# Patient Record
Sex: MALE | Race: WHITE | NOT HISPANIC OR LATINO | Employment: OTHER | URBAN - METROPOLITAN AREA
[De-identification: names, ages, dates, MRNs, and addresses within clinical notes are randomized per-mention and may not be internally consistent; named-entity substitution may affect disease eponyms.]

---

## 2023-08-28 ENCOUNTER — HOSPITAL ENCOUNTER (OUTPATIENT)
Dept: DATA CONVERSION | Facility: HOSPITAL | Age: 73
Discharge: HOME | End: 2023-08-28
Payer: MEDICARE

## 2023-08-28 DIAGNOSIS — R29.898 OTHER SYMPTOMS AND SIGNS INVOLVING THE MUSCULOSKELETAL SYSTEM: ICD-10-CM

## 2023-08-28 LAB
BASOPHILS # BLD AUTO: 0.1 K/UL (ref 0–0.22)
BASOPHILS NFR BLD AUTO: 1.1 % (ref 0–1)
DEPRECATED RDW RBC AUTO: 53 FL (ref 37–54)
DIFFERENTIAL METHOD BLD: ABNORMAL
EOSINOPHIL # BLD AUTO: 0.1 K/UL (ref 0–0.45)
EOSINOPHIL NFR BLD: 1.1 % (ref 0–3)
ERYTHROCYTE [DISTWIDTH] IN BLOOD BY AUTOMATED COUNT: 15.1 % (ref 11.7–15)
ERYTHROCYTE [SEDIMENTATION RATE] IN BLOOD BY WESTERGREN METHOD: 27 MM/HR (ref 0–20)
HCT VFR BLD AUTO: 34.8 % (ref 41–50)
HGB BLD-MCNC: 10.9 GM/DL (ref 13.5–16.5)
IMM GRANULOCYTES # BLD AUTO: 0.01 K/UL (ref 0–0.1)
LYMPHOCYTES # BLD AUTO: 1.3 K/UL (ref 1.2–3.2)
LYMPHOCYTES NFR BLD MANUAL: 14.3 % (ref 20–40)
MCH RBC QN AUTO: 29.8 PG (ref 26–34)
MCHC RBC AUTO-ENTMCNC: 31.3 % (ref 31–37)
MCV RBC AUTO: 95.1 FL (ref 80–100)
MONOCYTES # BLD AUTO: 0.6 K/UL (ref 0–0.8)
MONOCYTES NFR BLD MANUAL: 6.6 % (ref 0–8)
NEUTROPHILS # BLD AUTO: 6.98 K/UL
NEUTROPHILS # BLD AUTO: 6.98 K/UL (ref 1.8–7.7)
NEUTROPHILS.IMMATURE NFR BLD: 0.1 % (ref 0–1)
NEUTS SEG NFR BLD: 76.8 % (ref 50–70)
NRBC BLD-RTO: 0 /100 WBC
PLATELET # BLD AUTO: 218 K/UL (ref 150–450)
PMV BLD AUTO: 10 CU (ref 7–12.6)
RBC # BLD AUTO: 3.66 M/UL (ref 4.5–5.5)
WBC # BLD AUTO: 9.1 K/UL (ref 4.5–11)

## 2023-09-01 PROBLEM — M15.0 PRIMARY OSTEOARTHRITIS INVOLVING MULTIPLE JOINTS: Status: ACTIVE | Noted: 2023-09-01

## 2023-09-01 PROBLEM — I10 PRIMARY HYPERTENSION: Status: ACTIVE | Noted: 2023-09-01

## 2023-09-01 PROBLEM — N18.32 STAGE 3B CHRONIC KIDNEY DISEASE (MULTI): Status: ACTIVE | Noted: 2023-09-01

## 2023-09-01 PROBLEM — K25.9: Status: ACTIVE | Noted: 2023-09-01

## 2023-09-01 PROBLEM — G47.30 SLEEP APNEA: Status: ACTIVE | Noted: 2023-09-01

## 2023-09-01 PROBLEM — M15.9 PRIMARY OSTEOARTHRITIS INVOLVING MULTIPLE JOINTS: Status: ACTIVE | Noted: 2023-09-01

## 2023-09-01 PROBLEM — R11.2 NAUSEA & VOMITING: Status: ACTIVE | Noted: 2023-09-01

## 2023-09-01 PROBLEM — I11.9 BENIGN HYPERTENSIVE HEART DISEASE: Status: ACTIVE | Noted: 2023-09-01

## 2023-09-01 PROBLEM — M81.0 AGE-RELATED OSTEOPOROSIS WITHOUT CURRENT PATHOLOGICAL FRACTURE: Status: ACTIVE | Noted: 2023-09-01

## 2023-09-01 PROBLEM — H90.6 MIXED CONDUCTIVE AND SENSORINEURAL HEARING LOSS OF BOTH EARS: Status: ACTIVE | Noted: 2023-09-01

## 2023-09-01 PROBLEM — I70.90 ARTERIOSCLEROSIS: Status: ACTIVE | Noted: 2023-09-01

## 2023-09-01 PROBLEM — K57.30 COLON, DIVERTICULOSIS: Status: ACTIVE | Noted: 2023-09-01

## 2023-09-01 PROBLEM — E11.9 TYPE 2 DIABETES MELLITUS WITHOUT COMPLICATION (MULTI): Status: ACTIVE | Noted: 2023-09-01

## 2023-09-01 PROBLEM — K59.01 SLOW TRANSIT CONSTIPATION: Status: ACTIVE | Noted: 2023-09-01

## 2023-09-01 PROBLEM — D50.0 IRON DEFICIENCY ANEMIA DUE TO CHRONIC BLOOD LOSS: Status: ACTIVE | Noted: 2023-09-01

## 2023-09-01 PROBLEM — F03.918 DEMENTIA WITH BEHAVIORAL DISTURBANCE (MULTI): Status: ACTIVE | Noted: 2023-09-01

## 2023-09-01 PROBLEM — I25.10 ATHEROSCLEROSIS OF CORONARY ARTERY WITHOUT ANGINA PECTORIS: Status: ACTIVE | Noted: 2023-09-01

## 2023-09-01 PROBLEM — I25.10 CORONARY ARTERY DISEASE INVOLVING NATIVE CORONARY ARTERY OF NATIVE HEART WITHOUT ANGINA PECTORIS: Status: ACTIVE | Noted: 2023-09-01

## 2023-09-01 RX ORDER — FISH OIL/DHA/EPA 1200-144MG
1 CAPSULE ORAL DAILY
COMMUNITY
End: 2023-11-24 | Stop reason: ALTCHOICE

## 2023-09-01 RX ORDER — DULOXETIN HYDROCHLORIDE 60 MG/1
1 CAPSULE, DELAYED RELEASE ORAL DAILY
COMMUNITY
End: 2023-12-30 | Stop reason: HOSPADM

## 2023-09-01 RX ORDER — FOLIC ACID 0.4 MG
1 TABLET ORAL DAILY
COMMUNITY

## 2023-09-01 RX ORDER — SOLIFENACIN SUCCINATE 5 MG/1
1 TABLET, FILM COATED ORAL DAILY
COMMUNITY
End: 2023-11-24 | Stop reason: ALTCHOICE

## 2023-09-01 RX ORDER — SUCRALFATE 1 G/1
1 TABLET ORAL
COMMUNITY
End: 2023-10-26 | Stop reason: HOSPADM

## 2023-09-01 RX ORDER — PANTOPRAZOLE SODIUM 40 MG/1
1 TABLET, DELAYED RELEASE ORAL DAILY
COMMUNITY
End: 2024-03-04 | Stop reason: ALTCHOICE

## 2023-09-01 RX ORDER — ASPIRIN 81 MG/1
1 TABLET ORAL DAILY
COMMUNITY
End: 2023-10-24 | Stop reason: ALTCHOICE

## 2023-09-01 RX ORDER — INSULIN HUMAN 100 [IU]/ML
INJECTION, SOLUTION PARENTERAL
COMMUNITY

## 2023-09-01 RX ORDER — DOCUSATE SODIUM 100 MG/1
100 CAPSULE, LIQUID FILLED ORAL 2 TIMES DAILY
COMMUNITY
End: 2023-10-24 | Stop reason: ALTCHOICE

## 2023-09-01 RX ORDER — ONDANSETRON 4 MG/1
1 TABLET, FILM COATED ORAL EVERY 6 HOURS PRN
COMMUNITY
End: 2023-12-30 | Stop reason: HOSPADM

## 2023-09-01 RX ORDER — LEVETIRACETAM 500 MG/1
1 TABLET ORAL EVERY 12 HOURS
COMMUNITY

## 2023-09-01 RX ORDER — LANOLIN ALCOHOL/MO/W.PET/CERES
1 CREAM (GRAM) TOPICAL DAILY
COMMUNITY

## 2023-09-01 RX ORDER — GLUCOSAM/CHONDRO/HERB 149/HYAL 750-100 MG
1 TABLET ORAL DAILY
COMMUNITY
End: 2023-11-24 | Stop reason: ALTCHOICE

## 2023-09-01 RX ORDER — FERROUS SULFATE 325(65) MG
1 TABLET, DELAYED RELEASE (ENTERIC COATED) ORAL DAILY
COMMUNITY
End: 2023-10-26 | Stop reason: HOSPADM

## 2023-09-01 RX ORDER — DONEPEZIL HYDROCHLORIDE 10 MG/1
1 TABLET, FILM COATED ORAL NIGHTLY
COMMUNITY
End: 2023-12-30 | Stop reason: HOSPADM

## 2023-09-01 RX ORDER — OXYCODONE HYDROCHLORIDE 15 MG/1
1 TABLET ORAL 4 TIMES DAILY PRN
COMMUNITY
End: 2023-12-30 | Stop reason: HOSPADM

## 2023-09-01 RX ORDER — ATORVASTATIN CALCIUM 10 MG/1
1 TABLET, FILM COATED ORAL DAILY
COMMUNITY
End: 2023-12-30 | Stop reason: HOSPADM

## 2023-09-01 RX ORDER — CLOPIDOGREL BISULFATE 75 MG/1
1 TABLET ORAL DAILY
COMMUNITY
End: 2023-11-06

## 2023-09-01 RX ORDER — SENNOSIDES 8.6 MG/1
2 TABLET ORAL NIGHTLY
COMMUNITY

## 2023-09-01 RX ORDER — TALC
3 POWDER (GRAM) TOPICAL NIGHTLY PRN
COMMUNITY
End: 2023-11-24 | Stop reason: ALTCHOICE

## 2023-09-01 RX ORDER — FENOFIBRATE 160 MG/1
TABLET ORAL
COMMUNITY
End: 2023-12-30 | Stop reason: HOSPADM

## 2023-09-01 RX ORDER — OMEGA-3 FATTY ACIDS 1000 MG
1000 CAPSULE ORAL DAILY
COMMUNITY
End: 2023-10-26 | Stop reason: HOSPADM

## 2023-09-01 RX ORDER — TRAZODONE HYDROCHLORIDE 150 MG/1
1 TABLET ORAL NIGHTLY
COMMUNITY
End: 2023-11-15 | Stop reason: SDUPTHER

## 2023-09-01 RX ORDER — GABAPENTIN 400 MG/1
400 CAPSULE ORAL DAILY
COMMUNITY
End: 2023-10-26 | Stop reason: HOSPADM

## 2023-09-01 RX ORDER — GABAPENTIN 600 MG/1
1 TABLET ORAL DAILY
COMMUNITY
End: 2023-12-30 | Stop reason: HOSPADM

## 2023-09-01 RX ORDER — GLUC/MSM/COLGN2/HYAL/ANTIARTH3 375-375-20
1 TABLET ORAL DAILY
COMMUNITY
End: 2023-10-26 | Stop reason: HOSPADM

## 2023-09-01 RX ORDER — DICYCLOMINE HYDROCHLORIDE 10 MG/1
10 CAPSULE ORAL 3 TIMES DAILY PRN
COMMUNITY
End: 2023-10-24

## 2023-09-01 RX ORDER — LOSARTAN POTASSIUM 100 MG/1
1 TABLET ORAL DAILY
COMMUNITY
End: 2023-12-30 | Stop reason: HOSPADM

## 2023-09-01 RX ORDER — AMLODIPINE BESYLATE 5 MG/1
1 TABLET ORAL DAILY
COMMUNITY
End: 2023-12-30 | Stop reason: HOSPADM

## 2023-09-01 RX ORDER — EZETIMIBE AND SIMVASTATIN 10; 80 MG/1; MG/1
1 TABLET ORAL DAILY
COMMUNITY
End: 2023-10-24 | Stop reason: ALTCHOICE

## 2023-09-01 RX ORDER — ONDANSETRON 4 MG/1
4 TABLET, FILM COATED ORAL EVERY 6 HOURS PRN
COMMUNITY
End: 2023-10-26 | Stop reason: HOSPADM

## 2023-09-01 RX ORDER — FERROUS SULFATE 325(65) MG
1 TABLET, DELAYED RELEASE (ENTERIC COATED) ORAL 2 TIMES DAILY
COMMUNITY

## 2023-09-01 RX ORDER — LACTULOSE 10 G/15ML
10 SOLUTION ORAL; RECTAL DAILY
COMMUNITY
End: 2023-11-03

## 2023-09-01 RX ORDER — INSULIN GLARGINE 100 [IU]/ML
40 INJECTION, SOLUTION SUBCUTANEOUS DAILY
COMMUNITY
End: 2023-12-30 | Stop reason: HOSPADM

## 2023-09-01 RX ORDER — TAMSULOSIN HYDROCHLORIDE 0.4 MG/1
CAPSULE ORAL
COMMUNITY

## 2023-10-04 DIAGNOSIS — I48.11 LONGSTANDING PERSISTENT ATRIAL FIBRILLATION (MULTI): Primary | ICD-10-CM

## 2023-10-23 ENCOUNTER — APPOINTMENT (OUTPATIENT)
Dept: RADIOLOGY | Facility: HOSPITAL | Age: 73
DRG: 309 | End: 2023-10-23
Payer: MEDICARE

## 2023-10-23 ENCOUNTER — HOSPITAL ENCOUNTER (INPATIENT)
Facility: HOSPITAL | Age: 73
LOS: 2 days | Discharge: HOME | DRG: 309 | End: 2023-10-26
Attending: EMERGENCY MEDICINE | Admitting: INTERNAL MEDICINE
Payer: MEDICARE

## 2023-10-23 DIAGNOSIS — R41.0 DELIRIUM: ICD-10-CM

## 2023-10-23 DIAGNOSIS — R94.31 ABNORMAL ELECTROCARDIOGRAM (ECG) (EKG): ICD-10-CM

## 2023-10-23 DIAGNOSIS — R55 SYNCOPE AND COLLAPSE: Primary | ICD-10-CM

## 2023-10-23 DIAGNOSIS — I95.89 HYPOTENSION DUE TO HYPOVOLEMIA: ICD-10-CM

## 2023-10-23 DIAGNOSIS — N18.9 ACUTE RENAL FAILURE SUPERIMPOSED ON CHRONIC KIDNEY DISEASE, UNSPECIFIED ACUTE RENAL FAILURE TYPE, UNSPECIFIED CKD STAGE: ICD-10-CM

## 2023-10-23 DIAGNOSIS — I25.10 CORONARY ARTERY DISEASE INVOLVING NATIVE CORONARY ARTERY OF NATIVE HEART WITHOUT ANGINA PECTORIS: ICD-10-CM

## 2023-10-23 DIAGNOSIS — N17.9 ACUTE RENAL FAILURE SUPERIMPOSED ON CHRONIC KIDNEY DISEASE, UNSPECIFIED ACUTE RENAL FAILURE TYPE, UNSPECIFIED CKD STAGE: ICD-10-CM

## 2023-10-23 DIAGNOSIS — N18.32 STAGE 3B CHRONIC KIDNEY DISEASE (MULTI): ICD-10-CM

## 2023-10-23 DIAGNOSIS — E86.1 HYPOTENSION DUE TO HYPOVOLEMIA: ICD-10-CM

## 2023-10-23 DIAGNOSIS — I48.91 ATRIAL FIBRILLATION, UNSPECIFIED TYPE (MULTI): ICD-10-CM

## 2023-10-23 DIAGNOSIS — S20.212A CONTUSION OF LEFT CHEST WALL, INITIAL ENCOUNTER: ICD-10-CM

## 2023-10-23 DIAGNOSIS — I31.39 PERICARDIAL EFFUSION (HHS-HCC): ICD-10-CM

## 2023-10-23 LAB
BASOPHILS # BLD AUTO: 0.04 X10*3/UL (ref 0–0.1)
BASOPHILS NFR BLD AUTO: 0.4 %
EOSINOPHIL # BLD AUTO: 0.08 X10*3/UL (ref 0–0.4)
EOSINOPHIL NFR BLD AUTO: 0.9 %
ERYTHROCYTE [DISTWIDTH] IN BLOOD BY AUTOMATED COUNT: 14.1 % (ref 11.5–14.5)
GLUCOSE BLD MANUAL STRIP-MCNC: 152 MG/DL (ref 74–99)
HCT VFR BLD AUTO: 38.2 % (ref 41–52)
HGB BLD-MCNC: 12.6 G/DL (ref 13.5–17.5)
IMM GRANULOCYTES # BLD AUTO: 0.04 X10*3/UL (ref 0–0.5)
IMM GRANULOCYTES NFR BLD AUTO: 0.4 % (ref 0–0.9)
LYMPHOCYTES # BLD AUTO: 1.23 X10*3/UL (ref 0.8–3)
LYMPHOCYTES NFR BLD AUTO: 13.5 %
MCH RBC QN AUTO: 30.6 PG (ref 26–34)
MCHC RBC AUTO-ENTMCNC: 33 G/DL (ref 32–36)
MCV RBC AUTO: 93 FL (ref 80–100)
MONOCYTES # BLD AUTO: 0.7 X10*3/UL (ref 0.05–0.8)
MONOCYTES NFR BLD AUTO: 7.7 %
NEUTROPHILS # BLD AUTO: 7.05 X10*3/UL (ref 1.6–5.5)
NEUTROPHILS NFR BLD AUTO: 77.1 %
NRBC BLD-RTO: 0 /100 WBCS (ref 0–0)
PLATELET # BLD AUTO: 239 X10*3/UL (ref 150–450)
PMV BLD AUTO: 10.2 FL (ref 7.5–11.5)
RBC # BLD AUTO: 4.12 X10*6/UL (ref 4.5–5.9)
WBC # BLD AUTO: 9.1 X10*3/UL (ref 4.4–11.3)

## 2023-10-23 PROCEDURE — 82947 ASSAY GLUCOSE BLOOD QUANT: CPT

## 2023-10-23 PROCEDURE — 85610 PROTHROMBIN TIME: CPT | Performed by: EMERGENCY MEDICINE

## 2023-10-23 PROCEDURE — 96374 THER/PROPH/DIAG INJ IV PUSH: CPT

## 2023-10-23 PROCEDURE — 85730 THROMBOPLASTIN TIME PARTIAL: CPT | Performed by: EMERGENCY MEDICINE

## 2023-10-23 PROCEDURE — 36415 COLL VENOUS BLD VENIPUNCTURE: CPT | Performed by: EMERGENCY MEDICINE

## 2023-10-23 PROCEDURE — 84075 ASSAY ALKALINE PHOSPHATASE: CPT | Performed by: EMERGENCY MEDICINE

## 2023-10-23 PROCEDURE — 96375 TX/PRO/DX INJ NEW DRUG ADDON: CPT

## 2023-10-23 PROCEDURE — 85025 COMPLETE CBC W/AUTO DIFF WBC: CPT | Performed by: EMERGENCY MEDICINE

## 2023-10-23 PROCEDURE — 2500000004 HC RX 250 GENERAL PHARMACY W/ HCPCS (ALT 636 FOR OP/ED): Performed by: EMERGENCY MEDICINE

## 2023-10-23 PROCEDURE — 70450 CT HEAD/BRAIN W/O DYE: CPT | Mod: MG

## 2023-10-23 PROCEDURE — 99285 EMERGENCY DEPT VISIT HI MDM: CPT | Mod: 25 | Performed by: EMERGENCY MEDICINE

## 2023-10-23 PROCEDURE — 86901 BLOOD TYPING SEROLOGIC RH(D): CPT | Performed by: EMERGENCY MEDICINE

## 2023-10-23 PROCEDURE — 84484 ASSAY OF TROPONIN QUANT: CPT | Performed by: EMERGENCY MEDICINE

## 2023-10-23 RX ADMIN — SODIUM CHLORIDE 1000 ML: 9 INJECTION, SOLUTION INTRAVENOUS at 00:01

## 2023-10-24 ENCOUNTER — DOCUMENTATION (OUTPATIENT)
Dept: INTERNAL MEDICINE | Facility: HOSPITAL | Age: 73
End: 2023-10-24

## 2023-10-24 ENCOUNTER — APPOINTMENT (OUTPATIENT)
Dept: RADIOLOGY | Facility: HOSPITAL | Age: 73
DRG: 309 | End: 2023-10-24
Payer: MEDICARE

## 2023-10-24 ENCOUNTER — APPOINTMENT (OUTPATIENT)
Dept: CARDIOLOGY | Facility: HOSPITAL | Age: 73
DRG: 309 | End: 2023-10-24
Payer: MEDICARE

## 2023-10-24 PROBLEM — I48.91 ATRIAL FIBRILLATION (MULTI): Status: ACTIVE | Noted: 2023-10-24

## 2023-10-24 PROBLEM — R55 SYNCOPE AND COLLAPSE: Status: ACTIVE | Noted: 2023-10-24

## 2023-10-24 PROBLEM — S20.212A CHEST WALL CONTUSION, LEFT, INITIAL ENCOUNTER: Status: ACTIVE | Noted: 2023-10-24

## 2023-10-24 PROBLEM — I31.39 PERICARDIAL EFFUSION (HHS-HCC): Status: ACTIVE | Noted: 2023-10-24

## 2023-10-24 LAB
ABO GROUP (TYPE) IN BLOOD: NORMAL
ALBUMIN SERPL-MCNC: 3.6 G/DL (ref 3.5–5)
ALBUMIN SERPL-MCNC: 3.8 G/DL (ref 3.5–5)
ALP BLD-CCNC: 31 U/L (ref 35–125)
ALT SERPL-CCNC: 14 U/L (ref 5–40)
ANION GAP SERPL CALC-SCNC: 10 MMOL/L
ANION GAP SERPL CALC-SCNC: 14 MMOL/L
ANTIBODY SCREEN: NORMAL
APTT PPP: 22.3 SECONDS (ref 22–32.5)
AST SERPL-CCNC: 16 U/L (ref 5–40)
BASOPHILS # BLD AUTO: 0.04 X10*3/UL (ref 0–0.1)
BASOPHILS NFR BLD AUTO: 0.4 %
BILIRUB SERPL-MCNC: 0.4 MG/DL (ref 0.1–1.2)
BUN SERPL-MCNC: 46 MG/DL (ref 8–25)
BUN SERPL-MCNC: 47 MG/DL (ref 8–25)
CALCIUM SERPL-MCNC: 8.9 MG/DL (ref 8.5–10.4)
CALCIUM SERPL-MCNC: 8.9 MG/DL (ref 8.5–10.4)
CHLORIDE SERPL-SCNC: 102 MMOL/L (ref 97–107)
CHLORIDE SERPL-SCNC: 103 MMOL/L (ref 97–107)
CO2 SERPL-SCNC: 18 MMOL/L (ref 24–31)
CO2 SERPL-SCNC: 19 MMOL/L (ref 24–31)
CREAT SERPL-MCNC: 2.4 MG/DL (ref 0.4–1.6)
CREAT SERPL-MCNC: 2.4 MG/DL (ref 0.4–1.6)
EJECTION FRACTION APICAL 4 CHAMBER: 56
EOSINOPHIL # BLD AUTO: 0.07 X10*3/UL (ref 0–0.4)
EOSINOPHIL NFR BLD AUTO: 0.6 %
ERYTHROCYTE [DISTWIDTH] IN BLOOD BY AUTOMATED COUNT: 14.1 % (ref 11.5–14.5)
EST. AVERAGE GLUCOSE BLD GHB EST-MCNC: 134 MG/DL
GFR SERPL CREATININE-BSD FRML MDRD: 28 ML/MIN/1.73M*2
GFR SERPL CREATININE-BSD FRML MDRD: 28 ML/MIN/1.73M*2
GLUCOSE BLD MANUAL STRIP-MCNC: 140 MG/DL (ref 74–99)
GLUCOSE BLD MANUAL STRIP-MCNC: 164 MG/DL (ref 74–99)
GLUCOSE BLD MANUAL STRIP-MCNC: 177 MG/DL (ref 74–99)
GLUCOSE BLD MANUAL STRIP-MCNC: 234 MG/DL (ref 74–99)
GLUCOSE SERPL-MCNC: 140 MG/DL (ref 65–99)
GLUCOSE SERPL-MCNC: 145 MG/DL (ref 65–99)
HBA1C MFR BLD: 6.3 %
HCT VFR BLD AUTO: 39.1 % (ref 41–52)
HGB BLD-MCNC: 13 G/DL (ref 13.5–17.5)
HOLD SPECIMEN: NORMAL
IMM GRANULOCYTES # BLD AUTO: 0.06 X10*3/UL (ref 0–0.5)
IMM GRANULOCYTES NFR BLD AUTO: 0.5 % (ref 0–0.9)
INR PPP: 1.1 (ref 0.9–1.2)
LEVETIRACETAM SERPL-MCNC: 9 UG/ML (ref 10–40)
LYMPHOCYTES # BLD AUTO: 1.36 X10*3/UL (ref 0.8–3)
LYMPHOCYTES NFR BLD AUTO: 12.4 %
MAGNESIUM SERPL-MCNC: 2.2 MG/DL (ref 1.6–3.1)
MCH RBC QN AUTO: 30 PG (ref 26–34)
MCHC RBC AUTO-ENTMCNC: 33.2 G/DL (ref 32–36)
MCV RBC AUTO: 90 FL (ref 80–100)
MONOCYTES # BLD AUTO: 0.71 X10*3/UL (ref 0.05–0.8)
MONOCYTES NFR BLD AUTO: 6.5 %
NEUTROPHILS # BLD AUTO: 8.73 X10*3/UL (ref 1.6–5.5)
NEUTROPHILS NFR BLD AUTO: 79.6 %
NRBC BLD-RTO: 0 /100 WBCS (ref 0–0)
PHOSPHATE SERPL-MCNC: 3.2 MG/DL (ref 2.5–4.5)
PLATELET # BLD AUTO: 219 X10*3/UL (ref 150–450)
PMV BLD AUTO: 10.5 FL (ref 7.5–11.5)
POTASSIUM SERPL-SCNC: 4.2 MMOL/L (ref 3.4–5.1)
POTASSIUM SERPL-SCNC: 4.5 MMOL/L (ref 3.4–5.1)
PROT SERPL-MCNC: 5.8 G/DL (ref 5.9–7.9)
PROTHROMBIN TIME: 11.9 SECONDS (ref 9.3–12.7)
RBC # BLD AUTO: 4.33 X10*6/UL (ref 4.5–5.9)
RH FACTOR (ANTIGEN D): NORMAL
SODIUM SERPL-SCNC: 132 MMOL/L (ref 133–145)
SODIUM SERPL-SCNC: 134 MMOL/L (ref 133–145)
TROPONIN T SERPL-MCNC: 77 NG/L
TSH SERPL DL<=0.05 MIU/L-ACNC: 0.63 MIU/L (ref 0.27–4.2)
WBC # BLD AUTO: 11 X10*3/UL (ref 4.4–11.3)

## 2023-10-24 PROCEDURE — 93306 TTE W/DOPPLER COMPLETE: CPT | Performed by: INTERNAL MEDICINE

## 2023-10-24 PROCEDURE — 2500000004 HC RX 250 GENERAL PHARMACY W/ HCPCS (ALT 636 FOR OP/ED): Performed by: INTERNAL MEDICINE

## 2023-10-24 PROCEDURE — 84100 ASSAY OF PHOSPHORUS: CPT | Performed by: INTERNAL MEDICINE

## 2023-10-24 PROCEDURE — 93306 TTE W/DOPPLER COMPLETE: CPT

## 2023-10-24 PROCEDURE — G0378 HOSPITAL OBSERVATION PER HR: HCPCS

## 2023-10-24 PROCEDURE — 2060000001 HC INTERMEDIATE ICU ROOM DAILY

## 2023-10-24 PROCEDURE — 96372 THER/PROPH/DIAG INJ SC/IM: CPT | Performed by: INTERNAL MEDICINE

## 2023-10-24 PROCEDURE — 80177 DRUG SCRN QUAN LEVETIRACETAM: CPT | Mod: WESLAB | Performed by: STUDENT IN AN ORGANIZED HEALTH CARE EDUCATION/TRAINING PROGRAM

## 2023-10-24 PROCEDURE — 82947 ASSAY GLUCOSE BLOOD QUANT: CPT

## 2023-10-24 PROCEDURE — 2500000005 HC RX 250 GENERAL PHARMACY W/O HCPCS: Performed by: EMERGENCY MEDICINE

## 2023-10-24 PROCEDURE — 2500000001 HC RX 250 WO HCPCS SELF ADMINISTERED DRUGS (ALT 637 FOR MEDICARE OP): Performed by: NURSE PRACTITIONER

## 2023-10-24 PROCEDURE — 2500000001 HC RX 250 WO HCPCS SELF ADMINISTERED DRUGS (ALT 637 FOR MEDICARE OP): Performed by: STUDENT IN AN ORGANIZED HEALTH CARE EDUCATION/TRAINING PROGRAM

## 2023-10-24 PROCEDURE — 85025 COMPLETE CBC W/AUTO DIFF WBC: CPT | Performed by: INTERNAL MEDICINE

## 2023-10-24 PROCEDURE — 99222 1ST HOSP IP/OBS MODERATE 55: CPT | Performed by: NURSE PRACTITIONER

## 2023-10-24 PROCEDURE — 74176 CT ABD & PELVIS W/O CONTRAST: CPT | Mod: ME

## 2023-10-24 PROCEDURE — 2500000005 HC RX 250 GENERAL PHARMACY W/O HCPCS: Performed by: INTERNAL MEDICINE

## 2023-10-24 PROCEDURE — 2500000004 HC RX 250 GENERAL PHARMACY W/ HCPCS (ALT 636 FOR OP/ED): Performed by: EMERGENCY MEDICINE

## 2023-10-24 PROCEDURE — 84443 ASSAY THYROID STIM HORMONE: CPT | Performed by: INTERNAL MEDICINE

## 2023-10-24 PROCEDURE — 36415 COLL VENOUS BLD VENIPUNCTURE: CPT | Performed by: INTERNAL MEDICINE

## 2023-10-24 PROCEDURE — 2500000001 HC RX 250 WO HCPCS SELF ADMINISTERED DRUGS (ALT 637 FOR MEDICARE OP): Performed by: INTERNAL MEDICINE

## 2023-10-24 PROCEDURE — 2500000001 HC RX 250 WO HCPCS SELF ADMINISTERED DRUGS (ALT 637 FOR MEDICARE OP): Performed by: EMERGENCY MEDICINE

## 2023-10-24 PROCEDURE — 36415 COLL VENOUS BLD VENIPUNCTURE: CPT | Performed by: STUDENT IN AN ORGANIZED HEALTH CARE EDUCATION/TRAINING PROGRAM

## 2023-10-24 PROCEDURE — 83735 ASSAY OF MAGNESIUM: CPT | Performed by: INTERNAL MEDICINE

## 2023-10-24 PROCEDURE — 2500000002 HC RX 250 W HCPCS SELF ADMINISTERED DRUGS (ALT 637 FOR MEDICARE OP, ALT 636 FOR OP/ED): Performed by: INTERNAL MEDICINE

## 2023-10-24 PROCEDURE — 83036 HEMOGLOBIN GLYCOSYLATED A1C: CPT | Performed by: INTERNAL MEDICINE

## 2023-10-24 RX ORDER — DONEPEZIL HYDROCHLORIDE 10 MG/1
10 TABLET, FILM COATED ORAL NIGHTLY
Status: DISCONTINUED | OUTPATIENT
Start: 2023-10-24 | End: 2023-10-26 | Stop reason: HOSPADM

## 2023-10-24 RX ORDER — ACETAMINOPHEN 325 MG/1
650 TABLET ORAL EVERY 4 HOURS PRN
Status: DISCONTINUED | OUTPATIENT
Start: 2023-10-24 | End: 2023-10-26 | Stop reason: HOSPADM

## 2023-10-24 RX ORDER — AMLODIPINE BESYLATE 5 MG/1
5 TABLET ORAL DAILY
Status: DISCONTINUED | OUTPATIENT
Start: 2023-10-24 | End: 2023-10-26 | Stop reason: HOSPADM

## 2023-10-24 RX ORDER — DEXTROSE MONOHYDRATE 100 MG/ML
0.3 INJECTION, SOLUTION INTRAVENOUS ONCE AS NEEDED
Status: DISCONTINUED | OUTPATIENT
Start: 2023-10-24 | End: 2023-10-26 | Stop reason: HOSPADM

## 2023-10-24 RX ORDER — ACETAMINOPHEN 325 MG/1
650 TABLET ORAL ONCE
Status: COMPLETED | OUTPATIENT
Start: 2023-10-24 | End: 2023-10-24

## 2023-10-24 RX ORDER — FOLIC ACID 0.4 MG
0.4 TABLET ORAL DAILY
Status: DISCONTINUED | OUTPATIENT
Start: 2023-10-24 | End: 2023-10-26 | Stop reason: HOSPADM

## 2023-10-24 RX ORDER — ONDANSETRON HYDROCHLORIDE 2 MG/ML
4 INJECTION, SOLUTION INTRAVENOUS EVERY 8 HOURS PRN
Status: DISCONTINUED | OUTPATIENT
Start: 2023-10-24 | End: 2023-10-26 | Stop reason: HOSPADM

## 2023-10-24 RX ORDER — SENNOSIDES 8.6 MG/1
2 TABLET ORAL NIGHTLY
Status: DISCONTINUED | OUTPATIENT
Start: 2023-10-24 | End: 2023-10-26 | Stop reason: HOSPADM

## 2023-10-24 RX ORDER — HEPARIN SODIUM 10000 [USP'U]/100ML
0-4000 INJECTION, SOLUTION INTRAVENOUS CONTINUOUS
Status: DISCONTINUED | OUTPATIENT
Start: 2023-10-24 | End: 2023-10-24 | Stop reason: ALTCHOICE

## 2023-10-24 RX ORDER — LANOLIN ALCOHOL/MO/W.PET/CERES
1000 CREAM (GRAM) TOPICAL DAILY
Status: DISCONTINUED | OUTPATIENT
Start: 2023-10-24 | End: 2023-10-26 | Stop reason: HOSPADM

## 2023-10-24 RX ORDER — LACTULOSE 10 G/15ML
20 SOLUTION ORAL AS NEEDED
Status: DISCONTINUED | OUTPATIENT
Start: 2023-10-24 | End: 2023-10-26 | Stop reason: HOSPADM

## 2023-10-24 RX ORDER — OXYCODONE HYDROCHLORIDE 5 MG/1
15 TABLET ORAL EVERY 6 HOURS PRN
Status: DISPENSED | OUTPATIENT
Start: 2023-10-24 | End: 2023-10-26

## 2023-10-24 RX ORDER — OXYCODONE HYDROCHLORIDE 5 MG/1
15 TABLET ORAL EVERY 6 HOURS PRN
Status: CANCELLED | OUTPATIENT
Start: 2023-10-24 | End: 2023-10-26

## 2023-10-24 RX ORDER — METOPROLOL TARTRATE 1 MG/ML
5 INJECTION, SOLUTION INTRAVENOUS EVERY 6 HOURS PRN
Status: DISCONTINUED | OUTPATIENT
Start: 2023-10-24 | End: 2023-10-26 | Stop reason: HOSPADM

## 2023-10-24 RX ORDER — SODIUM CHLORIDE 9 MG/ML
75 INJECTION, SOLUTION INTRAVENOUS CONTINUOUS
Status: ACTIVE | OUTPATIENT
Start: 2023-10-24 | End: 2023-10-25

## 2023-10-24 RX ORDER — GABAPENTIN 300 MG/1
300 CAPSULE ORAL ONCE
Status: COMPLETED | OUTPATIENT
Start: 2023-10-24 | End: 2023-10-24

## 2023-10-24 RX ORDER — ONDANSETRON 4 MG/1
4 TABLET, ORALLY DISINTEGRATING ORAL EVERY 8 HOURS PRN
Status: DISCONTINUED | OUTPATIENT
Start: 2023-10-24 | End: 2023-10-26 | Stop reason: HOSPADM

## 2023-10-24 RX ORDER — INSULIN LISPRO 100 [IU]/ML
0-5 INJECTION, SOLUTION INTRAVENOUS; SUBCUTANEOUS
Status: DISCONTINUED | OUTPATIENT
Start: 2023-10-24 | End: 2023-10-24

## 2023-10-24 RX ORDER — METOPROLOL SUCCINATE 25 MG/1
25 TABLET, EXTENDED RELEASE ORAL 2 TIMES DAILY
Status: DISCONTINUED | OUTPATIENT
Start: 2023-10-24 | End: 2023-10-26 | Stop reason: HOSPADM

## 2023-10-24 RX ORDER — CLOPIDOGREL BISULFATE 75 MG/1
75 TABLET ORAL DAILY
Status: DISCONTINUED | OUTPATIENT
Start: 2023-10-24 | End: 2023-10-26 | Stop reason: HOSPADM

## 2023-10-24 RX ORDER — IPRATROPIUM BROMIDE AND ALBUTEROL SULFATE 2.5; .5 MG/3ML; MG/3ML
3 SOLUTION RESPIRATORY (INHALATION) EVERY 6 HOURS PRN
Status: DISCONTINUED | OUTPATIENT
Start: 2023-10-24 | End: 2023-10-26 | Stop reason: HOSPADM

## 2023-10-24 RX ORDER — ACETAMINOPHEN 650 MG/1
650 SUPPOSITORY RECTAL EVERY 4 HOURS PRN
Status: DISCONTINUED | OUTPATIENT
Start: 2023-10-24 | End: 2023-10-26 | Stop reason: HOSPADM

## 2023-10-24 RX ORDER — DEXTROSE 50 % IN WATER (D50W) INTRAVENOUS SYRINGE
25
Status: DISCONTINUED | OUTPATIENT
Start: 2023-10-24 | End: 2023-10-26 | Stop reason: HOSPADM

## 2023-10-24 RX ORDER — GUAIFENESIN 600 MG/1
600 TABLET, EXTENDED RELEASE ORAL EVERY 12 HOURS PRN
Status: DISCONTINUED | OUTPATIENT
Start: 2023-10-24 | End: 2023-10-26 | Stop reason: HOSPADM

## 2023-10-24 RX ORDER — LEVETIRACETAM 500 MG/1
500 TABLET ORAL 2 TIMES DAILY
Status: DISCONTINUED | OUTPATIENT
Start: 2023-10-24 | End: 2023-10-26 | Stop reason: HOSPADM

## 2023-10-24 RX ORDER — HEPARIN SODIUM 5000 [USP'U]/ML
2000-4000 INJECTION, SOLUTION INTRAVENOUS; SUBCUTANEOUS AS NEEDED
Status: DISCONTINUED | OUTPATIENT
Start: 2023-10-24 | End: 2023-10-24 | Stop reason: ALTCHOICE

## 2023-10-24 RX ORDER — ATORVASTATIN CALCIUM 10 MG/1
10 TABLET, FILM COATED ORAL NIGHTLY
Status: DISCONTINUED | OUTPATIENT
Start: 2023-10-24 | End: 2023-10-26 | Stop reason: HOSPADM

## 2023-10-24 RX ORDER — INSULIN LISPRO 100 [IU]/ML
0-5 INJECTION, SOLUTION INTRAVENOUS; SUBCUTANEOUS
Status: DISCONTINUED | OUTPATIENT
Start: 2023-10-24 | End: 2023-10-26 | Stop reason: HOSPADM

## 2023-10-24 RX ORDER — ACETAMINOPHEN 160 MG/5ML
650 SOLUTION ORAL EVERY 4 HOURS PRN
Status: DISCONTINUED | OUTPATIENT
Start: 2023-10-24 | End: 2023-10-26 | Stop reason: HOSPADM

## 2023-10-24 RX ORDER — GABAPENTIN 600 MG/1
600 TABLET ORAL 2 TIMES DAILY
Status: DISCONTINUED | OUTPATIENT
Start: 2023-10-24 | End: 2023-10-26 | Stop reason: HOSPADM

## 2023-10-24 RX ORDER — HEPARIN SODIUM 5000 [USP'U]/ML
5000 INJECTION, SOLUTION INTRAVENOUS; SUBCUTANEOUS EVERY 8 HOURS
Status: DISCONTINUED | OUTPATIENT
Start: 2023-10-24 | End: 2023-10-26 | Stop reason: HOSPADM

## 2023-10-24 RX ORDER — ONDANSETRON HYDROCHLORIDE 2 MG/ML
4 INJECTION, SOLUTION INTRAVENOUS EVERY 8 HOURS PRN
Status: DISCONTINUED | OUTPATIENT
Start: 2023-10-24 | End: 2023-10-24

## 2023-10-24 RX ORDER — TAMSULOSIN HYDROCHLORIDE 0.4 MG/1
0.4 CAPSULE ORAL DAILY
Status: DISCONTINUED | OUTPATIENT
Start: 2023-10-24 | End: 2023-10-26 | Stop reason: HOSPADM

## 2023-10-24 RX ORDER — INSULIN LISPRO 100 [IU]/ML
0-5 INJECTION, SOLUTION INTRAVENOUS; SUBCUTANEOUS
Status: DISCONTINUED | OUTPATIENT
Start: 2023-10-25 | End: 2023-10-24

## 2023-10-24 RX ORDER — LIDOCAINE 560 MG/1
1 PATCH PERCUTANEOUS; TOPICAL; TRANSDERMAL NIGHTLY
Status: DISCONTINUED | OUTPATIENT
Start: 2023-10-24 | End: 2023-10-26 | Stop reason: HOSPADM

## 2023-10-24 RX ORDER — FERROUS SULFATE 325(65) MG
65 TABLET ORAL
Status: DISCONTINUED | OUTPATIENT
Start: 2023-10-24 | End: 2023-10-26 | Stop reason: HOSPADM

## 2023-10-24 RX ORDER — HEPARIN SODIUM 5000 [USP'U]/ML
5000 INJECTION, SOLUTION INTRAVENOUS; SUBCUTANEOUS EVERY 8 HOURS
Status: CANCELLED | OUTPATIENT
Start: 2023-10-24

## 2023-10-24 RX ORDER — MORPHINE SULFATE 4 MG/ML
4 INJECTION, SOLUTION INTRAMUSCULAR; INTRAVENOUS ONCE
Status: COMPLETED | OUTPATIENT
Start: 2023-10-24 | End: 2023-10-24

## 2023-10-24 RX ORDER — ONDANSETRON HYDROCHLORIDE 2 MG/ML
4 INJECTION, SOLUTION INTRAVENOUS ONCE
Status: COMPLETED | OUTPATIENT
Start: 2023-10-24 | End: 2023-10-24

## 2023-10-24 RX ORDER — ONDANSETRON 4 MG/1
4 TABLET, FILM COATED ORAL EVERY 8 HOURS PRN
Status: DISCONTINUED | OUTPATIENT
Start: 2023-10-24 | End: 2023-10-24

## 2023-10-24 RX ADMIN — METOPROLOL SUCCINATE 25 MG: 25 TABLET, EXTENDED RELEASE ORAL at 09:38

## 2023-10-24 RX ADMIN — FERROUS SULFATE TAB 325 MG (65 MG ELEMENTAL FE) 65 MG OF IRON: 325 (65 FE) TAB at 09:39

## 2023-10-24 RX ADMIN — MORPHINE SULFATE 4 MG: 4 INJECTION, SOLUTION INTRAMUSCULAR; INTRAVENOUS at 00:34

## 2023-10-24 RX ADMIN — DONEPEZIL HYDROCHLORIDE 10 MG: 10 TABLET, FILM COATED ORAL at 22:24

## 2023-10-24 RX ADMIN — ATORVASTATIN CALCIUM 10 MG: 10 TABLET, FILM COATED ORAL at 22:24

## 2023-10-24 RX ADMIN — CLOPIDOGREL BISULFATE 75 MG: 75 TABLET ORAL at 12:54

## 2023-10-24 RX ADMIN — OXYCODONE HYDROCHLORIDE 15 MG: 5 TABLET ORAL at 05:20

## 2023-10-24 RX ADMIN — LEVETIRACETAM 500 MG: 500 TABLET, FILM COATED ORAL at 12:54

## 2023-10-24 RX ADMIN — METOPROLOL SUCCINATE 25 MG: 25 TABLET, EXTENDED RELEASE ORAL at 22:24

## 2023-10-24 RX ADMIN — HEPARIN SODIUM 5000 UNITS: 5000 INJECTION, SOLUTION INTRAVENOUS; SUBCUTANEOUS at 04:17

## 2023-10-24 RX ADMIN — GABAPENTIN 300 MG: 300 CAPSULE ORAL at 03:28

## 2023-10-24 RX ADMIN — ACETAMINOPHEN 650 MG: 325 TABLET ORAL at 03:30

## 2023-10-24 RX ADMIN — OXYCODONE HYDROCHLORIDE 15 MG: 5 TABLET ORAL at 11:50

## 2023-10-24 RX ADMIN — LEVETIRACETAM 500 MG: 500 TABLET, FILM COATED ORAL at 22:24

## 2023-10-24 RX ADMIN — ONDANSETRON 4 MG: 2 INJECTION INTRAMUSCULAR; INTRAVENOUS at 00:34

## 2023-10-24 RX ADMIN — LIDOCAINE 1 PATCH: 4 PATCH TOPICAL at 01:46

## 2023-10-24 RX ADMIN — TAMSULOSIN HYDROCHLORIDE 0.4 MG: 0.4 CAPSULE ORAL at 12:54

## 2023-10-24 RX ADMIN — HEPARIN SODIUM 5000 UNITS: 5000 INJECTION, SOLUTION INTRAVENOUS; SUBCUTANEOUS at 11:50

## 2023-10-24 RX ADMIN — SENNOSIDES 17.2 MG: 8.6 TABLET, FILM COATED ORAL at 22:24

## 2023-10-24 RX ADMIN — SODIUM CHLORIDE 75 ML/HR: 900 INJECTION, SOLUTION INTRAVENOUS at 12:54

## 2023-10-24 RX ADMIN — HEPARIN SODIUM 5000 UNITS: 5000 INJECTION, SOLUTION INTRAVENOUS; SUBCUTANEOUS at 22:24

## 2023-10-24 RX ADMIN — AMLODIPINE BESYLATE 5 MG: 5 TABLET ORAL at 12:54

## 2023-10-24 RX ADMIN — OXYCODONE HYDROCHLORIDE 15 MG: 5 TABLET ORAL at 18:22

## 2023-10-24 RX ADMIN — LIDOCAINE 1 PATCH: 4 PATCH TOPICAL at 22:24

## 2023-10-24 RX ADMIN — CYANOCOBALAMIN TAB 1000 MCG 1000 MCG: 1000 TAB at 12:54

## 2023-10-24 RX ADMIN — GABAPENTIN 600 MG: 600 TABLET, FILM COATED ORAL at 12:54

## 2023-10-24 RX ADMIN — INSULIN LISPRO 1 UNITS: 100 INJECTION, SOLUTION INTRAVENOUS; SUBCUTANEOUS at 17:04

## 2023-10-24 RX ADMIN — GABAPENTIN 600 MG: 600 TABLET, FILM COATED ORAL at 22:24

## 2023-10-24 RX ADMIN — FOLIC ACID TAB 400 MCG 0.4 MG: 400 TAB at 17:04

## 2023-10-24 SDOH — SOCIAL STABILITY: SOCIAL INSECURITY: ABUSE: ADULT

## 2023-10-24 SDOH — HEALTH STABILITY: MENTAL HEALTH: EXPERIENCED ANY OF THE FOLLOWING LIFE EVENTS: DEATH OF FAMILY/FRIEND

## 2023-10-24 SDOH — SOCIAL STABILITY: SOCIAL INSECURITY: HAVE YOU HAD THOUGHTS OF HARMING ANYONE ELSE?: NO

## 2023-10-24 ASSESSMENT — COGNITIVE AND FUNCTIONAL STATUS - GENERAL
DRESSING REGULAR UPPER BODY CLOTHING: A LITTLE
DRESSING REGULAR UPPER BODY CLOTHING: A LITTLE
DRESSING REGULAR LOWER BODY CLOTHING: A LITTLE
DAILY ACTIVITIY SCORE: 19
HELP NEEDED FOR BATHING: A LITTLE
PATIENT BASELINE BEDBOUND: NO
WALKING IN HOSPITAL ROOM: A LOT
TURNING FROM BACK TO SIDE WHILE IN FLAT BAD: A LITTLE
STANDING UP FROM CHAIR USING ARMS: A LITTLE
MOBILITY SCORE: 17
MOVING TO AND FROM BED TO CHAIR: A LITTLE
TOILETING: A LITTLE
DRESSING REGULAR UPPER BODY CLOTHING: A LITTLE
CLIMB 3 TO 5 STEPS WITH RAILING: A LOT
STANDING UP FROM CHAIR USING ARMS: A LITTLE
MOBILITY SCORE: 16
WALKING IN HOSPITAL ROOM: TOTAL
MOVING FROM LYING ON BACK TO SITTING ON SIDE OF FLAT BED WITH BEDRAILS: A LITTLE
TOILETING: A LITTLE
DRESSING REGULAR LOWER BODY CLOTHING: A LITTLE
WALKING IN HOSPITAL ROOM: A LOT
STANDING UP FROM CHAIR USING ARMS: A LITTLE
DAILY ACTIVITIY SCORE: 19
PERSONAL GROOMING: A LITTLE
CLIMB 3 TO 5 STEPS WITH RAILING: A LOT
DAILY ACTIVITIY SCORE: 19
PERSONAL GROOMING: A LITTLE
TOILETING: A LITTLE
CLIMB 3 TO 5 STEPS WITH RAILING: A LOT
TURNING FROM BACK TO SIDE WHILE IN FLAT BAD: A LITTLE
MOVING TO AND FROM BED TO CHAIR: A LITTLE
HELP NEEDED FOR BATHING: A LITTLE
DRESSING REGULAR LOWER BODY CLOTHING: A LITTLE
HELP NEEDED FOR BATHING: A LITTLE
PERSONAL GROOMING: A LITTLE
MOBILITY SCORE: 18

## 2023-10-24 ASSESSMENT — LIFESTYLE VARIABLES
SKIP TO QUESTIONS 9-10: 1
HOW OFTEN DO YOU HAVE A DRINK CONTAINING ALCOHOL: NEVER
HOW MANY STANDARD DRINKS CONTAINING ALCOHOL DO YOU HAVE ON A TYPICAL DAY: PATIENT DOES NOT DRINK
SUBSTANCE_ABUSE_PAST_12_MONTHS: NO
HOW OFTEN DO YOU HAVE 6 OR MORE DRINKS ON ONE OCCASION: NEVER
PRESCIPTION_ABUSE_PAST_12_MONTHS: NO
AUDIT-C TOTAL SCORE: 0
AUDIT-C TOTAL SCORE: 0

## 2023-10-24 ASSESSMENT — ENCOUNTER SYMPTOMS
SPEECH DIFFICULTY: 0
DIAPHORESIS: 0
PHOTOPHOBIA: 0
POLYDIPSIA: 0
RECTAL PAIN: 0
WHEEZING: 0
CHILLS: 0
COUGH: 0
TREMORS: 0
DIZZINESS: 1
JOINT SWELLING: 0
FREQUENCY: 0
HEMATURIA: 0
BLOOD IN STOOL: 0
DIARRHEA: 0
PHOTOPHOBIA: 0
BACK PAIN: 1
FEVER: 0
SORE THROAT: 0
COLOR CHANGE: 0
FATIGUE: 1
DYSURIA: 0
BRUISES/BLEEDS EASILY: 0
LIGHT-HEADEDNESS: 1
VOMITING: 0
BRUISES/BLEEDS EASILY: 1
ABDOMINAL PAIN: 0
MYALGIAS: 0
CONFUSION: 0
SLEEP DISTURBANCE: 0
POLYPHAGIA: 0
WOUND: 0
CONSTIPATION: 0
NAUSEA: 0
COLOR CHANGE: 0
SHORTNESS OF BREATH: 0
ADENOPATHY: 0
SEIZURES: 0
SINUS PRESSURE: 0
HALLUCINATIONS: 0
WEAKNESS: 1
ARTHRALGIAS: 0
NUMBNESS: 0
PALPITATIONS: 1
PALPITATIONS: 0
FACIAL ASYMMETRY: 0
HEADACHES: 0
NECK PAIN: 0
FEVER: 0
APNEA: 0
DIZZINESS: 0
MYALGIAS: 1
NUMBNESS: 0
CHILLS: 0

## 2023-10-24 ASSESSMENT — PAIN SCALES - GENERAL
PAINLEVEL_OUTOF10: 8
PAINLEVEL_OUTOF10: 7
PAINLEVEL_OUTOF10: 7
PAINLEVEL_OUTOF10: 10 - WORST POSSIBLE PAIN
PAINLEVEL_OUTOF10: 0 - NO PAIN

## 2023-10-24 ASSESSMENT — PAIN - FUNCTIONAL ASSESSMENT
PAIN_FUNCTIONAL_ASSESSMENT: 0-10

## 2023-10-24 ASSESSMENT — PAIN DESCRIPTION - LOCATION: LOCATION: RIB CAGE

## 2023-10-24 ASSESSMENT — PAIN DESCRIPTION - PAIN TYPE: TYPE: ACUTE PAIN;CHRONIC PAIN

## 2023-10-24 ASSESSMENT — ACTIVITIES OF DAILY LIVING (ADL)
LACK_OF_TRANSPORTATION: NO
BATHING: INDEPENDENT
ADEQUATE_TO_COMPLETE_ADL: YES
HEARING - LEFT EAR: HEARING AID
JUDGMENT_ADEQUATE_SAFELY_COMPLETE_DAILY_ACTIVITIES: YES
ASSISTIVE_DEVICE: EYEGLASSES
PATIENT'S MEMORY ADEQUATE TO SAFELY COMPLETE DAILY ACTIVITIES?: YES
WALKS IN HOME: NEEDS ASSISTANCE
DRESSING YOURSELF: INDEPENDENT
TOILETING: INDEPENDENT
HEARING - RIGHT EAR: HEARING AID
GROOMING: INDEPENDENT
FEEDING YOURSELF: INDEPENDENT

## 2023-10-24 ASSESSMENT — COLUMBIA-SUICIDE SEVERITY RATING SCALE - C-SSRS
6. HAVE YOU EVER DONE ANYTHING, STARTED TO DO ANYTHING, OR PREPARED TO DO ANYTHING TO END YOUR LIFE?: NO
2. HAVE YOU ACTUALLY HAD ANY THOUGHTS OF KILLING YOURSELF?: NO
1. IN THE PAST MONTH, HAVE YOU WISHED YOU WERE DEAD OR WISHED YOU COULD GO TO SLEEP AND NOT WAKE UP?: NO

## 2023-10-24 ASSESSMENT — PAIN DESCRIPTION - PROGRESSION: CLINICAL_PROGRESSION: NOT CHANGED

## 2023-10-24 ASSESSMENT — PATIENT HEALTH QUESTIONNAIRE - PHQ9
1. LITTLE INTEREST OR PLEASURE IN DOING THINGS: NOT AT ALL
SUM OF ALL RESPONSES TO PHQ9 QUESTIONS 1 & 2: 0
2. FEELING DOWN, DEPRESSED OR HOPELESS: NOT AT ALL

## 2023-10-24 NOTE — CARE PLAN
Problem: Fall/Injury  Goal: Not fall by end of shift  Outcome: Progressing  Goal: Be free from injury by end of the shift  Outcome: Progressing  Goal: Verbalize understanding of personal risk factors for fall in the hospital  Outcome: Progressing  Goal: Verbalize understanding of risk factor reduction measures to prevent injury from fall in the home  Outcome: Progressing  Goal: Use assistive devices by end of the shift  Outcome: Progressing  Goal: Pace activities to prevent fatigue by end of the shift  Outcome: Progressing     Problem: Skin  Goal: Decreased wound size/increased tissue granulation at next dressing change  10/24/2023 1651 by Kandy Moeller RN  Outcome: Progressing  10/24/2023 1650 by Kandy Moeller RN  Outcome: Progressing  Goal: Participates in plan/prevention/treatment measures  10/24/2023 1651 by Kandy Moeller RN  Outcome: Progressing  10/24/2023 1650 by Kandy Moeller RN  Outcome: Progressing  Goal: Prevent/manage excess moisture  Outcome: Progressing  Goal: Prevent/minimize sheer/friction injuries  Outcome: Progressing  Goal: Promote/optimize nutrition  Outcome: Progressing  Goal: Promote skin healing  Outcome: Progressing     Problem: Pain  Goal: Takes deep breaths with improved pain control throughout the shift  Outcome: Progressing  Goal: Turns in bed with improved pain control throughout the shift  Outcome: Progressing  Goal: Walks with improved pain control throughout the shift  Outcome: Progressing  Goal: Performs ADL's with improved pain control throughout shift  Outcome: Progressing  Goal: Participates in PT with improved pain control throughout the shift  Outcome: Progressing  Goal: Free from opioid side effects throughout the shift  Outcome: Progressing  Goal: Free from acute confusion related to pain meds throughout the shift  Outcome: Progressing   The patient's goals for the shift include feel better    The clinical goals for the shift  include vitlas to remain wnl    Over the shift, the patient did not make progress toward the following goals. Barriers to progression include n/a. Recommendations to address these barriers include n/a.

## 2023-10-24 NOTE — PROGRESS NOTES
Occupational Therapy                 Therapy Communication Note    Patient Name: Rachid Yun  MRN: [id number]  Today's Date: 10/24/2023     Discipline: Occupational Therapy    Missed Visit Reason: Missed Visit Reason: Other (Comment) (Patient admitted s/p fall. Pericardial effusion noted with echo ordered for this AM to determine size and POC. Will hold eval for this AM.)    Missed Time: Cancel

## 2023-10-24 NOTE — NURSING NOTE
Call out to cardio to ask about diet order.  Patient verbalizes feeling hungry and is concerned about diabetes and low blood sugars.

## 2023-10-24 NOTE — CARE PLAN
Problem: Fall/Injury  Goal: Not fall by end of shift  10/24/2023 1629 by Cristiana Grissom RN  Outcome: Progressing  10/24/2023 1628 by Cristiana Grissom RN  Outcome: Progressing   The patient's goals for the shift include feel better    The clinical goals for the shift include rest      Problem: Skin  Goal: Participates in plan/prevention/treatment measures  10/24/2023 1629 by Cristiana Grissom RN  Outcome: Progressing  10/24/2023 1628 by Cristiana Grissom RN  Outcome: Progressing     Problem: Skin  Goal: Prevent/manage excess moisture  10/24/2023 1629 by Cristiana Grissom RN  Outcome: Progressing  10/24/2023 1628 by Cristiana Grissom RN  Outcome: Progressing     Problem: Pain  Goal: Free from acute confusion related to pain meds throughout the shift  10/24/2023 1629 by Cristiana Grissom RN  Outcome: Progressing  10/24/2023 1628 by Cristiana Grissom RN  Outcome: Progressing

## 2023-10-24 NOTE — PROGRESS NOTES
Physical Therapy                 Therapy Communication Note    Patient Name: Rachid Yun  MRN: [id number]  Today's Date: 10/24/2023     Discipline: Physical Therapy    Missed Visit Reason: Missed Visit Reason: Other (Comment) (Patient admitted s/p fall. Pericardial effusion noted with echo ordered for this AM to determine size and POC. Will hold eval for this AM.)    Missed Time: Attempt    Comment:

## 2023-10-24 NOTE — H&P
History Of Present Illness    Rachid Yun is a 73 y.o. male presenting with Syncopal Episode.      Patient is a 73-year-old male with a PMHx of previous stroke, degenerative disc disease, radiculopathies, obesity, hypertension, dyslipidemia, CAD and DVT comes to the emergency department with a chief complaint of syncope.  The patient had a procedure done at the [facility] over a week ago that was an injection in his spine.  This was performed at [facility] in [place].  At that time he showed atrial fibrillation.  Patient was seen by Dr. Ashely Cerda cardiologist at that time.  Decision was made to start Eliquis in a few days and order a Holter monitor.  Patient was scheduled to follow-up at [facility] this week.  Since then he has had a few falls.  Son Adin noted that he developed A-fib with RVR at home with heart rate in the 180s.  Specifically this morning at 430 he felt dizzy and landed on his left side and sustained a large contusion to his left chest wall.  He did not want to go to the emergency department at that time, and this evening he was with his son who witnessed a syncopal episode, but was able to catch his father before he sustained another injury.         The patient's son is his primary caregiver.  His name is Adin Yun.  Can be reached at [phone].  He lives with his father and is his primary caregiver.    Adin states that the atrial fibrillation was noted during his epidural injections for the lumbar spine at [facility] last week.  At that time he was sent home on his Plavix and was going to start Eliquis as an outpatient.  He was never able to follow-up with cardiology since then.       Today the patient's ED diagnostic work-up was noted for a normal WBC count of 9.1.  The H&H slightly low at 12.6/38.2.  The patient's platelet count was normal at 239.  Blood glucose was slightly elevated 152.  Patient's coagulation profile was within normal limits.  Blood chemistry noted for a creatinine of  2.4.  Patient's son mentioned that he normally runs at a range of 2.1.  His father does have a history of CKD.  He has not established with a renal doctor area.  First troponin level elevated 77.  CT brain without contrast was obtained.  Official results still pending.  CT chest abdomen and pelvis without contrast was obtained.  Official result is still pending.  This was noted for a very small anterior layering pericardial effusion in the preliminary report.        ECG performed on October 24, 2023 at 00 12 and interpreted at 00 13 shows atrial fibrillation without tachycardia, no axis deviation, otherwise intervals within normal limits, T wave inversions in 1 and aVL, no STEMI.  No previous for comparison.       Patient's PCP is BARTOLO Marshall.       Past Medical History    Recent diagnosis of atrial fibrillation   CAD s/p PCI w/ Stent to circumflex artery in 1996 by Dr. 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 Hare)  Epidural lumbar injections for herniated disks L1 and L2     Social History    Denies any toxic habits  Lives at home with his son who is his primary caregiver    Family History  Family History   Problem Relation Name Age of Onset    Stomach cancer Mother      Diabetes Mother      Coronary artery disease Father      Other (Pacemaker) Father      Other (S/p CABG) Sister      Other (S/p CABG) Brother      Ovarian cancer Sibling      Coronary artery disease Sibling          Allergies    Meperidine, Vancomycin, Hydromorphone, Other, and Rifampin      Review of Systems    General: No change in weight. No weakenss. No Fevers/Chills/Night Sweats   Skin: No skin/hair/nail changes. No rashes or sores.  Head:  No trauma. No  Headache/nasuea/vomitting.   Eyes: No visual changes. No tearing. No itching.   Ears: No hearing loss. No tinnitus. No vertigo. No discharge.  Nose, Sinuses: No rhinorrhea, No nasal congestion. No epistaxis.  Mouth, Throat, Neck: No bleeding gums, hoarseness, sore throat or swollen neck  Cardiac: Yes palpitations. No VERMA. No PND. No Orthopnea.   Respiratory: No Shortness of Breath. No wheezing. No cough. No hemoptysis.   GI: No nausea/vomiting. No indigestion. No diarrhea. No constipation.   Extremities: No numbness or tingling. No paresthesias.   Urinary: No change in urinary frequency. No change in hesitancy. No hematuria. No incontinence.           Physical Exam      Constitutional:  Pleasant  Eyes: PERRL, EOMI,   ENMT: mucous membranes moist  Head/Neck: Neck supple, No JVD,   Respiratory/Thorax: Patent airways, CTAB,   Cardiovascular: S1-S2 variable, irregularly irregular, no murmurs  Gastrointestinal: Soft, non-distended, +BS.  Musculoskeletal: ROM intact, no joint swelling, normal strength  Extremities: peripheral pulses intact; no edema.  Right lower extremity amputation of digits 1, 2, and 5  Neurological: Alert and Oriented x 3; no focal deficits; gross motor and sensation intact; CN II-XII intact. No asterixis.  Psychological: Appropriate mood and behavior  Skin: Left posterior chest hematoma         Last Recorded Vitals    Blood pressure 124/83, pulse 91, temperature 37.1 °C (98.7 °F), temperature source Oral, resp. rate 10, height 1.829 m (6'), weight 105 kg (231 lb 7.7 oz), SpO2 98 %.    Relevant Results               Assessment/Plan   Principal Problem:    Syncope and collapse  Active Problems:    Iron deficiency anemia due to chronic blood loss    Primary osteoarthritis involving multiple joints    Stage 3b chronic kidney disease (CMS/HCC)    Coronary artery disease involving native coronary artery of native heart without angina pectoris    Atrial fibrillation (CMS/HCC)    Pericardial effusion     Chest wall contusion, left, initial encounter        Rachid Yun is a 73 y.o. male presenting with Syncopal Episode.  Patient admitted for further evaluation and management.        1) Syncopal Episode 2/2 Suspected Atrial Fibrillation w/ RVR:      Admit patient to Telemetry service   Continuous Cardiac Monitor and BP Monitor Placement  F/U [facility] report  Patient's CHADSVASC score is elevated 3+  Lopressor as needed for rate control  Holding Full Anticoagulation in the setting of recent Left Chest Hematoma. Risks > Benefits to start now. Defer DOAC start time to Cardiology.  Maintain K+>4 and Mg++ > 2.  Will obtain TFTs (TSH + Free T4)  Obtain 2D-Echocardiography (TTE) to evaluate LV Function   Aspiration Precautions   Orthostatic vital signs  Cardiology consultation. Appreciate Recs.       2) Left Posterior Chest Contusion/Hematoma:    Algesia as needed  DuoNebs as needed  Ice pack  Lidoderm patch  Incentive spirometer  Monitor for expansion of hematoma      3) Acute on Chronic Renal Failure:    Continue IVF hydration   Hold ACEI/ARB   Continue to hold all Nephrotoxic agents  Official CT abdomen pelvis without contrast to rule out hydronephrosis  Obtain Urine electrolytes, Urinalysis , Urine Culture + Sensitivity   Continue to Monitor Renal Function (BUN/Cr) + Urine Output.   Consult nephrology specialist to establish care      4) H/o CAD s/p Stent/Small Pericardial Effusion/H/o CVA:      We will continue patient's Plavix and statin therapy  Follow-up official 2D echocardiogram to evaluate size of pericardial effusion.  Defer management to cardiology      5) H/o Seizure Disorder:     Continue patient's home Keppra dose  Seizure precautions      6) H/o Diabetes Mellitus Type 2/H/o HTN/H/o MALOU    POCT every 6 hourly  Patient states that he takes Lantus 10 to 40 units SQ nightly depending on his blood sugar result  Restart Lantus once patient's diet is advanced titrate as necessary  Continue home amlodipine  dose p.o. daily  Holding home losartan dose in the setting of acute on chronic renal insufficiency  Continue iron supplementation or therapy  Holding Jardiance for now        7) H/o Lumbar Radiculopathy w/ Chronic Opioid Dependence    Continue patient's oxycodone as needed  Medication Dispense H/o noted for prescriber  Wenceslao Kidd, APRN-CNP  Last RX written on 10/04/2023 for 25 days/100 Pills  Holding Neuromodulators for confusion and soft BP (SNRI and Gabapentinoids)  Can restart when safer        8) H/o BPH/H/o Mild Cognitive Impairment/H/o Opiod Induced Constipation:    Continue home Flomax therapy  Continue home Aricept dose  Bowel regimen with laxatives as needed      9) GI + DVT PPX:      Continue home PPI dose  Heparin subcu in the setting of chest hematoma until safe to initiate to therapeutic anticoagulation per cardiology               This Dictation was Transcribed using a Nuance Dragon Voice Recognition System Device (with Compatible Computer + Software) and as such may contain Grammatical Errors and Unintentional Typing Misprints.                      I spent 45 minutes in the professional and overall care of this patient.          Baljeet Smith MD/MPH

## 2023-10-24 NOTE — NURSING NOTE
Assumed care of patient from dayshift nurse. Patient resting comfortably in bed at this time with no complaints of pain and or discomfort. Pain medications given most recently at 1822; patient made aware he can have pain medication again ~0030; not having pain at this moment. Patient's son at bedside. IV fluids stopped; IV leaking--dayshift nurse attempted to place a new IV with no success; I will try to place a new IV after assessing the rest of my patients. Small skin tear to rt hand; non-adherent over wound. Bed in lowest position and locked, bed alarm on and functioning, and call bell and personal belongings within reach. No further interventions at this time.

## 2023-10-24 NOTE — ED PROVIDER NOTES
HPI   Chief Complaint   Patient presents with    Weakness, Gen       73-year-old male with a history of previous stroke, degenerative disc disease, radiculopathies, obesity, hypertension, dyslipidemia, CAD and DVT comes to the emergency department with a chief complaint of syncope.  The patient had a procedure done at the [facility] over a week ago that was an injection in his spine.  At that time he showed atrial fibrillation.  He did not have this diagnosis previous to that and was evaluated in the Hospital with 2 EKGs and told that he could leave as he was already taking Plavix.  Since then he has had a few falls.  Specifically this morning at 430 he felt dizzy and landed on his left side and sustained a large contusion to his left chest wall.  He did not want to go to the emergency department at that time, and this evening he was with his son who witnessed a syncopal episode, but was able to catch his father before he sustained another injury.  Currently the patient is mildly confused, pale and diaphoretic.  He states that he had a diarrheal episode today and that his major concern is the pain in the left chest wall.      History provided by:  Patient and relative   used: No                        [place] Coma Scale Score: 15         NIH Stroke Scale: 4          Patient History   No past medical history on file.  No past surgical history on file.  Family History   Problem Relation Name Age of Onset    Stomach cancer Mother      Diabetes Mother      Coronary artery disease Father      Other (Pacemaker) Father      Other (S/p CABG) Sister      Other (S/p CABG) Brother      Ovarian cancer Sibling      Coronary artery disease Sibling       Social History     Tobacco Use    Smoking status: Not on file    Smokeless tobacco: Not on file   Substance Use Topics    Alcohol use: Not on file    Drug use: Not on file       Physical Exam   ED Triage Vitals   Temp Heart Rate Resp BP   10/24/23 0115  10/23/23 2327 10/23/23 2333 10/23/23 2325   37.1 °C (98.7 °F) 79 16 93/68      SpO2 Temp Source Heart Rate Source Patient Position   10/23/23 2327 10/24/23 0115 10/24/23 0115 --   100 % Oral Monitor       BP Location FiO2 (%)     -- --             Physical Exam  Constitutional:       General: He is not in acute distress.     Appearance: He is obese. He is ill-appearing, toxic-appearing and diaphoretic.   HENT:      Head: Normocephalic and atraumatic.   Eyes:      General: No scleral icterus.     Extraocular Movements: Extraocular movements intact.      Pupils: Pupils are equal, round, and reactive to light.   Cardiovascular:      Rate and Rhythm: Normal rate. Rhythm irregular.      Pulses: Normal pulses.   Pulmonary:      Effort: Pulmonary effort is normal. No respiratory distress.      Breath sounds: No wheezing.   Abdominal:      General: There is no distension.      Palpations: Abdomen is soft.      Tenderness: There is abdominal tenderness. There is left CVA tenderness. There is no right CVA tenderness, guarding or rebound.   Musculoskeletal:         General: No swelling.      Cervical back: Normal range of motion.   Skin:     General: Skin is warm.      Capillary Refill: Capillary refill takes more than 3 seconds.      Findings: Bruising present.   Neurological:      Mental Status: He is disoriented.      Cranial Nerves: No cranial nerve deficit.      Sensory: No sensory deficit.      Motor: Weakness present.      Coordination: Coordination normal.   Psychiatric:         Mood and Affect: Mood normal.         ED Course & MDM   ED Course as of 10/24/23 0342   Tue Oct 24, 2023   0341 ECG performed on October 24, 2023 at 00 12 and interpreted at 00 13 shows atrial fibrillation without tachycardia, no axis deviation, otherwise intervals within normal limits, T wave inversions in 1 and aVL, no STEMI.  No previous for comparison. [EG]      ED Course User Index  [EG] Isa Scruggs MD         Diagnoses as of  10/24/23 0342   Syncope and collapse   Acute renal failure superimposed on chronic kidney disease, unspecified acute renal failure type, unspecified CKD stage (CMS/HCC)   Contusion of left chest wall, initial encounter   Pericardial effusion   Atrial fibrillation, unspecified type (CMS/HCC)   Delirium   Hypotension due to hypovolemia       Medical Decision Making    HPI:  As Above  PMHx/PSHx/Meds/Allergies/SH/FH as per nursing documentation and reviewed.  Review of systems: Total of 10 systems reviewed and otherwise negative except as noted elsewhere    DDX: As described in MDM    If performed, radiology listed above interpreted by me and confirmed by the Radiologist.  Medications administered during this visit (name and route): see MAR  Social determinants of health considered for this visit: Lives with son  If performed, EKG interpreted by me and detailed above    [facility] Summary/considerations:  73-year-old male presenting after frequent falls and a syncopal episode today with hypotension.  The patient is currently on Plavix, but has atrial fibrillation and likely will require a different anticoagulant.  He did not show evidence of A-fib and rapid ventricular response.  After being treated with IV fluids his hypotension improved significantly as well as his perfusion.  CT scan was performed of the chest abdomen and pelvis that shows no rib fractures or Effusions.  The patient has a new small pericardial effusion without evidence of electrical alternans or low QRS on EKG.  Patient has history also of DVT and again only on Plavix.  CT scan is also negative for pulmonary embolism.  Patient had left-sided weakness and his mental status on arrival was not at baseline with concern for possible stroke.  Head CT was performed through a brain attack protocol and is negative for intracranial bleeding or acute pathology.    The patient was seen and triaged by our nursing/medic staff, their vitals were taken and the staff notes  were reviewed.  If the patient arrived by an EMS squad or an outside agency, we discussed the case with transporting EMS medic, police, or other historians. My initial assessment was attention to their airway, breathing, and circulatory status.  We addressed any immediate or life threatening findings and completed a medical history and a physical exam if the patient or those legally responsible were in agreement with this.   Prior to the patient being discharged, I or my PA/NP or the nursing staff discussed the differential, results and discharge plan with the patient and/or family/friend/caregiver if present.  I emphasized the importance of follow-up in 2-3 days unless otherwise specified.  I explained reasons for the patient to return to the Emergency Department. Additional verbal discharge instructions were also given and discussed with the patient to supplement those generated by the EMR. We also discussed medications that were prescribed (if any) including common side effects and interactions. The patient was advised to abstain from driving, operating heavy machinery or making significant decisions while taking medications such as antihistamines, benzodiazepines, opiates and muscle relaxers. All questions were addressed.  They understand return precautions and discharge instructions. The patient and/or family/friend/caregiver expressed understanding.  **Disclaimer:  This note was dictated by speech recognition technology.  Minor errors in transcription may be present.  Please contact for clarification or corrections.    In the case the patient eloped or refused treatment/admission, we offered to the best of our ability to provide care to the patient at the time of this encounter.    35 minutes of critical care time were spent on re-examinations, close monitoring, data analysis and communication with patient/family/other providers and do not include time spent on procedures. Not concurrent with other  providers        Procedure  Procedures     Isa Scruggs MD  10/24/23 0838       Isa Scruggs MD  10/24/23 0839

## 2023-10-24 NOTE — NURSING NOTE
Dr Donovan is attending hospitalist for today.  Spoke with her student who is at bedside for assessment.  Diet order placed and non select breakfast tray ordered.  ECHO tech at bedside.

## 2023-10-24 NOTE — PROGRESS NOTES
Occupational Therapy                 Therapy Communication Note    Patient Name: Rachid Yun  MRN: [id number]  Today's Date: 10/24/2023     Discipline: Occupational Therapy    Missed Visit Reason: Missed Visit Reason: Cancel, Other (Comment) (Awaiting results of the echo test in relation to pt's pericardial effusion. Cancel in PM.)    Missed Time: Cancel

## 2023-10-24 NOTE — PROGRESS NOTES
"Subjective   Patient ID: Rachid Yun is a 73 y.o. male who presents for syncope episode.    HPI    Pt admitted to ED for episode of syncope and falling on his left side. He has a past medical history of Arthritis, Coronary artery disease, Diabetes mellitus (CMS/[facility]), Hypertension, and Stroke (CMS/[facility]). He recently had a procedure at [facility] for injections in his spine and it was discovered that he had afib. Pt mentions he has had many previous surgeries such as multiple toe amputations, b/l knee replacements, and back surgery in which his afib was never discovered. \"Pt was already taking Plavix so he was sent home.\" Monday morning he fell on his left side, resulting in a contusion to his left chest wall. He was brought into the ED with his son. Upon arrival to the ED he was diaphoretic confused and pale there was concern for chest wall integrity. His creatinine was elevated at 2.40 compared to his base of 2.0. Troponin was elevated at 77. CT w/o contrast of head, chest, and abdomen had no acute findings. ECG on 10/24 at 0012 showed atrial fibrillation w/o tachycardia, no axis deviation, no electrical alternans.     I saw the patient today he was upright in bed and well oriented. He relays the story stating he fell multiple times in the last 2 weeks which is unusual compared to his baseline ambulation. He uses a walker at home and is closely monitored by his son, he has not had such a increase frequency of falls until recently. He is a type II diabetic and states that him and his son have been working hard on his sugar and diet eating lots of vegetables and avoiding fast food. He states he had issues of low blood sugar years ago. He was recently put on Jardiance by his PCP, but had to stop taking it due to insurance issues and it costing $200/month. He states he had similar issues with Eliquis. When I was in the pts room around 10 00 he said he was feeling better but still experiencing anxiety, sweating, " and fluttering feel in his chest. He states that earlier this morning he was feeling unwell and began feeling overheated. His nurse states when she came in this morning his HR was in the 180s and he was in active afib.     Later this afternoon his son was present and able to relay that these unusual symptoms did not begin until after his steroid shot in the L1-L2 region at [facility] on the 13th.  Since this procedure he was diagnosed immediately after with A-fib and has had diaphoretic chest tightening syncope episodes most frequently when he stands up.       Medication Documentation Review Audit       Reviewed by Ирина Ahn RN (Registered Nurse) on 10/24/23 at 0649      Medication Order Taking? Sig Documenting Provider Last Dose Status   amLODIPine (Norvasc) 5 mg tablet [id number] No Take 1 tablet (5 mg) by mouth once daily. Rashawn Provider, MD 10/24/2023 Active   atorvastatin (Lipitor) 10 mg tablet [id number]  Take 1 tablet (10 mg) by mouth once daily. for 90 day(s) Historical Provider, MD  Active   clopidogrel (Plavix) 75 mg tablet [id number] No Take 1 tablet (75 mg) by mouth once daily. for 90 day(s) Rashawn Ratliff MD 10/24/2023 Active   cyanocobalamin (Vitamin B-12) 1,000 mcg tablet [id number] No Take 1 tablet (1,000 mcg) by mouth once daily. Rashawn Provider, MD 10/24/2023 Active   donepezil (Aricept) 10 mg tablet [id number] No Take 1 tablet (10 mg) by mouth once daily at bedtime. for 90 day(s) Rashawn Ratliff MD 10/24/2023 Active   DULoxetine (Cymbalta) 60 mg DR capsule [id number] No Take 1 capsule (60 mg) by mouth once daily. for 90 day(s) Historical Provider, MD Unknown Active   fenofibrate (Triglide) 160 mg tablet [id number] No  Historical Provider, MD Unknown Active   ferrous sulfate 325 (65 Fe) MG EC tablet [id number] No Take 1 tablet (325 mg) by mouth once daily. for 90 day(s) Historical Provider, MD Unknown Active   ferrous sulfate 325 (65 Fe) MG EC tablet [id number] No  Take 1 tablet (325 mg) by mouth 2 times a day. for 90 day(s) Historical Provider, MD Unknown Active   fish oil-dha-epa 1,200-144-216 mg capsule [id number] No Take 1 capsule (1,200 mg) by mouth once daily. Historical Provider, MD Unknown Active   folic acid (Folvite) 400 mcg tablet [id number] No Take 1 tablet (0.4 mg) by mouth once daily. for 90 day(s) Rashawn Ratliff MD 10/24/2023 Active   gabapentin (Neurontin) 400 mg capsule [id number] No Take 1 capsule (400 mg) by mouth once daily. for 90 day(s) Historical Provider, MD Unknown Active   gabapentin (Neurontin) 600 mg tablet [id number] No Take 1 tablet (600 mg) by mouth twice a day. Historical Provider, MD Unknown Active   insulin glargine (Lantus U-100 Insulin) 100 unit/mL injection [id number] No Inject 40 Units under the skin once daily. for 90 day(s) Rashawn Ratliff MD 10/24/2023 Active   insulin regular (HumuLIN R Regular U-100 Insuln) 100 unit/mL injection [id number] No as directed Injection sliding scale Historical Provider, MD Unknown Active   lactulose 20 gram/30 mL oral solution [id number] No Take 15 mL (10 g) by mouth once daily. for 90 day(s) Rashawn Ratliff MD Unknown Active   levETIRAcetam (Keppra) 500 mg tablet [id number] No Take 1 tablet (500 mg) by mouth every 12 hours. for 90 day(s) Rashawn Ratliff MD 10/24/2023 Active   losartan (Cozaar) 100 mg tablet [id number] No Take 1 tablet (100 mg) by mouth once daily. FOR 90 DAYS Rashawn Ratliff MD 10/24/2023 Active   melatonin 3 mg tablet [id number] No 1 tablet (3 mg) as needed at bedtime. for 30 day(s) Rashawn Ratliff MD Unknown Active   multivitamin capsule [id number] No Take 1 capsule by mouth once daily. Historical Provider, MD Unknown Active   mv-min-folic-lycop-lut-herb178 (Robbin Multivitamin For Men) 200-175-250 mcg tablet [id number] No Take 1 tablet by mouth once daily. Historical Provider, MD Unknown Active   omega 3-dha-epa-fish oil (Fish OiL) 1,000 mg (120 mg-180 mg) capsule  [id number] No Take 1 capsule (1,000 mg) by mouth once daily. 90 day(s) Rashawn Ratliff MD Unknown Active   omega-3 1,000 mg capsule capsule [id number] No Take 1 capsule (1,000 mg) by mouth once daily.  90 DAY(S) Rashawn Ratliff MD Unknown Active   ondansetron (Zofran) 4 mg tablet [id number] No Take 1 tablet (4 mg) by mouth every 6 hours if needed for nausea or vomiting. Rashawn Ratliff MD Unknown Active   ondansetron (Zofran) 4 mg tablet [id number] No Take 1 tablet (4 mg) by mouth every 6 hours if needed. Rashawn Ratliff MD Unknown Active   oxyCODONE (Roxicodone) 15 mg immediate release tablet [id number] No Take 1 tablet (15 mg) by mouth 4 times a day as needed.  FOR PAIN FOR UP TO 30 DAYS *DNF UNTIL 8/26/23* Rashawn Ratliff MD 10/24/2023 Active   pantoprazole (ProtoNix) 40 mg EC tablet [id number] No Take 1 tablet (40 mg) by mouth once daily. Rashawn Ratliff MD 10/24/2023 Active   sennosides (Senokot) 8.6 mg tablet [id number] No Take 2 tablets (17.2 mg) by mouth once daily at bedtime. Rashawn Ratliff MD Unknown Active   solifenacin (VESIcare) 5 mg tablet [id number] No Take 1 tablet (5 mg) by mouth once daily. Rashawn Ratliff MD Unknown Active   sucralfate (Carafate) 1 gram tablet [id number] No Take 1 tablet (1 g) by mouth 4 times a day before meals. Rashawn Ratliff MD Unknown Active   tamsulosin (Flomax) 0.4 mg 24 hr capsule [id number] No  Rashawn Ratliff MD 10/24/2023 Active   traZODone (Desyrel) 150 mg tablet [id number] No Take 1 tablet (150 mg) by mouth once daily at bedtime. FOR 90 DAYS Rashawn Ratliff MD Unknown Active                    Allergies   Allergen Reactions    Meperidine Other     Passes out     Vancomycin Hives     & chest pain    Hydromorphone Anxiety    Other Unknown     Ripampin    Rifampin Unknown     Dizzy/ oriented      PSHx:  bilateral total knee replacements  Left femur ORIF  Right lower extremity digits 1, 2, and 5 amputation  Bilateral rotator  cuff repair  [facility] w/ Stent Placement to CX 1996 9 (Dr. Hare)  Epidural lumbar injections for herniated disks L1 and L2    Family History   Problem Relation Name Age of Onset    Stomach cancer Mother      Diabetes Mother      Coronary artery disease Father      Other (Pacemaker) Father      Other (S/p CABG) Sister      Other (S/p CABG) Brother      Ovarian cancer Sibling      Coronary artery disease Sibling         Review of Systems   Constitutional:  Negative for chills, diaphoresis and fever.   Eyes:  Negative for photophobia and visual disturbance.   Cardiovascular:  Positive for palpitations. Negative for chest pain and leg swelling.   Musculoskeletal:  Positive for gait problem and myalgias (along left chest wall).   Skin:  Negative for color change (bruises visible along both of pts arms and legs).   Neurological:  Positive for dizziness (When changing position). Negative for facial asymmetry and numbness.   Hematological:  Bruises/bleeds easily (bruises on both arms and legs along with contusion on left side chest wall).       Objective     Vitals:  Blood pressure 119/65, pulse 81, temperature 37.1 °C (98.7 °F), temperature source Oral, resp. rate 15, height 1.829 m (6'), weight 104 kg (230 lb 6.1 oz), SpO2 98 %.     EKG     ECG performed on October 24, 2023 at 00 12 and interpreted at 00 13 shows atrial fibrillation without tachycardia, no axis deviation, otherwise intervals within normal limits, T wave inversions in 1 and aVL, no STEMI.  No previous for comparison.        CT of Head:  Old right frontal infarct   Chronic b/l white matter microvascular disease    CT Chest/Abdomen:  Severe atheroscleroritc disease of coronary vasculature   No acute fractures   Throacic wall soft tissues within normal limits   Gallstones present, no wall thickening   Atherlosclerotic disease of the aorta and b/l iliac vessels   Infrarenal IVC filter     Labs as of 10/24 05 18  CBC w/ Diff  RBC 4.33  Hemoglobin 13  Hematocrit  39.1  Neutrophils Abs 8.73    Renal Function Panel  Glucose 140   Sodium 132  Bicarbonate 19   Urea Nitrogen 47  Creatinine 2.4    Physical Exam  Constitutional:       Appearance: Normal appearance.   HENT:      Head: Normocephalic and atraumatic.   Eyes:      Extraocular Movements: Extraocular movements intact.   Cardiovascular:      Rate and Rhythm: Normal rate. Rhythm irregular.      Comments:     Pulmonary:      Effort: Pulmonary effort is normal. No respiratory distress.      Breath sounds: No wheezing.   Musculoskeletal:      Cervical back: Normal range of motion.   Neurological:      General: No focal deficit present.      Mental Status: He is alert and oriented to person, place, and time.   Psychiatric:         Mood and Affect: Mood normal.         Behavior: Behavior normal.         Assessment/Plan     1) Autonomic Instability:  Due to procedure whether it be anesthetic agent entering blood stream, or structural damage  Consult Neuro given the strange features of autonomic instability    2) Syncope:   Hypoglycemia- review pts insulin regime  Orthostatic Hypotension- orthostatic test, BP monitoring   Seizure- kepra level  3) Newly diagnosed Atrial Fibrillation   Consulted Cardio who has managed patient's rate with metoprolol in the morning and gave subcutaneous heparin to manage increased hospital coag ability.  Patient had an echo performed this afternoon that was normal with signs of underlying HFpEF.  4) Prerenal NOAM  Continue with fluids as patients NOAM seems to be dehydration driven  Repeat CMP in morning   Order urine urea, sodium, and creatinine    5) Chest Wall Contusion  Continue conservative therapy of heat, ice, and positioning changes     6) Type II Diabetes  Cut insulin doses by 50% due to hospital food, CKD, and NOAM   Diabetic Diet food order   Recheck sugar in morning      Ana Valderrama MS3    Attending A/P:    Fall  Differential including syncope 2/2 Afib with RVR vs orthostatic vs ?TIA vs  "hypoglycemia vs seizure  Per son at bedside, BG as been controlled at home, denies frequent hypoglycemic episodes  Does have hx of recurrent TIA; however patient is compliant with plavix and CT head was negative  Discussed seizure medication with patient/son - per son patient has never had a seizure, was placed empirically on keppra due being \"high risk\" for seizures after having multiple kaplan/TIA  Orthostatics ordered  Cardiology consulted for management of Afib  Concern for possible ?autonomic instability   Check keppra level  Consider neurology consult  PT/OT    Afib with RVR  Management per cardiology  Echo with normal LVEF    T2DM  SSI for now given NOAM  Hypoglycemia protocol    NOAM on CKD  Baseline Cr. 2.0 per patient's son  Suspect due to dehydration - reports decreased PO intake prior to admission  Nephrology consulted  Urine studies ordered    Hyponatremia  Asymptomatic  Likely related to dehydration  Check BMP in AM    Chest wall contusion  Pain medications PRN    Myrna Donovan MD      "

## 2023-10-24 NOTE — CARE PLAN
The patient's goals for the shift include      The clinical goals for the shift include rest    Over the shift, the patient did not make progress toward the following goals. Barriers to progression include N/A pt just arrived to the floor. Recommendations to address these barriers include rest.

## 2023-10-24 NOTE — NURSING NOTE
Assumed care of patient.  Patient up to side of bed using urinal.  HR went up to 180's.  Assisted back into bed and HR back to low 100's.  Slight SOB with speaking.  Denies chest pain/discomfort.  Awaiting cardio consult to determine plan for day.  NPO at this time.  Call light and commonly used items in reach.  Bed locked and in low position.

## 2023-10-24 NOTE — CONSULTS
"Inpatient consult to Cardiology  Consult performed by: HERBERT Yee-CNP  Consult ordered by: Baljeet Smith MD  Reason for consult: syncope        History Of Present Illness:    Rachid Yun is a 73 y.o. male presenting with syncope and weakness.  Previously followed with Dr. Hare remotely.  Recent medical history of spinal injection and subsequent atrial fibrillation post procedure with initial cardiac work-up.  Found to be in rapid atrial fibrillation.  Has a history of TIA/strokes x4 remotely and has been on clopidogrel for this.  Patient did fall in the outpatient setting with a near syncopal/syncopal episode.  Patient did fall at home , obtained a left-sided hematoma.  Was to start apixaban for stroke risk reduction, however this medication was not initiated.  Patient also describes 5 days of significant diaphoresis without chest pain, shortness of breath, nausea or vomiting.  Reports no fevers.     In the emergency department here rapid atrial fibrillation noted on EKG.  High-sensitivity troponin with a 77.  Creatinine 2.4.  Potassium 4.5.  Hemoglobin stating at 13.0.  Most recent blood pressure 140/82.  Patient received morphine and Zofran in the emergency department and was admitted on telemetry for further testing treatment            last Recorded Vitals:  Vitals:    10/24/23 0315 10/24/23 0430 10/24/23 0500 10/24/23 0605   BP:  135/78  140/82   BP Location:    Left arm   Patient Position:    Lying   Pulse: 99 90 95 93   Resp: 19 13 11 12   Temp:    36.4 °C (97.5 °F)   TempSrc:    Oral   SpO2: 98%   97%   Weight:    104 kg (230 lb 6.1 oz)   Height:    1.956 m (6' 5\")       Last Labs:  CBC - 10/24/2023:  5:18 AM  11.0 13.0 219    39.1      CMP - 10/24/2023:  5:18 AM  8.9 5.8 16 --- 0.4   3.2 3.8 14 31      PTT - 10/23/2023: 11:31 PM  1.1   11.9 22.3     Hemoglobin A1C   Date/Time Value Ref Range Status   10/24/2023 05:18 AM 6.3 (H) See below % Final   01/30/2023 10:32 AM 6.7 (H) 4.0 - 6.0 " % Final     Comment:     Hemoglobin A1C levels are related to mean blood glucose during the   preceding 2-3 months. The relationship table below may be used as a   general guide. Each 1% increase in HGB A1C is a reflection of an   increase in mean glucose of approximately 30 mg/dl.   Reference: Diabetes Care, volume 29, supplement 1 Jan. 2006                        HGB A1C ................. Approx. Mean Glucose   _______________________________________________   6%   ...............................  120 mg/dl   7%   ...............................  150 mg/dl   8%   ...............................  180 mg/dl   9%   ...............................  210 mg/dl   10%  ...............................  240 mg/dl  Performed at [address] [address]     12/01/2021 12:28 PM 5.8 4.0 - 6.0 % Final     Comment:     Hemoglobin A1C levels are related to mean blood glucose during the   preceding 2-3 months. The relationship table below may be used as a   general guide. Each 1% increase in HGB A1C is a reflection of an   increase in mean glucose of approximately 30 mg/dl.   Reference: Diabetes Care, volume 29, supplement 1 Jan. 2006                        HGB A1C ................. Approx. Mean Glucose   _______________________________________________   6%   ...............................  120 mg/dl   7%   ...............................  150 mg/dl   8%   ...............................  180 mg/dl   9%   ...............................  210 mg/dl   10%  ...............................  240 mg/dl  Performed at [address] [address]     10/17/2021 01:52 PM 7.0 (H) 4.7 - 6.4 % Final     Comment:     Interpretation:     Standardized A1c  Good control or normal:  4-6% ( mg/dL avg)  Moderate control:        6.1-8.0% (120-180 mg/dL avg)  Poor control:            >8.0% (180 mg/dL avg)  With 4% as a baseline, each 1% increase = 30 mg/dL increase in average   glucose.  [facility]  (Diabetes Control Complications Trial)      Last I/O:  No intake/output data recorded.    Past Cardiology Tests (Last 3 Years):  None available     Past Medical History:  He has a past medical history of Arthritis, Coronary artery disease, Diabetes mellitus (CMS/HCC), Hypertension, and Stroke (CMS/HCC).  Diagnosed with atrial fibrillation in October 2023    Past Surgical History:  He has a past surgical history that includes Back surgery.      Social History:  He reports that he quit smoking about 35 years ago. His smoking use included cigarettes. He has been exposed to tobacco smoke. He has never used smokeless tobacco. He reports that he does not currently use alcohol. No history on file for drug use.    Family History:  Family History   Problem Relation Name Age of Onset    Stomach cancer Mother      Diabetes Mother      Coronary artery disease Father      Other (Pacemaker) Father      Other (S/p CABG) Sister      Other (S/p CABG) Brother      Ovarian cancer Sibling      Coronary artery disease Sibling          Allergies:  Meperidine, Vancomycin, Hydromorphone, Other, and Rifampin    Inpatient Medications:  Scheduled medications   Medication Dose Route Frequency    ferrous sulfate  65 mg of iron oral Daily with breakfast    heparin  5,000 Units subcutaneous q8h    insulin lispro  0-5 Units subcutaneous TID with meals    lidocaine  1 patch transdermal Nightly     PRN medications   Medication    dextrose 10 % in water (D10W)    dextrose    glucagon    ipratropium-albuteroL    lactulose    metoprolol    oxyCODONE     Continuous Medications   Medication Dose Last Rate     Outpatient Medications:  Current Outpatient Medications   Medication Instructions    amLODIPine (Norvasc) 5 mg tablet 1 tablet, oral, Daily    atorvastatin (LIPITOR) 10 mg, oral, Daily, for 90 day(s)    clopidogrel (PLAVIX) 75 mg, oral, Daily, for 90 day(s)    cyanocobalamin (Vitamin B-12) 1,000 mcg tablet 1 tablet, oral, Daily    donepezil  (ARICEPT) 10 mg, oral, Nightly, for 90 day(s)    DULoxetine (CYMBALTA) 60 mg, oral, Daily, for 90 day(s)    fenofibrate (Triglide) 160 mg tablet     ferrous sulfate 325 (65 Fe) MG EC tablet 1 tablet, oral, Daily, for 90 day(s)    ferrous sulfate 325 (65 Fe) MG EC tablet 1 tablet, oral, 2 times daily, for 90 day(s)    fish oil-dha-epa 1,200-144-216 mg capsule 1 capsule, oral, Daily    folic acid (FOLVITE) 0.4 mg, oral, Daily, for 90 day(s)    gabapentin (NEURONTIN) 600 mg, oral, 2 times daily    gabapentin (NEURONTIN) 400 mg, oral, Daily, for 90 day(s)    insulin glargine (LANTUS U-100 INSULIN) 40 Units, subcutaneous, Daily, for 90 day(s)    insulin regular (HumuLIN R Regular U-100 Insuln) 100 unit/mL injection as directed Injection sliding scale<BR>    lactulose 10 g, oral, Daily, for 90 day(s)    levETIRAcetam (KEPPRA) 500 mg, oral, Every 12 hours, for 90 day(s)    losartan (COZAAR) 100 mg, oral, Daily, FOR 90 DAYS    melatonin 3 mg, Nightly PRN, for 30 day(s)<BR>    multivitamin capsule 1 capsule, oral, Daily    mv-min-folic-lycop-lut-herb178 (Robbin Multivitamin For Men) 200-175-250 mcg tablet 1 tablet, oral, Daily    omega 3-dha-epa-fish oil (Fish OiL) 1,000 mg (120 mg-180 mg) capsule 1 capsule, oral, Daily, 90 day(s)    omega-3 1,000 mg, oral, Daily,  90 DAY(S)<BR>    ondansetron (Zofran) 4 mg tablet 1 tablet, oral, Every 6 hours PRN    ondansetron (ZOFRAN) 4 mg, oral, Every 6 hours PRN    oxyCODONE (ROXICODONE) 15 mg, oral, 4 times daily PRN,  FOR PAIN FOR UP TO 30 DAYS *DNF UNTIL 8/26/23*<BR>    pantoprazole (PROTONIX) 40 mg, oral, Daily    sennosides (Senokot) 8.6 mg tablet 2 tablets, oral, Nightly    solifenacin (VESICARE) 5 mg, oral, Daily    sucralfate (CARAFATE) 1 g, oral, 4 times daily before meals and nightly    tamsulosin (Flomax) 0.4 mg 24 hr capsule     traZODone (DESYREL) 150 mg, oral, Nightly, FOR 90 DAYS       Physical Exam:  General: alert, oriented x3, pleasant; tremulous  HEENT: normal cephalic,  atraumatic, no scleral icterus,   Neck: No JVD, bruit or thrill, masses or tenderness   Heart: S1/S2, Rate rapid, Rhythm irregular, no s3 or s4, no murmur, thrill, or heaves at PMI.   Lungs: Clear, equal expansion and excursion, no wheezes, crackles, rhales or rhonci.  Diminished in bases.  No conversational dyspnea appreciated.  No tachypnea.  No pain with inspiration  Abdomen: bowel sounds x 4, soft, non-tender to light and deep palpation, No masses, guarding, or CVA tenderness   Genitourinary: deferred   Extremities: No significant upper or lower extremity edema appreciated.       Assessment/Plan     Rapid atrial fibrillation  Syncope/near syncope  Coronary disease  Diabetes mellitus type 2  Recent spinal injection  Hypertensive disorder  History of recurrent strokes    Overall impression    10/24: As described above, patient had an injection in his spine and subsequently went atrial fibrillation.  Had some initial work-up, however states he did not have an echocardiogram.  Was to be placed on apixaban at home in combination with his current home clopidogrel for coronary artery disease/history of 4 strokes; however he had a syncopal episode prior to this and subsequently had further syncopal episodes and falls prompting hospitalization.  Patient did not begin his apixaban.  Does have hematoma noted to his left chest wall.  No significant findings otherwise on CT brain and CT abdomen pelvis.  Blood pressure remained stable albeit mildly hypertensive.  On telemetry he remains in a atrial fibrillation, currently he is in a rapid atrial fibrillation.  To this point the patient has not been initiated on rate controlling medications per medical record.  I will initiate him on metoprolol succinate 25 mg p.o. twice daily.  Anticipate the patient has some systolic dysfunction from his previous coronary disease, will check echocardiogram this hospitalization.  He is chest pain-free.  Diabetes appears to be fairly well  "controlled.  We will continue to hold his amlodipine to monitor response to metoprolol first.  Concerning anticoagulation believe the patient would benefit from combination of both clopidogrel and apixaban for his coronary artery disease, recurrent strokes, and new atrial fibrillation.  Will wait 1 day to initiate these medications secondary to hematoma on left chest. Further recommendations post echocardiogram.  Note, given patient's description of his chronic upper extremity tremor as well as his difficulty walking and the feeling of \"rolling over his feet\" I do have some suspicion for a undiagnosed Parkinson's disease.  Defer assessment of this to admitting.     I spent 60 minutes in the professional and overall care of this patient.        Gabe Leong, APRN-CNP  "

## 2023-10-24 NOTE — CONSULTS
.Reason For Consult  Chronic kidney disease stage III/IV    History Of Present Illness  Rachid Yun is a 73 y.o. male who is known to have a history of diabetes mellitus type 2, hypertension, hyperlipidemia, coronary artery disease status post stenting many years ago recently was diagnosed with atrial fibrillation at [facility] who was placed on Eliquis to go back and see cardiology at the [facility] for further management yesterday the patient had sustained 2 falls on his left side and injured his left flank area and rib area severe bruising in that area being of pain in that area however he did pass out he does not have any chest pain shortness of breath she does not have any nausea vomiting diarrhea asked to see the patient via consultation because of his abnormal renal function with elevated serum creatinine with low GFR of 28 mL/min review his records from the past apparently last year he had a creatinine level of 2.01 with a GFR around 29-30 mils per minute the patient however is not aware of his kidney disease any dysuria hematuria or any difficulty urinating..     Review of Systems  Review of Systems   Constitutional:  Positive for fatigue. Negative for chills and fever.   HENT:  Negative for sinus pressure, sore throat and tinnitus.    Eyes:  Negative for photophobia and visual disturbance.   Respiratory:  Negative for apnea, cough, shortness of breath and wheezing.    Cardiovascular:  Negative for chest pain, palpitations and leg swelling.   Gastrointestinal:  Negative for abdominal pain, blood in stool, constipation, diarrhea, nausea, rectal pain and vomiting.   Endocrine: Negative for cold intolerance, heat intolerance, polydipsia, polyphagia and polyuria.   Genitourinary:  Negative for decreased urine volume, dysuria, frequency, hematuria and urgency.   Musculoskeletal:  Positive for back pain. Negative for arthralgias, joint swelling, myalgias and neck pain.        Patient has  sustained 2 falls yesterday   Skin:  Negative for color change, pallor, rash and wound.   Neurological:  Positive for weakness and light-headedness. Negative for dizziness, tremors, seizures, syncope, speech difficulty, numbness and headaches.   Hematological:  Negative for adenopathy. Does not bruise/bleed easily.   Psychiatric/Behavioral:  Negative for confusion, hallucinations, sleep disturbance and suicidal ideas.         Past Medical History  He has a past medical history of Arthritis, Coronary artery disease, Diabetes mellitus (CMS/HCC), Hypertension, and Stroke (CMS/HCC).    Surgical History  He has a past surgical history that includes Back surgery.     Social History  He reports that he quit smoking about 35 years ago. His smoking use included cigarettes. He has been exposed to tobacco smoke. He has never used smokeless tobacco. He reports that he does not currently use alcohol. No history on file for drug use.    Family History  Family History   Problem Relation Name Age of Onset    Stomach cancer Mother      Diabetes Mother      Coronary artery disease Father      Other (Pacemaker) Father      Other (S/p CABG) Sister      Other (S/p CABG) Brother      Ovarian cancer Sibling      Coronary artery disease Sibling          Current Facility-Administered Medications:     acetaminophen (Tylenol) tablet 650 mg, 650 mg, oral, q4h PRN **OR** acetaminophen (Tylenol) oral liquid 650 mg, 650 mg, nasogastric tube, q4h PRN **OR** acetaminophen (Tylenol) suppository 650 mg, 650 mg, rectal, q4h PRN, Baljeet Smith MD    amLODIPine (Norvasc) tablet 5 mg, 5 mg, oral, Daily, Baljeet Smith MD, 5 mg at 10/24/23 1254    atorvastatin (Lipitor) tablet 10 mg, 10 mg, oral, Nightly, Baljeet Smith MD    benzocaine-menthol (Cepastat Sore Throat) 15-3.6 mg lozenge 1 lozenge, 1 lozenge, Mouth/Throat, q2h PRN, Baljeet Smith MD    clopidogrel (Plavix) tablet 75 mg, 75 mg, oral, Daily, Baljeet Smith MD, 75 mg at 10/24/23 1254     cyanocobalamin (Vitamin B-12) tablet 1,000 mcg, 1,000 mcg, oral, Daily, Baljeet Smith MD, 1,000 mcg at 10/24/23 1254    dextrose 10 % in water (D10W) infusion, 0.3 g/kg/hr, intravenous, Once PRN, Baljeet Smith MD    dextrose 50 % injection 25 g, 25 g, intravenous, q15 min PRN, Baljeet Smith MD    donepezil (Aricept) tablet 10 mg, 10 mg, oral, Nightly, Baljeet Smith MD    ferrous sulfate 325 (65 Fe) MG tablet 65 mg of iron, 65 mg of iron, oral, Daily with breakfast, Baljeet Smith MD, 65 mg of iron at 10/24/23 0939    folic acid (Folvite) tablet 0.4 mg, 0.4 mg, oral, Daily, Baljeet Smith MD    gabapentin (Neurontin) tablet 600 mg, 600 mg, oral, BID, Myrna Donovan MD, 600 mg at 10/24/23 1254    glucagon (Glucagen) injection 1 mg, 1 mg, intramuscular, q15 min PRN, Baljeet Smith MD    guaiFENesin (Mucinex) 12 hr tablet 600 mg, 600 mg, oral, q12h PRN, Baljeet Smith MD    heparin (porcine) injection 5,000 Units, 5,000 Units, subcutaneous, q8h, Baljeet Smith MD, 5,000 Units at 10/24/23 1150    insulin lispro (HumaLOG) injection 0-5 Units, 0-5 Units, subcutaneous, TID with meals, Baljeet Smith MD    ipratropium-albuteroL (Duo-Neb) 0.5-2.5 mg/3 mL nebulizer solution 3 mL, 3 mL, nebulization, q6h PRN, Baljeet Smith MD    lactulose 20 gram/30 mL oral solution 20 g, 20 g, oral, PRN, Baljeet Smith MD    levETIRAcetam (Keppra) tablet 500 mg, 500 mg, oral, BID, Baljeet Smith MD, 500 mg at 10/24/23 1254    lidocaine 4 % patch 1 patch, 1 patch, transdermal, Nightly, Baljeet Smith MD, 1 patch at 10/24/23 0146    metoprolol succinate XL (Toprol-XL) 24 hr tablet 25 mg, 25 mg, oral, BID, Gabe Leong, HERBERT-CNP, 25 mg at 10/24/23 0938    metoprolol tartrate (Lopressor) injection 5 mg, 5 mg, intravenous, q6h PRN, Baljeet Smith MD    ondansetron ODT (Zofran-ODT) disintegrating tablet 4 mg, 4 mg, oral, q8h PRN **OR** ondansetron (Zofran) injection 4 mg, 4 mg, intravenous, q8h PRN, Baljeet Smith MD     oxyCODONE (Roxicodone) immediate release tablet 15 mg, 15 mg, oral, q6h PRN, Baljeet Smith MD, 15 mg at 10/24/23 1150    perflutren lipid microspheres (Definity) injection 0.5-10 mL of dilution, 0.5-10 mL of dilution, intravenous, Once in imaging, Baljeet Smith MD    sennosides (Senokot) tablet 17.2 mg, 2 tablet, oral, Nightly, Baljeet Smith MD    sodium chloride 0.9% infusion, 75 mL/hr, intravenous, Continuous, Baljeet Smith MD, Last Rate: 75 mL/hr at 10/24/23 1254, 75 mL/hr at 10/24/23 1254    tamsulosin (Flomax) 24 hr capsule 0.4 mg, 0.4 mg, oral, Daily, Baljeet Smith MD, 0.4 mg at 10/24/23 1254   Allergies  Meperidine, Vancomycin, Hydromorphone, Other, and Rifampin         Physical Exam  Physical Exam  Constitutional:       General: He is not in acute distress.     Appearance: He is not toxic-appearing.   HENT:      Head: Normocephalic and atraumatic.   Eyes:      Extraocular Movements: Extraocular movements intact.      Pupils: Pupils are equal, round, and reactive to light.   Neck:      Vascular: No carotid bruit.   Cardiovascular:      Rate and Rhythm: Normal rate. Rhythm irregular.   Pulmonary:      Effort: No respiratory distress.      Breath sounds: No stridor. No wheezing, rhonchi or rales.   Chest:      Chest wall: No tenderness.   Abdominal:      General: There is no distension.      Palpations: There is no mass.      Tenderness: There is no abdominal tenderness. There is left CVA tenderness (Does have severe ecchymosis and oozing all over his left flank area). There is no right CVA tenderness or guarding.      Hernia: No hernia is present.   Musculoskeletal:         General: No swelling or tenderness.      Cervical back: No rigidity.      Right lower leg: No edema.      Left lower leg: No edema.   Lymphadenopathy:      Cervical: No cervical adenopathy.   Skin:     General: Skin is warm and dry.      Coloration: Skin is not jaundiced or pale.      Findings: No bruising or erythema.  "  Neurological:      General: No focal deficit present.      Mental Status: He is alert and oriented to person, place, and time.   Psychiatric:         Mood and Affect: Mood normal.         Behavior: Behavior normal.              I&O 24HR    Intake/Output Summary (Last 24 hours) at 10/24/2023 1610  Last data filed at 10/24/2023 1135  Gross per 24 hour   Intake --   Output 250 ml   Net -250 ml       Vitals 24HR  Heart Rate:  []   Temp:  [35.9 °C (96.6 °F)-37.1 °C (98.7 °F)]   Resp:  [10-24]   BP: ()/(47-88)   Height:  [182.9 cm (6')-195.6 cm (6' 5\")]   Weight:  [104 kg (230 lb 6.1 oz)-105 kg (231 lb 7.7 oz)]   SpO2:  [91 %-100 %]         Relevant Results        Results for orders placed or performed during the hospital encounter of 10/23/23 (from the past 96 hour(s))   POCT GLUCOSE   Result Value Ref Range    POCT Glucose 152 (H) 74 - 99 mg/dL   CBC and Auto Differential   Result Value Ref Range    WBC 9.1 4.4 - 11.3 x10*3/uL    nRBC 0.0 0.0 - 0.0 /100 WBCs    RBC 4.12 (L) 4.50 - 5.90 x10*6/uL    Hemoglobin 12.6 (L) 13.5 - 17.5 g/dL    Hematocrit 38.2 (L) 41.0 - 52.0 %    MCV 93 80 - 100 fL    MCH 30.6 26.0 - 34.0 pg    MCHC 33.0 32.0 - 36.0 g/dL    RDW 14.1 11.5 - 14.5 %    Platelets 239 150 - 450 x10*3/uL    MPV 10.2 7.5 - 11.5 fL    Neutrophils % 77.1 40.0 - 80.0 %    Immature Granulocytes %, Automated 0.4 0.0 - 0.9 %    Lymphocytes % 13.5 13.0 - 44.0 %    Monocytes % 7.7 2.0 - 10.0 %    Eosinophils % 0.9 0.0 - 6.0 %    Basophils % 0.4 0.0 - 2.0 %    Neutrophils Absolute 7.05 (H) 1.60 - 5.50 x10*3/uL    Immature Granulocytes Absolute, Automated 0.04 0.00 - 0.50 x10*3/uL    Lymphocytes Absolute 1.23 0.80 - 3.00 x10*3/uL    Monocytes Absolute 0.70 0.05 - 0.80 x10*3/uL    Eosinophils Absolute 0.08 0.00 - 0.40 x10*3/uL    Basophils Absolute 0.04 0.00 - 0.10 x10*3/uL   Comprehensive metabolic panel   Result Value Ref Range    Glucose 145 (H) 65 - 99 mg/dL    Sodium 134 133 - 145 mmol/L    Potassium 4.2 3.4 " - 5.1 mmol/L    Chloride 102 97 - 107 mmol/L    Bicarbonate 18 (L) 24 - 31 mmol/L    Urea Nitrogen 46 (H) 8 - 25 mg/dL    Creatinine 2.40 (H) 0.40 - 1.60 mg/dL    eGFR 28 (L) >60 mL/min/1.73m*2    Calcium 8.9 8.5 - 10.4 mg/dL    Albumin 3.6 3.5 - 5.0 g/dL    Alkaline Phosphatase 31 (L) 35 - 125 U/L    Total Protein 5.8 (L) 5.9 - 7.9 g/dL    AST 16 5 - 40 U/L    Bilirubin, Total 0.4 0.1 - 1.2 mg/dL    ALT 14 5 - 40 U/L    Anion Gap 14 <=19 mmol/L   Protime-INR   Result Value Ref Range    Protime 11.9 9.3 - 12.7 seconds    INR 1.1 0.9 - 1.2   APTT   Result Value Ref Range    aPTT 22.3 22.0 - 32.5 seconds   Troponin T, High Sensitivity   Result Value Ref Range    Troponin T, High Sensitivity 77 (H) <=15 ng/L   Type And Screen   Result Value Ref Range    ABO TYPE A     Rh TYPE POS     ANTIBODY SCREEN NEG    Light Blue Top   Result Value Ref Range    Extra Tube Hold for add-ons.    Renal function panel   Result Value Ref Range    Glucose 140 (H) 65 - 99 mg/dL    Sodium 132 (L) 133 - 145 mmol/L    Potassium 4.5 3.4 - 5.1 mmol/L    Chloride 103 97 - 107 mmol/L    Bicarbonate 19 (L) 24 - 31 mmol/L    Urea Nitrogen 47 (H) 8 - 25 mg/dL    Creatinine 2.40 (H) 0.40 - 1.60 mg/dL    eGFR 28 (L) >60 mL/min/1.73m*2    Calcium 8.9 8.5 - 10.4 mg/dL    Phosphorus 3.2 2.5 - 4.5 mg/dL    Albumin 3.8 3.5 - 5.0 g/dL    Anion Gap 10 <=19 mmol/L   CBC and Auto Differential   Result Value Ref Range    WBC 11.0 4.4 - 11.3 x10*3/uL    nRBC 0.0 0.0 - 0.0 /100 WBCs    RBC 4.33 (L) 4.50 - 5.90 x10*6/uL    Hemoglobin 13.0 (L) 13.5 - 17.5 g/dL    Hematocrit 39.1 (L) 41.0 - 52.0 %    MCV 90 80 - 100 fL    MCH 30.0 26.0 - 34.0 pg    MCHC 33.2 32.0 - 36.0 g/dL    RDW 14.1 11.5 - 14.5 %    Platelets 219 150 - 450 x10*3/uL    MPV 10.5 7.5 - 11.5 fL    Neutrophils % 79.6 40.0 - 80.0 %    Immature Granulocytes %, Automated 0.5 0.0 - 0.9 %    Lymphocytes % 12.4 13.0 - 44.0 %    Monocytes % 6.5 2.0 - 10.0 %    Eosinophils % 0.6 0.0 - 6.0 %    Basophils %  0.4 0.0 - 2.0 %    Neutrophils Absolute 8.73 (H) 1.60 - 5.50 x10*3/uL    Immature Granulocytes Absolute, Automated 0.06 0.00 - 0.50 x10*3/uL    Lymphocytes Absolute 1.36 0.80 - 3.00 x10*3/uL    Monocytes Absolute 0.71 0.05 - 0.80 x10*3/uL    Eosinophils Absolute 0.07 0.00 - 0.40 x10*3/uL    Basophils Absolute 0.04 0.00 - 0.10 x10*3/uL   Magnesium   Result Value Ref Range    Magnesium 2.20 1.60 - 3.10 mg/dL   TSH   Result Value Ref Range    Thyroid Stimulating Hormone 0.63 0.27 - 4.20 mIU/L   Hemoglobin A1C   Result Value Ref Range    Hemoglobin A1C 6.3 (H) See below %    Estimated Average Glucose 134 Not Established mg/dL   POCT GLUCOSE   Result Value Ref Range    POCT Glucose 140 (H) 74 - 99 mg/dL   Transthoracic Echo (TTE) Complete   Result Value Ref Range    LV A4C EF 56.0    POCT GLUCOSE   Result Value Ref Range    POCT Glucose 177 (H) 74 - 99 mg/dL          Assessment/Plan     Transthoracic Echo (TTE) Complete    Result Date: 10/24/2023           [place] [address]            Phone [phone] TRANSTHORACIC ECHOCARDIOGRAM REPORT  Patient Name:      LORI Valdez Physician:   [id number] Adi Payan DO Study Date:        10/24/2023          Ordering Provider:   [id number] MILE CONRAD MRN/PID:           [id number]            Fellow: Accession#:        [id number]        Nurse: Date of Birth/Age: 1950 / 73      Sonographer:         Blu Morales RDCS                    years Gender:            M                   Additional Staff: Height:            190.00 cm           Admit Date: Weight:            104.00 kg           Admission Status:    Inpatient - Routine BSA:               2.32 m2             Department Location: [facility] ([place]) Blood Pressure: 119 /65 mmHg Study Type:    TRANSTHORACIC ECHO (TTE) COMPLETE Diagnosis/ICD: Abnormal electrocardiogram [ECG] [EKG]-R94.31 Indication:    Abn Ekg CPT  Codes:     Echo Complete w Full Doppler-[id number] Patient History: Smoker:            Current. Diabetes:          Yes Pertinent History: CAD, HTN, Hyperlipidemia and Chest Pain. PCI, Abn Ekg, Renal                    dx III. Study Detail: The following Echo studies were performed: 2D, M-Mode, Doppler and               color flow.  PHYSICIAN INTERPRETATION: Left Ventricle: Left ventricular systolic function is normal. There are no regional wall motion abnormalities. The left ventricular cavity size is normal. Spectral Doppler shows an impaired relaxation pattern of left ventricular diastolic filling. Left Atrium: The left atrium is mildly dilated. Right Ventricle: The right ventricle is normal in size. There is normal right ventricular global systolic function. Right Atrium: The right atrium is normal in size. Aortic Valve: The aortic valve is trileaflet. There is trivial aortic valve regurgitation. The peak instantaneous gradient of the aortic valve is 6.1 mmHg. The mean gradient of the aortic valve is 3.0 mmHg. Mitral Valve: The mitral valve is normal in structure. There is mild mitral valve regurgitation. Tricuspid Valve: The tricuspid valve is structurally normal. There is mild tricuspid regurgitation. Pulmonic Valve: The pulmonic valve is not well visualized. The pulmonic valve regurgitation was not well visualized. Pericardium: There is no pericardial effusion noted. Aorta: The aortic root is normal.  CONCLUSIONS:  1. Left ventricular systolic function is normal.  2. Spectral Doppler shows an impaired relaxation pattern of left ventricular diastolic filling. QUANTITATIVE DATA SUMMARY: 2D MEASUREMENTS:                           Normal Ranges: LAs:           4.70 cm    (2.7-4.0cm) IVSd:          1.29 cm    (0.6-1.1cm) LVPWd:         1.41 cm    (0.6-1.1cm) LVIDd:         6.29 cm    (3.9-5.9cm) LVIDs:         4.20 cm LV Mass Index: 171.5 g/m2 LV % FS        33.2 % LA VOLUME:                               Normal Ranges:  LA Vol A4C:        121.4 ml   (22+/-6mL/m2) LA Vol A2C:        140.2 ml LA Vol BP:         133.6 ml LA Vol Index A4C:  52.3ml/m2 LA Vol Index A2C:  60.4 ml/m2 LA Vol Index BP:   57.5 ml/m2 LA Area A4C:       30.0 cm2 LA Area A2C:       33.0 cm2 LA Major Axis A4C: 6.3 cm LA Major Axis A2C: 6.6 cm LA Volume Index:   52.2 ml/m2 LA Vol A4C:        111.0 ml LA Vol A2C:        127.0 ml RA VOLUME BY A/L METHOD:                       Normal Ranges: RA Area A4C: 25.2 cm2 M-MODE MEASUREMENTS:                  Normal Ranges: Ao Root: 3.60 cm (2.0-3.7cm) LAs:     5.40 cm (2.7-4.0cm) AORTA MEASUREMENTS:                    Normal Ranges: Asc Ao, d: 3.90 cm (2.1-3.4cm) LV SYSTOLIC FUNCTION BY 2D PLANIMETRY (MOD):                     Normal Ranges: EF-A4C View: 56.0 % (>=55%) EF-A2C View: 51.1 % EF-Biplane:  52.4 % LV DIASTOLIC FUNCTION:                     Normal Ranges: MV Peak E: 0.74 m/s (0.7-1.2 m/s) MITRAL VALVE:                 Normal Ranges: MV DT: 165 msec (150-240msec) AORTIC VALVE:                                   Normal Ranges: AoV Vmax:                1.23 m/s (<=1.7m/s) AoV Peak P.1 mmHg (<20mmHg) AoV Mean PG:             3.0 mmHg (1.7-11.5mmHg) LVOT Max Josr:            0.91 m/s (<=1.1m/s) AoV VTI:                 19.10 cm (18-25cm) LVOT VTI:                16.60 cm LVOT Diameter:           2.00 cm  (1.8-2.4cm) AoV Area, VTI:           2.73 cm2 (2.5-5.5cm2) AoV Area,Vmax:           2.33 cm2 (2.5-4.5cm2) AoV Dimensionless Index: 0.87  RIGHT VENTRICLE: RV Basal 4.46 cm RV Mid   3.50 cm RV Major 5.6 cm TAPSE:   19.3 mm RV s'    0.13 m/s TRICUSPID VALVE/RVSP:                             Normal Ranges: Peak TR Velocity: 2.11 m/s RV Syst Pressure: 20.8 mmHg (< 30mmHg) IVC Diam:         1.61 cm PULMONIC VALVE:                      Normal Ranges: PV Max Josr: 0.7 m/s  (0.6-0.9m/s) PV Max P.0 mmHg  [id number] Adi Payan DO Electronically signed on 10/24/2023 at 1:18:50 PM  ** Final **     CT brain attack head  wo IV contrast    Result Date: 10/24/2023  Interpreted By:  Luciana Mobley, STUDY: CT BRAIN ATTACK HEAD WO IV CONTRAST;  10/23/2023 11:40 pm   INDICATION: Signs/Symptoms:Stroke Evaluation.   COMPARISON: 08/31/2010.   ACCESSION NUMBER(S): [id number]   ORDERING CLINICIAN: MASTER CHAVEZ   TECHNIQUE: Noncontrast axial CT scan of head was performed. Angled reformats in brain and bone windows were generated. The images were reviewed in bone, brain, blood and soft tissue windows.   FINDINGS: CSF Spaces: Ex vacuo dilation of the ventricles. The sulci and basal cisterns are within normal limits. There is no extraaxial fluid collection.   Parenchyma: Chronic bilateral white matter microvascular disease. Old right frontal infarct. There is no mass effect or midline shift. There is no intracranial hemorrhage.   Calvarium: The calvarium is unremarkable.   Paranasal sinuses and mastoids: Visualized paranasal sinuses and mastoids are clear.       No acute intracranial findings.   MACRO: None   Signed by: Luciana Mobley 10/24/2023 8:48 AM Dictation workstation:   [id number]    CT chest abdomen pelvis wo IV contrast    Result Date: 10/24/2023  Interpreted By:  Luciana Mobley, STUDY: CT CHEST ABDOMEN PELVIS WO CONTRAST;  10/24/2023 12:51 am   INDICATION: Signs/Symptoms:syncope, hypotension, hx diarrhea on blood thinner   COMPARISON: CT abdomen and pelvis dated 07/21/2023.   ACCESSION NUMBER(S): [id number]   ORDERING CLINICIAN: MASTER CHAVEZ   TECHNIQUE: CT of the chest, abdomen, and pelvis was performed. Contiguous axial images were obtained at  5 mm slice thickness through the chest, and at  3 mm through the abdomen and pelvis. Coronal and sagittal reconstructions at  3 mm slice thickness were performed.   FINDINGS: CHEST:   LUNG/PLEURA/LARGE AIRWAYS: No air space opacity, focal consolidation, pleural effusion/hemothorax, or pneumothorax are appreciated.  There are no discrete pulmonary nodules.   VESSELS: Atherosclerotic  disease of the aorta.   HEART: The heart is normal in size.  Severe atherosclerotic disease of the coronary vasculature.   MEDIASTINUM AND ATIF: No pneumomediastinum, abnormal mediastinal fluid collection or mediastinal hematoma are appreciated.  No mediastinal, hilar or biaxillary adenopathy is present.  The esophagus is normal in course and caliber.   CHEST WALL AND LOWER NECK: No acute fracture or dislocation of the included osseous structures are appreciated.  No suspicious osseous lesions are identified.  The thoracic wall soft tissues are within normal limits.   ABDOMEN:   LIVER: No focal perfusion abnormality of the liver is appreciated to suggest contusion or laceration. There is no subcapsular hematoma, no perihepatic fluid collection.   GALLBLADDER: Gallstones present. No wall thickening.   BILE DUCTS: The intahepatic and extrahepatic bile ducts are not dilated.   PANCREAS: The pancreas appears unremarkable.   SPLEEN: Normal size. No focal lesions.   ADRENAL GLANDS: The bilateral adrenal glands are unremarkable in appearance.   KIDNEYS AND URETERS: Kidneys appear normal. No stones. No hydronephrosis.   PELVIS:   BLADDER: The urinary bladder appears within normal limits.   REPRODUCTIVE ORGANS: No suspicious findings.   BOWEL: The stomach is unremarkable.  The small bowel is normal in caliber without evidence of focal wall thickening or obstruction.  There is no evidence of focal wall thickening or dilatation of the large bowel.   VESSELS: Atherosclerotic disease of the aorta and bilateral iliac vessels. Infrarenal IVC filter present.   PERITONEUM/RETROPERITONEUM/LYMPH NODES: There is no evidence of intra- or retroperitoneal hematoma.  There is no free or loculated fluid collection, no free intraperitoneal air. No abdominopelvic lymphadenopathy is present.   BONES AND ABDOMINAL WALL: No evidence of acute fracture or dislocation of the included osseous structures.  No suspicious osseous lesions are  identified.  The abdominal wall soft tissues appear normal.       CHEST No acute findings.   ABDOMEN - PELVIS 1. No acute findings. 2. Cholelithiasis.   MACRO: None   Signed by: Luciana Mobley 10/24/2023 8:45 AM Dictation workstation:   [id number]    Impression:  Chronic kidney disease stage IV etiology most likely nephrosclerosis from combination of diabetes mellitus and hypertension  Syncope  Multiple falls  Atrial fibrillation  Diabetes mellitus  Hypertension  Coronary artery disease    Recommendations:    obtain urine protein /creatinine ratio  He will benefit from SGLT2 inhibitor farxiga 10 mg daily  Continue to monitor renal function very closely  No indication for dialysis therapy  Renal diet    Thank you for your consultation          Nicole Ordazults

## 2023-10-25 LAB
ALBUMIN SERPL-MCNC: 3.8 G/DL (ref 3.5–5)
ALP BLD-CCNC: 30 U/L (ref 35–125)
ALT SERPL-CCNC: 16 U/L (ref 5–40)
ANION GAP SERPL CALC-SCNC: 11 MMOL/L
AST SERPL-CCNC: 17 U/L (ref 5–40)
BILIRUB SERPL-MCNC: 0.5 MG/DL (ref 0.1–1.2)
BUN SERPL-MCNC: 51 MG/DL (ref 8–25)
CALCIUM SERPL-MCNC: 8.7 MG/DL (ref 8.5–10.4)
CHLORIDE SERPL-SCNC: 105 MMOL/L (ref 97–107)
CO2 SERPL-SCNC: 21 MMOL/L (ref 24–31)
CREAT SERPL-MCNC: 2.1 MG/DL (ref 0.4–1.6)
ERYTHROCYTE [DISTWIDTH] IN BLOOD BY AUTOMATED COUNT: 14.1 % (ref 11.5–14.5)
GFR SERPL CREATININE-BSD FRML MDRD: 33 ML/MIN/1.73M*2
GLUCOSE BLD MANUAL STRIP-MCNC: 113 MG/DL (ref 74–99)
GLUCOSE BLD MANUAL STRIP-MCNC: 113 MG/DL (ref 74–99)
GLUCOSE BLD MANUAL STRIP-MCNC: 158 MG/DL (ref 74–99)
GLUCOSE BLD MANUAL STRIP-MCNC: 172 MG/DL (ref 74–99)
GLUCOSE BLD MANUAL STRIP-MCNC: 230 MG/DL (ref 74–99)
GLUCOSE SERPL-MCNC: 116 MG/DL (ref 65–99)
HCT VFR BLD AUTO: 38.7 % (ref 41–52)
HGB BLD-MCNC: 12.7 G/DL (ref 13.5–17.5)
MCH RBC QN AUTO: 30.1 PG (ref 26–34)
MCHC RBC AUTO-ENTMCNC: 32.8 G/DL (ref 32–36)
MCV RBC AUTO: 92 FL (ref 80–100)
NRBC BLD-RTO: 0 /100 WBCS (ref 0–0)
PLATELET # BLD AUTO: 202 X10*3/UL (ref 150–450)
PMV BLD AUTO: 10.6 FL (ref 7.5–11.5)
POTASSIUM SERPL-SCNC: 4.5 MMOL/L (ref 3.4–5.1)
PROT SERPL-MCNC: 6.1 G/DL (ref 5.9–7.9)
RBC # BLD AUTO: 4.22 X10*6/UL (ref 4.5–5.9)
SODIUM SERPL-SCNC: 137 MMOL/L (ref 133–145)
WBC # BLD AUTO: 10.7 X10*3/UL (ref 4.4–11.3)

## 2023-10-25 PROCEDURE — 2500000004 HC RX 250 GENERAL PHARMACY W/ HCPCS (ALT 636 FOR OP/ED): Performed by: INTERNAL MEDICINE

## 2023-10-25 PROCEDURE — 99232 SBSQ HOSP IP/OBS MODERATE 35: CPT | Performed by: NURSE PRACTITIONER

## 2023-10-25 PROCEDURE — 36415 COLL VENOUS BLD VENIPUNCTURE: CPT | Performed by: INTERNAL MEDICINE

## 2023-10-25 PROCEDURE — 97166 OT EVAL MOD COMPLEX 45 MIN: CPT | Mod: GO

## 2023-10-25 PROCEDURE — 96372 THER/PROPH/DIAG INJ SC/IM: CPT | Performed by: NURSE PRACTITIONER

## 2023-10-25 PROCEDURE — 2500000004 HC RX 250 GENERAL PHARMACY W/ HCPCS (ALT 636 FOR OP/ED): Performed by: NURSE PRACTITIONER

## 2023-10-25 PROCEDURE — 97530 THERAPEUTIC ACTIVITIES: CPT | Mod: GO

## 2023-10-25 PROCEDURE — 80053 COMPREHEN METABOLIC PANEL: CPT | Performed by: INTERNAL MEDICINE

## 2023-10-25 PROCEDURE — 2500000001 HC RX 250 WO HCPCS SELF ADMINISTERED DRUGS (ALT 637 FOR MEDICARE OP): Performed by: STUDENT IN AN ORGANIZED HEALTH CARE EDUCATION/TRAINING PROGRAM

## 2023-10-25 PROCEDURE — 97162 PT EVAL MOD COMPLEX 30 MIN: CPT | Mod: GP

## 2023-10-25 PROCEDURE — 2500000002 HC RX 250 W HCPCS SELF ADMINISTERED DRUGS (ALT 637 FOR MEDICARE OP, ALT 636 FOR OP/ED): Performed by: INTERNAL MEDICINE

## 2023-10-25 PROCEDURE — 97110 THERAPEUTIC EXERCISES: CPT | Mod: GP

## 2023-10-25 PROCEDURE — 2500000001 HC RX 250 WO HCPCS SELF ADMINISTERED DRUGS (ALT 637 FOR MEDICARE OP): Performed by: INTERNAL MEDICINE

## 2023-10-25 PROCEDURE — 2500000005 HC RX 250 GENERAL PHARMACY W/O HCPCS: Performed by: INTERNAL MEDICINE

## 2023-10-25 PROCEDURE — 2500000001 HC RX 250 WO HCPCS SELF ADMINISTERED DRUGS (ALT 637 FOR MEDICARE OP): Performed by: NURSE PRACTITIONER

## 2023-10-25 PROCEDURE — 96372 THER/PROPH/DIAG INJ SC/IM: CPT | Performed by: INTERNAL MEDICINE

## 2023-10-25 PROCEDURE — 2060000001 HC INTERMEDIATE ICU ROOM DAILY

## 2023-10-25 PROCEDURE — G0378 HOSPITAL OBSERVATION PER HR: HCPCS

## 2023-10-25 PROCEDURE — 85027 COMPLETE CBC AUTOMATED: CPT | Performed by: INTERNAL MEDICINE

## 2023-10-25 PROCEDURE — 82947 ASSAY GLUCOSE BLOOD QUANT: CPT

## 2023-10-25 RX ADMIN — GABAPENTIN 600 MG: 600 TABLET, FILM COATED ORAL at 08:20

## 2023-10-25 RX ADMIN — OXYCODONE HYDROCHLORIDE 15 MG: 5 TABLET ORAL at 00:39

## 2023-10-25 RX ADMIN — FOLIC ACID TAB 400 MCG 0.4 MG: 400 TAB at 08:21

## 2023-10-25 RX ADMIN — METOPROLOL SUCCINATE 25 MG: 25 TABLET, EXTENDED RELEASE ORAL at 20:34

## 2023-10-25 RX ADMIN — LIDOCAINE 1 PATCH: 4 PATCH TOPICAL at 20:35

## 2023-10-25 RX ADMIN — ATORVASTATIN CALCIUM 10 MG: 10 TABLET, FILM COATED ORAL at 20:34

## 2023-10-25 RX ADMIN — OXYCODONE HYDROCHLORIDE 15 MG: 5 TABLET ORAL at 08:01

## 2023-10-25 RX ADMIN — CLOPIDOGREL BISULFATE 75 MG: 75 TABLET ORAL at 08:20

## 2023-10-25 RX ADMIN — OXYCODONE HYDROCHLORIDE 15 MG: 5 TABLET ORAL at 14:05

## 2023-10-25 RX ADMIN — HEPARIN SODIUM 5000 UNITS: 5000 INJECTION, SOLUTION INTRAVENOUS; SUBCUTANEOUS at 20:34

## 2023-10-25 RX ADMIN — LEVETIRACETAM 500 MG: 500 TABLET, FILM COATED ORAL at 08:21

## 2023-10-25 RX ADMIN — OXYCODONE HYDROCHLORIDE 15 MG: 5 TABLET ORAL at 20:35

## 2023-10-25 RX ADMIN — INSULIN LISPRO 1 UNITS: 100 INJECTION, SOLUTION INTRAVENOUS; SUBCUTANEOUS at 20:39

## 2023-10-25 RX ADMIN — HEPARIN SODIUM 5000 UNITS: 5000 INJECTION, SOLUTION INTRAVENOUS; SUBCUTANEOUS at 04:48

## 2023-10-25 RX ADMIN — CYANOCOBALAMIN TAB 1000 MCG 1000 MCG: 1000 TAB at 08:21

## 2023-10-25 RX ADMIN — AMLODIPINE BESYLATE 5 MG: 5 TABLET ORAL at 08:21

## 2023-10-25 RX ADMIN — INSULIN LISPRO 2 UNITS: 100 INJECTION, SOLUTION INTRAVENOUS; SUBCUTANEOUS at 00:02

## 2023-10-25 RX ADMIN — GABAPENTIN 600 MG: 600 TABLET, FILM COATED ORAL at 20:33

## 2023-10-25 RX ADMIN — SENNOSIDES 17.2 MG: 8.6 TABLET, FILM COATED ORAL at 20:34

## 2023-10-25 RX ADMIN — INSULIN LISPRO 2 UNITS: 100 INJECTION, SOLUTION INTRAVENOUS; SUBCUTANEOUS at 17:45

## 2023-10-25 RX ADMIN — LEVETIRACETAM 500 MG: 500 TABLET, FILM COATED ORAL at 20:33

## 2023-10-25 RX ADMIN — LACTULOSE 20 G: 10 SOLUTION ORAL at 15:14

## 2023-10-25 RX ADMIN — HEPARIN SODIUM 5000 UNITS: 5000 INJECTION, SOLUTION INTRAVENOUS; SUBCUTANEOUS at 12:05

## 2023-10-25 RX ADMIN — DONEPEZIL HYDROCHLORIDE 10 MG: 10 TABLET, FILM COATED ORAL at 20:33

## 2023-10-25 RX ADMIN — FERROUS SULFATE TAB 325 MG (65 MG ELEMENTAL FE) 65 MG OF IRON: 325 (65 FE) TAB at 08:21

## 2023-10-25 RX ADMIN — TAMSULOSIN HYDROCHLORIDE 0.4 MG: 0.4 CAPSULE ORAL at 08:21

## 2023-10-25 RX ADMIN — METOPROLOL SUCCINATE 25 MG: 25 TABLET, EXTENDED RELEASE ORAL at 08:21

## 2023-10-25 ASSESSMENT — COGNITIVE AND FUNCTIONAL STATUS - GENERAL
MOVING TO AND FROM BED TO CHAIR: A LOT
TOILETING: A LITTLE
DAILY ACTIVITIY SCORE: 14
HELP NEEDED FOR BATHING: A LOT
WALKING IN HOSPITAL ROOM: A LOT
HELP NEEDED FOR BATHING: A LOT
MOBILITY SCORE: 12
DRESSING REGULAR UPPER BODY CLOTHING: A LITTLE
PERSONAL GROOMING: A LITTLE
DAILY ACTIVITIY SCORE: 17
TURNING FROM BACK TO SIDE WHILE IN FLAT BAD: A LOT
CLIMB 3 TO 5 STEPS WITH RAILING: TOTAL
MOVING FROM LYING ON BACK TO SITTING ON SIDE OF FLAT BED WITH BEDRAILS: A LITTLE
STANDING UP FROM CHAIR USING ARMS: A LOT
DRESSING REGULAR LOWER BODY CLOTHING: A LOT
PERSONAL GROOMING: A LOT
TOILETING: A LOT
DRESSING REGULAR UPPER BODY CLOTHING: A LITTLE
DRESSING REGULAR LOWER BODY CLOTHING: TOTAL

## 2023-10-25 ASSESSMENT — PAIN SCALES - GENERAL
PAINLEVEL_OUTOF10: 4
PAINLEVEL_OUTOF10: 5 - MODERATE PAIN
PAINLEVEL_OUTOF10: 5 - MODERATE PAIN
PAINLEVEL_OUTOF10: 0 - NO PAIN
PAINLEVEL_OUTOF10: 7
PAINLEVEL_OUTOF10: 6
PAINLEVEL_OUTOF10: 5 - MODERATE PAIN
PAINLEVEL_OUTOF10: 0 - NO PAIN

## 2023-10-25 ASSESSMENT — PAIN - FUNCTIONAL ASSESSMENT
PAIN_FUNCTIONAL_ASSESSMENT: 0-10

## 2023-10-25 ASSESSMENT — ACTIVITIES OF DAILY LIVING (ADL)
ADLS_ADDRESSED: NO
ADL_ASSISTANCE: INDEPENDENT
BATHING_ASSISTANCE: MODERATE
ADL_ASSISTANCE: INDEPENDENT

## 2023-10-25 NOTE — PROGRESS NOTES
Medication Education     Medication education for Rachid Yun was provided to the patient  for the following medication(s):  Metoprolol succinate      Medication education provided by a Pharmacist:  ADR Counseling Dose, frequency, storage How the medication works and benefits of taking it Benefits of taking the medication     Identified potential barriers to education:  None    Method(s) of Education:  Verbal, Handout    An opportunity to ask questions and receive answers was provided.     Assessment of understanding the patient :  2= meets goals/outcomes    Additional Notes (if applicable): provided pill box    Samantha Dela Cruz, PharmD

## 2023-10-25 NOTE — CARE PLAN
Problem: Fall/Injury  Goal: Not fall by end of shift  Outcome: Progressing  Goal: Be free from injury by end of the shift  Outcome: Progressing  Goal: Verbalize understanding of personal risk factors for fall in the hospital  Outcome: Progressing  Goal: Verbalize understanding of risk factor reduction measures to prevent injury from fall in the home  Outcome: Progressing  Goal: Use assistive devices by end of the shift  Outcome: Progressing  Goal: Pace activities to prevent fatigue by end of the shift  Outcome: Progressing     Problem: Skin  Goal: Decreased wound size/increased tissue granulation at next dressing change  Outcome: Progressing  Goal: Participates in plan/prevention/treatment measures  Outcome: Progressing  Goal: Prevent/manage excess moisture  Outcome: Progressing  Goal: Prevent/minimize sheer/friction injuries  Outcome: Progressing  Goal: Promote/optimize nutrition  Outcome: Progressing  Goal: Promote skin healing  Outcome: Progressing     Problem: Pain  Goal: Takes deep breaths with improved pain control throughout the shift  Outcome: Progressing  Goal: Turns in bed with improved pain control throughout the shift  Outcome: Progressing  Goal: Walks with improved pain control throughout the shift  Outcome: Progressing  Goal: Performs ADL's with improved pain control throughout shift  Outcome: Progressing  Goal: Participates in PT with improved pain control throughout the shift  Outcome: Progressing  Goal: Free from opioid side effects throughout the shift  Outcome: Progressing  Goal: Free from acute confusion related to pain meds throughout the shift  Outcome: Progressing   The patient's goals for the shift include feel better    The clinical goals for the shift include vitlas to remain wnl    Over the shift, the patient did not make progress toward the following goals. Barriers to progression include pain and room temperature. Recommendations to address these barriers include giving pain  medications as ordered and turning the temperature down in room for comfort.

## 2023-10-25 NOTE — PROGRESS NOTES
Occupational Therapy    Evaluation/Treatment    Patient Name: Rachid Yun  MRN: [id number]  : 1950  Today's Date: 10/25/23  Time Calculation  Start Time: 816  Stop Time: 840  Time Calculation (min): 24 min       Assessment:  OT Assessment: Pt presents with increased overall weakness, tremulous movements, and requires 2 person assist upon eval. Pt will benefit from continued skilled OT services to address these functional deficits.  Prognosis: Good  Evaluation/Treatment Tolerance: Patient limited by fatigue, Patient limited by pain  End of Session Communication: Bedside nurse  End of Session Patient Position: Up in chair  OT Assessment Results: Decreased ADL status, Decreased upper extremity range of motion, Decreased upper extremity strength, Decreased endurance, Decreased fine motor control, Decreased functional mobility, Decreased gross motor control  Prognosis: Good  Evaluation/Treatment Tolerance: Patient limited by fatigue, Patient limited by pain    Plan:  Treatment Interventions: ADL retraining, Functional transfer training, UE strengthening/ROM, Endurance training, Patient/family training, Equipment evaluation/education, Neuromuscular reeducation, Continued evaluation  OT Frequency: 3 times per week  OT Discharge Recommendations: Moderate intensity level of continued care  OT - OK to Discharge: Yes  Treatment Interventions: ADL retraining, Functional transfer training, UE strengthening/ROM, Endurance training, Patient/family training, Equipment evaluation/education, Neuromuscular reeducation, Continued evaluation    Subjective   Current Problem:  1. Syncope and collapse        2. Acute renal failure superimposed on chronic kidney disease, unspecified acute renal failure type, unspecified CKD stage (CMS/HCC)        3. Contusion of left chest wall, initial encounter        4. Pericardial effusion        5. Atrial fibrillation, unspecified type (CMS/HCC)        6. Delirium        7. Hypotension due  to hypovolemia        8. Stage 3b chronic kidney disease (CMS/HCC)  Transthoracic Echo (TTE) Complete    Transthoracic Echo (TTE) Complete      9. Coronary artery disease involving native coronary artery of native heart without angina pectoris  Transthoracic Echo (TTE) Complete    Transthoracic Echo (TTE) Complete      10. Abnormal electrocardiogram (ECG) (EKG)  Transthoracic Echo (TTE) Complete        General:   OT Received On: 10/25/23  General  Reason for Referral: Syncope and collapse, weakness, impaired ADL's  Referred By: Dr. Smith  Past Medical History Relevant to Rehab: OP, DM2, arthritis, CAD, CVA, spine surgery, a-fib  pt's pericardial effusion. Cancel in PM.)  Family/Caregiver Present: Yes (son present at the beginning)  Co-Treatment: PT  Co-Treatment Reason: Pt required 2 skilled Therapists on initial eval due to medical and physical complexity.  Prior to Session Communication: Bedside nurse  Patient Position Received: Bed, 3 rail up  General Comment: Cleared by nurse prior to session and pt agreeable to OT eval.    Precautions:  Hearing/Visual Limitations: glasses all the time  Medical Precautions: Fall precautions    Vital Signs:  Heart Rate: 92    Pain:  Pain Assessment  Pain Assessment: 0-10  Pain Score: 5 - Moderate pain  Pain Type: Acute pain  Pain Location: Abdomen  Pain Orientation: Left  Patient's Stated Pain Goal: No pain    Objective     Cognition:  Overall Cognitive Status: Within Functional Limits  Orientation Level: Oriented X4    Home Living:  Type of Home: Condo  Lives With: Adult children, Other (Comment) (Son)  Home Adaptive Equipment: Wheelchair-power, Other (Comment) (rollator)  Home Layout: Two level  Home Access: Stairs to enter without rails  Entrance Stairs-Rails: None  Entrance Stairs-Number of Steps: 1  Bathroom Shower/Tub: Tub/shower unit  Bathroom Toilet: Handicapped height  Bathroom Equipment: Grab bars in shower, Shower chair with back    Prior Function:  Level of  [place]: Independent with ADLs and functional transfers, Needs assistance with homemaking  Receives Help From: Family (Son)  ADL Assistance: Independent  Homemaking Assistance: Needs assistance  Ambulatory Assistance: Independent (mod indep with rollator vs motorized W/C)  Vocational: Retired  Hand Dominance: Ambidextrous       ADL:  Eating Assistance: Independent  Grooming Assistance: Moderate  Grooming Deficit: Standing with assistive device  Bathing Assistance: Moderate  UE Dressing Assistance: Minimal  LE Dressing Assistance: Total  Toileting Assistance with Device: Unable to assess      Activity Tolerance:  Activity Tolerance Comments: impaired due to increased overall weakness and pain reported       Bed Mobility/Transfers: Transfers  Transfer: Yes  Transfer 1  Transfer From 1: Bed to  Transfer to 1: Stand  Technique 1: Sit to stand  Transfer Device 1: Walker  Transfer Level of Assistance 1: Moderate assistance, +2, Minimal verbal cues  Trials/Comments 1: Increased tactile assist for balance and due to BLE weakness.  Transfers 2  Transfer From 2: Stand to  Transfer to 2: Sit  Technique 2: Stand to sit  Transfer Device 2: Walker  Transfer Level of Assistance 2: Moderate assistance, +2, Minimal verbal cues  Trials/Comments 2: same as above         Therapy/Activity: Therapeutic Activity  Therapeutic Activity Performed: Yes  Therapeutic Activity 1: Pt completed short functional mobility from the bed to the bedside chair and required mod A x2 for balance mostly and due to BLE weakness.     Vision:Vision - Basic Assessment  Current Vision: Wears glasses all the time    Sensation:  Sensation Comment: BUE's WFL    Strength:  Strength Comments: RUE AROM impaired at shoulder and WFL distally, MMT 2/5 shoulder and 3+/5 distally (LUE AROM impaired at shoulder and WFL distally, MMT 2-/5 shoulder and 3+/5 distally)       Coordination:  Movements are Fluid and Coordinated: No  Upper Body Coordination: tremors noted  intermittently     Hand Function:  Hand Function  Gross Grasp: Functional  Coordination: Impaired      Outcome Measures: [facility] Daily Activity  Putting on and taking off regular lower body clothing: Total  Bathing (including washing, rinsing, drying): A lot  Putting on and taking off regular upper body clothing: A little  Toileting, which includes using toilet, bedpan or urinal: A lot  Taking care of personal grooming such as brushing teeth: A lot  Eating Meals: None  Daily Activity - Total Score: 14      Education Documentation  Home Exercise Program, taught by Ash Cruz Jr., OT at 10/25/2023  9:42 AM.  Learner: Patient  Readiness: Acceptance  Method: Explanation, Demonstration  Response: Needs Reinforcement    ADL Training, taught by Ash Cruz Jr., OT at 10/25/2023  9:42 AM.  Learner: Patient  Readiness: Acceptance  Method: Explanation, Demonstration  Response: Needs Reinforcement    Education Comments  No comments found.         Goals:  Encounter Problems       Encounter Problems (Active)       OT Goals       Pt will complete all functional transfers and mobility with close S using a RW. (Progressing)       Start:  10/25/23    Expected End:  11/08/23            Pt will complete all grooming tasks with close S/setup in standing. (Progressing)       Start:  10/25/23    Expected End:  11/08/23            Pt will complete UB dressing/bathing with setup/indep and LB dressing/bathing with close S using AE as needed. (Progressing)       Start:  10/25/23    Expected End:  11/08/23            Pt will complete all toileting tasks with close S. (Progressing)       Start:  10/25/23    Expected End:  11/08/23            Pt will participate in BUE exercises in order to enhance functional performance. (Progressing)       Start:  10/25/23    Expected End:  11/08/23

## 2023-10-25 NOTE — PROGRESS NOTES
[facility] met with the pt and his son. Discussion around dc planning needs. [facility] made pt and son aware that based on pts therapy evals it appears as though he would benefit from stay at SNF as he needed 2 assist for transfers. Pt and son stating they would like to see how the pt does taking more than a few steps from bed to chair, requesting therapy work with him one more time. [facility] state they will see him again but not until 10/26 in the morning. In the meantime dc planning completed with the hopes that the pt will return home with [facility], with son and pts approval referral being sent to  Homecare. Pts PCP is Dr MADAY Marshall. Attending CNP made aware. Anticipate dc on 10/26, son requesting dc after 2:30 as she states he will transport pt home.

## 2023-10-25 NOTE — NURSING NOTE
Patient asleep at this time, no signs of pain or discomfort observed. No need for interventions at this time.

## 2023-10-25 NOTE — NURSING NOTE
Assumed care of this pt. Pt sitting in bed, NAD. Pt denies needs at this time. BSSR. Bed low, call light and belongings in reach. Continuing to monitor

## 2023-10-25 NOTE — PROGRESS NOTES
Physical Therapy    Physical Therapy Evaluation & Treatment    Patient Name: Rachid Yun  MRN: [id number]  Today's Date: 10/25/2023   Time Calculation  Start Time: 0817  Stop Time: 0841  Time Calculation (min): 24 min    Assessment/Plan   PT Assessment  PT Assessment Results: Decreased strength, Decreased range of motion, Decreased endurance, Impaired balance, Decreased mobility, Decreased coordination, Decreased safety awareness, Impaired sensation, Impaired tone  Rehab Prognosis: Good  Evaluation/Treatment Tolerance: Patient tolerated treatment well  Medical Staff Made Aware: Yes  Strengths: Ability to acquire knowledge  Barriers to Participation: Comorbidities  End of Session Communication: Bedside nurse  Assessment Comment: pt with a decline in function from his baseline. pt with B LE weakness, L>R, unsteady gait, impaired balance, and fair safety awareness. pt is a high risk for falls and will benefit from continued skilled therapy services to improve functional performance and maximize safety.  End of Session Patient Position: Up in chair, Alarm off, not on at start of session  IP OR SWING BED PT PLAN  Inpatient or Swing Bed: Inpatient  PT Plan  Treatment/Interventions: Bed mobility, Transfer training, Gait training, Balance training, Strengthening, Endurance training, Range of motion, Therapeutic exercise, Therapeutic activity  PT Plan: Skilled PT  PT Frequency: 4 times per week  PT Discharge Recommendations: Moderate intensity level of continued care  Equipment Recommended upon Discharge: Wheeled walker  PT Recommended Transfer Status: Assist x2, Assistive device (rolling walker)  PT - OK to Discharge: Yes      Subjective     General Visit Information:  General  Reason for Referral: Impaired Mobility following syncope/collapse at home  Referred By: Baljeet Smith MD  Past Medical History Relevant to Rehab: OP, DM2, arthritis, CAD, CVA, spine surgery, a-fib  Family/Caregiver Present: Yes  Caregiver  Feedback: son corroborating information pt was providing  Co-Treatment: OT  Co-Treatment Reason: initial evaluation on medically complex patient with history of multiple falls  Prior to Session Communication: Bedside nurse  Patient Position Received: Bed, 3 rail up, Alarm off, not on at start of session  Preferred Learning Style: verbal  General Comment: pt sitting on EOB upon arrival, son present initially but had to leave for work.  Home Living:  Home Living  Type of Home: Condo  Lives With: Adult children, Other (Comment)  Home Adaptive Equipment: Wheelchair-power, Other (Comment)  Home Layout: Two level  Home Access: Stairs to enter without rails  Entrance Stairs-Rails: None  Entrance Stairs-Number of Steps: 1  Bathroom Shower/Tub: Tub/shower unit  Bathroom Toilet: Handicapped height  Bathroom Equipment: Grab bars in shower, Shower chair with back  Home Living Comments: pt has multiple accomodations in his home  Prior Level of Function:  Prior Function Per Pt/Caregiver Report  Level of [place]: Independent with ADLs and functional transfers, Needs assistance with homemaking  Receives Help From: Family  ADL Assistance: Independent  Homemaking Assistance: Needs assistance  Ambulatory Assistance: Independent  Vocational: Retired  Hand Dominance: Ambidextrous  Prior Function Comments: pt with several falls recently, son works from home usually and can assist pt as needed. son reports pt is getting weaker.  Precautions:  Precautions  Hearing/Visual Limitations: wears glasses, no hearing aides  Medical Precautions: Fall precautions  Precautions Comment: pt verbalizes understanding of uding the call button and waiting for assistance for all mobility  Vital Signs:  Vital Signs  Heart Rate: 87  Heart Rate Source: Monitor  Patient Position: Sitting    Objective   Pain:  Pain Assessment  Pain Assessment: 0-10  Pain Score: 5 - Moderate pain  Pain Type: Acute pain  Pain Location: Rib cage  Pain Orientation: Left  Pain  Interventions:  (pt is wearing a pain patch over hematoma on left flank)  Cognition:  Cognition  Overall Cognitive Status: Within Functional Limits  Orientation Level: Oriented X4    General Assessments:  General Observation  General Observation: hematoma left flank from previous fall, pt very pleasant, cooperative and talkative.     Activity Tolerance  Endurance: Tolerates 10 - 20 min exercise with multiple rests  Activity Tolerance Comments: decreased [place] to activity, fatigues relatively quickly    Sensation  Sensation Comment: numbness B feet    Coordination  Movements are Fluid and Coordinated: No  Upper Body Coordination: resting tremors in B UEs, R>L  Lower Body Coordination: steppage gait with L LE  Coordination Comment: poor accuracy of movement, increased time to complete    Postural Control  Posture Comment: forward flexed at hips, trunk, and head, unable to stand fully erect.    Static Sitting Balance  Static Sitting-Balance Support: Bilateral upper extremity supported  Static Sitting-Level of Assistance: Close supervision  Static Sitting-Comment/Number of Minutes: sat EOB x 10 minutes, denies dizziness    Static Standing Balance  Static Standing-Balance Support: Bilateral upper extremity supported  Static Standing-Level of Assistance: Moderate assistance (x2)  Static Standing-Comment/Number of Minutes: stood in place x 1 minute before ambulating few steps to chair.  Functional Assessments:  ADL  ADL's Addressed: No    Bed Mobility  Bed Mobility: No (pt sitting EOB upon arrival.)    Transfers  Transfer: Yes  Transfer 1  Transfer From 1: Sit to  Transfer to 1: Stand  Technique 1: Sit to stand  Transfer Device 1: Walker  Transfer Level of Assistance 1: Moderate assistance, Minimal verbal cues, Moderate tactile cues, +2  Trials/Comments 1: verbal cues for hand placement, mod A x 2 to guide trunk up, shift weight anteriorly, and establish COG over HARI. verbal cues for more erect posture.  Transfers  2  Transfer From 2: Stand to  Transfer to 2: Sit  Technique 2: Stand to sit  Transfer Device 2: Walker  Transfer Level of Assistance 2: Moderate assistance, Minimal verbal cues, Minimal tactile cues, +2  Trials/Comments 2: verbal cues for safety, sequencing, and centering self at the chair, pt reaching hands back for the chair before cues provided, mod A for eccentric control in sitting.    Ambulation/Gait Training  Ambulation/Gait Training Performed: Yes (Amb 5 steps to the chair with the rolling walker and mod A x 2 for balance and safety. steppage gait left LE, tremulous and unsteady.)    Stairs  Stairs: No  Extremity/Trunk Assessments:  RLE   RLE : Exceptions to WFL (tremulous with movement, unable to achieve full knee and hip extension, ankle to neutral, strength 3+ to 4-/5)  LLE   LLE : Exceptions to WFL (poor available ROM ankle, strnegth 2-/5, increase tremors during ther ex, very weak and atrophied musculature. hip/knee strength 3/5)  Treatments:  Therapeutic Exercise  Therapeutic Exercise Performed: Yes (seated in chair x 12 reps each)  Therapeutic Exercise Activity 1: heel toe raises (very limited right ankle ROM, unable to perform with left ankle)  Therapeutic Exercise Activity 2: LAQ (increased time and effort to complete L LE through available ROM)  Therapeutic Exercise Activity 3: alternating hip flexion  Outcome Measures:  [facility] Basic Mobility  Turning from your back to your side while in a flat bed without using bedrails: A little  Moving from lying on your back to sitting on the side of a flat bed without using bedrails: A lot  Moving to and from bed to chair (including a wheelchair): A lot  Standing up from a chair using your arms (e.g. wheelchair or bedside chair): A lot  To walk in hospital room: A lot  Climbing 3-5 steps with railing: Total  Basic Mobility - Total Score: 12    Encounter Problems       Encounter Problems (Active)       Balance       STG - Maintains dynamic standing balance with  upper extremity support on rolling walker and close supervision (Progressing)       Start:  10/25/23    Expected End:  11/30/23       INTERVENTIONS:  1. Practice standing with minimal support.  2. Educate patient about standing tolerance.  3. Educate patient about independence with gait, transfers, and ADL's.  4. Educate patient about use of assistive device.  5. Educate patient about self-directed care.            Mobility       STG - Patient will ambulate 60' with min A and rolling walker (Progressing)       Start:  10/25/23    Expected End:  11/30/23               Transfers       STG - Patient to transfer to and from sit to supine with close supervision (Progressing)       Start:  10/25/23    Expected End:  11/30/23            STG - Patient will transfer sit to and from stand with close supervision (Progressing)       Start:  10/25/23    Expected End:  11/30/23                   Education Documentation  Mobility Training, taught by Ayse Arvizu PT at 10/25/2023 10:39 AM.  Learner: Patient  Readiness: Acceptance  Method: Explanation  Response: Verbalizes Understanding    Education Comments  No comments found.

## 2023-10-25 NOTE — PROGRESS NOTES
"Rachid Yun is a 73 y.o. male on day 1 of admission presenting with Syncope and collapse.    Subjective      Patient was seen yesterday for chronic kidney disease 3 admitted after he passed out and had a fall feels a whole lot better no complaints today    Review of Systems    Objective     Physical Exam  Neck:      Vascular: No carotid bruit.   Cardiovascular:      Rate and Rhythm: Normal rate and regular rhythm.      Heart sounds: No murmur heard.     No friction rub. No gallop.   Pulmonary:      Breath sounds: No wheezing, rhonchi or rales.   Chest:      Chest wall: No tenderness.   Abdominal:      General: There is no distension.      Tenderness: There is no abdominal tenderness. There is no guarding or rebound.      Comments: Severe bruises along his left flank area   Musculoskeletal:         General: No swelling or tenderness.      Cervical back: Neck supple.      Right lower leg: No edema.      Left lower leg: No edema.   Lymphadenopathy:      Cervical: No cervical adenopathy.         Last Recorded Vitals  Blood pressure 158/88, pulse 87, temperature 36.8 °C (98.2 °F), temperature source Oral, resp. rate 18, height 1.958 m (6' 5.09\"), weight 104 kg (229 lb 4.5 oz), SpO2 97 %.    Intake/Output last 3 Shifts:  I/O last 3 completed shifts:  In: 686.3 (6.6 mL/kg) [P.O.:240; I.V.:446.3 (4.3 mL/kg)]  Out: 430 (4.1 mL/kg) [Urine:430 (0.1 mL/kg/hr)]  Weight: 104 kg     Current Facility-Administered Medications:     acetaminophen (Tylenol) tablet 650 mg, 650 mg, oral, q4h PRN **OR** acetaminophen (Tylenol) oral liquid 650 mg, 650 mg, nasogastric tube, q4h PRN **OR** acetaminophen (Tylenol) suppository 650 mg, 650 mg, rectal, q4h PRN, Baljeet Smith MD    amLODIPine (Norvasc) tablet 5 mg, 5 mg, oral, Daily, Baljeet Smith MD, 5 mg at 10/25/23 0821    [START ON 10/27/2023] apixaban (Eliquis) tablet 5 mg, 5 mg, oral, BID, Gabe Leong, APRN-CNP    atorvastatin (Lipitor) tablet 10 mg, 10 mg, oral, Nightly, " Baljeet Smith MD, 10 mg at 10/24/23 2224    benzocaine-menthol (Cepastat Sore Throat) 15-3.6 mg lozenge 1 lozenge, 1 lozenge, Mouth/Throat, q2h PRN, Baljeet Smith MD    clopidogrel (Plavix) tablet 75 mg, 75 mg, oral, Daily, Baljeet Smith MD, 75 mg at 10/25/23 0820    cyanocobalamin (Vitamin B-12) tablet 1,000 mcg, 1,000 mcg, oral, Daily, Baljeet Smith MD, 1,000 mcg at 10/25/23 0821    dextrose 10 % in water (D10W) infusion, 0.3 g/kg/hr, intravenous, Once PRN, Baljeet mSith MD    dextrose 50 % injection 25 g, 25 g, intravenous, q15 min PRN, Baljeet Smith MD    donepezil (Aricept) tablet 10 mg, 10 mg, oral, Nightly, Baljeet Smith MD, 10 mg at 10/24/23 2224    ferrous sulfate 325 (65 Fe) MG tablet 65 mg of iron, 65 mg of iron, oral, Daily with breakfast, Baljeet Smith MD, 65 mg of iron at 10/25/23 0821    folic acid (Folvite) tablet 0.4 mg, 0.4 mg, oral, Daily, Baljeet Smith MD, 0.4 mg at 10/25/23 0821    gabapentin (Neurontin) tablet 600 mg, 600 mg, oral, BID, Myrna Donovan MD, 600 mg at 10/25/23 0820    glucagon (Glucagen) injection 1 mg, 1 mg, intramuscular, q15 min PRN, Baljeet Smith MD    guaiFENesin (Mucinex) 12 hr tablet 600 mg, 600 mg, oral, q12h PRN, Baljeet Smith MD    heparin (porcine) injection 5,000 Units, 5,000 Units, subcutaneous, q8h, HERBERT Yee-CNP, 5,000 Units at 10/25/23 1205    insulin lispro (HumaLOG) injection 0-5 Units, 0-5 Units, subcutaneous, Before meals & nightly, Baljeet Smith MD, 2 Units at 10/25/23 0002    ipratropium-albuteroL (Duo-Neb) 0.5-2.5 mg/3 mL nebulizer solution 3 mL, 3 mL, nebulization, q6h PRN, Baljeet Smith MD    lactulose 20 gram/30 mL oral solution 20 g, 20 g, oral, PRN, Baljeet Smith MD    levETIRAcetam (Keppra) tablet 500 mg, 500 mg, oral, BID, Baljeet Smith MD, 500 mg at 10/25/23 0821    lidocaine 4 % patch 1 patch, 1 patch, transdermal, Nightly, Baljeet Smith MD, 1 patch at 10/24/23 2224    metoprolol succinate XL  (Toprol-XL) 24 hr tablet 25 mg, 25 mg, oral, BID, Gabe Leong, APRN-CNP, 25 mg at 10/25/23 0821    metoprolol tartrate (Lopressor) injection 5 mg, 5 mg, intravenous, q6h PRN, Baljeet Smith MD    ondansetron ODT (Zofran-ODT) disintegrating tablet 4 mg, 4 mg, oral, q8h PRN **OR** ondansetron (Zofran) injection 4 mg, 4 mg, intravenous, q8h PRN, Baljeet Smith MD    oxyCODONE (Roxicodone) immediate release tablet 15 mg, 15 mg, oral, q6h PRN, Baljeet Smith MD, 15 mg at 10/25/23 1405    perflutren lipid microspheres (Definity) injection 0.5-10 mL of dilution, 0.5-10 mL of dilution, intravenous, Once in imaging, Baljeet Smith MD    sennosides (Senokot) tablet 17.2 mg, 2 tablet, oral, Nightly, Baljeet Smith MD, 17.2 mg at 10/24/23 2224    tamsulosin (Flomax) 24 hr capsule 0.4 mg, 0.4 mg, oral, Daily, Baljeet Smith MD, 0.4 mg at 10/25/23 0821   Relevant Results    Results for orders placed or performed during the hospital encounter of 10/23/23 (from the past 96 hour(s))   POCT GLUCOSE   Result Value Ref Range    POCT Glucose 152 (H) 74 - 99 mg/dL   CBC and Auto Differential   Result Value Ref Range    WBC 9.1 4.4 - 11.3 x10*3/uL    nRBC 0.0 0.0 - 0.0 /100 WBCs    RBC 4.12 (L) 4.50 - 5.90 x10*6/uL    Hemoglobin 12.6 (L) 13.5 - 17.5 g/dL    Hematocrit 38.2 (L) 41.0 - 52.0 %    MCV 93 80 - 100 fL    MCH 30.6 26.0 - 34.0 pg    MCHC 33.0 32.0 - 36.0 g/dL    RDW 14.1 11.5 - 14.5 %    Platelets 239 150 - 450 x10*3/uL    MPV 10.2 7.5 - 11.5 fL    Neutrophils % 77.1 40.0 - 80.0 %    Immature Granulocytes %, Automated 0.4 0.0 - 0.9 %    Lymphocytes % 13.5 13.0 - 44.0 %    Monocytes % 7.7 2.0 - 10.0 %    Eosinophils % 0.9 0.0 - 6.0 %    Basophils % 0.4 0.0 - 2.0 %    Neutrophils Absolute 7.05 (H) 1.60 - 5.50 x10*3/uL    Immature Granulocytes Absolute, Automated 0.04 0.00 - 0.50 x10*3/uL    Lymphocytes Absolute 1.23 0.80 - 3.00 x10*3/uL    Monocytes Absolute 0.70 0.05 - 0.80 x10*3/uL    Eosinophils Absolute 0.08  0.00 - 0.40 x10*3/uL    Basophils Absolute 0.04 0.00 - 0.10 x10*3/uL   Comprehensive metabolic panel   Result Value Ref Range    Glucose 145 (H) 65 - 99 mg/dL    Sodium 134 133 - 145 mmol/L    Potassium 4.2 3.4 - 5.1 mmol/L    Chloride 102 97 - 107 mmol/L    Bicarbonate 18 (L) 24 - 31 mmol/L    Urea Nitrogen 46 (H) 8 - 25 mg/dL    Creatinine 2.40 (H) 0.40 - 1.60 mg/dL    eGFR 28 (L) >60 mL/min/1.73m*2    Calcium 8.9 8.5 - 10.4 mg/dL    Albumin 3.6 3.5 - 5.0 g/dL    Alkaline Phosphatase 31 (L) 35 - 125 U/L    Total Protein 5.8 (L) 5.9 - 7.9 g/dL    AST 16 5 - 40 U/L    Bilirubin, Total 0.4 0.1 - 1.2 mg/dL    ALT 14 5 - 40 U/L    Anion Gap 14 <=19 mmol/L   Protime-INR   Result Value Ref Range    Protime 11.9 9.3 - 12.7 seconds    INR 1.1 0.9 - 1.2   APTT   Result Value Ref Range    aPTT 22.3 22.0 - 32.5 seconds   Troponin T, High Sensitivity   Result Value Ref Range    Troponin T, High Sensitivity 77 (H) <=15 ng/L   Type And Screen   Result Value Ref Range    ABO TYPE A     Rh TYPE POS     ANTIBODY SCREEN NEG    Light Blue Top   Result Value Ref Range    Extra Tube Hold for add-ons.    Renal function panel   Result Value Ref Range    Glucose 140 (H) 65 - 99 mg/dL    Sodium 132 (L) 133 - 145 mmol/L    Potassium 4.5 3.4 - 5.1 mmol/L    Chloride 103 97 - 107 mmol/L    Bicarbonate 19 (L) 24 - 31 mmol/L    Urea Nitrogen 47 (H) 8 - 25 mg/dL    Creatinine 2.40 (H) 0.40 - 1.60 mg/dL    eGFR 28 (L) >60 mL/min/1.73m*2    Calcium 8.9 8.5 - 10.4 mg/dL    Phosphorus 3.2 2.5 - 4.5 mg/dL    Albumin 3.8 3.5 - 5.0 g/dL    Anion Gap 10 <=19 mmol/L   CBC and Auto Differential   Result Value Ref Range    WBC 11.0 4.4 - 11.3 x10*3/uL    nRBC 0.0 0.0 - 0.0 /100 WBCs    RBC 4.33 (L) 4.50 - 5.90 x10*6/uL    Hemoglobin 13.0 (L) 13.5 - 17.5 g/dL    Hematocrit 39.1 (L) 41.0 - 52.0 %    MCV 90 80 - 100 fL    MCH 30.0 26.0 - 34.0 pg    MCHC 33.2 32.0 - 36.0 g/dL    RDW 14.1 11.5 - 14.5 %    Platelets 219 150 - 450 x10*3/uL    MPV 10.5 7.5 - 11.5  fL    Neutrophils % 79.6 40.0 - 80.0 %    Immature Granulocytes %, Automated 0.5 0.0 - 0.9 %    Lymphocytes % 12.4 13.0 - 44.0 %    Monocytes % 6.5 2.0 - 10.0 %    Eosinophils % 0.6 0.0 - 6.0 %    Basophils % 0.4 0.0 - 2.0 %    Neutrophils Absolute 8.73 (H) 1.60 - 5.50 x10*3/uL    Immature Granulocytes Absolute, Automated 0.06 0.00 - 0.50 x10*3/uL    Lymphocytes Absolute 1.36 0.80 - 3.00 x10*3/uL    Monocytes Absolute 0.71 0.05 - 0.80 x10*3/uL    Eosinophils Absolute 0.07 0.00 - 0.40 x10*3/uL    Basophils Absolute 0.04 0.00 - 0.10 x10*3/uL   Magnesium   Result Value Ref Range    Magnesium 2.20 1.60 - 3.10 mg/dL   TSH   Result Value Ref Range    Thyroid Stimulating Hormone 0.63 0.27 - 4.20 mIU/L   Hemoglobin A1C   Result Value Ref Range    Hemoglobin A1C 6.3 (H) See below %    Estimated Average Glucose 134 Not Established mg/dL   POCT GLUCOSE   Result Value Ref Range    POCT Glucose 140 (H) 74 - 99 mg/dL   Transthoracic Echo (TTE) Complete   Result Value Ref Range    LV A4C EF 56.0    POCT GLUCOSE   Result Value Ref Range    POCT Glucose 177 (H) 74 - 99 mg/dL   Levetiracetam   Result Value Ref Range    Keppra 9 (L) 10 - 40 ug/mL   POCT GLUCOSE   Result Value Ref Range    POCT Glucose 164 (H) 74 - 99 mg/dL   POCT GLUCOSE   Result Value Ref Range    POCT Glucose 234 (H) 74 - 99 mg/dL   Comprehensive metabolic panel   Result Value Ref Range    Glucose 116 (H) 65 - 99 mg/dL    Sodium 137 133 - 145 mmol/L    Potassium 4.5 3.4 - 5.1 mmol/L    Chloride 105 97 - 107 mmol/L    Bicarbonate 21 (L) 24 - 31 mmol/L    Urea Nitrogen 51 (H) 8 - 25 mg/dL    Creatinine 2.10 (H) 0.40 - 1.60 mg/dL    eGFR 33 (L) >60 mL/min/1.73m*2    Calcium 8.7 8.5 - 10.4 mg/dL    Albumin 3.8 3.5 - 5.0 g/dL    Alkaline Phosphatase 30 (L) 35 - 125 U/L    Total Protein 6.1 5.9 - 7.9 g/dL    AST 17 5 - 40 U/L    Bilirubin, Total 0.5 0.1 - 1.2 mg/dL    ALT 16 5 - 40 U/L    Anion Gap 11 <=19 mmol/L   CBC   Result Value Ref Range    WBC 10.7 4.4 - 11.3  x10*3/uL    nRBC 0.0 0.0 - 0.0 /100 WBCs    RBC 4.22 (L) 4.50 - 5.90 x10*6/uL    Hemoglobin 12.7 (L) 13.5 - 17.5 g/dL    Hematocrit 38.7 (L) 41.0 - 52.0 %    MCV 92 80 - 100 fL    MCH 30.1 26.0 - 34.0 pg    MCHC 32.8 32.0 - 36.0 g/dL    RDW 14.1 11.5 - 14.5 %    Platelets 202 150 - 450 x10*3/uL    MPV 10.6 7.5 - 11.5 fL   POCT GLUCOSE   Result Value Ref Range    POCT Glucose 113 (H) 74 - 99 mg/dL   POCT GLUCOSE   Result Value Ref Range    POCT Glucose 113 (H) 74 - 99 mg/dL       Assessment/Plan    Chronic kidney disease stage IV etiology most likely nephrosclerosis from combination of diabetes mellitus and hypertension creatinine is a little bit better today discharge from renal point review with me in the office in 2 weeks  Syncope  Multiple falls  Atrial fibrillation  Diabetes mellitus  Hypertension  Coronary artery disease        Ariel Costa MD

## 2023-10-25 NOTE — NURSING NOTE
"Patient participated with therapy.  Sat up in chair for about 30 minutes.  HR 85 on tele, afib rhythm.  States he feels like the \"Sweats\" are starting to decrease.  Son at bedside.    "

## 2023-10-25 NOTE — PROGRESS NOTES
Rachid Yun is a 73 y.o. male on day 1 of admission presenting with Syncope and collapse.      Subjective   Patient seen and examined this AM. States he feels much better today compared to yesterday. Denies palpitations, SOB, chest pain this AM. Was able to work with therapy today but still feels like his strength is not back to baseline.        Objective     Last Recorded Vitals  /88 (BP Location: Left arm, Patient Position: Lying)   Pulse 87   Temp 36.8 °C (98.2 °F) (Oral)   Resp 18   Wt 104 kg (229 lb 4.5 oz)   SpO2 97%   Intake/Output last 3 Shifts:    Intake/Output Summary (Last 24 hours) at 10/25/2023 1354  Last data filed at 10/25/2023 1300  Gross per 24 hour   Intake 1168.25 ml   Output 1655 ml   Net -486.75 ml       Admission Weight  Weight: 105 kg (231 lb 7.7 oz) (10/24/23 0000)    Daily Weight  10/24/23 : 104 kg (229 lb 4.5 oz)    Image Results  Transthoracic Echo (TTE) [place] [address]             Phone [phone]    TRANSTHORACIC ECHOCARDIOGRAM REPORT       Patient Name:      RACHID YUN      Reading Physician:   [id number] Payan DO  Study Date:        10/24/2023          Ordering Provider:   [id number] CONRAD  MRN/PID:           [id number]            Fellow:  Accession#:        [id number]        Nurse:  Date of Birth/Age: 1950 / 73      Sonographer:         Blu Morales RDCS                     years  Gender:            M                   Additional Staff:  Height:            190.00 cm           Admit Date:  Weight:            104.00 kg           Admission Status:    Inpatient - Routine  BSA:               2.32 m2             Department Location: [facility]  Blood Pressure: 119 /65 mmHg    Study Type:    TRANSTHORACIC ECHO (TTE) COMPLETE  Diagnosis/ICD: Abnormal electrocardiogram [ECG] [EKG]-R94.31  Indication:    Abn Ekg  CPT Codes:      Echo Complete w Full Doppler-[id number]    Patient History:  Smoker:            Current.  Diabetes:          Yes  Pertinent History: CAD, HTN, Hyperlipidemia and Chest Pain. PCI, Abn Ekg, Renal                     dx III.    Study Detail: The following Echo studies were performed: 2D, M-Mode, Doppler and                color flow.       PHYSICIAN INTERPRETATION:  Left Ventricle: Left ventricular systolic function is normal. There are no regional wall motion abnormalities. The left ventricular cavity size is normal. Spectral Doppler shows an impaired relaxation pattern of left ventricular diastolic filling.  Left Atrium: The left atrium is mildly dilated.  Right Ventricle: The right ventricle is normal in size. There is normal right ventricular global systolic function.  Right Atrium: The right atrium is normal in size.  Aortic Valve: The aortic valve is trileaflet. There is trivial aortic valve regurgitation. The peak instantaneous gradient of the aortic valve is 6.1 mmHg. The mean gradient of the aortic valve is 3.0 mmHg.  Mitral Valve: The mitral valve is normal in structure. There is mild mitral valve regurgitation.  Tricuspid Valve: The tricuspid valve is structurally normal. There is mild tricuspid regurgitation.  Pulmonic Valve: The pulmonic valve is not well visualized. The pulmonic valve regurgitation was not well visualized.  Pericardium: There is no pericardial effusion noted.  Aorta: The aortic root is normal.       CONCLUSIONS:   1. Left ventricular systolic function is normal.   2. Spectral Doppler shows an impaired relaxation pattern of left ventricular diastolic filling.    QUANTITATIVE DATA SUMMARY:  2D MEASUREMENTS:                            Normal Ranges:  LAs:           4.70 cm    (2.7-4.0cm)  IVSd:          1.29 cm    (0.6-1.1cm)  LVPWd:         1.41 cm    (0.6-1.1cm)  LVIDd:         6.29 cm    (3.9-5.9cm)  LVIDs:         4.20 cm  LV Mass Index: 171.5 g/m2  LV % FS        33.2 %    LA VOLUME:                                 Normal Ranges:  LA Vol A4C:        121.4 ml   (22+/-6mL/m2)  LA Vol A2C:        140.2 ml  LA Vol BP:         133.6 ml  LA Vol Index A4C:  52.3ml/m2  LA Vol Index A2C:  60.4 ml/m2  LA Vol Index BP:   57.5 ml/m2  LA Area A4C:       30.0 cm2  LA Area A2C:       33.0 cm2  LA Major Axis A4C: 6.3 cm  LA Major Axis A2C: 6.6 cm  LA Volume Index:   52.2 ml/m2  LA Vol A4C:        111.0 ml  LA Vol A2C:        127.0 ml    RA VOLUME BY A/L METHOD:                        Normal Ranges:  RA Area A4C: 25.2 cm2    M-MODE MEASUREMENTS:                   Normal Ranges:  Ao Root: 3.60 cm (2.0-3.7cm)  LAs:     5.40 cm (2.7-4.0cm)    AORTA MEASUREMENTS:                     Normal Ranges:  Asc Ao, d: 3.90 cm (2.1-3.4cm)    LV SYSTOLIC FUNCTION BY 2D PLANIMETRY (MOD):                      Normal Ranges:  EF-A4C View: 56.0 % (>=55%)  EF-A2C View: 51.1 %  EF-Biplane:  52.4 %    LV DIASTOLIC FUNCTION:                      Normal Ranges:  MV Peak E: 0.74 m/s (0.7-1.2 m/s)    MITRAL VALVE:                  Normal Ranges:  MV DT: 165 msec (150-240msec)    AORTIC VALVE:                                    Normal Ranges:  AoV Vmax:                1.23 m/s (<=1.7m/s)  AoV Peak P.1 mmHg (<20mmHg)  AoV Mean PG:             3.0 mmHg (1.7-11.5mmHg)  LVOT Max Josr:            0.91 m/s (<=1.1m/s)  AoV VTI:                 19.10 cm (18-25cm)  LVOT VTI:                16.60 cm  LVOT Diameter:           2.00 cm  (1.8-2.4cm)  AoV Area, VTI:           2.73 cm2 (2.5-5.5cm2)  AoV Area,Vmax:           2.33 cm2 (2.5-4.5cm2)  AoV Dimensionless Index: 0.87       RIGHT VENTRICLE:  RV Basal 4.46 cm  RV Mid   3.50 cm  RV Major 5.6 cm  TAPSE:   19.3 mm  RV s'    0.13 m/s    TRICUSPID VALVE/RVSP:                              Normal Ranges:  Peak TR Velocity: 2.11 m/s  RV Syst Pressure: 20.8 mmHg (< 30mmHg)  IVC Diam:         1.61 cm    PULMONIC VALVE:                       Normal Ranges:  PV Max Josr: 0.7 m/s  (0.6-0.9m/s)  PV Max PG:   2.0 mmHg       [id number] Adi Payan DO  Electronically signed on 10/24/2023 at 1:18:50 PM       ** Final **  CT brain attack head wo IV contrast  Narrative: Interpreted By:  Luciana Mobley,   STUDY:  CT BRAIN ATTACK HEAD WO IV CONTRAST;  10/23/2023 11:40 pm      INDICATION:  Signs/Symptoms:Stroke Evaluation.      COMPARISON:  08/31/2010.      ACCESSION NUMBER(S):  [id number]      ORDERING CLINICIAN:  MASTER CHAVEZ      TECHNIQUE:  Noncontrast axial CT scan of head was performed. Angled reformats in  brain and bone windows were generated. The images were reviewed in  bone, brain, blood and soft tissue windows.      FINDINGS:  CSF Spaces: Ex vacuo dilation of the ventricles. The sulci and basal  cisterns are within normal limits. There is no extraaxial fluid  collection.      Parenchyma: Chronic bilateral white matter microvascular disease. 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 There is no mass effect or midline shift.  There is no intracranial hemorrhage.      Calvarium: The calvarium is unremarkable.      Paranasal sinuses and mastoids: Visualized paranasal sinuses and  mastoids are clear.      Impression: No acute intracranial findings.      MACRO:  None      Signed by: Luciana Mobley 10/24/2023 8:48 AM  Dictation workstation:   [id number]  CT chest abdomen pelvis wo IV contrast  Narrative: Interpreted By:  Luciana Mobley,   STUDY:  CT CHEST ABDOMEN PELVIS WO CONTRAST;  10/24/2023 12:51 am      INDICATION:  Signs/Symptoms:syncope, hypotension, hx diarrhea on blood thinner      COMPARISON:  CT abdomen and pelvis dated 07/21/2023.      ACCESSION NUMBER(S):  [id number]      ORDERING CLINICIAN:  MASTER CHAVEZ      TECHNIQUE:  CT of the chest, abdomen, and pelvis was performed.  Contiguous axial images were obtained at  5 mm slice thickness  through the chest, and at  3 mm through the abdomen and pelvis.  Coronal and sagittal reconstructions at  3 mm slice thickness were  performed.      FINDINGS:  CHEST:       LUNG/PLEURA/LARGE AIRWAYS:  No air space opacity, focal consolidation, pleural  effusion/hemothorax, or pneumothorax are appreciated.  There are no  discrete pulmonary nodules.      VESSELS:  Atherosclerotic disease of the aorta.      HEART:  The heart is normal in size.  Severe atherosclerotic disease of the  coronary vasculature.      MEDIASTINUM AND ATIF:  No pneumomediastinum, abnormal mediastinal fluid collection or  mediastinal hematoma are appreciated.  No mediastinal, hilar or  biaxillary adenopathy is present.  The esophagus is normal in course  and caliber.      CHEST WALL AND LOWER NECK:  No acute fracture or dislocation of the included osseous structures  are appreciated.  No suspicious osseous lesions are identified.  The  thoracic wall soft tissues are within normal limits.      ABDOMEN:      LIVER:  No focal perfusion abnormality of the liver is appreciated to suggest  contusion or laceration. There is no subcapsular hematoma, no  perihepatic fluid collection.      GALLBLADDER:  Gallstones present. No wall thickening.      BILE DUCTS:  The intahepatic and extrahepatic bile ducts are not dilated.      PANCREAS:  The pancreas appears unremarkable.      SPLEEN:  Normal size. No focal lesions.      ADRENAL GLANDS:  The bilateral adrenal glands are unremarkable in appearance.      KIDNEYS AND URETERS:  Kidneys appear normal. No stones. No hydronephrosis.      PELVIS:      BLADDER:  The urinary bladder appears within normal limits.      REPRODUCTIVE ORGANS:  No suspicious findings.      BOWEL:  The stomach is unremarkable.  The small bowel is normal in caliber  without evidence of focal wall thickening or obstruction.  There is  no evidence of focal wall thickening or dilatation of the large bowel.      VESSELS:  Atherosclerotic disease of the aorta and bilateral iliac vessels.  Infrarenal IVC filter present.      PERITONEUM/RETROPERITONEUM/LYMPH NODES:  There is no evidence of intra- or retroperitoneal  hematoma.  There is  no free or loculated fluid collection, no free intraperitoneal air.  No abdominopelvic lymphadenopathy is present.      BONES AND ABDOMINAL WALL:  No evidence of acute fracture or dislocation of the included osseous  structures.  No suspicious osseous lesions are identified.  The  abdominal wall soft tissues appear normal.      Impression: CHEST  No acute findings.      ABDOMEN - PELVIS  1. No acute findings.  2. Cholelithiasis.      MACRO:  None      Signed by: Luciana Mobley 10/24/2023 8:45 AM  Dictation workstation:   [id number]      Physical Exam  Constitutional:       General: He is not in acute distress.     Appearance: He is not toxic-appearing.   HENT:      Head: Normocephalic and atraumatic.      Mouth/Throat:      Mouth: Mucous membranes are moist.      Pharynx: Oropharynx is clear.   Eyes:      General: No scleral icterus.  Cardiovascular:      Rate and Rhythm: Normal rate. Rhythm irregular.   Pulmonary:      Effort: No respiratory distress.      Breath sounds: No wheezing.   Abdominal:      General: There is no distension.      Tenderness: There is no abdominal tenderness.   Musculoskeletal:      Right lower leg: No edema.      Left lower leg: No edema.   Neurological:      Mental Status: He is alert and oriented to person, place, and time.         Relevant Results               Assessment/Plan   This patient currently has cardiac telemetry ordered; if you would like to modify or discontinue the telemetry order, click here to go to the orders activity to modify/discontinue the order.    Syncope  Differential including syncope 2/2 Afib with RVR vs orthostatic vs ?TIA vs hypoglycemia vs seizure  Per son, BG as been controlled at home, denies frequent hypoglycemic episodes  Does have hx of recurrent TIA; however patient is compliant with plavix and CT head was negative  Discussed seizure medication with patient/son - per son patient has never had a seizure, was placed empirically on keppra  "due being \"high risk\" for seizures after having multiple kaplan/TIA  Seeing as symptoms started following spinal epidural procedure, concern for possible ??local anesthetic systemic toxicity  Orthostatics ordered  Cardiology consulted for management of Afib  Will defer neurology eval for now seeing as patient's symptoms have improved  PT/OT     Afib with RVR  Management per cardiology  Echo with normal LVEF  Start eliquis on 10/27 per cardiology given chest wall contusion     T2DM  SSI for now given NOAM  Hypoglycemia protocol     NOAM on CKD  Baseline Cr. 2.0 per patient's son  Cr 2.1 this AM  Suspect due to dehydration - reports decreased PO intake prior to admission  Nephrology consulted  Resolved      Hyponatremia  Likely related to dehydration  resolved     Chest wall contusion  Pain medications PRN    HTN  Home meds resumed    Debility  PT/OT recommending SNF placement    Dispo: cleared for discharge by cardiology. Possible discharge today vs tomorrow      Principal Problem:    Syncope and collapse  Active Problems:    Iron deficiency anemia due to chronic blood loss    Primary osteoarthritis involving multiple joints    Stage 3b chronic kidney disease (CMS/HCC)    Coronary artery disease involving native coronary artery of native heart without angina pectoris    Atrial fibrillation (CMS/HCC)    Pericardial effusion    Chest wall contusion, left, initial encounter                Myrna Donovan MD      "

## 2023-10-25 NOTE — PROGRESS NOTES
Spiritual Care Visit    Clinical Encounter Type  Visited With: Patient  Routine Visit: Introduction  Continue Visiting: No         Values/Beliefs  Spiritual Requests During Hospitalization: [place] only    Sacramental Encounters  Communion: Does not want communion  Communion Given Indicator: No  Sacrament of Sick-Anointing: Patient declined anointing  Ethan Mcduffie

## 2023-10-25 NOTE — PROGRESS NOTES
"Rachid Yun is a 73 y.o. male on day 2 of admission presenting with Syncope and collapse.    Subjective   Alert and oriented x3.  Denies complaints chest pain or pressure, palpitations or feeling rapid heart rate.  States he slept very well       Objective     Physical Exam  Constitutional:       General: He is not in acute distress.     Appearance: Normal appearance.   HENT:      Head: Normocephalic and atraumatic.      Nose: Nose normal.      Mouth/Throat:      Mouth: Mucous membranes are dry.      Pharynx: Oropharynx is clear.   Eyes:      Extraocular Movements: Extraocular movements intact.      Pupils: Pupils are equal, round, and reactive to light.   Cardiovascular:      Rate and Rhythm: Normal rate. Rhythm irregular.      Pulses: Normal pulses.      Heart sounds: No murmur heard.     No friction rub. No gallop.      Comments: Bruising noted to left flank  Pulmonary:      Effort: Pulmonary effort is normal.      Breath sounds: No wheezing, rhonchi or rales.   Abdominal:      Palpations: Abdomen is soft.   Musculoskeletal:         General: No deformity. Normal range of motion.      Cervical back: Normal range of motion.      Right lower leg: No edema.      Left lower leg: No edema.   Skin:     General: Skin is warm and dry.      Capillary Refill: Capillary refill takes less than 2 seconds.   Neurological:      Mental Status: He is alert and oriented to person, place, and time.   Psychiatric:         Mood and Affect: Mood normal.         Judgment: Judgment normal.         Last Recorded Vitals  Blood pressure 158/88, pulse 92, temperature 36.8 °C (98.2 °F), temperature source Oral, resp. rate 18, height 1.958 m (6' 5.09\"), weight 104 kg (229 lb 4.5 oz), SpO2 97 %.  Intake/Output last 3 Shifts:  I/O last 3 completed shifts:  In: 686.3 (6.6 mL/kg) [P.O.:240; I.V.:446.3 (4.3 mL/kg)]  Out: 430 (4.1 mL/kg) [Urine:430 (0.1 mL/kg/hr)]  Weight: 104 kg     Relevant Results  Results for orders placed or performed during " the hospital encounter of 10/23/23 (from the past 24 hour(s))   POCT GLUCOSE   Result Value Ref Range    POCT Glucose 140 (H) 74 - 99 mg/dL   Transthoracic Echo (TTE) Complete   Result Value Ref Range    LV A4C EF 56.0    POCT GLUCOSE   Result Value Ref Range    POCT Glucose 177 (H) 74 - 99 mg/dL   Levetiracetam   Result Value Ref Range    Keppra 9 (L) 10 - 40 ug/mL   POCT GLUCOSE   Result Value Ref Range    POCT Glucose 164 (H) 74 - 99 mg/dL   POCT GLUCOSE   Result Value Ref Range    POCT Glucose 234 (H) 74 - 99 mg/dL   Comprehensive metabolic panel   Result Value Ref Range    Glucose 116 (H) 65 - 99 mg/dL    Sodium 137 133 - 145 mmol/L    Potassium 4.5 3.4 - 5.1 mmol/L    Chloride 105 97 - 107 mmol/L    Bicarbonate 21 (L) 24 - 31 mmol/L    Urea Nitrogen 51 (H) 8 - 25 mg/dL    Creatinine 2.10 (H) 0.40 - 1.60 mg/dL    eGFR 33 (L) >60 mL/min/1.73m*2    Calcium 8.7 8.5 - 10.4 mg/dL    Albumin 3.8 3.5 - 5.0 g/dL    Alkaline Phosphatase 30 (L) 35 - 125 U/L    Total Protein 6.1 5.9 - 7.9 g/dL    AST 17 5 - 40 U/L    Bilirubin, Total 0.5 0.1 - 1.2 mg/dL    ALT 16 5 - 40 U/L    Anion Gap 11 <=19 mmol/L   CBC   Result Value Ref Range    WBC 10.7 4.4 - 11.3 x10*3/uL    nRBC 0.0 0.0 - 0.0 /100 WBCs    RBC 4.22 (L) 4.50 - 5.90 x10*6/uL    Hemoglobin 12.7 (L) 13.5 - 17.5 g/dL    Hematocrit 38.7 (L) 41.0 - 52.0 %    MCV 92 80 - 100 fL    MCH 30.1 26.0 - 34.0 pg    MCHC 32.8 32.0 - 36.0 g/dL    RDW 14.1 11.5 - 14.5 %    Platelets 202 150 - 450 x10*3/uL    MPV 10.6 7.5 - 11.5 fL   POCT GLUCOSE   Result Value Ref Range    POCT Glucose 113 (H) 74 - 99 mg/dL     Transthoracic Echo (TTE) Complete    Result Date: 10/24/2023           [place] [id number]            Phone [phone] TRANSTHORACIC ECHOCARDIOGRAM REPORT  Patient Name:      LORI Valdez Physician:   [id number] Adi Payan DO Study Date:        10/24/2023           Ordering Provider:   [id number] MILE CONRAD MRN/PID:           [id number]            Fellow: Accession#:        [id number]        Nurse: Date of Birth/Age: 1950 / 73      Sonographer:         Blu Morales RDCS                    years Gender:            M                   Additional Staff: Height:            190.00 cm           Admit Date: Weight:            104.00 kg           Admission Status:    Inpatient - Routine BSA:               2.32 m2             Department Location: [facility] Blood Pressure: 119 /65 mmHg Study Type:    TRANSTHORACIC ECHO (TTE) COMPLETE Diagnosis/ICD: Abnormal electrocardiogram [ECG] [EKG]-R94.31 Indication:    Abn Ekg CPT Codes:     Echo Complete w Full [id number] Patient History: Smoker:            Current. Diabetes:          Yes Pertinent History: CAD, HTN, Hyperlipidemia and Chest Pain. PCI, Abn Ekg, Renal                    dx III. Study Detail: The following Echo studies were performed: 2D, M-Mode, Doppler and               color flow.  PHYSICIAN INTERPRETATION: Left Ventricle: Left ventricular systolic function is normal. There are no regional wall motion abnormalities. The left ventricular cavity size is normal. Spectral Doppler shows an impaired relaxation pattern of left ventricular diastolic filling. Left Atrium: The left atrium is mildly dilated. Right Ventricle: The right ventricle is normal in size. There is normal right ventricular global systolic function. Right Atrium: The right atrium is normal in size. Aortic Valve: The aortic valve is trileaflet. There is trivial aortic valve regurgitation. The peak instantaneous gradient of the aortic valve is 6.1 mmHg. The mean gradient of the aortic valve is 3.0 mmHg. Mitral Valve: The mitral valve is normal in structure. There is mild mitral valve regurgitation. Tricuspid Valve: The tricuspid valve is structurally normal. There is mild tricuspid regurgitation. Pulmonic Valve: The pulmonic valve is not well  visualized. The pulmonic valve regurgitation was not well visualized. Pericardium: There is no pericardial effusion noted. Aorta: The aortic root is normal.  CONCLUSIONS:  1. Left ventricular systolic function is normal.  2. Spectral Doppler shows an impaired relaxation pattern of left ventricular diastolic filling. QUANTITATIVE DATA SUMMARY: 2D MEASUREMENTS:                           Normal Ranges: LAs:           4.70 cm    (2.7-4.0cm) IVSd:          1.29 cm    (0.6-1.1cm) LVPWd:         1.41 cm    (0.6-1.1cm) LVIDd:         6.29 cm    (3.9-5.9cm) LVIDs:         4.20 cm LV Mass Index: 171.5 g/m2 LV % FS        33.2 % LA VOLUME:                               Normal Ranges: LA Vol A4C:        121.4 ml   (22+/-6mL/m2) LA Vol A2C:        140.2 ml LA Vol BP:         133.6 ml LA Vol Index A4C:  52.3ml/m2 LA Vol Index A2C:  60.4 ml/m2 LA Vol Index BP:   57.5 ml/m2 LA Area A4C:       30.0 cm2 LA Area A2C:       33.0 cm2 LA Major Axis A4C: 6.3 cm LA Major Axis A2C: 6.6 cm LA Volume Index:   52.2 ml/m2 LA Vol A4C:        111.0 ml LA Vol A2C:        127.0 ml RA VOLUME BY A/L METHOD:                       Normal Ranges: RA Area A4C: 25.2 cm2 M-MODE MEASUREMENTS:                  Normal Ranges: Ao Root: 3.60 cm (2.0-3.7cm) LAs:     5.40 cm (2.7-4.0cm) AORTA MEASUREMENTS:                    Normal Ranges: Asc Ao, d: 3.90 cm (2.1-3.4cm) LV SYSTOLIC FUNCTION BY 2D PLANIMETRY (MOD):                     Normal Ranges: EF-A4C View: 56.0 % (>=55%) EF-A2C View: 51.1 % EF-Biplane:  52.4 % LV DIASTOLIC FUNCTION:                     Normal Ranges: MV Peak E: 0.74 m/s (0.7-1.2 m/s) MITRAL VALVE:                 Normal Ranges: MV DT: 165 msec (150-240msec) AORTIC VALVE:                                   Normal Ranges: AoV Vmax:                1.23 m/s (<=1.7m/s) AoV Peak P.1 mmHg (<20mmHg) AoV Mean PG:             3.0 mmHg (1.7-11.5mmHg) LVOT Max Josr:            0.91 m/s (<=1.1m/s) AoV VTI:                 19.10 cm (18-25cm)  LVOT VTI:                16.60 cm LVOT Diameter:           2.00 cm  (1.8-2.4cm) AoV Area, VTI:           2.73 cm2 (2.5-5.5cm2) AoV Area,Vmax:           2.33 cm2 (2.5-4.5cm2) AoV Dimensionless Index: 0.87  RIGHT VENTRICLE: RV Basal 4.46 cm RV Mid   3.50 cm RV Major 5.6 cm TAPSE:   19.3 mm RV s'    0.13 m/s TRICUSPID VALVE/RVSP:                             Normal Ranges: Peak TR Velocity: 2.11 m/s RV Syst Pressure: 20.8 mmHg (< 30mmHg) IVC Diam:         1.61 cm PULMONIC VALVE:                      Normal Ranges: PV Max Josr: 0.7 m/s  (0.6-0.9m/s) PV Max P.0 mmHg  [id number] Adi Payan DO Electronically signed on 10/24/2023 at 1:18:50 PM  ** Final **          Assessment/Plan   Principal Problem:    Syncope and collapse  Active Problems:    Iron deficiency anemia due to chronic blood loss    Primary osteoarthritis involving multiple joints    Stage 3b chronic kidney disease (CMS/HCC)    Coronary artery disease involving native coronary artery of native heart without angina pectoris    Atrial fibrillation (CMS/HCC)    Pericardial effusion    Chest wall contusion, left, initial encounter       Rapid atrial fibrillation  Syncope/near syncope  Coronary disease  Diabetes mellitus type 2  Recent spinal injection  Hypertensive disorder  History of recurrent strokes     Overall impression     10/24: As described above, patient had an injection in his spine and subsequently went atrial fibrillation.  Had some initial work-up, however states he did not have an echocardiogram.  Was to be placed on apixaban at home in combination with his current home clopidogrel for coronary artery disease/history of 4 strokes; however he had a syncopal episode prior to this and subsequently had further syncopal episodes and falls prompting hospitalization.  Patient did not begin his apixaban.  Does have hematoma noted to his left chest wall.  No significant findings otherwise on CT brain and CT abdomen pelvis.  Blood pressure remained stable  "albeit mildly hypertensive.  On telemetry he remains in a atrial fibrillation, currently he is in a rapid atrial fibrillation.  To this point the patient has not been initiated on rate controlling medications per medical record.  I will initiate him on metoprolol succinate 25 mg p.o. twice daily.  Anticipate the patient has some systolic dysfunction from his previous coronary disease, will check echocardiogram this hospitalization.  He is chest pain-free.  Diabetes appears to be fairly well controlled.  We will continue to hold his amlodipine to monitor response to metoprolol first.  Concerning anticoagulation believe the patient would benefit from combination of both clopidogrel and apixaban for his coronary artery disease, recurrent strokes, and new atrial fibrillation.  Will wait 1 day to initiate these medications secondary to hematoma on left chest. Further recommendations post echocardiogram.  Note, given patient's description of his chronic upper extremity tremor as well as his difficulty walking and the feeling of \"rolling over his feet\" I do have some suspicion for a undiagnosed Parkinson's disease.  Defer assessment of this to admitting.     10/25: Stable overnight.  Denies complaints chest pain or pressure, palpitations or feeling of rapid heart rate.  States he feels very well.  On telemetry monitor has remained in a rate controlled atrial fibrillation.  We will continue with metoprolol therapy.  He is complaining was initially placed on hold and resumed today by admitting.  Agree with this.  Starting anticoagulation patient will start on apixaban.  We will delay this until 10/27 to allow for some healing and prevent further bruising to his left flank.  We will discontinue heparin subcutaneously prior to this. His blood pressure has remained stable and trended with systolic most predominant between 120 and 130, this morning systolic is noted to be 158.  We will give a.m. medications and reassess.  His " amlodipine was initially placed on hold, however since the patient has remained consistently mildly hypertensive will reinitiate today.  Patient is working with therapy this morning with no complaints.  Overall patient appears stable from a cardiac perspective.  We will sign off.  Patient to follow-up with Dr. Hare in the outpatient setting in the next 2 to 3 weeks.    HERBERT Yee-CNP

## 2023-10-26 ENCOUNTER — PHARMACY VISIT (OUTPATIENT)
Dept: PHARMACY | Facility: CLINIC | Age: 73
End: 2023-10-26
Payer: COMMERCIAL

## 2023-10-26 ENCOUNTER — HOME HEALTH ADMISSION (OUTPATIENT)
Dept: HOME HEALTH SERVICES | Facility: HOME HEALTH | Age: 73
End: 2023-10-26
Payer: MEDICARE

## 2023-10-26 VITALS
DIASTOLIC BLOOD PRESSURE: 82 MMHG | OXYGEN SATURATION: 100 % | RESPIRATION RATE: 18 BRPM | SYSTOLIC BLOOD PRESSURE: 146 MMHG | TEMPERATURE: 98.1 F | WEIGHT: 229.28 LBS | HEART RATE: 78 BPM | HEIGHT: 77 IN | BODY MASS INDEX: 27.07 KG/M2

## 2023-10-26 LAB
GLUCOSE BLD MANUAL STRIP-MCNC: 112 MG/DL (ref 74–99)
GLUCOSE BLD MANUAL STRIP-MCNC: 132 MG/DL (ref 74–99)
GLUCOSE BLD MANUAL STRIP-MCNC: 153 MG/DL (ref 74–99)

## 2023-10-26 PROCEDURE — G0378 HOSPITAL OBSERVATION PER HR: HCPCS

## 2023-10-26 PROCEDURE — RXMED WILLOW AMBULATORY MEDICATION CHARGE

## 2023-10-26 PROCEDURE — 82947 ASSAY GLUCOSE BLOOD QUANT: CPT

## 2023-10-26 PROCEDURE — 2500000001 HC RX 250 WO HCPCS SELF ADMINISTERED DRUGS (ALT 637 FOR MEDICARE OP): Performed by: INTERNAL MEDICINE

## 2023-10-26 PROCEDURE — 97116 GAIT TRAINING THERAPY: CPT | Mod: GP,CQ

## 2023-10-26 PROCEDURE — 97530 THERAPEUTIC ACTIVITIES: CPT | Mod: GP,CQ

## 2023-10-26 PROCEDURE — 2500000001 HC RX 250 WO HCPCS SELF ADMINISTERED DRUGS (ALT 637 FOR MEDICARE OP): Performed by: STUDENT IN AN ORGANIZED HEALTH CARE EDUCATION/TRAINING PROGRAM

## 2023-10-26 PROCEDURE — 2500000004 HC RX 250 GENERAL PHARMACY W/ HCPCS (ALT 636 FOR OP/ED): Performed by: INTERNAL MEDICINE

## 2023-10-26 PROCEDURE — 2500000001 HC RX 250 WO HCPCS SELF ADMINISTERED DRUGS (ALT 637 FOR MEDICARE OP): Performed by: NURSE PRACTITIONER

## 2023-10-26 PROCEDURE — 96372 THER/PROPH/DIAG INJ SC/IM: CPT | Performed by: NURSE PRACTITIONER

## 2023-10-26 PROCEDURE — 97110 THERAPEUTIC EXERCISES: CPT | Mod: GP,CQ

## 2023-10-26 PROCEDURE — 2500000004 HC RX 250 GENERAL PHARMACY W/ HCPCS (ALT 636 FOR OP/ED): Performed by: NURSE PRACTITIONER

## 2023-10-26 RX ORDER — OXYCODONE HYDROCHLORIDE 5 MG/1
15 TABLET ORAL EVERY 6 HOURS PRN
Status: DISCONTINUED | OUTPATIENT
Start: 2023-10-26 | End: 2023-10-26 | Stop reason: HOSPADM

## 2023-10-26 RX ORDER — METOPROLOL SUCCINATE 25 MG/1
25 TABLET, EXTENDED RELEASE ORAL 2 TIMES DAILY
Qty: 60 TABLET | Refills: 0 | Status: SHIPPED | OUTPATIENT
Start: 2023-10-26 | End: 2023-11-24 | Stop reason: DRUGHIGH

## 2023-10-26 RX ADMIN — METOPROLOL SUCCINATE 25 MG: 25 TABLET, EXTENDED RELEASE ORAL at 08:36

## 2023-10-26 RX ADMIN — GABAPENTIN 600 MG: 600 TABLET, FILM COATED ORAL at 08:36

## 2023-10-26 RX ADMIN — FERROUS SULFATE TAB 325 MG (65 MG ELEMENTAL FE) 65 MG OF IRON: 325 (65 FE) TAB at 08:35

## 2023-10-26 RX ADMIN — OXYCODONE HYDROCHLORIDE 15 MG: 5 TABLET ORAL at 10:12

## 2023-10-26 RX ADMIN — CYANOCOBALAMIN TAB 1000 MCG 1000 MCG: 1000 TAB at 08:36

## 2023-10-26 RX ADMIN — OXYCODONE HYDROCHLORIDE 15 MG: 5 TABLET ORAL at 16:21

## 2023-10-26 RX ADMIN — LACTULOSE 20 G: 10 SOLUTION ORAL at 10:23

## 2023-10-26 RX ADMIN — LEVETIRACETAM 500 MG: 500 TABLET, FILM COATED ORAL at 08:36

## 2023-10-26 RX ADMIN — CLOPIDOGREL BISULFATE 75 MG: 75 TABLET ORAL at 08:36

## 2023-10-26 RX ADMIN — TAMSULOSIN HYDROCHLORIDE 0.4 MG: 0.4 CAPSULE ORAL at 08:36

## 2023-10-26 RX ADMIN — HEPARIN SODIUM 5000 UNITS: 5000 INJECTION, SOLUTION INTRAVENOUS; SUBCUTANEOUS at 03:00

## 2023-10-26 RX ADMIN — OXYCODONE HYDROCHLORIDE 15 MG: 5 TABLET ORAL at 02:53

## 2023-10-26 RX ADMIN — AMLODIPINE BESYLATE 5 MG: 5 TABLET ORAL at 08:36

## 2023-10-26 RX ADMIN — FOLIC ACID TAB 400 MCG 0.4 MG: 400 TAB at 08:36

## 2023-10-26 RX ADMIN — HEPARIN SODIUM 5000 UNITS: 5000 INJECTION, SOLUTION INTRAVENOUS; SUBCUTANEOUS at 12:00

## 2023-10-26 ASSESSMENT — COGNITIVE AND FUNCTIONAL STATUS - GENERAL
DRESSING REGULAR LOWER BODY CLOTHING: A LOT
MOBILITY SCORE: 14
MOVING TO AND FROM BED TO CHAIR: A LOT
STANDING UP FROM CHAIR USING ARMS: A LOT
TURNING FROM BACK TO SIDE WHILE IN FLAT BAD: A LITTLE
STANDING UP FROM CHAIR USING ARMS: A LOT
HELP NEEDED FOR BATHING: A LOT
TURNING FROM BACK TO SIDE WHILE IN FLAT BAD: A LITTLE
HELP NEEDED FOR BATHING: A LOT
CLIMB 3 TO 5 STEPS WITH RAILING: TOTAL
TOILETING: A LITTLE
DAILY ACTIVITIY SCORE: 19
STANDING UP FROM CHAIR USING ARMS: A LOT
WALKING IN HOSPITAL ROOM: A LOT
CLIMB 3 TO 5 STEPS WITH RAILING: TOTAL
MOVING TO AND FROM BED TO CHAIR: A LOT
WALKING IN HOSPITAL ROOM: A LOT
TOILETING: A LITTLE
WALKING IN HOSPITAL ROOM: A LOT
MOBILITY SCORE: 14
MOVING TO AND FROM BED TO CHAIR: A LOT
MOBILITY SCORE: 14
DRESSING REGULAR LOWER BODY CLOTHING: A LOT
DAILY ACTIVITIY SCORE: 19
CLIMB 3 TO 5 STEPS WITH RAILING: TOTAL
TURNING FROM BACK TO SIDE WHILE IN FLAT BAD: A LITTLE

## 2023-10-26 ASSESSMENT — PAIN SCALES - GENERAL
PAINLEVEL_OUTOF10: 9
PAINLEVEL_OUTOF10: 0 - NO PAIN
PAINLEVEL_OUTOF10: 0 - NO PAIN
PAINLEVEL_OUTOF10: 7
PAINLEVEL_OUTOF10: 0 - NO PAIN
PAINLEVEL_OUTOF10: 7

## 2023-10-26 ASSESSMENT — PAIN - FUNCTIONAL ASSESSMENT
PAIN_FUNCTIONAL_ASSESSMENT: 0-10
PAIN_FUNCTIONAL_ASSESSMENT: 0-10

## 2023-10-26 NOTE — PROGRESS NOTES
"Rachid Yun is a 73 y.o. male on day 2 of admission presenting with Syncope and collapse.    Subjective      Patient is seen for chronic kidney disease 3 admitted after he passed out and had a fall feels a whole lot better no complaints today    Review of Systems    Objective     Physical Exam  Neck:      Vascular: No carotid bruit.   Cardiovascular:      Rate and Rhythm: Normal rate and regular rhythm.      Heart sounds: No murmur heard.     No friction rub. No gallop.   Pulmonary:      Breath sounds: No wheezing, rhonchi or rales.   Chest:      Chest wall: No tenderness.   Abdominal:      General: There is no distension.      Tenderness: There is no abdominal tenderness. There is no guarding or rebound.      Comments: Severe bruises along his left flank area   Musculoskeletal:         General: No swelling or tenderness.      Cervical back: Neck supple.      Right lower leg: No edema.      Left lower leg: No edema.   Lymphadenopathy:      Cervical: No cervical adenopathy.         Last Recorded Vitals  Blood pressure 146/82, pulse 78, temperature 36.7 °C (98.1 °F), temperature source Oral, resp. rate 18, height 1.958 m (6' 5.09\"), weight 104 kg (229 lb 4.5 oz), SpO2 100 %.    Intake/Output last 3 Shifts:  I/O last 3 completed shifts:  In: 1392 (13.4 mL/kg) [P.O.:1392]  Out: 3425 (32.9 mL/kg) [Urine:3425 (0.9 mL/kg/hr)]  Weight: 104 kg     Current Facility-Administered Medications:     acetaminophen (Tylenol) tablet 650 mg, 650 mg, oral, q4h PRN **OR** acetaminophen (Tylenol) oral liquid 650 mg, 650 mg, nasogastric tube, q4h PRN **OR** acetaminophen (Tylenol) suppository 650 mg, 650 mg, rectal, q4h PRN, Baljeet Smith MD    amLODIPine (Norvasc) tablet 5 mg, 5 mg, oral, Daily, Baljeet Smith MD, 5 mg at 10/26/23 0836    [START ON 10/27/2023] apixaban (Eliquis) tablet 5 mg, 5 mg, oral, BID, Gabe Leong APRN-CNP    atorvastatin (Lipitor) tablet 10 mg, 10 mg, oral, Nightly, Baljeet Smith MD, 10 mg at " 10/25/23 2034    benzocaine-menthol (Cepastat Sore Throat) 15-3.6 mg lozenge 1 lozenge, 1 lozenge, Mouth/Throat, q2h PRN, Baljeet Smith MD    clopidogrel (Plavix) tablet 75 mg, 75 mg, oral, Daily, Baljeet Smith MD, 75 mg at 10/26/23 0836    cyanocobalamin (Vitamin B-12) tablet 1,000 mcg, 1,000 mcg, oral, Daily, Baljeet Smith MD, 1,000 mcg at 10/26/23 0836    dextrose 10 % in water (D10W) infusion, 0.3 g/kg/hr, intravenous, Once PRN, Baljeet Smith MD    dextrose 50 % injection 25 g, 25 g, intravenous, q15 min PRN, Baljeet Smith MD    donepezil (Aricept) tablet 10 mg, 10 mg, oral, Nightly, Baljeet Smith MD, 10 mg at 10/25/23 2033    ferrous sulfate 325 (65 Fe) MG tablet 65 mg of iron, 65 mg of iron, oral, Daily with breakfast, Baljeet Smith MD, 65 mg of iron at 10/26/23 0835    folic acid (Folvite) tablet 0.4 mg, 0.4 mg, oral, Daily, Baljeet Smith MD, 0.4 mg at 10/26/23 0836    gabapentin (Neurontin) tablet 600 mg, 600 mg, oral, BID, Myrna Donovan MD, 600 mg at 10/26/23 0836    glucagon (Glucagen) injection 1 mg, 1 mg, intramuscular, q15 min PRN, Baljeet Smith MD    guaiFENesin (Mucinex) 12 hr tablet 600 mg, 600 mg, oral, q12h PRN, Baljeet Smith MD    heparin (porcine) injection 5,000 Units, 5,000 Units, subcutaneous, q8h, Gabe Leong, APRN-CNP, 5,000 Units at 10/26/23 1200    insulin lispro (HumaLOG) injection 0-5 Units, 0-5 Units, subcutaneous, Before meals & nightly, Baljeet Smith MD, 1 Units at 10/25/23 2039    ipratropium-albuteroL (Duo-Neb) 0.5-2.5 mg/3 mL nebulizer solution 3 mL, 3 mL, nebulization, q6h PRN, Baljeet Smith MD    lactulose 20 gram/30 mL oral solution 20 g, 20 g, oral, PRN, Baljeet Smith MD, 20 g at 10/26/23 1023    levETIRAcetam (Keppra) tablet 500 mg, 500 mg, oral, BID, Baljeet Smith MD, 500 mg at 10/26/23 0836    lidocaine 4 % patch 1 patch, 1 patch, transdermal, Nightly, Baljeet Smith MD, 1 patch at 10/25/23 2035    metoprolol succinate XL (Toprol-XL)  24 hr tablet 25 mg, 25 mg, oral, BID, Gabe Leong, APRN-CNP, 25 mg at 10/26/23 0836    metoprolol tartrate (Lopressor) injection 5 mg, 5 mg, intravenous, q6h PRN, Baljeet Smith MD    ondansetron ODT (Zofran-ODT) disintegrating tablet 4 mg, 4 mg, oral, q8h PRN **OR** ondansetron (Zofran) injection 4 mg, 4 mg, intravenous, q8h PRN, Baljeet Smith MD    oxyCODONE (Roxicodone) immediate release tablet 15 mg, 15 mg, oral, q6h PRN, Myrna Donovan MD, 15 mg at 10/26/23 1012    perflutren lipid microspheres (Definity) injection 0.5-10 mL of dilution, 0.5-10 mL of dilution, intravenous, Once in imaging, Baljeet Smith MD    sennosides (Senokot) tablet 17.2 mg, 2 tablet, oral, Nightly, Baljeet Smith MD, 17.2 mg at 10/25/23 2034    tamsulosin (Flomax) 24 hr capsule 0.4 mg, 0.4 mg, oral, Daily, Baljeet Smith MD, 0.4 mg at 10/26/23 0836   Relevant Results    Results for orders placed or performed during the hospital encounter of 10/23/23 (from the past 96 hour(s))   POCT GLUCOSE   Result Value Ref Range    POCT Glucose 152 (H) 74 - 99 mg/dL   CBC and Auto Differential   Result Value Ref Range    WBC 9.1 4.4 - 11.3 x10*3/uL    nRBC 0.0 0.0 - 0.0 /100 WBCs    RBC 4.12 (L) 4.50 - 5.90 x10*6/uL    Hemoglobin 12.6 (L) 13.5 - 17.5 g/dL    Hematocrit 38.2 (L) 41.0 - 52.0 %    MCV 93 80 - 100 fL    MCH 30.6 26.0 - 34.0 pg    MCHC 33.0 32.0 - 36.0 g/dL    RDW 14.1 11.5 - 14.5 %    Platelets 239 150 - 450 x10*3/uL    MPV 10.2 7.5 - 11.5 fL    Neutrophils % 77.1 40.0 - 80.0 %    Immature Granulocytes %, Automated 0.4 0.0 - 0.9 %    Lymphocytes % 13.5 13.0 - 44.0 %    Monocytes % 7.7 2.0 - 10.0 %    Eosinophils % 0.9 0.0 - 6.0 %    Basophils % 0.4 0.0 - 2.0 %    Neutrophils Absolute 7.05 (H) 1.60 - 5.50 x10*3/uL    Immature Granulocytes Absolute, Automated 0.04 0.00 - 0.50 x10*3/uL    Lymphocytes Absolute 1.23 0.80 - 3.00 x10*3/uL    Monocytes Absolute 0.70 0.05 - 0.80 x10*3/uL    Eosinophils Absolute 0.08 0.00 - 0.40  x10*3/uL    Basophils Absolute 0.04 0.00 - 0.10 x10*3/uL   Comprehensive metabolic panel   Result Value Ref Range    Glucose 145 (H) 65 - 99 mg/dL    Sodium 134 133 - 145 mmol/L    Potassium 4.2 3.4 - 5.1 mmol/L    Chloride 102 97 - 107 mmol/L    Bicarbonate 18 (L) 24 - 31 mmol/L    Urea Nitrogen 46 (H) 8 - 25 mg/dL    Creatinine 2.40 (H) 0.40 - 1.60 mg/dL    eGFR 28 (L) >60 mL/min/1.73m*2    Calcium 8.9 8.5 - 10.4 mg/dL    Albumin 3.6 3.5 - 5.0 g/dL    Alkaline Phosphatase 31 (L) 35 - 125 U/L    Total Protein 5.8 (L) 5.9 - 7.9 g/dL    AST 16 5 - 40 U/L    Bilirubin, Total 0.4 0.1 - 1.2 mg/dL    ALT 14 5 - 40 U/L    Anion Gap 14 <=19 mmol/L   Protime-INR   Result Value Ref Range    Protime 11.9 9.3 - 12.7 seconds    INR 1.1 0.9 - 1.2   APTT   Result Value Ref Range    aPTT 22.3 22.0 - 32.5 seconds   Troponin T, High Sensitivity   Result Value Ref Range    Troponin T, High Sensitivity 77 (H) <=15 ng/L   Type And Screen   Result Value Ref Range    ABO TYPE A     Rh TYPE POS     ANTIBODY SCREEN NEG    Light Blue Top   Result Value Ref Range    Extra Tube Hold for add-ons.    Renal function panel   Result Value Ref Range    Glucose 140 (H) 65 - 99 mg/dL    Sodium 132 (L) 133 - 145 mmol/L    Potassium 4.5 3.4 - 5.1 mmol/L    Chloride 103 97 - 107 mmol/L    Bicarbonate 19 (L) 24 - 31 mmol/L    Urea Nitrogen 47 (H) 8 - 25 mg/dL    Creatinine 2.40 (H) 0.40 - 1.60 mg/dL    eGFR 28 (L) >60 mL/min/1.73m*2    Calcium 8.9 8.5 - 10.4 mg/dL    Phosphorus 3.2 2.5 - 4.5 mg/dL    Albumin 3.8 3.5 - 5.0 g/dL    Anion Gap 10 <=19 mmol/L   CBC and Auto Differential   Result Value Ref Range    WBC 11.0 4.4 - 11.3 x10*3/uL    nRBC 0.0 0.0 - 0.0 /100 WBCs    RBC 4.33 (L) 4.50 - 5.90 x10*6/uL    Hemoglobin 13.0 (L) 13.5 - 17.5 g/dL    Hematocrit 39.1 (L) 41.0 - 52.0 %    MCV 90 80 - 100 fL    MCH 30.0 26.0 - 34.0 pg    MCHC 33.2 32.0 - 36.0 g/dL    RDW 14.1 11.5 - 14.5 %    Platelets 219 150 - 450 x10*3/uL    MPV 10.5 7.5 - 11.5 fL     Neutrophils % 79.6 40.0 - 80.0 %    Immature Granulocytes %, Automated 0.5 0.0 - 0.9 %    Lymphocytes % 12.4 13.0 - 44.0 %    Monocytes % 6.5 2.0 - 10.0 %    Eosinophils % 0.6 0.0 - 6.0 %    Basophils % 0.4 0.0 - 2.0 %    Neutrophils Absolute 8.73 (H) 1.60 - 5.50 x10*3/uL    Immature Granulocytes Absolute, Automated 0.06 0.00 - 0.50 x10*3/uL    Lymphocytes Absolute 1.36 0.80 - 3.00 x10*3/uL    Monocytes Absolute 0.71 0.05 - 0.80 x10*3/uL    Eosinophils Absolute 0.07 0.00 - 0.40 x10*3/uL    Basophils Absolute 0.04 0.00 - 0.10 x10*3/uL   Magnesium   Result Value Ref Range    Magnesium 2.20 1.60 - 3.10 mg/dL   TSH   Result Value Ref Range    Thyroid Stimulating Hormone 0.63 0.27 - 4.20 mIU/L   Hemoglobin A1C   Result Value Ref Range    Hemoglobin A1C 6.3 (H) See below %    Estimated Average Glucose 134 Not Established mg/dL   POCT GLUCOSE   Result Value Ref Range    POCT Glucose 140 (H) 74 - 99 mg/dL   Transthoracic Echo (TTE) Complete   Result Value Ref Range    LV A4C EF 56.0    POCT GLUCOSE   Result Value Ref Range    POCT Glucose 177 (H) 74 - 99 mg/dL   Levetiracetam   Result Value Ref Range    Keppra 9 (L) 10 - 40 ug/mL   POCT GLUCOSE   Result Value Ref Range    POCT Glucose 164 (H) 74 - 99 mg/dL   POCT GLUCOSE   Result Value Ref Range    POCT Glucose 234 (H) 74 - 99 mg/dL   Comprehensive metabolic panel   Result Value Ref Range    Glucose 116 (H) 65 - 99 mg/dL    Sodium 137 133 - 145 mmol/L    Potassium 4.5 3.4 - 5.1 mmol/L    Chloride 105 97 - 107 mmol/L    Bicarbonate 21 (L) 24 - 31 mmol/L    Urea Nitrogen 51 (H) 8 - 25 mg/dL    Creatinine 2.10 (H) 0.40 - 1.60 mg/dL    eGFR 33 (L) >60 mL/min/1.73m*2    Calcium 8.7 8.5 - 10.4 mg/dL    Albumin 3.8 3.5 - 5.0 g/dL    Alkaline Phosphatase 30 (L) 35 - 125 U/L    Total Protein 6.1 5.9 - 7.9 g/dL    AST 17 5 - 40 U/L    Bilirubin, Total 0.5 0.1 - 1.2 mg/dL    ALT 16 5 - 40 U/L    Anion Gap 11 <=19 mmol/L   CBC   Result Value Ref Range    WBC 10.7 4.4 - 11.3 x10*3/uL     nRBC 0.0 0.0 - 0.0 /100 WBCs    RBC 4.22 (L) 4.50 - 5.90 x10*6/uL    Hemoglobin 12.7 (L) 13.5 - 17.5 g/dL    Hematocrit 38.7 (L) 41.0 - 52.0 %    MCV 92 80 - 100 fL    MCH 30.1 26.0 - 34.0 pg    MCHC 32.8 32.0 - 36.0 g/dL    RDW 14.1 11.5 - 14.5 %    Platelets 202 150 - 450 x10*3/uL    MPV 10.6 7.5 - 11.5 fL   POCT GLUCOSE   Result Value Ref Range    POCT Glucose 113 (H) 74 - 99 mg/dL   POCT GLUCOSE   Result Value Ref Range    POCT Glucose 113 (H) 74 - 99 mg/dL   POCT GLUCOSE   Result Value Ref Range    POCT Glucose 172 (H) 74 - 99 mg/dL   POCT GLUCOSE   Result Value Ref Range    POCT Glucose 158 (H) 74 - 99 mg/dL   POCT GLUCOSE   Result Value Ref Range    POCT Glucose 230 (H) 74 - 99 mg/dL   POCT GLUCOSE   Result Value Ref Range    POCT Glucose 132 (H) 74 - 99 mg/dL   POCT GLUCOSE   Result Value Ref Range    POCT Glucose 112 (H) 74 - 99 mg/dL       Assessment/Plan    Chronic kidney disease stage IV etiology most likely nephrosclerosis from combination of diabetes mellitus and hypertension creatinine is a little bit better today discharge from renal point review with me in the office in 2 weeks  Syncope  Multiple falls  Atrial fibrillation  Diabetes mellitus  Hypertension  Coronary artery disease        Ariel Costa MD

## 2023-10-26 NOTE — CARE PLAN
Problem: Fall/Injury  Goal: Not fall by end of shift  Outcome: Progressing  Goal: Be free from injury by end of the shift  Outcome: Progressing  Goal: Verbalize understanding of personal risk factors for fall in the hospital  Outcome: Progressing  Goal: Verbalize understanding of risk factor reduction measures to prevent injury from fall in the home  Outcome: Progressing  Goal: Use assistive devices by end of the shift  Outcome: Progressing  Goal: Pace activities to prevent fatigue by end of the shift  Outcome: Progressing     Problem: Skin  Goal: Decreased wound size/increased tissue granulation at next dressing change  Outcome: Progressing  Goal: Participates in plan/prevention/treatment measures  Outcome: Progressing  Goal: Prevent/manage excess moisture  Outcome: Progressing  Goal: Prevent/minimize sheer/friction injuries  Outcome: Progressing  Goal: Promote/optimize nutrition  Outcome: Progressing  Goal: Promote skin healing  Outcome: Progressing     Problem: Pain  Goal: Takes deep breaths with improved pain control throughout the shift  Outcome: Progressing  Goal: Turns in bed with improved pain control throughout the shift  Outcome: Progressing  Goal: Walks with improved pain control throughout the shift  Outcome: Progressing  Goal: Performs ADL's with improved pain control throughout shift  Outcome: Progressing  Goal: Participates in PT with improved pain control throughout the shift  Outcome: Progressing  Goal: Free from opioid side effects throughout the shift  Outcome: Progressing  Goal: Free from acute confusion related to pain meds throughout the shift  Outcome: Progressing   The patient's goals for the shift include pain control    The clinical goals for the shift include pt safety, no falls    Over the shift, the patient did not make progress toward the following goals. Barriers to progression include pt has chronic pain. Recommendations to address these barriers include continue with home pain  medication regimen.

## 2023-10-26 NOTE — DISCHARGE INSTRUCTIONS
Okay to resume home medications at discharge as prescribed by your healthcare team.     New medications: Eliquis 5mg twice daily (start date 10/27), metoprolol succinate 25mg twice daily.     Please keep an eye on the bruises on your sides as they might increase in size with the use of eliquis. If the bruises significantly worsen, please go to your PCP office or emergency department for further evaluation.     I recommend asking your cardiologist about any coupons/samples/prescription programs for eliquis at your follow up appointment    You can also try searching the pharmaceutical company or medication online to see if they have any coupons/prescription programs available (for any medication)    An order was went for a wheelchair but it might take some time before you actually receive it (assuming it is covered by your insurance)

## 2023-10-26 NOTE — CARE PLAN
Problem: Fall/Injury  Goal: Not fall by end of shift  Outcome: Adequate for Discharge  Goal: Be free from injury by end of the shift  Outcome: Adequate for Discharge  Goal: Verbalize understanding of personal risk factors for fall in the hospital  Outcome: Adequate for Discharge  Goal: Verbalize understanding of risk factor reduction measures to prevent injury from fall in the home  Outcome: Adequate for Discharge  Goal: Use assistive devices by end of the shift  Outcome: Adequate for Discharge  Goal: Pace activities to prevent fatigue by end of the shift  Outcome: Adequate for Discharge     Problem: Skin  Goal: Decreased wound size/increased tissue granulation at next dressing change  Outcome: Adequate for Discharge  Goal: Participates in plan/prevention/treatment measures  Outcome: Adequate for Discharge  Goal: Prevent/manage excess moisture  Outcome: Adequate for Discharge  Goal: Prevent/minimize sheer/friction injuries  Outcome: Adequate for Discharge  Goal: Promote/optimize nutrition  Outcome: Adequate for Discharge  Goal: Promote skin healing  Outcome: Adequate for Discharge     Problem: Pain  Goal: Takes deep breaths with improved pain control throughout the shift  Outcome: Adequate for Discharge  Goal: Turns in bed with improved pain control throughout the shift  Outcome: Adequate for Discharge  Goal: Walks with improved pain control throughout the shift  Outcome: Adequate for Discharge  Goal: Performs ADL's with improved pain control throughout shift  Outcome: Adequate for Discharge  Goal: Participates in PT with improved pain control throughout the shift  Outcome: Adequate for Discharge  Goal: Free from opioid side effects throughout the shift  Outcome: Adequate for Discharge  Goal: Free from acute confusion related to pain meds throughout the shift  Outcome: Adequate for Discharge     Problem: OT Goals  Goal: Pt will complete all functional transfers and mobility with close S using a RW.  Outcome:  Adequate for Discharge  Goal: Pt will complete all grooming tasks with close S/setup in standing.  Outcome: Adequate for Discharge  Goal: Pt will complete UB dressing/bathing with setup/indep and LB dressing/bathing with close S using AE as needed.  Outcome: Adequate for Discharge  Goal: Pt will complete all toileting tasks with close S.  Outcome: Adequate for Discharge  Goal: Pt will participate in BUE exercises in order to enhance functional performance.  Outcome: Adequate for Discharge     Problem: Balance  Goal: STG - Maintains dynamic standing balance with upper extremity support on rolling walker and close supervision  Outcome: Adequate for Discharge     Problem: Mobility  Goal: STG - Patient will ambulate 60' with min A and rolling walker  Outcome: Adequate for Discharge     Problem: Transfers  Goal: STG - Patient to transfer to and from sit to supine with close supervision  Outcome: Adequate for Discharge  Goal: STG - Patient will transfer sit to and from stand with close supervision  Outcome: Adequate for Discharge   The patient's goals for the shift include pain control    The clinical goals for the shift include no falls

## 2023-10-26 NOTE — PROGRESS NOTES
Rachid Yun is a 73 y.o. male on day 2 of admission presenting with Syncope and collapse.      Subjective   Patient seen and examined with son present at bedside. Patient denies additional episodes of palpitations/clamminess. States that he feels significantly improved from admission. Working with therapy this AM, still feels mildly weak compared to baseline but this is improving as well. Tolerating PO. Patient and son requesting order for wheelchair if possible - will discuss with care coordinator.        Objective     Last Recorded Vitals  /79 (BP Location: Left arm, Patient Position: Lying)   Pulse 78   Temp 36.5 °C (97.7 °F) (Oral)   Resp 18   Wt 104 kg (229 lb 4.5 oz)   SpO2 98%   Intake/Output last 3 Shifts:    Intake/Output Summary (Last 24 hours) at 10/26/2023 1043  Last data filed at 10/26/2023 0900  Gross per 24 hour   Intake 1392 ml   Output 3900 ml   Net -2508 ml       Admission Weight  Weight: 105 kg (231 lb 7.7 oz) (10/24/23 0000)    Daily Weight  10/24/23 : 104 kg (229 lb 4.5 oz)    Image Results  Transthoracic Echo (TTE) [place] [address]             Phone [phone]    TRANSTHORACIC ECHOCARDIOGRAM REPORT       Patient Name:      RACHID YUN      Reading Physician:   [id number] Payan DO  Study Date:        10/24/2023          Ordering Provider:   [id number] MILE CONRAD  MRN/PID:           [id number]            Fellow:  Accession#:        [id number]        Nurse:  Date of Birth/Age: 1950 / 73      Sonographer:         Blu Morales RDCS                     years  Gender:            M                   Additional Staff:  Height:            190.00 cm           Admit Date:  Weight:            104.00 kg           Admission Status:    Inpatient - Routine  BSA:               2.32 m2             Department Location: [facility]  Blood Pressure: 119 /65  mmHg    Study Type:    TRANSTHORACIC ECHO (TTE) COMPLETE  Diagnosis/ICD: Abnormal electrocardiogram [ECG] [EKG]-R94.31  Indication:    Abn Ekg  CPT Codes:     Echo Complete w Full Doppler-[id number]    Patient History:  Smoker:            Current.  Diabetes:          Yes  Pertinent History: CAD, HTN, Hyperlipidemia and Chest Pain. PCI, Abn Ekg, Renal                     dx III.    Study Detail: The following Echo studies were performed: 2D, M-Mode, Doppler and                color flow.       PHYSICIAN INTERPRETATION:  Left Ventricle: Left ventricular systolic function is normal. There are no regional wall motion abnormalities. The left ventricular cavity size is normal. Spectral Doppler shows an impaired relaxation pattern of left ventricular diastolic filling.  Left Atrium: The left atrium is mildly dilated.  Right Ventricle: The right ventricle is normal in size. There is normal right ventricular global systolic function.  Right Atrium: The right atrium is normal in size.  Aortic Valve: The aortic valve is trileaflet. There is trivial aortic valve regurgitation. The peak instantaneous gradient of the aortic valve is 6.1 mmHg. The mean gradient of the aortic valve is 3.0 mmHg.  Mitral Valve: The mitral valve is normal in structure. There is mild mitral valve regurgitation.  Tricuspid Valve: The tricuspid valve is structurally normal. There is mild tricuspid regurgitation.  Pulmonic Valve: The pulmonic valve is not well visualized. The pulmonic valve regurgitation was not well visualized.  Pericardium: There is no pericardial effusion noted.  Aorta: The aortic root is normal.       CONCLUSIONS:   1. Left ventricular systolic function is normal.   2. Spectral Doppler shows an impaired relaxation pattern of left ventricular diastolic filling.    QUANTITATIVE DATA SUMMARY:  2D MEASUREMENTS:                            Normal Ranges:  LAs:           4.70 cm    (2.7-4.0cm)  IVSd:          1.29 cm    (0.6-1.1cm)  LVPWd:          1.41 cm    (0.6-1.1cm)  LVIDd:         6.29 cm    (3.9-5.9cm)  LVIDs:         4.20 cm  LV Mass Index: 171.5 g/m2  LV % FS        33.2 %    LA VOLUME:                                Normal Ranges:  LA Vol A4C:        121.4 ml   (22+/-6mL/m2)  LA Vol A2C:        140.2 ml  LA Vol BP:         133.6 ml  LA Vol Index A4C:  52.3ml/m2  LA Vol Index A2C:  60.4 ml/m2  LA Vol Index BP:   57.5 ml/m2  LA Area A4C:       30.0 cm2  LA Area A2C:       33.0 cm2  LA Major Axis A4C: 6.3 cm  LA Major Axis A2C: 6.6 cm  LA Volume Index:   52.2 ml/m2  LA Vol A4C:        111.0 ml  LA Vol A2C:        127.0 ml    RA VOLUME BY A/L METHOD:                        Normal Ranges:  RA Area A4C: 25.2 cm2    M-MODE MEASUREMENTS:                   Normal Ranges:  Ao Root: 3.60 cm (2.0-3.7cm)  LAs:     5.40 cm (2.7-4.0cm)    AORTA MEASUREMENTS:                     Normal Ranges:  Asc Ao, d: 3.90 cm (2.1-3.4cm)    LV SYSTOLIC FUNCTION BY 2D PLANIMETRY (MOD):                      Normal Ranges:  EF-A4C View: 56.0 % (>=55%)  EF-A2C View: 51.1 %  EF-Biplane:  52.4 %    LV DIASTOLIC FUNCTION:                      Normal Ranges:  MV Peak E: 0.74 m/s (0.7-1.2 m/s)    MITRAL VALVE:                  Normal Ranges:  MV DT: 165 msec (150-240msec)    AORTIC VALVE:                                    Normal Ranges:  AoV Vmax:                1.23 m/s (<=1.7m/s)  AoV Peak P.1 mmHg (<20mmHg)  AoV Mean PG:             3.0 mmHg (1.7-11.5mmHg)  LVOT Max Josr:            0.91 m/s (<=1.1m/s)  AoV VTI:                 19.10 cm (18-25cm)  LVOT VTI:                16.60 cm  LVOT Diameter:           2.00 cm  (1.8-2.4cm)  AoV Area, VTI:           2.73 cm2 (2.5-5.5cm2)  AoV Area,Vmax:           2.33 cm2 (2.5-4.5cm2)  AoV Dimensionless Index: 0.87       RIGHT VENTRICLE:  RV Basal 4.46 cm  RV Mid   3.50 cm  RV Major 5.6 cm  TAPSE:   19.3 mm  RV s'    0.13 m/s    TRICUSPID VALVE/RVSP:                              Normal Ranges:  Peak TR Velocity: 2.11 m/s  RV Syst  Pressure: 20.8 mmHg (< 30mmHg)  IVC Diam:         1.61 cm    PULMONIC VALVE:                       Normal Ranges:  PV Max Josr: 0.7 m/s  (0.6-0.9m/s)  PV Max P.0 mmHg       [id number] Adi Payan DO  Electronically signed on 10/24/2023 at 1:18:50 PM       ** Final **  CT brain attack head wo IV contrast  Narrative: Interpreted By:  Luciana Mobley,   STUDY:  CT BRAIN ATTACK HEAD WO IV CONTRAST;  10/23/2023 11:40 pm      INDICATION:  Signs/Symptoms:Stroke Evaluation.      COMPARISON:  2010.      ACCESSION NUMBER(S):  [id number]      ORDERING CLINICIAN:  MASTER CHAVEZ      TECHNIQUE:  Noncontrast axial CT scan of head was performed. Angled reformats in  brain and bone windows were generated. The images were reviewed in  bone, brain, blood and soft tissue windows.      FINDINGS:  CSF Spaces: Ex vacuo dilation of the ventricles. The sulci and basal  cisterns are within normal limits. There is no extraaxial fluid  collection.      Parenchyma: Chronic bilateral white matter microvascular disease. 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 There is no mass effect or midline shift.  There is no intracranial hemorrhage.      Calvarium: The calvarium is unremarkable.      Paranasal sinuses and mastoids: Visualized paranasal sinuses and  mastoids are clear.      Impression: No acute intracranial findings.      MACRO:  None      Signed by: Luciana Mobley 10/24/2023 8:48 AM  Dictation workstation:   [id number]  CT chest abdomen pelvis wo IV contrast  Narrative: Interpreted By:  Luciana Mobley,   STUDY:  CT CHEST ABDOMEN PELVIS WO CONTRAST;  10/24/2023 12:51 am      INDICATION:  Signs/Symptoms:syncope, hypotension, hx diarrhea on blood thinner      COMPARISON:  CT abdomen and pelvis dated 2023.      ACCESSION NUMBER(S):  [id number]      ORDERING CLINICIAN:  MASTER CHAVEZ      TECHNIQUE:  CT of the chest, abdomen, and pelvis was performed.  Contiguous axial images were obtained at  5 mm slice thickness  through the chest, and at   3 mm through the abdomen and pelvis.  Coronal and sagittal reconstructions at  3 mm slice thickness were  performed.      FINDINGS:  CHEST:      LUNG/PLEURA/LARGE AIRWAYS:  No air space opacity, focal consolidation, pleural  effusion/hemothorax, or pneumothorax are appreciated.  There are no  discrete pulmonary nodules.      VESSELS:  Atherosclerotic disease of the aorta.      HEART:  The heart is normal in size.  Severe atherosclerotic disease of the  coronary vasculature.      MEDIASTINUM AND ATIF:  No pneumomediastinum, abnormal mediastinal fluid collection or  mediastinal hematoma are appreciated.  No mediastinal, hilar or  biaxillary adenopathy is present.  The esophagus is normal in course  and caliber.      CHEST WALL AND LOWER NECK:  No acute fracture or dislocation of the included osseous structures  are appreciated.  No suspicious osseous lesions are identified.  The  thoracic wall soft tissues are within normal limits.      ABDOMEN:      LIVER:  No focal perfusion abnormality of the liver is appreciated to suggest  contusion or laceration. There is no subcapsular hematoma, no  perihepatic fluid collection.      GALLBLADDER:  Gallstones present. No wall thickening.      BILE DUCTS:  The intahepatic and extrahepatic bile ducts are not dilated.      PANCREAS:  The pancreas appears unremarkable.      SPLEEN:  Normal size. No focal lesions.      ADRENAL GLANDS:  The bilateral adrenal glands are unremarkable in appearance.      KIDNEYS AND URETERS:  Kidneys appear normal. No stones. No hydronephrosis.      PELVIS:      BLADDER:  The urinary bladder appears within normal limits.      REPRODUCTIVE ORGANS:  No suspicious findings.      BOWEL:  The stomach is unremarkable.  The small bowel is normal in caliber  without evidence of focal wall thickening or obstruction.  There is  no evidence of focal wall thickening or dilatation of the large bowel.      VESSELS:  Atherosclerotic disease of the aorta and bilateral  iliac vessels.  Infrarenal IVC filter present.      PERITONEUM/RETROPERITONEUM/LYMPH NODES:  There is no evidence of intra- or retroperitoneal hematoma.  There is  no free or loculated fluid collection, no free intraperitoneal air.  No abdominopelvic lymphadenopathy is present.      BONES AND ABDOMINAL WALL:  No evidence of acute fracture or dislocation of the included osseous  structures.  No suspicious osseous lesions are identified.  The  abdominal wall soft tissues appear normal.      Impression: CHEST  No acute findings.      ABDOMEN - PELVIS  1. No acute findings.  2. Cholelithiasis.      MACRO:  None      Signed by: Luciana Mobley 10/24/2023 8:45 AM  Dictation workstation:   [id number]      Physical Exam  Constitutional:       General: He is not in acute distress.     Appearance: He is not toxic-appearing.   HENT:      Head: Normocephalic and atraumatic.      Mouth/Throat:      Mouth: Mucous membranes are moist.      Pharynx: Oropharynx is clear.   Eyes:      General: No scleral icterus.  Cardiovascular:      Rate and Rhythm: Normal rate. Rhythm irregular.   Pulmonary:      Effort: No respiratory distress.      Breath sounds: Normal breath sounds. No wheezing.   Abdominal:      General: There is no distension.      Tenderness: There is no abdominal tenderness.   Musculoskeletal:      Right lower leg: No edema.      Left lower leg: No edema.   Skin:     Findings: Bruising (large area of ecchymosis of the right and left flank; chronic ecchymosis of the upper extremities) present.   Neurological:      Mental Status: He is alert and oriented to person, place, and time.   Psychiatric:         Mood and Affect: Mood normal.         Behavior: Behavior normal.         Relevant Results               Assessment/Plan   This patient currently has cardiac telemetry ordered; if you would like to modify or discontinue the telemetry order, click here to go to the orders activity to modify/discontinue the  "order.    Syncope  Differential including syncope 2/2 Afib with RVR vs orthostatic vs ?TIA vs hypoglycemia vs seizure  Per son, BG as been controlled at home, denies frequent hypoglycemic episodes  Does have hx of recurrent TIA; however patient is compliant with plavix and CT head was negative  Discussed seizure medication with patient/son - per son patient has never had a seizure, was placed empirically on keppra due being \"high risk\" for seizures after having multiple kaplan/TIA  Seeing as symptoms started following spinal epidural procedure, concern for possible ??local anesthetic systemic toxicity  Orthostatics ordered  Cardiology consulted for management of Afib  Will defer neurology eval for now seeing as patient's symptoms have improved  PT/OT     Afib with RVR  Management per cardiology  Echo with normal LVEF  Start eliquis on 10/27 per cardiology given chest wall contusion     T2DM  SSI for now given NOAM  Hypoglycemia protocol     NOAM on CKD  Baseline Cr. 2.0 per patient's son  Cr 2.1 this AM  Suspect due to dehydration - reports decreased PO intake prior to admission  Nephrology consulted  Resolved      Hyponatremia  Likely related to dehydration  resolved     Chest wall contusion  Pain medications PRN     HTN  Home meds resumed     Debility  PT/OT recommending SNF placement  Patient is currently ambulating with walker at home but is having frequent falls.     Patient has medical history of multi-joint osteoarthritis, herniated lumbar disc, and chronic pain which has caused difficulty with mobility and frequent falls at home despite use of a walker. This is now exacerbated by new onset atrial fibrillation which has caused multiple syncopal episodes. Because of this, it is in my medical opinion that patient would benefit from a wheelchair.        Dispo: plan for discharge home with [facility] this afternoon.           Principal Problem:    Syncope and collapse  Active Problems:    Iron deficiency anemia due to " chronic blood loss    Primary osteoarthritis involving multiple joints    Stage 3b chronic kidney disease (CMS/HCC)    Coronary artery disease involving native coronary artery of native heart without angina pectoris    Atrial fibrillation (CMS/HCC)    Pericardial effusion    Chest wall contusion, left, initial encounter                  Myrna Donovan MD

## 2023-10-26 NOTE — PROGRESS NOTES
Physical Therapy    Physical Therapy Treatment    Patient Name: Rachid Yun  MRN: [id number]  Today's Date: 10/26/2023  Time Calculation  Start Time: 1030  Stop Time: 1100  Time Calculation (min): 30 min       Assessment/Plan   PT Assessment  PT Assessment Results: Decreased strength, Decreased range of motion, Decreased endurance, Impaired balance, Decreased mobility, Decreased coordination, Decreased safety awareness, Impaired sensation, Impaired tone  Rehab Prognosis: Good  Evaluation/Treatment Tolerance: Patient tolerated treatment well  Medical Staff Made Aware: Yes  Strengths: Ability to acquire knowledge  Barriers to Participation: Comorbidities  End of Session Communication: Bedside nurse  Assessment Comment: pt with a decline in function from his baseline. pt with B LE weakness, L>R, unsteady gait, impaired balance, and fair safety awareness. pt is a high risk for falls and will benefit from continued skilled therapy services to improve functional performance and maximize safety.  End of Session Patient Position: Bed, 2 rail up  PT Plan  Inpatient/Swing Bed or Outpatient: Inpatient  PT Plan  Treatment/Interventions: Bed mobility, Transfer training, Gait training, Balance training, Strengthening, Endurance training, Range of motion, Therapeutic exercise, Therapeutic activity  PT Plan: Skilled PT  PT Frequency: 4 times per week  PT Discharge Recommendations: Moderate intensity level of continued care  Equipment Recommended upon Discharge: Wheeled walker  PT Recommended Transfer Status: Assist x2, Assistive device (rolling walker)  PT - OK to Discharge: Yes      General Visit Information:   PT  Visit  PT Received On: 10/26/23  General  Family/Caregiver Present: Yes  Caregiver Feedback: Son present.  Prior to Session Communication: Bedside nurse  Patient Position Received: Bed, 2 rail up  General Comment: Cleared by nursing to be seen for therapy. pt agreeable with tx, seated EOB upon arrival.    Subjective    Precautions:  Precautions  Hearing/Visual Limitations: wears glasses, no hearing aides  Medical Precautions: Fall precautions    Vital Signs:       Objective   Pain:  Pain Assessment  Pain Assessment: 0-10  Pain Score: 0 - No pain  Cognition:     Postural Control:     Activity Tolerance:     Treatments:  Therapeutic Exercise  Therapeutic Exercise Performed: Yes  Therapeutic Exercise Activity 1: Bilateral ankle pumps x15  Therapeutic Exercise Activity 2: Bilateral hip flexion x15  Therapeutic Exercise Activity 3: Bilateral knee extension x15  Therapeutic Exercise Activity 4: Resisted hip abd/add 15    Therapeutic Activity  Therapeutic Activity Performed: Yes  Therapeutic Activity 1: Assisted to bathroom.    Balance/Neuromuscular Re-Education  Balance/Neuromuscular Re-Education Activity Performed: Yes  Balance/Neuromuscular Re-Education Activity 1: Fair seated static balance, poor static standing balance with bilateral UE support on walker.    Bed Mobility  Bed Mobility: No    Ambulation/Gait Training  Ambulation/Gait Training Performed: Yes  Ambulation/Gait Training 1  Surface 1: Level tile  Device 1: Rolling walker  Assistance 1: Moderate assistance  Comments/Distance (ft) 1: 15' with wheeled walker, presents with slow fawad, decreased bilateral step length, foot drop noted on left, kyphotic posture, bilateral flexed knees, cues to remain in HARI of walker.  Transfers  Transfer: Yes  Transfer 1  Transfer From 1: Sit to  Transfer to 1: Stand  Transfer Level of Assistance 1: Moderate assistance  Trials/Comments 1: Mod assist for trunk up during sit to stand, cues for proper hand placement, decreased eccentric control in sitting.    Stairs  Stairs: No    Outcome Measures:  [facility] Basic Mobility  Turning from your back to your side while in a flat bed without using bedrails: None  Moving from lying on your back to sitting on the side of a flat bed without using bedrails: A little  Moving to and from bed to chair  (including a wheelchair): A lot  Standing up from a chair using your arms (e.g. wheelchair or bedside chair): A lot  To walk in hospital room: A lot  Climbing 3-5 steps with railing: Total  Basic Mobility - Total Score: 14    Education Documentation  No documentation found.  Education Comments  No comments found.        OP EDUCATION:       Encounter Problems       Encounter Problems (Active)       Balance       STG - Maintains dynamic standing balance with upper extremity support on rolling walker and close supervision (Progressing)             Mobility       STG - Patient will ambulate 60' with min A and rolling walker (Progressing)       Start:  10/25/23    Expected End:  11/30/23               Transfers       STG - Patient to transfer to and from sit to supine with close supervision (Progressing)       Start:  10/25/23    Expected End:  11/30/23            STG - Patient will transfer sit to and from stand with close supervision (Progressing)       Start:  10/25/23    Expected End:  11/30/23

## 2023-10-26 NOTE — PROGRESS NOTES
Pt with possible dc home today with  Homecare.    [facility] well as [facility] note indicating need for wheelchair which will be sent to DME provider who will then need to run things through insurance which will take some time. Hospital pharmacy to fill free one month prescription for eliquis. [facility] met with pt and explained all of this to him, he is agreeable for dc home.

## 2023-10-27 NOTE — DISCHARGE SUMMARY
Discharge Diagnosis  Syncope and collapse    Issues Requiring Follow-Up  Afib  Cost of eliquis  Resolution of chest wall contusions    Discharge Meds     Your medication list        START taking these medications        Instructions Last Dose Given Next Dose Due   Eliquis 5 mg tablet  Generic drug: apixaban  Start taking on: October 27, 2023      Take 1 tablet (5 mg) by mouth 2 times a day. Do not start before October 27, 2023.       metoprolol succinate XL 25 mg 24 hr tablet  Commonly known as: Toprol-XL      Take 1 tablet (25 mg) by mouth 2 times a day. Do not crush or chew.              CHANGE how you take these medications        Instructions Last Dose Given Next Dose Due   ferrous sulfate 325 (65 Fe) MG EC tablet  What changed: Another medication with the same name was removed. Continue taking this medication, and follow the directions you see here.           gabapentin 600 mg tablet  Commonly known as: Neurontin  What changed: Another medication with the same name was removed. Continue taking this medication, and follow the directions you see here.           ondansetron 4 mg tablet  Commonly known as: Zofran  What changed: Another medication with the same name was removed. Continue taking this medication, and follow the directions you see here.                  CONTINUE taking these medications        Instructions Last Dose Given Next Dose Due   amLODIPine 5 mg tablet  Commonly known as: Norvasc           Aricept 10 mg tablet  Generic drug: donepezil           atorvastatin 10 mg tablet  Commonly known as: Lipitor           cyanocobalamin 1,000 mcg tablet  Commonly known as: Vitamin B-12           Cymbalta 60 mg DR capsule  Generic drug: DULoxetine           fenofibrate 160 mg tablet  Commonly known as: Triglide           Fish OiL 1,000 mg (120 mg-180 mg) capsule  Generic drug: omega 3-dha-epa-fish oil           fish oil-dha-epa 1,200-144-216 mg capsule           folic acid 400 mcg tablet  Commonly known as:  Folvite           HumuLIN R Regular U-100 Insuln 100 unit/mL injection  Generic drug: insulin regular           lactulose 20 gram/30 mL oral solution           Lantus U-100 Insulin 100 unit/mL injection  Generic drug: insulin glargine           levETIRAcetam 500 mg tablet  Commonly known as: Keppra           losartan 100 mg tablet  Commonly known as: Cozaar           melatonin 3 mg tablet           multivitamin capsule           oxyCODONE 15 mg immediate release tablet  Commonly known as: Roxicodone           pantoprazole 40 mg EC tablet  Commonly known as: ProtoNix           Plavix 75 mg tablet  Generic drug: clopidogrel           sennosides 8.6 mg tablet  Commonly known as: Senokot           solifenacin 5 mg tablet  Commonly known as: VESIcare           tamsulosin 0.4 mg 24 hr capsule  Commonly known as: Flomax           traZODone 150 mg tablet  Commonly known as: Desyrel                  STOP taking these medications      Robbin Multivitamin For Men 200-175-250 mcg tablet  Generic drug: [id number]        omega-3 1,000 mg capsule capsule        sucralfate 1 gram tablet  Commonly known as: Carafate                  Where to Get Your Medications        These medications were sent to [facility]  [id number] [address] [address]      Hours: 9 AM to 6 PM Mon-Fri, 9 AM to 1 PM Sat Phone: [phone]   Eliquis 5 mg tablet  metoprolol succinate XL 25 mg 24 hr tablet         Test Results Pending At Discharge  Pending Labs       Order Current Status    Extra Urine Gray Tube Collected (10/24/23 0338)    Sodium, Urine Random Collected (10/24/23 7748)    Urinalysis with Reflex Microscopic and Culture Collected (10/24/23 0338)    Urinalysis with Reflex Microscopic and Culture Collected (10/24/23 0338)    Urea Nitrogen, Urine Random In process            Hospital Course   73yoM with PMHx multi-joint OA with chronic pain, T2DM, HTN, hx of TIA/stroke, HLD, CAD, CKD who presented after a  syncopal fall at home. Recently diagnosed with afib after having a spinal injection at [facility]. Found to be in Afib with RVR at home with HR 180s. Admitted for further workup of syncope/afib. Cardiology was consulted. Patient was started on metoprolol for rate control with plans to start eliquis on discharge. Patient's symptoms improved with beta blocker. PT/OT recommending mod intensity rehab at discharge but patient and family elected for discharge home with [facility] instead.     Pertinent Physical Exam At Time of Discharge  Physical Exam    Outpatient Follow-Up  Future Appointments   Date Time Provider Department Center   12/4/2023  1:30 PM Izabel Marshall MD [id number] [facility]         Myrna Donovan MD

## 2023-10-31 ENCOUNTER — HOME CARE VISIT (OUTPATIENT)
Dept: HOME HEALTH SERVICES | Facility: HOME HEALTH | Age: 73
End: 2023-10-31
Payer: MEDICARE

## 2023-10-31 VITALS
SYSTOLIC BLOOD PRESSURE: 122 MMHG | HEART RATE: 72 BPM | HEIGHT: 77 IN | WEIGHT: 230 LBS | BODY MASS INDEX: 27.16 KG/M2 | TEMPERATURE: 97.3 F | DIASTOLIC BLOOD PRESSURE: 84 MMHG | RESPIRATION RATE: 18 BRPM

## 2023-10-31 PROCEDURE — G0152 HHCP-SERV OF OT,EA 15 MIN: HCPCS

## 2023-10-31 PROCEDURE — 0023 HH SOC

## 2023-10-31 PROCEDURE — G0299 HHS/HOSPICE OF RN EA 15 MIN: HCPCS

## 2023-10-31 ASSESSMENT — PAIN SCALES - PAIN ASSESSMENT IN ADVANCED DEMENTIA (PAINAD)
NEGVOCALIZATION: 0
FACIALEXPRESSION: 0
FACIALEXPRESSION: 0 - SMILING OR INEXPRESSIVE.
NEGVOCALIZATION: 0 - NONE.
CONSOLABILITY: 0 - NO NEED TO CONSOLE.
TOTALSCORE: 0
CONSOLABILITY: 0
BODYLANGUAGE: 0 - RELAXED.
BODYLANGUAGE: 0
BREATHING: 0

## 2023-10-31 ASSESSMENT — ENCOUNTER SYMPTOMS
STOOL FREQUENCY: LESS THAN DAILY
CONTUSION: 1
PAIN SEVERITY GOAL: 0/10
SUBJECTIVE PAIN PROGRESSION: GRADUALLY IMPROVING
TINGLING: 1
HIGHEST PAIN SEVERITY IN PAST 24 HOURS: 6/10
BOWEL PATTERN NORMAL: 1
PAIN: 1
PAIN LOCATION - PAIN SEVERITY: 6/10
LAST BOWEL MOVEMENT: 66777
LOWEST PAIN SEVERITY IN PAST 24 HOURS: 5/10
APPETITE LEVEL: GOOD
DRY SKIN: 1
DESCRIPTION OF MEMORY LOSS: LONG TERM
PAIN LOCATION: BACK
DEPRESSION: 0
LOSS OF SENSATION IN FEET: 1
TREMORS: 1
PERSON REPORTING PAIN: PATIENT
PERSON REPORTING PAIN: PATIENT
PAIN: 1
DYSPNEA ACTIVITY LEVEL: AFTER AMBULATING LESS THAN 10 FT
SHORTNESS OF BREATH: 1
DESCRIPTION OF MEMORY LOSS: SHORT TERM
CHANGE IN APPETITE: UNCHANGED
OCCASIONAL FEELINGS OF UNSTEADINESS: 1

## 2023-10-31 ASSESSMENT — ACTIVITIES OF DAILY LIVING (ADL)
ENTERING_EXITING_HOME: MINIMUM ASSIST
AMBULATION ASSISTANCE: STAND BY ASSIST
OASIS_M1830: 03
AMBULATION ASSISTANCE: 1

## 2023-11-01 ENCOUNTER — HOME CARE VISIT (OUTPATIENT)
Dept: HOME HEALTH SERVICES | Facility: HOME HEALTH | Age: 73
End: 2023-11-01
Payer: MEDICARE

## 2023-11-01 VITALS
TEMPERATURE: 98.6 F | DIASTOLIC BLOOD PRESSURE: 84 MMHG | SYSTOLIC BLOOD PRESSURE: 133 MMHG | OXYGEN SATURATION: 97 % | RESPIRATION RATE: 18 BRPM | HEART RATE: 88 BPM

## 2023-11-01 PROCEDURE — G0151 HHCP-SERV OF PT,EA 15 MIN: HCPCS

## 2023-11-01 SDOH — HEALTH STABILITY: PHYSICAL HEALTH: EXERCISE TYPE: WRITTEN

## 2023-11-01 ASSESSMENT — PAIN SCALES - PAIN ASSESSMENT IN ADVANCED DEMENTIA (PAINAD)
BODYLANGUAGE: 1 - TENSE. DISTRESSED PACING. FIDGETING.
BREATHING: 1
CONSOLABILITY: 0 - NO NEED TO CONSOLE.
TOTALSCORE: 4
FACIALEXPRESSION: 1
CONSOLABILITY: 0
FACIALEXPRESSION: 1 - SAD. FRIGHTENED. FROWN.
NEGVOCALIZATION: 1
BODYLANGUAGE: 1
NEGVOCALIZATION: 1 - OCCASIONAL MOAN OR GROAN. LOW-LEVEL SPEECH WITH A NEGATIVE OR DISAPPROVING QUALITY.

## 2023-11-01 ASSESSMENT — ENCOUNTER SYMPTOMS
PAIN LOCATION: CHEST
PAIN LOCATION - PAIN SEVERITY: 7/10
PAIN LOCATION - PAIN QUALITY: ACHING
LOWEST PAIN SEVERITY IN PAST 24 HOURS: 3/10
PAIN LOCATION - PAIN DURATION: DAILY
PAIN SEVERITY GOAL: 5/10
PERSON REPORTING PAIN: PATIENT
PAIN: 1
SUBJECTIVE PAIN PROGRESSION: GRADUALLY IMPROVING
PAIN LOCATION - RELIEVING FACTORS: REST
SUBJECTIVE PAIN PROGRESSION: WAXING AND WANING
PAIN LOCATION - PAIN SEVERITY: 8/10
HIGHEST PAIN SEVERITY IN PAST 24 HOURS: 8/10
PAIN LOCATION - PAIN QUALITY: SHARP
PAIN LOCATION - PAIN DURATION: VARIES
PAIN LOCATION - EXACERBATING FACTORS: MVMT
HIGHEST PAIN SEVERITY IN PAST 24 HOURS: 10/10
PAIN LOCATION - PAIN FREQUENCY: INTERMITTENT
ARTHRALGIAS: 1
LOWEST PAIN SEVERITY IN PAST 24 HOURS: 8/10
PAIN SEVERITY GOAL: 0/10
AGITATION: 1
MUSCLE WEAKNESS: 1
PAIN LOCATION - EXACERBATING FACTORS: MOVEMENT
PAIN LOCATION: BACK
PAIN LOCATION - RELIEVING FACTORS: NOTHING
PAIN LOCATION - PAIN FREQUENCY: CONSTANT

## 2023-11-01 ASSESSMENT — ACTIVITIES OF DAILY LIVING (ADL)
PHYSICAL TRANSFERS ASSESSED: 1
PHYSICAL TRANSFERS ASSESSED: 1
AMBULATION ASSISTANCE: 1
AMBULATION ASSISTANCE: STAND BY ASSIST
AMBULATION ASSISTANCE: CONTACT GUARD ASSIST
CURRENT_FUNCTION: STAND BY ASSIST
TOILETING: 1
AMBULATION_DISTANCE/DURATION_TOLERATED: 50'
AMBULATION ASSISTANCE ON FLAT SURFACES: 1
AMBULATION ASSISTANCE: 1
TOILETING: MINIMUM ASSIST
DRESSING_LB_CURRENT_FUNCTION: MINIMUM ASSIST
CURRENT_FUNCTION: MODERATE ASSIST

## 2023-11-01 NOTE — HOME HEALTH
"S: \"I am so afraid to fall\"  O: Pt went sit to stand from very low couch to RW with CGA(Placed second couch cushion on top of first to aid in sit to stand). Pt ambulated 50' with RW and SBA, pt has left foot drop and has AFO but does not wear it in the house. Pt went sit to and from supine with SBA. Pt did supine(heel slides, hip abduct, bridges), seated(LAQ, abdom, UEs), standing(squats, toe raises) x 5-10 reps.  A: Pt had 2 falls at home with bruising to left and right ribs. Has been recently dx'd with a-fib, is diabetic. Pt has chronic pain and has been declining in mobility for some time. Pt has weakness, imbalance, decreased mobility and pain. Pt has good potential to increase strength, balance, endurance, mobility and decrease pain.  P: PT 1wk1 and 2wk4 to address ther ex, gait, balance and stairs"

## 2023-11-02 ENCOUNTER — HOME CARE VISIT (OUTPATIENT)
Dept: HOME HEALTH SERVICES | Facility: HOME HEALTH | Age: 73
End: 2023-11-02
Payer: MEDICARE

## 2023-11-02 PROCEDURE — G0156 HHCP-SVS OF AIDE,EA 15 MIN: HCPCS

## 2023-11-06 ENCOUNTER — HOME CARE VISIT (OUTPATIENT)
Dept: HOME HEALTH SERVICES | Facility: HOME HEALTH | Age: 73
End: 2023-11-06
Payer: MEDICARE

## 2023-11-06 PROCEDURE — G0156 HHCP-SVS OF AIDE,EA 15 MIN: HCPCS

## 2023-11-06 RX ORDER — CLOPIDOGREL BISULFATE 75 MG/1
75 TABLET ORAL DAILY
Qty: 100 TABLET | Refills: 2 | Status: SHIPPED | OUTPATIENT
Start: 2023-11-06

## 2023-11-07 ENCOUNTER — HOME CARE VISIT (OUTPATIENT)
Dept: HOME HEALTH SERVICES | Facility: HOME HEALTH | Age: 73
End: 2023-11-07
Payer: MEDICARE

## 2023-11-07 VITALS — DIASTOLIC BLOOD PRESSURE: 78 MMHG | HEART RATE: 76 BPM | SYSTOLIC BLOOD PRESSURE: 128 MMHG | TEMPERATURE: 98.2 F

## 2023-11-07 VITALS
RESPIRATION RATE: 18 BRPM | HEART RATE: 76 BPM | DIASTOLIC BLOOD PRESSURE: 78 MMHG | TEMPERATURE: 98.2 F | SYSTOLIC BLOOD PRESSURE: 128 MMHG

## 2023-11-07 PROCEDURE — G0157 HHC PT ASSISTANT EA 15: HCPCS

## 2023-11-07 PROCEDURE — G0158 HHC OT ASSISTANT EA 15: HCPCS

## 2023-11-07 RX ORDER — TRAZODONE HYDROCHLORIDE 150 MG/1
TABLET ORAL
Qty: 90 TABLET | Refills: 3 | OUTPATIENT
Start: 2023-11-07

## 2023-11-07 ASSESSMENT — ENCOUNTER SYMPTOMS
LOWEST PAIN SEVERITY IN PAST 24 HOURS: 4/10
HIGHEST PAIN SEVERITY IN PAST 24 HOURS: 7/10

## 2023-11-09 ENCOUNTER — HOME CARE VISIT (OUTPATIENT)
Dept: HOME HEALTH SERVICES | Facility: HOME HEALTH | Age: 73
End: 2023-11-09
Payer: MEDICARE

## 2023-11-09 VITALS
HEART RATE: 82 BPM | SYSTOLIC BLOOD PRESSURE: 122 MMHG | DIASTOLIC BLOOD PRESSURE: 82 MMHG | RESPIRATION RATE: 18 BRPM | TEMPERATURE: 97.7 F

## 2023-11-09 VITALS
OXYGEN SATURATION: 98 % | DIASTOLIC BLOOD PRESSURE: 70 MMHG | HEART RATE: 80 BPM | SYSTOLIC BLOOD PRESSURE: 114 MMHG | TEMPERATURE: 97.9 F | RESPIRATION RATE: 20 BRPM

## 2023-11-09 PROCEDURE — G0299 HHS/HOSPICE OF RN EA 15 MIN: HCPCS

## 2023-11-09 PROCEDURE — G0157 HHC PT ASSISTANT EA 15: HCPCS

## 2023-11-09 PROCEDURE — G0156 HHCP-SVS OF AIDE,EA 15 MIN: HCPCS

## 2023-11-09 SDOH — HEALTH STABILITY: PHYSICAL HEALTH: EXERCISE COMMENTS: THER EX IN SUPINE B LE MODIFIED TO PAIN TOLERABLE ROM ON R LE FOR ABD AND HEEL SLIDES

## 2023-11-09 ASSESSMENT — ENCOUNTER SYMPTOMS
PAIN LOCATION: RIGHT HIP
DYSPNEA ACTIVITY LEVEL: AFTER AMBULATING LESS THAN 10 FT
PAIN: 1
TINGLING: 1
PAIN LOCATION - PAIN SEVERITY: 7/10
PAIN: 1
DESCRIPTION OF MEMORY LOSS: SHORT TERM
STOOL FREQUENCY: LESS THAN DAILY
SHORTNESS OF BREATH: 1
MUSCLE WEAKNESS: 1
PERSON REPORTING PAIN: PATIENT
SUBJECTIVE PAIN PROGRESSION: GRADUALLY IMPROVING
HIGHEST PAIN SEVERITY IN PAST 24 HOURS: 8/10
PAIN SEVERITY GOAL: 0/10
HIGHEST PAIN SEVERITY IN PAST 24 HOURS: 9/10
DEPRESSION: 0
BOWEL PATTERN NORMAL: 1
APPETITE LEVEL: GOOD
OCCASIONAL FEELINGS OF UNSTEADINESS: 1
LOWEST PAIN SEVERITY IN PAST 24 HOURS: 4/10
CHANGE IN APPETITE: UNCHANGED
PAIN LOCATION: RIGHT HIP
LOWEST PAIN SEVERITY IN PAST 24 HOURS: 7/10
LAST BOWEL MOVEMENT: 66786
LOSS OF SENSATION IN FEET: 1

## 2023-11-09 ASSESSMENT — PAIN SCALES - PAIN ASSESSMENT IN ADVANCED DEMENTIA (PAINAD)
CONSOLABILITY: 0 - NO NEED TO CONSOLE.
NEGVOCALIZATION: 0 - NONE.
BODYLANGUAGE: 0
BREATHING: 0
FACIALEXPRESSION: 0 - SMILING OR INEXPRESSIVE.
NEGVOCALIZATION: 0
CONSOLABILITY: 0
TOTALSCORE: 0
FACIALEXPRESSION: 0
BODYLANGUAGE: 0 - RELAXED.

## 2023-11-09 ASSESSMENT — ACTIVITIES OF DAILY LIVING (ADL)
MONEY MANAGEMENT (EXPENSES/BILLS): NEEDS ASSISTANCE
AMBULATION ASSISTANCE ON FLAT SURFACES: 1

## 2023-11-10 ENCOUNTER — HOME CARE VISIT (OUTPATIENT)
Dept: HOME HEALTH SERVICES | Facility: HOME HEALTH | Age: 73
End: 2023-11-10
Payer: MEDICARE

## 2023-11-10 PROCEDURE — G0158 HHC OT ASSISTANT EA 15: HCPCS

## 2023-11-10 ASSESSMENT — PAIN DESCRIPTION - PAIN TYPE: TYPE: ACUTE PAIN

## 2023-11-13 ENCOUNTER — APPOINTMENT (OUTPATIENT)
Dept: CARDIOLOGY | Facility: CLINIC | Age: 73
End: 2023-11-13
Payer: MEDICARE

## 2023-11-13 ENCOUNTER — HOME CARE VISIT (OUTPATIENT)
Dept: HOME HEALTH SERVICES | Facility: HOME HEALTH | Age: 73
End: 2023-11-13
Payer: MEDICARE

## 2023-11-13 PROBLEM — E78.2 MIXED HYPERLIPIDEMIA: Status: ACTIVE | Noted: 2023-11-13

## 2023-11-13 PROCEDURE — G0156 HHCP-SVS OF AIDE,EA 15 MIN: HCPCS

## 2023-11-14 ENCOUNTER — HOME CARE VISIT (OUTPATIENT)
Dept: HOME HEALTH SERVICES | Facility: HOME HEALTH | Age: 73
End: 2023-11-14

## 2023-11-14 ENCOUNTER — HOME CARE VISIT (OUTPATIENT)
Dept: HOME HEALTH SERVICES | Facility: HOME HEALTH | Age: 73
End: 2023-11-14
Payer: MEDICARE

## 2023-11-14 VITALS — TEMPERATURE: 97.6 F | HEART RATE: 78 BPM | RESPIRATION RATE: 18 BRPM

## 2023-11-14 PROCEDURE — G0157 HHC PT ASSISTANT EA 15: HCPCS

## 2023-11-14 SDOH — HEALTH STABILITY: PHYSICAL HEALTH: EXERCISE TYPE: STRENGTHENING

## 2023-11-14 SDOH — HEALTH STABILITY: PHYSICAL HEALTH: EXERCISE COMMENTS: SECONDS

## 2023-11-14 SDOH — HEALTH STABILITY: PHYSICAL HEALTH
EXERCISE COMMENTS: SUPINE STRENGTHENING EXERCISES X 10 BRIDGING IR TO NEUTRAL ABD HEEL SLIDES , ADDED IN STANDING X 10 HAMSTRING CURLS MARCHING SHALLOW KNEE BENDS STRETCHING L HAMSTRINGS ABDUCTORS AND LOWBACK X 30 SECONDS PATIENT INSTRUCTED TO PERFORM X 3 DAILY FOR 30

## 2023-11-14 ASSESSMENT — ENCOUNTER SYMPTOMS
MUSCLE WEAKNESS: 1
LOWEST PAIN SEVERITY IN PAST 24 HOURS: 4/10
HIGHEST PAIN SEVERITY IN PAST 24 HOURS: 7/10

## 2023-11-15 DIAGNOSIS — I48.91 ATRIAL FIBRILLATION, UNSPECIFIED TYPE (MULTI): ICD-10-CM

## 2023-11-15 DIAGNOSIS — G47.30 SLEEP APNEA, UNSPECIFIED TYPE: ICD-10-CM

## 2023-11-16 ENCOUNTER — HOME CARE VISIT (OUTPATIENT)
Dept: HOME HEALTH SERVICES | Facility: HOME HEALTH | Age: 73
End: 2023-11-16
Payer: MEDICARE

## 2023-11-16 VITALS
TEMPERATURE: 98.3 F | RESPIRATION RATE: 18 BRPM | HEART RATE: 76 BPM | OXYGEN SATURATION: 98 % | DIASTOLIC BLOOD PRESSURE: 78 MMHG | SYSTOLIC BLOOD PRESSURE: 110 MMHG

## 2023-11-16 VITALS
DIASTOLIC BLOOD PRESSURE: 76 MMHG | HEART RATE: 86 BPM | SYSTOLIC BLOOD PRESSURE: 104 MMHG | TEMPERATURE: 97.3 F | RESPIRATION RATE: 18 BRPM

## 2023-11-16 PROCEDURE — G0157 HHC PT ASSISTANT EA 15: HCPCS

## 2023-11-16 PROCEDURE — G0299 HHS/HOSPICE OF RN EA 15 MIN: HCPCS

## 2023-11-16 PROCEDURE — G0158 HHC OT ASSISTANT EA 15: HCPCS

## 2023-11-16 PROCEDURE — G0156 HHCP-SVS OF AIDE,EA 15 MIN: HCPCS

## 2023-11-16 ASSESSMENT — ENCOUNTER SYMPTOMS
BOWEL PATTERN NORMAL: 1
HIGHEST PAIN SEVERITY IN PAST 24 HOURS: 8/10
PAIN LOCATION: RIGHT HIP
DESCRIPTION OF MEMORY LOSS: LONG TERM
HIGHEST PAIN SEVERITY IN PAST 24 HOURS: 8/10
SUBJECTIVE PAIN PROGRESSION: GRADUALLY WORSENING
TINGLING: 1
PAIN: 1
LOWEST PAIN SEVERITY IN PAST 24 HOURS: 4/10
FORGETFULNESS: 1
DEPRESSION: 0
FATIGUE: 1
PAIN LOCATION - PAIN DURATION: VARIES
APPETITE LEVEL: GOOD
MUSCLE WEAKNESS: 1
PAIN LOCATION - PAIN FREQUENCY: CONSTANT
LAST BOWEL MOVEMENT: 66794
DESCRIPTION OF MEMORY LOSS: SHORT TERM
DIZZINESS: 1
PAIN SEVERITY GOAL: 0/10
LOSS OF SENSATION IN FEET: 1
STOOL FREQUENCY: LESS THAN DAILY
TREMORS: 1
DRY SKIN: 1
OCCASIONAL FEELINGS OF UNSTEADINESS: 1
LOWEST PAIN SEVERITY IN PAST 24 HOURS: 5/10
PAIN: 1
PAIN LOCATION - PAIN QUALITY: ACHE
PAIN LOCATION - PAIN SEVERITY: 5/10
PERSON REPORTING PAIN: PATIENT

## 2023-11-16 ASSESSMENT — PAIN SCALES - PAIN ASSESSMENT IN ADVANCED DEMENTIA (PAINAD)
BODYLANGUAGE: 0
FACIALEXPRESSION: 0
NEGVOCALIZATION: 0 - NONE.
TOTALSCORE: 0
BODYLANGUAGE: 0 - RELAXED.
CONSOLABILITY: 0 - NO NEED TO CONSOLE.
FACIALEXPRESSION: 0 - SMILING OR INEXPRESSIVE.
CONSOLABILITY: 0
BREATHING: 0
NEGVOCALIZATION: 0

## 2023-11-16 ASSESSMENT — ACTIVITIES OF DAILY LIVING (ADL)
AMBULATION ASSISTANCE: 1
AMBULATION ASSISTANCE: STAND BY ASSIST
MONEY MANAGEMENT (EXPENSES/BILLS): NEEDS ASSISTANCE

## 2023-11-20 ENCOUNTER — HOME CARE VISIT (OUTPATIENT)
Dept: HOME HEALTH SERVICES | Facility: HOME HEALTH | Age: 73
End: 2023-11-20
Payer: MEDICARE

## 2023-11-20 PROCEDURE — G0156 HHCP-SVS OF AIDE,EA 15 MIN: HCPCS

## 2023-11-21 ENCOUNTER — HOME CARE VISIT (OUTPATIENT)
Dept: HOME HEALTH SERVICES | Facility: HOME HEALTH | Age: 73
End: 2023-11-21
Payer: MEDICARE

## 2023-11-21 PROCEDURE — G0152 HHCP-SERV OF OT,EA 15 MIN: HCPCS

## 2023-11-21 PROCEDURE — G0157 HHC PT ASSISTANT EA 15: HCPCS

## 2023-11-21 ASSESSMENT — PAIN SCALES - PAIN ASSESSMENT IN ADVANCED DEMENTIA (PAINAD)
CONSOLABILITY: 0 - NO NEED TO CONSOLE.
NEGVOCALIZATION: 0
BODYLANGUAGE: 0 - RELAXED.
BREATHING: 0
FACIALEXPRESSION: 0 - SMILING OR INEXPRESSIVE.
NEGVOCALIZATION: 0 - NONE.
FACIALEXPRESSION: 0
TOTALSCORE: 0
BODYLANGUAGE: 0
CONSOLABILITY: 0

## 2023-11-21 ASSESSMENT — ACTIVITIES OF DAILY LIVING (ADL)
BATHING ASSESSED: 1
AMBULATION ASSISTANCE: STAND BY ASSIST
DRESSING_UB_CURRENT_FUNCTION: MINIMUM ASSIST
AMBULATION ASSISTANCE: 1
BATHING_CURRENT_FUNCTION: MINIMUM ASSIST
DRESSING_LB_CURRENT_FUNCTION: CONTACT GUARD ASSIST

## 2023-11-21 ASSESSMENT — ENCOUNTER SYMPTOMS
HIGHEST PAIN SEVERITY IN PAST 24 HOURS: 5/10
PAIN: 1
HIGHEST PAIN SEVERITY IN PAST 24 HOURS: 5/10
LOWEST PAIN SEVERITY IN PAST 24 HOURS: 5/10
SUBJECTIVE PAIN PROGRESSION: UNCHANGED
PAIN LOCATION - PAIN SEVERITY: 5/10
LOWEST PAIN SEVERITY IN PAST 24 HOURS: 4/10
PAIN LOCATION: RIGHT HIP
PAIN: 1
PERSON REPORTING PAIN: PATIENT

## 2023-11-22 ENCOUNTER — HOME CARE VISIT (OUTPATIENT)
Dept: HOME HEALTH SERVICES | Facility: HOME HEALTH | Age: 73
End: 2023-11-22
Payer: MEDICARE

## 2023-11-22 VITALS
HEART RATE: 88 BPM | DIASTOLIC BLOOD PRESSURE: 78 MMHG | SYSTOLIC BLOOD PRESSURE: 114 MMHG | TEMPERATURE: 98.5 F | RESPIRATION RATE: 18 BRPM | OXYGEN SATURATION: 98 %

## 2023-11-22 PROCEDURE — G0156 HHCP-SVS OF AIDE,EA 15 MIN: HCPCS

## 2023-11-22 PROCEDURE — G0299 HHS/HOSPICE OF RN EA 15 MIN: HCPCS

## 2023-11-22 PROCEDURE — G0158 HHC OT ASSISTANT EA 15: HCPCS

## 2023-11-22 ASSESSMENT — ENCOUNTER SYMPTOMS
SUBJECTIVE PAIN PROGRESSION: UNCHANGED
DEPRESSION: 0
LOSS OF SENSATION IN FEET: 1
LOWEST PAIN SEVERITY IN PAST 24 HOURS: 4/10
HIGHEST PAIN SEVERITY IN PAST 24 HOURS: 8/10
DYSPNEA ACTIVITY LEVEL: AFTER AMBULATING LESS THAN 10 FT
PAIN: 1
LAST BOWEL MOVEMENT: 66800
BOWEL PATTERN NORMAL: 1
PAIN SEVERITY GOAL: 0/10
CHANGE IN APPETITE: UNCHANGED
APPETITE LEVEL: GOOD
SHORTNESS OF BREATH: 1
PAIN LOCATION: RIGHT HIP
OCCASIONAL FEELINGS OF UNSTEADINESS: 1
RHINORRHEA: 1
STOOL FREQUENCY: LESS THAN DAILY
PAIN LOCATION - PAIN SEVERITY: 4/10

## 2023-11-22 ASSESSMENT — PAIN SCALES - PAIN ASSESSMENT IN ADVANCED DEMENTIA (PAINAD)
CONSOLABILITY: 0 - NO NEED TO CONSOLE.
BODYLANGUAGE: 0 - RELAXED.
NEGVOCALIZATION: 0 - NONE.
CONSOLABILITY: 0
NEGVOCALIZATION: 0
FACIALEXPRESSION: 0
BODYLANGUAGE: 0
BREATHING: 0
FACIALEXPRESSION: 0 - SMILING OR INEXPRESSIVE.
TOTALSCORE: 0

## 2023-11-22 ASSESSMENT — ACTIVITIES OF DAILY LIVING (ADL): AMBULATION ASSISTANCE: 1

## 2023-11-24 ENCOUNTER — OFFICE VISIT (OUTPATIENT)
Dept: CARDIOLOGY | Facility: CLINIC | Age: 73
End: 2023-11-24
Payer: MEDICARE

## 2023-11-24 VITALS
SYSTOLIC BLOOD PRESSURE: 124 MMHG | HEART RATE: 61 BPM | DIASTOLIC BLOOD PRESSURE: 70 MMHG | OXYGEN SATURATION: 99 % | WEIGHT: 230 LBS | BODY MASS INDEX: 27.27 KG/M2

## 2023-11-24 DIAGNOSIS — E78.2 MIXED HYPERLIPIDEMIA: ICD-10-CM

## 2023-11-24 DIAGNOSIS — F03.918 DEMENTIA WITH BEHAVIORAL DISTURBANCE (MULTI): ICD-10-CM

## 2023-11-24 DIAGNOSIS — I10 PRIMARY HYPERTENSION: ICD-10-CM

## 2023-11-24 DIAGNOSIS — I50.9 CHRONIC HEART FAILURE, UNSPECIFIED HEART FAILURE TYPE (MULTI): ICD-10-CM

## 2023-11-24 DIAGNOSIS — I48.91 ATRIAL FIBRILLATION, UNSPECIFIED TYPE (MULTI): ICD-10-CM

## 2023-11-24 DIAGNOSIS — I25.10 ATHEROSCLEROSIS OF CORONARY ARTERY OF NATIVE HEART WITHOUT ANGINA PECTORIS, UNSPECIFIED VESSEL OR LESION TYPE: Primary | ICD-10-CM

## 2023-11-24 DIAGNOSIS — G40.909 NONINTRACTABLE EPILEPSY WITHOUT STATUS EPILEPTICUS, UNSPECIFIED EPILEPSY TYPE (MULTI): ICD-10-CM

## 2023-11-24 PROCEDURE — 1125F AMNT PAIN NOTED PAIN PRSNT: CPT | Performed by: INTERNAL MEDICINE

## 2023-11-24 PROCEDURE — 1159F MED LIST DOCD IN RCRD: CPT | Performed by: INTERNAL MEDICINE

## 2023-11-24 PROCEDURE — 99214 OFFICE O/P EST MOD 30 MIN: CPT | Performed by: INTERNAL MEDICINE

## 2023-11-24 PROCEDURE — 3044F HG A1C LEVEL LT 7.0%: CPT | Performed by: INTERNAL MEDICINE

## 2023-11-24 PROCEDURE — 3074F SYST BP LT 130 MM HG: CPT | Performed by: INTERNAL MEDICINE

## 2023-11-24 PROCEDURE — 4010F ACE/ARB THERAPY RXD/TAKEN: CPT | Performed by: INTERNAL MEDICINE

## 2023-11-24 PROCEDURE — 1036F TOBACCO NON-USER: CPT | Performed by: INTERNAL MEDICINE

## 2023-11-24 PROCEDURE — 1111F DSCHRG MED/CURRENT MED MERGE: CPT | Performed by: INTERNAL MEDICINE

## 2023-11-24 PROCEDURE — 3078F DIAST BP <80 MM HG: CPT | Performed by: INTERNAL MEDICINE

## 2023-11-24 RX ORDER — SODIUM CHLORIDE 9 MG/ML
50 INJECTION, SOLUTION INTRAVENOUS CONTINUOUS
Status: CANCELLED | OUTPATIENT
Start: 2023-11-24

## 2023-11-24 RX ORDER — TRAZODONE HYDROCHLORIDE 150 MG/1
150 TABLET ORAL NIGHTLY
Qty: 90 TABLET | Refills: 3 | Status: SHIPPED | OUTPATIENT
Start: 2023-11-24 | End: 2024-11-23

## 2023-11-24 RX ORDER — METOPROLOL SUCCINATE 50 MG/1
50 TABLET, EXTENDED RELEASE ORAL 2 TIMES DAILY
Qty: 60 TABLET | Refills: 11 | Status: SHIPPED | OUTPATIENT
Start: 2023-11-24 | End: 2023-11-29 | Stop reason: HOSPADM

## 2023-11-24 RX ORDER — METOPROLOL SUCCINATE 25 MG/1
25 TABLET, EXTENDED RELEASE ORAL 2 TIMES DAILY
Qty: 180 TABLET | Refills: 3 | Status: SHIPPED | OUTPATIENT
Start: 2023-11-24 | End: 2024-03-04 | Stop reason: SDUPTHER

## 2023-11-24 ASSESSMENT — ENCOUNTER SYMPTOMS
DYSPNEA ON EXERTION: 1
NEUROLOGICAL NEGATIVE: 1
PALPITATIONS: 1
GASTROINTESTINAL NEGATIVE: 1

## 2023-11-24 ASSESSMENT — PAIN SCALES - GENERAL: PAINLEVEL: 6

## 2023-11-24 NOTE — PROGRESS NOTES
Subjective   Rachid Yun is a 73 y.o. male.    Chief Complaint:  Hospital Follow-up    HPI  Patient has had SOB with exertion with the afib, and remains so.  He has 2 rotator cuff tears and wants to have cortisone injection on Monday.  Review of Systems   Constitutional: Positive for malaise/fatigue.   HENT: Negative.     Cardiovascular:  Positive for dyspnea on exertion and palpitations.   Musculoskeletal:  Positive for arthritis.   Gastrointestinal: Negative.    Genitourinary: Negative.    Neurological: Negative.        Objective   Constitutional:       Appearance: Not in distress.   Eyes:      Conjunctiva/sclera: Conjunctivae normal.   Neck:      Vascular: JVD normal.   Pulmonary:      Breath sounds: Normal breath sounds. No wheezing. No rhonchi. No rales.   Cardiovascular:      Normal rate. Irregularly irregular rhythm.      Murmurs: There is no murmur.      No gallop.  No click. No rub.   Abdominal:      Palpations: Abdomen is soft.   Neurological:      General: No focal deficit present.      Mental Status: Alert.         Lab Review:   Admission on 10/23/2023, Discharged on 10/26/2023   Component Date Value   • WBC 10/23/2023 9.1    • nRBC 10/23/2023 0.0    • RBC 10/23/2023 4.12 (L)    • Hemoglobin 10/23/2023 12.6 (L)    • Hematocrit 10/23/2023 38.2 (L)    • MCV 10/23/2023 93    • MCH 10/23/2023 30.6    • MCHC 10/23/2023 33.0    • RDW 10/23/2023 14.1    • Platelets 10/23/2023 239    • MPV 10/23/2023 10.2    • Neutrophils % 10/23/2023 77.1    • Immature Granulocytes %,* 10/23/2023 0.4    • Lymphocytes % 10/23/2023 13.5    • Monocytes % 10/23/2023 7.7    • Eosinophils % 10/23/2023 0.9    • Basophils % 10/23/2023 0.4    • Neutrophils Absolute 10/23/2023 7.05 (H)    • Immature Granulocytes Ab* 10/23/2023 0.04    • Lymphocytes Absolute 10/23/2023 1.23    • Monocytes Absolute 10/23/2023 0.70    • Eosinophils Absolute 10/23/2023 0.08    • Basophils Absolute 10/23/2023 0.04    • Glucose 10/23/2023 145 (H)    •  Sodium 10/23/2023 134    • Potassium 10/23/2023 4.2    • Chloride 10/23/2023 102    • Bicarbonate 10/23/2023 18 (L)    • Urea Nitrogen 10/23/2023 46 (H)    • Creatinine 10/23/2023 2.40 (H)    • eGFR 10/23/2023 28 (L)    • Calcium 10/23/2023 8.9    • Albumin 10/23/2023 3.6    • Alkaline Phosphatase 10/23/2023 31 (L)    • Total Protein 10/23/2023 5.8 (L)    • AST 10/23/2023 16    • Bilirubin, Total 10/23/2023 0.4    • ALT 10/23/2023 14    • Anion Gap 10/23/2023 14    • Protime 10/23/2023 11.9    • INR 10/23/2023 1.1    • aPTT 10/23/2023 22.3    • ABO TYPE 10/23/2023 A    • Rh TYPE 10/23/2023 POS    • ANTIBODY SCREEN 10/23/2023 NEG    • Troponin T, High Sensiti* 10/23/2023 77 (H)    • POCT Glucose 10/23/2023 152 (H)    • Glucose 10/24/2023 140 (H)    • Sodium 10/24/2023 132 (L)    • Potassium 10/24/2023 4.5    • Chloride 10/24/2023 103    • Bicarbonate 10/24/2023 19 (L)    • Urea Nitrogen 10/24/2023 47 (H)    • Creatinine 10/24/2023 2.40 (H)    • eGFR 10/24/2023 28 (L)    • Calcium 10/24/2023 8.9    • Phosphorus 10/24/2023 3.2    • Albumin 10/24/2023 3.8    • Anion Gap 10/24/2023 10    • WBC 10/24/2023 11.0    • nRBC 10/24/2023 0.0    • RBC 10/24/2023 4.33 (L)    • Hemoglobin 10/24/2023 13.0 (L)    • Hematocrit 10/24/2023 39.1 (L)    • MCV 10/24/2023 90    • MCH 10/24/2023 30.0    • MCHC 10/24/2023 33.2    • RDW 10/24/2023 14.1    • Platelets 10/24/2023 219    • MPV 10/24/2023 10.5    • Neutrophils % 10/24/2023 79.6    • Immature Granulocytes %,* 10/24/2023 0.5    • Lymphocytes % 10/24/2023 12.4    • Monocytes % 10/24/2023 6.5    • Eosinophils % 10/24/2023 0.6    • Basophils % 10/24/2023 0.4    • Neutrophils Absolute 10/24/2023 8.73 (H)    • Immature Granulocytes Ab* 10/24/2023 0.06    • Lymphocytes Absolute 10/24/2023 1.36    • Monocytes Absolute 10/24/2023 0.71    • Eosinophils Absolute 10/24/2023 0.07    • Basophils Absolute 10/24/2023 0.04    • Magnesium 10/24/2023 2.20    • Thyroid Stimulating Horm* 10/24/2023  0.63    • LV A4C EF 10/24/2023 56.0    • Hemoglobin A1C 10/24/2023 6.3 (H)    • Estimated Average Glucose 10/24/2023 134    • Extra Tube 10/24/2023 Hold for add-ons.    • POCT Glucose 10/24/2023 140 (H)    • Keppra 10/24/2023 9 (L)    • POCT Glucose 10/24/2023 177 (H)    • POCT Glucose 10/24/2023 164 (H)    • Glucose 10/25/2023 116 (H)    • Sodium 10/25/2023 137    • Potassium 10/25/2023 4.5    • Chloride 10/25/2023 105    • Bicarbonate 10/25/2023 21 (L)    • Urea Nitrogen 10/25/2023 51 (H)    • Creatinine 10/25/2023 2.10 (H)    • eGFR 10/25/2023 33 (L)    • Calcium 10/25/2023 8.7    • Albumin 10/25/2023 3.8    • Alkaline Phosphatase 10/25/2023 30 (L)    • Total Protein 10/25/2023 6.1    • AST 10/25/2023 17    • Bilirubin, Total 10/25/2023 0.5    • ALT 10/25/2023 16    • Anion Gap 10/25/2023 11    • WBC 10/25/2023 10.7    • nRBC 10/25/2023 0.0    • RBC 10/25/2023 4.22 (L)    • Hemoglobin 10/25/2023 12.7 (L)    • Hematocrit 10/25/2023 38.7 (L)    • MCV 10/25/2023 92    • MCH 10/25/2023 30.1    • MCHC 10/25/2023 32.8    • RDW 10/25/2023 14.1    • Platelets 10/25/2023 202    • MPV 10/25/2023 10.6    • POCT Glucose 10/24/2023 234 (H)    • POCT Glucose 10/25/2023 113 (H)    • POCT Glucose 10/25/2023 113 (H)    • POCT Glucose 10/25/2023 172 (H)    • POCT Glucose 10/25/2023 158 (H)    • POCT Glucose 10/25/2023 230 (H)    • POCT Glucose 10/26/2023 132 (H)    • POCT Glucose 10/26/2023 112 (H)    • POCT Glucose 10/26/2023 153 (H)        Assessment/Plan   The primary encounter diagnosis was Atherosclerosis of coronary artery of native heart without angina pectoris, unspecified vessel or lesion type. Diagnoses of Primary hypertension, Mixed hyperlipidemia, Atrial fibrillation, unspecified type (CMS/HCC), Chronic heart failure, unspecified heart failure type (CMS/HCC), Nonintractable epilepsy without status epilepticus, unspecified epilepsy type (CMS/HCC), and Dementia with behavioral disturbance (CMS/[facility]) were also pertinent to  this visit.      Atherosclerosis of coronary artery without angina pectoris  Stable with no anginal type symptoms.  But does have shortness of breath when he exerts himself.  Will need to try to control atrial fibrillation better to see if symptoms will improve.    Atrial fibrillation (CMS/HCC)  Patient definitely with symptomatic atrial fibrillation.  I think a rhythm control strategy would be appropriate at this point in time.  I thus recommended a DC cardioversion to reestablish sinus rhythm.  The patient has been on Eliquis for approximately 4 weeks now so a cardioversion procedure can be done.   unfortunately will need to limit the injections at this point in time, but hopefully the cardioversion will be successful and will be able to pause anticoagulation for injections in the future.    Primary hypertension  BP adequate will follow    Mixed hyperlipidemia  Dr. Marshall has followed lipids, review in future.           Patient was identified as a fall risk. Risk prevention instructions provided.

## 2023-11-24 NOTE — ASSESSMENT & PLAN NOTE
Patient definitely with symptomatic atrial fibrillation.  I think a rhythm control strategy would be appropriate at this point in time.  I thus recommended a DC cardioversion to reestablish sinus rhythm.  The patient has been on Eliquis for approximately 4 weeks now so a cardioversion procedure can be done.   unfortunately will need to limit the injections at this point in time, but hopefully the cardioversion will be successful and will be able to pause anticoagulation for injections in the future.

## 2023-11-24 NOTE — PATIENT INSTRUCTIONS

## 2023-11-24 NOTE — ASSESSMENT & PLAN NOTE
Stable with no anginal type symptoms.  But does have shortness of breath when he exerts himself.  Will need to try to control atrial fibrillation better to see if symptoms will improve.

## 2023-11-27 ENCOUNTER — HOME CARE VISIT (OUTPATIENT)
Dept: HOME HEALTH SERVICES | Facility: HOME HEALTH | Age: 73
End: 2023-11-27
Payer: MEDICARE

## 2023-11-27 ENCOUNTER — LAB (OUTPATIENT)
Dept: LAB | Facility: LAB | Age: 73
End: 2023-11-27
Payer: MEDICARE

## 2023-11-27 VITALS — OXYGEN SATURATION: 98 % | DIASTOLIC BLOOD PRESSURE: 62 MMHG | SYSTOLIC BLOOD PRESSURE: 101 MMHG | HEART RATE: 86 BPM

## 2023-11-27 DIAGNOSIS — I48.91 ATRIAL FIBRILLATION, UNSPECIFIED TYPE (MULTI): ICD-10-CM

## 2023-11-27 LAB
ANION GAP SERPL CALC-SCNC: 15 MMOL/L
BUN SERPL-MCNC: 30 MG/DL (ref 8–25)
CALCIUM SERPL-MCNC: 9.4 MG/DL (ref 8.5–10.4)
CHLORIDE SERPL-SCNC: 106 MMOL/L (ref 97–107)
CO2 SERPL-SCNC: 23 MMOL/L (ref 24–31)
CREAT SERPL-MCNC: 2.3 MG/DL (ref 0.4–1.6)
ERYTHROCYTE [DISTWIDTH] IN BLOOD BY AUTOMATED COUNT: 13.7 % (ref 11.5–14.5)
GFR SERPL CREATININE-BSD FRML MDRD: 29 ML/MIN/1.73M*2
GLUCOSE SERPL-MCNC: 154 MG/DL (ref 65–99)
HCT VFR BLD AUTO: 36.6 % (ref 41–52)
HGB BLD-MCNC: 11.6 G/DL (ref 13.5–17.5)
MCH RBC QN AUTO: 29.8 PG (ref 26–34)
MCHC RBC AUTO-ENTMCNC: 31.7 G/DL (ref 32–36)
MCV RBC AUTO: 94 FL (ref 80–100)
NRBC BLD-RTO: 0 /100 WBCS (ref 0–0)
PLATELET # BLD AUTO: 261 X10*3/UL (ref 150–450)
POTASSIUM SERPL-SCNC: 5.2 MMOL/L (ref 3.4–5.1)
RBC # BLD AUTO: 3.89 X10*6/UL (ref 4.5–5.9)
SODIUM SERPL-SCNC: 144 MMOL/L (ref 133–145)
WBC # BLD AUTO: 7.3 X10*3/UL (ref 4.4–11.3)

## 2023-11-27 PROCEDURE — 36415 COLL VENOUS BLD VENIPUNCTURE: CPT

## 2023-11-27 PROCEDURE — G0156 HHCP-SVS OF AIDE,EA 15 MIN: HCPCS

## 2023-11-27 PROCEDURE — 80048 BASIC METABOLIC PNL TOTAL CA: CPT

## 2023-11-27 PROCEDURE — 85027 COMPLETE CBC AUTOMATED: CPT

## 2023-11-27 PROCEDURE — G0158 HHC OT ASSISTANT EA 15: HCPCS

## 2023-11-27 ASSESSMENT — PAIN DESCRIPTION - PAIN TYPE: TYPE: CHRONIC PAIN

## 2023-11-28 PROCEDURE — G0180 MD CERTIFICATION HHA PATIENT: HCPCS | Performed by: INTERNAL MEDICINE

## 2023-11-29 ENCOUNTER — HOSPITAL ENCOUNTER (OUTPATIENT)
Facility: HOSPITAL | Age: 73
Setting detail: OBSERVATION
Discharge: HOME | End: 2023-11-29
Attending: INTERNAL MEDICINE | Admitting: INTERNAL MEDICINE
Payer: MEDICARE

## 2023-11-29 ENCOUNTER — ANESTHESIA EVENT (OUTPATIENT)
Dept: CARDIOLOGY | Facility: HOSPITAL | Age: 73
End: 2023-11-29
Payer: MEDICARE

## 2023-11-29 ENCOUNTER — ANESTHESIA (OUTPATIENT)
Dept: CARDIOLOGY | Facility: HOSPITAL | Age: 73
End: 2023-11-29
Payer: MEDICARE

## 2023-11-29 VITALS
SYSTOLIC BLOOD PRESSURE: 84 MMHG | OXYGEN SATURATION: 94 % | RESPIRATION RATE: 16 BRPM | TEMPERATURE: 96.8 F | DIASTOLIC BLOOD PRESSURE: 53 MMHG | HEART RATE: 48 BPM

## 2023-11-29 DIAGNOSIS — I48.91 ATRIAL FIBRILLATION, UNSPECIFIED TYPE (MULTI): ICD-10-CM

## 2023-11-29 PROCEDURE — 3700000002 HC GENERAL ANESTHESIA TIME - EACH INCREMENTAL 1 MINUTE: Performed by: INTERNAL MEDICINE

## 2023-11-29 PROCEDURE — 7100000009 HC PHASE TWO TIME - INITIAL BASE CHARGE: Performed by: INTERNAL MEDICINE

## 2023-11-29 PROCEDURE — A92960 PR CARDIOVERSION, ELECTIVE;EXTERN: Performed by: NURSE ANESTHETIST, CERTIFIED REGISTERED

## 2023-11-29 PROCEDURE — 3700000001 HC GENERAL ANESTHESIA TIME - INITIAL BASE CHARGE: Performed by: INTERNAL MEDICINE

## 2023-11-29 PROCEDURE — A92960 PR CARDIOVERSION, ELECTIVE;EXTERN: Performed by: STUDENT IN AN ORGANIZED HEALTH CARE EDUCATION/TRAINING PROGRAM

## 2023-11-29 PROCEDURE — 92960 CARDIOVERSION ELECTRIC EXT: CPT | Mod: 59 | Performed by: INTERNAL MEDICINE

## 2023-11-29 PROCEDURE — 92960 CARDIOVERSION ELECTRIC EXT: CPT | Performed by: INTERNAL MEDICINE

## 2023-11-29 PROCEDURE — 99100 ANES PT EXTEME AGE<1 YR&>70: CPT | Performed by: STUDENT IN AN ORGANIZED HEALTH CARE EDUCATION/TRAINING PROGRAM

## 2023-11-29 PROCEDURE — 2500000004 HC RX 250 GENERAL PHARMACY W/ HCPCS (ALT 636 FOR OP/ED): Performed by: NURSE ANESTHETIST, CERTIFIED REGISTERED

## 2023-11-29 PROCEDURE — 7100000010 HC PHASE TWO TIME - EACH INCREMENTAL 1 MINUTE: Performed by: INTERNAL MEDICINE

## 2023-11-29 RX ORDER — SODIUM CHLORIDE 9 MG/ML
50 INJECTION, SOLUTION INTRAVENOUS CONTINUOUS
Status: DISCONTINUED | OUTPATIENT
Start: 2023-11-29 | End: 2023-11-29

## 2023-11-29 RX ORDER — PROPOFOL 10 MG/ML
INJECTION, EMULSION INTRAVENOUS AS NEEDED
Status: DISCONTINUED | OUTPATIENT
Start: 2023-11-29 | End: 2023-11-29

## 2023-11-29 RX ADMIN — PROPOFOL 50 MG: 10 INJECTION, EMULSION INTRAVENOUS at 11:11

## 2023-11-29 ASSESSMENT — COLUMBIA-SUICIDE SEVERITY RATING SCALE - C-SSRS
6. HAVE YOU EVER DONE ANYTHING, STARTED TO DO ANYTHING, OR PREPARED TO DO ANYTHING TO END YOUR LIFE?: NO
1. IN THE PAST MONTH, HAVE YOU WISHED YOU WERE DEAD OR WISHED YOU COULD GO TO SLEEP AND NOT WAKE UP?: NO
2. HAVE YOU ACTUALLY HAD ANY THOUGHTS OF KILLING YOURSELF?: NO

## 2023-11-29 ASSESSMENT — PAIN SCALES - GENERAL
PAINLEVEL_OUTOF10: 0 - NO PAIN
PAIN_LEVEL: 0

## 2023-11-29 ASSESSMENT — ENCOUNTER SYMPTOMS: EXERCISE TOLERANCE: POOR

## 2023-11-29 ASSESSMENT — PAIN - FUNCTIONAL ASSESSMENT: PAIN_FUNCTIONAL_ASSESSMENT: 0-10

## 2023-11-29 NOTE — DISCHARGE SUMMARY
Discharge Diagnosis  Atrial fibrillation (CMS/[facility])    Issues Requiring Follow-Up  Paroxysmal atrial fibrillation    Test Results Pending At Discharge  Pending Labs       No current pending labs.            Hospital Course   Patient was brought to the heart and vascular center at [facility].  Sedation was provided by the anesthesia department and the patient underwent a successful DC cardioversion from the atrial fibrillation to a sinus bradycardia.  He was to be discharged home on the same day.    Pertinent Physical Exam At Time of Discharge  Physical Exam  Eyes:      Conjunctiva/sclera: Conjunctivae normal.   Cardiovascular:      Rate and Rhythm: Normal rate and regular rhythm.      Heart sounds:      No gallop.   Pulmonary:      Breath sounds: Normal breath sounds. No wheezing, rhonchi or rales.   Abdominal:      Palpations: Abdomen is soft.   Neurological:      General: No focal deficit present.      Mental Status: He is alert.       Constitutional:       Appearance: Not in distress.   Eyes:      Conjunctiva/sclera: Conjunctivae normal.   Neck:      Vascular: JVD normal.   Pulmonary:      Breath sounds: Normal breath sounds. No wheezing. No rhonchi. No rales.   Cardiovascular:      Normal rate. Regular rhythm.      Murmurs: There is no murmur.      No gallop.  No click. No rub.   Abdominal:      Palpations: Abdomen is soft.   Neurological:      General: No focal deficit present.      Mental Status: Alert.        Home Medications     Medication List      CHANGE how you take these medications     metoprolol succinate XL 25 mg 24 hr tablet; Commonly known as:   Toprol-XL; Take 1 tablet (25 mg) by mouth 2 times a day. Do not crush or   chew.; What changed: Another medication with the same name was removed.   Continue taking this medication, and follow the directions you see here.     CONTINUE taking these medications     amLODIPine 5 mg tablet; Commonly known as: Norvasc   Aricept 10 mg tablet; Generic drug:  donepezil   atorvastatin 10 mg tablet; Commonly known as: Lipitor   clopidogrel 75 mg tablet; Commonly known as: Plavix; TAKE 1 TABLET BY   MOUTH ONCE  DAILY   cyanocobalamin 1,000 mcg tablet; Commonly known as: Vitamin B-12   Cymbalta 60 mg DR capsule; Generic drug: DULoxetine   Eliquis 5 mg tablet; Generic drug: apixaban   fenofibrate 160 mg tablet; Commonly known as: Triglide   ferrous sulfate 325 (65 Fe) MG EC tablet   folic acid 400 mcg tablet; Commonly known as: Folvite   gabapentin 600 mg tablet; Commonly known as: Neurontin   HumuLIN R Regular U-100 Insuln 100 unit/mL injection; Generic drug:   insulin regular   lactulose 20 gram/30 mL oral solution; TAKE 15 ML BY MOUTH ONCE DAILY   Lantus U-100 Insulin 100 unit/mL injection; Generic drug: insulin   glargine   levETIRAcetam 500 mg tablet; Commonly known as: Keppra   losartan 100 mg tablet; Commonly known as: Cozaar   ondansetron 4 mg tablet; Commonly known as: Zofran   oxyCODONE 15 mg immediate release tablet; Commonly known as: Roxicodone   pantoprazole 40 mg EC tablet; Commonly known as: ProtoNix   sennosides 8.6 mg tablet; Commonly known as: Senokot   tamsulosin 0.4 mg 24 hr capsule; Commonly known as: Flomax   traZODone 150 mg tablet; Commonly known as: Desyrel; Take 1 tablet (150   mg) by mouth once daily at bedtime. FOR 90 DAYS       Outpatient Follow-Up  Future Appointments   Date Time Provider Department Center   11/30/2023 To Be Determined Talia More [facility]   11/30/2023 To Be Determined Jaclyn Trevizo CNA [facility]   12/2/2023 11:00 AM Lesley Grimes PT [facility]   12/4/2023  1:30 PM Izabel Marshall MD [id number] [facility]   12/5/2023 To Be Determined Talia More [facility]   12/6/2023 To Be Determined Jaclyn Trevizo CNA [facility]   12/7/2023 To Be Determined Talia More [facility]   12/8/2023 To Be Determined Jaclyn Trevizo CNA [facility]   12/12/2023 To Be Determined GREG Cardenas [facility]   12/14/2023 To Be  Determined GREG Cardenas [facility]   12/19/2023 To Be Determined GREG Cardenas [facility]   12/21/2023 To Be Determined Marisa Mccrary OT [facility]   1/2/2024  9:30 AM Phill Hare DO [address]       Phill Hare DO

## 2023-11-29 NOTE — PERIOPERATIVE NURSING NOTE
Discharge instructions reviewed with patient and family members. Patient and family verbalized understanding. Patient discharged in stable condition with family.

## 2023-11-29 NOTE — H&P
History Of Present Illness  Rachid Yun is a 73 y.o. male presenting with .Persistent atrial fibrillation shortness of breath.  Patient with significant shortness of breath with exertion felt could be related to persistent atrial fibrillation despite rate control measures.  Patient now presents for elective DC cardioversion.     Past Medical History  Past Medical History:   Diagnosis Date    Arthritis     Coronary artery disease     Diabetes mellitus (CMS/HCC)     Hypertension     Stroke (CMS/HCC)        Surgical History  Past Surgical History:   Procedure Laterality Date    BACK SURGERY          Social History  He reports that he quit smoking about 35 years ago. His smoking use included cigarettes. He has been exposed to tobacco smoke. He has never used smokeless tobacco. He reports that he does not currently use alcohol. No history on file for drug use.    Family History  Family History   Problem Relation Name Age of Onset    Stomach cancer Mother      Diabetes Mother      Coronary artery disease Father      Other (Pacemaker) Father      Other (S/p CABG) Sister      Other (S/p CABG) Brother      Ovarian cancer Sibling      Coronary artery disease Sibling          Allergies  Meperidine, Vancomycin, Hydromorphone, Other, and Rifampin    Review of Systems     Physical Exam  Eyes:      Conjunctiva/sclera: Conjunctivae normal.   Cardiovascular:      Rate and Rhythm: Normal rate. Rhythm irregularly irregular.      Heart sounds:      No gallop.   Pulmonary:      Breath sounds: Normal breath sounds. No wheezing, rhonchi or rales.   Abdominal:      Palpations: Abdomen is soft.   Neurological:      General: No focal deficit present.      Mental Status: He is alert.        Constitutional:       Appearance: Not in distress.   Eyes:      Conjunctiva/sclera: Conjunctivae normal.   Neck:      Vascular: JVD normal.   Pulmonary:      Breath sounds: Normal breath sounds. No wheezing. No rhonchi. No rales.   Cardiovascular:       Normal rate. Irregularly irregular rhythm.      Murmurs: There is no murmur.      No gallop.  No click. No rub.   Abdominal:      Palpations: Abdomen is soft.   Neurological:      General: No focal deficit present.      Mental Status: Alert.        Last Recorded Vitals  Blood pressure 84/53, pulse (!) 48, temperature 36 °C (96.8 °F), temperature source Temporal, resp. rate 16, SpO2 94 %.    Relevant Results             Assessment/Plan   Active Problems:    Atrial fibrillation (CMS/HCC)      Patient presents for elective DC cardioversion.  Risks and benefits reviewed with patient and family.       I spent 20 minutes in the professional and overall care of this patient.      Phill Hare, DO

## 2023-11-29 NOTE — ANESTHESIA PREPROCEDURE EVALUATION
Patient: Rachid Yun    Procedure Information       Date/Time: 11/29/23 1100    Procedure: Cardioversion    Location: [facility] CV ROOM / Virtual [facility] Cardiac Cath Lab    Providers: Phill Hare, DO            Relevant Problems   Anesthesia (within normal limits)      Cardiovascular   (+) Atherosclerosis of coronary artery without angina pectoris   (+) Atrial fibrillation (CMS/HCC) (Resolved)   (+) Chronic heart failure, unspecified heart failure type (CMS/HCC)   (+) Coronary artery disease involving native coronary artery of native heart without angina pectoris   (+) Mixed hyperlipidemia   (+) Primary hypertension      Endocrine   (+) Type 2 diabetes mellitus without complication (CMS/HCC)      GI   (+) Gastric stress ulcer      /Renal   (+) Stage 3b chronic kidney disease (CMS/HCC)      Neuro/Psych   (+) Dementia with behavioral disturbance (CMS/HCC)   (+) Nonintractable epilepsy without status epilepticus, unspecified epilepsy type (CMS/HCC)      Hematology   (+) Iron deficiency anemia due to chronic blood loss      Musculoskeletal   (+) Primary osteoarthritis involving multiple joints      Eyes, Ears, Nose, and Throat   (+) Mixed conductive and sensorineural hearing loss of both ears       Clinical information reviewed:     Meds                 Anesthesia Evaluation     Patient summary reviewed and Nursing notes reviewed    No history of anesthetic complications   Airway   Mallampati: II  TM distance: >3 FB  Neck ROM: full  Dental     Comment: carious    Pulmonary   (+) sleep apnea  Cardiovascular   Exercise tolerance: poor  (+) hypertension, CAD    Neuro/Psych   (+) CVA  GI/Hepatic/Renal   (+) PUD, chronic renal disease    Endo/Other   (+) diabetes mellitus type 2      Anesthesia type: general  Risks, benefits, and alternatives discussed with patient.    Plan discussed with CRNA and attending.  ASA 3

## 2023-11-29 NOTE — ANESTHESIA POSTPROCEDURE EVALUATION
Patient: Rachid Yun    Procedure Summary       Date: 11/29/23 Room / Location: [facility] CV ROOM / Virtual [facility] Cardiac Cath Lab    Anesthesia Start: 1107 Anesthesia Stop: 1124    Procedure: Cardioversion Diagnosis:       Atrial fibrillation, unspecified type (CMS/HCC)      (Atrial fibrillation, unspecified type (CMS/HCC) [I48.91])    Providers: Phill Hare DO Responsible Provider: Lakhwinder Henry DO    Anesthesia Type: MAC ASA Status: 3            Anesthesia Type: MAC    Vitals Value Taken Time   BP 86/47 11/29/23 1123   Temp 36 °C (96.8 °F) 11/29/23 1121   Pulse 50 11/29/23 1121   Resp 16 11/29/23 1121   SpO2 99 % 11/29/23 1121       Anesthesia Post Evaluation    Patient location during evaluation: PACU  Patient participation: complete - patient participated  Level of consciousness: awake  Pain score: 0  Pain management: adequate  Multimodal analgesia pain management approach  Airway patency: patent  Two or more strategies used to mitigate risk of obstructive sleep apnea  Cardiovascular status: acceptable  Respiratory status: acceptable  Hydration status: acceptable  Postoperative Nausea and Vomiting: none  Comments: No Nausea    There were no known notable events for this encounter.

## 2023-11-29 NOTE — POST-PROCEDURE NOTE
Physician Transition of Care Summary  Invasive Cardiovascular Lab    Procedure Date: 11/29/2023  Attending:    * Phill Hare - Primary  Resident/Fellow/Other Assistant: Surgeon(s) and Role:    Indications:   Pre-op Diagnosis     * Atrial fibrillation, unspecified type (CMS/HCC) [I48.91]    Post-procedure diagnosis:   Successful cardioversion to sinus rhythm    Procedure(s):   Cardioversion  57596 - [place] CARDIOVERSION ELECTIVE ARRHYTHMIA EXTERNAL        Procedure Findings:   Successful electrocardioversion    Description of the Procedure:   Patient was brought to the heart and vascular center at [facility].  Sedation was provided by the anesthesia department.  Once adequate sedation was required patient was given a single 200 J cardioversion shock successfully converting the patient from atrial fibrillation to a sinus bradycardia.  The patient was to be recovered in the heart vascular center and discharged home.    Complications:   None    Stents/Implants:       Anticoagulation/Antiplatelet Plan:   Eliquis    Estimated Blood Loss:   * No values recorded between 11/29/2023 11:11 AM and 11/29/2023 11:18 AM *    Anesthesia: Monitor Anesthesia Care Anesthesia Staff: Anesthesiologist: Lakhwinder Henry DO  CRNA: HERBERT Camacho-CRNA    Any Specimen(s) Removed:   No specimens collected during this procedure.    Disposition:   Discharged to home      Electronically signed by: Phill Hare DO, 11/29/2023 11:18 AM

## 2023-11-30 ENCOUNTER — HOME CARE VISIT (OUTPATIENT)
Dept: HOME HEALTH SERVICES | Facility: HOME HEALTH | Age: 73
End: 2023-11-30
Payer: MEDICARE

## 2023-11-30 VITALS
OXYGEN SATURATION: 95 % | DIASTOLIC BLOOD PRESSURE: 56 MMHG | HEART RATE: 62 BPM | SYSTOLIC BLOOD PRESSURE: 101 MMHG | TEMPERATURE: 98.4 F | RESPIRATION RATE: 16 BRPM

## 2023-11-30 PROCEDURE — G0156 HHCP-SVS OF AIDE,EA 15 MIN: HCPCS

## 2023-11-30 PROCEDURE — 0023 HH SOC

## 2023-11-30 PROCEDURE — G0158 HHC OT ASSISTANT EA 15: HCPCS

## 2023-11-30 ASSESSMENT — PAIN DESCRIPTION - PAIN TYPE: TYPE: CHRONIC PAIN

## 2023-12-02 ENCOUNTER — HOME CARE VISIT (OUTPATIENT)
Dept: HOME HEALTH SERVICES | Facility: HOME HEALTH | Age: 73
End: 2023-12-02
Payer: MEDICARE

## 2023-12-02 ENCOUNTER — APPOINTMENT (OUTPATIENT)
Dept: HOME HEALTH SERVICES | Facility: HOME HEALTH | Age: 73
End: 2023-12-02
Payer: MEDICARE

## 2023-12-04 ENCOUNTER — HOME CARE VISIT (OUTPATIENT)
Dept: HOME HEALTH SERVICES | Facility: HOME HEALTH | Age: 73
End: 2023-12-04
Payer: MEDICARE

## 2023-12-04 ENCOUNTER — OFFICE VISIT (OUTPATIENT)
Dept: PRIMARY CARE | Facility: CLINIC | Age: 73
End: 2023-12-04
Payer: MEDICARE

## 2023-12-04 VITALS
DIASTOLIC BLOOD PRESSURE: 70 MMHG | RESPIRATION RATE: 16 BRPM | SYSTOLIC BLOOD PRESSURE: 146 MMHG | OXYGEN SATURATION: 95 % | WEIGHT: 239 LBS | HEIGHT: 77 IN | BODY MASS INDEX: 28.22 KG/M2 | HEART RATE: 69 BPM

## 2023-12-04 DIAGNOSIS — M81.0 AGE-RELATED OSTEOPOROSIS WITHOUT CURRENT PATHOLOGICAL FRACTURE: Primary | ICD-10-CM

## 2023-12-04 DIAGNOSIS — Z23 IMMUNIZATION DUE: ICD-10-CM

## 2023-12-04 PROCEDURE — 1036F TOBACCO NON-USER: CPT | Performed by: INTERNAL MEDICINE

## 2023-12-04 PROCEDURE — 3077F SYST BP >= 140 MM HG: CPT | Performed by: INTERNAL MEDICINE

## 2023-12-04 PROCEDURE — 1159F MED LIST DOCD IN RCRD: CPT | Performed by: INTERNAL MEDICINE

## 2023-12-04 PROCEDURE — 4010F ACE/ARB THERAPY RXD/TAKEN: CPT | Performed by: INTERNAL MEDICINE

## 2023-12-04 PROCEDURE — 3078F DIAST BP <80 MM HG: CPT | Performed by: INTERNAL MEDICINE

## 2023-12-04 PROCEDURE — G0156 HHCP-SVS OF AIDE,EA 15 MIN: HCPCS

## 2023-12-04 PROCEDURE — 99211 OFF/OP EST MAY X REQ PHY/QHP: CPT | Performed by: INTERNAL MEDICINE

## 2023-12-04 PROCEDURE — 1125F AMNT PAIN NOTED PAIN PRSNT: CPT | Performed by: INTERNAL MEDICINE

## 2023-12-04 PROCEDURE — 3044F HG A1C LEVEL LT 7.0%: CPT | Performed by: INTERNAL MEDICINE

## 2023-12-04 PROCEDURE — G0008 ADMIN INFLUENZA VIRUS VAC: HCPCS | Performed by: INTERNAL MEDICINE

## 2023-12-04 ASSESSMENT — ENCOUNTER SYMPTOMS
LOSS OF SENSATION IN FEET: 0
OCCASIONAL FEELINGS OF UNSTEADINESS: 1
DEPRESSION: 0

## 2023-12-04 ASSESSMENT — PATIENT HEALTH QUESTIONNAIRE - PHQ9
1. LITTLE INTEREST OR PLEASURE IN DOING THINGS: NOT AT ALL
2. FEELING DOWN, DEPRESSED OR HOPELESS: NOT AT ALL
SUM OF ALL RESPONSES TO PHQ9 QUESTIONS 1 AND 2: 0

## 2023-12-04 ASSESSMENT — PAIN SCALES - GENERAL: PAINLEVEL: 7

## 2023-12-05 ENCOUNTER — HOME CARE VISIT (OUTPATIENT)
Dept: HOME HEALTH SERVICES | Facility: HOME HEALTH | Age: 73
End: 2023-12-05
Payer: MEDICARE

## 2023-12-05 PROCEDURE — G0158 HHC OT ASSISTANT EA 15: HCPCS

## 2023-12-05 ASSESSMENT — PAIN DESCRIPTION - PAIN TYPE: TYPE: CHRONIC PAIN

## 2023-12-07 ENCOUNTER — HOME CARE VISIT (OUTPATIENT)
Dept: HOME HEALTH SERVICES | Facility: HOME HEALTH | Age: 73
End: 2023-12-07
Payer: MEDICARE

## 2023-12-07 VITALS
DIASTOLIC BLOOD PRESSURE: 76 MMHG | OXYGEN SATURATION: 97 % | SYSTOLIC BLOOD PRESSURE: 138 MMHG | HEART RATE: 81 BPM | TEMPERATURE: 97.7 F

## 2023-12-07 PROCEDURE — G0158 HHC OT ASSISTANT EA 15: HCPCS

## 2023-12-07 ASSESSMENT — PAIN DESCRIPTION - PAIN TYPE: TYPE: CHRONIC PAIN

## 2023-12-08 ENCOUNTER — HOME CARE VISIT (OUTPATIENT)
Dept: HOME HEALTH SERVICES | Facility: HOME HEALTH | Age: 73
End: 2023-12-08
Payer: MEDICARE

## 2023-12-11 ENCOUNTER — OFFICE VISIT (OUTPATIENT)
Dept: PRIMARY CARE | Facility: CLINIC | Age: 73
End: 2023-12-11
Payer: MEDICARE

## 2023-12-11 ENCOUNTER — HOSPITAL ENCOUNTER (OUTPATIENT)
Dept: RADIOLOGY | Facility: HOSPITAL | Age: 73
Discharge: HOME | End: 2023-12-11
Payer: MEDICARE

## 2023-12-11 VITALS
WEIGHT: 239 LBS | RESPIRATION RATE: 16 BRPM | HEIGHT: 77 IN | BODY MASS INDEX: 28.22 KG/M2 | SYSTOLIC BLOOD PRESSURE: 116 MMHG | OXYGEN SATURATION: 97 % | HEART RATE: 105 BPM | DIASTOLIC BLOOD PRESSURE: 68 MMHG

## 2023-12-11 DIAGNOSIS — L21.9 SEBORRHEIC DERMATITIS OF SCALP: ICD-10-CM

## 2023-12-11 DIAGNOSIS — R10.31 RLQ ABDOMINAL PAIN: ICD-10-CM

## 2023-12-11 DIAGNOSIS — G47.33 OBSTRUCTIVE SLEEP APNEA SYNDROME: ICD-10-CM

## 2023-12-11 DIAGNOSIS — I48.11 LONGSTANDING PERSISTENT ATRIAL FIBRILLATION (MULTI): Primary | ICD-10-CM

## 2023-12-11 DIAGNOSIS — E11.42 DIABETIC POLYNEUROPATHY ASSOCIATED WITH TYPE 2 DIABETES MELLITUS (MULTI): ICD-10-CM

## 2023-12-11 DIAGNOSIS — K57.32 DIVERTICULITIS OF LARGE INTESTINE WITHOUT PERFORATION OR ABSCESS WITHOUT BLEEDING: ICD-10-CM

## 2023-12-11 DIAGNOSIS — I10 PRIMARY HYPERTENSION: ICD-10-CM

## 2023-12-11 DIAGNOSIS — N18.32 STAGE 3B CHRONIC KIDNEY DISEASE (MULTI): ICD-10-CM

## 2023-12-11 DIAGNOSIS — E78.2 MIXED HYPERLIPIDEMIA: ICD-10-CM

## 2023-12-11 DIAGNOSIS — I25.10 ATHEROSCLEROSIS OF NATIVE CORONARY ARTERY OF NATIVE HEART WITHOUT ANGINA PECTORIS: ICD-10-CM

## 2023-12-11 PROCEDURE — 4010F ACE/ARB THERAPY RXD/TAKEN: CPT | Performed by: INTERNAL MEDICINE

## 2023-12-11 PROCEDURE — A9698 NON-RAD CONTRAST MATERIALNOC: HCPCS | Performed by: INTERNAL MEDICINE

## 2023-12-11 PROCEDURE — 3074F SYST BP LT 130 MM HG: CPT | Performed by: INTERNAL MEDICINE

## 2023-12-11 PROCEDURE — 2550000001 HC RX 255 CONTRASTS: Performed by: INTERNAL MEDICINE

## 2023-12-11 PROCEDURE — 1036F TOBACCO NON-USER: CPT | Performed by: INTERNAL MEDICINE

## 2023-12-11 PROCEDURE — 74176 CT ABD & PELVIS W/O CONTRAST: CPT

## 2023-12-11 PROCEDURE — 3078F DIAST BP <80 MM HG: CPT | Performed by: INTERNAL MEDICINE

## 2023-12-11 PROCEDURE — 1125F AMNT PAIN NOTED PAIN PRSNT: CPT | Performed by: INTERNAL MEDICINE

## 2023-12-11 PROCEDURE — 99215 OFFICE O/P EST HI 40 MIN: CPT | Performed by: INTERNAL MEDICINE

## 2023-12-11 PROCEDURE — 1159F MED LIST DOCD IN RCRD: CPT | Performed by: INTERNAL MEDICINE

## 2023-12-11 PROCEDURE — 3044F HG A1C LEVEL LT 7.0%: CPT | Performed by: INTERNAL MEDICINE

## 2023-12-11 PROCEDURE — 99215 OFFICE O/P EST HI 40 MIN: CPT | Mod: 25 | Performed by: INTERNAL MEDICINE

## 2023-12-11 RX ORDER — KETOCONAZOLE 20 MG/ML
SHAMPOO, SUSPENSION TOPICAL 2 TIMES WEEKLY
Qty: 120 ML | Refills: 11 | Status: SHIPPED | OUTPATIENT
Start: 2023-12-11 | End: 2023-12-30 | Stop reason: HOSPADM

## 2023-12-11 RX ADMIN — IOHEXOL 500 ML: 12 SOLUTION ORAL at 15:27

## 2023-12-11 ASSESSMENT — ENCOUNTER SYMPTOMS
SHORTNESS OF BREATH: 0
CONSTIPATION: 0
FEVER: 0
WEAKNESS: 0
HEADACHES: 0
ABDOMINAL PAIN: 0
NAUSEA: 0
PALPITATIONS: 0
CHILLS: 0
FREQUENCY: 0
DIZZINESS: 0
SLEEP DISTURBANCE: 0
RHINORRHEA: 0
NERVOUS/ANXIOUS: 0
VOMITING: 0
DIFFICULTY URINATING: 0
SINUS PAIN: 0
ARTHRALGIAS: 0
DIARRHEA: 0
OCCASIONAL FEELINGS OF UNSTEADINESS: 1
JOINT SWELLING: 0
APPETITE CHANGE: 0
COUGH: 0
SORE THROAT: 0
HEMATURIA: 0
DEPRESSION: 0
FATIGUE: 0
BACK PAIN: 1
LOSS OF SENSATION IN FEET: 0

## 2023-12-11 ASSESSMENT — PAIN SCALES - GENERAL: PAINLEVEL: 7

## 2023-12-11 NOTE — PROGRESS NOTES
"Subjective   Patient ID: Rachid Yun is a 73 y.o. male who presents for follow up after hospital visit on 11/29. Patient had 3 falls in November. After one of the falls, his son reports the patient was unresponsive and they presented to the hospital at this time. In the hospital, patient received cardioversion and started metoprolol. Plans to see Dr. Hare next month. He received steroid injection for back pain prior to his hospital visit. Has not been using CPAP and is waiting for parts. Monitors his blood glucose every morning and reports readings in 110's. His readings fluctuate at night. He c/o R sided groin pain that began 4 days ago. It feels like he pulled a muscle. He also reports an itchy facial rash that causes discomfort. The patient attends physical therapy 3x a week but his neuropathy has worsened.    Diagnostics Reviewed: 2010 sleep study showed severe sleep apnea.  Labs Reviewed: 11/2023 creatinine 2.3. 10/2023 A1c 6.3, TSH nml.         Review of Systems   Constitutional:  Negative for appetite change, chills, fatigue and fever.   HENT:  Negative for ear pain, rhinorrhea, sinus pain and sore throat.    Eyes:  Negative for visual disturbance.   Respiratory:  Negative for cough and shortness of breath.    Cardiovascular:  Negative for chest pain and palpitations.   Gastrointestinal:  Negative for abdominal pain, constipation, diarrhea, nausea and vomiting.   Genitourinary:  Negative for difficulty urinating, frequency and hematuria.   Musculoskeletal:  Positive for back pain. Negative for arthralgias and joint swelling.        Groin pain   Skin:  Positive for rash.   Neurological:  Negative for dizziness, weakness and headaches.   Psychiatric/Behavioral:  Negative for sleep disturbance. The patient is not nervous/anxious.        Objective   /68   Pulse 105   Resp 16   Ht 1.956 m (6' 5\")   Wt 108 kg (239 lb)   SpO2 97%   BMI 28.34 kg/m²     Physical Exam  Eyes:      Conjunctiva/sclera: " Conjunctivae normal.      Pupils: Pupils are equal, round, and reactive to light.   Cardiovascular:      Rate and Rhythm: Regular rhythm.      Heart sounds: Normal heart sounds.   Pulmonary:      Breath sounds: Normal breath sounds.   Abdominal:      Palpations: Abdomen is soft. There is no hepatomegaly.      Tenderness: There is abdominal tenderness (RLQ).   Skin:     General: Skin is warm.      Comments: Seborrheic dermatitis on face   Neurological:      Mental Status: He is alert and oriented to person, place, and time.   Psychiatric:         Mood and Affect: Mood and affect normal.         Behavior: Behavior normal. Behavior is cooperative.         Cognition and Memory: Cognition normal.         Assessment/Plan   Diagnoses and all orders for this visit:  Longstanding persistent atrial fibrillation (CMS/HCC)  Primary hypertension  Obstructive sleep apnea syndrome  -     In-Center Sleep Study (Non-Sleep Provider Only); Future  Stage 3b chronic kidney disease (CMS/HCC)  Atherosclerosis of native coronary artery of native heart without angina pectoris  Mixed hyperlipidemia  RLQ abdominal pain  -     CT abdomen pelvis wo IV contrast; Future  Diabetic polyneuropathy associated with type 2 diabetes mellitus (CMS/HCC)  Seborrheic dermatitis of scalp  -     ketoconazole (NIZOral) 2 % shampoo; Apply topically 2 times a week. Shampoo daily, leave on for 5-10 minutes, then rinse.      Scribe Attestation  By signing my name below, I, Carin Moore, Scribe   attest that this documentation has been prepared under the direction and in the presence of Izabel Marshall MD.

## 2023-12-12 ENCOUNTER — HOME CARE VISIT (OUTPATIENT)
Dept: HOME HEALTH SERVICES | Facility: HOME HEALTH | Age: 73
End: 2023-12-12
Payer: MEDICARE

## 2023-12-12 PROCEDURE — G0156 HHCP-SVS OF AIDE,EA 15 MIN: HCPCS

## 2023-12-12 PROCEDURE — G0158 HHC OT ASSISTANT EA 15: HCPCS

## 2023-12-12 RX ORDER — AMOXICILLIN AND CLAVULANATE POTASSIUM 500; 125 MG/1; MG/1
500 TABLET, FILM COATED ORAL 2 TIMES DAILY
Qty: 14 TABLET | Refills: 0 | Status: SHIPPED | OUTPATIENT
Start: 2023-12-12 | End: 2023-12-30 | Stop reason: HOSPADM

## 2023-12-12 ASSESSMENT — PAIN DESCRIPTION - PAIN TYPE: TYPE: CHRONIC PAIN

## 2023-12-12 NOTE — RESULT ENCOUNTER NOTE
CT abdomen shows mild sigmoid diverticulitis, and constipation, will start antibiotic and he also needs to take a stool softener.  He also has small bilateral inguinal hernias containing fat which can also cause pain in the groin, and has gallbladder stones, which I believe do not cause any symptoms at this point

## 2023-12-14 ENCOUNTER — HOME CARE VISIT (OUTPATIENT)
Dept: HOME HEALTH SERVICES | Facility: HOME HEALTH | Age: 73
End: 2023-12-14
Payer: MEDICARE

## 2023-12-14 VITALS
OXYGEN SATURATION: 98 % | DIASTOLIC BLOOD PRESSURE: 77 MMHG | HEART RATE: 66 BPM | SYSTOLIC BLOOD PRESSURE: 127 MMHG | RESPIRATION RATE: 16 BRPM

## 2023-12-14 PROCEDURE — G0158 HHC OT ASSISTANT EA 15: HCPCS

## 2023-12-14 ASSESSMENT — PAIN DESCRIPTION - PAIN TYPE: TYPE: CHRONIC PAIN

## 2023-12-15 ENCOUNTER — HOME CARE VISIT (OUTPATIENT)
Dept: HOME HEALTH SERVICES | Facility: HOME HEALTH | Age: 73
End: 2023-12-15
Payer: MEDICARE

## 2023-12-15 PROCEDURE — G0156 HHCP-SVS OF AIDE,EA 15 MIN: HCPCS

## 2023-12-18 ENCOUNTER — HOME CARE VISIT (OUTPATIENT)
Dept: HOME HEALTH SERVICES | Facility: HOME HEALTH | Age: 73
End: 2023-12-18
Payer: MEDICARE

## 2023-12-18 PROCEDURE — G0156 HHCP-SVS OF AIDE,EA 15 MIN: HCPCS

## 2023-12-20 ENCOUNTER — HOME CARE VISIT (OUTPATIENT)
Dept: HOME HEALTH SERVICES | Facility: HOME HEALTH | Age: 73
End: 2023-12-20
Payer: MEDICARE

## 2023-12-20 PROCEDURE — G0156 HHCP-SVS OF AIDE,EA 15 MIN: HCPCS

## 2023-12-21 ENCOUNTER — HOME CARE VISIT (OUTPATIENT)
Dept: HOME HEALTH SERVICES | Facility: HOME HEALTH | Age: 73
End: 2023-12-21
Payer: MEDICARE

## 2023-12-21 PROCEDURE — G0152 HHCP-SERV OF OT,EA 15 MIN: HCPCS

## 2023-12-21 ASSESSMENT — ENCOUNTER SYMPTOMS
PERSON REPORTING PAIN: PATIENT
LOWEST PAIN SEVERITY IN PAST 24 HOURS: 7/10
PAIN: 1
PAIN SEVERITY GOAL: 5/10
SUBJECTIVE PAIN PROGRESSION: UNCHANGED
HIGHEST PAIN SEVERITY IN PAST 24 HOURS: 7/10
PAIN LOCATION: LEFT SHOULDER

## 2023-12-21 ASSESSMENT — PAIN SCALES - PAIN ASSESSMENT IN ADVANCED DEMENTIA (PAINAD)
NEGVOCALIZATION: 0 - NONE.
BODYLANGUAGE: 0
BODYLANGUAGE: 0 - RELAXED.
BREATHING: 0
CONSOLABILITY: 0 - NO NEED TO CONSOLE.
FACIALEXPRESSION: 0
NEGVOCALIZATION: 0
FACIALEXPRESSION: 0 - SMILING OR INEXPRESSIVE.
TOTALSCORE: 0
CONSOLABILITY: 0

## 2023-12-21 ASSESSMENT — ACTIVITIES OF DAILY LIVING (ADL)
OASIS_M1830: 01
HOME_HEALTH_OASIS: 01

## 2023-12-24 ENCOUNTER — APPOINTMENT (OUTPATIENT)
Dept: RADIOLOGY | Facility: HOSPITAL | Age: 73
DRG: 321 | End: 2023-12-24
Payer: MEDICARE

## 2023-12-24 ENCOUNTER — APPOINTMENT (OUTPATIENT)
Dept: CARDIOLOGY | Facility: HOSPITAL | Age: 73
DRG: 321 | End: 2023-12-24
Payer: MEDICARE

## 2023-12-24 ENCOUNTER — HOSPITAL ENCOUNTER (INPATIENT)
Facility: HOSPITAL | Age: 73
LOS: 6 days | Discharge: NURSING, REHAB OR LONG-TERM CARE | DRG: 321 | End: 2023-12-30
Attending: STUDENT IN AN ORGANIZED HEALTH CARE EDUCATION/TRAINING PROGRAM | Admitting: INTERNAL MEDICINE
Payer: MEDICARE

## 2023-12-24 DIAGNOSIS — M25.562 PAIN IN BOTH KNEES, UNSPECIFIED CHRONICITY: ICD-10-CM

## 2023-12-24 DIAGNOSIS — G40.909 NONINTRACTABLE EPILEPSY WITHOUT STATUS EPILEPTICUS, UNSPECIFIED EPILEPSY TYPE (MULTI): ICD-10-CM

## 2023-12-24 DIAGNOSIS — G89.29 CHRONIC LOW BACK PAIN WITHOUT SCIATICA, UNSPECIFIED BACK PAIN LATERALITY: ICD-10-CM

## 2023-12-24 DIAGNOSIS — M25.561 PAIN IN BOTH KNEES, UNSPECIFIED CHRONICITY: ICD-10-CM

## 2023-12-24 DIAGNOSIS — N28.9 KIDNEY DISEASE: ICD-10-CM

## 2023-12-24 DIAGNOSIS — E11.9 TYPE 2 DIABETES MELLITUS WITHOUT COMPLICATION, WITH LONG-TERM CURRENT USE OF INSULIN (MULTI): ICD-10-CM

## 2023-12-24 DIAGNOSIS — G62.9 NEUROPATHY: ICD-10-CM

## 2023-12-24 DIAGNOSIS — I48.0 PAROXYSMAL ATRIAL FIBRILLATION (MULTI): ICD-10-CM

## 2023-12-24 DIAGNOSIS — R94.31 ABNORMAL ELECTROCARDIOGRAM (ECG) (EKG): ICD-10-CM

## 2023-12-24 DIAGNOSIS — N18.32 STAGE 3B CHRONIC KIDNEY DISEASE (MULTI): ICD-10-CM

## 2023-12-24 DIAGNOSIS — I21.4 NON-ST ELEVATION (NSTEMI) MYOCARDIAL INFARCTION (MULTI): ICD-10-CM

## 2023-12-24 DIAGNOSIS — F03.918 DEMENTIA WITH BEHAVIORAL DISTURBANCE (MULTI): ICD-10-CM

## 2023-12-24 DIAGNOSIS — I25.10 CORONARY ARTERY DISEASE INVOLVING NATIVE CORONARY ARTERY OF NATIVE HEART WITHOUT ANGINA PECTORIS: ICD-10-CM

## 2023-12-24 DIAGNOSIS — K25.9 GASTRIC STRESS ULCER: ICD-10-CM

## 2023-12-24 DIAGNOSIS — M15.9 PRIMARY OSTEOARTHRITIS INVOLVING MULTIPLE JOINTS: ICD-10-CM

## 2023-12-24 DIAGNOSIS — R55 SYNCOPE AND COLLAPSE: Primary | ICD-10-CM

## 2023-12-24 DIAGNOSIS — M54.50 CHRONIC LOW BACK PAIN WITHOUT SCIATICA, UNSPECIFIED BACK PAIN LATERALITY: ICD-10-CM

## 2023-12-24 DIAGNOSIS — Z79.4 TYPE 2 DIABETES MELLITUS WITHOUT COMPLICATION, WITH LONG-TERM CURRENT USE OF INSULIN (MULTI): ICD-10-CM

## 2023-12-24 DIAGNOSIS — K59.01 SLOW TRANSIT CONSTIPATION: ICD-10-CM

## 2023-12-24 DIAGNOSIS — E78.2 MIXED HYPERLIPIDEMIA: ICD-10-CM

## 2023-12-24 LAB
ALBUMIN SERPL-MCNC: 3.9 G/DL (ref 3.5–5)
ALP BLD-CCNC: 44 U/L (ref 35–125)
ALT SERPL-CCNC: 10 U/L (ref 5–40)
ANION GAP SERPL CALC-SCNC: 14 MMOL/L
AST SERPL-CCNC: 18 U/L (ref 5–40)
BASOPHILS # BLD AUTO: 0.08 X10*3/UL (ref 0–0.1)
BASOPHILS NFR BLD AUTO: 1 %
BILIRUB SERPL-MCNC: 0.5 MG/DL (ref 0.1–1.2)
BUN SERPL-MCNC: 54 MG/DL (ref 8–25)
CALCIUM SERPL-MCNC: 9.2 MG/DL (ref 8.5–10.4)
CHLORIDE SERPL-SCNC: 102 MMOL/L (ref 97–107)
CO2 SERPL-SCNC: 19 MMOL/L (ref 24–31)
CREAT SERPL-MCNC: 3.2 MG/DL (ref 0.4–1.6)
EOSINOPHIL # BLD AUTO: 0.15 X10*3/UL (ref 0–0.4)
EOSINOPHIL NFR BLD AUTO: 2 %
ERYTHROCYTE [DISTWIDTH] IN BLOOD BY AUTOMATED COUNT: 13.7 % (ref 11.5–14.5)
GFR SERPL CREATININE-BSD FRML MDRD: 20 ML/MIN/1.73M*2
GLUCOSE BLD MANUAL STRIP-MCNC: 138 MG/DL (ref 74–99)
GLUCOSE SERPL-MCNC: 160 MG/DL (ref 65–99)
HCT VFR BLD AUTO: 37.4 % (ref 41–52)
HGB BLD-MCNC: 11.9 G/DL (ref 13.5–17.5)
IMM GRANULOCYTES # BLD AUTO: 0.03 X10*3/UL (ref 0–0.5)
IMM GRANULOCYTES NFR BLD AUTO: 0.4 % (ref 0–0.9)
LYMPHOCYTES # BLD AUTO: 0.96 X10*3/UL (ref 0.8–3)
LYMPHOCYTES NFR BLD AUTO: 12.5 %
MCH RBC QN AUTO: 28.8 PG (ref 26–34)
MCHC RBC AUTO-ENTMCNC: 31.8 G/DL (ref 32–36)
MCV RBC AUTO: 91 FL (ref 80–100)
MONOCYTES # BLD AUTO: 0.49 X10*3/UL (ref 0.05–0.8)
MONOCYTES NFR BLD AUTO: 6.4 %
NEUTROPHILS # BLD AUTO: 5.95 X10*3/UL (ref 1.6–5.5)
NEUTROPHILS NFR BLD AUTO: 77.7 %
NRBC BLD-RTO: 0 /100 WBCS (ref 0–0)
PLATELET # BLD AUTO: 239 X10*3/UL (ref 150–450)
POTASSIUM SERPL-SCNC: 4.3 MMOL/L (ref 3.4–5.1)
PROT SERPL-MCNC: 6.4 G/DL (ref 5.9–7.9)
RBC # BLD AUTO: 4.13 X10*6/UL (ref 4.5–5.9)
SODIUM SERPL-SCNC: 135 MMOL/L (ref 133–145)
TROPONIN T SERPL-MCNC: 129 NG/L
TROPONIN T SERPL-MCNC: 130 NG/L
WBC # BLD AUTO: 7.7 X10*3/UL (ref 4.4–11.3)

## 2023-12-24 PROCEDURE — 82947 ASSAY GLUCOSE BLOOD QUANT: CPT

## 2023-12-24 PROCEDURE — 36415 COLL VENOUS BLD VENIPUNCTURE: CPT | Performed by: STUDENT IN AN ORGANIZED HEALTH CARE EDUCATION/TRAINING PROGRAM

## 2023-12-24 PROCEDURE — G0390 TRAUMA RESPONS W/HOSP CRITI: HCPCS

## 2023-12-24 PROCEDURE — 70450 CT HEAD/BRAIN W/O DYE: CPT

## 2023-12-24 PROCEDURE — 73030 X-RAY EXAM OF SHOULDER: CPT | Mod: LT

## 2023-12-24 PROCEDURE — 99285 EMERGENCY DEPT VISIT HI MDM: CPT | Performed by: STUDENT IN AN ORGANIZED HEALTH CARE EDUCATION/TRAINING PROGRAM

## 2023-12-24 PROCEDURE — 2060000001 HC INTERMEDIATE ICU ROOM DAILY

## 2023-12-24 PROCEDURE — 2500000004 HC RX 250 GENERAL PHARMACY W/ HCPCS (ALT 636 FOR OP/ED): Performed by: INTERNAL MEDICINE

## 2023-12-24 PROCEDURE — 93005 ELECTROCARDIOGRAM TRACING: CPT

## 2023-12-24 PROCEDURE — 85025 COMPLETE CBC W/AUTO DIFF WBC: CPT | Performed by: STUDENT IN AN ORGANIZED HEALTH CARE EDUCATION/TRAINING PROGRAM

## 2023-12-24 PROCEDURE — 2500000001 HC RX 250 WO HCPCS SELF ADMINISTERED DRUGS (ALT 637 FOR MEDICARE OP): Performed by: INTERNAL MEDICINE

## 2023-12-24 PROCEDURE — 71045 X-RAY EXAM CHEST 1 VIEW: CPT

## 2023-12-24 PROCEDURE — 80053 COMPREHEN METABOLIC PANEL: CPT | Performed by: STUDENT IN AN ORGANIZED HEALTH CARE EDUCATION/TRAINING PROGRAM

## 2023-12-24 PROCEDURE — 93010 ELECTROCARDIOGRAM REPORT: CPT | Performed by: INTERNAL MEDICINE

## 2023-12-24 PROCEDURE — 2500000004 HC RX 250 GENERAL PHARMACY W/ HCPCS (ALT 636 FOR OP/ED): Performed by: STUDENT IN AN ORGANIZED HEALTH CARE EDUCATION/TRAINING PROGRAM

## 2023-12-24 PROCEDURE — 99223 1ST HOSP IP/OBS HIGH 75: CPT | Performed by: SURGERY

## 2023-12-24 PROCEDURE — 72125 CT NECK SPINE W/O DYE: CPT

## 2023-12-24 PROCEDURE — 73564 X-RAY EXAM KNEE 4 OR MORE: CPT | Mod: 50

## 2023-12-24 PROCEDURE — 84484 ASSAY OF TROPONIN QUANT: CPT | Performed by: STUDENT IN AN ORGANIZED HEALTH CARE EDUCATION/TRAINING PROGRAM

## 2023-12-24 RX ORDER — ATORVASTATIN CALCIUM 10 MG/1
10 TABLET, FILM COATED ORAL DAILY
Status: DISCONTINUED | OUTPATIENT
Start: 2023-12-25 | End: 2023-12-30 | Stop reason: HOSPADM

## 2023-12-24 RX ORDER — FENOFIBRATE 160 MG/1
160 TABLET ORAL DAILY
Status: DISCONTINUED | OUTPATIENT
Start: 2023-12-25 | End: 2023-12-25

## 2023-12-24 RX ORDER — POLYETHYLENE GLYCOL 3350 17 G/17G
17 POWDER, FOR SOLUTION ORAL DAILY
Status: DISCONTINUED | OUTPATIENT
Start: 2023-12-25 | End: 2023-12-30 | Stop reason: HOSPADM

## 2023-12-24 RX ORDER — SENNOSIDES 8.6 MG/1
2 TABLET ORAL NIGHTLY
Status: DISCONTINUED | OUTPATIENT
Start: 2023-12-24 | End: 2023-12-30 | Stop reason: HOSPADM

## 2023-12-24 RX ORDER — ONDANSETRON 4 MG/1
4 TABLET, FILM COATED ORAL 4 TIMES DAILY PRN
Status: DISCONTINUED | OUTPATIENT
Start: 2023-12-24 | End: 2023-12-30 | Stop reason: HOSPADM

## 2023-12-24 RX ORDER — DONEPEZIL HYDROCHLORIDE 10 MG/1
10 TABLET, FILM COATED ORAL NIGHTLY
Status: DISCONTINUED | OUTPATIENT
Start: 2023-12-24 | End: 2023-12-30 | Stop reason: HOSPADM

## 2023-12-24 RX ORDER — TAMSULOSIN HYDROCHLORIDE 0.4 MG/1
0.4 CAPSULE ORAL DAILY
Status: DISCONTINUED | OUTPATIENT
Start: 2023-12-25 | End: 2023-12-30 | Stop reason: HOSPADM

## 2023-12-24 RX ORDER — PANTOPRAZOLE SODIUM 40 MG/1
40 TABLET, DELAYED RELEASE ORAL DAILY
Status: DISCONTINUED | OUTPATIENT
Start: 2023-12-25 | End: 2023-12-30 | Stop reason: HOSPADM

## 2023-12-24 RX ORDER — GABAPENTIN 600 MG/1
600 TABLET ORAL DAILY
Status: DISCONTINUED | OUTPATIENT
Start: 2023-12-25 | End: 2023-12-30 | Stop reason: HOSPADM

## 2023-12-24 RX ORDER — FERROUS SULFATE 325(65) MG
65 TABLET ORAL
Status: DISCONTINUED | OUTPATIENT
Start: 2023-12-25 | End: 2023-12-30 | Stop reason: HOSPADM

## 2023-12-24 RX ORDER — METOPROLOL SUCCINATE 25 MG/1
25 TABLET, EXTENDED RELEASE ORAL 2 TIMES DAILY
Status: DISCONTINUED | OUTPATIENT
Start: 2023-12-24 | End: 2023-12-30 | Stop reason: HOSPADM

## 2023-12-24 RX ORDER — FOLIC ACID 0.4 MG
0.4 TABLET ORAL DAILY
Status: DISCONTINUED | OUTPATIENT
Start: 2023-12-25 | End: 2023-12-30 | Stop reason: HOSPADM

## 2023-12-24 RX ORDER — CLOPIDOGREL BISULFATE 75 MG/1
75 TABLET ORAL DAILY
Status: DISCONTINUED | OUTPATIENT
Start: 2023-12-25 | End: 2023-12-30 | Stop reason: HOSPADM

## 2023-12-24 RX ORDER — ACETAMINOPHEN 500 MG
5 TABLET ORAL NIGHTLY PRN
Status: DISCONTINUED | OUTPATIENT
Start: 2023-12-24 | End: 2023-12-30 | Stop reason: HOSPADM

## 2023-12-24 RX ORDER — LOSARTAN POTASSIUM 100 MG/1
100 TABLET ORAL DAILY
Status: DISCONTINUED | OUTPATIENT
Start: 2023-12-24 | End: 2023-12-24

## 2023-12-24 RX ORDER — LEVETIRACETAM 500 MG/1
500 TABLET ORAL 2 TIMES DAILY
Status: DISCONTINUED | OUTPATIENT
Start: 2023-12-24 | End: 2023-12-30 | Stop reason: HOSPADM

## 2023-12-24 RX ORDER — DULOXETIN HYDROCHLORIDE 60 MG/1
60 CAPSULE, DELAYED RELEASE ORAL DAILY
Status: DISCONTINUED | OUTPATIENT
Start: 2023-12-25 | End: 2023-12-30 | Stop reason: HOSPADM

## 2023-12-24 RX ORDER — GUAIFENESIN 100 MG/5ML
200 SOLUTION ORAL EVERY 4 HOURS PRN
Status: DISCONTINUED | OUTPATIENT
Start: 2023-12-24 | End: 2023-12-30 | Stop reason: HOSPADM

## 2023-12-24 RX ORDER — ONDANSETRON HYDROCHLORIDE 2 MG/ML
4 INJECTION, SOLUTION INTRAVENOUS EVERY 6 HOURS PRN
Status: DISCONTINUED | OUTPATIENT
Start: 2023-12-24 | End: 2023-12-30 | Stop reason: HOSPADM

## 2023-12-24 RX ORDER — ACETAMINOPHEN 325 MG/1
650 TABLET ORAL EVERY 6 HOURS PRN
Status: DISCONTINUED | OUTPATIENT
Start: 2023-12-24 | End: 2023-12-30 | Stop reason: HOSPADM

## 2023-12-24 RX ORDER — SODIUM CHLORIDE 9 MG/ML
100 INJECTION, SOLUTION INTRAVENOUS CONTINUOUS
Status: DISCONTINUED | OUTPATIENT
Start: 2023-12-24 | End: 2023-12-29

## 2023-12-24 RX ORDER — AMLODIPINE BESYLATE 5 MG/1
5 TABLET ORAL DAILY
Status: DISCONTINUED | OUTPATIENT
Start: 2023-12-25 | End: 2023-12-30 | Stop reason: HOSPADM

## 2023-12-24 RX ORDER — ONDANSETRON 4 MG/1
4 TABLET, FILM COATED ORAL EVERY 8 HOURS PRN
Status: DISCONTINUED | OUTPATIENT
Start: 2023-12-24 | End: 2023-12-24

## 2023-12-24 RX ADMIN — SODIUM CHLORIDE 100 ML/HR: 900 INJECTION, SOLUTION INTRAVENOUS at 20:43

## 2023-12-24 RX ADMIN — TRAZODONE HYDROCHLORIDE 150 MG: 100 TABLET ORAL at 20:42

## 2023-12-24 RX ADMIN — SODIUM CHLORIDE 1000 ML: 9 INJECTION, SOLUTION INTRAVENOUS at 16:17

## 2023-12-24 RX ADMIN — LEVETIRACETAM 500 MG: 500 TABLET, FILM COATED ORAL at 20:42

## 2023-12-24 RX ADMIN — DONEPEZIL HYDROCHLORIDE 10 MG: 10 TABLET, FILM COATED ORAL at 20:44

## 2023-12-24 RX ADMIN — APIXABAN 2.5 MG: 2.5 TABLET, FILM COATED ORAL at 20:41

## 2023-12-24 RX ADMIN — METOPROLOL SUCCINATE 25 MG: 25 TABLET, FILM COATED, EXTENDED RELEASE ORAL at 20:42

## 2023-12-24 ASSESSMENT — ENCOUNTER SYMPTOMS
PALPITATIONS: 0
DIARRHEA: 0
LIGHT-HEADEDNESS: 1
APNEA: 0
FEVER: 0
ACTIVITY CHANGE: 0
BLOOD IN STOOL: 0
HEMATURIA: 0
CONSTIPATION: 0
SEIZURES: 0
NAUSEA: 0
APPETITE CHANGE: 0
DIAPHORESIS: 0
CHILLS: 0
ARTHRALGIAS: 1
ABDOMINAL PAIN: 0
WHEEZING: 0
SHORTNESS OF BREATH: 0
COUGH: 0

## 2023-12-24 ASSESSMENT — COGNITIVE AND FUNCTIONAL STATUS - GENERAL
DRESSING REGULAR UPPER BODY CLOTHING: A LITTLE
TURNING FROM BACK TO SIDE WHILE IN FLAT BAD: A LITTLE
MOBILITY SCORE: 14
TOILETING: A LITTLE
MOVING TO AND FROM BED TO CHAIR: A LOT
MOVING FROM LYING ON BACK TO SITTING ON SIDE OF FLAT BED WITH BEDRAILS: A LITTLE
DAILY ACTIVITIY SCORE: 20
DRESSING REGULAR LOWER BODY CLOTHING: A LITTLE
HELP NEEDED FOR BATHING: A LITTLE
STANDING UP FROM CHAIR USING ARMS: A LOT
CLIMB 3 TO 5 STEPS WITH RAILING: A LOT
WALKING IN HOSPITAL ROOM: A LOT

## 2023-12-24 ASSESSMENT — PAIN DESCRIPTION - PROGRESSION: CLINICAL_PROGRESSION: NOT CHANGED

## 2023-12-24 ASSESSMENT — LIFESTYLE VARIABLES
REASON UNABLE TO ASSESS: NO
HAVE YOU EVER FELT YOU SHOULD CUT DOWN ON YOUR DRINKING: NO
EVER FELT BAD OR GUILTY ABOUT YOUR DRINKING: NO
HAVE PEOPLE ANNOYED YOU BY CRITICIZING YOUR DRINKING: NO
EVER HAD A DRINK FIRST THING IN THE MORNING TO STEADY YOUR NERVES TO GET RID OF A HANGOVER: NO

## 2023-12-24 ASSESSMENT — PAIN DESCRIPTION - FREQUENCY: FREQUENCY: CONSTANT/CONTINUOUS

## 2023-12-24 ASSESSMENT — PAIN SCALES - GENERAL
PAINLEVEL_OUTOF10: 8
PAINLEVEL_OUTOF10: 5 - MODERATE PAIN

## 2023-12-24 ASSESSMENT — PAIN DESCRIPTION - ORIENTATION: ORIENTATION: LEFT;RIGHT

## 2023-12-24 ASSESSMENT — COLUMBIA-SUICIDE SEVERITY RATING SCALE - C-SSRS
1. IN THE PAST MONTH, HAVE YOU WISHED YOU WERE DEAD OR WISHED YOU COULD GO TO SLEEP AND NOT WAKE UP?: NO
6. HAVE YOU EVER DONE ANYTHING, STARTED TO DO ANYTHING, OR PREPARED TO DO ANYTHING TO END YOUR LIFE?: NO
2. HAVE YOU ACTUALLY HAD ANY THOUGHTS OF KILLING YOURSELF?: NO

## 2023-12-24 ASSESSMENT — PAIN DESCRIPTION - PAIN TYPE: TYPE: ACUTE PAIN

## 2023-12-24 ASSESSMENT — PAIN DESCRIPTION - ONSET: ONSET: SUDDEN

## 2023-12-24 ASSESSMENT — PAIN - FUNCTIONAL ASSESSMENT
PAIN_FUNCTIONAL_ASSESSMENT: 0-10
PAIN_FUNCTIONAL_ASSESSMENT: 0-10

## 2023-12-24 ASSESSMENT — PAIN DESCRIPTION - LOCATION: LOCATION: KNEE

## 2023-12-24 ASSESSMENT — PAIN DESCRIPTION - DESCRIPTORS: DESCRIPTORS: ACHING;SHOOTING;SHARP

## 2023-12-24 NOTE — Clinical Note
Guidewire exchanged. The guidewire was removed due to: wire reshaping. Bend in wire, replacing for new wire

## 2023-12-24 NOTE — Clinical Note
Diagnostic wire removed. The ASCVD Risk score (Jesúsjose e DONALD Jr., et al., 2013) failed to calculate for the following reasons:    The 2013 ASCVD risk score is only valid for ages 40 to 79    Preventive Health Recommendations  Male Ages 26 - 39    Yearly exam:             See your health care provider every year in order to  o   Review health changes.   o   Discuss preventive care.    o   Review your medicines if your doctor has prescribed any.    You should be tested each year for STDs (sexually transmitted diseases), if you re at risk.     After age 35, talk to your provider about cholesterol testing. If you are at risk for heart disease, have your cholesterol tested at least every 5 years.     If you are at risk for diabetes, you should have a diabetes test (fasting glucose).  Shots: Get a flu shot each year. Get a tetanus shot every 10 years.     Nutrition:    Eat at least 5 servings of fruits and vegetables daily.     Eat whole-grain bread, whole-wheat pasta and brown rice instead of white grains and rice.     Get adequate Calcium and Vitamin D.     Lifestyle    Exercise for at least 150 minutes a week (30 minutes a day, 5 days a week). This will help you control your weight and prevent disease.     Limit alcohol to one drink per day.     No smoking.     Wear sunscreen to prevent skin cancer.     See your dentist every six months for an exam and cleaning.

## 2023-12-24 NOTE — Clinical Note
Vessel: circumflex (mid). Inflation 1: Pressure = 13 aria; Duration = 40 sec. Inflation 2: Pressure = 14 aria; Duration = 30 sec.

## 2023-12-24 NOTE — Clinical Note
Diagnostic wire inserted. Detail Level: Detailed Quality 226: Preventive Care And Screening: Tobacco Use: Screening And Cessation Intervention: Patient screened for tobacco use and is an ex/non-smoker Quality 130: Documentation Of Current Medications In The Medical Record: Current Medications Documented Quality 431: Preventive Care And Screening: Unhealthy Alcohol Use - Screening: Patient not identified as an unhealthy alcohol user when screened for unhealthy alcohol use using a systematic screening method Quality 110: Preventive Care And Screening: Influenza Immunization: Influenza Immunization previously received during influenza season

## 2023-12-24 NOTE — Clinical Note
Vessel: circumflex (distal). Guidewire advanced and used as the primary guidewire crossing the lesion.

## 2023-12-24 NOTE — ED PROVIDER NOTES
HPI   Chief Complaint   Patient presents with    Fall     Lightheadedness prior to fall       Patient is a 73-year-old male presenting to the emergency department for evaluation after a fall from a syncopal episode on the toilet.  Patient states he sitting on the toilet when he thinks he passed out and woke up on the floor.  He states he is on Eliquis and on the blood thinner that he is unsure which 1.  He does believe he hit his head and is complaining of head and neck pain as well as pain in his left knee, right knee and left shoulder.  Patient denies chest pain, shortness of breath, fever, chills, nausea, vomiting abdominal pain, recent, headaches, numbness, tingling.                          Enrique Coma Scale Score: 15                  Patient History   Past Medical History:   Diagnosis Date    Arthritis     Coronary artery disease     Diabetes mellitus (CMS/HCC)     Hypertension     Stroke (CMS/HCC)      Past Surgical History:   Procedure Laterality Date    BACK SURGERY      CARDIAC ELECTROPHYSIOLOGY PROCEDURE N/A 2023    Procedure: Cardioversion;  Surgeon: Phill Hare DO;  Location: [facility] Cardiac Cath Lab;  Service: Cardiovascular;  Laterality: N/A;     Family History   Problem Relation Name Age of Onset    Stomach cancer Mother      Diabetes Mother      Coronary artery disease Father      Other (Pacemaker) Father      Other (S/p CABG) Sister      Other (S/p CABG) Brother      Ovarian cancer Sibling      Coronary artery disease Sibling       Social History     Tobacco Use    Smoking status: Former     Types: Cigarettes     Quit date:      Years since quittin.0     Passive exposure: Past    Smokeless tobacco: Never   Vaping Use    Vaping Use: Never used   Substance Use Topics    Alcohol use: Not Currently    Drug use: Never       Physical Exam   ED Triage Vitals [23 1538]   Temp Heart Rate Resp BP   36.7 °C (98 °F) 79 18 119/70      SpO2 Temp src Heart Rate Source Patient Position   100 %  -- -- --      BP Location FiO2 (%)     -- --       Physical Exam  Vitals and nursing note reviewed.   Constitutional:       General: He is not in acute distress.     Appearance: Normal appearance. He is normal weight. He is not ill-appearing or toxic-appearing.   HENT:      Head: Normocephalic and atraumatic.      Ears:      Comments: Skin tear to the tip of the left ear with hemostasis achieved.     Nose: Nose normal.      Mouth/Throat:      Mouth: Mucous membranes are moist.   Eyes:      Extraocular Movements: Extraocular movements intact.      Conjunctiva/sclera: Conjunctivae normal.      Pupils: Pupils are equal, round, and reactive to light.   Cardiovascular:      Rate and Rhythm: Normal rate. Rhythm irregular.      Pulses: Normal pulses.      Heart sounds: Normal heart sounds.      Comments: History of A-fib  Pulmonary:      Effort: Pulmonary effort is normal.      Breath sounds: Normal breath sounds. No wheezing, rhonchi or rales.   Abdominal:      General: Abdomen is flat.      Palpations: Abdomen is soft.   Musculoskeletal:         General: Normal range of motion.      Cervical back: Normal range of motion and neck supple.      Comments: Moving all extremities   Skin:     Findings: Abrasion (Skin tear to the left shoulder, left knee and right elbow) present.   Neurological:      General: No focal deficit present.      Mental Status: He is alert and oriented to person, place, and time. Mental status is at baseline.   Psychiatric:         Mood and Affect: Mood normal.         Behavior: Behavior normal.         ED Course & MDM   ED Course as of 12/24/23 1859   Sun Dec 24, 2023   1755 EKG Time: 1559  EKG Interpretation time: 1605  EKG Interpretation: EKG shows atrial fibrillation with rate controlled at 73, normal axis, QTc 429, no evidence of STEMI    EKG was interpreted by myself independently [JL]      ED Course User Index  [JL] Igor Glez DO         Diagnoses as of 12/24/23 1859   Syncope and collapse    Pain in both knees, unspecified chronicity   Kidney disease       Medical Decision Making  Parts of this chart have been completed using voice recognition software. Please excuse any errors of transcription. Despite the medical decision making time stamp above-my medical decision making has taken place during the patient's entire visit. My thought process and reason for plan has been formulated from the time that I saw the patient until the time of disposition and is not specific to one specific moment during their visit and furthermore my MDM encompasses this entire chart and not only this text box.    Patient seen in conjunction with attending physician Dr. Glez.    HPI: Detailed above.    Exam: A medically appropriate exam performed, outlined above, given the known history and presentation.    History obtained from: Patient    EKG: Reviewed and interpreted by my attending physician    Social Determinants of Health considered during this visit: Lives at home    Labs/Diagnostics:  XR chest 1 view   Final Result   No acute cardiopulmonary process.        MACRO:   None        Signed by: Torres Vallejo 12/24/2023 4:48 PM   Dictation workstation:   [id number]      XR shoulder left 2+ views   Final Result   1. No acute fracture or dislocation.   2. Severe glenohumeral and moderate AC joint osteoarthrosis.        MACRO:   None.        Signed by: Torres Vallejo 12/24/2023 4:49 PM   Dictation workstation:   [id number]      XR knee 4+ views bilateral   Final Result   No acute fracture or dislocation.        MACRO:   None        Signed by: Torres Vallejo 12/24/2023 4:52 PM   Dictation workstation:   [id number]      CT head wo IV contrast   Final Result   Stable age related changes and remote right frontal/periventricular   white matter infarct without acute intracranial process.        Signed by: Moody Aranda 12/24/2023 3:51 PM   Dictation workstation:   [id number]      CT cervical spine wo IV contrast    Final Result   Marked cervical spondylosis without acute fracture or facet   subluxation.        Signed by: Moody Aranda 12/24/2023 3:53 PM   Dictation workstation:   [id number]        Labs Reviewed   CBC WITH AUTO DIFFERENTIAL - Abnormal       Result Value    WBC 7.7      nRBC 0.0      RBC 4.13 (*)     Hemoglobin 11.9 (*)     Hematocrit 37.4 (*)     MCV 91      MCH 28.8      MCHC 31.8 (*)     RDW 13.7      Platelets 239      Neutrophils % 77.7      Immature Granulocytes %, Automated 0.4      Lymphocytes % 12.5      Monocytes % 6.4      Eosinophils % 2.0      Basophils % 1.0      Neutrophils Absolute 5.95 (*)     Immature Granulocytes Absolute, Automated 0.03      Lymphocytes Absolute 0.96      Monocytes Absolute 0.49      Eosinophils Absolute 0.15      Basophils Absolute 0.08     COMPREHENSIVE METABOLIC PANEL - Abnormal    Glucose 160 (*)     Sodium 135      Potassium 4.3      Chloride 102      Bicarbonate 19 (*)     Urea Nitrogen 54 (*)     Creatinine 3.20 (*)     eGFR 20 (*)     Calcium 9.2      Albumin 3.9      Alkaline Phosphatase 44      Total Protein 6.4      AST 18      Bilirubin, Total 0.5      ALT 10      Anion Gap 14     SERIAL TROPONIN, INITIAL ([facility]) - Abnormal    Troponin T, High Sensitivity 130 (*)    SERIAL TROPONIN,  2 HOUR ([facility]) - Abnormal    Troponin T, High Sensitivity 129 (*)    TROPONIN T SERIES, HIGH SENSITIVITY (0, 2 HR, 6 HR)    Narrative:     The following orders were created for panel order Troponin T Series, High Sensitivity (0, 2HR, 6HR).  Procedure                               Abnormality         Status                     ---------                               -----------         ------                     Serial Troponin, Initial...[[id number]]  Abnormal            Final result               Serial Troponin, 2 Hour ...[[id number]]  Abnormal            Final result                 Please view results for these tests on the individual orders.     Medications given during visit:  Normal saline    Considerations/further MDM:  Patient is a 73-year-old male presenting to the emergency department for evaluation after a fall from a syncopal episode on the toilet.  Limited trauma was called due to patient having a head injury on Eliquis and complaining of multiple injuries.  Patient now complaining of bilateral knee pain, pain in the left shoulder and pain in the neck.  Imaging ordered as well as diagnostic labs.  Skin tear as mentioned above were cleaned and dressed appropriately.  CMP showed evidence of acute kidney injury.  Initial troponin was 130 and therefore there is concern for cardiogenic syncope.  No evidence of leukocytosis on CBC however hemoglobin is 11.9 and hematocrit 37.4.  Chest x-ray showed no acute cardiopulmonary process.  X-ray of the left shoulder showed no acute fracture or dislocation with severe glenohumeral and moderate AC joint osteoarthritis.  X-ray of the bilateral knee shows no acute fracture dislocations.  CT of the head showed age-related changes but no acute intracranial process.  CT of the cervical spine showed marked cervical spondylosis without acute fracture or facet subluxation.  Due to the patient's acute kidney injury and concern for cardiogenic syncope he will be admitted for further evaluation and management.  Spoke with hospitalist  who agreed to admission.     Procedure  Procedures     Alina Arceo PA-C  12/24/23 1900

## 2023-12-25 LAB
ALBUMIN SERPL-MCNC: 3.6 G/DL (ref 3.5–5)
ANION GAP SERPL CALC-SCNC: 12 MMOL/L
BUN SERPL-MCNC: 51 MG/DL (ref 8–25)
CALCIUM SERPL-MCNC: 8.8 MG/DL (ref 8.5–10.4)
CHLORIDE SERPL-SCNC: 106 MMOL/L (ref 97–107)
CO2 SERPL-SCNC: 20 MMOL/L (ref 24–31)
CREAT SERPL-MCNC: 2.6 MG/DL (ref 0.4–1.6)
GFR SERPL CREATININE-BSD FRML MDRD: 25 ML/MIN/1.73M*2
GLUCOSE BLD MANUAL STRIP-MCNC: 113 MG/DL (ref 74–99)
GLUCOSE BLD MANUAL STRIP-MCNC: 162 MG/DL (ref 74–99)
GLUCOSE SERPL-MCNC: 122 MG/DL (ref 65–99)
HOLD SPECIMEN: NORMAL
PHOSPHATE SERPL-MCNC: 3 MG/DL (ref 2.5–4.5)
POTASSIUM SERPL-SCNC: 3.9 MMOL/L (ref 3.4–5.1)
SODIUM SERPL-SCNC: 138 MMOL/L (ref 133–145)
TROPONIN T SERPL-MCNC: 90 NG/L
TSH SERPL DL<=0.05 MIU/L-ACNC: 0.95 MIU/L (ref 0.27–4.2)

## 2023-12-25 PROCEDURE — 2500000001 HC RX 250 WO HCPCS SELF ADMINISTERED DRUGS (ALT 637 FOR MEDICARE OP): Performed by: INTERNAL MEDICINE

## 2023-12-25 PROCEDURE — 82947 ASSAY GLUCOSE BLOOD QUANT: CPT

## 2023-12-25 PROCEDURE — 2500000002 HC RX 250 W HCPCS SELF ADMINISTERED DRUGS (ALT 637 FOR MEDICARE OP, ALT 636 FOR OP/ED): Performed by: INTERNAL MEDICINE

## 2023-12-25 PROCEDURE — 2500000004 HC RX 250 GENERAL PHARMACY W/ HCPCS (ALT 636 FOR OP/ED): Performed by: INTERNAL MEDICINE

## 2023-12-25 PROCEDURE — 36415 COLL VENOUS BLD VENIPUNCTURE: CPT | Performed by: INTERNAL MEDICINE

## 2023-12-25 PROCEDURE — 99222 1ST HOSP IP/OBS MODERATE 55: CPT

## 2023-12-25 PROCEDURE — 80069 RENAL FUNCTION PANEL: CPT | Performed by: INTERNAL MEDICINE

## 2023-12-25 PROCEDURE — 84443 ASSAY THYROID STIM HORMONE: CPT | Performed by: INTERNAL MEDICINE

## 2023-12-25 PROCEDURE — 84484 ASSAY OF TROPONIN QUANT: CPT | Performed by: STUDENT IN AN ORGANIZED HEALTH CARE EDUCATION/TRAINING PROGRAM

## 2023-12-25 PROCEDURE — 97162 PT EVAL MOD COMPLEX 30 MIN: CPT | Mod: GP | Performed by: PHYSICAL THERAPIST

## 2023-12-25 PROCEDURE — 97530 THERAPEUTIC ACTIVITIES: CPT | Mod: GP | Performed by: PHYSICAL THERAPIST

## 2023-12-25 PROCEDURE — 2060000001 HC INTERMEDIATE ICU ROOM DAILY

## 2023-12-25 RX ORDER — AMINOPHYLLINE 25 MG/ML
125 INJECTION, SOLUTION INTRAVENOUS AS NEEDED
Status: CANCELLED | OUTPATIENT
Start: 2023-12-25

## 2023-12-25 RX ADMIN — Medication 0.4 MG: at 09:08

## 2023-12-25 RX ADMIN — LEVETIRACETAM 500 MG: 500 TABLET, FILM COATED ORAL at 20:15

## 2023-12-25 RX ADMIN — FERROUS SULFATE TAB 325 MG (65 MG ELEMENTAL FE) 1 TABLET: 325 (65 FE) TAB at 20:15

## 2023-12-25 RX ADMIN — STANDARDIZED SENNA CONCENTRATE 17.2 MG: 8.6 TABLET ORAL at 20:15

## 2023-12-25 RX ADMIN — DONEPEZIL HYDROCHLORIDE 10 MG: 10 TABLET, FILM COATED ORAL at 20:15

## 2023-12-25 RX ADMIN — SODIUM CHLORIDE 100 ML/HR: 900 INJECTION, SOLUTION INTRAVENOUS at 20:22

## 2023-12-25 RX ADMIN — ACETAMINOPHEN 650 MG: 325 TABLET ORAL at 14:12

## 2023-12-25 RX ADMIN — APIXABAN 2.5 MG: 2.5 TABLET, FILM COATED ORAL at 09:08

## 2023-12-25 RX ADMIN — ATORVASTATIN CALCIUM 10 MG: 10 TABLET, FILM COATED ORAL at 14:11

## 2023-12-25 RX ADMIN — AMLODIPINE BESYLATE 5 MG: 5 TABLET ORAL at 09:08

## 2023-12-25 RX ADMIN — TRAZODONE HYDROCHLORIDE 150 MG: 100 TABLET ORAL at 20:15

## 2023-12-25 RX ADMIN — PANTOPRAZOLE SODIUM 40 MG: 40 TABLET, DELAYED RELEASE ORAL at 09:08

## 2023-12-25 RX ADMIN — POLYETHYLENE GLYCOL 3350 17 G: 17 POWDER, FOR SOLUTION ORAL at 09:00

## 2023-12-25 RX ADMIN — CLOPIDOGREL BISULFATE 75 MG: 75 TABLET ORAL at 09:08

## 2023-12-25 RX ADMIN — FERROUS SULFATE TAB 325 MG (65 MG ELEMENTAL FE) 1 TABLET: 325 (65 FE) TAB at 09:08

## 2023-12-25 RX ADMIN — FENOFIBRATE 160 MG: 160 TABLET ORAL at 09:08

## 2023-12-25 RX ADMIN — LEVETIRACETAM 500 MG: 500 TABLET, FILM COATED ORAL at 09:08

## 2023-12-25 RX ADMIN — METOPROLOL SUCCINATE 25 MG: 25 TABLET, FILM COATED, EXTENDED RELEASE ORAL at 09:08

## 2023-12-25 RX ADMIN — TAMSULOSIN HYDROCHLORIDE 0.4 MG: 0.4 CAPSULE ORAL at 09:08

## 2023-12-25 RX ADMIN — APIXABAN 2.5 MG: 2.5 TABLET, FILM COATED ORAL at 20:16

## 2023-12-25 RX ADMIN — ACETAMINOPHEN 650 MG: 325 TABLET ORAL at 04:08

## 2023-12-25 RX ADMIN — GABAPENTIN 600 MG: 600 TABLET, FILM COATED ORAL at 09:08

## 2023-12-25 RX ADMIN — ACETAMINOPHEN 650 MG: 325 TABLET ORAL at 20:16

## 2023-12-25 RX ADMIN — DULOXETINE HYDROCHLORIDE 60 MG: 60 CAPSULE, DELAYED RELEASE ORAL at 09:08

## 2023-12-25 SDOH — SOCIAL STABILITY: SOCIAL INSECURITY: HAVE YOU HAD THOUGHTS OF HARMING ANYONE ELSE?: NO

## 2023-12-25 SDOH — SOCIAL STABILITY: SOCIAL INSECURITY: DO YOU FEEL ANYONE HAS EXPLOITED OR TAKEN ADVANTAGE OF YOU FINANCIALLY OR OF YOUR PERSONAL PROPERTY?: NO

## 2023-12-25 SDOH — SOCIAL STABILITY: SOCIAL INSECURITY: DOES ANYONE TRY TO KEEP YOU FROM HAVING/CONTACTING OTHER FRIENDS OR DOING THINGS OUTSIDE YOUR HOME?: NO

## 2023-12-25 SDOH — SOCIAL STABILITY: SOCIAL INSECURITY: ARE THERE ANY APPARENT SIGNS OF INJURIES/BEHAVIORS THAT COULD BE RELATED TO ABUSE/NEGLECT?: NO

## 2023-12-25 SDOH — SOCIAL STABILITY: SOCIAL INSECURITY: ARE YOU OR HAVE YOU BEEN THREATENED OR ABUSED PHYSICALLY, EMOTIONALLY, OR SEXUALLY BY ANYONE?: NO

## 2023-12-25 SDOH — SOCIAL STABILITY: SOCIAL INSECURITY: WERE YOU ABLE TO COMPLETE ALL THE BEHAVIORAL HEALTH SCREENINGS?: YES

## 2023-12-25 SDOH — SOCIAL STABILITY: SOCIAL INSECURITY: DO YOU FEEL UNSAFE GOING BACK TO THE PLACE WHERE YOU ARE LIVING?: NO

## 2023-12-25 SDOH — SOCIAL STABILITY: SOCIAL INSECURITY: HAS ANYONE EVER THREATENED TO HURT YOUR FAMILY OR YOUR PETS?: NO

## 2023-12-25 SDOH — SOCIAL STABILITY: SOCIAL INSECURITY: ABUSE: ADULT

## 2023-12-25 ASSESSMENT — COGNITIVE AND FUNCTIONAL STATUS - GENERAL
CLIMB 3 TO 5 STEPS WITH RAILING: A LOT
WALKING IN HOSPITAL ROOM: A LOT
MOBILITY SCORE: 14
MOVING FROM LYING ON BACK TO SITTING ON SIDE OF FLAT BED WITH BEDRAILS: A LITTLE
TURNING FROM BACK TO SIDE WHILE IN FLAT BAD: A LITTLE
DRESSING REGULAR LOWER BODY CLOTHING: A LITTLE
CLIMB 3 TO 5 STEPS WITH RAILING: A LOT
MOVING TO AND FROM BED TO CHAIR: A LOT
HELP NEEDED FOR BATHING: A LITTLE
DRESSING REGULAR UPPER BODY CLOTHING: A LITTLE
TOILETING: A LITTLE
PATIENT BASELINE BEDBOUND: NO
TOILETING: A LITTLE
WALKING IN HOSPITAL ROOM: A LOT
DRESSING REGULAR UPPER BODY CLOTHING: A LITTLE
CLIMB 3 TO 5 STEPS WITH RAILING: A LOT
DRESSING REGULAR UPPER BODY CLOTHING: A LITTLE
MOVING FROM LYING ON BACK TO SITTING ON SIDE OF FLAT BED WITH BEDRAILS: A LITTLE
DRESSING REGULAR LOWER BODY CLOTHING: A LITTLE
STANDING UP FROM CHAIR USING ARMS: A LOT
WALKING IN HOSPITAL ROOM: A LOT
TURNING FROM BACK TO SIDE WHILE IN FLAT BAD: A LITTLE
DAILY ACTIVITIY SCORE: 20
MOVING TO AND FROM BED TO CHAIR: A LOT
MOVING TO AND FROM BED TO CHAIR: A LOT
MOBILITY SCORE: 13
STANDING UP FROM CHAIR USING ARMS: A LOT
TURNING FROM BACK TO SIDE WHILE IN FLAT BAD: A LOT
HELP NEEDED FOR BATHING: A LITTLE
TOILETING: A LITTLE
DAILY ACTIVITIY SCORE: 20
MOVING FROM LYING ON BACK TO SITTING ON SIDE OF FLAT BED WITH BEDRAILS: A LITTLE
DAILY ACTIVITIY SCORE: 20
STANDING UP FROM CHAIR USING ARMS: A LOT
HELP NEEDED FOR BATHING: A LITTLE
DRESSING REGULAR LOWER BODY CLOTHING: A LITTLE
MOBILITY SCORE: 14

## 2023-12-25 ASSESSMENT — ACTIVITIES OF DAILY LIVING (ADL)
BATHING: NEEDS ASSISTANCE
GROOMING: NEEDS ASSISTANCE
HEARING - LEFT EAR: FUNCTIONAL
GROOMING: NEEDS ASSISTANCE
HEARING - RIGHT EAR: FUNCTIONAL
JUDGMENT_ADEQUATE_SAFELY_COMPLETE_DAILY_ACTIVITIES: YES
DRESSING YOURSELF: NEEDS ASSISTANCE
FEEDING YOURSELF: INDEPENDENT
ADL_ASSISTANCE: INDEPENDENT
ASSISTIVE_DEVICE: WALKER
WALKS IN HOME: NEEDS ASSISTANCE
ADLS_ADDRESSED: NO
TOILETING: NEEDS ASSISTANCE
HEARING - LEFT EAR: FUNCTIONAL
ADEQUATE_TO_COMPLETE_ADL: YES
JUDGMENT_ADEQUATE_SAFELY_COMPLETE_DAILY_ACTIVITIES: YES
FEEDING YOURSELF: INDEPENDENT
TOILETING: NEEDS ASSISTANCE
DRESSING YOURSELF: NEEDS ASSISTANCE
PATIENT'S MEMORY ADEQUATE TO SAFELY COMPLETE DAILY ACTIVITIES?: YES
ADEQUATE_TO_COMPLETE_ADL: YES
ASSISTIVE_DEVICE: WALKER
HEARING - RIGHT EAR: FUNCTIONAL
PATIENT'S MEMORY ADEQUATE TO SAFELY COMPLETE DAILY ACTIVITIES?: YES
BATHING: NEEDS ASSISTANCE

## 2023-12-25 ASSESSMENT — LIFESTYLE VARIABLES
AUDIT-C TOTAL SCORE: 0
AUDIT-C TOTAL SCORE: 0
HOW MANY STANDARD DRINKS CONTAINING ALCOHOL DO YOU HAVE ON A TYPICAL DAY: PATIENT DOES NOT DRINK
HOW OFTEN DO YOU HAVE A DRINK CONTAINING ALCOHOL: NEVER
SKIP TO QUESTIONS 9-10: 1
HOW OFTEN DO YOU HAVE 6 OR MORE DRINKS ON ONE OCCASION: NEVER

## 2023-12-25 ASSESSMENT — PATIENT HEALTH QUESTIONNAIRE - PHQ9
1. LITTLE INTEREST OR PLEASURE IN DOING THINGS: NOT AT ALL
2. FEELING DOWN, DEPRESSED OR HOPELESS: NOT AT ALL
SUM OF ALL RESPONSES TO PHQ9 QUESTIONS 1 & 2: 0

## 2023-12-25 ASSESSMENT — PAIN - FUNCTIONAL ASSESSMENT
PAIN_FUNCTIONAL_ASSESSMENT: 0-10
PAIN_FUNCTIONAL_ASSESSMENT: FLACC (FACE, LEGS, ACTIVITY, CRY, CONSOLABILITY)
PAIN_FUNCTIONAL_ASSESSMENT: 0-10
PAIN_FUNCTIONAL_ASSESSMENT: WONG-BAKER FACES
PAIN_FUNCTIONAL_ASSESSMENT: FLACC (FACE, LEGS, ACTIVITY, CRY, CONSOLABILITY)
PAIN_FUNCTIONAL_ASSESSMENT: FLACC (FACE, LEGS, ACTIVITY, CRY, CONSOLABILITY)

## 2023-12-25 ASSESSMENT — PAIN SCALES - GENERAL
PAINLEVEL_OUTOF10: 7
PAINLEVEL_OUTOF10: 8
PAINLEVEL_OUTOF10: 8
PAINLEVEL_OUTOF10: 0 - NO PAIN
PAINLEVEL_OUTOF10: 8
PAINLEVEL_OUTOF10: 0 - NO PAIN

## 2023-12-25 ASSESSMENT — PAIN DESCRIPTION - LOCATION: LOCATION: GENERALIZED

## 2023-12-25 NOTE — CARE PLAN
The patient's goals for the shift include      The clinical goals for the shift include no falls, manage pain    Problem: Diabetes  Goal: Achieve decreasing blood glucose levels by end of shift  Outcome: Progressing  Goal: Increase stability of blood glucose readings by end of shift  Outcome: Progressing  Goal: Decrease in ketones present in urine by end of shift  Outcome: Progressing  Goal: Maintain electrolyte levels within acceptable range throughout shift  Outcome: Progressing  Goal: Maintain glucose levels >70mg/dl to <250mg/dl throughout shift  Outcome: Progressing  Goal: No changes in neurological exam by end of shift  Outcome: Progressing  Goal: Learn about and adhere to nutrition recommendations by end of shift  Outcome: Progressing  Goal: Vital signs within normal range for age by end of shift  Outcome: Progressing  Goal: Increase self care and/or family involovement by end of shift  Outcome: Progressing  Goal: Receive DSME education by end of shift  Outcome: Progressing     Problem: Fall/Injury  Goal: Not fall by end of shift  Outcome: Progressing  Goal: Be free from injury by end of the shift  Outcome: Progressing  Goal: Verbalize understanding of personal risk factors for fall in the hospital  Outcome: Progressing  Goal: Verbalize understanding of risk factor reduction measures to prevent injury from fall in the home  Outcome: Progressing  Goal: Use assistive devices by end of the shift  Outcome: Progressing  Goal: Pace activities to prevent fatigue by end of the shift  Outcome: Progressing     Problem: Pain  Goal: My pain/discomfort is manageable  Outcome: Progressing     Problem: Safety  Goal: Patient will be injury free during hospitalization  Outcome: Progressing  Goal: I will remain free of falls  Outcome: Progressing     Problem: Daily Care  Goal: Daily care needs are met  Outcome: Progressing     Problem: Psychosocial Needs  Goal: Demonstrates ability to cope with  hospitalization/illness  Outcome: Progressing  Goal: Collaborate with me, my family, and caregiver to identify my specific goals  Outcome: Progressing     Problem: Discharge Barriers  Goal: My discharge needs are met  Outcome: Progressing     Problem: Pain  Goal: STG - Patient verbalizes a reduction in pain level to <4/10 to improve I/ADLs  Outcome: Progressing

## 2023-12-25 NOTE — CONSULTS
.Reason For Consult  Chronic kidney disease stage IV  History Of Present Illness  Rachid Yun is a 73 y.o. male who is very well-known to my practice is [id number] he has underlying CKD stage IV he also had a history of congestive heart failure possible seizure activity who apparently was admitted to the hospital after he had a syncopal episode and a fall he hurt his left knee right elbow and shoulder he was found to have A-fib with RVR the patient is known to have a longstanding history of atrial fibrillation in the past I was asked to see the patient on renal consultation because of advanced kidney disease he is awake and responsive he denies any uremic symptoms no GI symptoms no shortness breath no swelling lower extremities no dysuria hematuria     Review of Systems  10 points review of system was done all negative except response for the history of present illness    Past Medical History  He has a past medical history of Arthritis, Coronary artery disease, Diabetes mellitus (CMS/HCC), Hypertension, and Stroke (CMS/HCC).    Surgical History  He has a past surgical history that includes Back surgery and Cardiac electrophysiology procedure (N/A, 11/29/2023).     Social History  He reports that he quit smoking about 36 years ago. His smoking use included cigarettes. He has been exposed to tobacco smoke. He has never used smokeless tobacco. He reports that he does not currently use alcohol. He reports that he does not use drugs.    Family History  Family History   Problem Relation Name Age of Onset    Stomach cancer Mother      Diabetes Mother      Coronary artery disease Father      Other (Pacemaker) Father      Other (S/p CABG) Sister      Other (S/p CABG) Brother      Ovarian cancer Sibling      Coronary artery disease Sibling          Current Facility-Administered Medications:     acetaminophen (Tylenol) tablet 650 mg, 650 mg, oral, q6h PRN, Monica Orozco MD, 650 mg at 12/25/23 0409    amLODIPine  (Norvasc) tablet 5 mg, 5 mg, oral, Daily, Monica Orozco MD, 5 mg at 12/25/23 0908    apixaban (Eliquis) tablet 2.5 mg, 2.5 mg, oral, BID, Monica Orozco MD, 2.5 mg at 12/25/23 0908    atorvastatin (Lipitor) tablet 10 mg, 10 mg, oral, Daily, Monica Orozco MD    clopidogrel (Plavix) tablet 75 mg, 75 mg, oral, Daily, Monica Orozco MD, 75 mg at 12/25/23 0908    donepezil (Aricept) tablet 10 mg, 10 mg, oral, Nightly, Monica Orozco MD, 10 mg at 12/24/23 2044    DULoxetine (Cymbalta) DR capsule 60 mg, 60 mg, oral, Daily, Monica Orozco MD, 60 mg at 12/25/23 0908    fenofibrate (Triglide) tablet 160 mg, 160 mg, oral, Daily, Monica Orozco MD, 160 mg at 12/25/23 0908    ferrous sulfate (325 mg ferrous sulfate) tablet 1 tablet, 65 mg of iron, oral, BID with meals, Monica Orozco MD, 1 tablet at 12/25/23 0908    folic acid (Folvite) tablet 0.4 mg, 0.4 mg, oral, Daily, Monica Orozco MD, 0.4 mg at 12/25/23 0908    gabapentin (Neurontin) tablet 600 mg, 600 mg, oral, Daily, Monica Orozco MD, 600 mg at 12/25/23 0908    guaiFENesin (Robitussin) 100 mg/5 mL syrup 200 mg, 200 mg, oral, q4h PRN, Monica Orozco MD    levETIRAcetam (Keppra) tablet 500 mg, 500 mg, oral, BID, Monica Orozco MD, 500 mg at 12/25/23 0908    melatonin tablet 5 mg, 5 mg, oral, Nightly PRN, Monica Orozco MD    metoprolol succinate XL (Toprol-XL) 24 hr tablet 25 mg, 25 mg, oral, BID, Monica Orozco MD, 25 mg at 12/25/23 0908    ondansetron (Zofran) injection 4 mg, 4 mg, intravenous, q6h PRN, Monica Orozco MD    ondansetron (Zofran) tablet 4 mg, 4 mg, oral, 4x daily PRN, Monica Orozco MD    pantoprazole (ProtoNix) EC tablet 40 mg, 40 mg, oral, Daily, Monica Orozco MD, 40 mg at 12/25/23 0908    polyethylene glycol (Glycolax, Miralax) packet 17 g, 17 g, oral, Daily, Monica Orozco MD, 17 g at 12/25/23 0900    sennosides (Senokot) tablet 17.2 mg, 2 tablet, oral,  Nightly, Monica Orozco MD    sodium chloride 0.9% infusion, 100 mL/hr, intravenous, Continuous, Monica Orozco MD, Last Rate: 100 mL/hr at 12/25/23 1143, 100 mL/hr at 12/25/23 1143    tamsulosin (Flomax) 24 hr capsule 0.4 mg, 0.4 mg, oral, Daily, Monica Orozco MD, 0.4 mg at 12/25/23 0908    traZODone (Desyrel) tablet 150 mg, 150 mg, oral, Nightly, Monica Orozco MD, 150 mg at 12/24/23 2042   Allergies  Meperidine, Vancomycin, Hydromorphone, Other, and Rifampin         Physical Exam  Physical Exam  Constitutional:       General: He is not in acute distress.     Appearance: He is not toxic-appearing.   HENT:      Head: Normocephalic and atraumatic.   Eyes:      Extraocular Movements: Extraocular movements intact.      Pupils: Pupils are equal, round, and reactive to light.   Neck:      Vascular: No carotid bruit.   Cardiovascular:      Rate and Rhythm: Normal rate. Rhythm irregular.   Pulmonary:      Effort: No respiratory distress.      Breath sounds: No stridor. No wheezing, rhonchi or rales.   Chest:      Chest wall: No tenderness.   Abdominal:      General: There is no distension.      Palpations: There is no mass.      Tenderness: There is no abdominal tenderness. There is no right CVA tenderness, left CVA tenderness or guarding.      Hernia: No hernia is present.   Musculoskeletal:         General: No swelling or tenderness.      Cervical back: No rigidity.      Right lower leg: No edema.      Left lower leg: No edema.   Lymphadenopathy:      Cervical: No cervical adenopathy.   Skin:     General: Skin is warm and dry.      Coloration: Skin is not jaundiced or pale.      Findings: Bruising present. No erythema.      Comments: Patient has bruises and abrasions on his left knee right elbow   Neurological:      General: No focal deficit present.      Mental Status: He is alert and oriented to person, place, and time.              I&O 24HR    Intake/Output Summary (Last 24 hours) at 12/25/2023  "1309  Last data filed at 12/25/2023 0413  Gross per 24 hour   Intake 1000 ml   Output 350 ml   Net 650 ml       Vitals 24HR  Heart Rate:  []   Temp:  [36 °C (96.8 °F)-36.7 °C (98 °F)]   Resp:  [6-26]   BP: (119-164)/()   Height:  [182.9 cm (6')-193 cm (6' 4\")]   Weight:  [99.9 kg (220 lb 3.8 oz)-108 kg (239 lb)]   SpO2:  [95 %-100 %]         Relevant Results        Results for orders placed or performed during the hospital encounter of 12/24/23 (from the past 96 hour(s))   CBC and Auto Differential   Result Value Ref Range    WBC 7.7 4.4 - 11.3 x10*3/uL    nRBC 0.0 0.0 - 0.0 /100 WBCs    RBC 4.13 (L) 4.50 - 5.90 x10*6/uL    Hemoglobin 11.9 (L) 13.5 - 17.5 g/dL    Hematocrit 37.4 (L) 41.0 - 52.0 %    MCV 91 80 - 100 fL    MCH 28.8 26.0 - 34.0 pg    MCHC 31.8 (L) 32.0 - 36.0 g/dL    RDW 13.7 11.5 - 14.5 %    Platelets 239 150 - 450 x10*3/uL    Neutrophils % 77.7 40.0 - 80.0 %    Immature Granulocytes %, Automated 0.4 0.0 - 0.9 %    Lymphocytes % 12.5 13.0 - 44.0 %    Monocytes % 6.4 2.0 - 10.0 %    Eosinophils % 2.0 0.0 - 6.0 %    Basophils % 1.0 0.0 - 2.0 %    Neutrophils Absolute 5.95 (H) 1.60 - 5.50 x10*3/uL    Immature Granulocytes Absolute, Automated 0.03 0.00 - 0.50 x10*3/uL    Lymphocytes Absolute 0.96 0.80 - 3.00 x10*3/uL    Monocytes Absolute 0.49 0.05 - 0.80 x10*3/uL    Eosinophils Absolute 0.15 0.00 - 0.40 x10*3/uL    Basophils Absolute 0.08 0.00 - 0.10 x10*3/uL   Comprehensive metabolic panel   Result Value Ref Range    Glucose 160 (H) 65 - 99 mg/dL    Sodium 135 133 - 145 mmol/L    Potassium 4.3 3.4 - 5.1 mmol/L    Chloride 102 97 - 107 mmol/L    Bicarbonate 19 (L) 24 - 31 mmol/L    Urea Nitrogen 54 (H) 8 - 25 mg/dL    Creatinine 3.20 (H) 0.40 - 1.60 mg/dL    eGFR 20 (L) >60 mL/min/1.73m*2    Calcium 9.2 8.5 - 10.4 mg/dL    Albumin 3.9 3.5 - 5.0 g/dL    Alkaline Phosphatase 44 35 - 125 U/L    Total Protein 6.4 5.9 - 7.9 g/dL    AST 18 5 - 40 U/L    Bilirubin, Total 0.5 0.1 - 1.2 mg/dL    ALT " 10 5 - 40 U/L    Anion Gap 14 <=19 mmol/L   Serial Troponin, Initial ([facility])   Result Value Ref Range    Troponin T, High Sensitivity 130 (H) <=15 ng/L   Serial Troponin, 2 Hour ([facility])   Result Value Ref Range    Troponin T, High Sensitivity 129 (H) <=15 ng/L   POCT GLUCOSE   Result Value Ref Range    POCT Glucose 138 (H) 74 - 99 mg/dL   Thyroid Stimulating Hormone   Result Value Ref Range    Thyroid Stimulating Hormone 0.95 0.27 - 4.20 mIU/L   Renal Function Panel   Result Value Ref Range    Glucose 122 (H) 65 - 99 mg/dL    Sodium 138 133 - 145 mmol/L    Potassium 3.9 3.4 - 5.1 mmol/L    Chloride 106 97 - 107 mmol/L    Bicarbonate 20 (L) 24 - 31 mmol/L    Urea Nitrogen 51 (H) 8 - 25 mg/dL    Creatinine 2.60 (H) 0.40 - 1.60 mg/dL    eGFR 25 (L) >60 mL/min/1.73m*2    Calcium 8.8 8.5 - 10.4 mg/dL    Phosphorus 3.0 2.5 - 4.5 mg/dL    Albumin 3.6 3.5 - 5.0 g/dL    Anion Gap 12 <=19 mmol/L   Serial Troponin, 6 Hour ([facility])   Result Value Ref Range    Troponin T, High Sensitivity 90 (H) <=15 ng/L   Lavender Top   Result Value Ref Range    Extra Tube Hold for add-ons.    POCT GLUCOSE   Result Value Ref Range    POCT Glucose 113 (H) 74 - 99 mg/dL          Assessment/Plan     XR knee 4+ views bilateral    Result Date: 12/24/2023  Interpreted By:  Torres Vallejo, STUDY: XR KNEE 4+ VIEWS BILATERAL; 12/24/2023 4:43 pm   INDICATION: Signs/Symptoms:fall, pain;   COMPARISON: None   ACCESSION NUMBER(S): [id number]   ORDERING CLINICIAN: AMAURY IBANEZ   TECHNIQUE: AP, lateral, internal oblique, external oblique projections bilaterally.   FINDINGS: LEFT KNEE:   Total knee arthroplasty changes are identified. Relative chronic loss of left femoral cortical bone is seen.   No fracture or subluxation is identified.   No suprapatellar joint effusion is identified.   RIGHT KNEE:   Total knee arthroplasty changes are identified.   No fracture or subluxation is identified.   No suprapatellar joint effusion is identified.       No acute  fracture or dislocation.   MACRO: None   Signed by: Torres Vallejo 12/24/2023 4:52 PM Dictation workstation:   [id number]    XR shoulder left 2+ views    Result Date: 12/24/2023  Interpreted By:  Torres Vallejo, STUDY: XR SHOULDER LEFT 2+ VIEWS; 12/24/2023 4:43 pm   INDICATION: Signs/Symptoms:fall, pain;   COMPARISON: None   ACCESSION NUMBER(S): [id number]   ORDERING CLINICIAN: AMAURY IBANEZ   TECHNIQUE: Neutral and scapular projections   FINDINGS: No fracture or dislocation is seen.   Severe glenohumeral osteoarthrosis noted.   Moderate AC joint osteoarthrosis is seen.   The included intrathoracic structures are grossly unremarkable.       1. No acute fracture or dislocation. 2. Severe glenohumeral and moderate AC joint osteoarthrosis.   MACRO: None.   Signed by: Torres Vallejo 12/24/2023 4:49 PM Dictation workstation:   [id number]    XR chest 1 view    Result Date: 12/24/2023  Interpreted By:  Torres Vallejo, STUDY: XR CHEST 1 VIEW; 12/24/2023 4:43 pm   INDICATION: Signs/Symptoms:trauma   COMPARISON: No recent comparison   ACCESSION NUMBER(S): [id number]   ORDERING CLINICIAN: AMAURY IBANEZ   FINDINGS: The cardiomediastinal silhouette is unremarkable. The lungs are clear. No pleural effusion is identified.   The osseous structures are intact.       No acute cardiopulmonary process.   MACRO: None   Signed by: Torres Vallejo 12/24/2023 4:48 PM Dictation workstation:   [id number]    CT cervical spine wo IV contrast    Result Date: 12/24/2023  Interpreted By:  Moody Aranda, STUDY: CT CERVICAL SPINE WO IV CONTRAST; 12/24/2023 3:39 pm   INDICATION: Signs/Symptoms:trauma, neck pain   COMPARISON: None.   ACCESSION NUMBER(S): [id number]   ORDERING CLINICIAN: AMAURY IBANEZ   TECHNIQUE: Axial unenhanced images were obtained through the cervical spine. Sagittal and coronal post processing reconstruction images were obtained.   All CT examinations are performed with one or more of the following dose  reduction techniques: Automated Exposure Control, adjustment of mA and/or kV according to patient size, or use of iterative reconstruction techniques.   FINDINGS: No fracture is seen. The vertebral body heights are maintained. The vertebral alignment is anatomic; this is confirmed on the sagittal reconstruction images. Degenerative changes involve the articulation between the odontoid process and anterior arch of the C1 vertebra. There is narrowing of the C3-C4, C4-C5, C5-C6 and mostly the C6-C7, C7-T1 and T1-T2 discs with marginal osteophytes. There are also hypertrophic changes of facet joints bilaterally with resultant bilateral foraminal stenosis. Images from the thoracic inlet are unremarkable.       Marked cervical spondylosis without acute fracture or facet subluxation.   Signed by: Moody Aranda 12/24/2023 3:53 PM Dictation workstation:   [id number]    CT head wo IV contrast    Result Date: 12/24/2023  Interpreted By:  Moody Aranda, STUDY: CT HEAD WO IV CONTRAST; 12/24/2023 3:39 pm   INDICATION: Signs/Symptoms:trauma, head injury on blood thinners   COMPARISON: October 2023   ACCESSION NUMBER(S): [id number]   ORDERING CLINICIAN: AMAURY IBANEZ   TECHNIQUE: Axial images were obtained through the brain. No IV contrast was administered.   All CT examinations are performed with one or more of the following dose reduction techniques: Automated Exposure Control, adjustment of mA and/or kV according to patient size, or use of iterative reconstruction techniques.   FINDINGS: The ventricles are enlarged but midline in position, with proportional prominence of the sulci, due to atrophy. Patchy periventricular hypodensities are present suggestive of small vessel ischemic white matter disease. Remote infarct is again seen in the right frontal periventricular white matter. There is no intracranial hemorrhage. No mass or mass effect is seen. The osseus structures are unremarkable.       Stable age related changes  and remote right frontal/periventricular white matter infarct without acute intracranial process.   Signed by: Moody Aranda 12/24/2023 3:51 PM Dictation workstation:   [id number]    Impression:  Chronic kidney disease stage IV  Syncope rule out cardiac causes  Falls  Chronic atrial fibrillation  History of congestive heart failure  Mild anemia of chronic kidney disease    Recommendations:  Discontinue fenofibrate  Cardiology evaluation  Avoid nephrotoxic medications  Indication for dialysis therapy      Thank  you for your consult    Ariel Costa MDInpatient consult to Nephrology  Consult performed by: Ariel Costa MD  Consult ordered by: Monica Orozco MD

## 2023-12-25 NOTE — NURSING NOTE
Pt arrived to U room 445 about 2145 via cart unaccompanied except by 2 ED staff members. Pt acknowledges generalized pain. I inform him I will review his MAR for current pain intervention orders. Pt is pulled from cart to bed. Pt has at least 5 wounds visible, though ED reported 6 which they stated was charted. Will review chart. Pt and I are concerned if dressings are removed at this time for further documentation, they may start to bleed again. The ED informed pt that one was still oozing, but it would be safe to remove dressings come morning. Will not remove ED dressings at this time. A small bruise is noted on pt's left sacrum/buttocks. Pt alert, aox4. Pt has no needs or complaints at this time. Skin assessment complete. Pt oriented to call bell use, bed functions, room layout, and telephone use. Call bell and belongings are placed in reach. Telemetry leads in process of being connected. Bed alarm set. Continuing to monitor.

## 2023-12-25 NOTE — CARE PLAN
The patient's goals for the shift include      The clinical goals for the shift include Patient to have vitals wnl      Problem: Diabetes  Goal: Achieve decreasing blood glucose levels by end of shift  12/25/2023 1141 by Cristiana Grissom RN  Outcome: Progressing  12/25/2023 1113 by Cristiana Grissom RN  Outcome: Progressing  Goal: Increase stability of blood glucose readings by end of shift  12/25/2023 1141 by Cristiana Grissom RN  Outcome: Progressing  12/25/2023 1113 by Cristiana Grissom RN  Outcome: Progressing  Goal: Decrease in ketones present in urine by end of shift  12/25/2023 1141 by Cristiana Grissom RN  Outcome: Progressing  12/25/2023 1113 by Cristiana Grissom RN  Outcome: Progressing  Goal: Maintain electrolyte levels within acceptable range throughout shift  12/25/2023 1141 by Cristiana Grissom RN  Outcome: Progressing  12/25/2023 1113 by Cristiana Grissom RN  Outcome: Progressing  Goal: Maintain glucose levels >70mg/dl to <250mg/dl throughout shift  12/25/2023 1141 by Cristiana Grissom RN  Outcome: Progressing  12/25/2023 1113 by Cristiana Grissom RN  Outcome: Progressing  Goal: No changes in neurological exam by end of shift  12/25/2023 1141 by Cristiana Grissom RN  Outcome: Progressing  12/25/2023 1113 by Cristiana Grissom RN  Outcome: Progressing  Goal: Learn about and adhere to nutrition recommendations by end of shift  12/25/2023 1141 by Cristiana Grissom RN  Outcome: Progressing  12/25/2023 1113 by Cristiana Grissom RN  Outcome: Progressing  Goal: Vital signs within normal range for age by end of shift  12/25/2023 1141 by Cristiana Grissom RN  Outcome: Progressing  12/25/2023 1113 by Cristiana Grissom RN  Outcome: Progressing  Goal: Increase self care and/or family involovement by end of shift  12/25/2023 1141 by Cristiana Grissom RN  Outcome: Progressing  12/25/2023 1113 by Cristiana Grissom RN  Outcome: Progressing  Goal: Receive DSME education by end of shift  12/25/2023 1141 by Cristiana Grissom RN  Outcome:  Progressing  12/25/2023 1113 by Cristiana Grissom RN  Outcome: Progressing     Problem: Fall/Injury  Goal: Not fall by end of shift  12/25/2023 1141 by Cristiana Grissom RN  Outcome: Progressing  12/25/2023 1113 by Cristiana Grissom RN  Outcome: Progressing  Goal: Be free from injury by end of the shift  12/25/2023 1141 by Cristiana Grissom RN  Outcome: Progressing  12/25/2023 1113 by Cristiana Grissom RN  Outcome: Progressing  Goal: Verbalize understanding of personal risk factors for fall in the hospital  12/25/2023 1141 by Cristiana Grissom RN  Outcome: Progressing  12/25/2023 1113 by Cristiana Grissom RN  Outcome: Progressing  Goal: Verbalize understanding of risk factor reduction measures to prevent injury from fall in the home  12/25/2023 1141 by Cristiana Grissom RN  Outcome: Progressing  12/25/2023 1113 by Cristiana Grissom RN  Outcome: Progressing  Goal: Use assistive devices by end of the shift  12/25/2023 1141 by Cristiana Grissom RN  Outcome: Progressing  12/25/2023 1113 by Cristiana Grissom RN  Outcome: Progressing  Goal: Pace activities to prevent fatigue by end of the shift  12/25/2023 1141 by Cristiana Grissom RN  Outcome: Progressing  12/25/2023 1113 by Cristiana Grissom RN  Outcome: Progressing     Problem: Pain  Goal: My pain/discomfort is manageable  12/25/2023 1141 by Cristiana Grissom RN  Outcome: Progressing  12/25/2023 1113 by Cristiana Grissom RN  Outcome: Progressing     Problem: Safety  Goal: Patient will be injury free during hospitalization  12/25/2023 1141 by Cristiana Grissom RN  Outcome: Progressing  12/25/2023 1113 by Cristiana Grissom RN  Outcome: Progressing  Goal: I will remain free of falls  12/25/2023 1141 by Cristiana Grissom RN  Outcome: Progressing  12/25/2023 1113 by Cristiana Grissom RN  Outcome: Progressing     Problem: Daily Care  Goal: Daily care needs are met  12/25/2023 1141 by Cristiana Grissom RN  Outcome: Progressing  12/25/2023 1113 by Cristiana Grissom, RN  Outcome: Progressing     Problem: Psychosocial  Needs  Goal: Demonstrates ability to cope with hospitalization/illness  12/25/2023 1141 by Cristiana Grissom RN  Outcome: Progressing  12/25/2023 1113 by Cristiana Grissom RN  Outcome: Progressing  Goal: Collaborate with me, my family, and caregiver to identify my specific goals  12/25/2023 1141 by Cristiana Grissom RN  Outcome: Progressing  12/25/2023 1113 by Cristiana Grissom RN  Outcome: Progressing     Problem: Discharge Barriers  Goal: My discharge needs are met  12/25/2023 1141 by Cristiana Grissom RN  Outcome: Progressing  12/25/2023 1113 by Cristiana Grissom RN  Outcome: Progressing     Problem: Pain  Goal: STG - Patient verbalizes a reduction in pain level to <4/10 to improve I/ADLs  12/25/2023 1141 by Cristiana Grissom RN  Outcome: Progressing  12/25/2023 1113 by Cristiana Grissom RN  Outcome: Progressing

## 2023-12-25 NOTE — PROGRESS NOTES
Physical Therapy    Physical Therapy Evaluation    Patient Name: Rachid Yun  MRN: [id number]  Today's Date: 12/25/2023   Time Calculation  Start Time: 0807  Stop Time: 0832  Time Calculation (min): 25 min    Assessment/Plan   PT Assessment  PT Assessment Results: Decreased strength, Decreased range of motion, Decreased endurance, Impaired balance, Decreased mobility, Decreased safety awareness, Impaired sensation, Pain, Decreased skin integrity  Rehab Prognosis: Good  Barriers to Discharge: Pain levels  Evaluation/Treatment Tolerance: Patient limited by pain, Patient limited by fatigue  Medical Staff Made Aware: Yes  Strengths: Support of Caregivers  Barriers to Participation: Comorbidities  End of Session Communication: Bedside nurse  Assessment Comment: Pt is a 73 y.o. male with decreased strength, reduced ROM, impaired functional mobility, high pain levels, gait deficits, and impaired balance at risk for falls. Pt would benefit from moderate intensity needs at discharge.  End of Session Patient Position: Bed, 3 rail up  IP OR SWING BED PT PLAN  Inpatient or Swing Bed: Inpatient  PT Plan  Treatment/Interventions: Bed mobility, Transfer training, Gait training, Balance training, Neuromuscular re-education, Strengthening, Endurance training, Range of motion, Therapeutic exercise, Therapeutic activity, Positioning, Postural re-education  PT Plan: Skilled PT  PT Frequency: 4 times per week  PT Discharge Recommendations: Moderate intensity level of continued care  Equipment Recommended upon Discharge: Wheeled walker  PT Recommended Transfer Status: Assist x1      Subjective   General Visit Information:  General  Reason for Referral: Syncope and collapse  Referred By: Dr. Orozco  Past Medical History Relevant to Rehab: Several falls  Missed Visit: No  Family/Caregiver Present: No  Prior to Session Communication: Bedside nurse  Patient Position Received: Bed, 3 rail up  Preferred Learning Style: verbal  General  "Comment: Pt supine in bed upon arrival. Cleared for therapy by nursing. Agreeable to PT session  Home Living:  Home Living  Type of Home: Condo  Lives With: Adult children  Home Adaptive Equipment: Walker rolling or standard, Cane  Home Layout: One level, Able to live on main level with bedroom/bathroom  Home Access: Stairs to enter with rails  Entrance Stairs-Rails: Right  Entrance Stairs-Number of Steps: 1  Bathroom Shower/Tub: Tub/shower unit  Bathroom Toilet: Standard  Bathroom Equipment: Grab bars in shower, Shower chair with back  Home Living Comments: Son able to assist  Prior Level of Function:  Prior Function Per Pt/Caregiver Report  Level of [place]: Independent with ADLs and functional transfers, Needs assistance with homemaking  Receives Help From: Family  ADL Assistance: Independent  Homemaking Assistance: Needs assistance  Ambulatory Assistance: Independent  Vocational: Retired  Precautions:  Precautions  Medical Precautions: Fall precautions  Vital Signs:  See flowsheet - orthostatics obtained during session with RN     Objective   Pain:  Pain Assessment  Pain Assessment: 0-10  Pain Score: 7  Pain Type: Chronic pain, Acute pain  Pain Location:  (\"All over\")  Pain Frequency: Constant/continuous  Pain Onset: Ongoing  Clinical Progression: Gradually worsening  Patient's Stated Pain Goal: No pain  Pain Interventions: Repositioned  Cognition:  Cognition  Overall Cognitive Status: Within Functional Limits  Insight: Within function limits    General Assessments:  General Observation  General Observation: Tele and IV     Activity Tolerance  Endurance: Tolerates less than 10 min exercise, no significant change in vital signs, Decreased tolerance for upright activites  Early Mobility/Exercise Safety Screen: Proceed with mobilization - No exclusion criteria met    Postural Control  Postural Control: Within Functional Limits  Posture Comment:  (Forward flexed posture, moderate protracted scapulae)    Static " Sitting Balance  Static Sitting-Balance Support: Bilateral upper extremity supported, Feet supported  Static Sitting-Level of Assistance: Close supervision  Static Sitting-Comment/Number of Minutes: 3 minutes    Static Standing Balance  Static Standing-Balance Support: Bilateral upper extremity supported  Static Standing-Level of Assistance: Moderate assistance  Static Standing-Comment/Number of Minutes: 2 minutes  Functional Assessments:  ADL  ADL's Addressed: No    Bed Mobility  Bed Mobility: Yes  Bed Mobility 1  Bed Mobility 1: Supine to sitting  Level of Assistance 1: Moderate assistance, Moderate verbal cues  Bed Mobility Comments 1:  (Mod A for supine to sit with cues to lower bilat LEs over EOB and then use bedrail and push through trunk for upright positioning)  Bed Mobility 2  Bed Mobility  2: Sitting to supine  Level of Assistance 2: Moderate verbal cues, Moderate assistance  Bed Mobility Comments 2: Assist for bilat LE positioning and positioning trunk in correct position after transfer    Transfers  Transfer: Yes  Transfer 1  Transfer From 1: Sit to  Transfer to 1: Stand  Technique 1: Sit to stand  Transfer Device 1: Walker  Transfer Level of Assistance 1: Moderate assistance  Trials/Comments 1:  (Decreased weight through left LE/UE. Cues to perform forward trunk shift and push from bed and not 2WW)  Transfers 2  Transfer From 2: Stand to  Transfer to 2: Sit  Technique 2: Stand to sit  Transfer Device 2: Walker  Transfer Level of Assistance 2: Moderate assistance, Moderate verbal cues  Trials/Comments 2:  (Cues for controlled eccentric lowering and to reach for bed before sitting)    Ambulation/Gait Training  Ambulation/Gait Training Performed: Yes  Ambulation/Gait Training 1  Surface 1: Level tile  Device 1: Rolling walker  Assistance 1: Moderate assistance  Quality of Gait 1: Narrow base of support, Shuffling gait  Comments/Distance (ft) 1:  (4 lateral steps to left)    Stairs  Stairs:  No  Extremity/Trunk Assessments:  RLE   RLE : Within Functional Limits  LLE   LLE : Within Functional Limits  Outcome Measures:  [facility] Basic Mobility  Turning from your back to your side while in a flat bed without using bedrails: A little  Moving from lying on your back to sitting on the side of a flat bed without using bedrails: A lot  Moving to and from bed to chair (including a wheelchair): A lot  Standing up from a chair using your arms (e.g. wheelchair or bedside chair): A lot  To walk in hospital room: A lot  Climbing 3-5 steps with railing: A lot  Basic Mobility - Total Score: 13    Encounter Problems       Encounter Problems (Active)       Mobility       STG - Patient will ambulate 40 feet with 2WW at modified independent       Start:  12/25/23    Expected End:  01/08/24               Pain          Transfers       STG - Patient will perform bed mobility from supine to sit at modified independent       Start:  12/25/23    Expected End:  01/08/24            STG - Patient will transfer sit to and from stand from normal surface height at modified independent        Start:  12/25/23    Expected End:  01/08/24

## 2023-12-25 NOTE — PROGRESS NOTES
Rachid Yun is a 73 y.o. male on day 1 of admission presenting with Syncope and collapse.      Subjective   Patient feels better.  He denies shortness of breath, dizziness.       Objective     Last Recorded Vitals  /56 (BP Location: Right arm, Patient Position: Lying)   Pulse 64   Temp 36.1 °C (97 °F) (Temporal)   Resp 18   Wt 99.9 kg (220 lb 3.8 oz)   SpO2 97%   Intake/Output last 3 Shifts:    Intake/Output Summary (Last 24 hours) at 12/25/2023 1605  Last data filed at 12/25/2023 0413  Gross per 24 hour   Intake 1000 ml   Output 350 ml   Net 650 ml       Admission Weight  Weight: 108 kg (239 lb) (12/24/23 2021)    Daily Weight  12/24/23 : 99.9 kg (220 lb 3.8 oz)    Image Results  XR knee 4+ views bilateral  Narrative: Interpreted By:  Torres Vallejo,   STUDY:  XR KNEE 4+ VIEWS BILATERAL; 12/24/2023 4:43 pm      INDICATION:  Signs/Symptoms:fall, pain;      COMPARISON:  None      ACCESSION NUMBER(S):  [id number]      ORDERING CLINICIAN:  AMAURY IBANEZ      TECHNIQUE:  AP, lateral, internal oblique, external oblique projections  bilaterally.      FINDINGS:  LEFT KNEE:      Total knee arthroplasty changes are identified. Relative chronic loss  of left femoral cortical bone is seen.      No fracture or subluxation is identified.      No suprapatellar joint effusion is identified.      RIGHT KNEE:      Total knee arthroplasty changes are identified.      No fracture or subluxation is identified.      No suprapatellar joint effusion is identified.      Impression: No acute fracture or dislocation.      MACRO:  None      Signed by: Torres Vallejo 12/24/2023 4:52 PM  Dictation workstation:   [id number]  XR shoulder left 2+ views  Narrative: Interpreted By:  Torres Vallejo,   STUDY:  XR SHOULDER LEFT 2+ VIEWS; 12/24/2023 4:43 pm      INDICATION:  Signs/Symptoms:fall, pain;      COMPARISON:  None      ACCESSION NUMBER(S):  [id number]      ORDERING CLINICIAN:  AMAURY IBANEZ      TECHNIQUE:  Neutral and  scapular projections      FINDINGS:  No fracture or dislocation is seen.      Severe glenohumeral osteoarthrosis noted.      Moderate AC joint osteoarthrosis is seen.      The included intrathoracic structures are grossly unremarkable.      Impression: 1. No acute fracture or dislocation.  2. Severe glenohumeral and moderate AC joint osteoarthrosis.      MACRO:  None.      Signed by: Torres Vallejo 12/24/2023 4:49 PM  Dictation workstation:   [id number]  XR chest 1 view  Narrative: Interpreted By:  Torres Vallejo,   STUDY:  XR CHEST 1 VIEW; 12/24/2023 4:43 pm      INDICATION:  Signs/Symptoms:trauma      COMPARISON:  No recent comparison      ACCESSION NUMBER(S):  [id number]      ORDERING CLINICIAN:  AMAURY IBANEZ      FINDINGS:  The cardiomediastinal silhouette is unremarkable. The lungs are  clear. No pleural effusion is identified.      The osseous structures are intact.      Impression: No acute cardiopulmonary process.      MACRO:  None      Signed by: Torres Vallejo 12/24/2023 4:48 PM  Dictation workstation:   [id number]  CT cervical spine wo IV contrast  Narrative: Interpreted By:  Moody Aranda,   STUDY:  CT CERVICAL SPINE WO IV CONTRAST; 12/24/2023 3:39 pm      INDICATION:  Signs/Symptoms:trauma, neck pain      COMPARISON:  None.      ACCESSION NUMBER(S):  [id number]      ORDERING CLINICIAN:  AMAURY IBANEZ      TECHNIQUE:  Axial unenhanced images were obtained through the cervical spine.  Sagittal and coronal post processing reconstruction images were  obtained.      All CT examinations are performed with one or more of the following  dose reduction techniques: Automated Exposure Control, adjustment of  mA and/or kV according to patient size, or use of iterative  reconstruction techniques.      FINDINGS:  No fracture is seen.  The vertebral body heights are maintained.  The vertebral alignment is anatomic; this is confirmed on the  sagittal reconstruction images. Degenerative changes involve  the  articulation between the odontoid process and anterior arch of the C1  vertebra. There is narrowing of the C3-C4, C4-C5, C5-C6 and mostly  the C6-C7, C7-T1 and T1-T2 discs with marginal osteophytes. There are  also hypertrophic changes of facet joints bilaterally with resultant  bilateral foraminal stenosis. Images from the thoracic inlet are  unremarkable.      Impression: Marked cervical spondylosis without acute fracture or facet  subluxation.      Signed by: Moody Aranda 12/24/2023 3:53 PM  Dictation workstation:   [id number]  CT head wo IV contrast  Narrative: Interpreted By:  Moody Aranda,   STUDY:  CT HEAD WO IV CONTRAST; 12/24/2023 3:39 pm      INDICATION:  Signs/Symptoms:trauma, head injury on blood thinners      COMPARISON:  October 2023      ACCESSION NUMBER(S):  [id number]      ORDERING CLINICIAN:  AMAURY IBANEZ      TECHNIQUE:  Axial images were obtained through the brain. No IV contrast was  administered.      All CT examinations are performed with one or more of the following  dose reduction techniques: Automated Exposure Control, adjustment of  mA and/or kV according to patient size, or use of iterative  reconstruction techniques.      FINDINGS:  The ventricles are enlarged but midline in position, with  proportional prominence of the sulci, due to atrophy. Patchy  periventricular hypodensities are present suggestive of small vessel  ischemic white matter disease. Remote infarct is again seen in the  right frontal periventricular white matter. There is no intracranial  hemorrhage. No mass or mass effect is seen.  The osseus structures are unremarkable.      Impression: Stable age related changes and remote right frontal/periventricular  white matter infarct without acute intracranial process.      Signed by: Moody Aranda 12/24/2023 3:51 PM  Dictation workstation:   [id number]      Physical Exam  Pt is NAD.  Cooperative with exam.  In no distress at rest.  A, Ox3.  Face is  symmetrical.  Skin - no lesions.  Lungs: clear to auscultations B/L.  Diminished bilaterally no wheezes, rales, rhonchi.  Heart: irregular S1S2.  Abdomen: soft, NT, ND. BS positive.  Extr.: no edema, cords, cyanosis.  Moves all extr.   Relevant Results               Assessment/Plan                  Principal Problem:    Syncope and collapse            Syncope and collapse.  Likely, cardiac.  Possible orthostatic.  Will provide IV hydration, check orthostatic vital signs.  Consulted cardiology -plan for stress test in the morning  Paroxysmal atrial fibrillation.  Patient is back in atrial fibrillation but his rate is controlled.  Continue Eliquis   Acute on chronic renal failure.  Hold Cozaar, monitor renal function.  IV hydration, consult nephrology.  Improved today.  Seen by nephrology  Hypertension.  Continue medications except Cozaar  CODE STATUS, discussed with patient: Full code  Repeated falls.  Consult physical Occupational Therapy              Monica Orozco MD

## 2023-12-25 NOTE — NURSING NOTE
Called ED nurse back for report at this time having reviewed the chart and finding the targeted room is not ideal in its proximity from desk r/t pts admission with syncope and the fact there is no bed in the room. Will target room 445. On hold currently for report.

## 2023-12-25 NOTE — H&P
History Of Present Illness  Rachid Yun is a 73 y.o. male presenting with syncope.  Patient has a history of atrial fibrillation he was diagnosed just wants to go.  Patient underwent cardioversion by Dr. Hare.  Patient was on Eliquis and then dose was decreased to 2.5 mg twice a day.  Patient had a similar episode about 2 months ago when he had syncope.  Patient is on Keppra for possibility of having seizure though he was not diagnosed with seizure disorder.  Today, patient was at his baseline he denies any chest pain, shortness of breath, palpitations.  He ambulates with a cane.  He remembers walking to the bathroom and sitting on the toilet to urinate.  Next thing he remembers he is on the floor when his son is trying to wake him up.  According to the son, he heard patient falling so he ran to the bathroom to find patient on the floor unresponsive.  Patient he the floor with his knees and then he hit his face and elbow against a door jam.  Patient had significant bleeding from his left knee and left side of the neck.  Patient did not have any seizure-like activity according to his son according to nursing signout.  During the exam, patient denies any acute problems.  Past Medical History  Paroxysmal atrial fibrillation  Coronary artery disease  Hypertension  Type 2 diabetes on insulin  Chronic renal failure stage III-IV  Osteoarthritis  Dyslipidemia  Possible seizure  Syncope in the past  CVA/TIA      Surgical History  He has a past surgical history that includes Back surgery and Cardiac electrophysiology procedure (N/A, 11/29/2023).  Bilateral knee replacement  Social History  He reports that he quit smoking about 36 years ago. His smoking use included cigarettes. He has been exposed to tobacco smoke. He has never used smokeless tobacco. He reports that he does not currently use alcohol. He reports that he does not use drugs.  Lives at home with his son  Family History  Family History   Problem Relation Name  Age of Onset    Stomach cancer Mother      Diabetes Mother      Coronary artery disease Father      Other (Pacemaker) Father      Other (S/p CABG) Sister      Other (S/p CABG) Brother      Ovarian cancer Sibling      Coronary artery disease Sibling          Allergies  Meperidine, Vancomycin, Hydromorphone, Other, and Rifampin    Review of Systems   Constitutional:  Negative for activity change, appetite change, chills, diaphoresis and fever.   Respiratory:  Negative for apnea, cough, shortness of breath and wheezing.    Cardiovascular:  Negative for chest pain, palpitations and leg swelling.   Gastrointestinal:  Negative for abdominal pain, blood in stool, constipation, diarrhea and nausea.   Genitourinary:  Negative for hematuria.   Musculoskeletal:  Positive for arthralgias.   Neurological:  Positive for syncope and light-headedness. Negative for seizures.   All other systems reviewed and are negative.  10 systems were reviewed     Physical Exam   Location: ED, room 10  Pi is in no apparent distress.   Cooperative with exam.  Nontoxic-appearing.  Comfortable at rest on room air.  Skin is clean, dry.  Ecchymosis and skin abrasions on the left knee, left side of the neck, right eyebrow, left earlobe, palpable  Head is atraumatic, normocephalic.  Mouth mucosa is pink and moist.  No mucosal lesions.  No nasal discharge.  Musculoskeletal: No deformities, no muscle swelling.  No point tenderness to palpation.  Neck is supple, no JVD, no carotid bruits.  No lymphadenopathy.  Chest is atraumatic.  No tenderness to palpation.  Lungs are clear to auscultation bilaterally.  Diminished at bases no wheezes, no rales, no rhonchi.  Heart: irregular rate and rhythm , S1-S2.  No murmurs, rubs, gallops.  Peripheral pulses are palpable in all extremities, equal.  Monitor demonstrates atrial fibrillation, rate controlled  Abdomen is soft, not tender, not distended.  Bowel sounds positive in all quadrants.  No rebound, no guarding.   Full exam deferred.  Extremities: No peripheral edema, cords, cyanosis, varices.  Neuro: Patient is alert, oriented x3.  Moves all extremities.  No gross focal neurological deficits.  Face is symmetrical.  Tongue is midline.  No visual abnormalities.  No dysarthria or aphasia.  Psychiatric: Patient is cooperative with exam, maintains good eye contact.  No evidence of psychosis, suicidal ideation or depression.   Last Recorded Vitals  /70   Pulse 75   Temp 36.7 °C (98 °F)   Resp 10   SpO2 100%     Relevant Results        Results for orders placed or performed during the hospital encounter of 12/24/23 (from the past 24 hour(s))   CBC and Auto Differential   Result Value Ref Range    WBC 7.7 4.4 - 11.3 x10*3/uL    nRBC 0.0 0.0 - 0.0 /100 WBCs    RBC 4.13 (L) 4.50 - 5.90 x10*6/uL    Hemoglobin 11.9 (L) 13.5 - 17.5 g/dL    Hematocrit 37.4 (L) 41.0 - 52.0 %    MCV 91 80 - 100 fL    MCH 28.8 26.0 - 34.0 pg    MCHC 31.8 (L) 32.0 - 36.0 g/dL    RDW 13.7 11.5 - 14.5 %    Platelets 239 150 - 450 x10*3/uL    Neutrophils % 77.7 40.0 - 80.0 %    Immature Granulocytes %, Automated 0.4 0.0 - 0.9 %    Lymphocytes % 12.5 13.0 - 44.0 %    Monocytes % 6.4 2.0 - 10.0 %    Eosinophils % 2.0 0.0 - 6.0 %    Basophils % 1.0 0.0 - 2.0 %    Neutrophils Absolute 5.95 (H) 1.60 - 5.50 x10*3/uL    Immature Granulocytes Absolute, Automated 0.03 0.00 - 0.50 x10*3/uL    Lymphocytes Absolute 0.96 0.80 - 3.00 x10*3/uL    Monocytes Absolute 0.49 0.05 - 0.80 x10*3/uL    Eosinophils Absolute 0.15 0.00 - 0.40 x10*3/uL    Basophils Absolute 0.08 0.00 - 0.10 x10*3/uL   Comprehensive metabolic panel   Result Value Ref Range    Glucose 160 (H) 65 - 99 mg/dL    Sodium 135 133 - 145 mmol/L    Potassium 4.3 3.4 - 5.1 mmol/L    Chloride 102 97 - 107 mmol/L    Bicarbonate 19 (L) 24 - 31 mmol/L    Urea Nitrogen 54 (H) 8 - 25 mg/dL    Creatinine 3.20 (H) 0.40 - 1.60 mg/dL    eGFR 20 (L) >60 mL/min/1.73m*2    Calcium 9.2 8.5 - 10.4 mg/dL    Albumin 3.9 3.5  - 5.0 g/dL    Alkaline Phosphatase 44 35 - 125 U/L    Total Protein 6.4 5.9 - 7.9 g/dL    AST 18 5 - 40 U/L    Bilirubin, Total 0.5 0.1 - 1.2 mg/dL    ALT 10 5 - 40 U/L    Anion Gap 14 <=19 mmol/L   Serial Troponin, Initial ([facility])   Result Value Ref Range    Troponin T, High Sensitivity 130 (H) <=15 ng/L   Serial Troponin, 2 Hour ([facility])   Result Value Ref Range    Troponin T, High Sensitivity 129 (H) <=15 ng/L       XR knee 4+ views bilateral    Result Date: 12/24/2023  Interpreted By:  Torres Vallejo, STUDY: XR KNEE 4+ VIEWS BILATERAL; 12/24/2023 4:43 pm   INDICATION: Signs/Symptoms:fall, pain;   COMPARISON: None   ACCESSION NUMBER(S): [id number]   ORDERING CLINICIAN: AMAURY IBANEZ   TECHNIQUE: AP, lateral, internal oblique, external oblique projections bilaterally.   FINDINGS: LEFT KNEE:   Total knee arthroplasty changes are identified. Relative chronic loss of left femoral cortical bone is seen.   No fracture or subluxation is identified.   No suprapatellar joint effusion is identified.   RIGHT KNEE:   Total knee arthroplasty changes are identified.   No fracture or subluxation is identified.   No suprapatellar joint effusion is identified.       No acute fracture or dislocation.   MACRO: None   Signed by: Torres Vallejo 12/24/2023 4:52 PM Dictation workstation:   [id number]    XR shoulder left 2+ views    Result Date: 12/24/2023  Interpreted By:  Torres Vallejo, STUDY: XR SHOULDER LEFT 2+ VIEWS; 12/24/2023 4:43 pm   INDICATION: Signs/Symptoms:fall, pain;   COMPARISON: None   ACCESSION NUMBER(S): [id number]   ORDERING CLINICIAN: AMAURY IBANEZ   TECHNIQUE: Neutral and scapular projections   FINDINGS: No fracture or dislocation is seen.   Severe glenohumeral osteoarthrosis noted.   Moderate AC joint osteoarthrosis is seen.   The included intrathoracic structures are grossly unremarkable.       1. No acute fracture or dislocation. 2. Severe glenohumeral and moderate AC joint osteoarthrosis.   MACRO: None.    Signed by: Torres Vallejo 12/24/2023 4:49 PM Dictation workstation:   [id number]    XR chest 1 view    Result Date: 12/24/2023  Interpreted By:  Torres Vallejo, STUDY: XR CHEST 1 VIEW; 12/24/2023 4:43 pm   INDICATION: Signs/Symptoms:trauma   COMPARISON: No recent comparison   ACCESSION NUMBER(S): [id number]   ORDERING CLINICIAN: AMAURY IBANEZ   FINDINGS: The cardiomediastinal silhouette is unremarkable. The lungs are clear. No pleural effusion is identified.   The osseous structures are intact.       No acute cardiopulmonary process.   MACRO: None   Signed by: Torres Vallejo 12/24/2023 4:48 PM Dictation workstation:   [id number]    CT cervical spine wo IV contrast    Result Date: 12/24/2023  Interpreted By:  Moody Aranda, STUDY: CT CERVICAL SPINE WO IV CONTRAST; 12/24/2023 3:39 pm   INDICATION: Signs/Symptoms:trauma, neck pain   COMPARISON: None.   ACCESSION NUMBER(S): [id number]   ORDERING CLINICIAN: AMAURY IBANEZ   TECHNIQUE: Axial unenhanced images were obtained through the cervical spine. Sagittal and coronal post processing reconstruction images were obtained.   All CT examinations are performed with one or more of the following dose reduction techniques: Automated Exposure Control, adjustment of mA and/or kV according to patient size, or use of iterative reconstruction techniques.   FINDINGS: No fracture is seen. The vertebral body heights are maintained. The vertebral alignment is anatomic; this is confirmed on the sagittal reconstruction images. Degenerative changes involve the articulation between the odontoid process and anterior arch of the C1 vertebra. There is narrowing of the C3-C4, C4-C5, C5-C6 and mostly the C6-C7, C7-T1 and T1-T2 discs with marginal osteophytes. There are also hypertrophic changes of facet joints bilaterally with resultant bilateral foraminal stenosis. Images from the thoracic inlet are unremarkable.       Marked cervical spondylosis without acute fracture or facet  subluxation.   Signed by: Moody Aranda 12/24/2023 3:53 PM Dictation workstation:   [id number]    CT head wo IV contrast    Result Date: 12/24/2023  Interpreted By:  Moody Aranda, STUDY: CT HEAD WO IV CONTRAST; 12/24/2023 3:39 pm   INDICATION: Signs/Symptoms:trauma, head injury on blood thinners   COMPARISON: October 2023   ACCESSION NUMBER(S): [id number]   ORDERING CLINICIAN: AMAURY IBANEZ   TECHNIQUE: Axial images were obtained through the brain. No IV contrast was administered.   All CT examinations are performed with one or more of the following dose reduction techniques: Automated Exposure Control, adjustment of mA and/or kV according to patient size, or use of iterative reconstruction techniques.   FINDINGS: The ventricles are enlarged but midline in position, with proportional prominence of the sulci, due to atrophy. Patchy periventricular hypodensities are present suggestive of small vessel ischemic white matter disease. Remote infarct is again seen in the right frontal periventricular white matter. There is no intracranial hemorrhage. No mass or mass effect is seen. The osseus structures are unremarkable.       Stable age related changes and remote right frontal/periventricular white matter infarct without acute intracranial process.   Signed by: Moody Aranda 12/24/2023 3:51 PM Dictation workstation:   [id number]    Electrophysiology procedure    Result Date: 12/19/2023  Recommendations: 1.  2.      CT abdomen pelvis wo IV contrast    Result Date: 12/11/2023  Interpreted By:  Ashu Palmer, STUDY: CT ABDOMEN PELVIS WO IV CONTRAST; 12/11/2023 4:28 pm   INDICATION: Signs/Symptoms:  RLQ pain, possible appendicitis.   COMPARISON: 24 October 2023   ACCESSION NUMBER(S): [id number]   ORDERING CLINICIAN: BUD PEREZ   TECHNIQUE: CT of the Abdomen and Pelvis was performed. Axial, sagittal and coronal reformatted images were reviewed.   PROCEDURE: Helical CT images were obtained through the abdomen and  pelvis at 3 mm collimation.   CT was performed with one or more of the following dose reduction techniques: automated exposure control, adjustment of the mA and/or kV according to patient size, or use of iterative reconstruction technique.   FINDINGS: Chest: The lower thorax demonstrates dependent atelectasis without infiltrate or effusion. Moderate to advanced coronary artery calcifications are seen without cardiomegaly with calcifications incompletely seen in the mitral in atrial valves. Mild atherosclerotic disease distal thoracic aorta demonstrated.   Esophagus, stomach, duodenum and small bowel: Esophagus is slightly patulous without hiatal hernia. Stomach, duodenum, and small bowel with residual oral contrast within it are otherwise unremarkable. No dilated loops of small bowel. No suggestion of asymmetric small bowel wall thickening. Pancreas:  There is atrophy of the pancreas with fatty infiltration without focal lesion and no duct visible.   Liver: Streak artifact due to contact of the patient with the gantry through the abdomen is seen. The liver parenchyma is unremarkable. Gallbladder and bile ducts: Elongated with layering sludge and tiny stones in the gallbladder without pericholecystic fluid or gallbladder wall thickening and no choledocholithiasis or intra or extrahepatic ductal dilatation.   Adrenal Glands: In adrenals are unremarkable.   Spleen: No abnormality of the spleen   Kidneys and ureters: Nonspecific stranding surrounds the left greater than right ureter without nephrolithiasis or ureterolithiasis.   Urinary Bladder: Incompletely urine distended with thickened walls.   Appendix: Normal appendix visualized posterior colonic gutter.   Large Bowel: Mild ascending, transverse and descending colonic stool burden with diverticulosis scattered in the descending colon without diverticulitis. Diverticulosis is greater in the sigmoid colon with slight wall thickening of the sigmoid colon measuring as  much as 9.4 mm thickness in the redundant sigmoid which continues to the rectum. This may be 2 2 rectal colitis without surrounding inflammation.   Intraperitoneal space: No free fluid, free air or fluid collection.   Vasculature: Moderate atherosclerotic disease of the nondilated abdominal aorta and iliac vessels seen. Infrarenal IVC filter in place.   Lymph Nodes: No inguinal, pelvic sidewall, mesenteric or retroperitoneal adenopathy.   Reproductive Organs: Coarse calcifications in a mildly enlarged prostate seen.   Bones/Joints: Multilevel discogenic degenerative disease and vacuum phenomenon in the lower thoracic and lumbar spine seen with grade 1 anterolisthesis L4 on L5. No acute wedge compression or compression injury. Sclerosis proximal left femur seen that may be due to in ostosis or AVN. Sclerosis is seen in the right greater than left femoral head suggestive of AVN with preserved femoral head contours. Moderate bilateral SI joint degenerative change in facet arthropathy lower lumbar spine seen. Levoscoliosis upper lumbar spine demonstrated. No fracture or dislocation of the bilateral hips or femurs.   Soft Tissues: Nonspecific induration right lower quadrant with associated calcifications is seen without fluid collection to suggest abscess. Trace anasarca is seen given streak artifact. Small right greater than left inguinal hernias only involving fat without bowel involvement demonstrated. Diastasis recti is seen without focal abdominal wall defect. The remainder of the soft tissues are unremarkable.       1. Normal appendix. 2. Diffuse nodular wall thickening of the rectosigmoid may be indicative of colitis without dilatation and no surrounding induration and no focal diverticulitis. This is new compared to the prior examination. 3. Mild scattered descending and slightly more extensive sigmoid diverticulosis without focal diverticulitis given the nodular wall thickening of the rectosigmoid. 4.  Nonspecific stranding surrounds the left greater than right kidney without obstructive uropathy and no cyst or mass seen. Recommend correlation with urinalysis for UTI. The bladder is incompletely urine distended given the appearance of a thickened wall. 5. No ileus or obstruction. No findings to suggest enteritis. 6. Elongated gallbladder with layering sludge and tiny stones without findings of acute cholecystitis and no choledocholithiasis or intra or extrahepatic ductal dilatation. 7. Nonspecific area of induration in the subcutaneous tissues right lower quadrant with associated calcifications, unchanged compared to previous. 8. Chronic sclerosis in the proximal left femur is well as each femoral head that may be due to chronic AVN. This is seen on the prior examination.         This report has been produced using speech recognition. This exam is available in DICOM format to non-affiliated healthcare facilities on a secure media free searchable basis with prior patient authorization. The patient exposure is reported to a radiation dose index registry. All CT examinations are performed with one or more of the following dose reduction techniques: Automated Exposure Control, Adjustment of mA and/or KV according to patient size, or use of iterative reconstruction techniques.   Signed by: Ashu Palmer 12/11/2023 6:15 PM Dictation workstation:   [id number]    Assessment/Plan   Principal Problem:    Syncope and collapse      Syncope and collapse.  Likely, cardiac.  Possible orthostatic.  Will provide IV hydration, check orthostatic vital signs.  Consult cardiology  Paroxysmal atrial fibrillation.  Patient is back in atrial fibrillation but his rate is controlled.  Continue Eliquis and consult cardiology  Acute on chronic renal failure.  Hold Cozaar, monitor renal function.  IV hydration, consult nephrology  Hypertension.  Continue medications except Cozaar  CODE STATUS, discussed with patient: Full code  Repeated  falls.  Consult physical Occupational Therapy  Time spent with patient 42 minutes.       Monica Orozco MD

## 2023-12-25 NOTE — CARE PLAN
Problem: Skin  Goal: Decreased wound size/increased tissue granulation at next dressing change  Outcome: Progressing   The patient's goals for the shift include      The clinical goals for the shift include Patient to have vitals wnl

## 2023-12-25 NOTE — NURSING NOTE
Rounded on pt. Pt laying in bed, eyes closed, respirations even and unlabored. Pt appears to be sleeping and in no apparent pain or distress. Call bell and belongings are placed in reach. Telemetry leads connected and reading on monitor. I asked pt earlier since he was awake near midnight, if he would like us to not do the midnight vitals if he was asleep by then. Pt agreeable. Vital signs not done at this time. Bed alarm set. Continuing to monitor.

## 2023-12-25 NOTE — CONSULTS
Inpatient consult to Cardiology  Consult performed by: HERBERT Almendarez-CNP  Consult ordered by: Monica Orozco MD  Reason for consult: Atrial fibrillation, syncope        History Of Present Illness:    Rachid Yun is a 73 y.o. male presenting with syncope.  He follows with Dr. Hare for cardiology. Patient has been admitted to the hospital recently with similar symptoms.  Past medical history of atrial fibrillation with cardioversion in November 2023, hypertension, stroke, coronary artery disease and diabetes mellitus.  Patient states he walked to the bathroom sat on the toilet to urinate and became lightheaded, the next thing he remembers being on the floor and his son tried to wake him up.  Patient denies any recent chest pain, pressure or shortness of breath.  Patient is on Keppra for suspected seizure activity over two years ago, though there was no seizure-like activity during this fall.  CT of the cervical spine shows no acute fracture or facet subluxation.  CT of the head negative for any acute intracranial process.  X-ray of the chest negative for any acute cardiopulmonary process.  EKG in the emergency department shows atrial fibrillation which is rate controlled with a ventricular rate of 73 bpm.  Lab work completed in the emergency department shows a sodium of 135, potassium 4.3, elevated creatinine at 3.20 and hemoglobin of 11.9.  Troponin trends show 130, 129 and 90.  He was given a bolus of normal saline in the emergency department and was admitted for further assessment and management.    Review of Systems   All other systems reviewed and are negative.        Last Recorded Vitals:  Vitals:    12/24/23 2210 12/24/23 2215 12/25/23 0413 12/25/23 0700   BP: 147/69 135/65 125/67 148/78   BP Location: Right leg Right arm Right arm Right arm   Patient Position: Lying Sitting Lying Lying   Pulse:  97 97 105   Resp:  13 18 18   Temp:  36.3 °C (97.3 °F) 36 °C (96.8 °F) 36 °C (96.8 °F)   TempSrc:   "Temporal Temporal Temporal   SpO2:  100% 98% 98%   Weight:  99.9 kg (220 lb 3.8 oz)     Height:  1.93 m (6' 4\")         Last Labs:  CBC - 12/24/2023:  3:55 PM  7.7 11.9 239    37.4      CMP - 12/25/2023:  4:31 AM  8.8 6.4 18 --- 0.5   3.0 3.6 10 44      PTT - 10/23/2023: 11:31 PM  1.1   11.9 22.3     Hemoglobin A1C   Date/Time Value Ref Range Status   10/24/2023 05:18 AM 6.3 (H) See below % Final   01/30/2023 10:32 AM 6.7 (H) 4.0 - 6.0 % Final     Comment:     Hemoglobin A1C levels are related to mean blood glucose during the   preceding 2-3 months. The relationship table below may be used as a   general guide. Each 1% increase in HGB A1C is a reflection of an   increase in mean glucose of approximately 30 mg/dl.   Reference: Diabetes Care, volume 29, supplement 1 Jan. 2006                        HGB A1C ................. Approx. Mean Glucose   _______________________________________________   6%   ...............................  120 mg/dl   7%   ...............................  150 mg/dl   8%   ...............................  180 mg/dl   9%   ...............................  210 mg/dl   10%  ...............................  240 mg/dl  Performed at [address] [place] [address]     12/01/2021 12:28 PM 5.8 4.0 - 6.0 % Final     Comment:     Hemoglobin A1C levels are related to mean blood glucose during the   preceding 2-3 months. The relationship table below may be used as a   general guide. Each 1% increase in HGB A1C is a reflection of an   increase in mean glucose of approximately 30 mg/dl.   Reference: Diabetes Care, volume 29, supplement 1 Jan. 2006                        HGB A1C ................. Approx. Mean Glucose   _______________________________________________   6%   ...............................  120 mg/dl   7%   ...............................  150 mg/dl   8%   ...............................  180 mg/dl   9%   ...............................  210 mg/dl   10%  " "...............................  240 mg/dl  Performed at [address] [address]     10/17/2021 01:52 PM 7.0 (H) 4.7 - 6.4 % Final     Comment:     Interpretation:     Standardized A1c  Good control or normal:  4-6% ( mg/dL avg)  Moderate control:        6.1-8.0% (120-180 mg/dL avg)  Poor control:            >8.0% (180 mg/dL avg)  With 4% as a baseline, each 1% increase = 30 mg/dL increase in average   glucose.  Taken from DCCT (Diabetes Control Complications Trial)      Last I/O:  I/O last 3 completed shifts:  In: 1000 (10 mL/kg) [IV Piggyback:1000]  Out: 350 (3.5 mL/kg) [Urine:350 (0.1 mL/kg/hr)]  Weight: 99.9 kg     Past Cardiology Tests (Last 3 Years):  EKG:  No results found for this or any previous visit from the past 1095 days.    Echo:  Transthoracic Echo (TTE) Complete 10/24/2023    Ejection Fractions:  No results found for: \"EF\"  Cath:  No results found for this or any previous visit from the past 1095 days.    Stress Test:  No results found for this or any previous visit from the past 1095 days.    Cardiac Imaging:  No results found for this or any previous visit from the past 1095 days.      Past Medical History:  He has a past medical history of Arthritis, Coronary artery disease, Diabetes mellitus (CMS/HCC), Hypertension, and Stroke (CMS/HCC).    Past Surgical History:  He has a past surgical history that includes Back surgery and Cardiac electrophysiology procedure (N/A, 11/29/2023).      Social History:  He reports that he quit smoking about 36 years ago. His smoking use included cigarettes. He has been exposed to tobacco smoke. He has never used smokeless tobacco. He reports that he does not currently use alcohol. He reports that he does not use drugs.    Family History:  Family History   Problem Relation Name Age of Onset    Stomach cancer Mother      Diabetes Mother      Coronary artery disease Father      Other (Pacemaker) Father      Other (S/p CABG) Sister      " Other (S/p CABG) Brother      Ovarian cancer Sibling      Coronary artery disease Sibling          Allergies:  Meperidine, Vancomycin, Hydromorphone, Other, and Rifampin    Inpatient Medications:  Scheduled medications   Medication Dose Route Frequency    amLODIPine  5 mg oral Daily    apixaban  2.5 mg oral BID    atorvastatin  10 mg oral Daily    clopidogrel  75 mg oral Daily    donepezil  10 mg oral Nightly    DULoxetine  60 mg oral Daily    fenofibrate  160 mg oral Daily    ferrous sulfate (325 mg ferrous sulfate)  65 mg of iron oral BID with meals    folic acid  0.4 mg oral Daily    gabapentin  600 mg oral Daily    levETIRAcetam  500 mg oral BID    metoprolol succinate XL  25 mg oral BID    pantoprazole  40 mg oral Daily    polyethylene glycol  17 g oral Daily    sennosides  2 tablet oral Nightly    tamsulosin  0.4 mg oral Daily    traZODone  150 mg oral Nightly     PRN medications   Medication    acetaminophen    guaiFENesin    melatonin    ondansetron    ondansetron     Continuous Medications   Medication Dose Last Rate    sodium chloride 0.9%  100 mL/hr 100 mL/hr (12/24/23 2043)     Outpatient Medications:  Current Outpatient Medications   Medication Instructions    amLODIPine (Norvasc) 5 mg tablet 1 tablet, oral, Daily    apixaban (ELIQUIS) 2.5 mg, oral, 2 times daily    atorvastatin (Lipitor) 10 mg tablet 1 tablet, oral, Daily, for 90 day(s)    clopidogrel (PLAVIX) 75 mg, oral, Daily    cyanocobalamin (Vitamin B-12) 1,000 mcg tablet 1 tablet, oral, Daily    donepezil (Aricept) 10 mg tablet 1 tablet, oral, Nightly, for 90 day(s)    DULoxetine (Cymbalta) 60 mg DR capsule 1 capsule, oral, Daily, for 90 day(s)    fenofibrate (Triglide) 160 mg tablet     ferrous sulfate 325 (65 Fe) MG EC tablet 1 tablet, oral, 2 times daily, for 90 day(s)    folic acid (Folvite) 400 mcg tablet 1 tablet, oral, Daily, for 90 day(s)    gabapentin (Neurontin) 600 mg tablet 1 tablet, oral, Daily    insulin glargine (LANTUS U-100  INSULIN) 40 Units, subcutaneous, Daily, for 90 day(s)    insulin regular (HumuLIN R Regular U-100 Insuln) 100 unit/mL injection as directed Injection sliding scale<BR>    ketoconazole (NIZOral) 2 % shampoo Topical, 2 times weekly, Shampoo daily, leave on for 5-10 minutes, then rinse.    lactulose 20 gram/30 mL oral solution TAKE 15 ML BY MOUTH ONCE DAILY    levETIRAcetam (Keppra) 500 mg tablet 1 tablet, oral, Every 12 hours, for 90 day(s)    losartan (Cozaar) 100 mg tablet 1 tablet, oral, Daily, FOR 90 DAYS    metoprolol succinate XL (TOPROL-XL) 25 mg, oral, 2 times daily, Do not crush or chew.    ondansetron (Zofran) 4 mg tablet 1 tablet, oral, Every 6 hours PRN    oxyCODONE (Roxicodone) 15 mg immediate release tablet 1 tablet, oral, 4 times daily PRN,  FOR PAIN FOR UP TO 30 DAYS *DNF UNTIL 8/26/23*<BR>    pantoprazole (ProtoNix) 40 mg EC tablet 1 tablet, oral, Daily    sennosides (Senokot) 8.6 mg tablet 2 tablets, oral, Nightly    tamsulosin (Flomax) 0.4 mg 24 hr capsule     traZODone (DESYREL) 150 mg, oral, Nightly, FOR 90 DAYS       Physical Exam  Neurological:      Mental Status: He is alert and oriented to person, place, and time.          Assessment/Plan   Syncope  Atrial Fibrillation  Coronary Artery Disease  Diabetes Mellitus type 2  Cerebrovascular Accident    Impression and Plan: 12/25 Patient presenting with syncope and collapse at home.  Patient has been admitted to the hospital recently with similar symptoms.  Patient states he walked to the bathroom sat on the toilet to urinate, became lightheaded and the next thing he remembers being on the floor and his son tried to wake him up.  Patient denies any recent chest pain, pressure or shortness of breath.  Patient is on Keppra for suspected seizure activity, though there was no seizure-like activity during this fall.  CT of the cervical spine shows no acute fracture or facet subluxation.  CT of the head negative for any acute intracranial process.  X-ray  of the chest negative for any acute cardiopulmonary process.  EKG in the emergency department shows atrial fibrillation which is rate controlled with a ventricular rate of 73 bpm.  Lab work completed in the emergency department shows a sodium of 135, potassium 4.3, elevated creatinine at 3.20 and hemoglobin of 11.9.  Troponin trend show 130, 129 and 90.  Patient remains in a rate controlled atrial fibrillation on telemetry.  He is on Eliquis for stroke risk reduction at a reduced dose of 2.5 mg.  Orthostatic vitals were negative.  Echocardiogram completed in October of this year showed a normal left ventricular systolic function and an impaired relaxation pattern of left ventricular diastolic filling. Given this patient's syncope as well as his elevated troponin's, I have ordered a Lexiscan stress test to be completed tomorrow morning. Patient will be NPO at midnight for this.     Peripheral IV 12/24/23 20 G Left Antecubital (Active)   Site Assessment Clean;Dry;Intact 12/25/23 0800   Dressing Status Clean;Dry;Occlusive 12/25/23 0800   Number of days: 1       Code Status:  Full Code    I spent 60 minutes in the professional and overall care of this patient.        Ana Olivo, APRN-CNP

## 2023-12-25 NOTE — NURSING NOTE
Rounded on pt. Pt laying in bed, alert, aox4. Pt requesting pain medication for generalized pain. Will obtain only available intervention which is tylenol. Pt has no other needs or complaints at this time. Pt oriented to call bell and it and belongings are placed in reach. Telemetry leads connected and reading on monitor. Bed alarm set. Continuing to monitor.

## 2023-12-25 NOTE — ED PROVIDER NOTES
Patient was seen by both myself and advanced practitioner.  I performed substantive portion of the visit including all aspects of the medical decision making.  Please refer to advanced practitioner's note further workup, evaluation.    Patient is a 73-year-old male that presents emergency room for evaluation after a fall, head injury.  Initially patient was made a code HIA as he is on Eliquis due to history of atrial fibrillation.  However it was found that patient did lose consciousness during the fall.  Is unclear whether patient lost consciousness before or after the fall.  Patient does admit to a headache in the back of the head as well as pain in his left shoulder and his knees bilaterally.  He denies change in vision, shortness of breath, chest pain, abdominal pain, nausea or vomiting.  Patient denies any missed doses of his Eliquis.    On exam patient uncomfortable appearing but in no obvious distress.  He is awake, alert and oriented.  He is moving all extremities equally with generalized weakness but no focal deficit.  Pupils are equal, round, reactive to light.  He does have a small abrasion to the left ear however no obvious head laceration.  There is a skin tear to the left shoulder as well as the knees bilaterally however no deep laceration in these skin tears do not require repair.  Patient has GCS of 15 on initial presentation.  Given patient's loss of consciousness with head injury and multiple body areas involved patient was made a limited trauma.  CT scan of the head and cervical spine was ordered along with x-ray of the left shoulder, chest, knees bilaterally.  X-ray showed degenerative changes consistent with patient's known history of arthritis.  There is no evidence of pneumothorax, pneumomediastinum or pulmonary contusion on chest x-ray.  CT scan of the brain shows again chronic age-related changes but no evidence of acute intracranial process.  CT scan cervical spine showed no evidence of  fracture.  Blood work is ordered given concern for possible syncope given patient significant cardiac history.  EKG shows atrial fibrillation which is rate controlled at this time and however no evidence of STEMI.  Blood work is remarkable for significantly elevated troponin of 130, acute on chronic kidney injury with a creatinine of 3.2 which is significantly up from his baseline of 2.  He does have normal electrolytes at this time and normal hemoglobin level.  Given his significant elevated troponin patient will be admitted for possible cardiogenic syncope.  I did discuss case again with the trauma surgeon who agrees with plan for admission.  Case was discussed with hospitalist who accepted patient for admission.     Igor Glez, DO  12/24/23 1909

## 2023-12-25 NOTE — CONSULTS
Reason For Consult  Trauma    History Of Present Illness  Rachid Yun is a 73 y.o. male presenting with multiple superficial injuries following a fall from the toilet.  The patient states he went to get up from the toilet and became lightheaded and fell to his knees.  He did lose consciousness.  He was brought to the emergency room.  He was initially classified as HIA because he is on Eliquis and Plavix.  He had multiple superficial abrasions that were bleeding when he came in.  These were all bandaged by the emergency department.  His trauma workup has been otherwise negative.  He will be admitted to the medical service for workup of his syncope.     Past Medical History  He has a past medical history of Arthritis, Coronary artery disease, Diabetes mellitus (CMS/HCC), Hypertension, and Stroke (CMS/HCC).  Atrial fibrillation.    Surgical History  He has a past surgical history that includes Back surgery and Cardiac electrophysiology procedure (N/A, 11/29/2023).  He has had a left knee replacement x 3.  He has also had cardioversion for atrial fibrillation.     Social History  He reports that he quit smoking about 36 years ago. His smoking use included cigarettes. He has been exposed to tobacco smoke. He has never used smokeless tobacco. He reports that he does not currently use alcohol. He reports that he does not use drugs.    Family History  Family History   Problem Relation Name Age of Onset    Stomach cancer Mother      Diabetes Mother      Coronary artery disease Father      Other (Pacemaker) Father      Other (S/p CABG) Sister      Other (S/p CABG) Brother      Ovarian cancer Sibling      Coronary artery disease Sibling          Allergies  Meperidine, Vancomycin, Hydromorphone, Other, and Rifampin    Review of Systems  Complains of pain in both legs.  Otherwise negative except as described in the history of present illness.     Physical Exam  Patient is awake, alert, cooperative.  No acute distress.  GCS  15.  HEENT: Notable for abrasion of the left ear, not actively bleeding  Neck: Supple, no lymphadenopathy.  Clinically clear.  Lungs: Clear to auscultation bilaterally.  Heart: Normal S1, S2, regular.  Abdomen: Soft, nontender, nondistended, positive bowel sounds.  Extremities: Bandage over the left knee with scar consistent with previous surgery.  Additional bandages over the left arm, left supraclavicular area.  Skin: Multiple abrasions and ecchymosis, otherwise warm and dry.  Neuro: Cranial nerves II through XII grossly intact.  No focal deficits.         I&O 24HR    Intake/Output Summary (Last 24 hours) at 12/24/2023 1911  Last data filed at 12/24/2023 1647  Gross per 24 hour   Intake 1000 ml   Output --   Net 1000 ml       Vitals 24HR  Heart Rate:  []   Temp:  [36.7 °C (98 °F)]   Resp:  [6-26]   BP: (119-164)/()   SpO2:  [95 %-100 %]       Relevant Results  XR knee 4+ views bilateral    Result Date: 12/24/2023  Interpreted By:  Torres Vallejo, STUDY: XR KNEE 4+ VIEWS BILATERAL; 12/24/2023 4:43 pm   INDICATION: Signs/Symptoms:fall, pain;   COMPARISON: None   ACCESSION NUMBER(S): [id number]   ORDERING CLINICIAN: AMAURY IBANEZ   TECHNIQUE: AP, lateral, internal oblique, external oblique projections bilaterally.   FINDINGS: LEFT KNEE:   Total knee arthroplasty changes are identified. Relative chronic loss of left femoral cortical bone is seen.   No fracture or subluxation is identified.   No suprapatellar joint effusion is identified.   RIGHT KNEE:   Total knee arthroplasty changes are identified.   No fracture or subluxation is identified.   No suprapatellar joint effusion is identified.       No acute fracture or dislocation.   MACRO: None   Signed by: Torres Vallejo 12/24/2023 4:52 PM Dictation workstation:   [id number]    XR shoulder left 2+ views    Result Date: 12/24/2023  Interpreted By:  Torres Vallejo, STUDY: XR SHOULDER LEFT 2+ VIEWS; 12/24/2023 4:43 pm   INDICATION:  Signs/Symptoms:fall, pain;   COMPARISON: None   ACCESSION NUMBER(S): [id number]   ORDERING CLINICIAN: AMAURY IBANEZ   TECHNIQUE: Neutral and scapular projections   FINDINGS: No fracture or dislocation is seen.   Severe glenohumeral osteoarthrosis noted.   Moderate AC joint osteoarthrosis is seen.   The included intrathoracic structures are grossly unremarkable.       1. No acute fracture or dislocation. 2. Severe glenohumeral and moderate AC joint osteoarthrosis.   MACRO: None.   Signed by: Torres Vallejo 12/24/2023 4:49 PM Dictation workstation:   [id number]    XR chest 1 view    Result Date: 12/24/2023  Interpreted By:  Torres Vallejo, STUDY: XR CHEST 1 VIEW; 12/24/2023 4:43 pm   INDICATION: Signs/Symptoms:trauma   COMPARISON: No recent comparison   ACCESSION NUMBER(S): [id number]   ORDERING CLINICIAN: AMAURY IBANEZ   FINDINGS: The cardiomediastinal silhouette is unremarkable. The lungs are clear. No pleural effusion is identified.   The osseous structures are intact.       No acute cardiopulmonary process.   MACRO: None   Signed by: Torres Vallejo 12/24/2023 4:48 PM Dictation workstation:   [id number]    CT cervical spine wo IV contrast    Result Date: 12/24/2023  Interpreted By:  Moody Aranda, STUDY: CT CERVICAL SPINE WO IV CONTRAST; 12/24/2023 3:39 pm   INDICATION: Signs/Symptoms:trauma, neck pain   COMPARISON: None.   ACCESSION NUMBER(S): [id number]   ORDERING CLINICIAN: AMAURY IBANEZ   TECHNIQUE: Axial unenhanced images were obtained through the cervical spine. Sagittal and coronal post processing reconstruction images were obtained.   All CT examinations are performed with one or more of the following dose reduction techniques: Automated Exposure Control, adjustment of mA and/or kV according to patient size, or use of iterative reconstruction techniques.   FINDINGS: No fracture is seen. The vertebral body heights are maintained. The vertebral alignment is anatomic; this is confirmed on the  sagittal reconstruction images. Degenerative changes involve the articulation between the odontoid process and anterior arch of the C1 vertebra. There is narrowing of the C3-C4, C4-C5, C5-C6 and mostly the C6-C7, C7-T1 and T1-T2 discs with marginal osteophytes. There are also hypertrophic changes of facet joints bilaterally with resultant bilateral foraminal stenosis. Images from the thoracic inlet are unremarkable.       Marked cervical spondylosis without acute fracture or facet subluxation.   Signed by: Moody Aranda 12/24/2023 3:53 PM Dictation workstation:   [id number]    CT head wo IV contrast    Result Date: 12/24/2023  Interpreted By:  Moody Aranda, STUDY: CT HEAD WO IV CONTRAST; 12/24/2023 3:39 pm   INDICATION: Signs/Symptoms:trauma, head injury on blood thinners   COMPARISON: October 2023   ACCESSION NUMBER(S): [id number]   ORDERING CLINICIAN: AMAURY IBANEZ   TECHNIQUE: Axial images were obtained through the brain. No IV contrast was administered.   All CT examinations are performed with one or more of the following dose reduction techniques: Automated Exposure Control, adjustment of mA and/or kV according to patient size, or use of iterative reconstruction techniques.   FINDINGS: The ventricles are enlarged but midline in position, with proportional prominence of the sulci, due to atrophy. Patchy periventricular hypodensities are present suggestive of small vessel ischemic white matter disease. Remote infarct is again seen in the right frontal periventricular white matter. There is no intracranial hemorrhage. No mass or mass effect is seen. The osseus structures are unremarkable.       Stable age related changes and remote right frontal/periventricular white matter infarct without acute intracranial process.   Signed by: Moody Aranda 12/24/2023 3:51 PM Dictation workstation:   [id number]      Results for orders placed or performed during the hospital encounter of 12/24/23 (from the past 24  hour(s))   CBC and Auto Differential   Result Value Ref Range    WBC 7.7 4.4 - 11.3 x10*3/uL    nRBC 0.0 0.0 - 0.0 /100 WBCs    RBC 4.13 (L) 4.50 - 5.90 x10*6/uL    Hemoglobin 11.9 (L) 13.5 - 17.5 g/dL    Hematocrit 37.4 (L) 41.0 - 52.0 %    MCV 91 80 - 100 fL    MCH 28.8 26.0 - 34.0 pg    MCHC 31.8 (L) 32.0 - 36.0 g/dL    RDW 13.7 11.5 - 14.5 %    Platelets 239 150 - 450 x10*3/uL    Neutrophils % 77.7 40.0 - 80.0 %    Immature Granulocytes %, Automated 0.4 0.0 - 0.9 %    Lymphocytes % 12.5 13.0 - 44.0 %    Monocytes % 6.4 2.0 - 10.0 %    Eosinophils % 2.0 0.0 - 6.0 %    Basophils % 1.0 0.0 - 2.0 %    Neutrophils Absolute 5.95 (H) 1.60 - 5.50 x10*3/uL    Immature Granulocytes Absolute, Automated 0.03 0.00 - 0.50 x10*3/uL    Lymphocytes Absolute 0.96 0.80 - 3.00 x10*3/uL    Monocytes Absolute 0.49 0.05 - 0.80 x10*3/uL    Eosinophils Absolute 0.15 0.00 - 0.40 x10*3/uL    Basophils Absolute 0.08 0.00 - 0.10 x10*3/uL   Comprehensive metabolic panel   Result Value Ref Range    Glucose 160 (H) 65 - 99 mg/dL    Sodium 135 133 - 145 mmol/L    Potassium 4.3 3.4 - 5.1 mmol/L    Chloride 102 97 - 107 mmol/L    Bicarbonate 19 (L) 24 - 31 mmol/L    Urea Nitrogen 54 (H) 8 - 25 mg/dL    Creatinine 3.20 (H) 0.40 - 1.60 mg/dL    eGFR 20 (L) >60 mL/min/1.73m*2    Calcium 9.2 8.5 - 10.4 mg/dL    Albumin 3.9 3.5 - 5.0 g/dL    Alkaline Phosphatase 44 35 - 125 U/L    Total Protein 6.4 5.9 - 7.9 g/dL    AST 18 5 - 40 U/L    Bilirubin, Total 0.5 0.1 - 1.2 mg/dL    ALT 10 5 - 40 U/L    Anion Gap 14 <=19 mmol/L   Serial Troponin, Initial ([facility])   Result Value Ref Range    Troponin T, High Sensitivity 130 (H) <=15 ng/L   Serial Troponin, 2 Hour ([facility])   Result Value Ref Range    Troponin T, High Sensitivity 129 (H) <=15 ng/L         Assessment/Plan       No acute trauma intervention planned or indicated.  Workup of syncope per internal medicine.  Patient is cleared from the trauma service.  Will remain available as needed.    Principal  Problem:    Syncope and collapse            Gregory Cruz MD

## 2023-12-25 NOTE — CARE PLAN
Problem: Diabetes  Goal: Achieve decreasing blood glucose levels by end of shift  12/25/2023 0526 by Carolina Morales RN  Outcome: Progressing  12/25/2023 0525 by Carolina Morales RN  Outcome: Progressing  Goal: Increase stability of blood glucose readings by end of shift  12/25/2023 0526 by Carolina Morales RN  Outcome: Progressing  12/25/2023 0525 by Carolina Morales RN  Outcome: Progressing  Goal: Decrease in ketones present in urine by end of shift  12/25/2023 0526 by Carolina Morales RN  Outcome: Progressing  12/25/2023 0525 by Carolina Morales RN  Outcome: Progressing  Goal: Maintain electrolyte levels within acceptable range throughout shift  12/25/2023 0526 by Carolina Morales RN  Outcome: Progressing  12/25/2023 0525 by Carolina Morales RN  Outcome: Progressing  Goal: Maintain glucose levels >70mg/dl to <250mg/dl throughout shift  12/25/2023 0526 by Carolina Morales RN  Outcome: Progressing  12/25/2023 0525 by Carolina Morales RN  Outcome: Progressing  Goal: No changes in neurological exam by end of shift  12/25/2023 0526 by Carolina Morales RN  Outcome: Progressing  12/25/2023 0525 by Carolina Morales RN  Outcome: Progressing  Goal: Learn about and adhere to nutrition recommendations by end of shift  12/25/2023 0526 by Carolina Morales RN  Outcome: Progressing  12/25/2023 0525 by Carolina Morales RN  Outcome: Progressing  Goal: Vital signs within normal range for age by end of shift  12/25/2023 0526 by Carolina Morales RN  Outcome: Progressing  12/25/2023 0525 by Carolina Morales RN  Outcome: Progressing  Goal: Increase self care and/or family involovement by end of shift  12/25/2023 0526 by Carolina Morales RN  Outcome: Progressing  12/25/2023 0525 by Carolina Morales RN  Outcome: Progressing  Goal: Receive DSME education by end of shift  12/25/2023 0526 by Carolina Morales RN  Outcome: Progressing  12/25/2023 0525 by Carolina Morales  RN  Outcome: Progressing     Problem: Fall/Injury  Goal: Not fall by end of shift  Outcome: Progressing  Goal: Be free from injury by end of the shift  Outcome: Progressing  Goal: Verbalize understanding of personal risk factors for fall in the hospital  Outcome: Progressing  Goal: Verbalize understanding of risk factor reduction measures to prevent injury from fall in the home  Outcome: Progressing  Goal: Use assistive devices by end of the shift  Outcome: Progressing  Goal: Pace activities to prevent fatigue by end of the shift  Outcome: Progressing     Problem: Pain  Goal: My pain/discomfort is manageable  Outcome: Progressing     Problem: Safety  Goal: Patient will be injury free during hospitalization  Outcome: Progressing  Goal: I will remain free of falls  Outcome: Progressing     Problem: Daily Care  Goal: Daily care needs are met  Outcome: Progressing     Problem: Psychosocial Needs  Goal: Demonstrates ability to cope with hospitalization/illness  Outcome: Progressing  Goal: Collaborate with me, my family, and caregiver to identify my specific goals  Outcome: Progressing  Flowsheets  Taken 12/25/2023 0017  Cultural Requests During Hospitalization: denies  Spiritual Requests During Hospitalization: denies  Taken 12/24/2023 2200  Cultural Requests During Hospitalization: denies  Spiritual Requests During Hospitalization: denies     Problem: Discharge Barriers  Goal: My discharge needs are met  Outcome: Progressing   The patient's goals for the shift include      The clinical goals for the shift include no falls, manage pain    Over the shift, the patient did make progress toward the goals.

## 2023-12-26 ENCOUNTER — APPOINTMENT (OUTPATIENT)
Dept: RADIOLOGY | Facility: HOSPITAL | Age: 73
DRG: 321 | End: 2023-12-26
Payer: MEDICARE

## 2023-12-26 ENCOUNTER — APPOINTMENT (OUTPATIENT)
Dept: CARDIOLOGY | Facility: HOSPITAL | Age: 73
DRG: 321 | End: 2023-12-26
Payer: MEDICARE

## 2023-12-26 LAB
GLUCOSE BLD MANUAL STRIP-MCNC: 121 MG/DL (ref 74–99)
GLUCOSE BLD MANUAL STRIP-MCNC: 146 MG/DL (ref 74–99)
GLUCOSE BLD MANUAL STRIP-MCNC: 209 MG/DL (ref 74–99)
GLUCOSE BLD MANUAL STRIP-MCNC: 212 MG/DL (ref 74–99)

## 2023-12-26 PROCEDURE — 2500000004 HC RX 250 GENERAL PHARMACY W/ HCPCS (ALT 636 FOR OP/ED)

## 2023-12-26 PROCEDURE — 2500000002 HC RX 250 W HCPCS SELF ADMINISTERED DRUGS (ALT 637 FOR MEDICARE OP, ALT 636 FOR OP/ED): Performed by: INTERNAL MEDICINE

## 2023-12-26 PROCEDURE — 2500000001 HC RX 250 WO HCPCS SELF ADMINISTERED DRUGS (ALT 637 FOR MEDICARE OP): Performed by: INTERNAL MEDICINE

## 2023-12-26 PROCEDURE — 93017 CV STRESS TEST TRACING ONLY: CPT

## 2023-12-26 PROCEDURE — 97166 OT EVAL MOD COMPLEX 45 MIN: CPT | Mod: GO

## 2023-12-26 PROCEDURE — 97530 THERAPEUTIC ACTIVITIES: CPT | Mod: GP

## 2023-12-26 PROCEDURE — 93016 CV STRESS TEST SUPVJ ONLY: CPT | Performed by: INTERNAL MEDICINE

## 2023-12-26 PROCEDURE — A9502 TC99M TETROFOSMIN: HCPCS

## 2023-12-26 PROCEDURE — 3430000001 HC RX 343 DIAGNOSTIC RADIOPHARMACEUTICALS

## 2023-12-26 PROCEDURE — 2060000001 HC INTERMEDIATE ICU ROOM DAILY

## 2023-12-26 PROCEDURE — 82947 ASSAY GLUCOSE BLOOD QUANT: CPT

## 2023-12-26 PROCEDURE — 78452 HT MUSCLE IMAGE SPECT MULT: CPT

## 2023-12-26 PROCEDURE — 94760 N-INVAS EAR/PLS OXIMETRY 1: CPT

## 2023-12-26 PROCEDURE — 2500000004 HC RX 250 GENERAL PHARMACY W/ HCPCS (ALT 636 FOR OP/ED): Performed by: INTERNAL MEDICINE

## 2023-12-26 PROCEDURE — 99232 SBSQ HOSP IP/OBS MODERATE 35: CPT | Performed by: INTERNAL MEDICINE

## 2023-12-26 RX ORDER — OXYCODONE HYDROCHLORIDE 5 MG/1
15 TABLET ORAL EVERY 6 HOURS PRN
Status: DISCONTINUED | OUTPATIENT
Start: 2023-12-26 | End: 2023-12-30 | Stop reason: HOSPADM

## 2023-12-26 RX ORDER — SODIUM CHLORIDE 9 MG/ML
75 INJECTION, SOLUTION INTRAVENOUS CONTINUOUS
Status: CANCELLED | OUTPATIENT
Start: 2023-12-27 | End: 2023-12-28

## 2023-12-26 RX ORDER — REGADENOSON 0.08 MG/ML
0.4 INJECTION, SOLUTION INTRAVENOUS
Status: COMPLETED | OUTPATIENT
Start: 2023-12-26 | End: 2023-12-26

## 2023-12-26 RX ADMIN — APIXABAN 2.5 MG: 2.5 TABLET, FILM COATED ORAL at 10:44

## 2023-12-26 RX ADMIN — AMLODIPINE BESYLATE 5 MG: 5 TABLET ORAL at 10:54

## 2023-12-26 RX ADMIN — TETROFOSMIN 10.4 MILLICURIE: 0.23 INJECTION, POWDER, LYOPHILIZED, FOR SOLUTION INTRAVENOUS at 07:15

## 2023-12-26 RX ADMIN — METOPROLOL SUCCINATE 25 MG: 25 TABLET, FILM COATED, EXTENDED RELEASE ORAL at 23:53

## 2023-12-26 RX ADMIN — PANTOPRAZOLE SODIUM 40 MG: 40 TABLET, DELAYED RELEASE ORAL at 10:44

## 2023-12-26 RX ADMIN — SODIUM CHLORIDE 100 ML/HR: 900 INJECTION, SOLUTION INTRAVENOUS at 06:33

## 2023-12-26 RX ADMIN — POLYETHYLENE GLYCOL 3350 17 G: 17 POWDER, FOR SOLUTION ORAL at 10:44

## 2023-12-26 RX ADMIN — TAMSULOSIN HYDROCHLORIDE 0.4 MG: 0.4 CAPSULE ORAL at 10:53

## 2023-12-26 RX ADMIN — METOPROLOL SUCCINATE 25 MG: 25 TABLET, FILM COATED, EXTENDED RELEASE ORAL at 10:44

## 2023-12-26 RX ADMIN — GABAPENTIN 600 MG: 600 TABLET, FILM COATED ORAL at 10:44

## 2023-12-26 RX ADMIN — OXYCODONE 15 MG: 5 TABLET ORAL at 22:30

## 2023-12-26 RX ADMIN — FERROUS SULFATE TAB 325 MG (65 MG ELEMENTAL FE) 1 TABLET: 325 (65 FE) TAB at 10:53

## 2023-12-26 RX ADMIN — OXYCODONE 15 MG: 5 TABLET ORAL at 16:25

## 2023-12-26 RX ADMIN — LEVETIRACETAM 500 MG: 500 TABLET, FILM COATED ORAL at 20:16

## 2023-12-26 RX ADMIN — OXYCODONE 15 MG: 5 TABLET ORAL at 10:43

## 2023-12-26 RX ADMIN — ATORVASTATIN CALCIUM 10 MG: 10 TABLET, FILM COATED ORAL at 10:44

## 2023-12-26 RX ADMIN — STANDARDIZED SENNA CONCENTRATE 17.2 MG: 8.6 TABLET ORAL at 20:16

## 2023-12-26 RX ADMIN — REGADENOSON 0.4 MG: 0.08 INJECTION, SOLUTION INTRAVENOUS at 10:08

## 2023-12-26 RX ADMIN — LEVETIRACETAM 500 MG: 500 TABLET, FILM COATED ORAL at 10:44

## 2023-12-26 RX ADMIN — Medication 0.4 MG: at 10:44

## 2023-12-26 RX ADMIN — DONEPEZIL HYDROCHLORIDE 10 MG: 10 TABLET, FILM COATED ORAL at 20:16

## 2023-12-26 RX ADMIN — FERROUS SULFATE TAB 325 MG (65 MG ELEMENTAL FE) 1 TABLET: 325 (65 FE) TAB at 16:25

## 2023-12-26 RX ADMIN — TRAZODONE HYDROCHLORIDE 150 MG: 100 TABLET ORAL at 20:16

## 2023-12-26 RX ADMIN — TETROFOSMIN 30 MILLICURIE: 0.23 INJECTION, POWDER, LYOPHILIZED, FOR SOLUTION INTRAVENOUS at 10:08

## 2023-12-26 RX ADMIN — CLOPIDOGREL BISULFATE 75 MG: 75 TABLET ORAL at 10:44

## 2023-12-26 RX ADMIN — DULOXETINE HYDROCHLORIDE 60 MG: 60 CAPSULE, DELAYED RELEASE ORAL at 10:44

## 2023-12-26 ASSESSMENT — PAIN - FUNCTIONAL ASSESSMENT
PAIN_FUNCTIONAL_ASSESSMENT: 0-10
PAIN_FUNCTIONAL_ASSESSMENT: WONG-BAKER FACES
PAIN_FUNCTIONAL_ASSESSMENT: 0-10
PAIN_FUNCTIONAL_ASSESSMENT: FLACC (FACE, LEGS, ACTIVITY, CRY, CONSOLABILITY)
PAIN_FUNCTIONAL_ASSESSMENT: 0-10

## 2023-12-26 ASSESSMENT — ACTIVITIES OF DAILY LIVING (ADL)
ADL_ASSISTANCE: INDEPENDENT
BATHING_ASSISTANCE: MODERATE
LACK_OF_TRANSPORTATION: NO

## 2023-12-26 ASSESSMENT — COGNITIVE AND FUNCTIONAL STATUS - GENERAL
CLIMB 3 TO 5 STEPS WITH RAILING: A LOT
MOVING TO AND FROM BED TO CHAIR: A LOT
WALKING IN HOSPITAL ROOM: TOTAL
TURNING FROM BACK TO SIDE WHILE IN FLAT BAD: A LOT
EATING MEALS: A LITTLE
DRESSING REGULAR LOWER BODY CLOTHING: TOTAL
MOBILITY SCORE: 13
MOVING FROM LYING ON BACK TO SITTING ON SIDE OF FLAT BED WITH BEDRAILS: A LITTLE
HELP NEEDED FOR BATHING: A LOT
HELP NEEDED FOR BATHING: A LITTLE
STANDING UP FROM CHAIR USING ARMS: A LITTLE
MOVING TO AND FROM BED TO CHAIR: A LOT
HELP NEEDED FOR BATHING: A LOT
MOBILITY SCORE: 13
DRESSING REGULAR LOWER BODY CLOTHING: A LOT
TOILETING: TOTAL
DRESSING REGULAR UPPER BODY CLOTHING: A LOT
DRESSING REGULAR UPPER BODY CLOTHING: A LITTLE
TOILETING: A LITTLE
CLIMB 3 TO 5 STEPS WITH RAILING: TOTAL
MOVING TO AND FROM BED TO CHAIR: A LITTLE
DAILY ACTIVITIY SCORE: 19
CLIMB 3 TO 5 STEPS WITH RAILING: A LOT
DAILY ACTIVITIY SCORE: 12
WALKING IN HOSPITAL ROOM: A LOT
EATING MEALS: A LITTLE
PERSONAL GROOMING: A LITTLE
TOILETING: TOTAL
MOVING FROM LYING ON BACK TO SITTING ON SIDE OF FLAT BED WITH BEDRAILS: A LOT
STANDING UP FROM CHAIR USING ARMS: A LITTLE
WALKING IN HOSPITAL ROOM: A LOT
DAILY ACTIVITIY SCORE: 12
TURNING FROM BACK TO SIDE WHILE IN FLAT BAD: A LOT
TURNING FROM BACK TO SIDE WHILE IN FLAT BAD: A LITTLE
DRESSING REGULAR UPPER BODY CLOTHING: A LOT
STANDING UP FROM CHAIR USING ARMS: A LITTLE
PERSONAL GROOMING: A LITTLE
MOVING FROM LYING ON BACK TO SITTING ON SIDE OF FLAT BED WITH BEDRAILS: A LOT
MOBILITY SCORE: 14
DRESSING REGULAR LOWER BODY CLOTHING: TOTAL

## 2023-12-26 ASSESSMENT — PAIN SCALES - GENERAL
PAINLEVEL_OUTOF10: 5 - MODERATE PAIN
PAINLEVEL_OUTOF10: 5 - MODERATE PAIN
PAINLEVEL_OUTOF10: 2
PAINLEVEL_OUTOF10: 10 - WORST POSSIBLE PAIN
PAINLEVEL_OUTOF10: 0 - NO PAIN
PAINLEVEL_OUTOF10: 7
PAINLEVEL_OUTOF10: 5 - MODERATE PAIN

## 2023-12-26 ASSESSMENT — PAIN DESCRIPTION - LOCATION: LOCATION: GENERALIZED

## 2023-12-26 NOTE — PROGRESS NOTES
[facility] met with the pt at bedside to discuss dc planning. Pt lives with his son in multistory condo however with first floor setup. Pt has walker and cane. Family assists with transportation as needed.Pts PCP is Dr Izabel Marshall.Pt is agreeable to go to SNF, SNF choices provided of 1.[facility] - no bed availability [facility] - reviewing 3.[facility] - can accept. Quick precert is needed.      12/26/23 1320   Discharge Planning   Patient expects to be discharged to: SNF   Does the patient need discharge transport arranged? Yes   RoundTrip coordination needed? Yes

## 2023-12-26 NOTE — NURSING NOTE
Patient back to room and Ok to eat prior to 2nd part of stress test.  Therapy worked with patient.  Medicated for pain.  Resting quietly in bed at this time.  Call light in reach.  Denies SOB/chest pain.

## 2023-12-26 NOTE — PROGRESS NOTES
Physical Therapy    Physical Therapy Treatment    Patient Name: Rachid Yun  MRN: [id number]  Today's Date: 12/26/2023  Time Calculation  Start Time: 1101  Stop Time: 1130  Time Calculation (min): 29 min       Assessment/Plan   PT Assessment  Rehab Prognosis: Good  Evaluation/Treatment Tolerance: Patient tolerated treatment well  End of Session Communication: Bedside nurse  Assessment Comment: Functional mobility is progressing slowly;  tolerance to activity improving slowly;  fall risk.  End of Session Patient Position: Bed, 2 rail up  PT Plan  Inpatient/Swing Bed or Outpatient: Inpatient  PT Plan  Treatment/Interventions: Bed mobility, Transfer training, Gait training, Balance training, Neuromuscular re-education, Strengthening, Endurance training, Range of motion, Therapeutic exercise, Therapeutic activity, Positioning, Postural re-education  PT Plan: Skilled PT  PT Frequency: 4 times per week  PT Discharge Recommendations: Moderate intensity level of continued care  Equipment Recommended upon Discharge: Wheeled walker  PT Recommended Transfer Status: Assist x2  PT - OK to Discharge: Yes (with skilled physical therapy services at next level of care.)      General Visit Information:   PT  Visit  PT Received On: 12/26/23  General  Missed Visit: Yes  Missed Visit Reason: Patient in a medical procedure (Patient off nursing abarca at testing (stress test).)  Prior to Session Communication: Bedside nurse  Patient Position Received: Bed, 2 rail up (Sitting on side of bed.)  General Comment: Patient reports agreeable to treatment.    Subjective   Precautions:  Precautions  Medical Precautions: Fall precautions         Objective   Pain:  Pain Assessment  Pain Assessment: FLACC (Face, Legs, Activity, Cry, Consolability)  Pain Score: 5 - Moderate pain  Pain Type: Acute pain  Pain Location: Knee  Pain Orientation: Left  Cognition:  Cognition  Overall Cognitive Status: Within Functional Limits          Activity  Tolerance:  Activity Tolerance  Endurance: Decreased tolerance for upright activites  Treatments:                 Bed Mobility 1  Bed Mobility 1: Sitting to supine, Supine to sitting  Level of Assistance 1: Moderate assistance  Bed Mobility Comments 1: Assist with left LE during supine to sit;  increased time and effort required to achieve supine to sit.  Bed Mobility 2  Bed Mobility  2: Sitting to supine  Level of Assistance 2: Moderate assistance  Bed Mobility Comments 2: Assist with olivia LE during sit to supine;  increased time and effort required to achieve sit to supine.    Ambulation/Gait Training  Ambulation/Gait Training Performed: No  Transfer 1  Transfer From 1: Sit to  Transfer to 1: Stand  Technique 1: Sit to stand  Transfer Device 1: Walker  Transfer Level of Assistance 1: Moderate assistance  Trials/Comments 1: x 2 reps;  assist with trunk support during sit to stand;  verbal cues for hand placement.  Transfers 2  Transfer From 2: Stand to  Transfer to 2: Sit  Technique 2: Stand to sit  Transfer Device 2: Walker  Transfer Level of Assistance 2: Minimum assistance  Trials/Comments 2: 2 x reps;  assist with trunk support during stand to sit.  Transfers 3  Transfer From 3: Bed to  Transfer to 3: Commode-standard  Technique 3: Stand pivot  Transfer Device 3: Walker  Transfer Level of Assistance 3: Minimum assistance  Trials/Comments 3: Assist with trunk support and balance during stand pivot transfer with rolling walker;  patient was able to support body weight with olivia LE;  patient required increased time and effort to weightshift and advance olivia LE during pivot portion of transfer.  Transfers 4  Transfer From 4: Commode-standard to  Transfer to 4: Bed  Technique 4: Stand pivot  Transfer Device 4: Walker  Transfer Level of Assistance 4: Minimum assistance  Trials/Comments 4: Assist with trunk support and balance during stand pivot transfer with walker.    Stairs  Stairs: No         Outcome Measures:  [facility]  Basic Mobility  Turning from your back to your side while in a flat bed without using bedrails: A lot  Moving from lying on your back to sitting on the side of a flat bed without using bedrails: A lot  Moving to and from bed to chair (including a wheelchair): A lot  Standing up from a chair using your arms (e.g. wheelchair or bedside chair): A little  To walk in hospital room: A lot  Climbing 3-5 steps with railing: A lot  Basic Mobility - Total Score: 13    Education Documentation  No documentation found.  Education Comments  No comments found.        OP EDUCATION:       Encounter Problems       Encounter Problems (Active)       Mobility       STG - Patient will ambulate 40 feet with 2WW at modified independent       Start:  12/25/23    Expected End:  01/08/24               Pain          Transfers       STG - Patient will perform bed mobility from supine to sit at modified independent (Progressing)       Start:  12/25/23    Expected End:  01/08/24            STG - Patient will transfer sit to and from stand from normal surface height at modified independent  (Progressing)       Start:  12/25/23    Expected End:  01/08/24

## 2023-12-26 NOTE — PROGRESS NOTES
Occupational Therapy    Evaluation    Patient Name: Rachid Yun  MRN: [id number]  Today's Date: 12/26/2023  Time Calculation  Start Time: 1248  Stop Time: 1302  Time Calculation (min): 14 min    Assessment  IP OT Assessment  OT Assessment: Patient is a 73 year old male admitted with syncope and collapse. Patient is presenting with a decline in strength, balance, activity tolerance, and function, resulting in an increased need for assistance with ADL/IADL tasks. Skilled OT to address the above deficits and increase patient's safety and independence with daily tasks.  Prognosis: Good  Evaluation/Treatment Tolerance: Patient limited by pain  End of Session Communication: Bedside nurse  End of Session Patient Position: Bed, 2 rail up  Plan:  Treatment Interventions: ADL retraining, Functional transfer training, UE strengthening/ROM, Endurance training, Patient/family training, Neuromuscular reeducation, Compensatory technique education  OT Frequency: 3 times per week  OT Discharge Recommendations: Moderate intensity level of continued care  OT Recommended Transfer Status: Moderate assist, Assist of 1  OT - OK to Discharge: Yes    Subjective   Current Problem:  1. Syncope and collapse  Nuclear Stress Test    Nuclear Stress Test    Cardiology Interpretation Of Nuclear Stress - See Other Report For Nuclear Portion    Cardiology Interpretation Of Nuclear Stress - See Other Report For Nuclear Portion      2. Pain in both knees, unspecified chronicity        3. Kidney disease        4. Abnormal electrocardiogram (ECG) (EKG)  Nuclear Stress Test    Nuclear Stress Test    Cardiology Interpretation Of Nuclear Stress - See Other Report For Nuclear Portion    Cardiology Interpretation Of Nuclear Stress - See Other Report For Nuclear Portion        General:  General  Reason for Referral: decline in ADLs  Referred By: Dr. Orozco  Past Medical History Relevant to Rehab: Patient is a 73 year old male admitted with syncope and  collapse. PMH: Several falls, a-fib, DM type 2, primary HTN, CAD, pericardial effusion, mixed HLD  Prior to Session Communication: Bedside nurse  Patient Position Received: Bed, 2 rail up  Preferred Learning Style: verbal  General Comment: Patient cleared by nursing for therapy. Patient in bed upon arrival and agreeable to participate  Precautions:  Medical Precautions: Fall precautions  Pain:  Pain Assessment  Pain Assessment: 0-10  Pain Score: 5 - Moderate pain  Pain Type: Chronic pain, Acute pain  Pain Location: Back (neck, B LE)    Objective   Cognition:  Overall Cognitive Status: Within Functional Limits  Insight: Mild     Home Living:  Type of Home: Condo  Lives With: Adult children (son)  Home Adaptive Equipment: Walker rolling or standard, Cane (rollator)  Home Layout: Two level, Able to live on main level with bedroom/bathroom  Home Access: Stairs to enter with rails  Entrance Stairs-Rails: Right  Entrance Stairs-Number of Steps: 1  Bathroom Shower/Tub: Tub/shower unit  Bathroom Toilet: Standard  Bathroom Equipment: Grab bars in shower, Shower chair with back   Prior Function:  Level of [place]: Independent with ADLs and functional transfers, Needs assistance with homemaking  Receives Help From: Family  ADL Assistance: Independent  Homemaking Assistance: Needs assistance  Ambulatory Assistance: Independent  Vocational: Retired  IADL History:  Homemaking Responsibilities: No  IADL Comments: patient's son completes IADLs  ADL:  Eating Assistance: Stand by  Eating Deficit: Setup (seated with items within reach)  Grooming Assistance: Minimal  Grooming Deficit: Standing with assistive device, Setup, Supervision/safety, Increased time to complete (per clinical judgement)  Bathing Assistance: Moderate  Bathing Deficit: Setup, Supervision/safety, Increased time to complete , Left lower leg including foot, Right lower leg including foot (per clinical judgement)  UE Dressing Assistance: Moderate  UE Dressing  Deficit: Thread LUE, Thread RUE, Pull over head, Setup (per clinical judgement)  LE Dressing Assistance: Total  LE Dressing Deficit: Don/doff R sock, Don/doff L sock (per clinical judgement)  Toileting Assistance with Device: Maximal  Toileting Deficit: Perineal hygiene (per clinical judgement)  Activity Tolerance:  Endurance: Decreased tolerance for upright activites  Bed Mobility/Transfers: Bed Mobility  Bed Mobility: Yes  Bed Mobility 1  Bed Mobility 1: Supine to sitting  Level of Assistance 1: Moderate assistance  Bed Mobility Comments 1: via log roll for pain control. assist to move B LE and raise trunk  Bed Mobility 2  Bed Mobility  2: Sitting to supine  Level of Assistance 2: Moderate assistance  Bed Mobility Comments 2: via log roll for pain control. assist to raise B LE    Transfers  Transfer: Yes  Transfer 1  Transfer From 1: Bed to  Transfer to 1: Stand  Technique 1: Sit to stand  Transfer Device 1: Walker  Transfer Level of Assistance 1: Moderate assistance  Trials/Comments 1: once in standing, patient requires Min A to take ~2 lateral side steps towards the head of the bed. patient c/o of dizziness    IADL's:   Homemaking Responsibilities: No  IADL Comments: patient's son completes IADLs  Vision: Vision - Basic Assessment  Current Vision: Wears glasses all the time  Sensation:  Sensation Comment: patient reports numbness/tingling in B LE  Strength:  Strength Comments: B UE impaired at shoulders, chronic rotrator cuff injuries. at least >/= 3/5 distally. observed functionally  Hand Function:  Hand Function  Gross Grasp: Functional  Coordination: Functional  Extremities: RUE   RUE : Exceptions to WFL (decreased shoulder ROM. chronic rotator cuff injury) and LUE   LUE: Exceptions to WFL (decreased shoulder ROM. chronic rotator cuff injury)    Outcome Measures: [facility] Daily Activity  Putting on and taking off regular lower body clothing: Total  Bathing (including washing, rinsing, drying): A lot  Putting on  and taking off regular upper body clothing: A lot  Toileting, which includes using toilet, bedpan or urinal: Total  Taking care of personal grooming such as brushing teeth: A little  Eating Meals: A little  Daily Activity - Total Score: 12    Education Documentation  Body Mechanics, taught by Junie Urbano OT at 12/26/2023  1:36 PM.  Learner: Patient  Readiness: Acceptance  Method: Explanation, Demonstration  Response: Needs Reinforcement    Precautions, taught by Junie Urbano OT at 12/26/2023  1:36 PM.  Learner: Patient  Readiness: Acceptance  Method: Explanation, Demonstration  Response: Needs Reinforcement    Education Comments  No comments found.      Goals:   Encounter Problems       Encounter Problems (Active)       OT Goals       B UE Strengthening (Progressing)       Start:  12/26/23    Expected End:  01/25/24       Patient will increase B UE strength to 4+/5 for functional transfers.         ADLs (Progressing)       Start:  12/26/23    Expected End:  01/25/24       Patient will complete ADL tasks with Mod I, using AE as needed, in order to increase patient's safety and independence with self-care tasks.         Functional Transfers (Progressing)       Start:  12/26/23    Expected End:  01/25/24       Patient will complete functional transfers with Mod I in order to increase patient's safety and independence with daily tasks.         Functional Mobility (Progressing)       Start:  12/26/23    Expected End:  01/25/24       Patient will demonstrate the ability to complete item retrieval and functional mobility with Mod I in order to increase patient's safety and independence with daily tasks.

## 2023-12-26 NOTE — PROGRESS NOTES
Physical Therapy                 Therapy Communication Note    Patient Name: Rachid Yun  MRN: [id number]  Today's Date: 12/26/2023     Discipline: Physical Therapy    Missed Visit Reason: Missed Visit Reason: Patient in a medical procedure (Patient off nursing abarca at testing (stress test).)    Missed Time: Attempt

## 2023-12-26 NOTE — NURSING NOTE
Assessment completed.    Irregular HR 80 bpm.  Afib on tele.    Skin tears observed to body from fall at home.  Discussed plan stress test today.    Patient asking about pain medications - states he takes 15mg oxycodone every 6 hours at home for chronic pain, patient stating has not taken this medication while here at hospital.    Pain is making it difficult for him to sleep on night.  Will reach out to hospitalist regarding patient's concerns.

## 2023-12-26 NOTE — CARE PLAN
Problem: Transfers  Goal: STG - Patient will perform bed mobility from supine to sit at modified independent  Outcome: Progressing  Goal: STG - Patient will transfer sit to and from stand from normal surface height at modified independent   Outcome: Progressing

## 2023-12-26 NOTE — PROGRESS NOTES
"Rachid Yun is a 73 y.o. male on day 2 of admission presenting with Syncope and collapse.    Subjective   Patient still with lots of aches and pain from his fall.  Difficulty lying flat secondary to back and knee pain.  No chest pain or anginal type symptoms.       Objective     Physical Exam  Eyes:      Conjunctiva/sclera: Conjunctivae normal.   Cardiovascular:      Rate and Rhythm: Normal rate and regular rhythm.      Heart sounds:      No gallop.   Pulmonary:      Breath sounds: Normal breath sounds. No wheezing, rhonchi or rales.   Abdominal:      Palpations: Abdomen is soft.   Neurological:      General: No focal deficit present.      Mental Status: He is alert.       Constitutional:       Appearance: Not in distress.   Eyes:      Conjunctiva/sclera: Conjunctivae normal.   Neck:      Vascular: JVD normal.   Pulmonary:      Breath sounds: Normal breath sounds. No wheezing. No rhonchi. No rales.   Cardiovascular:      Normal rate. Regular rhythm.      Murmurs: There is no murmur.      No gallop.  No click. No rub.   Abdominal:      Palpations: Abdomen is soft.   Neurological:      General: No focal deficit present.      Mental Status: Alert.        Last Recorded Vitals  Blood pressure (!) 124/94, pulse 80, temperature 36.4 °C (97.5 °F), temperature source Temporal, resp. rate 20, height 1.93 m (6' 4\"), weight 102 kg (225 lb 12 oz), SpO2 95 %.  Intake/Output last 3 Shifts:  I/O last 3 completed shifts:  In: 2825 (27.6 mL/kg) [I.V.:2825 (27.6 mL/kg)]  Out: 1540 (15 mL/kg) [Urine:1540 (0.4 mL/kg/hr)]  Weight: 102.4 kg     Relevant Results                             Assessment/Plan   Principal Problem:    Syncope and collapse    Syncope  Atrial Fibrillation  Coronary Artery Disease  Diabetes Mellitus type 2  Cerebrovascular Accident     Impression and Plan: 12/25 Patient presenting with syncope and collapse at home.  Patient has been admitted to the hospital recently with similar symptoms.  Patient states he " walked to the bathroom sat on the toilet to urinate, became lightheaded and the next thing he remembers being on the floor and his son tried to wake him up.  Patient denies any recent chest pain, pressure or shortness of breath.  Patient is on Keppra for suspected seizure activity, though there was no seizure-like activity during this fall.  CT of the cervical spine shows no acute fracture or facet subluxation.  CT of the head negative for any acute intracranial process.  X-ray of the chest negative for any acute cardiopulmonary process.  EKG in the emergency department shows atrial fibrillation which is rate controlled with a ventricular rate of 73 bpm.  Lab work completed in the emergency department shows a sodium of 135, potassium 4.3, elevated creatinine at 3.20 and hemoglobin of 11.9.  Troponin trend show 130, 129 and 90.  Patient remains in a rate controlled atrial fibrillation on telemetry.  He is on Eliquis for stroke risk reduction at a reduced dose of 2.5 mg.  Orthostatic vitals were negative.  Echocardiogram completed in October of this year showed a normal left ventricular systolic function and an impaired relaxation pattern of left ventricular diastolic filling. Given this patient's syncope as well as his elevated troponin's, I have ordered a Lexiscan stress test to be completed tomorrow morning. Patient will be NPO at midnight for this.     12/26: Patient feels relatively well except for pain from traumatic fall with injuries to the knee back and arm.  Rest pain or anginal symptoms.  With elevated high-sensitivity troponins myocardial perfusion stress test has been ordered to look to see if there is any evidence of ischemia.  Thus far no significant cardiac arrhythmias noted.  If etiology of the syncope remains unclear possibly discharge home with patch monitor.  Also restart the patient's oral oxycodone that he takes at home for his chronic pain syndrome.       I spent 20 minutes in the professional  and overall care of this patient.      Phill Hare, DO

## 2023-12-26 NOTE — PROGRESS NOTES
Rachid Yun is a 73 y.o. male on day 2 of admission presenting with Syncope and collapse.      Subjective   Patient feels better.  He denies shortness of breath, dizziness.   More active.    Objective     Last Recorded Vitals  BP (!) 124/40 (BP Location: Right arm, Patient Position: Sitting)   Pulse 78   Temp 36.4 °C (97.5 °F) (Temporal)   Resp 16   Wt 102 kg (225 lb 12 oz)   SpO2 99%   Intake/Output last 3 Shifts:    Intake/Output Summary (Last 24 hours) at 12/26/2023 1236  Last data filed at 12/26/2023 0732  Gross per 24 hour   Intake 2825 ml   Output 1810 ml   Net 1015 ml         Admission Weight  Weight: 108 kg (239 lb) (12/24/23 2021)    Daily Weight  12/26/23 : 102 kg (225 lb 12 oz)    Image Results  Cardiology Interpretation Of Nuclear Stress - See Other Report For Nuclear Portion             [place] [address]             Phone [phone]    Nuclear Pharmacologic Stress Test    Patient Name:     RACHID YUN      Ordering Provider:    [id number] SMOOTH CABRERA  Study Date:       12/26/2023          Reading Physician:    Namita Hare DO  MRN/PID:          [id number]            Supervising           Namita Hare DO                                        Physician:  Accession#:       [id number]        Fellow:  Date of           1950 / 73      Fellow:  Birth/Age:        years  Gender:           M                   Nurse:                Zulma Diaz RN  Admit Date:                           Technician:           Roseann Tillman                                                              CVTI  Admission Status:                     Sonographer:          KEIKO  Height:           193.04 cm           Technologist:  Weight:           102.06 kg           Additional Staff:  BSA:              2.33 m2             Encounter#:           [id number]  BMI:              27.39 kg/m2         Patient Location:    Study Type:    CARDIOLOGY INTERPRETATION OF  NUCLEAR STRESS  Diagnosis/ICD: Syncope and collapse-R55  Indication:    Syncope  CPT Codes:     Stress Test Interpretation-51255; Stress Test Supervision-[id number]    Falls Risk:     Study Details: Correct procedure and correct patient verified verbally and with                 ID Band checked.       Patient History: Hypertension, coronary artery disease, hyperlipidemia, congestive heart failure, atrial fibrillation and syncope. ARTERIOSCLEROSIS,PERICARDIAL EFFUSION, DM, CARDIOVERSION NOVEMBER 2023.     Medications: AMLODIPINE, ELIQUIS, ATORVASTATIN, PLAVIX, METOPROLOL. The patient did not take medications as prescribed.     Patient Performance: Patient received a total of 0.4 mg of Regadenoson at 10:07:53 AM. The resting blood pressure was 140/75 mmHg with a heart rate of 93 bpm. The patient developed no symptoms during the stress exam. The blood pressure response was normal. The test was terminated due to: completed lab protocol. A standard dose of Lexiscan was infused over 10 to 15 seconds and flushed with saline. The patient then received a standard IV dose of Myoview. The patient did not experience chest pain with infusion of Lexiscan. The resting twelve-lead EKG revealed atrial fibrillation with occasional PVCs. With infusion of the Lexiscan there were no significant changes in the ST segments. No dysrhythmias were induced. Resting blood pressure was 140/75 and did not change significantly. Myoview imaging was to be reported separately. Patient has met the discharge criteria and is discharged to their floor.     Baseline ECG: Resting ECG showed AFIB with occasional premature ventricular contractions and AFIB.     Stress Stage Data:  +----------------+--+------+-------+                  HRSys BPDias BP  +----------------+--+------+-------+  Baseline [id number]   75       +----------------+--+------+-------+       Recovery ECG: Recovery ECG showed atrial fibrillation, with frequent premature ventricular  contractions.     +------------+---+------+-------+              HR Sys BPDias BP  +------------+---+------+-------+  Recovery I  [id number]   74       +------------+---+------+-------+  Recovery II [id number]   72       +------------+---+------+-------+  Recovery [id number]   65       +------------+---+------+-------+       Summary:   1. No clinical or ECG evidence of ischemia.   2. Myoview imaging to be reported separately.   3. Adequate level of stress achieved.   4. Nuclear image results are reported separately.    [id number] Phill Hare   Electronically signed on 12/26/2023 at 10:28:51 AM            ** Final **      Physical Exam  Pt is NAD.  Cooperative with exam.  In no distress at rest.  A, Ox3.  Face is symmetrical.  Skin - no lesions.  Lungs: clear to auscultations B/L.  Diminished bilaterally no wheezes, rales, rhonchi.  Heart: irregular S1S2.  Abdomen: soft, NT, ND. BS positive.  Extr.: no edema, cords, cyanosis.  Moves all extr.   Relevant Results               Assessment/Plan                  Principal Problem:    Syncope and collapse            Syncope and collapse.  Likely, cardiac.  Possible orthostatic.  Will provide IV hydration, check orthostatic vital signs.  Consulted cardiology -plan for stress test in the morning  Paroxysmal atrial fibrillation.  Patient is back in atrial fibrillation but his rate is controlled.  Continue Eliquis   Acute on chronic renal failure.  Hold Cozaar, monitor renal function.  IV hydration, consult nephrology.  Improved today.  Seen by nephrology  Hypertension.  Continue medications except Cozaar  CODE STATUS, discussed with patient: Full code  Repeated falls.  Consult physical Occupational Therapy     12/26: Stress test today.  Patient is doing overall better.  He is agreeable for acute rehab.  Possible discharge later today or tomorrow.  Radiology may adjust patient's medications based on stress test results.         Monica Orozco MD

## 2023-12-26 NOTE — PROGRESS NOTES
"Rachid Yun is a 73 y.o. male on day 2 of admission presenting with Syncope and collapse.    Subjective   Patient seen for chronic disease stage IV admitted with a syncopal episode he is undergoing cardiology evaluation as an etiology for his syncope he just had a first part of his stress test no further syncopal episode or falls here in the hospital       Objective     Physical Exam  Neck:      Vascular: No carotid bruit.   Cardiovascular:      Rate and Rhythm: Normal rate and regular rhythm.      Heart sounds: No murmur heard.     No friction rub. No gallop.   Pulmonary:      Breath sounds: No wheezing, rhonchi or rales.   Chest:      Chest wall: No tenderness.   Abdominal:      General: There is no distension.      Tenderness: There is no abdominal tenderness. There is no guarding or rebound.   Musculoskeletal:         General: No swelling or tenderness.      Cervical back: Neck supple.      Right lower leg: No edema.      Left lower leg: No edema.   Lymphadenopathy:      Cervical: No cervical adenopathy.         Last Recorded Vitals  Blood pressure (!) 124/94, pulse 80, temperature 36.4 °C (97.5 °F), temperature source Temporal, resp. rate 20, height 1.93 m (6' 4\"), weight 102 kg (225 lb 12 oz), SpO2 95 %.    Intake/Output last 3 Shifts:  I/O last 3 completed shifts:  In: 2825 (27.6 mL/kg) [I.V.:2825 (27.6 mL/kg)]  Out: 1540 (15 mL/kg) [Urine:1540 (0.4 mL/kg/hr)]  Weight: 102.4 kg     Current Facility-Administered Medications:     acetaminophen (Tylenol) tablet 650 mg, 650 mg, oral, q6h PRN, Monica Orozco MD, 650 mg at 12/25/23 2016    amLODIPine (Norvasc) tablet 5 mg, 5 mg, oral, Daily, Monica Orozco MD, 5 mg at 12/26/23 1054    apixaban (Eliquis) tablet 2.5 mg, 2.5 mg, oral, BID, Monica Orozco MD, 2.5 mg at 12/26/23 1044    atorvastatin (Lipitor) tablet 10 mg, 10 mg, oral, Daily, Monica Orozco MD, 10 mg at 12/26/23 1044    clopidogrel (Plavix) tablet 75 mg, 75 mg, oral, Daily, Monica" MD Pam, 75 mg at 12/26/23 1044    donepezil (Aricept) tablet 10 mg, 10 mg, oral, Nightly, Monica Orozco MD, 10 mg at 12/25/23 2015    DULoxetine (Cymbalta) DR capsule 60 mg, 60 mg, oral, Daily, Monica Orozco MD, 60 mg at 12/26/23 1044    ferrous sulfate (325 mg ferrous sulfate) tablet 1 tablet, 65 mg of iron, oral, BID with meals, Monica Orozco MD, 1 tablet at 12/26/23 1053    folic acid (Folvite) tablet 0.4 mg, 0.4 mg, oral, Daily, Monica Orozco MD, 0.4 mg at 12/26/23 1044    gabapentin (Neurontin) tablet 600 mg, 600 mg, oral, Daily, Monica Orozco MD, 600 mg at 12/26/23 1044    guaiFENesin (Robitussin) 100 mg/5 mL syrup 200 mg, 200 mg, oral, q4h PRN, Monica Orozco MD    levETIRAcetam (Keppra) tablet 500 mg, 500 mg, oral, BID, Monica Orozco MD, 500 mg at 12/26/23 1044    melatonin tablet 5 mg, 5 mg, oral, Nightly PRN, Monica Orozco MD    metoprolol succinate XL (Toprol-XL) 24 hr tablet 25 mg, 25 mg, oral, BID, Monica Orozco MD, 25 mg at 12/26/23 1044    ondansetron (Zofran) injection 4 mg, 4 mg, intravenous, q6h PRN, Monica Orozco MD    ondansetron (Zofran) tablet 4 mg, 4 mg, oral, 4x daily PRN, Monica Orozco MD    oxyCODONE (Roxicodone) immediate release tablet 15 mg, 15 mg, oral, q6h PRN, Phill Hare DO, 15 mg at 12/26/23 1043    pantoprazole (ProtoNix) EC tablet 40 mg, 40 mg, oral, Daily, Monica Orozco MD, 40 mg at 12/26/23 1044    polyethylene glycol (Glycolax, Miralax) packet 17 g, 17 g, oral, Daily, Monica Orozco MD, 17 g at 12/26/23 1044    sennosides (Senokot) tablet 17.2 mg, 2 tablet, oral, Nightly, Monica Orozco MD, 17.2 mg at 12/25/23 2015    sodium chloride 0.9% infusion, 100 mL/hr, intravenous, Continuous, Monica Orozco MD, Last Rate: 100 mL/hr at 12/26/23 0633, 100 mL/hr at 12/26/23 0633    tamsulosin (Flomax) 24 hr capsule 0.4 mg, 0.4 mg, oral, Daily, Monica Orozco MD, 0.4 mg at 12/26/23 1058     traZODone (Desyrel) tablet 150 mg, 150 mg, oral, Nightly, Monica Orozco MD, 150 mg at 12/25/23 2015   Relevant Results    Results for orders placed or performed during the hospital encounter of 12/24/23 (from the past 96 hour(s))   CBC and Auto Differential   Result Value Ref Range    WBC 7.7 4.4 - 11.3 x10*3/uL    nRBC 0.0 0.0 - 0.0 /100 WBCs    RBC 4.13 (L) 4.50 - 5.90 x10*6/uL    Hemoglobin 11.9 (L) 13.5 - 17.5 g/dL    Hematocrit 37.4 (L) 41.0 - 52.0 %    MCV 91 80 - 100 fL    MCH 28.8 26.0 - 34.0 pg    MCHC 31.8 (L) 32.0 - 36.0 g/dL    RDW 13.7 11.5 - 14.5 %    Platelets 239 150 - 450 x10*3/uL    Neutrophils % 77.7 40.0 - 80.0 %    Immature Granulocytes %, Automated 0.4 0.0 - 0.9 %    Lymphocytes % 12.5 13.0 - 44.0 %    Monocytes % 6.4 2.0 - 10.0 %    Eosinophils % 2.0 0.0 - 6.0 %    Basophils % 1.0 0.0 - 2.0 %    Neutrophils Absolute 5.95 (H) 1.60 - 5.50 x10*3/uL    Immature Granulocytes Absolute, Automated 0.03 0.00 - 0.50 x10*3/uL    Lymphocytes Absolute 0.96 0.80 - 3.00 x10*3/uL    Monocytes Absolute 0.49 0.05 - 0.80 x10*3/uL    Eosinophils Absolute 0.15 0.00 - 0.40 x10*3/uL    Basophils Absolute 0.08 0.00 - 0.10 x10*3/uL   Comprehensive metabolic panel   Result Value Ref Range    Glucose 160 (H) 65 - 99 mg/dL    Sodium 135 133 - 145 mmol/L    Potassium 4.3 3.4 - 5.1 mmol/L    Chloride 102 97 - 107 mmol/L    Bicarbonate 19 (L) 24 - 31 mmol/L    Urea Nitrogen 54 (H) 8 - 25 mg/dL    Creatinine 3.20 (H) 0.40 - 1.60 mg/dL    eGFR 20 (L) >60 mL/min/1.73m*2    Calcium 9.2 8.5 - 10.4 mg/dL    Albumin 3.9 3.5 - 5.0 g/dL    Alkaline Phosphatase 44 35 - 125 U/L    Total Protein 6.4 5.9 - 7.9 g/dL    AST 18 5 - 40 U/L    Bilirubin, Total 0.5 0.1 - 1.2 mg/dL    ALT 10 5 - 40 U/L    Anion Gap 14 <=19 mmol/L   Serial Troponin, Initial ([facility])   Result Value Ref Range    Troponin T, High Sensitivity 130 (H) <=15 ng/L   Serial Troponin, 2 Hour ([facility])   Result Value Ref Range    Troponin T, High Sensitivity 129 (H) <=15  ng/L   POCT GLUCOSE   Result Value Ref Range    POCT Glucose 138 (H) 74 - 99 mg/dL   Thyroid Stimulating Hormone   Result Value Ref Range    Thyroid Stimulating Hormone 0.95 0.27 - 4.20 mIU/L   Renal Function Panel   Result Value Ref Range    Glucose 122 (H) 65 - 99 mg/dL    Sodium 138 133 - 145 mmol/L    Potassium 3.9 3.4 - 5.1 mmol/L    Chloride 106 97 - 107 mmol/L    Bicarbonate 20 (L) 24 - 31 mmol/L    Urea Nitrogen 51 (H) 8 - 25 mg/dL    Creatinine 2.60 (H) 0.40 - 1.60 mg/dL    eGFR 25 (L) >60 mL/min/1.73m*2    Calcium 8.8 8.5 - 10.4 mg/dL    Phosphorus 3.0 2.5 - 4.5 mg/dL    Albumin 3.6 3.5 - 5.0 g/dL    Anion Gap 12 <=19 mmol/L   Serial Troponin, 6 Hour ([facility])   Result Value Ref Range    Troponin T, High Sensitivity 90 (H) <=15 ng/L   Lavender Top   Result Value Ref Range    Extra Tube Hold for add-ons.    POCT GLUCOSE   Result Value Ref Range    POCT Glucose 113 (H) 74 - 99 mg/dL   POCT GLUCOSE   Result Value Ref Range    POCT Glucose 162 (H) 74 - 99 mg/dL   POCT GLUCOSE   Result Value Ref Range    POCT Glucose 121 (H) 74 - 99 mg/dL       Assessment/Plan   Chronic kidney disease stage IV renal function is stable no indication for dialysis continue to monitor very closely  Syncope rule out cardiac causes he is undergoing evaluation by cardiology  Falls  Chronic atrial fibrillation  History of congestive heart failure  Mild anemia of chronic kidney disease                Ariel Costa MD

## 2023-12-27 LAB
ANION GAP SERPL CALC-SCNC: 10 MMOL/L
BUN SERPL-MCNC: 36 MG/DL (ref 8–25)
CALCIUM SERPL-MCNC: 8.8 MG/DL (ref 8.5–10.4)
CHLORIDE SERPL-SCNC: 105 MMOL/L (ref 97–107)
CO2 SERPL-SCNC: 22 MMOL/L (ref 24–31)
CREAT SERPL-MCNC: 1.7 MG/DL (ref 0.4–1.6)
GFR SERPL CREATININE-BSD FRML MDRD: 42 ML/MIN/1.73M*2
GLUCOSE BLD MANUAL STRIP-MCNC: 121 MG/DL (ref 74–99)
GLUCOSE BLD MANUAL STRIP-MCNC: 130 MG/DL (ref 74–99)
GLUCOSE BLD MANUAL STRIP-MCNC: 157 MG/DL (ref 74–99)
GLUCOSE BLD MANUAL STRIP-MCNC: 179 MG/DL (ref 74–99)
GLUCOSE SERPL-MCNC: 125 MG/DL (ref 65–99)
POTASSIUM SERPL-SCNC: 4.9 MMOL/L (ref 3.4–5.1)
SODIUM SERPL-SCNC: 137 MMOL/L (ref 133–145)

## 2023-12-27 PROCEDURE — 2500000001 HC RX 250 WO HCPCS SELF ADMINISTERED DRUGS (ALT 637 FOR MEDICARE OP): Performed by: INTERNAL MEDICINE

## 2023-12-27 PROCEDURE — 2500000002 HC RX 250 W HCPCS SELF ADMINISTERED DRUGS (ALT 637 FOR MEDICARE OP, ALT 636 FOR OP/ED): Performed by: INTERNAL MEDICINE

## 2023-12-27 PROCEDURE — 82947 ASSAY GLUCOSE BLOOD QUANT: CPT

## 2023-12-27 PROCEDURE — 97535 SELF CARE MNGMENT TRAINING: CPT | Mod: GO

## 2023-12-27 PROCEDURE — 80048 BASIC METABOLIC PNL TOTAL CA: CPT | Performed by: INTERNAL MEDICINE

## 2023-12-27 PROCEDURE — 97116 GAIT TRAINING THERAPY: CPT | Mod: GP

## 2023-12-27 PROCEDURE — 2060000001 HC INTERMEDIATE ICU ROOM DAILY

## 2023-12-27 PROCEDURE — 99232 SBSQ HOSP IP/OBS MODERATE 35: CPT | Performed by: INTERNAL MEDICINE

## 2023-12-27 PROCEDURE — 2500000004 HC RX 250 GENERAL PHARMACY W/ HCPCS (ALT 636 FOR OP/ED): Performed by: INTERNAL MEDICINE

## 2023-12-27 PROCEDURE — 97530 THERAPEUTIC ACTIVITIES: CPT | Mod: GP

## 2023-12-27 PROCEDURE — 94760 N-INVAS EAR/PLS OXIMETRY 1: CPT

## 2023-12-27 PROCEDURE — 36415 COLL VENOUS BLD VENIPUNCTURE: CPT | Performed by: INTERNAL MEDICINE

## 2023-12-27 RX ORDER — ALUMINUM HYDROXIDE, MAGNESIUM HYDROXIDE, AND SIMETHICONE 1200; 120; 1200 MG/30ML; MG/30ML; MG/30ML
30 SUSPENSION ORAL 4 TIMES DAILY PRN
Status: DISCONTINUED | OUTPATIENT
Start: 2023-12-27 | End: 2023-12-30 | Stop reason: HOSPADM

## 2023-12-27 RX ORDER — ADHESIVE BANDAGE
30 BANDAGE TOPICAL DAILY PRN
Status: DISCONTINUED | OUTPATIENT
Start: 2023-12-27 | End: 2023-12-30 | Stop reason: HOSPADM

## 2023-12-27 RX ORDER — BISACODYL 10 MG/1
10 SUPPOSITORY RECTAL DAILY PRN
Status: DISCONTINUED | OUTPATIENT
Start: 2023-12-27 | End: 2023-12-30 | Stop reason: HOSPADM

## 2023-12-27 RX ORDER — DULOXETIN HYDROCHLORIDE 60 MG/1
60 CAPSULE, DELAYED RELEASE ORAL DAILY
Qty: 3 CAPSULE | Refills: 0 | Status: SHIPPED | OUTPATIENT
Start: 2023-12-28 | End: 2024-04-16

## 2023-12-27 RX ORDER — OXYCODONE HYDROCHLORIDE 15 MG/1
15 TABLET ORAL EVERY 6 HOURS PRN
Qty: 15 TABLET | Refills: 0 | Status: SHIPPED | OUTPATIENT
Start: 2023-12-27 | End: 2023-12-30

## 2023-12-27 RX ADMIN — LEVETIRACETAM 500 MG: 500 TABLET, FILM COATED ORAL at 20:27

## 2023-12-27 RX ADMIN — STANDARDIZED SENNA CONCENTRATE 17.2 MG: 8.6 TABLET ORAL at 20:27

## 2023-12-27 RX ADMIN — AMLODIPINE BESYLATE 5 MG: 5 TABLET ORAL at 08:35

## 2023-12-27 RX ADMIN — DONEPEZIL HYDROCHLORIDE 10 MG: 10 TABLET, FILM COATED ORAL at 20:27

## 2023-12-27 RX ADMIN — CLOPIDOGREL BISULFATE 75 MG: 75 TABLET ORAL at 08:35

## 2023-12-27 RX ADMIN — METOPROLOL SUCCINATE 25 MG: 25 TABLET, FILM COATED, EXTENDED RELEASE ORAL at 20:27

## 2023-12-27 RX ADMIN — FERROUS SULFATE TAB 325 MG (65 MG ELEMENTAL FE) 1 TABLET: 325 (65 FE) TAB at 16:32

## 2023-12-27 RX ADMIN — MAGNESIUM HYDROXIDE 30 ML: 1200 LIQUID ORAL at 20:27

## 2023-12-27 RX ADMIN — TAMSULOSIN HYDROCHLORIDE 0.4 MG: 0.4 CAPSULE ORAL at 08:35

## 2023-12-27 RX ADMIN — OXYCODONE 15 MG: 5 TABLET ORAL at 04:52

## 2023-12-27 RX ADMIN — FERROUS SULFATE TAB 325 MG (65 MG ELEMENTAL FE) 1 TABLET: 325 (65 FE) TAB at 08:34

## 2023-12-27 RX ADMIN — OXYCODONE 15 MG: 5 TABLET ORAL at 10:44

## 2023-12-27 RX ADMIN — PANTOPRAZOLE SODIUM 40 MG: 40 TABLET, DELAYED RELEASE ORAL at 08:35

## 2023-12-27 RX ADMIN — OXYCODONE 15 MG: 5 TABLET ORAL at 20:39

## 2023-12-27 RX ADMIN — ATORVASTATIN CALCIUM 10 MG: 10 TABLET, FILM COATED ORAL at 08:35

## 2023-12-27 RX ADMIN — GABAPENTIN 600 MG: 600 TABLET, FILM COATED ORAL at 08:35

## 2023-12-27 RX ADMIN — Medication 0.4 MG: at 08:35

## 2023-12-27 RX ADMIN — METOPROLOL SUCCINATE 25 MG: 25 TABLET, FILM COATED, EXTENDED RELEASE ORAL at 08:35

## 2023-12-27 RX ADMIN — ALUMINUM HYDROXIDE, MAGNESIUM HYDROXIDE, AND SIMETHICONE 30 ML: 200; 200; 20 SUSPENSION ORAL at 16:32

## 2023-12-27 RX ADMIN — TRAZODONE HYDROCHLORIDE 150 MG: 100 TABLET ORAL at 20:26

## 2023-12-27 RX ADMIN — LEVETIRACETAM 500 MG: 500 TABLET, FILM COATED ORAL at 08:35

## 2023-12-27 RX ADMIN — POLYETHYLENE GLYCOL 3350 17 G: 17 POWDER, FOR SOLUTION ORAL at 08:35

## 2023-12-27 RX ADMIN — DULOXETINE HYDROCHLORIDE 60 MG: 60 CAPSULE, DELAYED RELEASE ORAL at 08:35

## 2023-12-27 ASSESSMENT — COGNITIVE AND FUNCTIONAL STATUS - GENERAL
CLIMB 3 TO 5 STEPS WITH RAILING: A LOT
CLIMB 3 TO 5 STEPS WITH RAILING: A LOT
TURNING FROM BACK TO SIDE WHILE IN FLAT BAD: A LITTLE
HELP NEEDED FOR BATHING: A LITTLE
CLIMB 3 TO 5 STEPS WITH RAILING: A LOT
STANDING UP FROM CHAIR USING ARMS: A LITTLE
HELP NEEDED FOR BATHING: A LOT
MOBILITY SCORE: 17
DRESSING REGULAR LOWER BODY CLOTHING: A LOT
STANDING UP FROM CHAIR USING ARMS: A LITTLE
TOILETING: A LITTLE
DRESSING REGULAR LOWER BODY CLOTHING: A LITTLE
WALKING IN HOSPITAL ROOM: A LOT
MOBILITY SCORE: 14
TURNING FROM BACK TO SIDE WHILE IN FLAT BAD: A LOT
TOILETING: A LITTLE
DAILY ACTIVITIY SCORE: 17
DRESSING REGULAR UPPER BODY CLOTHING: A LITTLE
MOBILITY SCORE: 14
DRESSING REGULAR LOWER BODY CLOTHING: A LOT
WALKING IN HOSPITAL ROOM: A LITTLE
MOVING TO AND FROM BED TO CHAIR: A LITTLE
TURNING FROM BACK TO SIDE WHILE IN FLAT BAD: A LOT
MOVING FROM LYING ON BACK TO SITTING ON SIDE OF FLAT BED WITH BEDRAILS: A LITTLE
MOVING FROM LYING ON BACK TO SITTING ON SIDE OF FLAT BED WITH BEDRAILS: A LITTLE
PERSONAL GROOMING: A LITTLE
DAILY ACTIVITIY SCORE: 19
STANDING UP FROM CHAIR USING ARMS: A LITTLE
HELP NEEDED FOR BATHING: A LOT
MOVING FROM LYING ON BACK TO SITTING ON SIDE OF FLAT BED WITH BEDRAILS: A LITTLE
DAILY ACTIVITIY SCORE: 17
WALKING IN HOSPITAL ROOM: A LOT
MOVING TO AND FROM BED TO CHAIR: A LOT
PERSONAL GROOMING: A LITTLE
MOVING TO AND FROM BED TO CHAIR: A LOT
PERSONAL GROOMING: A LITTLE
DRESSING REGULAR UPPER BODY CLOTHING: A LITTLE
TOILETING: A LITTLE
DRESSING REGULAR UPPER BODY CLOTHING: A LITTLE

## 2023-12-27 ASSESSMENT — PAIN SCALES - GENERAL
PAINLEVEL_OUTOF10: 5 - MODERATE PAIN
PAINLEVEL_OUTOF10: 0 - NO PAIN
PAINLEVEL_OUTOF10: 2
PAINLEVEL_OUTOF10: 7
PAINLEVEL_OUTOF10: 0 - NO PAIN
PAINLEVEL_OUTOF10: 7

## 2023-12-27 ASSESSMENT — PAIN - FUNCTIONAL ASSESSMENT
PAIN_FUNCTIONAL_ASSESSMENT: 0-10

## 2023-12-27 ASSESSMENT — PAIN DESCRIPTION - LOCATION
LOCATION: OTHER (COMMENT)
LOCATION: GENERALIZED
LOCATION: GENERALIZED

## 2023-12-27 ASSESSMENT — ACTIVITIES OF DAILY LIVING (ADL): HOME_MANAGEMENT_TIME_ENTRY: 25

## 2023-12-27 ASSESSMENT — PAIN DESCRIPTION - DESCRIPTORS: DESCRIPTORS: ACHING;SORE

## 2023-12-27 NOTE — PROGRESS NOTES
"Occupational Therapy    Occupational Therapy Treatment    Name: Rachid Yun  MRN: [id number]  : 1950  Date: 23  Time Calculation  Start Time: 1026  Stop Time: 1051  Time Calculation (min): 25 min    Assessment:     Plan:  Treatment Interventions: ADL retraining, Functional transfer training, UE strengthening/ROM, Endurance training, Patient/family training, Neuromuscular reeducation, Compensatory technique education  OT Frequency: 3 times per week  OT Discharge Recommendations: Moderate intensity level of continued care  OT Recommended Transfer Status: Moderate assist, Assist of 1  OT - OK to Discharge: Yes    Subjective   Previous Visit Info:  OT Last Visit  OT Received On: 23  General:  General  Prior to Session Communication: Bedside nurse  Patient Position Received: Bed, 2 rail up  Preferred Learning Style: verbal  General Comment: RN cleared for OT treatment, pt agreeable  Precautions:  Medical Precautions: Fall precautions  Vitals:  Vital Signs  Heart Rate: 82  Heart Rate Source: Monitor  Pain Assessment:  Pain Assessment  Pain Assessment: 0-10  Pain Score: 5 - Moderate pain  Pain Type: Acute pain  Pain Location:  (\"all over\")  Pain Interventions:  (RN medicated at end of session)     Objective   Activities of Daily Living: Grooming  Grooming Level of Assistance: Setup  Grooming Where Assessed: Sitting sinkside  Grooming Comments: Pt seated in wheelchair at sinkside able to wash face/hands and brush teeth. Combed hair prior    UE Dressing  UE Dressing Level of Assistance: Moderate assistance  UE Dressing Where Assessed: Edge of bed  UE Dressing Comments: assist to thread Ue into gown due to L shoulder issues    LE Dressing  LE Dressing: Yes  Adult Briefs Level of Assistance: Maximum assistance  LE Dressing Where Assessed: Edge of bed  LE Dressing Comments: assist to to thread B LEs into brief, assist to pull up in standing.    Bed Mobility/Transfers: Bed Mobility  Bed Mobility: Yes  Bed " Mobility 1  Bed Mobility 1: Supine to sitting  Level of Assistance 1: Moderate assistance  Bed Mobility Comments 1: assist for trunk up and scoot to EOB. pt able to initiate B LEs to EOB    Transfers  Transfer: Yes  Transfer 1  Transfer From 1: Bed to  Transfer to 1: Stand  Technique 1: Sit to stand  Transfer Device 1: Walker  Transfer Level of Assistance 1: Moderate assistance  Trials/Comments 1: Pt required increased time to complete with extra effort and mod A. Pt able to take several steps to wheelchair.  Transfers 2  Transfer From 2: Stand to  Transfer to 2: Wheelchair  Technique 2: Stand to sit  Transfer Device 2: Walker  Transfer Level of Assistance 2: Minimum assistance  Trials/Comments 2: transferred to wheelchair for remainder of treatment and handed off to PT at end of session    Strength:  Strength  Strength Comments: B UE impaired at shoulders, chronic rotrator cuff injuries. at least >/= 3/5 distally. observed functionally    Outcome Measures:  [facility] Daily Activity  Putting on and taking off regular lower body clothing: A lot  Bathing (including washing, rinsing, drying): A lot  Putting on and taking off regular upper body clothing: A little  Toileting, which includes using toilet, bedpan or urinal: A little  Taking care of personal grooming such as brushing teeth: A little  Eating Meals: None  Daily Activity - Total Score: 17        Education Documentation  No documentation found.  Education Comments  No comments found.      Goals:  Encounter Problems          OT Goals       B UE Strengthening (Progressing)       Start:  12/26/23    Expected End:  01/25/24       Patient will increase B UE strength to 4+/5 for functional transfers.         ADLs (Progressing)       Start:  12/26/23    Expected End:  01/25/24       Patient will complete ADL tasks with Mod I, using AE as needed, in order to increase patient's safety and independence with self-care tasks.         Functional Transfers (Progressing)        Start:  12/26/23    Expected End:  01/25/24       Patient will complete functional transfers with Mod I in order to increase patient's safety and independence with daily tasks.         Functional Mobility (Progressing)       Start:  12/26/23    Expected End:  01/25/24       Patient will demonstrate the ability to complete item retrieval and functional mobility with Mod I in order to increase patient's safety and independence with daily tasks.

## 2023-12-27 NOTE — PROGRESS NOTES
Rachid Yun is a 73 y.o. male on day 3 of admission presenting with Syncope and collapse.      Subjective   Patient feels better.  He denies shortness of breath, dizziness.   More active.    Objective     Last Recorded Vitals  /75 (BP Location: Right arm, Patient Position: Lying)   Pulse 76   Temp 36 °C (96.8 °F) (Temporal)   Resp 20   Wt 102 kg (225 lb 15.5 oz)   SpO2 98%   Intake/Output last 3 Shifts:    Intake/Output Summary (Last 24 hours) at 12/27/2023 1450  Last data filed at 12/27/2023 1100  Gross per 24 hour   Intake 440 ml   Output 1915 ml   Net -1475 ml         Admission Weight  Weight: 108 kg (239 lb) (12/24/23 2021)    Daily Weight  12/27/23 : 102 kg (225 lb 15.5 oz)    Image Results  Nuclear Stress Test  Narrative: Interpreted By:  Raúl Infante,   STUDY:  NUCLEAR STRESS TEST;  12/26/2023 11:09 am      INDICATION:  Signs/Symptoms:Syncope.  Collapse      COMPARISON:  None.      ACCESSION NUMBER(S):  [id number]      ORDERING CLINICIAN:  SMOOTH CABRERA      TECHNIQUE:  DIVISION OF NUCLEAR MEDICINE  PHARMACOLOGIC STRESS MYOCARDIAL PERFUSION SCAN, ONE DAY PROTOCOL      The patient received an intravenous injection of 10.4 mCi of Tc-99m  Myoview and resting emission tomographic (SPECT) images of the  myocardium were acquired. The patient then received an intravenous  infusion of 0.4 mg regadenoson (Lexiscan) followed by an additional  injection of 30.0 mCi of Tc-99m Myoview. Stress phase SPECT images of  the myocardium were then acquired. These included ECG-gated  post-stress and rest images to assess and quantify ventricular  function.  Low dose CT was acquired for attenuation correction.      FINDINGS:  Stress and rest phase imaging demonstrate a small area of ischemia  within the lateral wall of the left ventricle at the apex. Also,  there is a small area of ischemia within the anteroseptal left  ventricle at the apex. Finally, there is a small area of ischemia  within the mid inferior  wall of the left ventricle (vertical long  axis image [id number]).      EKG gated imaging demonstrate dyskinetic motion within portions of  the septum of the left ventricle with corresponding decreased wall  thickening. There is increased end-diastolic volume 126 mL. Decreased  ejection fraction 33%              Attenuation correction CT images demonstrate no gross anatomic  abnormalities.      Impression: 1. Small area of ischemia about the apex of left ventricle involving  the lateral wall and anteroseptal left ventricle at the apex.      2. Small area of ischemia within the mid inferior wall left ventricle.      3. Decreased ejection fraction 33%          MACRO:  None      Signed by: Raúl Infante 12/26/2023 2:49 PM  Dictation workstation:   [id number]  Cardiology Interpretation Of Nuclear Stress - See Other Report For Nuclear Portion             [place] [address]             Phone [phone]    Nuclear Pharmacologic Stress Test    Patient Name:     LORI KEN      Ordering Provider:    [id number] SMOOTH CABRERA  Study Date:       12/26/2023          Reading Physician:    [id number] Hare DO  MRN/PID:          [id number]            Supervising           [id number] Hare DO                                        Physician:  Accession#:       [id number]        Fellow:  Date of           1950 / 73      Fellow:  Birth/Age:        years  Gender:           M                   Nurse:                Zulma Diaz RN  Admit Date:                           Technician:           Roseann Tillman                                                              CVTI  Admission Status:                     Sonographer:          KEIKO  Height:           193.04 cm           Technologist:  Weight:           102.06 kg           Additional Staff:  BSA:              2.33 m2             Encounter#:           [id number]  BMI:              27.39 kg/m2         Patient Location:    Study  Type:    CARDIOLOGY INTERPRETATION OF NUCLEAR STRESS  Diagnosis/ICD: Syncope and collapse-R55  Indication:    Syncope  CPT Codes:     Stress Test Interpretation-49542; Stress Test Supervision-[id number]    Falls Risk:     Study Details: Correct procedure and correct patient verified verbally and with                 ID Band checked.       Patient History: Hypertension, coronary artery disease, hyperlipidemia, congestive heart failure, atrial fibrillation and syncope. ARTERIOSCLEROSIS,PERICARDIAL EFFUSION, DM, CARDIOVERSION NOVEMBER 2023.     Medications: AMLODIPINE, ELIQUIS, ATORVASTATIN, PLAVIX, METOPROLOL. The patient did not take medications as prescribed.     Patient Performance: Patient received a total of 0.4 mg of Regadenoson at 10:07:53 AM. The resting blood pressure was 140/75 mmHg with a heart rate of 93 bpm. The patient developed no symptoms during the stress exam. The blood pressure response was normal. The test was terminated due to: completed lab protocol. A standard dose of Lexiscan was infused over 10 to 15 seconds and flushed with saline. The patient then received a standard IV dose of Myoview. The patient did not experience chest pain with infusion of Lexiscan. The resting twelve-lead EKG revealed atrial fibrillation with occasional PVCs. With infusion of the Lexiscan there were no significant changes in the ST segments. No dysrhythmias were induced. Resting blood pressure was 140/75 and did not change significantly. Myoview imaging was to be reported separately. Patient has met the discharge criteria and is discharged to their floor.     Baseline ECG: Resting ECG showed AFIB with occasional premature ventricular contractions and AFIB.     Stress Stage Data:  +----------------+--+------+-------+                  HRSys BPDias BP  +----------------+--+------+-------+  Baseline [id number]   75       +----------------+--+------+-------+       Recovery ECG: Recovery ECG showed atrial  fibrillation, with frequent premature ventricular contractions.     +------------+---+------+-------+              HR Sys BPDias BP  +------------+---+------+-------+  Recovery I  [id number]   74       +------------+---+------+-------+  Recovery II [id number]   72       +------------+---+------+-------+  Recovery [id number] 136   65       +------------+---+------+-------+       Summary:   1. No clinical or ECG evidence of ischemia.   2. Myoview imaging to be reported separately.   3. Adequate level of stress achieved.   4. Nuclear image results are reported separately.    [id number] Phill Hare   Electronically signed on 12/26/2023 at 10:28:51 AM            ** Final **      Physical Exam  Pt is NAD.  Cooperative with exam.  In no distress at rest.  A, Ox3.  Face is symmetrical.  Skin - no lesions.  Lungs: clear to auscultations B/L.  Diminished bilaterally no wheezes, rales, rhonchi.  Heart: irregular S1S2.  Abdomen: soft, NT, ND. BS positive.  Extr.: no edema, cords, cyanosis.  Moves all extr.   Relevant Results               Assessment/Plan                  Principal Problem:    Syncope and collapse  Active Problems:    Atrial fibrillation (CMS/HCC)    Stage 3b chronic kidney disease (CMS/HCC)    Coronary artery disease involving native coronary artery of native heart without angina pectoris            Syncope and collapse.  Likely, cardiac.  Possible orthostatic.  Will provide IV hydration, check orthostatic vital signs.  Consulted cardiology -plan for stress test in the morning  Paroxysmal atrial fibrillation.  Patient is back in atrial fibrillation but his rate is controlled.  Continue Eliquis   Acute on chronic renal failure.  Hold Cozaar, monitor renal function.  IV hydration, consult nephrology.  Improved today.  Seen by nephrology  Hypertension.  Continue medications except Cozaar  CODE STATUS, discussed with patient: Full code  Repeated falls.  Consult physical Occupational Therapy     12/26: Stress  test today.  Patient is doing overall better.  He is agreeable for acute rehab.  Possible discharge later today or tomorrow.  Radiology may adjust patient's medications based on stress test results.  12/27: Stress test yesterday demonstrated some ischemia.  Plan is for cardiac catheterization in the morning.  Renal function improved.  Patient complains of heartburn.  Will use Mylanta as needed.  Time spent with patient today 22 minutes       Monica Orozco MD

## 2023-12-27 NOTE — NURSING NOTE
Patients Hr went up to 140s and while going in to check on him I noticed the patient adding water to his urinal. I asked him why he did and he said because it smelled. I asked him not to do that because we measure the amount.

## 2023-12-27 NOTE — PROGRESS NOTES
Pt to have heart cath on 12/28. [facility] stating no bed availability, this worker made [facility] aware they are facility of choice and that  will initiate precert.      12/27/23 1442   Discharge Planning   Patient expects to be discharged to: [facility] - precert needed

## 2023-12-27 NOTE — PROGRESS NOTES
"Rachid Yun is a 73 y.o. male on day 3 of admission presenting with Syncope and collapse.    Subjective   Patient resting comfortably in bed eating breakfast. Denies chest pain, pressure or palpitations.        Objective     Physical Exam  Cardiovascular:      Rate and Rhythm: Normal rate. Rhythm irregular.      Heart sounds: No murmur heard.     No friction rub. No gallop.   Pulmonary:      Effort: Pulmonary effort is normal. No respiratory distress.      Breath sounds: No wheezing, rhonchi or rales.   Abdominal:      General: Bowel sounds are normal.   Musculoskeletal:      Right lower leg: No edema.   Skin:     General: Skin is warm and dry.      Findings: Bruising present.   Neurological:      Mental Status: He is alert and oriented to person, place, and time.         Last Recorded Vitals  Blood pressure 103/61, pulse 70, temperature 36.3 °C (97.3 °F), temperature source Temporal, resp. rate 18, height 1.93 m (6' 4\"), weight 102 kg (225 lb 15.5 oz), SpO2 98 %.  Intake/Output last 3 Shifts:  I/O last 3 completed shifts:  In: 3265 (31.9 mL/kg) [P.O.:440; I.V.:2825 (27.6 mL/kg)]  Out: 2975 (29 mL/kg) [Urine:2975 (0.8 mL/kg/hr)]  Weight: 102.5 kg     Relevant Results  Results for orders placed or performed during the hospital encounter of 12/24/23 (from the past 24 hour(s))   POCT GLUCOSE   Result Value Ref Range    POCT Glucose 121 (H) 74 - 99 mg/dL   POCT GLUCOSE   Result Value Ref Range    POCT Glucose 146 (H) 74 - 99 mg/dL   POCT GLUCOSE   Result Value Ref Range    POCT Glucose 209 (H) 74 - 99 mg/dL   POCT GLUCOSE   Result Value Ref Range    POCT Glucose 212 (H) 74 - 99 mg/dL   Basic Metabolic Panel   Result Value Ref Range    Glucose 125 (H) 65 - 99 mg/dL    Sodium 137 133 - 145 mmol/L    Potassium 4.9 3.4 - 5.1 mmol/L    Chloride 105 97 - 107 mmol/L    Bicarbonate 22 (L) 24 - 31 mmol/L    Urea Nitrogen 36 (H) 8 - 25 mg/dL    Creatinine 1.70 (H) 0.40 - 1.60 mg/dL    eGFR 42 (L) >60 mL/min/1.73m*2    Calcium " 8.8 8.5 - 10.4 mg/dL    Anion Gap 10 <=19 mmol/L          Assessment/Plan   Principal Problem:    Syncope and collapse  Active Problems:    Atrial fibrillation (CMS/HCC)    Stage 3b chronic kidney disease (CMS/HCC)    Coronary artery disease involving native coronary artery of native heart without angina pectoris    Syncope  Atrial Fibrillation  Coronary Artery Disease  Diabetes Mellitus type 2  Cerebrovascular Accident     Impression and Plan: 12/25 Patient presenting with syncope and collapse at home.  Patient has been admitted to the hospital recently with similar symptoms.  Patient states he walked to the bathroom sat on the toilet to urinate, became lightheaded and the next thing he remembers being on the floor and his son tried to wake him up.  Patient denies any recent chest pain, pressure or shortness of breath.  Patient is on Keppra for suspected seizure activity, though there was no seizure-like activity during this fall.  CT of the cervical spine shows no acute fracture or facet subluxation.  CT of the head negative for any acute intracranial process.  X-ray of the chest negative for any acute cardiopulmonary process.  EKG in the emergency department shows atrial fibrillation which is rate controlled with a ventricular rate of 73 bpm.  Lab work completed in the emergency department shows a sodium of 135, potassium 4.3, elevated creatinine at 3.20 and hemoglobin of 11.9.  Troponin trend show 130, 129 and 90.  Patient remains in a rate controlled atrial fibrillation on telemetry.  He is on Eliquis for stroke risk reduction at a reduced dose of 2.5 mg.  Orthostatic vitals were negative.  Echocardiogram completed in October of this year showed a normal left ventricular systolic function and an impaired relaxation pattern of left ventricular diastolic filling. Given this patient's syncope as well as his elevated troponin's, I have ordered a Lexiscan stress test to be completed tomorrow morning. Patient will be  NPO at midnight for this.      12/26: Patient feels relatively well except for pain from traumatic fall with injuries to the knee back and arm.  Rest pain or anginal symptoms.  With elevated high-sensitivity troponins myocardial perfusion stress test has been ordered to look to see if there is any evidence of ischemia.  Thus far no significant cardiac arrhythmias noted.  If etiology of the syncope remains unclear possibly discharge home with patch monitor.  Also restart the patient's oral oxycodone that he takes at home for his chronic pain syndrome.    12/27: Patient resting comfortably in bed eating breakfast at the time of my exam.  He denies chest pain, pressure or palpitations.  Stress test yesterday showing small area of ischemia at the apex of left ventricle involving the lateral wall and anterior septal left ventricle at the apex, small area of ischemia within the mid inferior wall left ventricle.  He has remained in a rate controlled atrial fibrillation on telemetry with occasional PVCs.  Blood pressures overall normal with his last recorded at 103/61.  Patient breathing comfortably on room air with pulse oximetry of 98%.  He appears overall euvolemic on examination.  Lab work this morning shows a sodium of 137, potassium 4.9 his creatinine is slightly improved at 1.70.  Results from his Lexiscan stress test were discussed with the patient and after further discussion we have decided to proceed with a left heart catheterization tomorrow.  The risks and benefits of this procedure have been discussed at length with the patient by myself and Dr. Hare and he is agreeable to proceeding.  Patient to be n.p.o. at midnight.  Eliquis is on hold at this time.          Ana Olivo, HERBERT-CNP

## 2023-12-27 NOTE — CARE PLAN
Problem: Mobility  Goal: STG - Patient will ambulate 40 feet with 2WW at modified independent  Outcome: Progressing     Problem: Transfers  Goal: STG - Patient will perform bed mobility from supine to sit at modified independent  Outcome: Progressing  Goal: STG - Patient will transfer sit to and from stand from normal surface height at modified independent   Outcome: Progressing

## 2023-12-27 NOTE — PROGRESS NOTES
"Rachid Yun is a 73 y.o. male on day 3 of admission presenting with Syncope and collapse.    Subjective   Patient seen for chronic disease stage IV admitted with a syncopal episode his stress test was abnormal he scheduled for cardiac catheterization tomorrow he is pain-free at this time       Objective     Physical Exam  Neck:      Vascular: No carotid bruit.   Cardiovascular:      Rate and Rhythm: Normal rate and regular rhythm.      Heart sounds: No murmur heard.     No friction rub. No gallop.   Pulmonary:      Breath sounds: No wheezing, rhonchi or rales.   Chest:      Chest wall: No tenderness.   Abdominal:      General: There is no distension.      Tenderness: There is no abdominal tenderness. There is no guarding or rebound.   Musculoskeletal:         General: No swelling or tenderness.      Cervical back: Neck supple.      Right lower leg: No edema.      Left lower leg: No edema.   Lymphadenopathy:      Cervical: No cervical adenopathy.         Last Recorded Vitals  Blood pressure 103/61, pulse 70, temperature 36.3 °C (97.3 °F), temperature source Temporal, resp. rate 18, height 1.93 m (6' 4\"), weight 102 kg (225 lb 15.5 oz), SpO2 98 %.    Intake/Output last 3 Shifts:  I/O last 3 completed shifts:  In: 3265 (31.9 mL/kg) [P.O.:440; I.V.:2825 (27.6 mL/kg)]  Out: 2975 (29 mL/kg) [Urine:2975 (0.8 mL/kg/hr)]  Weight: 102.5 kg     Current Facility-Administered Medications:     acetaminophen (Tylenol) tablet 650 mg, 650 mg, oral, q6h PRN, Monica Orozco MD, 650 mg at 12/25/23 2016    amLODIPine (Norvasc) tablet 5 mg, 5 mg, oral, Daily, Monica Orozco MD, 5 mg at 12/27/23 0835    atorvastatin (Lipitor) tablet 10 mg, 10 mg, oral, Daily, Monica Orozco MD, 10 mg at 12/27/23 0835    clopidogrel (Plavix) tablet 75 mg, 75 mg, oral, Daily, Monica Orozco MD, 75 mg at 12/27/23 0835    donepezil (Aricept) tablet 10 mg, 10 mg, oral, Nightly, Monica Orozco MD, 10 mg at 12/26/23 2016    DULoxetine " (Cymbalta) DR capsule 60 mg, 60 mg, oral, Daily, Monica Orozco MD, 60 mg at 12/27/23 0835    ferrous sulfate (325 mg ferrous sulfate) tablet 1 tablet, 65 mg of iron, oral, BID with meals, Monica Orozco MD, 1 tablet at 12/27/23 0834    folic acid (Folvite) tablet 0.4 mg, 0.4 mg, oral, Daily, Monica Orozco MD, 0.4 mg at 12/27/23 0835    gabapentin (Neurontin) tablet 600 mg, 600 mg, oral, Daily, Monica Orozco MD, 600 mg at 12/27/23 0835    guaiFENesin (Robitussin) 100 mg/5 mL syrup 200 mg, 200 mg, oral, q4h PRN, Monica Orozco MD    levETIRAcetam (Keppra) tablet 500 mg, 500 mg, oral, BID, Monica Orozco MD, 500 mg at 12/27/23 0835    melatonin tablet 5 mg, 5 mg, oral, Nightly PRN, Monica Orozco MD    metoprolol succinate XL (Toprol-XL) 24 hr tablet 25 mg, 25 mg, oral, BID, Monica Orozco MD, 25 mg at 12/27/23 0835    ondansetron (Zofran) injection 4 mg, 4 mg, intravenous, q6h PRN, Monica Orozco MD    ondansetron (Zofran) tablet 4 mg, 4 mg, oral, 4x daily PRN, Monica Orozco MD    oxyCODONE (Roxicodone) immediate release tablet 15 mg, 15 mg, oral, q6h PRN, Phill Hare DO, 15 mg at 12/27/23 1044    pantoprazole (ProtoNix) EC tablet 40 mg, 40 mg, oral, Daily, Monica Orozco MD, 40 mg at 12/27/23 0835    polyethylene glycol (Glycolax, Miralax) packet 17 g, 17 g, oral, Daily, Monica Orozco MD, 17 g at 12/27/23 0835    sennosides (Senokot) tablet 17.2 mg, 2 tablet, oral, Nightly, Monica Orozco MD, 17.2 mg at 12/26/23 2016    sodium chloride 0.9% infusion, 100 mL/hr, intravenous, Continuous, Monica Orozco MD, Last Rate: 100 mL/hr at 12/26/23 0633, 100 mL/hr at 12/26/23 0633    tamsulosin (Flomax) 24 hr capsule 0.4 mg, 0.4 mg, oral, Daily, Monica Orozco MD, 0.4 mg at 12/27/23 0835    traZODone (Desyrel) tablet 150 mg, 150 mg, oral, Nightly, Monica Orozco MD, 150 mg at 12/26/23 2016   Relevant Results    Results for orders placed or  performed during the hospital encounter of 12/24/23 (from the past 96 hour(s))   CBC and Auto Differential   Result Value Ref Range    WBC 7.7 4.4 - 11.3 x10*3/uL    nRBC 0.0 0.0 - 0.0 /100 WBCs    RBC 4.13 (L) 4.50 - 5.90 x10*6/uL    Hemoglobin 11.9 (L) 13.5 - 17.5 g/dL    Hematocrit 37.4 (L) 41.0 - 52.0 %    MCV 91 80 - 100 fL    MCH 28.8 26.0 - 34.0 pg    MCHC 31.8 (L) 32.0 - 36.0 g/dL    RDW 13.7 11.5 - 14.5 %    Platelets 239 150 - 450 x10*3/uL    Neutrophils % 77.7 40.0 - 80.0 %    Immature Granulocytes %, Automated 0.4 0.0 - 0.9 %    Lymphocytes % 12.5 13.0 - 44.0 %    Monocytes % 6.4 2.0 - 10.0 %    Eosinophils % 2.0 0.0 - 6.0 %    Basophils % 1.0 0.0 - 2.0 %    Neutrophils Absolute 5.95 (H) 1.60 - 5.50 x10*3/uL    Immature Granulocytes Absolute, Automated 0.03 0.00 - 0.50 x10*3/uL    Lymphocytes Absolute 0.96 0.80 - 3.00 x10*3/uL    Monocytes Absolute 0.49 0.05 - 0.80 x10*3/uL    Eosinophils Absolute 0.15 0.00 - 0.40 x10*3/uL    Basophils Absolute 0.08 0.00 - 0.10 x10*3/uL   Comprehensive metabolic panel   Result Value Ref Range    Glucose 160 (H) 65 - 99 mg/dL    Sodium 135 133 - 145 mmol/L    Potassium 4.3 3.4 - 5.1 mmol/L    Chloride 102 97 - 107 mmol/L    Bicarbonate 19 (L) 24 - 31 mmol/L    Urea Nitrogen 54 (H) 8 - 25 mg/dL    Creatinine 3.20 (H) 0.40 - 1.60 mg/dL    eGFR 20 (L) >60 mL/min/1.73m*2    Calcium 9.2 8.5 - 10.4 mg/dL    Albumin 3.9 3.5 - 5.0 g/dL    Alkaline Phosphatase 44 35 - 125 U/L    Total Protein 6.4 5.9 - 7.9 g/dL    AST 18 5 - 40 U/L    Bilirubin, Total 0.5 0.1 - 1.2 mg/dL    ALT 10 5 - 40 U/L    Anion Gap 14 <=19 mmol/L   Serial Troponin, Initial ([facility])   Result Value Ref Range    Troponin T, High Sensitivity 130 (H) <=15 ng/L   Serial Troponin, 2 Hour ([facility])   Result Value Ref Range    Troponin T, High Sensitivity 129 (H) <=15 ng/L   POCT GLUCOSE   Result Value Ref Range    POCT Glucose 138 (H) 74 - 99 mg/dL   Thyroid Stimulating Hormone   Result Value Ref Range    Thyroid  Stimulating Hormone 0.95 0.27 - 4.20 mIU/L   Renal Function Panel   Result Value Ref Range    Glucose 122 (H) 65 - 99 mg/dL    Sodium 138 133 - 145 mmol/L    Potassium 3.9 3.4 - 5.1 mmol/L    Chloride 106 97 - 107 mmol/L    Bicarbonate 20 (L) 24 - 31 mmol/L    Urea Nitrogen 51 (H) 8 - 25 mg/dL    Creatinine 2.60 (H) 0.40 - 1.60 mg/dL    eGFR 25 (L) >60 mL/min/1.73m*2    Calcium 8.8 8.5 - 10.4 mg/dL    Phosphorus 3.0 2.5 - 4.5 mg/dL    Albumin 3.6 3.5 - 5.0 g/dL    Anion Gap 12 <=19 mmol/L   Serial Troponin, 6 Hour ([facility])   Result Value Ref Range    Troponin T, High Sensitivity 90 (H) <=15 ng/L   Lavender Top   Result Value Ref Range    Extra Tube Hold for add-ons.    POCT GLUCOSE   Result Value Ref Range    POCT Glucose 113 (H) 74 - 99 mg/dL   POCT GLUCOSE   Result Value Ref Range    POCT Glucose 162 (H) 74 - 99 mg/dL   POCT GLUCOSE   Result Value Ref Range    POCT Glucose 121 (H) 74 - 99 mg/dL   POCT GLUCOSE   Result Value Ref Range    POCT Glucose 146 (H) 74 - 99 mg/dL   POCT GLUCOSE   Result Value Ref Range    POCT Glucose 209 (H) 74 - 99 mg/dL   POCT GLUCOSE   Result Value Ref Range    POCT Glucose 212 (H) 74 - 99 mg/dL   Basic Metabolic Panel   Result Value Ref Range    Glucose 125 (H) 65 - 99 mg/dL    Sodium 137 133 - 145 mmol/L    Potassium 4.9 3.4 - 5.1 mmol/L    Chloride 105 97 - 107 mmol/L    Bicarbonate 22 (L) 24 - 31 mmol/L    Urea Nitrogen 36 (H) 8 - 25 mg/dL    Creatinine 1.70 (H) 0.40 - 1.60 mg/dL    eGFR 42 (L) >60 mL/min/1.73m*2    Calcium 8.8 8.5 - 10.4 mg/dL    Anion Gap 10 <=19 mmol/L   POCT GLUCOSE   Result Value Ref Range    POCT Glucose 121 (H) 74 - 99 mg/dL       Assessment/Plan   Chronic kidney disease stage IV creatinine level is better today patient is at high risk for IV contrast induced acute kidney injury because of the heart catheterization tomorrow he understand the risk very well including the possibility of dialysis therapy and he is agreeable to proceed  Syncope rule out cardiac  causes he is undergoing evaluation by cardiology  Falls  Chronic atrial fibrillation  History of congestive heart failure  Mild anemia of chronic kidney disease                Ariel Costa MD

## 2023-12-28 LAB
ACT BLD: 302 SEC (ref 89–169)
ANION GAP SERPL CALC-SCNC: 9 MMOL/L
BUN SERPL-MCNC: 40 MG/DL (ref 8–25)
CALCIUM SERPL-MCNC: 9.1 MG/DL (ref 8.5–10.4)
CHLORIDE SERPL-SCNC: 103 MMOL/L (ref 97–107)
CO2 SERPL-SCNC: 23 MMOL/L (ref 24–31)
CREAT SERPL-MCNC: 1.9 MG/DL (ref 0.4–1.6)
GFR SERPL CREATININE-BSD FRML MDRD: 37 ML/MIN/1.73M*2
GLUCOSE BLD MANUAL STRIP-MCNC: 137 MG/DL (ref 74–99)
GLUCOSE BLD MANUAL STRIP-MCNC: 164 MG/DL (ref 74–99)
GLUCOSE SERPL-MCNC: 139 MG/DL (ref 65–99)
POTASSIUM SERPL-SCNC: 5 MMOL/L (ref 3.4–5.1)
POTASSIUM SERPL-SCNC: 5.3 MMOL/L (ref 3.4–5.1)
SODIUM SERPL-SCNC: 135 MMOL/L (ref 133–145)

## 2023-12-28 PROCEDURE — 2500000005 HC RX 250 GENERAL PHARMACY W/O HCPCS: Performed by: INTERNAL MEDICINE

## 2023-12-28 PROCEDURE — C9600 PERC DRUG-EL COR STENT SING: HCPCS | Performed by: INTERNAL MEDICINE

## 2023-12-28 PROCEDURE — 2550000001 HC RX 255 CONTRASTS: Performed by: INTERNAL MEDICINE

## 2023-12-28 PROCEDURE — C1760 CLOSURE DEV, VASC: HCPCS | Performed by: INTERNAL MEDICINE

## 2023-12-28 PROCEDURE — 027034Z DILATION OF CORONARY ARTERY, ONE ARTERY WITH DRUG-ELUTING INTRALUMINAL DEVICE, PERCUTANEOUS APPROACH: ICD-10-PCS | Performed by: INTERNAL MEDICINE

## 2023-12-28 PROCEDURE — C1887 CATHETER, GUIDING: HCPCS | Performed by: INTERNAL MEDICINE

## 2023-12-28 PROCEDURE — B215YZZ FLUOROSCOPY OF LEFT HEART USING OTHER CONTRAST: ICD-10-PCS | Performed by: INTERNAL MEDICINE

## 2023-12-28 PROCEDURE — 2500000001 HC RX 250 WO HCPCS SELF ADMINISTERED DRUGS (ALT 637 FOR MEDICARE OP): Performed by: INTERNAL MEDICINE

## 2023-12-28 PROCEDURE — 80048 BASIC METABOLIC PNL TOTAL CA: CPT | Performed by: INTERNAL MEDICINE

## 2023-12-28 PROCEDURE — 2720000007 HC OR 272 NO HCPCS: Performed by: INTERNAL MEDICINE

## 2023-12-28 PROCEDURE — 92928 PRQ TCAT PLMT NTRAC ST 1 LES: CPT | Performed by: INTERNAL MEDICINE

## 2023-12-28 PROCEDURE — 4A023N7 MEASUREMENT OF CARDIAC SAMPLING AND PRESSURE, LEFT HEART, PERCUTANEOUS APPROACH: ICD-10-PCS | Performed by: INTERNAL MEDICINE

## 2023-12-28 PROCEDURE — 36415 COLL VENOUS BLD VENIPUNCTURE: CPT | Performed by: INTERNAL MEDICINE

## 2023-12-28 PROCEDURE — 2500000004 HC RX 250 GENERAL PHARMACY W/ HCPCS (ALT 636 FOR OP/ED): Performed by: INTERNAL MEDICINE

## 2023-12-28 PROCEDURE — 99152 MOD SED SAME PHYS/QHP 5/>YRS: CPT | Performed by: INTERNAL MEDICINE

## 2023-12-28 PROCEDURE — 2780000003 HC OR 278 NO HCPCS: Performed by: INTERNAL MEDICINE

## 2023-12-28 PROCEDURE — 85347 COAGULATION TIME ACTIVATED: CPT

## 2023-12-28 PROCEDURE — C1874 STENT, COATED/COV W/DEL SYS: HCPCS | Performed by: INTERNAL MEDICINE

## 2023-12-28 PROCEDURE — 99153 MOD SED SAME PHYS/QHP EA: CPT | Performed by: INTERNAL MEDICINE

## 2023-12-28 PROCEDURE — B211YZZ FLUOROSCOPY OF MULTIPLE CORONARY ARTERIES USING OTHER CONTRAST: ICD-10-PCS | Performed by: INTERNAL MEDICINE

## 2023-12-28 PROCEDURE — 82947 ASSAY GLUCOSE BLOOD QUANT: CPT

## 2023-12-28 PROCEDURE — 93458 L HRT ARTERY/VENTRICLE ANGIO: CPT | Performed by: INTERNAL MEDICINE

## 2023-12-28 PROCEDURE — 2060000001 HC INTERMEDIATE ICU ROOM DAILY

## 2023-12-28 PROCEDURE — 2500000002 HC RX 250 W HCPCS SELF ADMINISTERED DRUGS (ALT 637 FOR MEDICARE OP, ALT 636 FOR OP/ED): Performed by: INTERNAL MEDICINE

## 2023-12-28 PROCEDURE — C1725 CATH, TRANSLUMIN NON-LASER: HCPCS | Performed by: INTERNAL MEDICINE

## 2023-12-28 PROCEDURE — 84132 ASSAY OF SERUM POTASSIUM: CPT | Performed by: INTERNAL MEDICINE

## 2023-12-28 DEVICE — STENT ONYXNG25012UX ONYX 2.50X12RX
Type: IMPLANTABLE DEVICE | Site: HEART | Status: FUNCTIONAL
Brand: ONYX FRONTIER™

## 2023-12-28 RX ORDER — FENTANYL CITRATE 50 UG/ML
INJECTION, SOLUTION INTRAMUSCULAR; INTRAVENOUS AS NEEDED
Status: DISCONTINUED | OUTPATIENT
Start: 2023-12-28 | End: 2023-12-28 | Stop reason: HOSPADM

## 2023-12-28 RX ORDER — CLOPIDOGREL BISULFATE 75 MG/1
75 TABLET ORAL DAILY
Status: CANCELLED | OUTPATIENT
Start: 2023-12-28 | End: 2024-12-27

## 2023-12-28 RX ORDER — SODIUM CHLORIDE 9 MG/ML
5 INJECTION, SOLUTION INTRAVENOUS CONTINUOUS
Status: CANCELLED | OUTPATIENT
Start: 2023-12-28 | End: 2023-12-28

## 2023-12-28 RX ORDER — NAPROXEN SODIUM 220 MG/1
81 TABLET, FILM COATED ORAL DAILY
Status: CANCELLED | OUTPATIENT
Start: 2023-12-28

## 2023-12-28 RX ORDER — IODIXANOL 270 MG/ML
INJECTION, SOLUTION INTRAVASCULAR AS NEEDED
Status: DISCONTINUED | OUTPATIENT
Start: 2023-12-28 | End: 2023-12-28 | Stop reason: HOSPADM

## 2023-12-28 RX ORDER — LIDOCAINE HYDROCHLORIDE 10 MG/ML
INJECTION, SOLUTION EPIDURAL; INFILTRATION; INTRACAUDAL; PERINEURAL AS NEEDED
Status: DISCONTINUED | OUTPATIENT
Start: 2023-12-28 | End: 2023-12-28 | Stop reason: HOSPADM

## 2023-12-28 RX ORDER — HEPARIN SODIUM 1000 [USP'U]/ML
INJECTION, SOLUTION INTRAVENOUS; SUBCUTANEOUS AS NEEDED
Status: DISCONTINUED | OUTPATIENT
Start: 2023-12-28 | End: 2023-12-28 | Stop reason: HOSPADM

## 2023-12-28 RX ORDER — MIDAZOLAM HYDROCHLORIDE 1 MG/ML
INJECTION INTRAMUSCULAR; INTRAVENOUS AS NEEDED
Status: DISCONTINUED | OUTPATIENT
Start: 2023-12-28 | End: 2023-12-28 | Stop reason: HOSPADM

## 2023-12-28 RX ORDER — CLOPIDOGREL BISULFATE 300 MG/1
TABLET, FILM COATED ORAL AS NEEDED
Status: DISCONTINUED | OUTPATIENT
Start: 2023-12-28 | End: 2023-12-28 | Stop reason: HOSPADM

## 2023-12-28 RX ADMIN — GABAPENTIN 600 MG: 600 TABLET, FILM COATED ORAL at 13:18

## 2023-12-28 RX ADMIN — PANTOPRAZOLE SODIUM 40 MG: 40 TABLET, DELAYED RELEASE ORAL at 13:18

## 2023-12-28 RX ADMIN — Medication 0.4 MG: at 13:19

## 2023-12-28 RX ADMIN — AMLODIPINE BESYLATE 5 MG: 5 TABLET ORAL at 13:18

## 2023-12-28 RX ADMIN — DONEPEZIL HYDROCHLORIDE 10 MG: 10 TABLET, FILM COATED ORAL at 21:56

## 2023-12-28 RX ADMIN — METOPROLOL SUCCINATE 25 MG: 25 TABLET, FILM COATED, EXTENDED RELEASE ORAL at 13:18

## 2023-12-28 RX ADMIN — METOPROLOL SUCCINATE 25 MG: 25 TABLET, FILM COATED, EXTENDED RELEASE ORAL at 21:56

## 2023-12-28 RX ADMIN — FERROUS SULFATE TAB 325 MG (65 MG ELEMENTAL FE) 1 TABLET: 325 (65 FE) TAB at 18:04

## 2023-12-28 RX ADMIN — DULOXETINE HYDROCHLORIDE 60 MG: 60 CAPSULE, DELAYED RELEASE ORAL at 13:18

## 2023-12-28 RX ADMIN — POLYETHYLENE GLYCOL 3350 17 G: 17 POWDER, FOR SOLUTION ORAL at 13:18

## 2023-12-28 RX ADMIN — TRAZODONE HYDROCHLORIDE 150 MG: 100 TABLET ORAL at 21:56

## 2023-12-28 RX ADMIN — STANDARDIZED SENNA CONCENTRATE 17.2 MG: 8.6 TABLET ORAL at 21:56

## 2023-12-28 RX ADMIN — OXYCODONE 15 MG: 5 TABLET ORAL at 03:49

## 2023-12-28 RX ADMIN — FERROUS SULFATE TAB 325 MG (65 MG ELEMENTAL FE) 1 TABLET: 325 (65 FE) TAB at 13:18

## 2023-12-28 RX ADMIN — LEVETIRACETAM 500 MG: 500 TABLET, FILM COATED ORAL at 21:56

## 2023-12-28 RX ADMIN — TAMSULOSIN HYDROCHLORIDE 0.4 MG: 0.4 CAPSULE ORAL at 13:18

## 2023-12-28 RX ADMIN — OXYCODONE 15 MG: 5 TABLET ORAL at 10:19

## 2023-12-28 RX ADMIN — OXYCODONE 15 MG: 5 TABLET ORAL at 18:04

## 2023-12-28 RX ADMIN — LEVETIRACETAM 500 MG: 500 TABLET, FILM COATED ORAL at 13:18

## 2023-12-28 ASSESSMENT — COGNITIVE AND FUNCTIONAL STATUS - GENERAL
DRESSING REGULAR UPPER BODY CLOTHING: A LITTLE
WALKING IN HOSPITAL ROOM: A LOT
TOILETING: A LITTLE
MOVING TO AND FROM BED TO CHAIR: A LITTLE
DRESSING REGULAR LOWER BODY CLOTHING: A LOT
MOBILITY SCORE: 15
DAILY ACTIVITIY SCORE: 17
MOVING TO AND FROM BED TO CHAIR: A LITTLE
MOVING FROM LYING ON BACK TO SITTING ON SIDE OF FLAT BED WITH BEDRAILS: A LITTLE
CLIMB 3 TO 5 STEPS WITH RAILING: A LOT
PERSONAL GROOMING: A LITTLE
STANDING UP FROM CHAIR USING ARMS: A LITTLE
TURNING FROM BACK TO SIDE WHILE IN FLAT BAD: A LITTLE
WALKING IN HOSPITAL ROOM: A LOT
CLIMB 3 TO 5 STEPS WITH RAILING: TOTAL
MOVING FROM LYING ON BACK TO SITTING ON SIDE OF FLAT BED WITH BEDRAILS: A LITTLE
DRESSING REGULAR UPPER BODY CLOTHING: A LITTLE
STANDING UP FROM CHAIR USING ARMS: A LITTLE
DAILY ACTIVITIY SCORE: 18
DRESSING REGULAR LOWER BODY CLOTHING: A LOT
MOBILITY SCORE: 16
HELP NEEDED FOR BATHING: A LITTLE
HELP NEEDED FOR BATHING: A LOT
TURNING FROM BACK TO SIDE WHILE IN FLAT BAD: A LITTLE
TOILETING: A LOT

## 2023-12-28 ASSESSMENT — PAIN SCALES - GENERAL
PAINLEVEL_OUTOF10: 9
PAINLEVEL_OUTOF10: 7
PAINLEVEL_OUTOF10: 0 - NO PAIN
PAINLEVEL_OUTOF10: 7
PAINLEVEL_OUTOF10: 0 - NO PAIN

## 2023-12-28 ASSESSMENT — PAIN DESCRIPTION - DESCRIPTORS: DESCRIPTORS: ACHING;SORE

## 2023-12-28 ASSESSMENT — COLUMBIA-SUICIDE SEVERITY RATING SCALE - C-SSRS
1. IN THE PAST MONTH, HAVE YOU WISHED YOU WERE DEAD OR WISHED YOU COULD GO TO SLEEP AND NOT WAKE UP?: NO
2. HAVE YOU ACTUALLY HAD ANY THOUGHTS OF KILLING YOURSELF?: NO
6. HAVE YOU EVER DONE ANYTHING, STARTED TO DO ANYTHING, OR PREPARED TO DO ANYTHING TO END YOUR LIFE?: NO

## 2023-12-28 ASSESSMENT — PAIN - FUNCTIONAL ASSESSMENT
PAIN_FUNCTIONAL_ASSESSMENT: 0-10

## 2023-12-28 ASSESSMENT — PAIN DESCRIPTION - LOCATION
LOCATION: GENERALIZED
LOCATION: SHOULDER

## 2023-12-28 NOTE — CARE PLAN
Problem: Diabetes  Goal: Achieve decreasing blood glucose levels by end of shift  Outcome: Progressing  Goal: Increase stability of blood glucose readings by end of shift  Outcome: Progressing  Goal: Decrease in ketones present in urine by end of shift  Outcome: Progressing  Goal: Maintain electrolyte levels within acceptable range throughout shift  Outcome: Progressing  Goal: Maintain glucose levels >70mg/dl to <250mg/dl throughout shift  Outcome: Progressing  Goal: No changes in neurological exam by end of shift  Outcome: Progressing  Goal: Learn about and adhere to nutrition recommendations by end of shift  Outcome: Progressing  Goal: Vital signs within normal range for age by end of shift  Outcome: Progressing  Goal: Increase self care and/or family involovement by end of shift  Outcome: Progressing  Goal: Receive DSME education by end of shift  Outcome: Progressing     Problem: Fall/Injury  Goal: Verbalize understanding of personal risk factors for fall in the hospital  Outcome: Progressing  Goal: Verbalize understanding of risk factor reduction measures to prevent injury from fall in the home  Outcome: Progressing  Goal: Use assistive devices by end of the shift  Outcome: Progressing     Problem: Pain  Goal: My pain/discomfort is manageable  Outcome: Progressing     Problem: Safety  Goal: Patient will be injury free during hospitalization  Outcome: Progressing  Goal: I will remain free of falls  Outcome: Progressing     Problem: Daily Care  Goal: Daily care needs are met  Outcome: Progressing     Problem: Psychosocial Needs  Goal: Demonstrates ability to cope with hospitalization/illness  Outcome: Progressing  Goal: Collaborate with me, my family, and caregiver to identify my specific goals  Outcome: Progressing     Problem: Discharge Barriers  Goal: My discharge needs are met  Outcome: Progressing     Problem: Pain  Goal: STG - Patient verbalizes a reduction in pain level to <4/10 to improve  I/ADLs  Outcome: Progressing     Problem: Skin  Goal: Decreased wound size/increased tissue granulation at next dressing change  Outcome: Progressing  Goal: Participates in plan/prevention/treatment measures  Outcome: Progressing  Goal: Prevent/manage excess moisture  Outcome: Progressing  Goal: Prevent/minimize sheer/friction injuries  Outcome: Progressing  Goal: Promote/optimize nutrition  Outcome: Progressing  Goal: Promote skin healing  Outcome: Progressing   The patient's goals for the shift include      The clinical goals for the shift include free from falls    Over the shift, the patient did not make progress toward the following goals. Barriers to progression include n/a. Recommendations to address these barriers include n/a.

## 2023-12-28 NOTE — NURSING NOTE
Spoke with dr. Link regarding pt ordered plavix (covering for dr. Hare) aware pt was given 300mg of plavix in cath lab, requested daily plavix 75mg start tomorrow

## 2023-12-28 NOTE — PROGRESS NOTES
Physical Therapy                 Therapy Communication Note    Patient Name: Rachid Yun  MRN: [id number]  Today's Date: 12/28/2023     Discipline: Physical Therapy    Missed Visit Reason: Missed Visit Reason: Patient in a medical procedure (Patient off nursing abarca at procedure (cardiac cath).)    Missed Time: Attempt

## 2023-12-28 NOTE — PROGRESS NOTES
[facility] met with the pt at bedside, he confirmed plan is still to go to SNF at [place]. This worker made him aware [place] has no beds available however [facility] does, he is agreeable. [facility] has been approved.      12/28/23 1250   Discharge Planning   Patient expects to be discharged to: [facility] - [facility] approval received

## 2023-12-28 NOTE — PROGRESS NOTES
Occupational Therapy                 Therapy Communication Note    Patient Name: Rachid Yun  MRN: [id number]  Today's Date: 12/28/2023     Discipline: Occupational Therapy    Missed Visit Reason: Missed Visit Reason: Patient in a medical procedure (Pt at heart cath this date)    Missed Time: Attempt    Comment:

## 2023-12-28 NOTE — PROGRESS NOTES
Lori Yun is a 73 y.o. male on day 4 of admission presenting with Syncope and collapse.      Subjective   Patient feels better.  He denies shortness of breath, dizziness.  Denies chest pain, denies shortness of breath.    Objective     Last Recorded Vitals  /88 (BP Location: Left arm, Patient Position: Lying)   Pulse 83   Temp 36.2 °C (97.2 °F) (Temporal)   Resp 17   Wt 104 kg (229 lb 0.9 oz)   SpO2 95%   Intake/Output last 3 Shifts:    Intake/Output Summary (Last 24 hours) at 12/28/2023 1205  Last data filed at 12/28/2023 0920  Gross per 24 hour   Intake --   Output 1145 ml   Net -1145 ml         Admission Weight  Weight: 108 kg (239 lb) (12/24/23 2021)    Daily Weight  12/28/23 : 104 kg (229 lb 0.9 oz)    Image Results  [place] [address]             Phone [phone]    Cardiovascular Catheterization Report    Patient Name:      LORI YUN      Performing Physician: Namita Hancock DO  Study Date:        12/28/2023          Verifying Physician:  Namita Hancock DO  MRN/PID:           [id number]            Cardiologist:  Accession#:        [id number]        Ordering Provider:    Namita HANCOCK  Date of Birth/Age: 1950 / 73      Fellow:                     years  Gender:            M                   Fellow:  Encounter#:        [id number]       Study:            Coronary Arteriogram  Additional Study: Left Heart Cath no LV  Additional Study: PCI - Percutaneous Coronary Intervention       Indications:  LORI YUN is a 73 year old male who presents with coronary artery disease, prior percutaneous coronary intervention, prior myocardial infarction and syncope. NSTE - ACS.  Stress test performed: No. CTA performed: NoDave Diaz accessed: No. LVEF  Assessed:  No.  Cardiac arrest: No.  Cardiac surgical consult: No.  Cardiovascular Instability: No  Frailty status of patient entering lab: 6 = Moderately frail.       Procedure Description:  After infiltration with 1% Lidocaine, the femoral artery was cannulated with a modified Seldinger technique. The arterial sheath was sized up to 6 [place]bourgish. After completion of the procedure, femoral artery angiography was performed. This demonstrated a common femoral artery puncture appropriate for closure. An Angio-Seal Evolution 6F ([employer]) vascular closure device was placed per protocol.     Procedure Description Comments:  The patient was brought to the cardiac catheterization laboratory and placed on the catheterization table. The right groin was prepped and draped in usual fashion local anesthesia obtained with 1% Xylocaine. The right femoral artery was punctured with a needle and a guidewire inserted. A 6 [place]bourgish arterial sheath was placed into the right femoral artery over the guidewire. A JL 4 catheter was [place] in the left coronary system cannulated and viewed angiographically multiple projections. The JL 4 cath was removed and exchanged for a Hemanth catheter. The Hemanth catheter was advanced in the right coronary artery cannulated and viewed angiographically in 2 projections. The Hemanth catheter was removed changed for a pigtail catheter. The pigtail catheter was advanced across aortic valve in the left ventricle where pressures were taken. Left-ventricular angiography was not performed to limit contrast exposure. The pigtail catheter was withdrawn and removed with pullback pressures   revealing no significant gradient. The patient then proceeded onto a percutaneous coronary intervention procedure. A CLS 3.5 guiding catheter was advanced in the left coronary system cannulated. A PT 2 guidewire was then advanced into the distal circumflex artery. Next a 2.5 x 10 mm compliant balloon was opened and prepped. His  tract over the guidewire position to the area of stenosis and expanded for 30 inflation followed by a second 30nd inflation taking the balloon diameter to 2.6 mm. The balloon was deflated withdrawn and exchanged for a 2.5 x 12 mm drug-eluting stent. The drug-eluting stent was passed into the area of stenosis and deployed with 2 separate inflations taking stent diameter to 2.65 mm. The balloon was deflated withdrawn and coronary angiography performed with and without the guidewire in place revealing an angiographic adequate result. There is SULEIMAN grade III flow in the system. The balloon guiding catheter and guidewire removed completely and an angiographic view of   the right femoral artery obtained. An Angio-Seal closure device was then deployed. Patient was then transferred to the heart and vascular center in stable condition.     Coronary Angiography:  The coronary circulation is right dominant.     Coronary Angiography Comments:  Left main trunk: Left main coronary artery was without significant disease dividing into left anterior descending and circumflex artery    Left anterior sending artery: Left anterior sending artery was a large vessel extending to the apex of the heart. There was mild nonobstructive disease in the system    Circumflex artery: The circumflex artery was a large nondominant vessel. There was a widely patent stent in its midportion and then just distal to the stent there was a 90% stenosis.    Right coronary artery: Right coronary artery is a large dominant vessel. There is a 50% proximal stenosis but no obstructive lesions noted.    Left ventricular angiography: Left ventricular angiography was not performed to limit contrast exposure.       Coronary Interventions:  Angiography reveals a 90% stenosis of the mid circumflex coronary artery. Pre-intervention SULEIMAN flow was 3. Percutaneous coronary intervention was performed within the mid circumflex. The vessel was pre-dilated using a compliant  balloon 2.5 mm x 10 mm at 8 OLY. [employer] drug-eluting stent 2.5 mm x 12 mm was advanced to the lesion and implanted at 10 OLY. The stenosis was successfully reduced from 90% to 0%. Post-intervention SULEIMAN flow was 3.     Hemo Personnel:  +----------------+---------+  Name            Duty       +----------------+---------+  Phill Hare MD 1  +----------------+---------+       Hemodynamic Pressures:     +----+---------------+----------+-------------+--------------+-------+---------+  Site   Date Time   Phase NameSystolic mmHgDiastolic mmHgED mmHgMean mmHg  +----+---------------+----------+-------------+--------------+-------+---------+    AO     12/28/2023  [facility]          122            53              78           8:27:34 AM                                                       +----+---------------+----------+-------------+--------------+-------+---------+    AO     12/28/2023  [facility]          121            51              76           8:27:48 AM                                                       +----+---------------+----------+-------------+--------------+-------+---------+    AO     12/28/2023  [facility]          121            52              75           8:27:53 AM                                                       +----+---------------+----------+-------------+--------------+-------+---------+    LV     12/28/2023  [facility]          127            -1      8                    8:36:02 AM                                                       +----+---------------+----------+-------------+--------------+-------+---------+    LV     12/28/2023  [facility]          126            -1      5                    8:36:13 AM                                                       +----+---------------+----------+-------------+--------------+-------+---------+   LVp     12/28/2023  [facility]          124             -3      5                    8:36:23 AM                                                       +----+---------------+----------+-------------+--------------+-------+---------+   AOp     12/28/2023  [facility]          132            51              86           8:36:33 AM                                                       +----+---------------+----------+-------------+--------------+-------+---------+    AO     12/28/2023  [facility]          123            57              84           8:44:18 AM                                                       +----+---------------+----------+-------------+--------------+-------+---------+    AO     12/28/2023  [facility]          137            39              91           8:48:02 AM                                                       +----+---------------+----------+-------------+--------------+-------+---------+    AO     12/28/2023  [facility]          128            57              86           8:52:26 AM                                                       +----+---------------+----------+-------------+--------------+-------+---------+    AO     12/28/2023  [facility]          135            64              94           8:58:20 AM                                                       +----+---------------+----------+-------------+--------------+-------+---------+    AO     12/28/2023  [facility]          138            63              93           9:09:36 AM                                                       +----+---------------+----------+-------------+--------------+-------+---------+    AO     12/28/2023  [facility]          134            64              92           9:13:28 AM                                                       +----+---------------+----------+-------------+--------------+-------+---------+       Cardiac Cath Post  Procedure Notes:  Post Procedure Diagnosis: Single vessel disease.  Blood Loss:               Estimated blood loss during the procedure was 20 ml                            mls.  Specimens Removed:        Number of specimen(s) removed: none.    ____________________________________________________________________________________  CONCLUSIONS:   1. Successful PCI and deployment of drug-eluting stent into the mid circumflex artery with preoperative stenosis of 90% and SULEIMAN grade III flow and postoperative residual of 0% and SULEIMAN grade III flow.   2. Mild nonobstructive plaquing of left anterior sending artery.   3. 50% stenosis of proximal right coronary artery.   4. Clinical correlation: This patient presented with a syncopal episode and had elevation in high-sensitivity troponin suggestive of a non-ST elevation myocardial infarction. His anticoagulation therapy was held and he underwent diagnostic cardiac catheterization today revealing a severe lesion in his mid circumflex artery. This is successfully treated with a PTCA and deployment of a drug-eluting stent with good procedural outcome. No left ventricular angiography was performed in attempt to limit contrast exposure in this patient with chronic kidney disease. The patient has been on anticoagulation chronically and will likely be placed on the combination of clopidogrel with his apixaban in the future.    ICD 10 Codes:  Non ST elevation (NSTEMI) myocardial infarction-I21.4     CPT Codes:  Coronary Angiography S&I only ([facility])([facility])-27453; Left Heart Cath (visualization of coronaries) and LV-93564; Stent w angioplasty Left Circumflex single major Artery branch (PCI)-16973.     [id number] Phill Hare DO  Performing Physician  Electronically signed by [id number] Phill Hare DO on 12/28/2023 at 10:03:13 AM         ** Final **      Physical Exam  Pt is NAD.  Cooperative with exam.  In no distress at rest.  A, Ox3.  Face is symmetrical.  Skin - no lesions.  Lungs: clear to  auscultations B/L.  Diminished bilaterally no wheezes, rales, rhonchi.  Heart: irregular S1S2.  Abdomen: soft, NT, ND. BS positive.  Extr.: no edema, cords, cyanosis.  Moves all extr.   Relevant Results               Assessment/Plan                  Principal Problem:    Syncope and collapse  Active Problems:    Atrial fibrillation (CMS/HCC)    Stage 3b chronic kidney disease (CMS/HCC)    Coronary artery disease involving native coronary artery of native heart without angina pectoris            Syncope and collapse.  Likely, cardiac.  Possible orthostatic.  Will provide IV hydration, check orthostatic vital signs.  Consulted cardiology -plan for stress test in the morning  Paroxysmal atrial fibrillation.  Patient is back in atrial fibrillation but his rate is controlled.  Continue Eliquis   Acute on chronic renal failure.  Hold Cozaar, monitor renal function.  IV hydration, consult nephrology.  Improved today.  Seen by nephrology  Hypertension.  Continue medications except Cozaar  CODE STATUS, discussed with patient: Full code  Repeated falls.  Consult physical Occupational Therapy     12/26: Stress test today.  Patient is doing overall better.  He is agreeable for acute rehab.  Possible discharge later today or tomorrow.  Radiology may adjust patient's medications based on stress test results.  12/27: Stress test yesterday demonstrated some ischemia.  Plan is for cardiac catheterization in the morning.  Renal function improved.  Patient complains of heartburn.  Will use Mylanta as needed.  Time spent with patient today 22 minutes     12/28: Status post heart cath with stent to mid circumflex artery.  Continue IV hydration.  Monitor renal function.  Possible discharge to rehab tomorrow if patient is stable and his renal function is stable.  Monica Orozco MD

## 2023-12-28 NOTE — CARE PLAN
The patient's goals for the shift include      The clinical goals for the shift include free from falls    Problem: Diabetes  Goal: Achieve decreasing blood glucose levels by end of shift  Outcome: Progressing  Goal: Increase stability of blood glucose readings by end of shift  Outcome: Progressing  Goal: Decrease in ketones present in urine by end of shift  Outcome: Progressing  Goal: Maintain electrolyte levels within acceptable range throughout shift  Outcome: Progressing  Goal: Maintain glucose levels >70mg/dl to <250mg/dl throughout shift  Outcome: Progressing  Goal: No changes in neurological exam by end of shift  Outcome: Progressing  Goal: Learn about and adhere to nutrition recommendations by end of shift  Outcome: Progressing  Goal: Vital signs within normal range for age by end of shift  Outcome: Progressing  Goal: Increase self care and/or family involovement by end of shift  Outcome: Progressing  Goal: Receive DSME education by end of shift  Outcome: Progressing     Problem: Fall/Injury  Goal: Verbalize understanding of personal risk factors for fall in the hospital  Outcome: Progressing  Goal: Verbalize understanding of risk factor reduction measures to prevent injury from fall in the home  Outcome: Progressing  Goal: Use assistive devices by end of the shift  Outcome: Progressing     Problem: Pain  Goal: My pain/discomfort is manageable  Outcome: Progressing     Problem: Safety  Goal: Patient will be injury free during hospitalization  Outcome: Progressing  Goal: I will remain free of falls  Outcome: Progressing     Problem: Daily Care  Goal: Daily care needs are met  Outcome: Progressing     Problem: Psychosocial Needs  Goal: Demonstrates ability to cope with hospitalization/illness  Outcome: Progressing  Goal: Collaborate with me, my family, and caregiver to identify my specific goals  Outcome: Progressing     Problem: Discharge Barriers  Goal: My discharge needs are met  Outcome: Progressing      Problem: Pain  Goal: STG - Patient verbalizes a reduction in pain level to <4/10 to improve I/ADLs  Outcome: Progressing     Problem: Skin  Goal: Decreased wound size/increased tissue granulation at next dressing change  Outcome: Progressing  Goal: Participates in plan/prevention/treatment measures  Outcome: Progressing  Goal: Prevent/manage excess moisture  Outcome: Progressing  Goal: Prevent/minimize sheer/friction injuries  Outcome: Progressing  Goal: Promote/optimize nutrition  Outcome: Progressing  Goal: Promote skin healing  Outcome: Progressing

## 2023-12-28 NOTE — POST-PROCEDURE NOTE
Physician Transition of Care Summary  Invasive Cardiovascular Lab    Procedure Date: 12/28/2023  Attending: Panel 1:     * Phill Hare - Primary  Panel 2:     * Phill Hare - Primary  Resident/Fellow/Other Assistant: Surgeon(s) and Role:    Indications:   Pre-op Diagnosis     * Syncope and collapse [R55]     * Stage 3b chronic kidney disease (CMS/HCC) [N18.32]     * Paroxysmal atrial fibrillation (CMS/HCC) [I48.0]     * Coronary artery disease involving native coronary artery of native heart without angina pectoris [I25.10]    Post-procedure diagnosis:   Post-op Diagnosis     * Syncope and collapse [R55]     * Stage 3b chronic kidney disease (CMS/HCC) [N18.32]     * Paroxysmal atrial fibrillation (CMS/HCC) [I48.0]     * Coronary artery disease involving native coronary artery of native heart without angina pectoris [I25.10]    Procedure(s):     * Left Heart Cath, With LV, Angriography And Grafts    * PCI      Procedure Findings:   Patient underwent diagnostic cardiac catheterization revealing 90% stenosis of mid circumflex artery.  Left anterior descending and right coronary arteries displayed nonobstructive disease.    Description of the Procedure:   Patient underwent coronary angiography revealing significant lesion involving his mid circumflex artery.  He then underwent successful percutaneous coronary intervention with drug-eluting stent placed into circumflex artery reducing stenosis to 0% with SULEIMAN grade III flow    Complications:   None    Stents/Implants:   Cardiovascular Implants       Stent    Stent, Ramses [place], 2.50 X 12rx - [id number] - Implanted        Inventory item: STENT, RAMSES FRONTIER CLAY, 2.50 X 12RX Model/Cat number: [id number]    : [employer] Lot number: [id number]    Device identifier: [id number]        As of 12/28/2023       Status: Implanted                              Anticoagulation/Antiplatelet Plan:   Aspirin, clopidogrel and Eliquis in the  future    Estimated Blood Loss:   20 mL    Anesthesia: Moderate Sedation Anesthesia Staff: No anesthesia staff entered.    Any Specimen(s) Removed:   No specimens collected during this procedure.    Disposition:   Tansferred to the [facility]      Electronically signed by: Phill Hare DO, 12/28/2023 9:39 AM

## 2023-12-28 NOTE — PROGRESS NOTES
Occupational Therapy                 Therapy Communication Note    Patient Name: Rachid Yun  MRN: [id number]  Today's Date: 12/28/2023     Discipline: Occupational Therapy    Missed Visit Reason: Missed Visit Reason: Other (Comment) (Pt sleeping soundly after heart cath this date. Verbal cues provided however pt does not arouse. No OT tx completed this date.)    Missed Time: Attempt    Comment: Attempt at 8178

## 2023-12-28 NOTE — PROGRESS NOTES
Patient is known to my practice with underlying CKD stage IV he underwent a cardiac catheterization this morning we will be monitoring his renal function very closely because of the IV contrast his potassium was a little bit high 5.3 this morning I will repeat another 1 this afternoon and treat accordingly

## 2023-12-28 NOTE — CARE PLAN
Problem: Skin  Goal: Prevent/minimize sheer/friction injuries  12/28/2023 1535 by Cristiana Grissom RN  Outcome: Progressing  12/28/2023 1535 by Cristiana Grissom, RN  Outcome: Progressing   The patient's goals for the shift include      The clinical goals for the shift include Patient to have no complications with procedure today

## 2023-12-28 NOTE — NURSING NOTE
[facility] Patient resting comfortably in bed at this time, complaints of generalized pain following fall at home. I told the patient I would give him his PRN oxycodone when ready per order. Dressings to multiple skin tears clean and intact; [facility] except [facility] to left [facility]. Will change dressing to left knee when I give the patient his HS medications. Patient aware of heart cath in morning; unsure of exact time of procedure. I told him I would try to find out the time of the heart cath for tomorrow in his chart. Bed in lowest position and locked, bed alarm on and functioning, and call bell and personal belongings within reach. No further interventions at this time.

## 2023-12-29 LAB
ANION GAP SERPL CALC-SCNC: 9 MMOL/L
BUN SERPL-MCNC: 37 MG/DL (ref 8–25)
CALCIUM SERPL-MCNC: 8.4 MG/DL (ref 8.5–10.4)
CHLORIDE SERPL-SCNC: 104 MMOL/L (ref 97–107)
CO2 SERPL-SCNC: 23 MMOL/L (ref 24–31)
CREAT SERPL-MCNC: 2 MG/DL (ref 0.4–1.6)
GFR SERPL CREATININE-BSD FRML MDRD: 35 ML/MIN/1.73M*2
GLUCOSE BLD MANUAL STRIP-MCNC: 129 MG/DL (ref 74–99)
GLUCOSE BLD MANUAL STRIP-MCNC: 143 MG/DL (ref 74–99)
GLUCOSE BLD MANUAL STRIP-MCNC: 151 MG/DL (ref 74–99)
GLUCOSE BLD MANUAL STRIP-MCNC: 169 MG/DL (ref 74–99)
GLUCOSE SERPL-MCNC: 169 MG/DL (ref 65–99)
POTASSIUM SERPL-SCNC: 4.8 MMOL/L (ref 3.4–5.1)
SODIUM SERPL-SCNC: 136 MMOL/L (ref 133–145)

## 2023-12-29 PROCEDURE — 2500000001 HC RX 250 WO HCPCS SELF ADMINISTERED DRUGS (ALT 637 FOR MEDICARE OP): Performed by: INTERNAL MEDICINE

## 2023-12-29 PROCEDURE — 36415 COLL VENOUS BLD VENIPUNCTURE: CPT | Performed by: INTERNAL MEDICINE

## 2023-12-29 PROCEDURE — 82947 ASSAY GLUCOSE BLOOD QUANT: CPT

## 2023-12-29 PROCEDURE — 97530 THERAPEUTIC ACTIVITIES: CPT | Mod: GO,CO

## 2023-12-29 PROCEDURE — 97110 THERAPEUTIC EXERCISES: CPT | Mod: GP,CQ

## 2023-12-29 PROCEDURE — 2060000001 HC INTERMEDIATE ICU ROOM DAILY

## 2023-12-29 PROCEDURE — 97116 GAIT TRAINING THERAPY: CPT | Mod: GP,CQ

## 2023-12-29 PROCEDURE — 2500000002 HC RX 250 W HCPCS SELF ADMINISTERED DRUGS (ALT 637 FOR MEDICARE OP, ALT 636 FOR OP/ED): Performed by: INTERNAL MEDICINE

## 2023-12-29 PROCEDURE — 99232 SBSQ HOSP IP/OBS MODERATE 35: CPT | Performed by: INTERNAL MEDICINE

## 2023-12-29 PROCEDURE — 80048 BASIC METABOLIC PNL TOTAL CA: CPT | Performed by: INTERNAL MEDICINE

## 2023-12-29 PROCEDURE — 97535 SELF CARE MNGMENT TRAINING: CPT | Mod: GO,CO

## 2023-12-29 PROCEDURE — 2500000004 HC RX 250 GENERAL PHARMACY W/ HCPCS (ALT 636 FOR OP/ED): Performed by: INTERNAL MEDICINE

## 2023-12-29 RX ORDER — LACTULOSE 10 G/15ML
10 SOLUTION ORAL DAILY
Status: DISCONTINUED | OUTPATIENT
Start: 2023-12-29 | End: 2023-12-30 | Stop reason: HOSPADM

## 2023-12-29 RX ADMIN — TRAZODONE HYDROCHLORIDE 150 MG: 100 TABLET ORAL at 20:33

## 2023-12-29 RX ADMIN — LACTULOSE 10 G: 20 SOLUTION ORAL at 09:10

## 2023-12-29 RX ADMIN — FERROUS SULFATE TAB 325 MG (65 MG ELEMENTAL FE) 1 TABLET: 325 (65 FE) TAB at 17:44

## 2023-12-29 RX ADMIN — OXYCODONE 15 MG: 5 TABLET ORAL at 06:49

## 2023-12-29 RX ADMIN — TAMSULOSIN HYDROCHLORIDE 0.4 MG: 0.4 CAPSULE ORAL at 09:06

## 2023-12-29 RX ADMIN — DULOXETINE HYDROCHLORIDE 60 MG: 60 CAPSULE, DELAYED RELEASE ORAL at 09:06

## 2023-12-29 RX ADMIN — METOPROLOL SUCCINATE 25 MG: 25 TABLET, FILM COATED, EXTENDED RELEASE ORAL at 09:06

## 2023-12-29 RX ADMIN — APIXABAN 2.5 MG: 2.5 TABLET, FILM COATED ORAL at 20:33

## 2023-12-29 RX ADMIN — AMLODIPINE BESYLATE 5 MG: 5 TABLET ORAL at 09:06

## 2023-12-29 RX ADMIN — SODIUM CHLORIDE 100 ML/HR: 900 INJECTION, SOLUTION INTRAVENOUS at 09:06

## 2023-12-29 RX ADMIN — METOPROLOL SUCCINATE 25 MG: 25 TABLET, FILM COATED, EXTENDED RELEASE ORAL at 20:33

## 2023-12-29 RX ADMIN — DONEPEZIL HYDROCHLORIDE 10 MG: 10 TABLET, FILM COATED ORAL at 20:33

## 2023-12-29 RX ADMIN — POLYETHYLENE GLYCOL 3350 17 G: 17 POWDER, FOR SOLUTION ORAL at 09:06

## 2023-12-29 RX ADMIN — OXYCODONE 15 MG: 5 TABLET ORAL at 13:23

## 2023-12-29 RX ADMIN — LEVETIRACETAM 500 MG: 500 TABLET, FILM COATED ORAL at 09:05

## 2023-12-29 RX ADMIN — ATORVASTATIN CALCIUM 10 MG: 10 TABLET, FILM COATED ORAL at 09:05

## 2023-12-29 RX ADMIN — OXYCODONE 15 MG: 5 TABLET ORAL at 00:05

## 2023-12-29 RX ADMIN — CLOPIDOGREL BISULFATE 75 MG: 75 TABLET ORAL at 09:05

## 2023-12-29 RX ADMIN — Medication 0.4 MG: at 09:06

## 2023-12-29 RX ADMIN — OXYCODONE 15 MG: 5 TABLET ORAL at 19:46

## 2023-12-29 RX ADMIN — APIXABAN 2.5 MG: 2.5 TABLET, FILM COATED ORAL at 09:00

## 2023-12-29 RX ADMIN — GABAPENTIN 600 MG: 600 TABLET, FILM COATED ORAL at 09:05

## 2023-12-29 RX ADMIN — LEVETIRACETAM 500 MG: 500 TABLET, FILM COATED ORAL at 20:33

## 2023-12-29 RX ADMIN — FERROUS SULFATE TAB 325 MG (65 MG ELEMENTAL FE) 1 TABLET: 325 (65 FE) TAB at 09:06

## 2023-12-29 RX ADMIN — PANTOPRAZOLE SODIUM 40 MG: 40 TABLET, DELAYED RELEASE ORAL at 09:05

## 2023-12-29 ASSESSMENT — COGNITIVE AND FUNCTIONAL STATUS - GENERAL
PERSONAL GROOMING: A LITTLE
EATING MEALS: A LITTLE
PERSONAL GROOMING: A LITTLE
MOVING TO AND FROM BED TO CHAIR: A LOT
PERSONAL GROOMING: A LITTLE
DAILY ACTIVITIY SCORE: 14
DRESSING REGULAR LOWER BODY CLOTHING: A LOT
MOVING FROM LYING ON BACK TO SITTING ON SIDE OF FLAT BED WITH BEDRAILS: A LITTLE
MOVING FROM LYING ON BACK TO SITTING ON SIDE OF FLAT BED WITH BEDRAILS: A LITTLE
TURNING FROM BACK TO SIDE WHILE IN FLAT BAD: A LITTLE
MOVING TO AND FROM BED TO CHAIR: A LOT
WALKING IN HOSPITAL ROOM: A LOT
STANDING UP FROM CHAIR USING ARMS: A LOT
MOVING TO AND FROM BED TO CHAIR: A LOT
DAILY ACTIVITIY SCORE: 17
STANDING UP FROM CHAIR USING ARMS: A LOT
DRESSING REGULAR UPPER BODY CLOTHING: A LOT
TURNING FROM BACK TO SIDE WHILE IN FLAT BAD: A LITTLE
HELP NEEDED FOR BATHING: A LOT
HELP NEEDED FOR BATHING: A LITTLE
MOBILITY SCORE: 14
EATING MEALS: A LITTLE
STANDING UP FROM CHAIR USING ARMS: A LOT
TURNING FROM BACK TO SIDE WHILE IN FLAT BAD: A LITTLE
DRESSING REGULAR LOWER BODY CLOTHING: A LOT
DAILY ACTIVITIY SCORE: 14
WALKING IN HOSPITAL ROOM: A LOT
MOBILITY SCORE: 14
TOILETING: A LOT
DRESSING REGULAR UPPER BODY CLOTHING: A LOT
DRESSING REGULAR LOWER BODY CLOTHING: A LOT
HELP NEEDED FOR BATHING: A LOT
TOILETING: A LOT
WALKING IN HOSPITAL ROOM: A LOT
MOBILITY SCORE: 13
CLIMB 3 TO 5 STEPS WITH RAILING: TOTAL
DRESSING REGULAR UPPER BODY CLOTHING: A LITTLE
MOVING FROM LYING ON BACK TO SITTING ON SIDE OF FLAT BED WITH BEDRAILS: A LITTLE
CLIMB 3 TO 5 STEPS WITH RAILING: A LOT
CLIMB 3 TO 5 STEPS WITH RAILING: A LOT
TOILETING: A LOT

## 2023-12-29 ASSESSMENT — PAIN - FUNCTIONAL ASSESSMENT
PAIN_FUNCTIONAL_ASSESSMENT: 0-10

## 2023-12-29 ASSESSMENT — PAIN SCALES - GENERAL
PAINLEVEL_OUTOF10: 7
PAINLEVEL_OUTOF10: 7
PAINLEVEL_OUTOF10: 0 - NO PAIN
PAINLEVEL_OUTOF10: 0 - NO PAIN
PAINLEVEL_OUTOF10: 3
PAINLEVEL_OUTOF10: 7
PAINLEVEL_OUTOF10: 0 - NO PAIN

## 2023-12-29 ASSESSMENT — ACTIVITIES OF DAILY LIVING (ADL): HOME_MANAGEMENT_TIME_ENTRY: 14

## 2023-12-29 ASSESSMENT — PAIN DESCRIPTION - LOCATION
LOCATION: BACK

## 2023-12-29 ASSESSMENT — PAIN DESCRIPTION - DESCRIPTORS
DESCRIPTORS: ACHING;SORE
DESCRIPTORS: ACHING;SORE

## 2023-12-29 NOTE — PROGRESS NOTES
"Rachid Yun is a 73 y.o. male on day 5 of admission presenting with Syncope and collapse.    Subjective   Patient feels very good this morning.  No chest pain or shortness of breath.  He states he feels very good ever since the percutaneous intervention was performed yesterday.       Objective     Physical Exam  Eyes:      Conjunctiva/sclera: Conjunctivae normal.   Cardiovascular:      Rate and Rhythm: Normal rate and regular rhythm.      Heart sounds:      No gallop.   Pulmonary:      Breath sounds: Normal breath sounds. No wheezing, rhonchi or rales.   Abdominal:      Palpations: Abdomen is soft.   Neurological:      General: No focal deficit present.      Mental Status: He is alert.       Constitutional:       Appearance: Not in distress.   Eyes:      Conjunctiva/sclera: Conjunctivae normal.   Neck:      Vascular: JVD normal.   Pulmonary:      Breath sounds: Normal breath sounds. No wheezing. No rhonchi. No rales.   Cardiovascular:      Normal rate. Regular rhythm.      Murmurs: There is no murmur.      No gallop.  No click. No rub.   Abdominal:      Palpations: Abdomen is soft.   Neurological:      General: No focal deficit present.      Mental Status: Alert.        Last Recorded Vitals  Blood pressure 126/74, pulse 78, temperature 36.3 °C (97.3 °F), temperature source Temporal, resp. rate 16, height 1.93 m (6' 4\"), weight 105 kg (232 lb 5.8 oz), SpO2 99 %.  Intake/Output last 3 Shifts:  I/O last 3 completed shifts:  In: - (0 mL/kg)   Out: 1770 (16.8 mL/kg) [Urine:1750 (0.5 mL/kg/hr); Blood:20]  Weight: 105.4 kg     Relevant Results                             Assessment/Plan   Principal Problem:    Syncope and collapse  Active Problems:    Atrial fibrillation (CMS/HCC)    Stage 3b chronic kidney disease (CMS/HCC)    Coronary artery disease involving native coronary artery of native heart without angina pectoris      Syncope  Atrial Fibrillation  Coronary Artery Disease  Diabetes Mellitus type " 2  Cerebrovascular Accident     Impression and Plan: 12/25 Patient presenting with syncope and collapse at home.  Patient has been admitted to the hospital recently with similar symptoms.  Patient states he walked to the bathroom sat on the toilet to urinate, became lightheaded and the next thing he remembers being on the floor and his son tried to wake him up.  Patient denies any recent chest pain, pressure or shortness of breath.  Patient is on Keppra for suspected seizure activity, though there was no seizure-like activity during this fall.  CT of the cervical spine shows no acute fracture or facet subluxation.  CT of the head negative for any acute intracranial process.  X-ray of the chest negative for any acute cardiopulmonary process.  EKG in the emergency department shows atrial fibrillation which is rate controlled with a ventricular rate of 73 bpm.  Lab work completed in the emergency department shows a sodium of 135, potassium 4.3, elevated creatinine at 3.20 and hemoglobin of 11.9.  Troponin trend show 130, 129 and 90.  Patient remains in a rate controlled atrial fibrillation on telemetry.  He is on Eliquis for stroke risk reduction at a reduced dose of 2.5 mg.  Orthostatic vitals were negative.  Echocardiogram completed in October of this year showed a normal left ventricular systolic function and an impaired relaxation pattern of left ventricular diastolic filling. Given this patient's syncope as well as his elevated troponin's, I have ordered a Lexiscan stress test to be completed tomorrow morning. Patient will be NPO at midnight for this.      12/26: Patient feels relatively well except for pain from traumatic fall with injuries to the knee back and arm.  Rest pain or anginal symptoms.  With elevated high-sensitivity troponins myocardial perfusion stress test has been ordered to look to see if there is any evidence of ischemia.  Thus far no significant cardiac arrhythmias noted.  If etiology of the  syncope remains unclear possibly discharge home with patch monitor.  Also restart the patient's oral oxycodone that he takes at home for his chronic pain syndrome.     12/27: Patient resting comfortably in bed eating breakfast at the time of my exam.  He denies chest pain, pressure or palpitations.  Stress test yesterday showing small area of ischemia at the apex of left ventricle involving the lateral wall and anterior septal left ventricle at the apex, small area of ischemia within the mid inferior wall left ventricle.  He has remained in a rate controlled atrial fibrillation on telemetry with occasional PVCs.  Blood pressures overall normal with his last recorded at 103/61.  Patient breathing comfortably on room air with pulse oximetry of 98%.  He appears overall euvolemic on examination.  Lab work this morning shows a sodium of 137, potassium 4.9 his creatinine is slightly improved at 1.70.  Results from his Lexiscan stress test were discussed with the patient and after further discussion we have decided to proceed with a left heart catheterization tomorrow.  The risks and benefits of this procedure have been discussed at length with the patient by myself and Dr. Hare and he is agreeable to proceeding.  Patient to be n.p.o. at midnight.  Eliquis is on hold at this time.    12/29: Patient underwent successful percutaneous coronary intervention yesterday with drug-eluting stent placed into circumflex artery.  Attempted to utilize the least amount of contrast as possible.  Serum creatinine at 2.0 this morning.  Will treat the patient with clopidogrel and apixaban as her anticoagulant and antiplatelet regimen.  Would now begin to increase activity level and begin discharge planning.             I spent 20 minutes in the professional and overall care of this patient.      Phill Hare, DO

## 2023-12-29 NOTE — CARE PLAN
The patient's goals for the shift include      The clinical goals for the shift include Patient to have no complications with procedure today      Problem: Diabetes  Goal: Achieve decreasing blood glucose levels by end of shift  Outcome: Progressing  Goal: Increase stability of blood glucose readings by end of shift  Outcome: Progressing  Goal: Decrease in ketones present in urine by end of shift  Outcome: Progressing  Goal: Maintain electrolyte levels within acceptable range throughout shift  Outcome: Progressing  Goal: Maintain glucose levels >70mg/dl to <250mg/dl throughout shift  Outcome: Progressing  Goal: No changes in neurological exam by end of shift  Outcome: Progressing  Goal: Learn about and adhere to nutrition recommendations by end of shift  Outcome: Progressing  Goal: Vital signs within normal range for age by end of shift  Outcome: Progressing  Goal: Increase self care and/or family involovement by end of shift  Outcome: Progressing  Goal: Receive DSME education by end of shift  Outcome: Progressing     Problem: Fall/Injury  Goal: Verbalize understanding of personal risk factors for fall in the hospital  Outcome: Progressing  Goal: Verbalize understanding of risk factor reduction measures to prevent injury from fall in the home  Outcome: Progressing  Goal: Use assistive devices by end of the shift  Outcome: Progressing     Problem: Pain  Goal: My pain/discomfort is manageable  Outcome: Progressing     Problem: Safety  Goal: Patient will be injury free during hospitalization  Outcome: Progressing  Goal: I will remain free of falls  Outcome: Progressing     Problem: Daily Care  Goal: Daily care needs are met  Outcome: Progressing     Problem: Psychosocial Needs  Goal: Demonstrates ability to cope with hospitalization/illness  Outcome: Progressing  Goal: Collaborate with me, my family, and caregiver to identify my specific goals  Outcome: Progressing     Problem: Discharge Barriers  Goal: My discharge  needs are met  Outcome: Progressing     Problem: Pain  Goal: STG - Patient verbalizes a reduction in pain level to <4/10 to improve I/ADLs  Outcome: Progressing     Problem: Skin  Goal: Decreased wound size/increased tissue granulation at next dressing change  Outcome: Progressing  Goal: Participates in plan/prevention/treatment measures  Outcome: Progressing  Goal: Prevent/manage excess moisture  Outcome: Progressing  Goal: Prevent/minimize sheer/friction injuries  Outcome: Progressing  Goal: Promote/optimize nutrition  Outcome: Progressing  Goal: Promote skin healing  Outcome: Progressing

## 2023-12-29 NOTE — CARE PLAN
Problem: Diabetes  Goal: Achieve decreasing blood glucose levels by end of shift  Outcome: Progressing  Goal: Increase stability of blood glucose readings by end of shift  Outcome: Progressing  Goal: Decrease in ketones present in urine by end of shift  Outcome: Progressing  Goal: Maintain electrolyte levels within acceptable range throughout shift  Outcome: Progressing  Goal: Maintain glucose levels >70mg/dl to <250mg/dl throughout shift  Outcome: Progressing  Goal: No changes in neurological exam by end of shift  Outcome: Progressing  Goal: Learn about and adhere to nutrition recommendations by end of shift  Outcome: Progressing  Goal: Vital signs within normal range for age by end of shift  Outcome: Progressing  Goal: Increase self care and/or family involovement by end of shift  Outcome: Progressing  Goal: Receive DSME education by end of shift  Outcome: Progressing     Problem: Fall/Injury  Goal: Verbalize understanding of personal risk factors for fall in the hospital  Outcome: Progressing  Goal: Verbalize understanding of risk factor reduction measures to prevent injury from fall in the home  Outcome: Progressing  Goal: Use assistive devices by end of the shift  Outcome: Progressing     Problem: Pain  Goal: My pain/discomfort is manageable  Outcome: Progressing     Problem: Safety  Goal: Patient will be injury free during hospitalization  Outcome: Progressing  Goal: I will remain free of falls  Outcome: Progressing     Problem: Daily Care  Goal: Daily care needs are met  Outcome: Progressing     Problem: Psychosocial Needs  Goal: Demonstrates ability to cope with hospitalization/illness  Outcome: Progressing  Goal: Collaborate with me, my family, and caregiver to identify my specific goals  Outcome: Progressing     Problem: Discharge Barriers  Goal: My discharge needs are met  Outcome: Progressing     Problem: Pain  Goal: STG - Patient verbalizes a reduction in pain level to <4/10 to improve  I/ADLs  Outcome: Progressing     Problem: Skin  Goal: Decreased wound size/increased tissue granulation at next dressing change  Outcome: Progressing  Goal: Participates in plan/prevention/treatment measures  Outcome: Progressing  Goal: Prevent/manage excess moisture  Outcome: Progressing  Goal: Prevent/minimize sheer/friction injuries  Outcome: Progressing  Goal: Promote/optimize nutrition  Outcome: Progressing  Goal: Promote skin healing  Outcome: Progressing   The patient's goals for the shift include      The clinical goals for the shift include Patient to have no complications with procedure today    Over the shift, the patient did not make progress toward the following goals. Barriers to progression include n/a. Recommendations to address these barriers include n/a.

## 2023-12-29 NOTE — PROGRESS NOTES
Pts creatinine elevated. Await nephrology follow up for clearance for dc. Precert has been approved to [facility].     IF pt does not dc on 12/29, precert to [facility] is good through 1/2.     12/29/23 1038   Discharge Planning   Patient expects to be discharged to: [facility]

## 2023-12-29 NOTE — NURSING NOTE
Assumed care of patient from dayshift nurse. Patient resting comfortably in bed at this time; complaints of generalized pain, told patient I would give him PRN medications when available per order. Right groin heart catheter insertion site clean and intact, covered with tegaderm. All skin tear dressings intact and dry. Soft to palpation, no signs of bleeding or hematoma observed. Bed in lowest position and locked, bed alarm on and functioning, and call bell and personal belongings within reach. No further interventions at this time.

## 2023-12-29 NOTE — PROGRESS NOTES
Lori Yun is a 73 y.o. male on day 5 of admission presenting with Syncope and collapse.      Subjective   Patient feels better.  He denies shortness of breath, dizziness.  Denies chest pain, denies shortness of breath.    Objective     Last Recorded Vitals  /74 (BP Location: Right arm, Patient Position: Lying)   Pulse 78   Temp 36.3 °C (97.3 °F) (Temporal)   Resp 16   Wt 105 kg (232 lb 5.8 oz)   SpO2 99%   Intake/Output last 3 Shifts:    Intake/Output Summary (Last 24 hours) at 12/29/2023 1002  Last data filed at 12/29/2023 0851  Gross per 24 hour   Intake 200 ml   Output 1370 ml   Net -1170 ml         Admission Weight  Weight: 108 kg (239 lb) (12/24/23 2021)    Daily Weight  12/29/23 : 105 kg (232 lb 5.8 oz)    Image Results  [place] [address]             Phone [phone]    Cardiovascular Catheterization Report    Patient Name:      LORI YUN      Performing Physician: Namita Hancock DO  Study Date:        12/28/2023          Verifying Physician:  Namita Hancock DO  MRN/PID:           [id number]            Cardiologist:  Accession#:        [id number]        Ordering Provider:    Namita HANCOCK  Date of Birth/Age: 1950 / 73      Fellow:                     years  Gender:            M                   Fellow:  Encounter#:        [id number]       Study:            Coronary Arteriogram  Additional Study: Left Heart Cath no LV  Additional Study: PCI - Percutaneous Coronary Intervention       Indications:  LORI YUN is a 73 year old male who presents with coronary artery disease, prior percutaneous coronary intervention, prior myocardial infarction and syncope. NSTE - ACS.  Stress test performed: No. CTA performed: NoDave Diaz accessed: No. LVEF  Assessed:  No.  Cardiac arrest: No.  Cardiac surgical consult: No.  Cardiovascular Instability: No  Frailty status of patient entering lab: 6 = Moderately frail.       Procedure Description:  After infiltration with 1% Lidocaine, the femoral artery was cannulated with a modified Seldinger technique. The arterial sheath was sized up to 6 Spanish. After completion of the procedure, femoral artery angiography was performed. This demonstrated a common femoral artery puncture appropriate for closure. An Angio-Seal Evolution 6F ([employer] [employer]) vascular closure device was placed per protocol.     Procedure Description Comments:  The patient was brought to the cardiac catheterization laboratory and placed on the catheterization table. The right groin was prepped and draped in usual fashion local anesthesia obtained with 1% Xylocaine. The right femoral artery was punctured with a needle and a guidewire inserted. A 6 Spanish arterial sheath was placed into the right femoral artery over the guidewire. A JL 4 catheter was [place] in the left coronary system cannulated and viewed angiographically multiple projections. The JL 4 cath was removed and exchanged for a Hemanth catheter. The Hemanth catheter was advanced in the right coronary artery cannulated and viewed angiographically in 2 projections. The Hemanth catheter was removed changed for a pigtail catheter. The pigtail catheter was advanced across aortic valve in the left ventricle where pressures were taken. Left-ventricular angiography was not performed to limit contrast exposure. The pigtail catheter was withdrawn and removed with pullback pressures   revealing no significant gradient. The patient then proceeded onto a percutaneous coronary intervention procedure. A CLS 3.5 guiding catheter was advanced in the left coronary system cannulated. A PT 2 guidewire was then advanced into the distal circumflex artery. Next a 2.5 x 10 mm compliant balloon was opened and prepped. His  tract over the guidewire position to the area of stenosis and expanded for 30 inflation followed by a second 30nd inflation taking the balloon diameter to 2.6 mm. The balloon was deflated withdrawn and exchanged for a 2.5 x 12 mm drug-eluting stent. The drug-eluting stent was passed into the area of stenosis and deployed with 2 separate inflations taking stent diameter to 2.65 mm. The balloon was deflated withdrawn and coronary angiography performed with and without the guidewire in place revealing an angiographic adequate result. There is SULEIMAN grade III flow in the system. The balloon guiding catheter and guidewire removed completely and an angiographic view of   the right femoral artery obtained. An Angio-Seal closure device was then deployed. Patient was then transferred to the heart and vascular center in stable condition.     Coronary Angiography:  The coronary circulation is right dominant.     Coronary Angiography Comments:  Left main trunk: Left main coronary artery was without significant disease dividing into left anterior descending and circumflex artery    Left anterior sending artery: Left anterior sending artery was a large vessel extending to the apex of the heart. There was mild nonobstructive disease in the system    Circumflex artery: The circumflex artery was a large nondominant vessel. There was a widely patent stent in its midportion and then just distal to the stent there was a 90% stenosis.    Right coronary artery: Right coronary artery is a large dominant vessel. There is a 50% proximal stenosis but no obstructive lesions noted.    Left ventricular angiography: Left ventricular angiography was not performed to limit contrast exposure.       Coronary Interventions:  Angiography reveals a 90% stenosis of the mid circumflex coronary artery. Pre-intervention SULEIMAN flow was 3. Percutaneous coronary intervention was performed within the mid circumflex. The vessel was pre-dilated using a compliant  balloon 2.5 mm x 10 mm at 8 OLY. [place] Sancho drug-eluting stent 2.5 mm x 12 mm was advanced to the lesion and implanted at 10 OLY. The stenosis was successfully reduced from 90% to 0%. Post-intervention SULEIMAN flow was 3.     Hemo Personnel:  +----------------+---------+  Name            Duty       +----------------+---------+  Phill Hare MD 1  +----------------+---------+       Hemodynamic Pressures:     +----+---------------+----------+-------------+--------------+-------+---------+  Site   Date Time   Phase NameSystolic mmHgDiastolic mmHgED mmHgMean mmHg  +----+---------------+----------+-------------+--------------+-------+---------+    AO     12/28/2023  AIR REST          122            53              78           8:27:34 AM                                                       +----+---------------+----------+-------------+--------------+-------+---------+    AO     12/28/2023  [facility]          121            51              76           8:27:48 AM                                                       +----+---------------+----------+-------------+--------------+-------+---------+    AO     12/28/2023  [facility]          121            52              75           8:27:53 AM                                                       +----+---------------+----------+-------------+--------------+-------+---------+    LV     12/28/2023  [facility]          127            -1      8                    8:36:02 AM                                                       +----+---------------+----------+-------------+--------------+-------+---------+    LV     12/28/2023  [facility]          126            -1      5                    8:36:13 AM                                                       +----+---------------+----------+-------------+--------------+-------+---------+   LVp     12/28/2023  [facility]          124             -3      5                    8:36:23 AM                                                       +----+---------------+----------+-------------+--------------+-------+---------+   AOp     12/28/2023  [facility]          132            51              86           8:36:33 AM                                                       +----+---------------+----------+-------------+--------------+-------+---------+    AO     12/28/2023  [facility]          123            57              84           8:44:18 AM                                                       +----+---------------+----------+-------------+--------------+-------+---------+    AO     12/28/2023  [facility]          137            39              91           8:48:02 AM                                                       +----+---------------+----------+-------------+--------------+-------+---------+    AO     12/28/2023  [facility]          128            57              86           8:52:26 AM                                                       +----+---------------+----------+-------------+--------------+-------+---------+    AO     12/28/2023  [facility]          135            64              94           8:58:20 AM                                                       +----+---------------+----------+-------------+--------------+-------+---------+    AO     12/28/2023  [facility]          138            63              93           9:09:36 AM                                                       +----+---------------+----------+-------------+--------------+-------+---------+    AO     12/28/2023  [facility]          134            64              92           9:13:28 AM                                                       +----+---------------+----------+-------------+--------------+-------+---------+       Cardiac Cath Post  Procedure Notes:  Post Procedure Diagnosis: Single vessel disease.  Blood Loss:               Estimated blood loss during the procedure was 20 ml                            mls.  Specimens Removed:        Number of specimen(s) removed: none.    ____________________________________________________________________________________  CONCLUSIONS:   1. Successful PCI and deployment of drug-eluting stent into the mid circumflex artery with preoperative stenosis of 90% and SULEIMAN grade III flow and postoperative residual of 0% and SULEIMAN grade III flow.   2. Mild nonobstructive plaquing of left anterior sending artery.   3. 50% stenosis of proximal right coronary artery.   4. Clinical correlation: This patient presented with a syncopal episode and had elevation in high-sensitivity troponin suggestive of a non-ST elevation myocardial infarction. His anticoagulation therapy was held and he underwent diagnostic cardiac catheterization today revealing a severe lesion in his mid circumflex artery. This is successfully treated with a PTCA and deployment of a drug-eluting stent with good procedural outcome. No left ventricular angiography was performed in attempt to limit contrast exposure in this patient with chronic kidney disease. The patient has been on anticoagulation chronically and will likely be placed on the combination of clopidogrel with his apixaban in the future.    ICD 10 Codes:  Non ST elevation (NSTEMI) myocardial infarction-I21.4     CPT Codes:  Coronary Angiography S&I only ([facility])([facility])-99909; Left Heart Cath (visualization of coronaries) and LV-44224; Stent w angioplasty Left Circumflex single major Artery branch (PCI)-91722.     [id number] Phill Hare DO  Performing Physician  Electronically signed by [id number] Phill Hare DO on 12/28/2023 at 10:03:13 AM         ** Final **      Physical Exam  Pt is NAD.  Cooperative with exam.  In no distress at rest.  A, Ox3.  Face is symmetrical.  Skin - no lesions.  Lungs: clear to  auscultations B/L.  Diminished bilaterally no wheezes, rales, rhonchi.  Heart: irregular S1S2.  Abdomen: soft, NT, ND. BS positive.  Extr.: no edema, cords, cyanosis.  Moves all extr.   Relevant Results               Assessment/Plan                  Principal Problem:    Syncope and collapse  Active Problems:    Atrial fibrillation (CMS/HCC)    Stage 3b chronic kidney disease (CMS/HCC)    Coronary artery disease involving native coronary artery of native heart without angina pectoris            Syncope and collapse.  Likely, cardiac.  Possible orthostatic.  Will provide IV hydration, check orthostatic vital signs.  Consulted cardiology -plan for stress test in the morning  Paroxysmal atrial fibrillation.  Patient is back in atrial fibrillation but his rate is controlled.  Continue Eliquis   Acute on chronic renal failure.  Hold Cozaar, monitor renal function.  IV hydration, consult nephrology.  Improved today.  Seen by nephrology  Hypertension.  Continue medications except Cozaar  CODE STATUS, discussed with patient: Full code  Repeated falls.  Consult physical Occupational Therapy     12/26: Stress test today.  Patient is doing overall better.  He is agreeable for acute rehab.  Possible discharge later today or tomorrow.  Radiology may adjust patient's medications based on stress test results.  12/27: Stress test yesterday demonstrated some ischemia.  Plan is for cardiac catheterization in the morning.  Renal function improved.  Patient complains of heartburn.  Will use Mylanta as needed.  Time spent with patient today 22 minutes     12/28: Status post heart cath with stent to mid circumflex artery.  Continue IV hydration.  Monitor renal function.  Possible discharge to rehab tomorrow if patient is stable and his renal function is stable.  12/29: Overall, patient is stable, ready for discharge.  His creatinine is a little bit worse today.  Will discharge if okay with nephrology.  Monica Orozco MD

## 2023-12-29 NOTE — NURSING NOTE
Stent education/card/handout given to patient. Discussed importance of taking medication as prescribed/following up with physician appointments. Discussed benefits of heart healthy diet. Discussed importance of maintaining a healthy weight. Cardiac rehab education given to patient.

## 2023-12-29 NOTE — PROGRESS NOTES
Physical Therapy    Physical Therapy Treatment    Patient Name: Rachid Yun  MRN: [id number]  Today's Date: 12/29/2023  Time Calculation  Start Time: 1035  Stop Time: 1100  Time Calculation (min): 25 min       Assessment/Plan   PT Assessment  End of Session Communication: Bedside nurse  End of Session Patient Position: Up in chair  PT Plan  Inpatient/Swing Bed or Outpatient: Inpatient  PT Plan  Treatment/Interventions: Bed mobility, Transfer training, Gait training, Balance training, Neuromuscular re-education, Strengthening, Endurance training, Range of motion, Therapeutic exercise, Therapeutic activity, Positioning, Postural re-education  PT Plan: Skilled PT  PT Frequency: 4 times per week  PT Discharge Recommendations: Moderate intensity level of continued care  Equipment Recommended upon Discharge: Wheeled walker  PT Recommended Transfer Status: Assist x1  PT - OK to Discharge: Yes (with skilled physical therapy services at next level of care.)      General Visit Information:   PT  Visit  PT Received On: 12/29/23  General  Prior to Session Communication: Bedside nurse  Patient Position Received: Bed, 2 rail up  General Comment: Cleared by nursing to be seen for therapy, pt agreeable with tx, seated EOB upon arrival.    Subjective        Objective   Pain:  Pain Assessment  Pain Assessment: 0-10  Pain Score: 0 - No pain    Treatments:  Therapeutic Exercise  Therapeutic Exercise Performed: Yes  Therapeutic Exercise Activity 1: Bilateral ankle pumps x15  Therapeutic Exercise Activity 2: Bilateral hip flexion x15  Therapeutic Exercise Activity 3: Bilateral knee extension x15  Therapeutic Exercise Activity 4: Resisted hip abd/add 15    Therapeutic Activity  Therapeutic Activity Performed: No    Balance/Neuromuscular Re-Education  Balance/Neuromuscular Re-Education Activity Performed: No    Bed Mobility  Bed Mobility: No    Ambulation/Gait Training  Ambulation/Gait Training Performed: Yes  Ambulation/Gait Training  1  Surface 1: Level tile  Device 1: Rolling walker  Assistance 1: Moderate assistance  Comments/Distance (ft) 1: 15' with wheeled walker, presents with slow fawad, decreased bilateral step length, bilateral flexed knees, multiple standing rest breaks due to fatigue. Mod assist x2 for balance.  Transfers  Transfer: Yes  Transfer 1  Transfer From 1: Sit to  Transfer to 1: Stand  Transfer Level of Assistance 1: Moderate assistance  Trials/Comments 1: Mod assist for trunk up during sit to stand, poor static standing balance, decreased eccentric control in sitting.    Stairs  Stairs: No    Outcome Measures:  [facility] Basic Mobility  Turning from your back to your side while in a flat bed without using bedrails: A little  Moving from lying on your back to sitting on the side of a flat bed without using bedrails: A little  Moving to and from bed to chair (including a wheelchair): A lot  Standing up from a chair using your arms (e.g. wheelchair or bedside chair): A lot  To walk in hospital room: A lot  Climbing 3-5 steps with railing: A lot  Basic Mobility - Total Score: 14         Encounter Problems       Encounter Problems (Active)       Mobility       STG - Patient will ambulate 40 feet with 2WW at modified independent (Progressing)       Start:  12/25/23    Expected End:  01/08/24               Pain          Transfers       STG - Patient will perform bed mobility from supine to sit at modified independent (Progressing)       Start:  12/25/23    Expected End:  01/08/24            STG - Patient will transfer sit to and from stand from normal surface height at modified independent  (Progressing)       Start:  12/25/23    Expected End:  01/08/24

## 2023-12-29 NOTE — PROGRESS NOTES
"Rachid Yun is a 73 y.o. male on day 5 of admission presenting with Syncope and collapse.    Subjective   Patient seen for chronic disease stage IV he underwent cardiac catheterization yesterday had a stent placement today is day 1 postprocedure he feels fine he denies any chest pain shortness of breath no GI symptoms       Objective     Physical Exam  Neck:      Vascular: No carotid bruit.   Cardiovascular:      Rate and Rhythm: Normal rate and regular rhythm.      Heart sounds: No murmur heard.     No friction rub. No gallop.   Pulmonary:      Breath sounds: No wheezing, rhonchi or rales.   Chest:      Chest wall: No tenderness.   Abdominal:      General: There is no distension.      Tenderness: There is no abdominal tenderness. There is no guarding or rebound.   Musculoskeletal:         General: No swelling or tenderness.      Cervical back: Neck supple.      Right lower leg: No edema.      Left lower leg: No edema.   Lymphadenopathy:      Cervical: No cervical adenopathy.         Last Recorded Vitals  Blood pressure 126/74, pulse 78, temperature 36.3 °C (97.3 °F), temperature source Temporal, resp. rate 16, height 1.93 m (6' 4\"), weight 105 kg (232 lb 5.8 oz), SpO2 99 %.    Intake/Output last 3 Shifts:  I/O last 3 completed shifts:  In: - (0 mL/kg)   Out: 1770 (16.8 mL/kg) [Urine:1750 (0.5 mL/kg/hr); Blood:20]  Weight: 105.4 kg     Current Facility-Administered Medications:     acetaminophen (Tylenol) tablet 650 mg, 650 mg, oral, q6h PRN, Monica Orozco MD, 650 mg at 12/25/23 2016    alum-mag hydroxide-simeth (Mylanta) 200-200-20 mg/5 mL oral suspension 30 mL, 30 mL, oral, 4x daily PRN, Monica Orozco MD, 30 mL at 12/27/23 1632    amLODIPine (Norvasc) tablet 5 mg, 5 mg, oral, Daily, Monica Orozco MD, 5 mg at 12/29/23 0906    apixaban (Eliquis) tablet 2.5 mg, 2.5 mg, oral, BID, Phill Hare DO, 2.5 mg at 12/29/23 0900    atorvastatin (Lipitor) tablet 10 mg, 10 mg, oral, Daily, Monica " MD Pam, 10 mg at 12/29/23 0905    bisacodyl (Dulcolax) suppository 10 mg, 10 mg, rectal, Daily PRN, Monica Orozco MD    clopidogrel (Plavix) tablet 75 mg, 75 mg, oral, Daily, Monica Orozco MD, 75 mg at 12/29/23 0905    donepezil (Aricept) tablet 10 mg, 10 mg, oral, Nightly, Monica Orozco MD, 10 mg at 12/28/23 2156    DULoxetine (Cymbalta) DR capsule 60 mg, 60 mg, oral, Daily, Monica Orozco MD, 60 mg at 12/29/23 0906    ferrous sulfate (325 mg ferrous sulfate) tablet 1 tablet, 65 mg of iron, oral, BID with meals, Monica Orozco MD, 1 tablet at 12/29/23 0906    folic acid (Folvite) tablet 0.4 mg, 0.4 mg, oral, Daily, Monica Orozco MD, 0.4 mg at 12/29/23 0906    gabapentin (Neurontin) tablet 600 mg, 600 mg, oral, Daily, Monica Orozco MD, 600 mg at 12/29/23 0905    guaiFENesin (Robitussin) 100 mg/5 mL syrup 200 mg, 200 mg, oral, q4h PRN, Monica Orozco MD    lactulose 20 gram/30 mL oral solution 10 g, 10 g, oral, Daily, Phill Hare DO, 10 g at 12/29/23 0910    levETIRAcetam (Keppra) tablet 500 mg, 500 mg, oral, BID, Monica Orozco MD, 500 mg at 12/29/23 0905    magnesium hydroxide (Milk of Magnesia) 400 mg/5 mL suspension 30 mL, 30 mL, oral, Daily PRN, Monica Orozco MD, 30 mL at 12/27/23 2027    melatonin tablet 5 mg, 5 mg, oral, Nightly PRN, Monica Orozco MD    metoprolol succinate XL (Toprol-XL) 24 hr tablet 25 mg, 25 mg, oral, BID, Monica Orozco MD, 25 mg at 12/29/23 0906    ondansetron (Zofran) injection 4 mg, 4 mg, intravenous, q6h PRN, Monica Orozco MD    ondansetron (Zofran) tablet 4 mg, 4 mg, oral, 4x daily PRN, Monica Orozco MD    oxyCODONE (Roxicodone) immediate release tablet 15 mg, 15 mg, oral, q6h PRN, Phill Hare DO, 15 mg at 12/29/23 0649    pantoprazole (ProtoNix) EC tablet 40 mg, 40 mg, oral, Daily, Monica Orozco MD, 40 mg at 12/29/23 0905    polyethylene glycol (Glycolax, Miralax) packet 17 g, 17 g,  oral, Daily, Monica Orozco MD, 17 g at 12/29/23 0906    sennosides (Senokot) tablet 17.2 mg, 2 tablet, oral, Nightly, Monica Orozco MD, 17.2 mg at 12/28/23 2156    sodium chloride 0.9% infusion, 100 mL/hr, intravenous, Continuous, Monica Orozco MD, Last Rate: 100 mL/hr at 12/29/23 0906, 100 mL/hr at 12/29/23 0906    tamsulosin (Flomax) 24 hr capsule 0.4 mg, 0.4 mg, oral, Daily, Monica Orozco MD, 0.4 mg at 12/29/23 0906    traZODone (Desyrel) tablet 150 mg, 150 mg, oral, Nightly, Monica Orozco MD, 150 mg at 12/28/23 2156   Relevant Results    Results for orders placed or performed during the hospital encounter of 12/24/23 (from the past 96 hour(s))   POCT GLUCOSE   Result Value Ref Range    POCT Glucose 162 (H) 74 - 99 mg/dL   POCT GLUCOSE   Result Value Ref Range    POCT Glucose 121 (H) 74 - 99 mg/dL   POCT GLUCOSE   Result Value Ref Range    POCT Glucose 146 (H) 74 - 99 mg/dL   POCT GLUCOSE   Result Value Ref Range    POCT Glucose 209 (H) 74 - 99 mg/dL   POCT GLUCOSE   Result Value Ref Range    POCT Glucose 212 (H) 74 - 99 mg/dL   Basic Metabolic Panel   Result Value Ref Range    Glucose 125 (H) 65 - 99 mg/dL    Sodium 137 133 - 145 mmol/L    Potassium 4.9 3.4 - 5.1 mmol/L    Chloride 105 97 - 107 mmol/L    Bicarbonate 22 (L) 24 - 31 mmol/L    Urea Nitrogen 36 (H) 8 - 25 mg/dL    Creatinine 1.70 (H) 0.40 - 1.60 mg/dL    eGFR 42 (L) >60 mL/min/1.73m*2    Calcium 8.8 8.5 - 10.4 mg/dL    Anion Gap 10 <=19 mmol/L   POCT GLUCOSE   Result Value Ref Range    POCT Glucose 121 (H) 74 - 99 mg/dL   POCT GLUCOSE   Result Value Ref Range    POCT Glucose 130 (H) 74 - 99 mg/dL   POCT GLUCOSE   Result Value Ref Range    POCT Glucose 179 (H) 74 - 99 mg/dL   POCT GLUCOSE   Result Value Ref Range    POCT Glucose 157 (H) 74 - 99 mg/dL   Basic Metabolic Panel   Result Value Ref Range    Glucose 139 (H) 65 - 99 mg/dL    Sodium 135 133 - 145 mmol/L    Potassium 5.3 (H) 3.4 - 5.1 mmol/L    Chloride 103 97 -  107 mmol/L    Bicarbonate 23 (L) 24 - 31 mmol/L    Urea Nitrogen 40 (H) 8 - 25 mg/dL    Creatinine 1.90 (H) 0.40 - 1.60 mg/dL    eGFR 37 (L) >60 mL/min/1.73m*2    Calcium 9.1 8.5 - 10.4 mg/dL    Anion Gap 9 <=19 mmol/L   ACTIVATED CLOTTING TIME LOW   Result Value Ref Range    POCT Activated Clotting Time Low Range 302 (H) 89 - 169 sec   POCT GLUCOSE   Result Value Ref Range    POCT Glucose 137 (H) 74 - 99 mg/dL   Potassium   Result Value Ref Range    Potassium 5.0 3.4 - 5.1 mmol/L   POCT GLUCOSE   Result Value Ref Range    POCT Glucose 164 (H) 74 - 99 mg/dL   Basic Metabolic Panel   Result Value Ref Range    Glucose 169 (H) 65 - 99 mg/dL    Sodium 136 133 - 145 mmol/L    Potassium 4.8 3.4 - 5.1 mmol/L    Chloride 104 97 - 107 mmol/L    Bicarbonate 23 (L) 24 - 31 mmol/L    Urea Nitrogen 37 (H) 8 - 25 mg/dL    Creatinine 2.00 (H) 0.40 - 1.60 mg/dL    eGFR 35 (L) >60 mL/min/1.73m*2    Calcium 8.4 (L) 8.5 - 10.4 mg/dL    Anion Gap 9 <=19 mmol/L   POCT GLUCOSE   Result Value Ref Range    POCT Glucose 129 (H) 74 - 99 mg/dL       Assessment/Plan   Chronic kidney disease stage IV it seems that his creatinine level is stable after the IV contrast discontinue IV fluids I would like to keep him overnight to check his renal function in the morning if stable he can be discharged to rehab  Syncope rule out cardiac causes he is undergoing evaluation by cardiology  Falls  Chronic atrial fibrillation  History of congestive heart failure  Mild anemia of chronic kidney disease  Artery disease status post stenting by cardiology              Ariel Costa MD

## 2023-12-29 NOTE — PROGRESS NOTES
Occupational Therapy    OT Treatment    Patient Name: Rachid Yun  MRN: [id number]  Today's Date: 12/29/2023  Time Calculation  Start Time: 1017  Stop Time: 1041  Time Calculation (min): 24 min         Assessment:  OT Assessment: Gradual progress made towards OT goals. Continue with current OT POC to increase strength, balance and functional tolerance for safety and independence during ADLs.  Evaluation/Treatment Tolerance: Patient tolerated treatment well  End of Session Communication: Bedside nurse (PTA present in room)  End of Session Patient Position: Up in chair, Alarm off, not on at start of session (all needs in reach)  OT Assessment Results: Decreased ADL status, Decreased upper extremity range of motion, Decreased upper extremity strength, Decreased safe judgment during ADL, Decreased endurance, Decreased sensation, Decreased functional mobility, Decreased gross motor control  Evaluation/Treatment Tolerance: Patient tolerated treatment well  Plan:  Treatment Interventions: ADL retraining, Functional transfer training, UE strengthening/ROM, Endurance training, Patient/family training, Neuromuscular reeducation, Compensatory technique education  OT Frequency: 3 times per week  OT Discharge Recommendations: Moderate intensity level of continued care  OT Recommended Transfer Status: Moderate assist, Assist of 1  OT - OK to Discharge: Yes  Treatment Interventions: ADL retraining, Functional transfer training, UE strengthening/ROM, Endurance training, Patient/family training, Neuromuscular reeducation, Compensatory technique education    Subjective   Previous Visit Info:  OT Last Visit  OT Received On: 12/29/23  General:  General  Reason for Referral: decline in ADLs s/p syncope and collapse, L heart cath  Past Medical History Relevant to Rehab: Patient is a 73 year old male admitted with syncope and collapse. PMH: Several falls, a-fib, DM type 2, primary HTN, CAD, pericardial effusion, mixed HLD  Prior to  Session Communication: Bedside nurse  Patient Position Received: Bed, 2 rail up, Alarm off, not on at start of session  General Comment: Pt cleared for OT session per nursing, cooperative throughout session.  Precautions:  Medical Precautions: Fall precautions  Vital Signs:     Pain:  Pain Assessment  Pain Assessment: 0-10  Pain Score: 3  Pain Type: Chronic pain  Pain Location: Generalized  Clinical Progression: Gradually worsening (with weight bearing)    Objective    Cognition:  Cognition  Overall Cognitive Status: Within Functional Limits  Safety/Judgement: Within Functional Limits  Impulsive: Mildly  Coordination:  Movements are Fluid and Coordinated: No (slower rate of movement)  Activities of Daily Living: Grooming  Grooming Comments: oral hygiene tasks completed seated EOB with set-up and close supervision. Pt declined participation in bathing tasks d/t completing with nursing staff prior to tx session    LE Dressing  LE Dressing: Yes  Pants Level of Assistance: Moderate assistance  Adult Briefs Level of Assistance: Moderate assistance  LE Dressing Where Assessed: Edge of bed  LE Dressing Comments: increased time required for task completion. Pt able to thread LLE through brief and pants however required modA to thread RLE. Once in supported standing with FWW pt required modA to don LB clothing over hips  Functional Standing Tolerance:  Time: x2.5min  Activity: static standing with FWW and Anne  Functional Standing Tolerance Comments: Static standing trials completed to increase functional tolerance for safety and independence during ADLs.  Bed Mobility/Transfers: Transfers  Transfer: Yes  Transfer 1  Trials/Comments 1: sit<>stands completed x3 from standard EOB height with FWW and modA-verbal/tactile cues required for proper hand placement to increase safety and decrease risk of falls.    Therapy/Activity: Therapeutic Activity  Therapeutic Activity Performed: Yes  Therapeutic Activity 1: Functional mobility  completed with FWW and Anne in room at minimal household distances to increase functional tolerance and strength to maximize safety and independence during ADL/ ADL transfers      Outcome Measures:[facility] Daily Activity  Putting on and taking off regular lower body clothing: A lot  Bathing (including washing, rinsing, drying): A lot  Putting on and taking off regular upper body clothing: A lot  Toileting, which includes using toilet, bedpan or urinal: A lot  Taking care of personal grooming such as brushing teeth: A little  Eating Meals: A little  Daily Activity - Total Score: 14        Education Documentation  Body Mechanics, taught by GREG Landers at 12/29/2023  2:12 PM.  Learner: Patient  Readiness: Acceptance  Method: Explanation, Demonstration  Response: Needs Reinforcement    ADL Training, taught by GREG Landers at 12/29/2023  2:12 PM.  Learner: Patient  Readiness: Acceptance  Method: Explanation, Demonstration  Response: Needs Reinforcement    Education Comments  Education reviewed on role of OT/ POC, importance of OT to maximize safety and independence during functional tasks and safety awareness throughout functional tasks/ transfers. Questions, comments and concerns addressed regarding OT.      Goals:  Encounter Problems       Encounter Problems (Active)       Mobility       STG - Patient will ambulate 40 feet with 2WW at modified independent (Progressing)       Start:  12/25/23    Expected End:  01/08/24               OT Goals       B UE Strengthening (Progressing)       Start:  12/26/23    Expected End:  01/25/24       Patient will increase B UE strength to 4+/5 for functional transfers.         ADLs (Progressing)       Start:  12/26/23    Expected End:  01/25/24       Patient will complete ADL tasks with Mod I, using AE as needed, in order to increase patient's safety and independence with self-care tasks.         Functional Transfers (Progressing)       Start:  12/26/23    Expected  End:  01/25/24       Patient will complete functional transfers with Mod I in order to increase patient's safety and independence with daily tasks.         Functional Mobility (Progressing)       Start:  12/26/23    Expected End:  01/25/24       Patient will demonstrate the ability to complete item retrieval and functional mobility with Mod I in order to increase patient's safety and independence with daily tasks.              Transfers       STG - Patient will perform bed mobility from supine to sit at modified independent (Progressing)       Start:  12/25/23    Expected End:  01/08/24            STG - Patient will transfer sit to and from stand from normal surface height at modified independent  (Progressing)       Start:  12/25/23    Expected End:  01/08/24

## 2023-12-30 VITALS
SYSTOLIC BLOOD PRESSURE: 123 MMHG | TEMPERATURE: 97 F | HEART RATE: 86 BPM | WEIGHT: 232.37 LBS | HEIGHT: 76 IN | OXYGEN SATURATION: 97 % | DIASTOLIC BLOOD PRESSURE: 95 MMHG | BODY MASS INDEX: 28.3 KG/M2 | RESPIRATION RATE: 16 BRPM

## 2023-12-30 PROBLEM — R55 SYNCOPE AND COLLAPSE: Status: RESOLVED | Noted: 2023-10-24 | Resolved: 2023-12-30

## 2023-12-30 LAB
ANION GAP SERPL CALC-SCNC: 10 MMOL/L
BUN SERPL-MCNC: 39 MG/DL (ref 8–25)
CALCIUM SERPL-MCNC: 8.7 MG/DL (ref 8.5–10.4)
CHLORIDE SERPL-SCNC: 104 MMOL/L (ref 97–107)
CO2 SERPL-SCNC: 23 MMOL/L (ref 24–31)
CREAT SERPL-MCNC: 1.9 MG/DL (ref 0.4–1.6)
GFR SERPL CREATININE-BSD FRML MDRD: 37 ML/MIN/1.73M*2
GLUCOSE BLD MANUAL STRIP-MCNC: 153 MG/DL (ref 74–99)
GLUCOSE BLD MANUAL STRIP-MCNC: 171 MG/DL (ref 74–99)
GLUCOSE SERPL-MCNC: 158 MG/DL (ref 65–99)
HOLD SPECIMEN: NORMAL
POTASSIUM SERPL-SCNC: 4.4 MMOL/L (ref 3.4–5.1)
SODIUM SERPL-SCNC: 137 MMOL/L (ref 133–145)

## 2023-12-30 PROCEDURE — 2500000001 HC RX 250 WO HCPCS SELF ADMINISTERED DRUGS (ALT 637 FOR MEDICARE OP): Performed by: INTERNAL MEDICINE

## 2023-12-30 PROCEDURE — 36415 COLL VENOUS BLD VENIPUNCTURE: CPT | Performed by: INTERNAL MEDICINE

## 2023-12-30 PROCEDURE — 82947 ASSAY GLUCOSE BLOOD QUANT: CPT

## 2023-12-30 PROCEDURE — 82565 ASSAY OF CREATININE: CPT | Performed by: INTERNAL MEDICINE

## 2023-12-30 PROCEDURE — 2500000002 HC RX 250 W HCPCS SELF ADMINISTERED DRUGS (ALT 637 FOR MEDICARE OP, ALT 636 FOR OP/ED): Performed by: INTERNAL MEDICINE

## 2023-12-30 PROCEDURE — 97530 THERAPEUTIC ACTIVITIES: CPT | Mod: GO,CO

## 2023-12-30 PROCEDURE — 2500000004 HC RX 250 GENERAL PHARMACY W/ HCPCS (ALT 636 FOR OP/ED): Performed by: INTERNAL MEDICINE

## 2023-12-30 PROCEDURE — 97535 SELF CARE MNGMENT TRAINING: CPT | Mod: GO,CO

## 2023-12-30 PROCEDURE — 99232 SBSQ HOSP IP/OBS MODERATE 35: CPT | Performed by: INTERNAL MEDICINE

## 2023-12-30 RX ORDER — ADHESIVE BANDAGE
30 BANDAGE TOPICAL DAILY PRN
Qty: 360 ML | Refills: 0
Start: 2023-12-30

## 2023-12-30 RX ORDER — DONEPEZIL HYDROCHLORIDE 10 MG/1
10 TABLET, FILM COATED ORAL NIGHTLY
Start: 2023-12-30

## 2023-12-30 RX ORDER — DEXTROSE MONOHYDRATE 100 MG/ML
0.3 INJECTION, SOLUTION INTRAVENOUS ONCE AS NEEDED
Status: DISCONTINUED | OUTPATIENT
Start: 2023-12-30 | End: 2023-12-30 | Stop reason: HOSPADM

## 2023-12-30 RX ORDER — ONDANSETRON 4 MG/1
4 TABLET, FILM COATED ORAL 4 TIMES DAILY PRN
Qty: 20 TABLET | Refills: 0 | Status: SHIPPED
Start: 2023-12-30

## 2023-12-30 RX ORDER — INSULIN GLARGINE 100 [IU]/ML
15 INJECTION, SOLUTION SUBCUTANEOUS NIGHTLY
Start: 2023-12-30 | End: 2024-05-01

## 2023-12-30 RX ORDER — ACETAMINOPHEN 500 MG
5 TABLET ORAL NIGHTLY PRN
Refills: 0
Start: 2023-12-30

## 2023-12-30 RX ORDER — INSULIN GLARGINE 100 [IU]/ML
15 INJECTION, SOLUTION SUBCUTANEOUS NIGHTLY
Status: DISCONTINUED | OUTPATIENT
Start: 2023-12-30 | End: 2023-12-30 | Stop reason: HOSPADM

## 2023-12-30 RX ORDER — BISACODYL 10 MG/1
10 SUPPOSITORY RECTAL DAILY PRN
Qty: 12 SUPPOSITORY | Refills: 0 | Status: SHIPPED | OUTPATIENT
Start: 2023-12-30

## 2023-12-30 RX ORDER — ATORVASTATIN CALCIUM 10 MG/1
10 TABLET, FILM COATED ORAL DAILY
Start: 2023-12-31

## 2023-12-30 RX ORDER — ACETAMINOPHEN 325 MG/1
650 TABLET ORAL EVERY 6 HOURS PRN
Qty: 30 TABLET | Refills: 0
Start: 2023-12-30

## 2023-12-30 RX ORDER — ALUMINUM HYDROXIDE, MAGNESIUM HYDROXIDE, AND SIMETHICONE 1200; 120; 1200 MG/30ML; MG/30ML; MG/30ML
30 SUSPENSION ORAL 4 TIMES DAILY PRN
Qty: 355 ML | Refills: 0 | Status: SHIPPED | OUTPATIENT
Start: 2023-12-30

## 2023-12-30 RX ORDER — DEXTROSE 50 % IN WATER (D50W) INTRAVENOUS SYRINGE
25
Status: DISCONTINUED | OUTPATIENT
Start: 2023-12-30 | End: 2023-12-30 | Stop reason: HOSPADM

## 2023-12-30 RX ORDER — GABAPENTIN 600 MG/1
600 TABLET ORAL DAILY
Qty: 3 TABLET | Refills: 0 | Status: SHIPPED | OUTPATIENT
Start: 2023-12-31 | End: 2024-04-22

## 2023-12-30 RX ORDER — POLYETHYLENE GLYCOL 3350 17 G/17G
17 POWDER, FOR SOLUTION ORAL DAILY
Start: 2023-12-31

## 2023-12-30 RX ADMIN — POLYETHYLENE GLYCOL 3350 17 G: 17 POWDER, FOR SOLUTION ORAL at 08:22

## 2023-12-30 RX ADMIN — FERROUS SULFATE TAB 325 MG (65 MG ELEMENTAL FE) 1 TABLET: 325 (65 FE) TAB at 08:21

## 2023-12-30 RX ADMIN — ATORVASTATIN CALCIUM 10 MG: 10 TABLET, FILM COATED ORAL at 08:21

## 2023-12-30 RX ADMIN — CLOPIDOGREL BISULFATE 75 MG: 75 TABLET ORAL at 08:21

## 2023-12-30 RX ADMIN — TAMSULOSIN HYDROCHLORIDE 0.4 MG: 0.4 CAPSULE ORAL at 08:21

## 2023-12-30 RX ADMIN — LEVETIRACETAM 500 MG: 500 TABLET, FILM COATED ORAL at 08:21

## 2023-12-30 RX ADMIN — METOPROLOL SUCCINATE 25 MG: 25 TABLET, FILM COATED, EXTENDED RELEASE ORAL at 08:22

## 2023-12-30 RX ADMIN — OXYCODONE 15 MG: 5 TABLET ORAL at 08:21

## 2023-12-30 RX ADMIN — GABAPENTIN 600 MG: 600 TABLET, FILM COATED ORAL at 08:21

## 2023-12-30 RX ADMIN — LACTULOSE 10 G: 20 SOLUTION ORAL at 08:22

## 2023-12-30 RX ADMIN — DULOXETINE HYDROCHLORIDE 60 MG: 60 CAPSULE, DELAYED RELEASE ORAL at 08:21

## 2023-12-30 RX ADMIN — OXYCODONE 15 MG: 5 TABLET ORAL at 01:31

## 2023-12-30 RX ADMIN — APIXABAN 2.5 MG: 2.5 TABLET, FILM COATED ORAL at 08:21

## 2023-12-30 RX ADMIN — Medication 0.4 MG: at 08:21

## 2023-12-30 RX ADMIN — AMLODIPINE BESYLATE 5 MG: 5 TABLET ORAL at 08:22

## 2023-12-30 RX ADMIN — PANTOPRAZOLE SODIUM 40 MG: 40 TABLET, DELAYED RELEASE ORAL at 08:21

## 2023-12-30 ASSESSMENT — ACTIVITIES OF DAILY LIVING (ADL)
HOME_MANAGEMENT_TIME_ENTRY: 24
BATHING_LEVEL_OF_ASSISTANCE: MAXIMUM ASSISTANCE
BATHING_WHERE_ASSESSED: SITTING SINKSIDE

## 2023-12-30 ASSESSMENT — PAIN SCALES - GENERAL
PAINLEVEL_OUTOF10: 0 - NO PAIN
PAINLEVEL_OUTOF10: 7
PAINLEVEL_OUTOF10: 7
PAINLEVEL_OUTOF10: 2
PAINLEVEL_OUTOF10: 7

## 2023-12-30 ASSESSMENT — PAIN - FUNCTIONAL ASSESSMENT
PAIN_FUNCTIONAL_ASSESSMENT: 0-10

## 2023-12-30 ASSESSMENT — COGNITIVE AND FUNCTIONAL STATUS - GENERAL
DRESSING REGULAR LOWER BODY CLOTHING: A LITTLE
PERSONAL GROOMING: A LITTLE
DAILY ACTIVITIY SCORE: 19
DRESSING REGULAR UPPER BODY CLOTHING: A LITTLE
TOILETING: A LITTLE
HELP NEEDED FOR BATHING: A LITTLE

## 2023-12-30 ASSESSMENT — PAIN DESCRIPTION - DESCRIPTORS: DESCRIPTORS: ACHING;SORE

## 2023-12-30 ASSESSMENT — PAIN DESCRIPTION - LOCATION: LOCATION: BACK

## 2023-12-30 NOTE — NURSING NOTE
Assumed care of patient from dayshift nurse. Patient resting comfortably in bed at this time with no complaints of pain and or discomfort. Dressing to left knee skin tear changed, area still bleeding. All other dressings over skin tears clean and intact. Bed in lowest position and locked, bed alarm on and functioning, and call bell and personal belongings within reach. No further interventions at this time.

## 2023-12-30 NOTE — PROGRESS NOTES
"Rachid Yun is a 73 y.o. male on day 6 of admission presenting with Syncope and collapse.    Subjective   Patient resting comfortably in bed.  Wondering if he can be discharged home today.       Objective     Physical Exam  Cardiovascular:      Rate and Rhythm: Normal rate. Rhythm irregular.      Heart sounds: No murmur heard.     No friction rub. No gallop.   Pulmonary:      Effort: Pulmonary effort is normal.      Breath sounds: Normal breath sounds. No wheezing, rhonchi or rales.   Musculoskeletal:      Right lower leg: No edema.      Left lower leg: No edema.   Neurological:      Mental Status: He is alert and oriented to person, place, and time.         Last Recorded Vitals  Blood pressure 104/77, pulse 80, temperature 36.2 °C (97.2 °F), temperature source Temporal, resp. rate 16, height 1.93 m (6' 4\"), weight 105 kg (232 lb 5.8 oz), SpO2 96 %.  Intake/Output last 3 Shifts:  I/O last 3 completed shifts:  In: 1512 (14.3 mL/kg) [P.O.:1512]  Out: 1370 (13 mL/kg) [Urine:1370 (0.4 mL/kg/hr)]  Weight: 105.4 kg     Relevant Results  Results for orders placed or performed during the hospital encounter of 12/24/23 (from the past 24 hour(s))   POCT GLUCOSE   Result Value Ref Range    POCT Glucose 143 (H) 74 - 99 mg/dL   POCT GLUCOSE   Result Value Ref Range    POCT Glucose 169 (H) 74 - 99 mg/dL   POCT GLUCOSE   Result Value Ref Range    POCT Glucose 151 (H) 74 - 99 mg/dL   Lavender Top   Result Value Ref Range    Extra Tube Hold for add-ons.    Basic Metabolic Panel   Result Value Ref Range    Glucose 158 (H) 65 - 99 mg/dL    Sodium 137 133 - 145 mmol/L    Potassium 4.4 3.4 - 5.1 mmol/L    Chloride 104 97 - 107 mmol/L    Bicarbonate 23 (L) 24 - 31 mmol/L    Urea Nitrogen 39 (H) 8 - 25 mg/dL    Creatinine 1.90 (H) 0.40 - 1.60 mg/dL    eGFR 37 (L) >60 mL/min/1.73m*2    Calcium 8.7 8.5 - 10.4 mg/dL    Anion Gap 10 <=19 mmol/L   POCT GLUCOSE   Result Value Ref Range    POCT Glucose 153 (H) 74 - 99 mg/dL   POCT GLUCOSE "   Result Value Ref Range    POCT Glucose 171 (H) 74 - 99 mg/dL          Assessment/Plan   Principal Problem:    Syncope and collapse  Active Problems:    Atrial fibrillation (CMS/HCC)    Stage 3b chronic kidney disease (CMS/HCC)    Coronary artery disease involving native coronary artery of native heart without angina pectoris    Syncope  Atrial Fibrillation  Coronary Artery Disease  Diabetes Mellitus type 2  Cerebrovascular Accident     Impression and Plan: 12/25 Patient presenting with syncope and collapse at home.  Patient has been admitted to the hospital recently with similar symptoms.  Patient states he walked to the bathroom sat on the toilet to urinate, became lightheaded and the next thing he remembers being on the floor and his son tried to wake him up.  Patient denies any recent chest pain, pressure or shortness of breath.  Patient is on Keppra for suspected seizure activity, though there was no seizure-like activity during this fall.  CT of the cervical spine shows no acute fracture or facet subluxation.  CT of the head negative for any acute intracranial process.  X-ray of the chest negative for any acute cardiopulmonary process.  EKG in the emergency department shows atrial fibrillation which is rate controlled with a ventricular rate of 73 bpm.  Lab work completed in the emergency department shows a sodium of 135, potassium 4.3, elevated creatinine at 3.20 and hemoglobin of 11.9.  Troponin trend show 130, 129 and 90.  Patient remains in a rate controlled atrial fibrillation on telemetry.  He is on Eliquis for stroke risk reduction at a reduced dose of 2.5 mg.  Orthostatic vitals were negative.  Echocardiogram completed in October of this year showed a normal left ventricular systolic function and an impaired relaxation pattern of left ventricular diastolic filling. Given this patient's syncope as well as his elevated troponin's, I have ordered a Lexiscan stress test to be completed tomorrow morning.  Patient will be NPO at midnight for this.      12/26: Patient feels relatively well except for pain from traumatic fall with injuries to the knee back and arm.  Rest pain or anginal symptoms.  With elevated high-sensitivity troponins myocardial perfusion stress test has been ordered to look to see if there is any evidence of ischemia.  Thus far no significant cardiac arrhythmias noted.  If etiology of the syncope remains unclear possibly discharge home with patch monitor.  Also restart the patient's oral oxycodone that he takes at home for his chronic pain syndrome.     12/27: Patient resting comfortably in bed eating breakfast at the time of my exam.  He denies chest pain, pressure or palpitations.  Stress test yesterday showing small area of ischemia at the apex of left ventricle involving the lateral wall and anterior septal left ventricle at the apex, small area of ischemia within the mid inferior wall left ventricle.  He has remained in a rate controlled atrial fibrillation on telemetry with occasional PVCs.  Blood pressures overall normal with his last recorded at 103/61.  Patient breathing comfortably on room air with pulse oximetry of 98%.  He appears overall euvolemic on examination.  Lab work this morning shows a sodium of 137, potassium 4.9 his creatinine is slightly improved at 1.70.  Results from his Lexiscan stress test were discussed with the patient and after further discussion we have decided to proceed with a left heart catheterization tomorrow.  The risks and benefits of this procedure have been discussed at length with the patient by myself and Dr. Hare and he is agreeable to proceeding.  Patient to be n.p.o. at midnight.  Eliquis is on hold at this time.     12/29: Patient underwent successful percutaneous coronary intervention yesterday with drug-eluting stent placed into circumflex artery.  Attempted to utilize the least amount of contrast as possible.  Serum creatinine at 2.0 this morning.   Will treat the patient with clopidogrel and apixaban as her anticoagulant and antiplatelet regimen.  Would now begin to increase activity level and begin discharge planning.    12/30 Patient doing well, inquiring about discharge.  Denies chest pain pressure or palpitations.  Denies any shortness of breath or lightheadedness.  Remains in rate controlled atrial fibrillation on telemetry with occasional PVCs.  Breathing comfortably on room air with pulse oximetry of 96%.  Appears euvolemic on examination.  Blood pressures have remained normotensive with his last recorded at 104/77.  Creatinine was actually slightly improved this morning at 1.90.  Sodium 137, potassium 4.4.  Continue patient on clopidogrel and apixaban for anticoagulant and antiplatelet regimen.  Patient would be appropriate for discharge to rehab facility today.  Follow-up with Dr. Hare in 2 to 3 weeks in the outpatient setting.        HERBERT Almendarez-CNP

## 2023-12-30 NOTE — PROGRESS NOTES
"Rachid Yun is a 73 y.o. male on day 6 of admission presenting with Syncope and collapse.    Subjective   Patient seen for chronic disease stage IV he underwent cardiac catheterization and had a stent placement patient feels well he denies any further chest pain or shortness of breath no GI symptoms.       Objective     Physical Exam  Neck:      Vascular: No carotid bruit.   Cardiovascular:      Rate and Rhythm: Normal rate and regular rhythm.      Heart sounds: No murmur heard.     No friction rub. No gallop.   Pulmonary:      Breath sounds: No wheezing, rhonchi or rales.   Chest:      Chest wall: No tenderness.   Abdominal:      General: There is no distension.      Tenderness: There is no abdominal tenderness. There is no guarding or rebound.   Musculoskeletal:         General: No swelling or tenderness.      Cervical back: Neck supple.      Right lower leg: No edema.      Left lower leg: No edema.   Lymphadenopathy:      Cervical: No cervical adenopathy.         Last Recorded Vitals  Blood pressure (!) 123/95, pulse 86, temperature 36.1 °C (97 °F), temperature source Temporal, resp. rate 16, height 1.93 m (6' 4\"), weight 105 kg (232 lb 5.8 oz), SpO2 97 %.    Intake/Output last 3 Shifts:  I/O last 3 completed shifts:  In: 1512 (14.3 mL/kg) [P.O.:1512]  Out: 1370 (13 mL/kg) [Urine:1370 (0.4 mL/kg/hr)]  Weight: 105.4 kg     Current Facility-Administered Medications:     acetaminophen (Tylenol) tablet 650 mg, 650 mg, oral, q6h PRN, Monica Orozco MD, 650 mg at 12/25/23 2016    alum-mag hydroxide-simeth (Mylanta) 200-200-20 mg/5 mL oral suspension 30 mL, 30 mL, oral, 4x daily PRN, Monica Orozco MD, 30 mL at 12/27/23 1632    amLODIPine (Norvasc) tablet 5 mg, 5 mg, oral, Daily, Monica Orozco MD, 5 mg at 12/30/23 0822    apixaban (Eliquis) tablet 2.5 mg, 2.5 mg, oral, BID, Phill Hare DO, 2.5 mg at 12/30/23 0821    atorvastatin (Lipitor) tablet 10 mg, 10 mg, oral, Daily, Monica Orozco MD, 10 " mg at 12/30/23 0821    bisacodyl (Dulcolax) suppository 10 mg, 10 mg, rectal, Daily PRN, Monica Orozco MD    clopidogrel (Plavix) tablet 75 mg, 75 mg, oral, Daily, Monica Orozco MD, 75 mg at 12/30/23 0821    dextrose 10 % in water (D10W) infusion, 0.3 g/kg/hr, intravenous, Once PRN, Monica Orozco MD    dextrose 50 % injection 25 g, 25 g, intravenous, q15 min PRN, Moinca Orozco MD    donepezil (Aricept) tablet 10 mg, 10 mg, oral, Nightly, Monica Orozco MD, 10 mg at 12/29/23 2033    DULoxetine (Cymbalta) DR capsule 60 mg, 60 mg, oral, Daily, Monica Orozco MD, 60 mg at 12/30/23 0821    ferrous sulfate (325 mg ferrous sulfate) tablet 1 tablet, 65 mg of iron, oral, BID with meals, Monica Orozco MD, 1 tablet at 12/30/23 0821    folic acid (Folvite) tablet 0.4 mg, 0.4 mg, oral, Daily, Monica Orozco MD, 0.4 mg at 12/30/23 0821    gabapentin (Neurontin) tablet 600 mg, 600 mg, oral, Daily, Monica Orozco MD, 600 mg at 12/30/23 0821    glucagon (Glucagen) injection 1 mg, 1 mg, intramuscular, q15 min PRN, Monica Orozco MD    guaiFENesin (Robitussin) 100 mg/5 mL syrup 200 mg, 200 mg, oral, q4h PRN, Monica Orozco MD    insulin glargine (Lantus) injection 15 Units, 15 Units, subcutaneous, Nightly, Monica Orozco MD    lactulose 20 gram/30 mL oral solution 10 g, 10 g, oral, Daily, Phill Hare DO, 10 g at 12/30/23 0822    levETIRAcetam (Keppra) tablet 500 mg, 500 mg, oral, BID, Monica Orozco MD, 500 mg at 12/30/23 0821    magnesium hydroxide (Milk of Magnesia) 400 mg/5 mL suspension 30 mL, 30 mL, oral, Daily PRN, Monica Orozco MD, 30 mL at 12/27/23 2027    melatonin tablet 5 mg, 5 mg, oral, Nightly PRN, Monica Orozco MD    metoprolol succinate XL (Toprol-XL) 24 hr tablet 25 mg, 25 mg, oral, BID, Monica Orozco MD, 25 mg at 12/30/23 0822    ondansetron (Zofran) injection 4 mg, 4 mg, intravenous, q6h PRN, Monica Orozco MD     ondansetron (Zofran) tablet 4 mg, 4 mg, oral, 4x daily PRN, Monica Orozco MD    oxyCODONE (Roxicodone) immediate release tablet 15 mg, 15 mg, oral, q6h PRN, Phill Hare DO, 15 mg at 12/30/23 0821    pantoprazole (ProtoNix) EC tablet 40 mg, 40 mg, oral, Daily, Monica Orozco MD, 40 mg at 12/30/23 0821    polyethylene glycol (Glycolax, Miralax) packet 17 g, 17 g, oral, Daily, Monica Orozco MD, 17 g at 12/30/23 0822    sennosides (Senokot) tablet 17.2 mg, 2 tablet, oral, Nightly, Monica Orozco MD, 17.2 mg at 12/28/23 2156    tamsulosin (Flomax) 24 hr capsule 0.4 mg, 0.4 mg, oral, Daily, Monica Orozco MD, 0.4 mg at 12/30/23 0821    traZODone (Desyrel) tablet 150 mg, 150 mg, oral, Nightly, Monica Orozco MD, 150 mg at 12/29/23 2033   Relevant Results    Results for orders placed or performed during the hospital encounter of 12/24/23 (from the past 96 hour(s))   POCT GLUCOSE   Result Value Ref Range    POCT Glucose 209 (H) 74 - 99 mg/dL   POCT GLUCOSE   Result Value Ref Range    POCT Glucose 212 (H) 74 - 99 mg/dL   Basic Metabolic Panel   Result Value Ref Range    Glucose 125 (H) 65 - 99 mg/dL    Sodium 137 133 - 145 mmol/L    Potassium 4.9 3.4 - 5.1 mmol/L    Chloride 105 97 - 107 mmol/L    Bicarbonate 22 (L) 24 - 31 mmol/L    Urea Nitrogen 36 (H) 8 - 25 mg/dL    Creatinine 1.70 (H) 0.40 - 1.60 mg/dL    eGFR 42 (L) >60 mL/min/1.73m*2    Calcium 8.8 8.5 - 10.4 mg/dL    Anion Gap 10 <=19 mmol/L   POCT GLUCOSE   Result Value Ref Range    POCT Glucose 121 (H) 74 - 99 mg/dL   POCT GLUCOSE   Result Value Ref Range    POCT Glucose 130 (H) 74 - 99 mg/dL   POCT GLUCOSE   Result Value Ref Range    POCT Glucose 179 (H) 74 - 99 mg/dL   POCT GLUCOSE   Result Value Ref Range    POCT Glucose 157 (H) 74 - 99 mg/dL   Basic Metabolic Panel   Result Value Ref Range    Glucose 139 (H) 65 - 99 mg/dL    Sodium 135 133 - 145 mmol/L    Potassium 5.3 (H) 3.4 - 5.1 mmol/L    Chloride 103 97 - 107 mmol/L     Bicarbonate 23 (L) 24 - 31 mmol/L    Urea Nitrogen 40 (H) 8 - 25 mg/dL    Creatinine 1.90 (H) 0.40 - 1.60 mg/dL    eGFR 37 (L) >60 mL/min/1.73m*2    Calcium 9.1 8.5 - 10.4 mg/dL    Anion Gap 9 <=19 mmol/L   ACTIVATED CLOTTING TIME LOW   Result Value Ref Range    POCT Activated Clotting Time Low Range 302 (H) 89 - 169 sec   POCT GLUCOSE   Result Value Ref Range    POCT Glucose 137 (H) 74 - 99 mg/dL   Potassium   Result Value Ref Range    Potassium 5.0 3.4 - 5.1 mmol/L   POCT GLUCOSE   Result Value Ref Range    POCT Glucose 164 (H) 74 - 99 mg/dL   Basic Metabolic Panel   Result Value Ref Range    Glucose 169 (H) 65 - 99 mg/dL    Sodium 136 133 - 145 mmol/L    Potassium 4.8 3.4 - 5.1 mmol/L    Chloride 104 97 - 107 mmol/L    Bicarbonate 23 (L) 24 - 31 mmol/L    Urea Nitrogen 37 (H) 8 - 25 mg/dL    Creatinine 2.00 (H) 0.40 - 1.60 mg/dL    eGFR 35 (L) >60 mL/min/1.73m*2    Calcium 8.4 (L) 8.5 - 10.4 mg/dL    Anion Gap 9 <=19 mmol/L   POCT GLUCOSE   Result Value Ref Range    POCT Glucose 129 (H) 74 - 99 mg/dL   POCT GLUCOSE   Result Value Ref Range    POCT Glucose 143 (H) 74 - 99 mg/dL   POCT GLUCOSE   Result Value Ref Range    POCT Glucose 169 (H) 74 - 99 mg/dL   POCT GLUCOSE   Result Value Ref Range    POCT Glucose 151 (H) 74 - 99 mg/dL   Lavender Top   Result Value Ref Range    Extra Tube Hold for add-ons.    Basic Metabolic Panel   Result Value Ref Range    Glucose 158 (H) 65 - 99 mg/dL    Sodium 137 133 - 145 mmol/L    Potassium 4.4 3.4 - 5.1 mmol/L    Chloride 104 97 - 107 mmol/L    Bicarbonate 23 (L) 24 - 31 mmol/L    Urea Nitrogen 39 (H) 8 - 25 mg/dL    Creatinine 1.90 (H) 0.40 - 1.60 mg/dL    eGFR 37 (L) >60 mL/min/1.73m*2    Calcium 8.7 8.5 - 10.4 mg/dL    Anion Gap 10 <=19 mmol/L   POCT GLUCOSE   Result Value Ref Range    POCT Glucose 153 (H) 74 - 99 mg/dL   POCT GLUCOSE   Result Value Ref Range    POCT Glucose 171 (H) 74 - 99 mg/dL       Assessment/Plan   Chronic kidney disease stage IV his renal function is  fairly stable postcardiac cath and IV contrast okay to discharge to rehab today follow-up with me in 2 weeks  Syncope rule out cardiac causes he is undergoing evaluation by cardiology  Falls  Chronic atrial fibrillation  History of congestive heart failure  Mild anemia of chronic kidney disease  Artery disease status post stenting by cardiology              Ariel Costa MD

## 2023-12-30 NOTE — CARE PLAN
Problem: Diabetes  Goal: Achieve decreasing blood glucose levels by end of shift  Outcome: Progressing  Goal: Increase stability of blood glucose readings by end of shift  Outcome: Progressing  Goal: Decrease in ketones present in urine by end of shift  Outcome: Progressing  Goal: Maintain electrolyte levels within acceptable range throughout shift  Outcome: Progressing  Goal: Maintain glucose levels >70mg/dl to <250mg/dl throughout shift  Outcome: Progressing  Goal: No changes in neurological exam by end of shift  Outcome: Progressing  Goal: Learn about and adhere to nutrition recommendations by end of shift  Outcome: Progressing  Goal: Vital signs within normal range for age by end of shift  Outcome: Progressing  Goal: Increase self care and/or family involovement by end of shift  Outcome: Progressing  Goal: Receive DSME education by end of shift  Outcome: Progressing     Problem: Fall/Injury  Goal: Verbalize understanding of personal risk factors for fall in the hospital  Outcome: Progressing  Goal: Verbalize understanding of risk factor reduction measures to prevent injury from fall in the home  Outcome: Progressing  Goal: Use assistive devices by end of the shift  Outcome: Progressing     Problem: Pain  Goal: My pain/discomfort is manageable  Outcome: Progressing     Problem: Safety  Goal: Patient will be injury free during hospitalization  Outcome: Progressing  Goal: I will remain free of falls  Outcome: Progressing     Problem: Daily Care  Goal: Daily care needs are met  Outcome: Progressing     Problem: Psychosocial Needs  Goal: Demonstrates ability to cope with hospitalization/illness  Outcome: Progressing  Goal: Collaborate with me, my family, and caregiver to identify my specific goals  Outcome: Progressing     Problem: Discharge Barriers  Goal: My discharge needs are met  Outcome: Progressing     Problem: Pain  Goal: STG - Patient verbalizes a reduction in pain level to <4/10 to improve  I/ADLs  Outcome: Progressing     Problem: Skin  Goal: Decreased wound size/increased tissue granulation at next dressing change  Outcome: Progressing  Goal: Participates in plan/prevention/treatment measures  Outcome: Progressing  Goal: Prevent/manage excess moisture  Outcome: Progressing  Goal: Prevent/minimize sheer/friction injuries  Outcome: Progressing  Goal: Promote/optimize nutrition  Outcome: Progressing  Goal: Promote skin healing  Outcome: Progressing   The patient's goals for the shift include      The clinical goals for the shift include Paqtient to be discharged    Over the shift, the patient did not make progress toward the following goals. Barriers to progression include n/a. Recommendations to address these barriers include n/a.

## 2023-12-30 NOTE — PROGRESS NOTES
Occupational Therapy    OT Treatment    Patient Name: Rachid Yun  MRN: [id number]  Today's Date: 12/30/2023  Time Calculation  Start Time: 0812  Stop Time: 0854  Time Calculation (min): 42 min         Assessment:  OT Assessment: Pt pleasant highly motivated with all requested tasks  Prognosis: Good  Evaluation/Treatment Tolerance: Patient tolerated treatment well  End of Session Communication: Bedside nurse  End of Session Patient Position: Up in chair (call light in reach all needs met)  OT Assessment Results: Decreased ADL status, Decreased upper extremity range of motion, Decreased upper extremity strength, Decreased safe judgment during ADL, Decreased endurance, Decreased sensation, Decreased functional mobility, Decreased gross motor control  Prognosis: Good  Evaluation/Treatment Tolerance: Patient tolerated treatment well  Strengths: Ability to acquire knowledge, Attitude of self, Coping skills, Rehab experience, Support of Caregivers, Support of extended family/friends  Barriers to Participation: Comorbidities  Plan:  Treatment Interventions: ADL retraining, Functional transfer training, Endurance training, Patient/family training, Equipment evaluation/education, Compensatory technique education  OT Frequency: 3 times per week  OT Discharge Recommendations: Moderate intensity level of continued care  Equipment Recommended upon Discharge: Wheeled walker  OT Recommended Transfer Status: Moderate assist  OT - OK to Discharge: Yes  Treatment Interventions: ADL retraining, Functional transfer training, Endurance training, Patient/family training, Equipment evaluation/education, Compensatory technique education    Subjective   Previous Visit Info:  OT Last Visit  OT Received On: 12/30/23  General:  General  Reason for Referral: decline in ADLs s/p syncope and collapse, L heart cath  Referred By: Dr. Orozco  Past Medical History Relevant to Rehab: Patient is a 73 year old male admitted with syncope and  collapse. PMH: Several falls, a-fib, DM type 2, primary HTN, CAD, pericardial effusion, mixed HLD  Prior to Session Communication: Bedside nurse  Patient Position Received: Bed, 2 rail up  Preferred Learning Style: verbal  General Comment: Confered with NSG.  Pt agreeable toskilled therapeutic intervention  Precautions:  Medical Precautions: Fall precautions  Vital Signs:     Pain:  Pain Assessment  Pain Assessment: 0-10  Pain Score: 7  Pain Type: Chronic pain  Pain Location: Shoulder  Pain Orientation: Left  Pain Interventions: Repositioned, Therapeutic presence, Environmental changes    Objective    Cognition:  Cognition  Overall Cognitive Status: Within Functional Limits  Orientation Level: Oriented X4  Safety/Judgement: Within Functional Limits  Coordination:     Activities of Daily Living: Feeding  Feeding Level of Assistance: Independent  Feeding Where Assessed: Chair  Feeding Comments: Pt self feed breakfast    Grooming  Grooming Level of Assistance: Modified independent  Grooming Where Assessed: Chair  Grooming Comments: Pt brushing teeth, comb hair wash face    UE Bathing  UE Bathing Level of Assistance: Minimum assistance  UE Bathing Where Assessed: Sitting sinkside  UE Bathing Comments: sponge bathing assist L axilla d/t limited shoulder AROM    LE Bathing  LE Bathing Level of Assistance: Maximum assistance  LE Bathing Where Assessed: Sitting sinkside  LE Bathing Comments: sponge bathing assist B feet/gbuttocks in stance    UE Dressing  UE Dressing Level of Assistance: Contact guard  UE Dressing Where Assessed: Chair  UE Dressing Comments: instructeed Pt with compensatory techniques doff/don hospital gown    LE Dressing  LE Dressing: Yes  LE Dressing Adaptive Equipment: Reacher, Sock aide  Pants Level of Assistance: Minimum assistance  Sock Level of Assistance: Setup  Adult Briefs Level of Assistance: Minimum assistance  LE Dressing Where Assessed: Toilet  LE Dressing Comments: instructed with AE LB care  assist  waist management L side unilateral hand support    Toileting  Toileting Level of Assistance: Minimum assistance  Where Assessed: Toilet  Toileting Comments: Pt adjust clothing prioor/hygiene assist L waist in stance  Functional Standing Tolerance:  Time: 2 minutes  Activity: stance phase ADL skills  Functional Standing Tolerance Comments: unilateral hand support  Bed Mobility/Transfers: Bed Mobility  Bed Mobility: Yes  Bed Mobility 1  Bed Mobility 1: Supine to sitting  Level of Assistance 1: Minimum assistance  Bed Mobility Comments 1: vc body mechanics use side transfer bar    Transfers  Transfer: Yes  Transfer 1  Transfer From 1: Sit to  Transfer to 1: Stand  Technique 1: Sit to stand  Transfer Device 1: Walker (rolling)  Transfer Level of Assistance 1: Minimum assistance  Trials/Comments 1: increased time vc hand placement audible shoulder crepitice  Transfers 2  Transfer From 2: Stand to  Transfer to 2: Sit  Technique 2: Stand to sit  Transfer Device 2: Walker (rolling)  Transfer Level of Assistance 2: Minimum assistance  Trials/Comments 2: vc controlled descent and hand placement    Toilet Transfers  Toilet Transfer From: Rolling walker  Toilet Transfer Type: To and from  Toilet Transfer to: Standard toilet  Toilet Transfer Technique: Ambulating  Toilet Transfers: Minimal assistance  Toilet Transfers Comments: vc controlled descent  Ambulation/Gait Training:  Ambulation/Gait Training  Ambulation/Gait Training Performed: Yes  Ambulation/Gait Training 1  Surface 1: Level tile  Device 1: Rolling walker  Gait Support Devices: Gait belt  Assistance 1: Minimum assistance  Quality of Gait 1: Foot slap (L)  Comments/Distance (ft) 1: 15 ' x 2  Strength:  Strength Comments: B UE impaired at shoulders, chronic rotrator cuff injuries. at least >/= 3/5 distally. observed functionally    Outcome Measures:[facility] Daily Activity  Putting on and taking off regular lower body clothing: A little  Bathing (including washing,  rinsing, drying): A little  Putting on and taking off regular upper body clothing: A little  Toileting, which includes using toilet, bedpan or urinal: A little  Taking care of personal grooming such as brushing teeth: A little  Eating Meals: None  Daily Activity - Total Score: 19        Education Documentation  Body Mechanics, taught by GREG Encarnacion at 12/30/2023  9:09 AM.  Learner: Patient  Readiness: Acceptance  Method: Explanation, Demonstration, Teach-back  Response: Needs Reinforcement, Demonstrated Understanding, Verbalizes Understanding  Comment: Instructing with body mechanics, AE LB, safety woth all transfers    Precautions, taught by GREG Encarnacion at 12/30/2023  9:09 AM.  Learner: Patient  Readiness: Acceptance  Method: Explanation, Demonstration, Teach-back  Response: Needs Reinforcement, Demonstrated Understanding, Verbalizes Understanding  Comment: Instructing with body mechanics, AE LB, safety woth all transfers    ADL Training, taught by GREG Encarnacion at 12/30/2023  9:09 AM.  Learner: Patient  Readiness: Acceptance  Method: Explanation, Demonstration, Teach-back  Response: Needs Reinforcement, Demonstrated Understanding, Verbalizes Understanding  Comment: Instructing with body mechanics, AE LB, safety woth all transfers    Education Comments  No comments found.        OP EDUCATION:       Goals:  Encounter Problems       Encounter Problems (Active)       Mobility       STG - Patient will ambulate 40 feet with 2WW at modified independent (Progressing)       Start:  12/25/23    Expected End:  01/08/24               OT Goals       B UE Strengthening (Progressing)       Start:  12/26/23    Expected End:  01/25/24       Patient will increase B UE strength to 4+/5 for functional transfers.         ADLs (Progressing)       Start:  12/26/23    Expected End:  01/25/24       Patient will complete ADL tasks with Mod I, using AE as needed, in order to increase patient's safety and independence  with self-care tasks.         Functional Transfers (Progressing)       Start:  12/26/23    Expected End:  01/25/24       Patient will complete functional transfers with Mod I in order to increase patient's safety and independence with daily tasks.         Functional Mobility (Progressing)       Start:  12/26/23    Expected End:  01/25/24       Patient will demonstrate the ability to complete item retrieval and functional mobility with Mod I in order to increase patient's safety and independence with daily tasks.              Transfers       STG - Patient will perform bed mobility from supine to sit at modified independent (Progressing)       Start:  12/25/23    Expected End:  01/08/24            STG - Patient will transfer sit to and from stand from normal surface height at modified independent  (Progressing)       Start:  12/25/23    Expected End:  01/08/24

## 2023-12-30 NOTE — DISCHARGE SUMMARY
Discharge Diagnosis  Syncope and collapse  Coronary artery disease  Acute on chronic renal failure  Chronic atrial fibrillation  Type 2 diabetes  Hypertension  Dyslipidemia  Ischemic cardiomyopathy  Issues Requiring Follow-Up      Discharge Meds     Your medication list        START taking these medications        Instructions Last Dose Given Next Dose Due   acetaminophen 325 mg tablet  Commonly known as: Tylenol      Take 2 tablets (650 mg) by mouth every 6 hours if needed for mild pain (1 - 3).       alum-mag hydroxide-simeth 200-200-20 mg/5 mL oral suspension  Commonly known as: Mylanta      Take 30 mL by mouth 4 times a day as needed for indigestion or heartburn.       bisacodyl 10 mg suppository  Commonly known as: Dulcolax      Insert 1 suppository (10 mg) into the rectum once daily as needed for constipation.       magnesium hydroxide 400 mg/5 mL suspension  Commonly known as: Milk of Magnesia      Take 30 mL by mouth once daily as needed for constipation.       melatonin 5 mg tablet      Take 1 tablet (5 mg) by mouth as needed at bedtime for sleep.       polyethylene glycol 17 gram packet  Commonly known as: Glycolax, Miralax  Start taking on: December 31, 2023      Take 17 g by mouth once daily. Do not start before December 31, 2023.              CHANGE how you take these medications        Instructions Last Dose Given Next Dose Due   atorvastatin 10 mg tablet  Commonly known as: Lipitor  Start taking on: December 31, 2023  What changed: additional instructions      Take 1 tablet (10 mg) by mouth once daily. Do not start before December 31, 2023.       donepezil 10 mg tablet  Commonly known as: Aricept  What changed: additional instructions      Take 1 tablet (10 mg) by mouth once daily at bedtime.       DULoxetine 60 mg DR capsule  Commonly known as: Cymbalta  What changed: additional instructions      Take 1 capsule (60 mg) by mouth once daily. Do not crush or chew. Do not start before December 28,  2023.       insulin glargine 100 unit/mL injection  Commonly known as: Lantus  What changed:   how much to take  when to take this  additional instructions      Inject 15 Units under the skin once daily at bedtime. Take as directed per insulin instructions.       ondansetron 4 mg tablet  Commonly known as: Zofran  What changed:   when to take this  reasons to take this      Take 1 tablet (4 mg) by mouth 4 times a day as needed for nausea.       oxyCODONE 15 mg immediate release tablet  Commonly known as: Roxicodone  What changed:   when to take this  reasons to take this  additional instructions      Take 1 tablet (15 mg) by mouth every 6 hours if needed for moderate pain (4 - 6) or severe pain (7 - 10) for up to 3 days.              CONTINUE taking these medications        Instructions Last Dose Given Next Dose Due   clopidogrel 75 mg tablet  Commonly known as: Plavix      TAKE 1 TABLET BY MOUTH ONCE  DAILY       cyanocobalamin 1,000 mcg tablet  Commonly known as: Vitamin B-12           ferrous sulfate 325 (65 Fe) MG EC tablet           folic acid 400 mcg tablet  Commonly known as: Folvite           gabapentin 600 mg tablet  Commonly known as: Neurontin  Start taking on: December 31, 2023      Take 1 tablet (600 mg) by mouth once daily for 3 days. Do not start before December 31, 2023.       HumuLIN R Regular U-100 Insuln 100 unit/mL injection  Generic drug: insulin regular           lactulose 20 gram/30 mL oral solution      TAKE 15 ML BY MOUTH ONCE DAILY       levETIRAcetam 500 mg tablet  Commonly known as: Keppra           metoprolol succinate XL 25 mg 24 hr tablet  Commonly known as: Toprol-XL      Take 1 tablet (25 mg) by mouth 2 times a day. Do not crush or chew.       pantoprazole 40 mg EC tablet  Commonly known as: ProtoNix           sennosides 8.6 mg tablet  Commonly known as: Senokot           tamsulosin 0.4 mg 24 hr capsule  Commonly known as: Flomax           traZODone 150 mg tablet  Commonly known as:  Desyrel      Take 1 tablet (150 mg) by mouth once daily at bedtime. FOR 90 DAYS              STOP taking these medications      amLODIPine 5 mg tablet  Commonly known as: Norvasc        amoxicillin-pot clavulanate 500-125 mg tablet  Commonly known as: Augmentin        Eliquis 5 mg tablet  Generic drug: apixaban        fenofibrate 160 mg tablet  Commonly known as: Triglide        ketoconazole 2 % shampoo  Commonly known as: NIZOral        losartan 100 mg tablet  Commonly known as: Cozaar                  Where to Get Your Medications        These medications were sent to [facility]/pharmacy #[id number] - [place], [place] - [address]  [address], [facility] [address]      Hours: 24-hours Phone: [phone]   alum-mag hydroxide-simeth 200-200-20 mg/5 mL oral suspension  bisacodyl 10 mg suppository       You can get these medications from any pharmacy    Bring a paper prescription for each of these medications  DULoxetine 60 mg DR capsule  gabapentin 600 mg tablet  oxyCODONE 15 mg immediate release tablet       Information about where to get these medications is not yet available    Ask your nurse or doctor about these medications  acetaminophen 325 mg tablet  atorvastatin 10 mg tablet  donepezil 10 mg tablet  insulin glargine 100 unit/mL injection  magnesium hydroxide 400 mg/5 mL suspension  melatonin 5 mg tablet  ondansetron 4 mg tablet  polyethylene glycol 17 gram packet         Test Results Pending At Discharge  Pending Labs       No current pending labs.            Hospital Course   History:Rachid Yun is a 73 y.o. male presenting with syncope.  Patient has a history of atrial fibrillation he was diagnosed just wants to go.  Patient underwent cardioversion by Dr. Hare.  Patient was on Eliquis and then dose was decreased to 2.5 mg twice a day.  Patient had a similar episode about 2 months ago when he had syncope.  Patient is on Keppra for possibility of having seizure though he was not  diagnosed with seizure disorder.  Today, patient was at his baseline he denies any chest pain, shortness of breath, palpitations.  He ambulates with a cane.  He remembers walking to the bathroom and sitting on the toilet to urinate.  Next thing he remembers he is on the floor when his son is trying to wake him up.  According to the son, he heard patient falling so he ran to the bathroom to find patient on the floor unresponsive.  Patient he the floor with his knees and then he hit his face and elbow against a door jam.  Patient had significant bleeding from his left knee and left side of the neck.  Patient did not have any seizure-like activity according to his son according to nursing signout.  During the exam, patient denies any acute problems.  Diagnostic workup: CT of the head and cervical spine did not demonstrate any acute findings.  X-ray of the bilateral knees did not demonstrate any acute traumatic abnormalities.  Cardiac stress test:1. Small area of ischemia about the apex of left ventricle involving  the lateral wall and anteroseptal left ventricle at the apex.      2. Small area of ischemia within the mid inferior wall left ventricle.      3. Decreased ejection fraction 33%  Patient had cardiac catheterization on December 27:Angiography reveals a 90% stenosis of the mid circumflex coronary artery. Pre-intervention SULEIMAN flow was 3. Percutaneous coronary intervention was performed within the mid circumflex. The vessel was pre-dilated using a compliant balloon 2.5 mm x 10 mm at 8 OLY. [place] drug-eluting stent 2.5 mm x 12 mm was advanced to the lesion and implanted at 10 OLY. The stenosis was successfully reduced from 90% to 0%. Post-intervention SULEIMAN flow was 3.     Patient was evaluated by nephrologist, Dr. Costa.  Initial level of creatinine 3.2.  We stopped Cozaar.  Patient received gentle hydration.  Creatinine improved to 1.7.  It worsened after cardiac catheter 2.0-1.9 today.    Patient will be  discharged to an acute rehab.    Pertinent Physical Exam 1At Time of Discharge  Physical Exam  Pt is NAD.  Cooperative with exam.  In no distress at rest.  A, Ox3.  Face is symmetrical.  Skin - no lesions.  Lungs: clear to auscultations B/L. No wheezes, rales, rhonchi.  Heart: irregular S1S2.  Abdomen: soft, NT, ND. BS positive.  Extr.: no edema, cords, cyanosis.  Moves all extr.   Outpatient Follow-Up  Future Appointments   Date Time Provider Department Center   1/2/2024  9:30 AM Phill Hare DO [address]     Time spent with patient today 36 minutes.    Monica Orozco MD

## 2024-01-02 PROBLEM — I25.10 ASHD (ARTERIOSCLEROTIC HEART DISEASE): Status: ACTIVE | Noted: 2024-01-02

## 2024-01-02 PROBLEM — R55 VASOVAGAL SYNCOPE: Status: ACTIVE | Noted: 2024-01-02

## 2024-01-07 LAB
Q ONSET: 226 MS
QRS COUNT: 12 BEATS
QRS DURATION: 90 MS
QT INTERVAL: 390 MS
QTC CALCULATION(BAZETT): 429 MS
QTC FREDERICIA: 416 MS
R AXIS: 36 DEGREES
T AXIS: 54 DEGREES
T OFFSET: 421 MS
VENTRICULAR RATE: 73 BPM

## 2024-02-01 ENCOUNTER — TELEPHONE (OUTPATIENT)
Dept: CARDIOLOGY | Facility: CLINIC | Age: 74
End: 2024-02-01
Payer: MEDICARE

## 2024-02-01 NOTE — TELEPHONE ENCOUNTER
Patient  calling to see if it was ok to get his cortisone injections in his shoulder again and if so dr carrera office needs an ok to do so

## 2024-02-01 NOTE — TELEPHONE ENCOUNTER
Called patient and told him he would have to stay on clopidogrel and eliquis without interruption for 3-6 months minimum.  Sent letter to a Presley Kidd CNP than would be ok to proceed with injection of shoulder if can be done on the clopidogrel and apixaban combination.

## 2024-03-04 ENCOUNTER — OFFICE VISIT (OUTPATIENT)
Dept: PRIMARY CARE | Facility: CLINIC | Age: 74
End: 2024-03-04
Payer: MEDICARE

## 2024-03-04 VITALS
BODY MASS INDEX: 27.4 KG/M2 | OXYGEN SATURATION: 95 % | DIASTOLIC BLOOD PRESSURE: 80 MMHG | HEART RATE: 67 BPM | RESPIRATION RATE: 16 BRPM | SYSTOLIC BLOOD PRESSURE: 134 MMHG | WEIGHT: 225 LBS | HEIGHT: 76 IN

## 2024-03-04 DIAGNOSIS — R26.2 DIFFICULTY WALKING: ICD-10-CM

## 2024-03-04 DIAGNOSIS — G47.33 OBSTRUCTIVE SLEEP APNEA SYNDROME: ICD-10-CM

## 2024-03-04 DIAGNOSIS — I48.91 ATRIAL FIBRILLATION, UNSPECIFIED TYPE (MULTI): ICD-10-CM

## 2024-03-04 DIAGNOSIS — L21.9 SEBORRHEIC DERMATITIS OF SCALP: ICD-10-CM

## 2024-03-04 DIAGNOSIS — N18.32 STAGE 3B CHRONIC KIDNEY DISEASE (MULTI): ICD-10-CM

## 2024-03-04 DIAGNOSIS — M17.12 ARTHRITIS OF LEFT KNEE: Primary | ICD-10-CM

## 2024-03-04 DIAGNOSIS — E11.42 DIABETIC POLYNEUROPATHY ASSOCIATED WITH TYPE 2 DIABETES MELLITUS (MULTI): ICD-10-CM

## 2024-03-04 DIAGNOSIS — M19.012 ARTHRITIS OF LEFT SHOULDER REGION: ICD-10-CM

## 2024-03-04 PROCEDURE — 1159F MED LIST DOCD IN RCRD: CPT | Performed by: INTERNAL MEDICINE

## 2024-03-04 PROCEDURE — 99214 OFFICE O/P EST MOD 30 MIN: CPT | Performed by: INTERNAL MEDICINE

## 2024-03-04 PROCEDURE — 1036F TOBACCO NON-USER: CPT | Performed by: INTERNAL MEDICINE

## 2024-03-04 PROCEDURE — 4010F ACE/ARB THERAPY RXD/TAKEN: CPT | Performed by: INTERNAL MEDICINE

## 2024-03-04 PROCEDURE — 1125F AMNT PAIN NOTED PAIN PRSNT: CPT | Performed by: INTERNAL MEDICINE

## 2024-03-04 PROCEDURE — 3079F DIAST BP 80-89 MM HG: CPT | Performed by: INTERNAL MEDICINE

## 2024-03-04 PROCEDURE — 1157F ADVNC CARE PLAN IN RCRD: CPT | Performed by: INTERNAL MEDICINE

## 2024-03-04 PROCEDURE — 3075F SYST BP GE 130 - 139MM HG: CPT | Performed by: INTERNAL MEDICINE

## 2024-03-04 RX ORDER — METOPROLOL SUCCINATE 25 MG/1
25 TABLET, EXTENDED RELEASE ORAL 2 TIMES DAILY
Qty: 180 TABLET | Refills: 3 | Status: SHIPPED | OUTPATIENT
Start: 2024-03-04 | End: 2025-03-04

## 2024-03-04 RX ORDER — OMEPRAZOLE 20 MG/1
20 CAPSULE, DELAYED RELEASE ORAL
COMMUNITY
Start: 2024-02-14

## 2024-03-04 RX ORDER — OXYCODONE HYDROCHLORIDE 15 MG/1
15 TABLET ORAL EVERY 4 HOURS PRN
COMMUNITY
Start: 2024-02-23

## 2024-03-04 RX ORDER — AMLODIPINE BESYLATE 5 MG/1
5 TABLET ORAL DAILY
COMMUNITY
Start: 2024-02-19

## 2024-03-04 RX ORDER — LOSARTAN POTASSIUM 100 MG/1
100 TABLET ORAL DAILY
COMMUNITY
Start: 2024-02-18 | End: 2024-05-20

## 2024-03-04 RX ORDER — KETOCONAZOLE 20 MG/G
CREAM TOPICAL DAILY
Qty: 30 G | Refills: 11 | Status: SHIPPED | OUTPATIENT
Start: 2024-03-04 | End: 2025-03-04

## 2024-03-04 RX ORDER — INSULIN ASPART 100 [IU]/ML
INJECTION, SOLUTION INTRAVENOUS; SUBCUTANEOUS
COMMUNITY

## 2024-03-04 RX ORDER — APIXABAN 2.5 MG/1
2.5 TABLET, FILM COATED ORAL 2 TIMES DAILY
COMMUNITY
Start: 2024-02-14 | End: 2024-03-04 | Stop reason: SDUPTHER

## 2024-03-04 RX ORDER — OXYBUTYNIN CHLORIDE 5 MG/1
5 TABLET ORAL 2 TIMES DAILY
COMMUNITY

## 2024-03-04 RX ORDER — FENOFIBRATE 134 MG/1
134 CAPSULE ORAL
COMMUNITY

## 2024-03-04 RX ORDER — APIXABAN 2.5 MG/1
2.5 TABLET, FILM COATED ORAL 2 TIMES DAILY
Qty: 180 TABLET | Refills: 3 | Status: SHIPPED | OUTPATIENT
Start: 2024-03-04 | End: 2025-03-04

## 2024-03-04 ASSESSMENT — PAIN SCALES - GENERAL: PAINLEVEL: 7

## 2024-03-04 ASSESSMENT — ENCOUNTER SYMPTOMS
DIARRHEA: 0
CONSTIPATION: 0
NAUSEA: 0
DIZZINESS: 0
OCCASIONAL FEELINGS OF UNSTEADINESS: 0
DEPRESSION: 0
ABDOMINAL PAIN: 0
PALPITATIONS: 0
LOSS OF SENSATION IN FEET: 0
ARTHRALGIAS: 1
SHORTNESS OF BREATH: 0
COUGH: 0

## 2024-03-04 NOTE — PROGRESS NOTES
"Subjective   Patient ID: Rachid Yun is a 73 y.o. male who presents for hospital follow up. He presented to ED on 12/24 after an episode of syncope. He previously had 2-3 prior episodes of syncope. Had stent placed and denies episodes of syncope since. The shampoo he was prescribed did not improve his dry scalp and he would like to try something else. In the morning, his blood glucose runs between . Patient has been doing physical therapy exercises at home. He has difficulty ambulating due to arthritis in shoulders and knees. He is unable to hold onto walker because of L shoulder pain. He cannot stand on his own and needs assistance with transfers. Needs to use wheelchair to navigate his home and he is able to self-propel a manual wheelchair.    Diagnostics Reviewed:  Labs Reviewed:         Review of Systems   HENT:          Dry scalp   Respiratory:  Negative for cough and shortness of breath.    Cardiovascular:  Negative for chest pain and palpitations.   Gastrointestinal:  Negative for abdominal pain, constipation, diarrhea and nausea.   Musculoskeletal:  Positive for arthralgias (L shoulder, knees).   Neurological:  Negative for dizziness.       Objective   /80   Pulse 67   Resp 16   Ht 1.93 m (6' 4\")   Wt 102 kg (225 lb)   SpO2 95%   BMI 27.39 kg/m²     Physical Exam  Cardiovascular:      Rate and Rhythm: Normal rate and regular rhythm.      Heart sounds: Normal heart sounds.   Pulmonary:      Breath sounds: Normal breath sounds.   Abdominal:      Palpations: Abdomen is soft.      Tenderness: There is no abdominal tenderness.   Musculoskeletal:      Right shoulder: Decreased range of motion.      Left shoulder: Decreased range of motion.      Right lower leg: No edema.      Left lower leg: No edema.   Neurological:      Mental Status: He is alert and oriented to person, place, and time.   Psychiatric:         Mood and Affect: Mood normal.         Behavior: Behavior normal. "         Assessment/Plan   Diagnoses and all orders for this visit:  Seborrheic dermatitis of scalp  -     Referral to Dermatology  Atrial fibrillation, unspecified type (CMS/HCC)  -     Eliquis 2.5 mg tablet; Take 1 tablet (2.5 mg) by mouth 2 times a day.  -     metoprolol succinate XL (Toprol-XL) 25 mg 24 hr tablet; Take 1 tablet (25 mg) by mouth 2 times a day. Do not crush or chew.  Arthritis of left knee  -     Wheelchair  Arthritis of left shoulder region  -     Wheelchair  Difficulty walking  Diabetic polyneuropathy associated with type 2 diabetes mellitus (CMS/[facility])  -     CBC and Auto Differential; Future  -     Comprehensive Metabolic Panel; Future  -     Wheelchair  -     Hemoglobin A1C; Future  -     Lipid Panel; Future  Obstructive sleep apnea syndrome  -     In-Center Sleep Study (Non-Sleep Provider Only); Future  Stage 3b chronic kidney disease (CMS/[facility])      Scribe Attestation  By signing my name below, ICarin, Scribe   attest that this documentation has been prepared under the direction and in the presence of Izabel Marshall MD.

## 2024-03-25 DIAGNOSIS — Z79.4 TYPE 2 DIABETES MELLITUS WITHOUT COMPLICATION, WITH LONG-TERM CURRENT USE OF INSULIN (MULTI): ICD-10-CM

## 2024-03-25 DIAGNOSIS — E11.9 TYPE 2 DIABETES MELLITUS WITHOUT COMPLICATION, WITH LONG-TERM CURRENT USE OF INSULIN (MULTI): ICD-10-CM

## 2024-03-28 DIAGNOSIS — K59.01 SLOW TRANSIT CONSTIPATION: ICD-10-CM

## 2024-03-28 RX ORDER — LACTULOSE 10 G/15ML
SOLUTION ORAL; RECTAL
Qty: 450 ML | Refills: 0 | Status: SHIPPED | OUTPATIENT
Start: 2024-03-28 | End: 2024-05-10 | Stop reason: SDUPTHER

## 2024-04-03 DIAGNOSIS — Z95.5 STENTED CORONARY ARTERY: Primary | ICD-10-CM

## 2024-04-10 ENCOUNTER — APPOINTMENT (OUTPATIENT)
Dept: CARDIAC REHAB | Facility: CLINIC | Age: 74
End: 2024-04-10
Payer: MEDICARE

## 2024-04-15 ENCOUNTER — APPOINTMENT (OUTPATIENT)
Dept: CARDIAC REHAB | Facility: CLINIC | Age: 74
End: 2024-04-15
Payer: MEDICARE

## 2024-04-15 DIAGNOSIS — N18.4 CHRONIC KIDNEY DISEASE, STAGE 4 (SEVERE) (MULTI): Primary | ICD-10-CM

## 2024-04-17 ENCOUNTER — APPOINTMENT (OUTPATIENT)
Dept: CARDIAC REHAB | Facility: CLINIC | Age: 74
End: 2024-04-17
Payer: MEDICARE

## 2024-04-22 ENCOUNTER — OFFICE VISIT (OUTPATIENT)
Dept: CARDIOLOGY | Facility: CLINIC | Age: 74
End: 2024-04-22
Payer: MEDICARE

## 2024-04-22 ENCOUNTER — APPOINTMENT (OUTPATIENT)
Dept: CARDIAC REHAB | Facility: CLINIC | Age: 74
End: 2024-04-22
Payer: MEDICARE

## 2024-04-22 VITALS
DIASTOLIC BLOOD PRESSURE: 64 MMHG | BODY MASS INDEX: 26.66 KG/M2 | HEART RATE: 68 BPM | WEIGHT: 219 LBS | SYSTOLIC BLOOD PRESSURE: 100 MMHG

## 2024-04-22 DIAGNOSIS — I10 PRIMARY HYPERTENSION: ICD-10-CM

## 2024-04-22 DIAGNOSIS — I25.10 ASHD (ARTERIOSCLEROTIC HEART DISEASE): Primary | ICD-10-CM

## 2024-04-22 DIAGNOSIS — I48.91 ATRIAL FIBRILLATION, UNSPECIFIED TYPE (MULTI): ICD-10-CM

## 2024-04-22 DIAGNOSIS — E78.2 MIXED HYPERLIPIDEMIA: ICD-10-CM

## 2024-04-22 PROCEDURE — 1125F AMNT PAIN NOTED PAIN PRSNT: CPT | Performed by: INTERNAL MEDICINE

## 2024-04-22 PROCEDURE — 99214 OFFICE O/P EST MOD 30 MIN: CPT | Performed by: INTERNAL MEDICINE

## 2024-04-22 PROCEDURE — 1159F MED LIST DOCD IN RCRD: CPT | Performed by: INTERNAL MEDICINE

## 2024-04-22 PROCEDURE — 1036F TOBACCO NON-USER: CPT | Performed by: INTERNAL MEDICINE

## 2024-04-22 PROCEDURE — 1157F ADVNC CARE PLAN IN RCRD: CPT | Performed by: INTERNAL MEDICINE

## 2024-04-22 PROCEDURE — 4010F ACE/ARB THERAPY RXD/TAKEN: CPT | Performed by: INTERNAL MEDICINE

## 2024-04-22 PROCEDURE — 3074F SYST BP LT 130 MM HG: CPT | Performed by: INTERNAL MEDICINE

## 2024-04-22 PROCEDURE — 3078F DIAST BP <80 MM HG: CPT | Performed by: INTERNAL MEDICINE

## 2024-04-22 ASSESSMENT — ENCOUNTER SYMPTOMS
ABDOMINAL PAIN: 0
OCCASIONAL FEELINGS OF UNSTEADINESS: 1
LOSS OF SENSATION IN FEET: 1
PALPITATIONS: 0
BACK PAIN: 1
DEPRESSION: 0
DYSPNEA ON EXERTION: 0
BLURRED VISION: 0
PARESTHESIAS: 0
SHORTNESS OF BREATH: 0
DYSURIA: 0
NUMBNESS: 0
HEMATURIA: 0
COUGH: 0

## 2024-04-22 ASSESSMENT — PAIN SCALES - GENERAL: PAINLEVEL: 6

## 2024-04-22 ASSESSMENT — PATIENT HEALTH QUESTIONNAIRE - PHQ9
1. LITTLE INTEREST OR PLEASURE IN DOING THINGS: NOT AT ALL
SUM OF ALL RESPONSES TO PHQ9 QUESTIONS 1 AND 2: 0
2. FEELING DOWN, DEPRESSED OR HOPELESS: NOT AT ALL

## 2024-04-22 NOTE — ASSESSMENT & PLAN NOTE
Heart rate is very well-controlled.  Eliquis for stroke prevention at reduced dose secondary to his renal insufficiency.

## 2024-04-22 NOTE — ASSESSMENT & PLAN NOTE
Stable with no anginal type symptoms.  Overall he feels much better from a cardiac standpoint.  Would like to have injections and ablation procedures for his chronic joint pains.  But I have recommended that we wait 6 months postprocedure before putting antiplatelet agents on hold for the procedure.  The patient and son are agreeable and we will plan on bringing back in June which will be 6 months after the procedure.  Will likely clear for his pain management procedures at that time.   no

## 2024-04-22 NOTE — PROGRESS NOTES
Subjective   Rachid Yun is a 73 y.o. male.    Chief Complaint:  Follow-up    HPI  Cardiac wise the patient is doing very well.  No chest pain or anginal symptoms no unusual shortness of breath.  Continues to have the significant joint pain as usual.  Ablations and epidural injections are being placed on hold at this time awaiting for a safer time to stop antiplatelet therapy.    Review of Systems   Constitutional: Negative for malaise/fatigue.   HENT:  Negative for congestion.    Eyes:  Negative for blurred vision.   Cardiovascular:  Negative for chest pain, dyspnea on exertion and palpitations.   Respiratory:  Negative for cough and shortness of breath.    Musculoskeletal:  Positive for arthritis and back pain. Negative for joint pain.   Gastrointestinal:  Negative for abdominal pain.   Genitourinary:  Negative for dysuria and hematuria.   Neurological:  Negative for numbness and paresthesias.       Objective   Constitutional:       Appearance: Not in distress.   Eyes:      Conjunctiva/sclera: Conjunctivae normal.   Neck:      Vascular: JVD normal.   Pulmonary:      Breath sounds: Normal breath sounds. No wheezing. No rhonchi. No rales.   Cardiovascular:      Normal rate. Regular rhythm.      Murmurs: There is no murmur.      No gallop.  No click. No rub.   Abdominal:      Palpations: Abdomen is soft.   Neurological:      General: No focal deficit present.      Mental Status: Alert.         Lab Review:       Assessment/Plan   The primary encounter diagnosis was ASHD (arteriosclerotic heart disease). Diagnoses of Atrial fibrillation, unspecified type (Multi), Mixed hyperlipidemia, and Primary hypertension were also pertinent to this visit.    Mixed hyperlipidemia  Dr. Marshall ordered lipid panel review on return.    ASHD (arteriosclerotic heart disease)  Stable with no anginal type symptoms.  Overall he feels much better from a cardiac standpoint.  Would like to have injections and ablation procedures for his  chronic joint pains.  But I have recommended that we wait 6 months postprocedure before putting antiplatelet agents on hold for the procedure.  The patient and son are agreeable and we will plan on bringing back in June which will be 6 months after the procedure.  Will likely clear for his pain management procedures at that time.    Atrial fibrillation (Multi)  Heart rate is very well-controlled.  Eliquis for stroke prevention at reduced dose secondary to his renal insufficiency.    Primary hypertension  Blood pressure currently very well-controlled no changes need to be made.

## 2024-05-31 DIAGNOSIS — N18.4 CHRONIC KIDNEY DISEASE, STAGE 4 (SEVERE) (MULTI): Primary | ICD-10-CM

## 2024-05-31 PROBLEM — Z01.810 PREOP CARDIOVASCULAR EXAM: Status: ACTIVE | Noted: 2024-05-31

## 2024-05-31 PROBLEM — I25.10 CORONARY ARTERY DISEASE INVOLVING NATIVE CORONARY ARTERY OF NATIVE HEART WITHOUT ANGINA PECTORIS: Status: RESOLVED | Noted: 2023-09-01 | Resolved: 2024-05-31

## 2024-06-03 ENCOUNTER — APPOINTMENT (OUTPATIENT)
Dept: CARDIOLOGY | Facility: CLINIC | Age: 74
End: 2024-06-03
Payer: MEDICARE

## 2024-06-03 ENCOUNTER — OFFICE VISIT (OUTPATIENT)
Dept: CARDIOLOGY | Facility: CLINIC | Age: 74
End: 2024-06-03
Payer: MEDICARE

## 2024-06-03 VITALS — SYSTOLIC BLOOD PRESSURE: 120 MMHG | DIASTOLIC BLOOD PRESSURE: 80 MMHG | HEART RATE: 100 BPM

## 2024-06-03 DIAGNOSIS — Z01.810 PREOP CARDIOVASCULAR EXAM: ICD-10-CM

## 2024-06-03 DIAGNOSIS — I48.11 LONGSTANDING PERSISTENT ATRIAL FIBRILLATION (MULTI): ICD-10-CM

## 2024-06-03 DIAGNOSIS — I10 PRIMARY HYPERTENSION: ICD-10-CM

## 2024-06-03 DIAGNOSIS — I25.10 ASHD (ARTERIOSCLEROTIC HEART DISEASE): Primary | ICD-10-CM

## 2024-06-03 DIAGNOSIS — N18.32 STAGE 3B CHRONIC KIDNEY DISEASE (MULTI): ICD-10-CM

## 2024-06-03 DIAGNOSIS — E78.2 MIXED HYPERLIPIDEMIA: ICD-10-CM

## 2024-06-03 PROCEDURE — 3074F SYST BP LT 130 MM HG: CPT | Performed by: INTERNAL MEDICINE

## 2024-06-03 PROCEDURE — 3079F DIAST BP 80-89 MM HG: CPT | Performed by: INTERNAL MEDICINE

## 2024-06-03 PROCEDURE — 4010F ACE/ARB THERAPY RXD/TAKEN: CPT | Performed by: INTERNAL MEDICINE

## 2024-06-03 PROCEDURE — 93005 ELECTROCARDIOGRAM TRACING: CPT | Performed by: INTERNAL MEDICINE

## 2024-06-03 PROCEDURE — 1157F ADVNC CARE PLAN IN RCRD: CPT | Performed by: INTERNAL MEDICINE

## 2024-06-03 PROCEDURE — 1125F AMNT PAIN NOTED PAIN PRSNT: CPT | Performed by: INTERNAL MEDICINE

## 2024-06-03 PROCEDURE — 1159F MED LIST DOCD IN RCRD: CPT | Performed by: INTERNAL MEDICINE

## 2024-06-03 PROCEDURE — 99214 OFFICE O/P EST MOD 30 MIN: CPT | Performed by: INTERNAL MEDICINE

## 2024-06-03 PROCEDURE — 93010 ELECTROCARDIOGRAM REPORT: CPT | Performed by: INTERNAL MEDICINE

## 2024-06-03 PROCEDURE — 1036F TOBACCO NON-USER: CPT | Performed by: INTERNAL MEDICINE

## 2024-06-03 ASSESSMENT — ENCOUNTER SYMPTOMS
HEMATURIA: 0
PALPITATIONS: 0
NUMBNESS: 0
BLURRED VISION: 0
DYSPNEA ON EXERTION: 0
BACK PAIN: 1
DEPRESSION: 0
COUGH: 0
OCCASIONAL FEELINGS OF UNSTEADINESS: 1
PARESTHESIAS: 0
ABDOMINAL PAIN: 0
SHORTNESS OF BREATH: 0
DYSURIA: 0
LOSS OF SENSATION IN FEET: 0

## 2024-06-03 ASSESSMENT — PAIN SCALES - GENERAL: PAINLEVEL: 10-WORST PAIN EVER

## 2024-06-03 NOTE — ASSESSMENT & PLAN NOTE
The patient is stable from a cardiac standpoint.  He exhibits no chest pain or anginal type symptoms.  Will continue standard risk factor modification.  Likely repeat echocardiogram in future.

## 2024-06-03 NOTE — ASSESSMENT & PLAN NOTE
Blood pressure currently is well-controlled on current regimen.  Will continue current medications and follow on a regular basis.   Complex Repair And Single Advancement Flap Text: The defect edges were debeveled with a #15 scalpel blade.  The primary defect was closed partially with a complex linear closure.  Given the location of the remaining defect, shape of the defect and the proximity to free margins a single advancement flap was deemed most appropriate for complete closure of the defect.  Using a sterile surgical marker, an appropriate advancement flap was drawn incorporating the defect and placing the expected incisions within the relaxed skin tension lines where possible.    The area thus outlined was incised deep to adipose tissue with a #15 scalpel blade.  The skin margins were undermined to an appropriate distance in all directions utilizing iris scissors.

## 2024-06-03 NOTE — PROGRESS NOTES
Subjective   Rachid Yun is a 73 y.o. male.    Chief Complaint:  Pre-op Exam (Back surgery)    HPI  This is a 73-year-old male with established coronary artery disease reports for a preoperative risk assessment.  The patient underwent a percutaneous coronary invention with a drug-eluting stent placed into the circumflex artery in December 2023.  He has made a good recovery and overall doing well.  He has no chest pain or anginal type symptoms.  He has not average functional capacity with limitations primarily on the basis of his back pain.  He now is being scheduled for a back intervention procedure in attempt to improve his pain.    Review of Systems   Constitutional: Negative for malaise/fatigue.   HENT:  Negative for congestion.    Eyes:  Negative for blurred vision.   Cardiovascular:  Negative for chest pain, dyspnea on exertion and palpitations.   Respiratory:  Negative for cough and shortness of breath.    Musculoskeletal:  Positive for back pain. Negative for joint pain.   Gastrointestinal:  Negative for abdominal pain.   Genitourinary:  Negative for dysuria and hematuria.   Neurological:  Negative for numbness and paresthesias.       Objective   Constitutional:       Appearance: Not in distress.   Eyes:      Conjunctiva/sclera: Conjunctivae normal.   Neck:      Vascular: JVD normal.   Pulmonary:      Breath sounds: Normal breath sounds. No wheezing. No rhonchi. No rales.   Cardiovascular:      Normal rate. Irregularly irregular rhythm.      Murmurs: There is no murmur.      No gallop.  No click. No rub.   Abdominal:      Palpations: Abdomen is soft.   Neurological:      General: No focal deficit present.      Mental Status: Alert.         Lab Review:   No visits with results within 2 Month(s) from this visit.   Latest known visit with results is:   Admission on 12/24/2023, Discharged on 12/30/2023   Component Date Value    Ventricular Rate 12/24/2023 73     QRS Duration 12/24/2023 90     QT Interval  12/24/2023 390     QTC Calculation(Bazett) 12/24/2023 429     R Axis 12/24/2023 36     T Axis 12/24/2023 54     QRS Count 12/24/2023 12     Q Onset 12/24/2023 226     T Offset 12/24/2023 421     QTC Fredericia 12/24/2023 416     WBC 12/24/2023 7.7     nRBC 12/24/2023 0.0     RBC 12/24/2023 4.13 (L)     Hemoglobin 12/24/2023 11.9 (L)     Hematocrit 12/24/2023 37.4 (L)     MCV 12/24/2023 91     MCH 12/24/2023 28.8     MCHC 12/24/2023 31.8 (L)     RDW 12/24/2023 13.7     Platelets 12/24/2023 239     Neutrophils % 12/24/2023 77.7     Immature Granulocytes %,* 12/24/2023 0.4     Lymphocytes % 12/24/2023 12.5     Monocytes % 12/24/2023 6.4     Eosinophils % 12/24/2023 2.0     Basophils % 12/24/2023 1.0     Neutrophils Absolute 12/24/2023 5.95 (H)     Immature Granulocytes Ab* 12/24/2023 0.03     Lymphocytes Absolute 12/24/2023 0.96     Monocytes Absolute 12/24/2023 0.49     Eosinophils Absolute 12/24/2023 0.15     Basophils Absolute 12/24/2023 0.08     Glucose 12/24/2023 160 (H)     Sodium 12/24/2023 135     Potassium 12/24/2023 4.3     Chloride 12/24/2023 102     Bicarbonate 12/24/2023 19 (L)     Urea Nitrogen 12/24/2023 54 (H)     Creatinine 12/24/2023 3.20 (H)     eGFR 12/24/2023 20 (L)     Calcium 12/24/2023 9.2     Albumin 12/24/2023 3.9     Alkaline Phosphatase 12/24/2023 44     Total Protein 12/24/2023 6.4     AST 12/24/2023 18     Bilirubin, Total 12/24/2023 0.5     ALT 12/24/2023 10     Anion Gap 12/24/2023 14     Troponin T, High Sensiti* 12/24/2023 130 (H)     Troponin T, High Sensiti* 12/24/2023 129 (H)     Thyroid Stimulating Horm* 12/25/2023 0.95     Glucose 12/25/2023 122 (H)     Sodium 12/25/2023 138     Potassium 12/25/2023 3.9     Chloride 12/25/2023 106     Bicarbonate 12/25/2023 20 (L)     Urea Nitrogen 12/25/2023 51 (H)     Creatinine 12/25/2023 2.60 (H)     eGFR 12/25/2023 25 (L)     Calcium 12/25/2023 8.8     Phosphorus 12/25/2023 3.0     Albumin 12/25/2023 3.6     Anion Gap 12/25/2023 12      Troponin T, High Sensiti* 12/25/2023 90 (H)     POCT Glucose 12/24/2023 138 (H)     Extra Tube 12/25/2023 Hold for add-ons.     POCT Glucose 12/25/2023 113 (H)     POCT Glucose 12/25/2023 162 (H)     POCT Glucose 12/26/2023 121 (H)     POCT Glucose 12/26/2023 146 (H)     POCT Glucose 12/26/2023 209 (H)     Glucose 12/27/2023 125 (H)     Sodium 12/27/2023 137     Potassium 12/27/2023 4.9     Chloride 12/27/2023 105     Bicarbonate 12/27/2023 22 (L)     Urea Nitrogen 12/27/2023 36 (H)     Creatinine 12/27/2023 1.70 (H)     eGFR 12/27/2023 42 (L)     Calcium 12/27/2023 8.8     Anion Gap 12/27/2023 10     POCT Glucose 12/26/2023 212 (H)     POCT Glucose 12/27/2023 121 (H)     POCT Glucose 12/27/2023 130 (H)     POCT Glucose 12/27/2023 179 (H)     Glucose 12/28/2023 139 (H)     Sodium 12/28/2023 135     Potassium 12/28/2023 5.3 (H)     Chloride 12/28/2023 103     Bicarbonate 12/28/2023 23 (L)     Urea Nitrogen 12/28/2023 40 (H)     Creatinine 12/28/2023 1.90 (H)     eGFR 12/28/2023 37 (L)     Calcium 12/28/2023 9.1     Anion Gap 12/28/2023 9     POCT Glucose 12/27/2023 157 (H)     Potassium 12/28/2023 5.0     POCT Glucose 12/28/2023 137 (H)     POCT Activated Clotting * 12/28/2023 302 (H)     Glucose 12/29/2023 169 (H)     Sodium 12/29/2023 136     Potassium 12/29/2023 4.8     Chloride 12/29/2023 104     Bicarbonate 12/29/2023 23 (L)     Urea Nitrogen 12/29/2023 37 (H)     Creatinine 12/29/2023 2.00 (H)     eGFR 12/29/2023 35 (L)     Calcium 12/29/2023 8.4 (L)     Anion Gap 12/29/2023 9     POCT Glucose 12/28/2023 164 (H)     POCT Glucose 12/29/2023 129 (H)     POCT Glucose 12/29/2023 143 (H)     POCT Glucose 12/29/2023 169 (H)     Glucose 12/30/2023 158 (H)     Sodium 12/30/2023 137     Potassium 12/30/2023 4.4     Chloride 12/30/2023 104     Bicarbonate 12/30/2023 23 (L)     Urea Nitrogen 12/30/2023 39 (H)     Creatinine 12/30/2023 1.90 (H)     eGFR 12/30/2023 37 (L)     Calcium 12/30/2023 8.7     Anion Gap 12/30/2023  10     POCT Glucose 12/29/2023 151 (H)     Extra Tube 12/30/2023 Hold for add-ons.     POCT Glucose 12/30/2023 153 (H)     POCT Glucose 12/30/2023 171 (H)        Assessment/Plan   The primary encounter diagnosis was ASHD (arteriosclerotic heart disease). Diagnoses of Longstanding persistent atrial fibrillation (Multi), Primary hypertension, Mixed hyperlipidemia, and Preop cardiovascular exam were also pertinent to this visit.    Mixed hyperlipidemia  Dr. Marshall has ordered lipid panel for future, will review on return visit.    ASHD (arteriosclerotic heart disease)  The patient is stable from a cardiac standpoint.  He exhibits no chest pain or anginal type symptoms.  Will continue standard risk factor modification.  Likely repeat echocardiogram in future.    Atrial fibrillation (Multi)  Heart rate with adequate control on metoprolol succinate 25 mg twice daily.  Will target heart rate of less than 110 at rest and continue the Eliquis for stroke prevention.    Preop cardiovascular exam  The patient stable from a cardiac standpoint.  He exhibits no chest pain or anginal symptoms at this time.  He describes no unusual shortness of breath symptoms thus appears to be well compensated from a cardiac standpoint.  He has an average functional capacity with limitations primarily on the basis of his back pain.  His twelve-lead EKG does reveal atrial fibrillation with a nonspecific ST-T segment abnormality.  Based on these factors I believe he may proceed with the back procedure at an acceptable intermediate cardiovascular risk.  If clopidogrel will be stopped 5 to 7 days prior to the procedure and Eliquis 3 days prior to the procedure and then restarted postprocedure when it is felt the bleeding risk is acceptable, likely the following day.    Primary hypertension  Blood pressure currently is well-controlled on current regimen.  Will continue current medications and follow on a regular basis.

## 2024-06-03 NOTE — ASSESSMENT & PLAN NOTE
Heart rate with adequate control on metoprolol succinate 25 mg twice daily.  Will target heart rate of less than 110 at rest and continue the Eliquis for stroke prevention.

## 2024-06-03 NOTE — ASSESSMENT & PLAN NOTE
The patient stable from a cardiac standpoint.  He exhibits no chest pain or anginal symptoms at this time.  He describes no unusual shortness of breath symptoms thus appears to be well compensated from a cardiac standpoint.  He has an average functional capacity with limitations primarily on the basis of his back pain.  His twelve-lead EKG does reveal atrial fibrillation with a nonspecific ST-T segment abnormality.  Based on these factors I believe he may proceed with the back procedure at an acceptable intermediate cardiovascular risk.  If clopidogrel will be stopped 5 to 7 days prior to the procedure and Eliquis 3 days prior to the procedure and then restarted postprocedure when it is felt the bleeding risk is acceptable, likely the following day.

## 2024-07-01 ENCOUNTER — LAB (OUTPATIENT)
Dept: LAB | Facility: LAB | Age: 74
End: 2024-07-01
Payer: MEDICARE

## 2024-07-01 DIAGNOSIS — N18.4 CHRONIC KIDNEY DISEASE, STAGE 4 (SEVERE) (MULTI): Primary | ICD-10-CM

## 2024-07-01 DIAGNOSIS — N18.4 CHRONIC KIDNEY DISEASE, STAGE 4 (SEVERE) (MULTI): ICD-10-CM

## 2024-07-01 DIAGNOSIS — E11.42 DIABETIC POLYNEUROPATHY ASSOCIATED WITH TYPE 2 DIABETES MELLITUS (MULTI): ICD-10-CM

## 2024-07-01 LAB
ALBUMIN SERPL-MCNC: 4 G/DL (ref 3.5–5)
ALP BLD-CCNC: 36 U/L (ref 35–125)
ALT SERPL-CCNC: 9 U/L (ref 5–40)
ANION GAP SERPL CALC-SCNC: 12 MMOL/L
AST SERPL-CCNC: 16 U/L (ref 5–40)
BASOPHILS # BLD AUTO: 0.06 X10*3/UL (ref 0–0.1)
BASOPHILS NFR BLD AUTO: 1.2 %
BILIRUB SERPL-MCNC: 0.3 MG/DL (ref 0.1–1.2)
BUN SERPL-MCNC: 25 MG/DL (ref 8–25)
CALCIUM SERPL-MCNC: 8.8 MG/DL (ref 8.5–10.4)
CHLORIDE SERPL-SCNC: 104 MMOL/L (ref 97–107)
CHOLEST SERPL-MCNC: 171 MG/DL (ref 133–200)
CHOLEST/HDLC SERPL: 4.8 {RATIO}
CO2 SERPL-SCNC: 23 MMOL/L (ref 24–31)
CREAT SERPL-MCNC: 1.9 MG/DL (ref 0.4–1.6)
EGFRCR SERPLBLD CKD-EPI 2021: 37 ML/MIN/1.73M*2
EOSINOPHIL # BLD AUTO: 0.11 X10*3/UL (ref 0–0.4)
EOSINOPHIL NFR BLD AUTO: 2.2 %
ERYTHROCYTE [DISTWIDTH] IN BLOOD BY AUTOMATED COUNT: 14.9 % (ref 11.5–14.5)
EST. AVERAGE GLUCOSE BLD GHB EST-MCNC: 128 MG/DL
GLUCOSE SERPL-MCNC: 108 MG/DL (ref 65–99)
HBA1C MFR BLD: 6.1 %
HCT VFR BLD AUTO: 34.7 % (ref 41–52)
HDLC SERPL-MCNC: 36 MG/DL
HGB BLD-MCNC: 10.7 G/DL (ref 13.5–17.5)
IMM GRANULOCYTES # BLD AUTO: 0.02 X10*3/UL (ref 0–0.5)
IMM GRANULOCYTES NFR BLD AUTO: 0.4 % (ref 0–0.9)
LDLC SERPL CALC-MCNC: 109 MG/DL (ref 65–130)
LYMPHOCYTES # BLD AUTO: 0.96 X10*3/UL (ref 0.8–3)
LYMPHOCYTES NFR BLD AUTO: 18.9 %
MCH RBC QN AUTO: 29.6 PG (ref 26–34)
MCHC RBC AUTO-ENTMCNC: 30.8 G/DL (ref 32–36)
MCV RBC AUTO: 96 FL (ref 80–100)
MONOCYTES # BLD AUTO: 0.41 X10*3/UL (ref 0.05–0.8)
MONOCYTES NFR BLD AUTO: 8.1 %
NEUTROPHILS # BLD AUTO: 3.52 X10*3/UL (ref 1.6–5.5)
NEUTROPHILS NFR BLD AUTO: 69.2 %
NRBC BLD-RTO: 0 /100 WBCS (ref 0–0)
PHOSPHATE SERPL-MCNC: 2.5 MG/DL (ref 2.5–4.5)
PLATELET # BLD AUTO: 178 X10*3/UL (ref 150–450)
POTASSIUM SERPL-SCNC: 5 MMOL/L (ref 3.4–5.1)
PROT SERPL-MCNC: 6.3 G/DL (ref 5.9–7.9)
PTH-INTACT SERPL-MCNC: 58.4 PG/ML (ref 18.5–88)
RBC # BLD AUTO: 3.62 X10*6/UL (ref 4.5–5.9)
SODIUM SERPL-SCNC: 139 MMOL/L (ref 133–145)
TRIGL SERPL-MCNC: 129 MG/DL (ref 40–150)
WBC # BLD AUTO: 5.1 X10*3/UL (ref 4.4–11.3)

## 2024-07-01 PROCEDURE — 80061 LIPID PANEL: CPT

## 2024-07-01 PROCEDURE — 83970 ASSAY OF PARATHORMONE: CPT

## 2024-07-01 PROCEDURE — 80053 COMPREHEN METABOLIC PANEL: CPT

## 2024-07-01 PROCEDURE — 36415 COLL VENOUS BLD VENIPUNCTURE: CPT

## 2024-07-01 PROCEDURE — 84100 ASSAY OF PHOSPHORUS: CPT

## 2024-07-01 PROCEDURE — 83036 HEMOGLOBIN GLYCOSYLATED A1C: CPT

## 2024-07-01 PROCEDURE — 85025 COMPLETE CBC W/AUTO DIFF WBC: CPT

## 2024-07-08 ENCOUNTER — OFFICE VISIT (OUTPATIENT)
Dept: PRIMARY CARE | Facility: CLINIC | Age: 74
End: 2024-07-08
Payer: MEDICARE

## 2024-07-08 VITALS
WEIGHT: 225 LBS | DIASTOLIC BLOOD PRESSURE: 70 MMHG | HEIGHT: 76 IN | SYSTOLIC BLOOD PRESSURE: 130 MMHG | OXYGEN SATURATION: 98 % | RESPIRATION RATE: 16 BRPM | BODY MASS INDEX: 27.4 KG/M2 | HEART RATE: 58 BPM

## 2024-07-08 DIAGNOSIS — L03.116 CELLULITIS OF LEFT LOWER EXTREMITY: ICD-10-CM

## 2024-07-08 DIAGNOSIS — I48.11 LONGSTANDING PERSISTENT ATRIAL FIBRILLATION (MULTI): ICD-10-CM

## 2024-07-08 DIAGNOSIS — L97.522 ULCER OF LEFT FOOT WITH FAT LAYER EXPOSED (MULTI): Primary | ICD-10-CM

## 2024-07-08 DIAGNOSIS — L03.119 CELLULITIS OF FOOT: ICD-10-CM

## 2024-07-08 PROCEDURE — 3078F DIAST BP <80 MM HG: CPT | Performed by: INTERNAL MEDICINE

## 2024-07-08 PROCEDURE — 99214 OFFICE O/P EST MOD 30 MIN: CPT | Performed by: INTERNAL MEDICINE

## 2024-07-08 PROCEDURE — 3049F LDL-C 100-129 MG/DL: CPT | Performed by: INTERNAL MEDICINE

## 2024-07-08 PROCEDURE — 3075F SYST BP GE 130 - 139MM HG: CPT | Performed by: INTERNAL MEDICINE

## 2024-07-08 PROCEDURE — 3044F HG A1C LEVEL LT 7.0%: CPT | Performed by: INTERNAL MEDICINE

## 2024-07-08 PROCEDURE — 1159F MED LIST DOCD IN RCRD: CPT | Performed by: INTERNAL MEDICINE

## 2024-07-08 PROCEDURE — 1125F AMNT PAIN NOTED PAIN PRSNT: CPT | Performed by: INTERNAL MEDICINE

## 2024-07-08 PROCEDURE — 4010F ACE/ARB THERAPY RXD/TAKEN: CPT | Performed by: INTERNAL MEDICINE

## 2024-07-08 PROCEDURE — 1157F ADVNC CARE PLAN IN RCRD: CPT | Performed by: INTERNAL MEDICINE

## 2024-07-08 RX ORDER — MUPIROCIN 20 MG/G
OINTMENT TOPICAL 3 TIMES DAILY
Qty: 22 G | Refills: 3 | Status: SHIPPED | OUTPATIENT
Start: 2024-07-08 | End: 2024-07-18 | Stop reason: HOSPADM

## 2024-07-08 RX ORDER — FENOFIBRATE 160 MG/1
160 TABLET ORAL DAILY
COMMUNITY
Start: 2024-06-30 | End: 2024-07-11 | Stop reason: WASHOUT

## 2024-07-08 RX ORDER — DOXYCYCLINE 100 MG/1
100 CAPSULE ORAL 2 TIMES DAILY
Qty: 14 CAPSULE | Refills: 0 | Status: SHIPPED | OUTPATIENT
Start: 2024-07-08 | End: 2024-07-18 | Stop reason: HOSPADM

## 2024-07-08 RX ORDER — CEPHALEXIN 500 MG/1
500 CAPSULE ORAL 4 TIMES DAILY
Qty: 56 CAPSULE | Refills: 0 | Status: SHIPPED | OUTPATIENT
Start: 2024-07-08 | End: 2024-07-18 | Stop reason: HOSPADM

## 2024-07-08 ASSESSMENT — ENCOUNTER SYMPTOMS
SHORTNESS OF BREATH: 0
DEPRESSION: 0
COUGH: 0
DIZZINESS: 0
OCCASIONAL FEELINGS OF UNSTEADINESS: 0
CONSTIPATION: 0
ARTHRALGIAS: 0
LOSS OF SENSATION IN FEET: 0
NAUSEA: 0
ABDOMINAL PAIN: 0
DIARRHEA: 0
PALPITATIONS: 0

## 2024-07-08 ASSESSMENT — PATIENT HEALTH QUESTIONNAIRE - PHQ9
2. FEELING DOWN, DEPRESSED OR HOPELESS: NOT AT ALL
1. LITTLE INTEREST OR PLEASURE IN DOING THINGS: NOT AT ALL
SUM OF ALL RESPONSES TO PHQ9 QUESTIONS 1 AND 2: 0

## 2024-07-08 ASSESSMENT — PAIN SCALES - GENERAL: PAINLEVEL: 6

## 2024-07-08 NOTE — PROGRESS NOTES
"Subjective   Patient ID: Rachid Yun is a 73 y.o. male who presents for open sore left foot.    Patient presents today with redness and swelling in his legs/feet DIANE with open sores and macerated skin on his toes. Reports his blood glucose levels have been consistently good, usually around 119 in the morning. He also reports of excoriations on his forearms that appeared 2 weeks ago, which he has bandaged.    Diagnostics Reviewed:  Labs Reviewed: 7/1/2024 A1c 6.1, .           Review of Systems   Respiratory:  Negative for cough and shortness of breath.    Cardiovascular:  Positive for leg swelling. Negative for chest pain and palpitations.   Gastrointestinal:  Negative for abdominal pain, constipation, diarrhea and nausea.   Musculoskeletal:  Negative for arthralgias.   Neurological:  Negative for dizziness.       Objective   /70   Pulse 58   Resp 16   Ht 1.93 m (6' 4\")   Wt 102 kg (225 lb)   SpO2 98%   BMI 27.39 kg/m²     Physical Exam  Cardiovascular:      Rate and Rhythm: Normal rate. Rhythm irregular.      Pulses:           Posterior tibial pulses are 2+ on the right side and 2+ on the left side.      Heart sounds: Normal heart sounds.   Pulmonary:      Breath sounds: Normal breath sounds.   Abdominal:      Palpations: Abdomen is soft. There is no hepatomegaly.      Tenderness: There is no abdominal tenderness.      Comments: Scar tissue from insulin injections - lower Rght side of abdomin   Musculoskeletal:      Right lower leg: No edema.      Left lower leg: No edema.   Neurological:      Mental Status: He is alert and oriented to person, place, and time.      Gait: Gait normal.   Psychiatric:         Mood and Affect: Mood normal.         Behavior: Behavior normal.         Assessment/Plan   Diagnoses and all orders for this visit:  Ulcer of left foot with fat layer exposed (Multi)  -     Referral to Podiatry; Future  -     mupirocin (Bactroban) 2 % ointment; Apply topically 3 times a day " for 10 days. apply to affected area  -     cephalexin (Keflex) 500 mg capsule; Take 1 capsule (500 mg) by mouth 4 times a day for 14 days.  -     doxycycline (Vibramycin) 100 mg capsule; Take 1 capsule (100 mg) by mouth 2 times a day for 14 days. Take with at least 8 ounces (large glass) of water, do not lie down for 30 minutes after  Cellulitis of foot  -     mupirocin (Bactroban) 2 % ointment; Apply topically 3 times a day for 10 days. apply to affected area  -     cephalexin (Keflex) 500 mg capsule; Take 1 capsule (500 mg) by mouth 4 times a day for 14 days.  -     doxycycline (Vibramycin) 100 mg capsule; Take 1 capsule (100 mg) by mouth 2 times a day for 14 days. Take with at least 8 ounces (large glass) of water, do not lie down for 30 minutes after  Longstanding persistent atrial fibrillation (Multi)  Cellulitis of left lower extremity       Scribe Attestation  By signing my name below, I, Jose R Momin   attest that this documentation has been prepared under the direction and in the presence of Izabel Marshall MD.

## 2024-07-11 ENCOUNTER — OFFICE VISIT (OUTPATIENT)
Dept: VASCULAR SURGERY | Facility: CLINIC | Age: 74
End: 2024-07-11
Payer: MEDICARE

## 2024-07-11 ENCOUNTER — ANCILLARY PROCEDURE (OUTPATIENT)
Dept: VASCULAR MEDICINE | Facility: CLINIC | Age: 74
End: 2024-07-11
Payer: MEDICARE

## 2024-07-11 VITALS
HEIGHT: 76 IN | DIASTOLIC BLOOD PRESSURE: 81 MMHG | BODY MASS INDEX: 27.4 KG/M2 | SYSTOLIC BLOOD PRESSURE: 145 MMHG | HEART RATE: 109 BPM | WEIGHT: 225 LBS | RESPIRATION RATE: 18 BRPM

## 2024-07-11 DIAGNOSIS — R09.89 WEAK PULSE: ICD-10-CM

## 2024-07-11 DIAGNOSIS — L97.522 FOOT ULCER WITH FAT LAYER EXPOSED, LEFT (MULTI): ICD-10-CM

## 2024-07-11 DIAGNOSIS — L03.119 CELLULITIS OF FOOT EXCLUDING TOE: ICD-10-CM

## 2024-07-11 DIAGNOSIS — R09.89 WEAK PULSE: Primary | ICD-10-CM

## 2024-07-11 DIAGNOSIS — I73.9 PERIPHERAL VASCULAR DISEASE, UNSPECIFIED (CMS-HCC): ICD-10-CM

## 2024-07-11 PROCEDURE — 1157F ADVNC CARE PLAN IN RCRD: CPT | Performed by: NURSE PRACTITIONER

## 2024-07-11 PROCEDURE — 1160F RVW MEDS BY RX/DR IN RCRD: CPT | Performed by: NURSE PRACTITIONER

## 2024-07-11 PROCEDURE — 1125F AMNT PAIN NOTED PAIN PRSNT: CPT | Performed by: NURSE PRACTITIONER

## 2024-07-11 PROCEDURE — 99214 OFFICE O/P EST MOD 30 MIN: CPT | Performed by: NURSE PRACTITIONER

## 2024-07-11 PROCEDURE — 4010F ACE/ARB THERAPY RXD/TAKEN: CPT | Performed by: NURSE PRACTITIONER

## 2024-07-11 PROCEDURE — 93923 UPR/LXTR ART STDY 3+ LVLS: CPT

## 2024-07-11 PROCEDURE — 1036F TOBACCO NON-USER: CPT | Performed by: NURSE PRACTITIONER

## 2024-07-11 PROCEDURE — 3044F HG A1C LEVEL LT 7.0%: CPT | Performed by: NURSE PRACTITIONER

## 2024-07-11 PROCEDURE — 1159F MED LIST DOCD IN RCRD: CPT | Performed by: NURSE PRACTITIONER

## 2024-07-11 PROCEDURE — 93923 UPR/LXTR ART STDY 3+ LVLS: CPT | Performed by: INTERNAL MEDICINE

## 2024-07-11 PROCEDURE — 99204 OFFICE O/P NEW MOD 45 MIN: CPT | Performed by: NURSE PRACTITIONER

## 2024-07-11 PROCEDURE — 3049F LDL-C 100-129 MG/DL: CPT | Performed by: NURSE PRACTITIONER

## 2024-07-11 PROCEDURE — 3077F SYST BP >= 140 MM HG: CPT | Performed by: NURSE PRACTITIONER

## 2024-07-11 PROCEDURE — 3079F DIAST BP 80-89 MM HG: CPT | Performed by: NURSE PRACTITIONER

## 2024-07-11 ASSESSMENT — PAIN SCALES - GENERAL: PAINLEVEL: 5

## 2024-07-11 ASSESSMENT — LIFESTYLE VARIABLES
HOW MANY STANDARD DRINKS CONTAINING ALCOHOL DO YOU HAVE ON A TYPICAL DAY: PATIENT DOES NOT DRINK
AUDIT-C TOTAL SCORE: 0
SKIP TO QUESTIONS 9-10: 1
HOW OFTEN DO YOU HAVE A DRINK CONTAINING ALCOHOL: NEVER
HOW OFTEN DO YOU HAVE SIX OR MORE DRINKS ON ONE OCCASION: NEVER

## 2024-07-11 ASSESSMENT — PATIENT HEALTH QUESTIONNAIRE - PHQ9
2. FEELING DOWN, DEPRESSED OR HOPELESS: NOT AT ALL
SUM OF ALL RESPONSES TO PHQ9 QUESTIONS 1 AND 2: 0
1. LITTLE INTEREST OR PLEASURE IN DOING THINGS: NOT AT ALL

## 2024-07-11 ASSESSMENT — ENCOUNTER SYMPTOMS
LOSS OF SENSATION IN FEET: 1
OCCASIONAL FEELINGS OF UNSTEADINESS: 1
DEPRESSION: 0

## 2024-07-11 NOTE — PROGRESS NOTES
History Of Present Illness  Rachid Yun is a 73 y.o. male presenting with a left lateral foot ulcer.  Patient states it began about 2 weeks ago as a blister.  He saw podiatrist recently, Dr. Christiano Khalil who requested vascular evaluation.  I was able to squeeze the patient into my schedule on the next day after his request.  The patient has no history of vascular disease.  Denies any symptoms of claudication or ischemic rest pain.  He does have pain related to the ulcer but prior to this, he had not no pain in his foot at night whatsoever.  He was initiated on Keflex yesterday and is taking faithfully.     Past Medical History  He has a past medical history of Arthritis, Coronary artery disease, Diabetes mellitus (Multi), Hypertension, and Stroke (Multi).    Surgical History  He has a past surgical history that includes Back surgery; Cardiac electrophysiology procedure (N/A, 11/29/2023); Cardiac catheterization (N/A, 12/28/2023); and Cardiac catheterization (N/A, 12/28/2023).     Social History  He reports that he quit smoking about 36 years ago. His smoking use included cigarettes. He has been exposed to tobacco smoke. He has never used smokeless tobacco. He reports that he does not currently use alcohol. He reports that he does not use drugs.    Family History  Family History   Problem Relation Name Age of Onset    Stomach cancer Mother      Diabetes Mother      Coronary artery disease Father      Other (Pacemaker) Father      Other (S/p CABG) Sister      Other (S/p CABG) Brother      Ovarian cancer Sibling      Coronary artery disease Sibling          Allergies  Meperidine, Vancomycin, Hydromorphone, Other, and Rifampin    Review of Systems    CONSTITUTIONAL: Denies weight loss, fever and chills.    HEENT: Denies changes in vision and hearing.    RESPIRATORY: Denies SOB and cough.    CV: Denies palpitations and CP.    GI: Denies abdominal pain, nausea, vomiting and diarrhea.    : Denies dysuria and urinary  frequency.    MSK: Denies myalgia and joint pain.    SKIN: Denies rash and pruritus.  Positive for cellulitis left lateral foot    VASC: Denies claudication, ischemic rest pain.  Positive for diabetic foot ulceration left lateral foot.    NEUROLOGICAL: Denies headache and syncope.    PSYCHIATRIC: Denies recent changes in mood. Denies anxiety and depression.     Physical Exam    General: Pt is alert and oriented x 3. Pleasant, conversive  HEENT: Head is atraumatic, normocephalic.   Cardiac: Normal S1-S2.  Regular rate and rhythm.  No murmurs.  Respiratory: Lungs clear to auscultation.  No adventitious sounds.  Abdomen: Soft, nondistended, nontender.  Bowel sounds x4 quadrants.  Pulse exam: Palpable brachial and radial pulses bilaterally.  I am unable to palpate left dorsalis pedis pulse.  The patient has multiphasic dorsalis pedis and posterior tibial signals.  Patient has a palpable right pedal pulse.  Extremities: No significant edema noted.  Extremities are warm to the touch and normal in color.  Left lateral foot with a bony protrusion with an ulceration overlying.  The ulceration is down to the level of the subcutaneous fat with some necrotic tissue present.  There is surrounding tissue erythema but no induration.  No extension of the erythema into the lower leg.  Neuro: Moves all extremities spontaneously.  No focal deficits.  Psych: Appropriate affect.  Answers questions appropriately.     Last Recorded Vitals  /81   Pulse 109   Resp 18   Wt 102 kg (225 lb)     Relevant Results  Current Outpatient Medications   Medication Instructions    acetaminophen (TYLENOL) 650 mg, oral, Every 6 hours PRN    amLODIPine (NORVASC) 5 mg, oral, Daily    atorvastatin (LIPITOR) 20 mg, oral, Daily    bisacodyl (DULCOLAX) 10 mg, rectal, Daily PRN    blood sugar diagnostic strip 1 strip, miscellaneous, 3 times daily before meals    cephalexin (KEFLEX) 500 mg, oral, 4 times daily    clopidogrel (PLAVIX) 75 mg, oral, Daily     donepezil (ARICEPT) 10 mg, oral, Nightly    doxycycline (VIBRAMYCIN) 100 mg, oral, 2 times daily, Take with at least 8 ounces (large glass) of water, do not lie down for 30 minutes after    DULoxetine (CYMBALTA) 60 mg, oral, Daily    Eliquis 2.5 mg, oral, 2 times daily    ferrous sulfate 325 (65 Fe) MG EC tablet 1 tablet, oral, 2 times daily, for 90 day(s)    folic acid (Folvite) 400 mcg tablet 1 tablet, oral, Daily, for 90 day(s)    gabapentin (NEURONTIN) 600 mg, oral, Daily    ketoconazole (NIZOral) 2 % cream Topical, Daily    ketoconazole 1 % shampoo 1 Application, topical (top), Every 3 days, Shampoo daily, leave on for 5-10 minutes, then rinse.    lactulose 20 gram/30 mL oral solution TAKE 15 ML BY MOUTH ONCE DAILY    Lantus U-100 Insulin 100 unit/mL injection 40 UNITS SUBCUTANEOUS DAILY 90 DAY(S)    levETIRAcetam (Keppra) 500 mg tablet 1 tablet, oral, Every 12 hours, for 90 day(s)    losartan (COZAAR) 100 mg, oral, Daily    magnesium hydroxide (Milk of Magnesia) 400 mg/5 mL suspension 30 mL, oral, Daily PRN    metoprolol succinate XL (TOPROL-XL) 25 mg, oral, 2 times daily, Do not crush or chew.    mupirocin (Bactroban) 2 % ointment Topical, 3 times daily, apply to affected area    omeprazole (PRILOSEC) 20 mg, oral, Daily before breakfast    oxybutynin (DITROPAN) 5 mg, oral, 2 times daily    oxyCODONE (ROXICODONE) 15 mg, oral, Every 4 hours PRN    polyethylene glycol (GLYCOLAX, MIRALAX) 17 g, oral, Daily    sennosides (Senokot) 8.6 mg tablet 2 tablets, oral, Nightly    tamsulosin (Flomax) 0.4 mg 24 hr capsule     traZODone (DESYREL) 150 mg, oral, Nightly, FOR 90 DAYS         Vascular US PVR without exercise    Result Date: 7/11/2024  Preliminary Cardiology Report           [place] [id number]            Phone [phone]  Preliminary Vascular Lab Report   VASC US PVR WITHOUT EXERCISE  Patient Name:     LORI Valdez Physician:  [id number] Deisi Ortega MD,                    SUELLEN                           [facility] Study Date:       7/11/2024     Ordering Provider:  [id number] PEYTON GONZALEZ MRN/PID:          [id number]      Fellow: Accession#:       [id number]  Technologist:       Sherri Gibson RVT YOB: 1950     Technologist 2: Gender:           M             Encounter#:         [id number] Admission Status: Outpatient    Location Performed: [facility]  Diagnosis/ICD: Peripheral vascular disease, unspecified-I73.9 Indication:    Ulceration CPT Codes:     51367 Peripheral artery PVR (multi segmental pressure  Pertinent History: Diabetic wound left foot.  PRELIMINARY CONCLUSIONS:  Right Lower PVR: No evidence of arterial occlusive disease in the right lower extremity at rest. Right pressures of >220 mmHg suggest no compressibility of vessels and may make absolute Segmental Limb Pressures (SLP) unreliable. Multiphasic flow is noted in the right common femoral artery, right popliteal artery, right posterior tibial artery and right dorsalis pedis artery. Results called by PVR and Doppler tracings due to non-compressible vessels. Third digit used for PPG tracing due to first and second toe amputations; unable to obtain TBI using third digit due to size of digit. Left Lower PVR: No evidence of arterial occlusive disease in the left lower extremity at rest. Left pressures of >220 mmHg suggest no compressibility of vessels and may make absolute Segmental Limb Pressures (SLP) unreliable. Multiphasic flow is noted in the left common femoral artery, left popliteal artery, left posterior tibial artery and left dorsalis pedis artery. Results called by PVR and Doppler tracings due to non-compressible vessels. Moderately dampened digital tracing; TBI is likely partially non-compressible.  Imaging & Doppler Findings:  RIGHT Lower PVR                Pressures Ratios Right High Thigh               255 mmHg  2.04 Right Low Thigh                160 mmHg  1.28 Right Calf                      255 mmHg  2.04 Right Posterior Tibial (Ankle) 255 mmHg  2.04 Right Dorsalis Pedis (Ankle)   255 mmHg  2.04   LEFT Lower PVR                Pressures Ratios Left High Thigh               255 mmHg  2.04 Left Low Thigh                165 mmHg  1.32 Left Calf                     255 mmHg  2.04 Left Posterior Tibial (Ankle) 255 mmHg  2.04 Left Dorsalis Pedis (Ankle)   157 mmHg  1.26 Left Digit (Great Toe)        100 mmHg  0.80                      Right     Left Brachial Pressure 125 mmHg 123 mmHg   VASCULAR PRELIMINARY REPORT completed by Sherri Gibson RVT on 7/11/2024 at 12:41:40 PM  ** Final **            Assessment/Plan   Weak pulse  Diabetic foot ulceration  Cellulitis    Patient was an urgent referral from Dr. Khalil for concern about possible arterial compromise.  Clinically, the patient has a multiphasic dorsalis pedis and posterior tibial signal.  I ordered a PVR while the patient was in the office for evaluation as I was unable to palpate a left pedal pulse.  PVR showed falsely elevated ADDIE/TBI.  No evidence of clinically significant arterial disease.  I recommend continuing antibiotics as well as following up with podiatrist for further wound care.  I secure message to Dr. Khalil regarding these findings.       Christiano Harry, APRN-CNP

## 2024-07-16 ENCOUNTER — APPOINTMENT (OUTPATIENT)
Dept: RADIOLOGY | Facility: HOSPITAL | Age: 74
End: 2024-07-16
Payer: MEDICARE

## 2024-07-16 ENCOUNTER — HOSPITAL ENCOUNTER (INPATIENT)
Facility: HOSPITAL | Age: 74
LOS: 2 days | Discharge: HOME HEALTH | End: 2024-07-18
Attending: STUDENT IN AN ORGANIZED HEALTH CARE EDUCATION/TRAINING PROGRAM | Admitting: INTERNAL MEDICINE
Payer: MEDICARE

## 2024-07-16 DIAGNOSIS — L97.309 DIABETIC ULCER OF ANKLE (MULTI): ICD-10-CM

## 2024-07-16 DIAGNOSIS — E11.628 DIABETIC FOOT INFECTION (MULTI): Primary | ICD-10-CM

## 2024-07-16 DIAGNOSIS — E11.622 DIABETIC ULCER OF ANKLE (MULTI): ICD-10-CM

## 2024-07-16 DIAGNOSIS — Z78.9 FAILURE OF OUTPATIENT TREATMENT: ICD-10-CM

## 2024-07-16 DIAGNOSIS — L08.9 DIABETIC FOOT INFECTION (MULTI): Primary | ICD-10-CM

## 2024-07-16 LAB
ALBUMIN SERPL-MCNC: 4.1 G/DL (ref 3.5–5)
ALP BLD-CCNC: 46 U/L (ref 35–125)
ALT SERPL-CCNC: 11 U/L (ref 5–40)
ANION GAP SERPL CALC-SCNC: 12 MMOL/L
AST SERPL-CCNC: 23 U/L (ref 5–40)
BASOPHILS # BLD AUTO: 0.09 X10*3/UL (ref 0–0.1)
BASOPHILS NFR BLD AUTO: 1 %
BILIRUB SERPL-MCNC: 0.5 MG/DL (ref 0.1–1.2)
BUN SERPL-MCNC: 32 MG/DL (ref 8–25)
CALCIUM SERPL-MCNC: 9.6 MG/DL (ref 8.5–10.4)
CHLORIDE SERPL-SCNC: 101 MMOL/L (ref 97–107)
CK SERPL-CCNC: 157 U/L (ref 24–195)
CO2 SERPL-SCNC: 23 MMOL/L (ref 24–31)
CREAT SERPL-MCNC: 2.3 MG/DL (ref 0.4–1.6)
CRP SERPL-MCNC: <0.3 MG/DL (ref 0–2)
EGFRCR SERPLBLD CKD-EPI 2021: 29 ML/MIN/1.73M*2
EOSINOPHIL # BLD AUTO: 0.13 X10*3/UL (ref 0–0.4)
EOSINOPHIL NFR BLD AUTO: 1.5 %
ERYTHROCYTE [DISTWIDTH] IN BLOOD BY AUTOMATED COUNT: 13.5 % (ref 11.5–14.5)
ERYTHROCYTE [SEDIMENTATION RATE] IN BLOOD BY WESTERGREN METHOD: 48 MM/H (ref 0–20)
GLUCOSE BLD MANUAL STRIP-MCNC: 130 MG/DL (ref 74–99)
GLUCOSE BLD MANUAL STRIP-MCNC: 140 MG/DL (ref 74–99)
GLUCOSE BLD MANUAL STRIP-MCNC: 141 MG/DL (ref 74–99)
GLUCOSE SERPL-MCNC: 133 MG/DL (ref 65–99)
HCT VFR BLD AUTO: 40.2 % (ref 41–52)
HGB BLD-MCNC: 13 G/DL (ref 13.5–17.5)
IMM GRANULOCYTES # BLD AUTO: 0.04 X10*3/UL (ref 0–0.5)
IMM GRANULOCYTES NFR BLD AUTO: 0.4 % (ref 0–0.9)
LYMPHOCYTES # BLD AUTO: 1.47 X10*3/UL (ref 0.8–3)
LYMPHOCYTES NFR BLD AUTO: 16.5 %
MCH RBC QN AUTO: 29.1 PG (ref 26–34)
MCHC RBC AUTO-ENTMCNC: 32.3 G/DL (ref 32–36)
MCV RBC AUTO: 90 FL (ref 80–100)
MONOCYTES # BLD AUTO: 0.62 X10*3/UL (ref 0.05–0.8)
MONOCYTES NFR BLD AUTO: 7 %
NEUTROPHILS # BLD AUTO: 6.55 X10*3/UL (ref 1.6–5.5)
NEUTROPHILS NFR BLD AUTO: 73.6 %
NRBC BLD-RTO: 0 /100 WBCS (ref 0–0)
PLATELET # BLD AUTO: 283 X10*3/UL (ref 150–450)
POTASSIUM SERPL-SCNC: 4.3 MMOL/L (ref 3.4–5.1)
PROT SERPL-MCNC: 7.6 G/DL (ref 5.9–7.9)
RBC # BLD AUTO: 4.47 X10*6/UL (ref 4.5–5.9)
SODIUM SERPL-SCNC: 136 MMOL/L (ref 133–145)
WBC # BLD AUTO: 8.9 X10*3/UL (ref 4.4–11.3)

## 2024-07-16 PROCEDURE — 80053 COMPREHEN METABOLIC PANEL: CPT

## 2024-07-16 PROCEDURE — 36415 COLL VENOUS BLD VENIPUNCTURE: CPT

## 2024-07-16 PROCEDURE — 81001 URINALYSIS AUTO W/SCOPE: CPT | Performed by: INTERNAL MEDICINE

## 2024-07-16 PROCEDURE — 82550 ASSAY OF CK (CPK): CPT | Performed by: INTERNAL MEDICINE

## 2024-07-16 PROCEDURE — 73718 MRI LOWER EXTREMITY W/O DYE: CPT | Mod: LT

## 2024-07-16 PROCEDURE — 85652 RBC SED RATE AUTOMATED: CPT

## 2024-07-16 PROCEDURE — 2500000004 HC RX 250 GENERAL PHARMACY W/ HCPCS (ALT 636 FOR OP/ED)

## 2024-07-16 PROCEDURE — 2500000001 HC RX 250 WO HCPCS SELF ADMINISTERED DRUGS (ALT 637 FOR MEDICARE OP): Performed by: INTERNAL MEDICINE

## 2024-07-16 PROCEDURE — 99285 EMERGENCY DEPT VISIT HI MDM: CPT

## 2024-07-16 PROCEDURE — 73630 X-RAY EXAM OF FOOT: CPT | Mod: LT

## 2024-07-16 PROCEDURE — 73630 X-RAY EXAM OF FOOT: CPT | Mod: LEFT SIDE | Performed by: RADIOLOGY

## 2024-07-16 PROCEDURE — 2500000004 HC RX 250 GENERAL PHARMACY W/ HCPCS (ALT 636 FOR OP/ED): Performed by: INTERNAL MEDICINE

## 2024-07-16 PROCEDURE — 2500000002 HC RX 250 W HCPCS SELF ADMINISTERED DRUGS (ALT 637 FOR MEDICARE OP, ALT 636 FOR OP/ED): Performed by: INTERNAL MEDICINE

## 2024-07-16 PROCEDURE — 1210000001 HC SEMI-PRIVATE ROOM DAILY

## 2024-07-16 PROCEDURE — 73718 MRI LOWER EXTREMITY W/O DYE: CPT | Mod: LEFT SIDE | Performed by: STUDENT IN AN ORGANIZED HEALTH CARE EDUCATION/TRAINING PROGRAM

## 2024-07-16 PROCEDURE — 87086 URINE CULTURE/COLONY COUNT: CPT | Mod: WESLAB | Performed by: INTERNAL MEDICINE

## 2024-07-16 PROCEDURE — 87040 BLOOD CULTURE FOR BACTERIA: CPT | Mod: WESLAB

## 2024-07-16 PROCEDURE — 82947 ASSAY GLUCOSE BLOOD QUANT: CPT

## 2024-07-16 PROCEDURE — 86140 C-REACTIVE PROTEIN: CPT

## 2024-07-16 PROCEDURE — 85025 COMPLETE CBC W/AUTO DIFF WBC: CPT

## 2024-07-16 RX ORDER — FOLIC ACID 0.4 MG
0.4 TABLET ORAL DAILY
Status: DISCONTINUED | OUTPATIENT
Start: 2024-07-17 | End: 2024-07-18 | Stop reason: HOSPADM

## 2024-07-16 RX ORDER — TAMSULOSIN HYDROCHLORIDE 0.4 MG/1
0.4 CAPSULE ORAL NIGHTLY
Status: DISCONTINUED | OUTPATIENT
Start: 2024-07-16 | End: 2024-07-18 | Stop reason: HOSPADM

## 2024-07-16 RX ORDER — HALOPERIDOL 5 MG/ML
2 INJECTION INTRAMUSCULAR EVERY 4 HOURS PRN
Status: DISCONTINUED | OUTPATIENT
Start: 2024-07-16 | End: 2024-07-18 | Stop reason: HOSPADM

## 2024-07-16 RX ORDER — PANTOPRAZOLE SODIUM 20 MG/1
20 TABLET, DELAYED RELEASE ORAL
Status: DISCONTINUED | OUTPATIENT
Start: 2024-07-17 | End: 2024-07-18 | Stop reason: HOSPADM

## 2024-07-16 RX ORDER — DONEPEZIL HYDROCHLORIDE 10 MG/1
10 TABLET, FILM COATED ORAL NIGHTLY
Status: DISCONTINUED | OUTPATIENT
Start: 2024-07-16 | End: 2024-07-18 | Stop reason: HOSPADM

## 2024-07-16 RX ORDER — HEPARIN SODIUM 5000 [USP'U]/ML
5000 INJECTION, SOLUTION INTRAVENOUS; SUBCUTANEOUS EVERY 8 HOURS SCHEDULED
Status: DISCONTINUED | OUTPATIENT
Start: 2024-07-16 | End: 2024-07-18 | Stop reason: HOSPADM

## 2024-07-16 RX ORDER — DEXTROSE 50 % IN WATER (D50W) INTRAVENOUS SYRINGE
12.5
Status: DISCONTINUED | OUTPATIENT
Start: 2024-07-16 | End: 2024-07-18 | Stop reason: HOSPADM

## 2024-07-16 RX ORDER — FERROUS SULFATE 325(65) MG
65 TABLET ORAL DAILY
Status: DISCONTINUED | OUTPATIENT
Start: 2024-07-17 | End: 2024-07-18 | Stop reason: HOSPADM

## 2024-07-16 RX ORDER — AMLODIPINE BESYLATE 5 MG/1
5 TABLET ORAL DAILY
Status: DISCONTINUED | OUTPATIENT
Start: 2024-07-17 | End: 2024-07-18 | Stop reason: HOSPADM

## 2024-07-16 RX ORDER — ONDANSETRON 4 MG/1
4 TABLET, ORALLY DISINTEGRATING ORAL EVERY 8 HOURS PRN
Status: DISCONTINUED | OUTPATIENT
Start: 2024-07-16 | End: 2024-07-18 | Stop reason: HOSPADM

## 2024-07-16 RX ORDER — GABAPENTIN 600 MG/1
600 TABLET ORAL 2 TIMES DAILY
Status: DISCONTINUED | OUTPATIENT
Start: 2024-07-16 | End: 2024-07-17

## 2024-07-16 RX ORDER — ATORVASTATIN CALCIUM 20 MG/1
20 TABLET, FILM COATED ORAL DAILY
Status: DISCONTINUED | OUTPATIENT
Start: 2024-07-17 | End: 2024-07-18 | Stop reason: HOSPADM

## 2024-07-16 RX ORDER — INSULIN GLARGINE 100 [IU]/ML
20 INJECTION, SOLUTION SUBCUTANEOUS EVERY 24 HOURS
Status: DISCONTINUED | OUTPATIENT
Start: 2024-07-16 | End: 2024-07-18 | Stop reason: HOSPADM

## 2024-07-16 RX ORDER — DULOXETIN HYDROCHLORIDE 60 MG/1
60 CAPSULE, DELAYED RELEASE ORAL DAILY
Status: DISCONTINUED | OUTPATIENT
Start: 2024-07-17 | End: 2024-07-18 | Stop reason: HOSPADM

## 2024-07-16 RX ORDER — DEXTROSE 50 % IN WATER (D50W) INTRAVENOUS SYRINGE
25
Status: DISCONTINUED | OUTPATIENT
Start: 2024-07-16 | End: 2024-07-18 | Stop reason: HOSPADM

## 2024-07-16 RX ORDER — GLUCOSAM/CHONDRO/HERB 149/HYAL 750-100 MG
1 TABLET ORAL DAILY
COMMUNITY

## 2024-07-16 RX ORDER — METOPROLOL SUCCINATE 25 MG/1
25 TABLET, EXTENDED RELEASE ORAL 2 TIMES DAILY
Status: DISCONTINUED | OUTPATIENT
Start: 2024-07-16 | End: 2024-07-18 | Stop reason: HOSPADM

## 2024-07-16 RX ORDER — INSULIN LISPRO 100 [IU]/ML
0-10 INJECTION, SOLUTION INTRAVENOUS; SUBCUTANEOUS
Status: DISCONTINUED | OUTPATIENT
Start: 2024-07-16 | End: 2024-07-18 | Stop reason: HOSPADM

## 2024-07-16 RX ORDER — OXYBUTYNIN CHLORIDE 5 MG/1
5 TABLET ORAL 2 TIMES DAILY
Status: DISCONTINUED | OUTPATIENT
Start: 2024-07-16 | End: 2024-07-18 | Stop reason: HOSPADM

## 2024-07-16 RX ORDER — LEVETIRACETAM 500 MG/1
500 TABLET ORAL EVERY 12 HOURS
Status: DISCONTINUED | OUTPATIENT
Start: 2024-07-16 | End: 2024-07-18 | Stop reason: HOSPADM

## 2024-07-16 RX ORDER — LINEZOLID 2 MG/ML
600 INJECTION, SOLUTION INTRAVENOUS ONCE
Status: COMPLETED | OUTPATIENT
Start: 2024-07-16 | End: 2024-07-16

## 2024-07-16 RX ORDER — OXYCODONE HYDROCHLORIDE 5 MG/1
15 TABLET ORAL EVERY 4 HOURS PRN
Status: DISCONTINUED | OUTPATIENT
Start: 2024-07-16 | End: 2024-07-18 | Stop reason: HOSPADM

## 2024-07-16 RX ORDER — POLYETHYLENE GLYCOL 3350 17 G/17G
17 POWDER, FOR SOLUTION ORAL DAILY
Status: DISCONTINUED | OUTPATIENT
Start: 2024-07-17 | End: 2024-07-18 | Stop reason: HOSPADM

## 2024-07-16 RX ORDER — CLOPIDOGREL BISULFATE 75 MG/1
75 TABLET ORAL DAILY
Status: DISCONTINUED | OUTPATIENT
Start: 2024-07-17 | End: 2024-07-18 | Stop reason: HOSPADM

## 2024-07-16 RX ORDER — ACETAMINOPHEN 325 MG/1
650 TABLET ORAL EVERY 4 HOURS PRN
Status: DISCONTINUED | OUTPATIENT
Start: 2024-07-16 | End: 2024-07-18 | Stop reason: HOSPADM

## 2024-07-16 RX ORDER — ACETAMINOPHEN 650 MG/1
650 SUPPOSITORY RECTAL EVERY 4 HOURS PRN
Status: DISCONTINUED | OUTPATIENT
Start: 2024-07-16 | End: 2024-07-18 | Stop reason: HOSPADM

## 2024-07-16 RX ORDER — ONDANSETRON HYDROCHLORIDE 2 MG/ML
4 INJECTION, SOLUTION INTRAVENOUS EVERY 8 HOURS PRN
Status: DISCONTINUED | OUTPATIENT
Start: 2024-07-16 | End: 2024-07-18 | Stop reason: HOSPADM

## 2024-07-16 RX ORDER — ACETAMINOPHEN 160 MG/5ML
650 SOLUTION ORAL EVERY 4 HOURS PRN
Status: DISCONTINUED | OUTPATIENT
Start: 2024-07-16 | End: 2024-07-18 | Stop reason: HOSPADM

## 2024-07-16 SDOH — SOCIAL STABILITY: SOCIAL INSECURITY: HAVE YOU HAD THOUGHTS OF HARMING ANYONE ELSE?: NO

## 2024-07-16 SDOH — SOCIAL STABILITY: SOCIAL INSECURITY: WERE YOU ABLE TO COMPLETE ALL THE BEHAVIORAL HEALTH SCREENINGS?: YES

## 2024-07-16 SDOH — ECONOMIC STABILITY: HOUSING INSECURITY: AT ANY TIME IN THE PAST 12 MONTHS, WERE YOU HOMELESS OR LIVING IN A SHELTER (INCLUDING NOW)?: NO

## 2024-07-16 SDOH — SOCIAL STABILITY: SOCIAL INSECURITY: DOES ANYONE TRY TO KEEP YOU FROM HAVING/CONTACTING OTHER FRIENDS OR DOING THINGS OUTSIDE YOUR HOME?: NO

## 2024-07-16 SDOH — ECONOMIC STABILITY: HOUSING INSECURITY: IN THE PAST 12 MONTHS, HOW MANY TIMES HAVE YOU MOVED WHERE YOU WERE LIVING?: 1

## 2024-07-16 SDOH — SOCIAL STABILITY: SOCIAL INSECURITY: HAS ANYONE EVER THREATENED TO HURT YOUR FAMILY OR YOUR PETS?: NO

## 2024-07-16 SDOH — SOCIAL STABILITY: SOCIAL INSECURITY: DO YOU FEEL ANYONE HAS EXPLOITED OR TAKEN ADVANTAGE OF YOU FINANCIALLY OR OF YOUR PERSONAL PROPERTY?: NO

## 2024-07-16 SDOH — ECONOMIC STABILITY: FOOD INSECURITY: WITHIN THE PAST 12 MONTHS, THE FOOD YOU BOUGHT JUST DIDN'T LAST AND YOU DIDN'T HAVE MONEY TO GET MORE.: SOMETIMES TRUE

## 2024-07-16 SDOH — ECONOMIC STABILITY: TRANSPORTATION INSECURITY
IN THE PAST 12 MONTHS, HAS LACK OF TRANSPORTATION KEPT YOU FROM MEETINGS, WORK, OR FROM GETTING THINGS NEEDED FOR DAILY LIVING?: NO

## 2024-07-16 SDOH — ECONOMIC STABILITY: INCOME INSECURITY: HOW HARD IS IT FOR YOU TO PAY FOR THE VERY BASICS LIKE FOOD, HOUSING, MEDICAL CARE, AND HEATING?: SOMEWHAT HARD

## 2024-07-16 SDOH — SOCIAL STABILITY: SOCIAL INSECURITY: DO YOU FEEL UNSAFE GOING BACK TO THE PLACE WHERE YOU ARE LIVING?: NO

## 2024-07-16 SDOH — ECONOMIC STABILITY: INCOME INSECURITY: IN THE LAST 12 MONTHS, WAS THERE A TIME WHEN YOU WERE NOT ABLE TO PAY THE MORTGAGE OR RENT ON TIME?: YES

## 2024-07-16 SDOH — SOCIAL STABILITY: SOCIAL INSECURITY: ARE YOU OR HAVE YOU BEEN THREATENED OR ABUSED PHYSICALLY, EMOTIONALLY, OR SEXUALLY BY ANYONE?: NO

## 2024-07-16 SDOH — ECONOMIC STABILITY: FOOD INSECURITY: WITHIN THE PAST 12 MONTHS, YOU WORRIED THAT YOUR FOOD WOULD RUN OUT BEFORE YOU GOT MONEY TO BUY MORE.: SOMETIMES TRUE

## 2024-07-16 SDOH — SOCIAL STABILITY: SOCIAL INSECURITY: HAVE YOU HAD ANY THOUGHTS OF HARMING ANYONE ELSE?: NO

## 2024-07-16 SDOH — SOCIAL STABILITY: SOCIAL INSECURITY: ABUSE: ADULT

## 2024-07-16 SDOH — SOCIAL STABILITY: SOCIAL INSECURITY: ARE THERE ANY APPARENT SIGNS OF INJURIES/BEHAVIORS THAT COULD BE RELATED TO ABUSE/NEGLECT?: NO

## 2024-07-16 SDOH — ECONOMIC STABILITY: TRANSPORTATION INSECURITY
IN THE PAST 12 MONTHS, HAS THE LACK OF TRANSPORTATION KEPT YOU FROM MEDICAL APPOINTMENTS OR FROM GETTING MEDICATIONS?: NO

## 2024-07-16 ASSESSMENT — LIFESTYLE VARIABLES
AUDIT-C TOTAL SCORE: 0
SKIP TO QUESTIONS 9-10: 1
TOTAL SCORE: 0
HAVE PEOPLE ANNOYED YOU BY CRITICIZING YOUR DRINKING: NO
AUDIT-C TOTAL SCORE: 0
HOW MANY STANDARD DRINKS CONTAINING ALCOHOL DO YOU HAVE ON A TYPICAL DAY: PATIENT DOES NOT DRINK
EVER HAD A DRINK FIRST THING IN THE MORNING TO STEADY YOUR NERVES TO GET RID OF A HANGOVER: NO
HOW OFTEN DO YOU HAVE 6 OR MORE DRINKS ON ONE OCCASION: NEVER
HOW OFTEN DO YOU HAVE A DRINK CONTAINING ALCOHOL: NEVER
EVER FELT BAD OR GUILTY ABOUT YOUR DRINKING: NO
HAVE YOU EVER FELT YOU SHOULD CUT DOWN ON YOUR DRINKING: NO

## 2024-07-16 ASSESSMENT — COGNITIVE AND FUNCTIONAL STATUS - GENERAL
DAILY ACTIVITIY SCORE: 19
PATIENT BASELINE BEDBOUND: NO
HELP NEEDED FOR BATHING: A LITTLE
PERSONAL GROOMING: A LITTLE
DRESSING REGULAR UPPER BODY CLOTHING: A LITTLE
TOILETING: A LITTLE
DRESSING REGULAR LOWER BODY CLOTHING: A LITTLE
MOBILITY SCORE: 20
CLIMB 3 TO 5 STEPS WITH RAILING: A LOT
WALKING IN HOSPITAL ROOM: A LITTLE
STANDING UP FROM CHAIR USING ARMS: A LITTLE

## 2024-07-16 ASSESSMENT — PAIN SCALES - GENERAL
PAINLEVEL_OUTOF10: 7
PAINLEVEL_OUTOF10: 7
PAINLEVEL_OUTOF10: 3
PAINLEVEL_OUTOF10: 6

## 2024-07-16 ASSESSMENT — ACTIVITIES OF DAILY LIVING (ADL)
TOILETING: INDEPENDENT
ASSISTIVE_DEVICE: WALKER
HEARING - RIGHT EAR: FUNCTIONAL
FEEDING YOURSELF: INDEPENDENT
BATHING: NEEDS ASSISTANCE
DRESSING YOURSELF: INDEPENDENT
GROOMING: NEEDS ASSISTANCE
ADEQUATE_TO_COMPLETE_ADL: YES
PATIENT'S MEMORY ADEQUATE TO SAFELY COMPLETE DAILY ACTIVITIES?: NO
HEARING - LEFT EAR: FUNCTIONAL
JUDGMENT_ADEQUATE_SAFELY_COMPLETE_DAILY_ACTIVITIES: YES
WALKS IN HOME: NEEDS ASSISTANCE

## 2024-07-16 ASSESSMENT — PAIN - FUNCTIONAL ASSESSMENT
PAIN_FUNCTIONAL_ASSESSMENT: 0-10

## 2024-07-16 ASSESSMENT — PATIENT HEALTH QUESTIONNAIRE - PHQ9
2. FEELING DOWN, DEPRESSED OR HOPELESS: NOT AT ALL
SUM OF ALL RESPONSES TO PHQ9 QUESTIONS 1 & 2: 0
1. LITTLE INTEREST OR PLEASURE IN DOING THINGS: NOT AT ALL

## 2024-07-16 ASSESSMENT — PAIN DESCRIPTION - ORIENTATION: ORIENTATION: LEFT

## 2024-07-16 ASSESSMENT — PAIN DESCRIPTION - PAIN TYPE: TYPE: ACUTE PAIN

## 2024-07-16 ASSESSMENT — PAIN DESCRIPTION - DESCRIPTORS: DESCRIPTORS: ACHING

## 2024-07-16 ASSESSMENT — PAIN DESCRIPTION - LOCATION: LOCATION: FOOT

## 2024-07-16 NOTE — ED PROVIDER NOTES
HPI   Chief Complaint   Patient presents with    Wound Check     Left foot wound- pt currently on antibiotics, pt has had increased confusion.       HPI  Patient is a 73-year-old male with history of diabetes, CAD, hypertension presenting for evaluation of diabetic wound check.  Patient states that he has had a wound develop on his left lateral foot over the past few weeks that is getting significantly worse.  Family states that he did see podiatrist Dr. Rodriguez who placed him on Keflex last week for treatment.  They are concerned that the wound is not getting better and are also concerned that the patient has been acting increasingly confused since last night.  Patient does state the wound has gotten increasingly painful.  They state that the patient was talking to the dog.  They are concerned that the infection may spread to the blood.  He otherwise denies fever, chills, chest pain, shortness of breath, nausea, vomiting, abdominal pain.      Patient History   Past Medical History:   Diagnosis Date    Arthritis     Coronary artery disease     Diabetes mellitus (Multi)     Hypertension     Stroke (Multi)      Past Surgical History:   Procedure Laterality Date    BACK SURGERY      06/2024    CARDIAC CATHETERIZATION N/A 12/28/2023    Procedure: Left Heart Cath, With LV, Angriography And Grafts;  Surgeon: Phill Hare DO;  Location: [facility];  Service: Cardiovascular;  Laterality: N/A;    CARDIAC CATHETERIZATION N/A 12/28/2023    Procedure: PCI;  Surgeon: Phill Hare DO;  Location: [facility];  Service: Cardiovascular;  Laterality: N/A;    CARDIAC ELECTROPHYSIOLOGY PROCEDURE N/A 11/29/2023    Procedure: Cardioversion;  Surgeon: Phill Hare DO;  Location: [facility];  Service: Cardiovascular;  Laterality: N/A;    CORONARY ANGIOPLASTY WITH STENT PLACEMENT      CORONARY ANGIOPLASTY WITH STENT PLACEMENT      cardiac stent 12/2023    RADIOFREQUENCY ABLATION       Family History   Problem  Relation Name Age of Onset    Stomach cancer Mother      Diabetes Mother      Coronary artery disease Father      Other (Pacemaker) Father      Other (S/p CABG) Sister      Other (S/p CABG) Brother      Ovarian cancer Sibling      Coronary artery disease Sibling       Social History     Tobacco Use    Smoking status: Former     Current packs/day: 0.00     Types: Cigarettes     Quit date:      Years since quittin.5     Passive exposure: Past    Smokeless tobacco: Never   Vaping Use    Vaping status: Never Used   Substance Use Topics    Alcohol use: Not Currently    Drug use: Never       Physical Exam   ED Triage Vitals [24 0936]   Temperature Heart Rate Respirations BP   36 °C (96.8 °F) 85 18 114/74      Pulse Ox Temp src Heart Rate Source Patient Position   100 % -- -- Sitting      BP Location FiO2 (%)     Right arm --       Physical Exam  Vitals and nursing note reviewed.   Constitutional:       General: He is not in acute distress.     Appearance: He is well-developed.   HENT:      Head: Normocephalic and atraumatic.   Eyes:      Conjunctiva/sclera: Conjunctivae normal.   Cardiovascular:      Rate and Rhythm: Normal rate and regular rhythm.      Heart sounds: No murmur heard.  Pulmonary:      Effort: Pulmonary effort is normal. No respiratory distress.      Breath sounds: Normal breath sounds.   Abdominal:      Palpations: Abdomen is soft.      Tenderness: There is no abdominal tenderness.   Musculoskeletal:         General: No swelling.      Cervical back: Neck supple.   Skin:     General: Skin is warm and dry.      Capillary Refill: Capillary refill takes less than 2 seconds.      Comments: Positive for diabetic foot wound to the left lateral foot with central necrosis and minimal surrounding erythema   Neurological:      Mental Status: He is alert.   Psychiatric:         Mood and Affect: Mood normal.           ED Course & MDM   Diagnoses as of 24 1612   Diabetic foot infection (Multi)    Failure of outpatient treatment   Diabetic ulcer of ankle (Multi)                       Enrique Coma Scale Score: 15                        Medical Decision Making  Parts of this chart have been completed using voice recognition software. Please excuse any errors of transcription.  My thought process and reason for plan has been formulated from the time that I saw the patient until the time of disposition and is not specific to one specific moment during their visit and furthermore my MDM encompasses this entire chart and not only this text box.      HPI: Detailed above.    Exam: A medically appropriate exam performed, outlined above, given the known history and presentation.    History obtained from: Patient, family    Social Determinants of Health considered during this visit: Lives independently with family    Medications given during visit:  Medications   amLODIPine (Norvasc) tablet 5 mg (has no administration in time range)   atorvastatin (Lipitor) tablet 20 mg (has no administration in time range)   clopidogrel (Plavix) tablet 75 mg (has no administration in time range)   donepezil (Aricept) tablet 10 mg (has no administration in time range)   DULoxetine (Cymbalta) DR capsule 60 mg (has no administration in time range)   folic acid (Folvite) tablet 0.4 mg (has no administration in time range)   ferrous sulfate (325 mg ferrous sulfate) tablet 1 tablet (has no administration in time range)   gabapentin (Neurontin) tablet 600 mg (has no administration in time range)   insulin glargine (Lantus) injection 20 Units (has no administration in time range)   levETIRAcetam (Keppra) tablet 500 mg (has no administration in time range)   metoprolol succinate XL (Toprol-XL) 24 hr tablet 25 mg (has no administration in time range)   pantoprazole (ProtoNix) EC tablet 20 mg (has no administration in time range)   oxybutynin (Ditropan) tablet 5 mg (has no administration in time range)   oxyCODONE (Roxicodone) immediate release  tablet 15 mg (15 mg oral Given 7/16/24 1534)   tamsulosin (Flomax) 24 hr capsule 0.4 mg (has no administration in time range)   traZODone (Desyrel) tablet 150 mg (has no administration in time range)   heparin (porcine) injection 5,000 Units (has no administration in time range)   polyethylene glycol (Glycolax, Miralax) packet 17 g (has no administration in time range)   acetaminophen (Tylenol) tablet 650 mg (has no administration in time range)     Or   acetaminophen (Tylenol) oral liquid 650 mg (has no administration in time range)     Or   acetaminophen (Tylenol) suppository 650 mg (has no administration in time range)   ondansetron ODT (Zofran-ODT) disintegrating tablet 4 mg (has no administration in time range)     Or   ondansetron (Zofran) injection 4 mg (has no administration in time range)   glucagon (Glucagen) injection 1 mg (has no administration in time range)   dextrose 50 % injection 25 g (has no administration in time range)   glucagon (Glucagen) injection 1 mg (has no administration in time range)   dextrose 50 % injection 12.5 g (has no administration in time range)   insulin lispro (HumaLOG) injection 0-10 Units (has no administration in time range)   DAPTOmycin (Cubicin) 1,000 mg in sodium chloride 0.9% 50 mL IV (has no administration in time range)   piperacillin-tazobactam (Zosyn) 2.25 g in dextrose (iso) IV 50 mL (has no administration in time range)   haloperidol lactate (Haldol) injection 2 mg (has no administration in time range)   linezolid (Zyvox)  mg in 300 mL (600 mg intravenous New Bag 7/16/24 1411)        Diagnostic/tests  Labs Reviewed   CBC WITH AUTO DIFFERENTIAL - Abnormal       Result Value    WBC 8.9      nRBC 0.0      RBC 4.47 (*)     Hemoglobin 13.0 (*)     Hematocrit 40.2 (*)     MCV 90      MCH 29.1      MCHC 32.3      RDW 13.5      Platelets 283      Neutrophils % 73.6      Immature Granulocytes %, Automated 0.4      Lymphocytes % 16.5      Monocytes % 7.0       Eosinophils % 1.5      Basophils % 1.0      Neutrophils Absolute 6.55 (*)     Immature Granulocytes Absolute, Automated 0.04      Lymphocytes Absolute 1.47      Monocytes Absolute 0.62      Eosinophils Absolute 0.13      Basophils Absolute 0.09     COMPREHENSIVE METABOLIC PANEL - Abnormal    Glucose 133 (*)     Sodium 136      Potassium 4.3      Chloride 101      Bicarbonate 23 (*)     Urea Nitrogen 32 (*)     Creatinine 2.30 (*)     eGFR 29 (*)     Calcium 9.6      Albumin 4.1      Alkaline Phosphatase 46      Total Protein 7.6      AST 23      Bilirubin, Total 0.5      ALT 11      Anion Gap 12     SEDIMENTATION RATE, AUTOMATED - Abnormal    Sedimentation Rate 48 (*)    C-REACTIVE PROTEIN - Normal    C-Reactive Protein <0.30     BLOOD CULTURE   BLOOD CULTURE      XR foot left 3+ views   Final Result   Soft tissue swelling without obvious acute osseous abnormality.             MACRO:   None        Signed by: Nash Boyd 7/16/2024 11:13 AM   Dictation workstation:   [id number]      MR foot left wo IV contrast    (Results Pending)        Considerations/further MDM:  Patient is a 73-year-old male with history of diabetes presenting for evaluation of left lateral foot wound    Patient hemodynamically stable without systemic signs and symptoms.  Laboratory workup without leukocytosis but is with evidence of minor NOAM.  Sed rate is elevated at 48.  X-ray of the left foot is without periosteal reaction or soft tissue gas.  Given ongoing infection despite outpatient therapy with antibiotics, blood culture was drawn and patient was initiated on linezolid for antimicrobial coverage given vancomycin allergy.  Patient will be admitted to the hospital for further antibiotic therapy and for evaluation by podiatry.  Patient agreeable with this plan of care.  He remained stable during the ER visit.  I saw this patient in conjunction with Dr. Lentz.  I spoke with the hospitalist on-call Dr. Florence.  He discussed the  patient case.  Based on that discussion, we have decided to admit the patient for further evaluation and treatment of his diabetic foot infection.      Procedure  Procedures     Yuliana Leyva PA-C  07/16/24 3996

## 2024-07-16 NOTE — TREATMENT PLAN
"The patient was seen in conjunction with the advanced practice provider, and I performed a substantive portion of the encounter. I personally saw the patient and made/approved the management plan and take complete responsibility for the patient management. I agree with the workup, evaluation, MDM, management and diagnosis of the patient stated in the advanced practice provider's note. All medical decision making was performed by me and communicated to the advanced practice provider. All laboratory studies, radiology, and EKGs were reviewed by me.     History:  Patient presents to ED with family concern of infection given reported hallucinations last night.  Notes hallucinations of small animals moving around.  Presently does not experience any hallucinations and denies any CNS pathology.  Does not endorse any infectious symptoms or fever/chills.  Also endorses worsening L foot ulcer on the lateral aspect, indicates increased pain and concern for how it looks.  Has been evaluated by his podiatrist and concern for advancing infection.  Notes significant infection and amputation history of R foot.  Also has experienced similar symptoms with UTI but present endorsing urinary symptoms.  Denies any other significant systemic/make symptoms or complaints.    VS:  /62 (BP Location: Right arm, Patient Position: Sitting)   Pulse 83   Temp 36.5 °C (97.7 °F) (Oral)   Resp 16   Ht 1.93 m (6' 4\")   Wt 102 kg (225 lb)   SpO2 100%   BMI 27.39 kg/m²     Physical exam:  CONST: alert, normal appearance, no acute distress, does not appear ill/toxic  HEAD: normocephalic, atraumatic  ENT: MMM, no rhinorrhea/congestion, posterior oropharynx clear and oral secretions well controlled  EYES: PEPRL, EOMI, no scleral icterus, no nystagmus  CV: RRR, no murmurs, 1+ DP of LLE, extremity warm to touch  PULM: CTAB, no respiratory distress, not requiring supplental O2  ABD: soft, flat/non-tender/non-distended, no mass  MSK: Wound to " lateral aspect of L midfoot  SKIN: warm/dry, dry gangrenous/eschar wound of lateral midfoot of LLE with surrounding mild erythema, no appreciable fluctuance, mild to moderate tenderness, no appreciable purulent drainage  NEURO: A&Ox4, no facial asymmetry, no focal neuro deficits, gross strength/motor/sensation intact x4, normal gait  PSYCH: Appropriate affect      I personally reviewed and interpreted the following studies images and labs with the following interpretation: EKG is N/A, labs are significant for no leukocytosis, mildly elevated ESR and CRP P WNL, images are notable for potential cortical irregularity underlying dry necrotic wound on L foot at base of fifth metatarsal no obvious fractures.        MDM:  Patient presented to the ED for wound of L foot and concern for sepsis given transient sensorium impairment. Concerning PMHx of DM2, HTN, CVA, dementia with behavioral disturbance.    Per Chart Review: Vascular surgery office visit on 7/11/2024 for evaluation of decreased pulses of LLE and evidence of wound of L foot, evaluation at request of podiatrist, visit summary significant for does not appreciate any significant limb pain consistent with vascular disease or claudication, unremarkable exam with warm extremities, cutaneous ulceration present overlying lateral L foot mild surrounding erythema but no significant induration, it does have multiphasic DP signals with Doppler, VSS, obtained ADDIE/TBI that was falsely elevated, no clinical evidence of significant arterial disease recommended continued follow-up with podiatrist.    Assessment/evaluation diabetic foot ulcer with concern of osteomyelitis and localized SSTI. No concerning history, clinical evidence/work-up, or exam findings for the concerning differentials of sepsis, fracture of metatarsal/hindfoot, cellulitis, advancing PAD. These conditions have been thoroughly evaluated and determined to be sufficiently unlikely to be the etiology of patient's  presenting symptoms. After receiving an appropriate exam, clinical work-up, and necessary interventions/treatment, Patient is appropriate for hospital admission to medicine (telemetry) at this time due to pain control/management and further interrogation/management of symptoms.  Patient was encouraged to ask any questions or for clarification of today's ED encounter.  Patient is agreeable to plan of care.      Diagnosis:  1. Diabetic foot infection (Multi)        2. Failure of outpatient treatment        3. Diabetic ulcer of ankle (Multi)

## 2024-07-16 NOTE — H&P
History Of Present Illness  Rachid Yun is a 73 y.o. male presenting with left foot ulcer.  On the left lateral foot about 2 weeks ago, patient noticed a blister, started on Keflex 4 times a day and doxycycline twice a day by primary care physician and told to go to podiatry to be evaluated.  Seen by Dr. Khalil, and has an appointment with vascular surgery to assess for arterial insufficiency on the 11th; this note states that there is no evidence of arterial insufficiency.  Last night, family stated the patient was confused and in fact hallucinating, responding to things that were not in the room which is unusual for him, but does happen when he has infections in the past.  No fevers chills.  No nausea.  No major drainage.     Past Medical History  He has a past medical history of Arthritis, Coronary artery disease, Diabetes mellitus (Multi), Hypertension, and Stroke (Multi).    Surgical History  He has a past surgical history that includes Back surgery; Cardiac electrophysiology procedure (N/A, 11/29/2023); Cardiac catheterization (N/A, 12/28/2023); Cardiac catheterization (N/A, 12/28/2023); Radiofrequency ablation; Coronary angioplasty with stent; and Coronary angioplasty with stent.     Social History  He reports that he quit smoking about 36 years ago. His smoking use included cigarettes. He has been exposed to tobacco smoke. He has never used smokeless tobacco. He reports that he does not currently use alcohol. He reports that he does not use drugs.    Family History  Family History   Problem Relation Name Age of Onset    Stomach cancer Mother      Diabetes Mother      Coronary artery disease Father      Other (Pacemaker) Father      Other (S/p CABG) Sister      Other (S/p CABG) Brother      Ovarian cancer Sibling      Coronary artery disease Sibling          Allergies  Meperidine, Vancomycin, Hydromorphone, Other, and Rifampin    Review of Systems   All other systems reviewed and are negative.        Physical Exam  General: Elderly male, nontoxic, sitting up in wheelchair  Eyes: Clear sclera  Neck: No JVD  Cardio: Regular rate rhythm.  Respiratory: Clear auscultation  Abdomen: Soft nondistended extremities: No lower extreme edema  Skin: Black eschar about 2 to 3 cm x 2 cm on the left lateral foot without significant drainage.  Tender to touch.  Non-malodorous.  Psychiatric: Appropriate mood and affect  Neuro: Oriented x 3.  Last Recorded Vitals  /74 (BP Location: Right arm, Patient Position: Sitting)   Pulse 85   Temp 36 °C (96.8 °F)   Resp 18   Wt 103 kg (226 lb)   SpO2 100%     Relevant Results             Assessment/Plan   Principal Problem:    Diabetic ulcer of ankle (Multi)    Diabetic foot ulcer  -Blood culture sent and will follow  -Reported allergy to vancomycin though unsure if this Trelegy.  For the time being, we will continue daptomycin and placed on linezolid given other serotonergic acting medications and continue Zosyn.  -Infectious disease consult  -MRI to assess for osteomyelitis to have some suspicion for  -Podiatry consult.  -Reviewed outpatient vascular surgery, and will hold on consulting their service.    Diabetes mellitus  -Continue lower dose Lantus and sliding scale.    Chronic kidney disease  -With slight NOAM.  -Hold losartan.  Hold IV fluids  -Nephrology consult with Dr. Costa    Atrial fibrillation  -Continue metoprolol.  Will hold Eliquis and continue heparin for DVT prophylaxis to ease with possible surgery if necessary.    Dementia  -Continue home Aricept  -Hallucinating at home yesterday, I have a higher suspicion for sundowning and hospital delirium while here.  -Will start Haldol 2 mg intramuscular as needed for agitation.    Physical therapy Occupational Therapy.           William Florence DO

## 2024-07-16 NOTE — PROGRESS NOTES
Pharmacy Medication History Review    Rachid Yun is a 73 y.o. male admitted for Diabetic ulcer of ankle (Multi). Pharmacy reviewed the patient's [id number] medications and allergies for accuracy.    Medications ADDED:  Fish oil 1000 mg  Medications CHANGED:  Lantus insulin  Gabapentin  Oxycodone  Tamsulosin  Medications REMOVED:   none     The list below reflects the updated PTA list. Comments regarding how patient may be taking medications differently can be found in the Admit Orders Activity  Prior to Admission Medications   Prescriptions Last Dose Informant   DULoxetine (Cymbalta) 60 mg DR capsule 2024    Sig: TAKE 1 CAPSULE BY MOUTH ONCE  DAILY   Eliquis 2.5 mg tablet Past Month    Sig: Take 1 tablet (2.5 mg) by mouth 2 times a day.   Lantus U-100 Insulin 100 unit/mL injection 7/15/2024    Si UNITS SUBCUTANEOUS DAILY 90 DAY(S)   acetaminophen (Tylenol) 325 mg tablet     Sig: Take 2 tablets (650 mg) by mouth every 6 hours if needed for mild pain (1 - 3).   amLODIPine (Norvasc) 5 mg tablet 2024    Sig: Take 1 tablet (5 mg) by mouth once daily.   atorvastatin (Lipitor) 20 mg tablet 2024    Sig: Take 1 tablet (20 mg) by mouth once daily.   bisacodyl (Dulcolax) 10 mg suppository     Sig: Insert 1 suppository (10 mg) into the rectum once daily as needed for constipation.   blood sugar diagnostic strip     Si strip 3 times a day before meals.   cephalexin (Keflex) 500 mg capsule 2024    Sig: Take 1 capsule (500 mg) by mouth 4 times a day for 14 days.   clopidogrel (Plavix) 75 mg tablet 2024    Sig: TAKE 1 TABLET BY MOUTH ONCE  DAILY   donepezil (Aricept) 10 mg tablet 7/15/2024    Sig: Take 1 tablet (10 mg) by mouth once daily at bedtime.   doxycycline (Vibramycin) 100 mg capsule 2024    Sig: Take 1 capsule (100 mg) by mouth 2 times a day for 14 days. Take with at least 8 ounces (large glass) of water, do not lie down for 30 minutes after   ferrous sulfate 325 (65 Fe)  MG EC tablet 7/16/2024    Sig: Take 1 tablet by mouth 2 times a day. for 90 day(s)   folic acid (Folvite) 400 mcg tablet 7/16/2024    Sig: Take 1 tablet (0.4 mg) by mouth once daily. for 90 day(s)   gabapentin (Neurontin) 600 mg tablet     Sig: Take 1 tablet (600 mg) by mouth once daily for 3 days. Do not start before December 31, 2023.   Patient taking differently: Take 1 tablet (600 mg) by mouth once daily. Takes once daily.   ketoconazole (NIZOral) 2 % cream     Sig: Apply topically once daily.   ketoconazole 1 % shampoo     Sig: Apply 1 Application topically every 3 days. Shampoo daily, leave on for 5-10 minutes, then rinse.   lactulose 20 gram/30 mL oral solution 7/16/2024    Sig: TAKE 15 ML BY MOUTH ONCE DAILY   levETIRAcetam (Keppra) 500 mg tablet 7/16/2024    Sig: Take 1 tablet (500 mg) by mouth every 12 hours. for 90 day(s)   losartan (Cozaar) 100 mg tablet 7/16/2024    Sig: TAKE 1 TABLET BY MOUTH EVERY DAY FOR 90 DAYS   magnesium hydroxide (Milk of Magnesia) 400 mg/5 mL suspension     Sig: Take 30 mL by mouth once daily as needed for constipation.   metoprolol succinate XL (Toprol-XL) 25 mg 24 hr tablet 7/16/2024    Sig: Take 1 tablet (25 mg) by mouth 2 times a day. Do not crush or chew.   mupirocin (Bactroban) 2 % ointment     Sig: Apply topically 3 times a day for 10 days. apply to affected area   omega 3-dha-epa-fish oil (Fish OiL) 1,000 mg (120 mg-180 mg) capsule 7/16/2024    Sig: Take 1 capsule (1,000 mg) by mouth once daily.   omeprazole (PriLOSEC) 20 mg DR capsule 7/16/2024    Sig: Take 1 capsule (20 mg) by mouth once daily in the morning. Take before meals.   oxyCODONE (Roxicodone) 15 mg immediate release tablet 7/16/2024    Sig: Take 1 tablet (15 mg) by mouth every 6 hours if needed.   oxybutynin (Ditropan) 5 mg tablet 7/16/2024    Sig: Take 1 tablet (5 mg) by mouth 2 times a day.   polyethylene glycol (Glycolax, Miralax) 17 gram packet 7/16/2024    Sig: Take 17 g by mouth once daily. Do not start  before December 31, 2023.   sennosides (Senokot) 8.6 mg tablet 7/15/2024    Sig: Take 2 tablets (17.2 mg) by mouth once daily at bedtime.   tamsulosin (Flomax) 0.4 mg 24 hr capsule 7/15/2024    Sig: Take 1 capsule (0.4 mg) by mouth once daily at bedtime.   traZODone (Desyrel) 150 mg tablet 7/15/2024    Sig: Take 1 tablet (150 mg) by mouth once daily at bedtime. FOR 90 DAYS      Facility-Administered Medications: None        The list below reflects the updated allergy list. Please review each documented allergy for additional clarification and justification.  Allergies  Reviewed by Dee Hines RN on 7/16/2024        Severity Reactions Comments    Meperidine High Other Passes out     Vancomycin High Hives & chest pain    Hydromorphone Medium Anxiety     Other Not Specified Unknown Ripampin    Rifampin Not Specified Unknown Dizzy/ oriented             Pharmacy has been updated to [facility].    Sources used to complete the med history include patient interview, family interview, PTA list, dispense history. Pt is a fair historian; son is excellent historian.    Below are additional concerns with the patient's PTA list.  none    Samantha Dela Cruz, PharmD  Please reach out via [employer] Secure Chat for questions

## 2024-07-17 LAB
ALBUMIN SERPL-MCNC: 4.1 G/DL (ref 3.5–5)
ANION GAP SERPL CALC-SCNC: 12 MMOL/L
APPEARANCE UR: CLEAR
BASOPHILS # BLD AUTO: 0.06 X10*3/UL (ref 0–0.1)
BASOPHILS NFR BLD AUTO: 0.8 %
BILIRUB UR STRIP.AUTO-MCNC: NEGATIVE MG/DL
BUN SERPL-MCNC: 36 MG/DL (ref 8–25)
CALCIUM SERPL-MCNC: 9.1 MG/DL (ref 8.5–10.4)
CHLORIDE SERPL-SCNC: 99 MMOL/L (ref 97–107)
CO2 SERPL-SCNC: 23 MMOL/L (ref 24–31)
COLOR UR: YELLOW
CREAT SERPL-MCNC: 2.6 MG/DL (ref 0.4–1.6)
EGFRCR SERPLBLD CKD-EPI 2021: 25 ML/MIN/1.73M*2
EOSINOPHIL # BLD AUTO: 0.15 X10*3/UL (ref 0–0.4)
EOSINOPHIL NFR BLD AUTO: 2.1 %
ERYTHROCYTE [DISTWIDTH] IN BLOOD BY AUTOMATED COUNT: 13.9 % (ref 11.5–14.5)
GLUCOSE BLD MANUAL STRIP-MCNC: 114 MG/DL (ref 74–99)
GLUCOSE BLD MANUAL STRIP-MCNC: 133 MG/DL (ref 74–99)
GLUCOSE BLD MANUAL STRIP-MCNC: 138 MG/DL (ref 74–99)
GLUCOSE BLD MANUAL STRIP-MCNC: 145 MG/DL (ref 74–99)
GLUCOSE BLD MANUAL STRIP-MCNC: 162 MG/DL (ref 74–99)
GLUCOSE SERPL-MCNC: 113 MG/DL (ref 65–99)
GLUCOSE UR STRIP.AUTO-MCNC: NORMAL MG/DL
HCT VFR BLD AUTO: 38.7 % (ref 41–52)
HGB BLD-MCNC: 12.6 G/DL (ref 13.5–17.5)
IMM GRANULOCYTES # BLD AUTO: 0.02 X10*3/UL (ref 0–0.5)
IMM GRANULOCYTES NFR BLD AUTO: 0.3 % (ref 0–0.9)
KETONES UR STRIP.AUTO-MCNC: NEGATIVE MG/DL
LEUKOCYTE ESTERASE UR QL STRIP.AUTO: NEGATIVE
LYMPHOCYTES # BLD AUTO: 0.76 X10*3/UL (ref 0.8–3)
LYMPHOCYTES NFR BLD AUTO: 10.7 %
MCH RBC QN AUTO: 29.9 PG (ref 26–34)
MCHC RBC AUTO-ENTMCNC: 32.6 G/DL (ref 32–36)
MCV RBC AUTO: 92 FL (ref 80–100)
MONOCYTES # BLD AUTO: 0.5 X10*3/UL (ref 0.05–0.8)
MONOCYTES NFR BLD AUTO: 7.1 %
MUCOUS THREADS #/AREA URNS AUTO: NORMAL /LPF
NEUTROPHILS # BLD AUTO: 5.6 X10*3/UL (ref 1.6–5.5)
NEUTROPHILS NFR BLD AUTO: 79 %
NITRITE UR QL STRIP.AUTO: NEGATIVE
NRBC BLD-RTO: 0 /100 WBCS (ref 0–0)
PH UR STRIP.AUTO: 5.5 [PH]
PHOSPHATE SERPL-MCNC: 3.9 MG/DL (ref 2.5–4.5)
PLATELET # BLD AUTO: 259 X10*3/UL (ref 150–450)
POTASSIUM SERPL-SCNC: 4.2 MMOL/L (ref 3.4–5.1)
PROT UR STRIP.AUTO-MCNC: ABNORMAL MG/DL
RBC # BLD AUTO: 4.22 X10*6/UL (ref 4.5–5.9)
RBC # UR STRIP.AUTO: NEGATIVE /UL
RBC #/AREA URNS AUTO: NORMAL /HPF
SODIUM SERPL-SCNC: 134 MMOL/L (ref 133–145)
SP GR UR STRIP.AUTO: 1.02
UROBILINOGEN UR STRIP.AUTO-MCNC: NORMAL MG/DL
WBC # BLD AUTO: 7.1 X10*3/UL (ref 4.4–11.3)
WBC #/AREA URNS AUTO: NORMAL /HPF

## 2024-07-17 PROCEDURE — 2500000002 HC RX 250 W HCPCS SELF ADMINISTERED DRUGS (ALT 637 FOR MEDICARE OP, ALT 636 FOR OP/ED): Performed by: INTERNAL MEDICINE

## 2024-07-17 PROCEDURE — 2500000001 HC RX 250 WO HCPCS SELF ADMINISTERED DRUGS (ALT 637 FOR MEDICARE OP): Performed by: PODIATRIST

## 2024-07-17 PROCEDURE — 87070 CULTURE OTHR SPECIMN AEROBIC: CPT | Mod: WESLAB | Performed by: INTERNAL MEDICINE

## 2024-07-17 PROCEDURE — 97161 PT EVAL LOW COMPLEX 20 MIN: CPT | Mod: GP

## 2024-07-17 PROCEDURE — 1210000001 HC SEMI-PRIVATE ROOM DAILY

## 2024-07-17 PROCEDURE — 2500000001 HC RX 250 WO HCPCS SELF ADMINISTERED DRUGS (ALT 637 FOR MEDICARE OP): Performed by: INTERNAL MEDICINE

## 2024-07-17 PROCEDURE — 84100 ASSAY OF PHOSPHORUS: CPT | Performed by: INTERNAL MEDICINE

## 2024-07-17 PROCEDURE — 97165 OT EVAL LOW COMPLEX 30 MIN: CPT | Mod: GO

## 2024-07-17 PROCEDURE — 82947 ASSAY GLUCOSE BLOOD QUANT: CPT

## 2024-07-17 PROCEDURE — 85025 COMPLETE CBC W/AUTO DIFF WBC: CPT | Performed by: INTERNAL MEDICINE

## 2024-07-17 PROCEDURE — 36415 COLL VENOUS BLD VENIPUNCTURE: CPT | Performed by: INTERNAL MEDICINE

## 2024-07-17 PROCEDURE — 97535 SELF CARE MNGMENT TRAINING: CPT | Mod: GO

## 2024-07-17 PROCEDURE — 2500000004 HC RX 250 GENERAL PHARMACY W/ HCPCS (ALT 636 FOR OP/ED): Performed by: INTERNAL MEDICINE

## 2024-07-17 RX ORDER — SODIUM CHLORIDE 9 MG/ML
70 INJECTION, SOLUTION INTRAVENOUS CONTINUOUS
Status: DISCONTINUED | OUTPATIENT
Start: 2024-07-17 | End: 2024-07-18 | Stop reason: HOSPADM

## 2024-07-17 RX ORDER — GABAPENTIN 300 MG/1
300 CAPSULE ORAL 2 TIMES DAILY
Status: DISCONTINUED | OUTPATIENT
Start: 2024-07-17 | End: 2024-07-18 | Stop reason: HOSPADM

## 2024-07-17 ASSESSMENT — COGNITIVE AND FUNCTIONAL STATUS - GENERAL
MOBILITY SCORE: 18
DRESSING REGULAR LOWER BODY CLOTHING: A LITTLE
EATING MEALS: A LITTLE
CLIMB 3 TO 5 STEPS WITH RAILING: A LOT
PERSONAL GROOMING: A LITTLE
WALKING IN HOSPITAL ROOM: A LITTLE
STANDING UP FROM CHAIR USING ARMS: A LOT
TOILETING: A LITTLE
HELP NEEDED FOR BATHING: A LITTLE
MOVING TO AND FROM BED TO CHAIR: A LITTLE
DRESSING REGULAR LOWER BODY CLOTHING: A LITTLE
PERSONAL GROOMING: A LITTLE
DRESSING REGULAR UPPER BODY CLOTHING: A LITTLE
CLIMB 3 TO 5 STEPS WITH RAILING: A LOT
DRESSING REGULAR UPPER BODY CLOTHING: A LITTLE
MOVING TO AND FROM BED TO CHAIR: A LITTLE
WALKING IN HOSPITAL ROOM: A LITTLE
MOBILITY SCORE: 18
DAILY ACTIVITIY SCORE: 21
HELP NEEDED FOR BATHING: A LITTLE
HELP NEEDED FOR BATHING: A LITTLE
DAILY ACTIVITIY SCORE: 19
DAILY ACTIVITIY SCORE: 18
TOILETING: A LITTLE
STANDING UP FROM CHAIR USING ARMS: A LOT
TOILETING: A LITTLE
DRESSING REGULAR LOWER BODY CLOTHING: A LITTLE

## 2024-07-17 ASSESSMENT — PAIN DESCRIPTION - DESCRIPTORS: DESCRIPTORS: SORE;ACHING

## 2024-07-17 ASSESSMENT — PAIN SCALES - GENERAL
PAINLEVEL_OUTOF10: 3
PAINLEVEL_OUTOF10: 7
PAINLEVEL_OUTOF10: 6
PAINLEVEL_OUTOF10: 7
PAINLEVEL_OUTOF10: 7
PAINLEVEL_OUTOF10: 4
PAINLEVEL_OUTOF10: 0 - NO PAIN
PAINLEVEL_OUTOF10: 5 - MODERATE PAIN
PAINLEVEL_OUTOF10: 7

## 2024-07-17 ASSESSMENT — PAIN - FUNCTIONAL ASSESSMENT
PAIN_FUNCTIONAL_ASSESSMENT: 0-10

## 2024-07-17 ASSESSMENT — ACTIVITIES OF DAILY LIVING (ADL)
DRESSING_ASSISTANCE: MINIMAL
BATHING_ASSISTANCE: MINIMAL
TOILETING_ASSISTANCE: MINIMAL
BATHING_ASSISTANCE: MINIMAL
HOME_MANAGEMENT_TIME_ENTRY: 14
ADL_ASSISTANCE: NEEDS ASSISTANCE
ADL_ASSISTANCE: INDEPENDENT

## 2024-07-17 ASSESSMENT — PAIN DESCRIPTION - ORIENTATION
ORIENTATION: LOWER
ORIENTATION: LOWER

## 2024-07-17 ASSESSMENT — PAIN DESCRIPTION - LOCATION
LOCATION: BACK
LOCATION: BACK

## 2024-07-17 NOTE — PROGRESS NOTES
Rachid Yun is a 73 y.o. male on day 1 of admission presenting with Diabetic ulcer of ankle (Multi).      Subjective     No fevers or chills.  Pain is a 5-6 out of 10.  Difficulty sleeping last night.         Objective     Last Recorded Vitals  /62 (BP Location: Right arm, Patient Position: Sitting) Comment: 76  Pulse 73   Temp 36.5 °C (97.7 °F) (Oral)   Resp 18   Wt 102 kg (225 lb)   SpO2 98%   Intake/Output last 3 Shifts:    Intake/Output Summary (Last 24 hours) at 7/17/2024 1109  Last data filed at 7/17/2024 0014  Gross per 24 hour   Intake 120 ml   Output 300 ml   Net -180 ml       Admission Weight  Weight: 103 kg (226 lb) (07/16/24 1354)    Daily Weight  07/16/24 : 102 kg (225 lb)    Image Results  MR foot left wo IV contrast  Narrative: Interpreted By:  Vishnu Martin,   STUDY:  MR FOOT LEFT WO IV CONTRAST;  7/16/2024 7:13 pm      INDICATION:  Signs/Symptoms:concern for osteo.      COMPARISON:  Left foot radiograph 07/16/2024      ACCESSION NUMBER(S):  [id number]      ORDERING CLINICIAN:  SEBAS HAWKINS      TECHNIQUE:  Multiplanar, multisequence MR imaging of the left foot was obtained  without contrast.      FINDINGS:  OSSEOUS STRUCTURES:  There is a ulceration overlying the base of 5th metatarsal bone.  There is focal loss of cortical definition and T1-hyperintense, T2  hyperintense marrow signal suspicious for developing osteomyelitis.  There is evidence of a healed fracture base of 5th metatarsal bone  and also the neck of the 5th metatarsal bone.          JOINTS:  Normal osteoarticular relationship of the midfoot and forefoot  structures. No significant discrete chondral or osteochondral  defects.  Mild osteoarthrosis of the 1st MTP joint.          SOFT TISSUES:  There is an ulceration overlying the base of 5th metatarsal bone  extending up to the bone surface. No fluid collection. There is mild  soft tissue edema along the lateral aspect of the forefoot and 5th  toe.           TENDONS/MUSCLES:  Visualized tendons are intact with normal caliber and signal  intensity. 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      LIGAMENTS:  Visualized ligaments, including the Lisfranc ligament and collateral  ligaments of the MTP and interphalangeal joints, are intact.      Impression: Ulceration overlying the 5th metatarsal base with MR signal changes  concerning for developing osteomyelitis.      Healed fractures at base of 5th metatarsal bone and 5th metatarsal  neck.          MACRO:  None.      Signed by: Vishnu Martin 7/17/2024 2:10 AM  Dictation workstation:   [id number]      Physical Exam    Relevant Results               Assessment/Plan                  Principal Problem:    Diabetic ulcer of ankle (Multi)    Diabetic foot ulceration  -Described as chronic ulcer by podiatry without signs of underlying osteomyelitis, and they have signed off the case this morning.  -With the reported worsening and pain initially, I favor treating with IV antibiotics for the time being for at least 24 another day, and defer further IV antibiotics to infectious disease.    NOAM on CKD  -Creatinine is trending up  -Continue home ACE/ARB  -Will hold off on IV fluids, pending nephrology input.    Diabetes mellitus  -Continue low-dose Lantus and sliding scale  -Anticipate going back to normal dosing upon discharge.    Physical therapy Occupational Therapy              William Florence DO

## 2024-07-17 NOTE — DOCUMENTATION CLARIFICATION NOTE
"    PATIENT:               LORI KEN  ACCT #:                  [id number]  MRN:                       [id number]  :                       1950  ADMIT DATE:       2024 9:46 AM  DISCH DATE:  RESPONDING PROVIDER #:        [id number]          PROVIDER RESPONSE TEXT:    Metabolic encephalopathy 2/2 osteomyelitis left foot    CDI QUERY TEXT:    Clarification        Instruction:    Based on your assessment of the patient and the clinical information, please provide the requested documentation by clicking on the appropriate radio button and enter any additional information if prompted.    Question: Please further clarify the most likely etiology of the altered mental status as    When answering this query, please exercise your independent professional judgment. The fact that a question is being asked, does not imply that any particular answer is desired or expected.    The patient's clinical indicators include:  Clinical Information:  Patient is a 73-year-old male with history of diabetes, CAD, hypertension presenting for evaluation of diabetic wound check.  Patient states that he has had a wound develop on his left lateral foot over the past few weeks that is getting significantly worse.     H/P \"Dementia  -Continue home Aricept  -Hallucinating at home yesterday, I have a higher suspicion for sundowning and hospital delirium while here.  -Will start Haldol 2 mg intramuscular as needed for agitation.\"  \"Last night, family stated the patient was confused and in fact hallucinating, responding to things that were not in the room which is unusual for him, but does happen when he has infections in the past.\"     ID PN:  \"Encephalopathy-resolved  Chronic kidney disease stage III  Type 2 diabetes with peripheral angiopathy without gangrene  Left diabetic foot ulcer, Grimes 2 versus 3  Left foot cellulitis  Possible osteomyelitis of left 5th metatarsal per MRI report\"      Hospitalist PN:  \"NOAM on " CKD'    Clinical Indicators:  WBC: 8.9/7.1  Creat:  2.30/2.6  7/16 MR Foot Left WO:  MPRESSION:  Ulceration overlying the 5th metatarsal base with MR signal changes  concerning for developing osteomyelitis.    Healed fractures at base of 5th metatarsal bone and 5th metatarsal  neck.    Treatment: Wound Culture; Blood Cultures; CBC; MR; XR Foot; Podiatry Consult;  ID Consult; Zosyn IV; Daptomycin IV; Linezolid IV    Risk Factors:  DM; CKD; HTN; CAD; Former Smoker; Afib; Haldol  Options provided:  -- Metabolic encephalopathy 2/2 osteomyelitis left foot  -- Metabolic encephalopathy 2/2 osteomyelitis left foot superimposed on dementia with behavioral disturbances  -- Other - I will add my own diagnosis  -- Refer to Clinical Documentation Reviewer    Query created by: Mariely Reza on 7/17/2024 3:08 PM      Electronically signed by:  SEBAS HAWKINS DO 7/17/2024 3:19 PM

## 2024-07-17 NOTE — CONSULTS
Wound Care Consult     Visit Date: 7/17/2024      Patient Name: Rachid Yun         MRN: [id number]           YOB: 1950     Reason for Consult: Left foot and right toe wounds        Wound History: Present on admission. Patient with Chronic left lateral foot necrotic diabetic ulcer and eschar to Right medial toe. Patient had been seeing Podiatry outpatient. Podiatry managing inpatient.     A 73 y.o. year old male admitted for Principal Problem:    Diabetic ulcer of ankle (Multi)      Past Medical History:   Diagnosis Date    Arthritis     Coronary artery disease     Diabetes mellitus (Multi)     Hypertension     Stroke (Multi)       Past Surgical History:   Procedure Laterality Date    BACK SURGERY      06/2024    CARDIAC CATHETERIZATION N/A 12/28/2023    Procedure: Left Heart Cath, With LV, Angriography And Grafts;  Surgeon: Phill Hare DO;  Location: [facility] Cardiac Cath Lab;  Service: Cardiovascular;  Laterality: N/A;    CARDIAC CATHETERIZATION N/A 12/28/2023    Procedure: PCI;  Surgeon: Phill Hare DO;  Location: [facility] Cardiac Cath Lab;  Service: Cardiovascular;  Laterality: N/A;    CARDIAC ELECTROPHYSIOLOGY PROCEDURE N/A 11/29/2023    Procedure: Cardioversion;  Surgeon: Phill Hare DO;  Location: [facility] Cardiac Cath Lab;  Service: Cardiovascular;  Laterality: N/A;    CORONARY ANGIOPLASTY WITH STENT PLACEMENT      CORONARY ANGIOPLASTY WITH STENT PLACEMENT      cardiac stent 12/2023    RADIOFREQUENCY ABLATION         Scheduled medications  [Held by provider] amLODIPine, 5 mg, oral, Daily  atorvastatin, 20 mg, oral, Daily  clopidogrel, 75 mg, oral, Daily  collagenase, , Topical, Daily  donepezil, 10 mg, oral, Nightly  DULoxetine, 60 mg, oral, Daily  ferrous sulfate (325 mg ferrous sulfate), 65 mg of iron, oral, Daily  folic acid, 0.4 mg, oral, Daily  gabapentin, 300 mg, oral, BID  heparin (porcine), 5,000 Units, subcutaneous, q8h KEVIN  insulin glargine, 20 Units, subcutaneous, q24h  insulin lispro,  0-10 Units, subcutaneous, TID  levETIRAcetam, 500 mg, oral, q12h  metoprolol succinate XL, 25 mg, oral, BID  oxybutynin, 5 mg, oral, BID  pantoprazole, 20 mg, oral, Daily before breakfast  piperacillin-tazobactam, 2.25 g, intravenous, q6h  polyethylene glycol, 17 g, oral, Daily  tamsulosin, 0.4 mg, oral, Nightly      Continuous medications  sodium chloride 0.9%, 70 mL/hr, Last Rate: 70 mL/hr (07/17/24 1227)      PRN medications  PRN medications: acetaminophen **OR** acetaminophen **OR** acetaminophen, dextrose, dextrose, glucagon, glucagon, haloperidol lactate, ondansetron ODT **OR** ondansetron, oxyCODONE, traZODone    Allergies   Allergen Reactions    Meperidine Other     Passes out     Vancomycin Hives     & chest pain    Hydromorphone Anxiety    Other Unknown     Ripampin    Rifampin Unknown     Dizzy/ oriented          Pertinent Labs:   Albumin   Date Value Ref Range Status   07/17/2024 4.1 3.5 - 5.0 g/dL Final       Wound Assessment:  Wound 07/16/24 Diabetic Ulcer Foot Dorsal foot;Left (Active)   Wound Image   07/17/24 1041   Site Assessment Black;Necrotic 07/17/24 1300   Drainage Description None 07/17/24 1042   Drainage Amount None 07/17/24 1042   Dressing Kerlix/rolled gauze 07/17/24 1042       Wound 07/17/24 Diabetic Ulcer Toe (Comment  which one) Dorsal foot;Right (Active)   Wound Image   07/17/24 1042   Site Assessment Eschar 07/17/24 1300   Selena-Wound Assessment Hypopigmented 07/17/24 1300   Drainage Amount None 07/17/24 1042   Dressing Open to air 07/17/24 1042       Wound Team Summary Assessment:   Left foot necrotic ulcer being managed by Podiatry. Order for Santyl daily. Right medial toe dry eschar. Recommend paint with betadine daily. Discussed with Allie Gonsales RN no other skin issues or concerns. Will continue to monitor. Allie Gonsales RN updated, to continue pressure injury prevention interventions, Woundcare, and nursing to continue to follow providers orders. Reconsult Wound RN PRN.  Denise JOHNSONN RN          Wound Team Plan: Continue to follow Provider orders. Left lateral foot- cleanse with soap and water, apply Santyl to woundbed cover with DCD daily and prn. Continue to offload. Right medial toe- paint with betadine daily and prn.     Denise Matos, RN  7/17/2024  1:07 PM

## 2024-07-17 NOTE — PROGRESS NOTES
"   07/17/24 1702   Discharge Planning   Living Arrangements Children   Support Systems Children   Assistance Needed ambulates with a walker   Type of Residence Private residence   Number of Stairs to Enter Residence 1   Number of Stairs Within Residence 0   Expected Discharge Disposition  Services   Does the patient need discharge transport arranged? No     Met with patient at bedside. An explanation of discharge planning was provided.  [facility].  Patient stays on the first floor only.  There is a full bathroom and bedroom on the first floor. Patient ambulates with a walker. Son takes care of all of  the cooking, cleaning and shopping. Son also helps with bathing.  There is a shower chair available. Patient does not require oxygen or cpap.  Patient stopped using his cpap 2 1/2 years ago.  Patient denies smoking and alcohol use. States he quit smoking \"years ago\". Patient is diabetic and checks his blood glucose twice daily with a glucometer. PCP is Dr. BARTOLO Marshall.  POA is son.  A copy of the paperwork was requested for hospital records.     PT recommendation is for high intensity rehab. At this time, patient is declining rehab.  Patient was discharged from [facility] in February and states he absolutely will not return to [facility].  On IV antibiotics.  ID following. Cultures pending. May need long term IV antibiotics.  Will continue to follow for needs.   "

## 2024-07-17 NOTE — PROGRESS NOTES
"Rachid Yun is a 73 y.o. male on day 1 of admission presenting with Diabetic ulcer of ankle (Multi).    Subjective   Patient seen and examined  Remains afebrile, no chills  Denies shortness of breath or cough  Denies nausea, vomiting, diarrhea  Reports moderate pain to right foot with palpation    Objective   Range of Vitals (last 24 hours)  Heart Rate:  [60-80]   Temp:  [36.2 °C (97.2 °F)-36.5 °C (97.7 °F)]   Resp:  [13-18]   BP: (107-120)/(56-68)   Height:  [193 cm (6' 4\")]   Weight:  [102 kg (225 lb)-103 kg (226 lb)]   SpO2:  [98 %-100 %]   Daily Weight  07/16/24 : 102 kg (225 lb)    Body mass index is 27.39 kg/m².    Physical Exam  Constitutional:       Appearance: Normal appearance.   HENT:      Head: Normocephalic and atraumatic.      Nose: Nose normal.      Mouth/Throat:      Pharynx: Oropharynx is clear.   Eyes:      Extraocular Movements: Extraocular movements intact.      Conjunctiva/sclera: Conjunctivae normal.   Cardiovascular:      Rate and Rhythm: Normal rate and regular rhythm.   Pulmonary:      Effort: Pulmonary effort is normal.      Breath sounds: Normal breath sounds.   Abdominal:      General: Bowel sounds are normal.      Palpations: Abdomen is soft.      Tenderness: There is no abdominal tenderness.   Musculoskeletal:         General: No swelling. Normal range of motion.      Cervical back: Normal range of motion and neck supple.   Skin:     General: Skin is warm and dry.      Comments: Left lateral foot ulceration with eschar, priscilla-ulcer erythema with slight increased warmth and tenderness   Neurological:      General: No focal deficit present.      Mental Status: He is alert and oriented to person, place, and time.   Psychiatric:         Mood and Affect: Mood normal.         Behavior: Behavior normal.           Antibiotics  linezolid (Zyvox)  mg in 300 mL  amLODIPine (Norvasc) tablet 5 mg  atorvastatin (Lipitor) tablet 20 mg  clopidogrel (Plavix) tablet 75 mg  donepezil (Aricept) " tablet 10 mg  DULoxetine (Cymbalta) DR capsule 60 mg  folic acid (Folvite) tablet 0.4 mg  ferrous sulfate (325 mg ferrous sulfate) tablet 1 tablet  gabapentin (Neurontin) tablet 600 mg  insulin glargine (Lantus) injection 20 Units  levETIRAcetam (Keppra) tablet 500 mg  metoprolol succinate XL (Toprol-XL) 24 hr tablet 25 mg  pantoprazole (ProtoNix) EC tablet 20 mg  oxybutynin (Ditropan) tablet 5 mg  oxyCODONE (Roxicodone) immediate release tablet 15 mg  tamsulosin (Flomax) 24 hr capsule 0.4 mg  traZODone (Desyrel) tablet 150 mg  heparin (porcine) injection 5,000 Units  polyethylene glycol (Glycolax, Miralax) packet 17 g  acetaminophen (Tylenol) tablet 650 mg  acetaminophen (Tylenol) oral liquid 650 mg  acetaminophen (Tylenol) suppository 650 mg  ondansetron ODT (Zofran-ODT) disintegrating tablet 4 mg  ondansetron (Zofran) injection 4 mg    glucagon (Glucagen) injection 1 mg  dextrose 50 % injection 25 g  glucagon (Glucagen) injection 1 mg  dextrose 50 % injection 12.5 g  insulin lispro (HumaLOG) injection 0-10 Units  DAPTOmycin (Cubicin) 1,000 mg in sodium chloride 0.9% 50 mL IV  piperacillin-tazobactam (Zosyn) 2.25 g in dextrose (iso) IV 50 mL  haloperidol lactate (Haldol) injection 2 mg  collagenase 250 unit/gram ointment  gabapentin (Neurontin) capsule 300 mg      Relevant Results  Labs  Results from last 72 hours   Lab Units 07/17/24  0559 07/16/24  1206   WBC AUTO x10*3/uL 7.1 8.9   HEMOGLOBIN g/dL 12.6* 13.0*   HEMATOCRIT % 38.7* 40.2*   PLATELETS AUTO x10*3/uL 259 283   NEUTROS PCT AUTO % 79.0 73.6   LYMPHS PCT AUTO % 10.7 16.5   MONOS PCT AUTO % 7.1 7.0   EOS PCT AUTO % 2.1 1.5     Results from last 72 hours   Lab Units 07/17/24  0559 07/16/24  1035   SODIUM mmol/L 134 136   POTASSIUM mmol/L 4.2 4.3   CHLORIDE mmol/L 99 101   CO2 mmol/L 23* 23*   BUN mg/dL 36* 32*   CREATININE mg/dL 2.60* 2.30*   GLUCOSE mg/dL 113* 133*   CALCIUM mg/dL 9.1 9.6   ANION GAP mmol/L 12 12   EGFR mL/min/1.73m*2 25* 29*    PHOSPHORUS mg/dL 3.9  --      Results from last 72 hours   Lab Units 07/17/24  0559 07/16/24  1035   ALK PHOS U/L  --  46   BILIRUBIN TOTAL mg/dL  --  0.5   PROTEIN TOTAL g/dL  --  7.6   ALT U/L  --  11   AST U/L  --  23   ALBUMIN g/dL 4.1 4.1     Estimated Creatinine Clearance: 31.1 mL/min (A) (by C-G formula based on SCr of 2.6 mg/dL (H)).  C-Reactive Protein   Date Value Ref Range Status   07/16/2024 <0.30 0.00 - 2.00 mg/dL Final     Microbiology  Wound culture pending  Urine culture pending  Blood cultures pending   Imaging  MR foot left wo IV contrast    Result Date: 7/17/2024  Interpreted By:  Vishnu Martin, STUDY: MR FOOT LEFT WO IV CONTRAST;  7/16/2024 7:13 pm   INDICATION: Signs/Symptoms:concern for osteo.   COMPARISON: Left foot radiograph 07/16/2024   ACCESSION NUMBER(S): [id number]   ORDERING CLINICIAN: SEABS HAWKINS   TECHNIQUE: Multiplanar, multisequence MR imaging of the left foot was obtained without contrast.   FINDINGS: OSSEOUS STRUCTURES: There is a ulceration overlying the base of 5th metatarsal bone. There is focal loss of cortical definition and T1-hyperintense, T2 hyperintense marrow signal suspicious for developing osteomyelitis. There is evidence of a healed fracture base of 5th metatarsal bone and also the neck of the 5th metatarsal bone.     JOINTS: Normal osteoarticular relationship of the midfoot and forefoot structures. No significant discrete chondral or osteochondral defects.  Mild osteoarthrosis of the 1st MTP joint.     SOFT TISSUES: There is an ulceration overlying the base of 5th metatarsal bone extending up to the bone surface. No fluid collection. There is mild soft tissue edema along the lateral aspect of the forefoot and 5th toe.     TENDONS/MUSCLES: Visualized tendons are intact with normal caliber and signal intensity. 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   LIGAMENTS: Visualized ligaments, including the  Lisfranc ligament and collateral ligaments of the MTP and interphalangeal joints, are intact.       Ulceration overlying the 5th metatarsal base with MR signal changes concerning for developing osteomyelitis.   Healed fractures at base of 5th metatarsal bone and 5th metatarsal neck.     MACRO: None.   Signed by: Vishnu Martin 7/17/2024 2:10 AM Dictation workstation:   [id number]    XR foot left 3+ views    Result Date: 7/16/2024  Interpreted By:  Nash Boyd, STUDY: XR FOOT LEFT 3+ VIEWS; ;  7/16/2024 10:57 am   INDICATION: Signs/Symptoms:Diabetic ulcer of left lateral foot.   COMPARISON: 02/01/2010   ACCESSION NUMBER(S): [id number]   ORDERING CLINICIAN: INOCENTE HEARD   FINDINGS: Soft tissue swelling. Chronic appearing irregularity of the 4th and 5th proximal phalanges. Degenerative changes of the 1st MTP joint and interphalangeal joints. No acute displaced fracture. No obvious acute lytic or erosive changes to suggest overt plain radiographic evidence of osteomyelitis. Calcaneal enthesopathy.       Soft tissue swelling without obvious acute osseous abnormality.     MACRO: None   Signed by: Nash Boyd 7/16/2024 11:13 AM Dictation workstation:   [id number]     Assessment/Plan   Encephalopathy-resolved  Chronic kidney disease stage III  Type 2 diabetes with peripheral angiopathy without gangrene  Left diabetic foot ulcer, Grimes 2 versus 3  Left foot cellulitis  Possible osteomyelitis of left 5th metatarsal per MRI report     IV daptomycin  IV Zosyn  CK level 157 on 7/16/24  Follow-up wound culture  Follow-up blood cultures  Follow-up urine culture  Supportive care  Monitor temperature and WBC  Podiatry signed off,  consider re-consulting based on MRI results  Further recommendations based on pending work-up    Total time spent caring for the patient today was 20 minutes.  This includes time spent before the visit reviewing the chart, time spent during the visit, and time spent after the visit on  documentation.      Vale BANKS Staff, APRN-CNP

## 2024-07-17 NOTE — RESEARCH NOTES
Artificial Intelligence Monitoring in Nursing (AIMS Nursing) Study    Principle Investigator - Dr. Celio Taveras  Research Coordinator - Samira Batista     Patient Name - Rachid Yun  Date - 7/17/2024 2:39 PM  Location - [facility] [place], operated by [facility]    Rachid Yun was not approached by Samira Batista to talk about participating in the AIMS Nursing Study. The patient was not able to be approached, a research coordinator will come back at a later time.     Samira Batista

## 2024-07-17 NOTE — PROGRESS NOTES
Physical Therapy    Physical Therapy Evaluation    Patient Name: Rachid Yun  MRN: [id number]  Today's Date: 7/17/2024   Time Calculation  Start Time: 1011  Stop Time: 1028  Time Calculation (min): 17 min    Assessment/Plan   PT Assessment  PT Assessment Results: Decreased strength, Decreased endurance, Decreased range of motion, Impaired balance, Decreased mobility, Decreased coordination, Impaired sensation, Obesity, Decreased skin integrity, Pain  Rehab Prognosis: Good  Evaluation/Treatment Tolerance: Patient tolerated treatment well  Medical Staff Made Aware: Yes  Strengths: Ability to acquire knowledge, Attitude of self, Support of Caregivers  Barriers to Participation: Comorbidities  End of Session Communication: Bedside nurse  Assessment Comment: Pt is currently functioning below baseline level of function. He typically is able to ambulate using rollator mod I for household distances. He is currently requiring Donell using FWW for 5' distance. Standing tolerance is limited by pain/weakness. Pt may benefit from high intensity rehab upon discharge to maximize level of function and reduce burden of care. Pt would benefit from ongoing skilled PT in acute setting to maximize function.  End of Session Patient Position: Up in chair, Alarm off, not on at start of session  IP OR SWING BED PT PLAN  Inpatient or Swing Bed: Inpatient  PT Plan  Treatment/Interventions: Bed mobility, Transfer training, Gait training, Stair training, Balance training, Neuromuscular re-education, Strengthening, Endurance training, Range of motion, Therapeutic exercise, Therapeutic activity, Home exercise program, Postural re-education  PT Plan: Ongoing PT  PT Frequency: 4 times per week  PT Discharge Recommendations: High intensity level of continued care  Equipment Recommended upon Discharge: Wheeled walker  PT Recommended Transfer Status: Assist x1, Assistive device  PT - OK to Discharge: Yes      Subjective   General Visit  Information:  General  Reason for Referral: impaired mobility  Referred By: Dr. Florence  Past Medical History Relevant to Rehab: L foot diabetic ulcer, arthritis, CAD, DM, HTN, Stroke  Family/Caregiver Present: No  Prior to Session Communication: Bedside nurse (RN clears pt to participate)  Patient Position Received: Up in chair, Alarm off, not on at start of session  Preferred Learning Style: verbal  General Comment: Pt is a 72 y/o male presenting to ED with AMS and hallucinations and L lateral foot wound.  Home Living:  Home Living  Type of Home: Condo  Lives With: Adult children  Home Adaptive Equipment: Walker rolling or standard (rollator)  Home Layout: Able to live on main level with bedroom/bathroom  Home Access: Stairs to enter without rails  Entrance Stairs-Rails: None  Entrance Stairs-Number of Steps: 1  Prior Level of Function:  Prior Function Per Pt/Caregiver Report  Level of [place]: Needs assistance with ADLs, Needs assistance with homemaking, Needs assistance with functional transfers  Receives Help From: Family  ADL Assistance: Needs assistance  Bath: Minimal  Toileting: Minimal  Dressing: Minimal  Homemaking Assistance: Needs assistance  Ambulatory Assistance: Independent (using rollator)  Prior Function Comments: Pt's son works from home. He assists with toilet transfers, bathing, dressing, and all IADL's. Pt is able to ambulate household distances using rollator mod I.  Precautions:  Precautions  Hearing/Visual Limitations: glasses  Medical Precautions: Fall precautions  Prosthesis/Orthosis Used:  (L post-op shoe)  Vital Signs:       Objective   Pain:  Pain Assessment  Pain Assessment: 0-10  0-10 (Numeric) Pain Score: 7  Pain Type: Chronic pain  Pain Location: Back  Pain Orientation: Lower  Pain Descriptors: Sore, Aching  Pain Frequency: Constant/continuous  Pain Onset: Ongoing  Clinical Progression: Not changed  Effect of Pain on Daily Activities: limits standing tolerance  Pain  Interventions: Distraction, Ambulation/increased activity  Response to Interventions: no change in pain noted  Cognition:  Cognition  Overall Cognitive Status: Within Functional Limits  Orientation Level: Oriented X4    General Assessments:    Activity Tolerance  Endurance: Decreased tolerance for upright activites, Tolerates less than 10 min exercise, no significant change in vital signs  Activity Tolerance Comments: increased effort required for bedside ambulation, standing tolerance limited by back pain and fatigue    Sensation  Sensation Comment: Pt reports numbness/tingling R hand/fingertips and b/l feet    Strength  Strength Comments: notable weakness in BLE's (R weakness > L) noted in functional mobility  Strength  Strength Comments: notable weakness in BLE's (R weakness > L) noted in functional mobility           Coordination  Movements are Fluid and Coordinated: No  Coordination Comment: poor accuracy and slowed movements noted LLE.    Postural Control  Postural Control: Within Functional Limits  Posture Comment: forward flexed posture    Static Sitting Balance  Static Sitting-Balance Support: Feet supported, No upper extremity supported  Static Sitting-Level of Assistance: Independent    Static Standing Balance  Static Standing-Balance Support: Bilateral upper extremity supported  Static Standing-Level of Assistance: Contact guard  Static Standing-Comment/Number of Minutes: FWW 1 minute-forward flexed posture noted  Functional Assessments:  Bed Mobility  Bed Mobility: No    Transfers  Transfer: Yes  Transfer 1  Transfer From 1: Chair with arms to  Transfer to 1: Stand  Technique 1: Sit to stand, Stand to sit  Transfer Device 1: Walker  Transfer Level of Assistance 1: Moderate assistance  Trials/Comments 1: ModA to elevate to FWW    Ambulation/Gait Training  Ambulation/Gait Training Performed: Yes  Ambulation/Gait Training 1  Surface 1: Level tile  Device 1: Rolling walker  Gait Support Devices:  (L post-op  shoe)  Assistance 1: Minimum assistance, Minimal verbal cues  Quality of Gait 1: Foot slap, Diminished heel strike, Inconsistent stride length, Foot drop/steppage gait, Forward flexed posture  Comments/Distance (ft) 1: Pt amb forward 5' and backward 5' using FWW with Donell for steadying. Pt demos heavy reliance on FWW, forward flexed posture, L foot slap d/t dorsiflexion weakness, L knee hyperextension    Stairs  Stairs: No  Extremity/Trunk Assessments:  RUE   RUE : Exceptions to WFL  RUE Strength  RUE Overall Strength: Deficits (Refer to OT eval)  LUE   LUE: Exceptions to WFL  LUE Strength  LUE Overall Strength: Deficits (Refer to OT eval)  RLE   RLE : Exceptions to WFL  Strength RLE  RLE Overall Strength: Greater than or equal to 3/5 as evidenced by functional mobility (AROM WNL)  LLE   LLE : Exceptions to WFL  AROM LLE (degrees)  L Knee Flexion 0-130: 80  L Knee Extension 0-130: 0  Strength LLE  L Hip Flexion: 3-/5  L Knee Flexion: 2-/5  L Knee Extension: 3+/5  L Ankle Dorsiflexion: 2+/5  Outcome Measures:  [facility] Basic Mobility  Turning from your back to your side while in a flat bed without using bedrails: None  Moving from lying on your back to sitting on the side of a flat bed without using bedrails: None  Moving to and from bed to chair (including a wheelchair): A little  Standing up from a chair using your arms (e.g. wheelchair or bedside chair): A lot  To walk in hospital room: A little  Climbing 3-5 steps with railing: A lot  Basic Mobility - Total Score: 18    Encounter Problems       Encounter Problems (Active)       PT Problem       Patient will ambulate 75' distance using walker with modified independent (Progressing)       Start:  07/17/24    Expected End:  07/31/24            Patient will ambulate up and down a curb/single step with modified independent using walker (Progressing)       Start:  07/17/24    Expected End:  07/31/24            Patient will perform chair to and from bed transfer with  modified independent (Progressing)       Start:  07/17/24    Expected End:  07/31/24               Pain - Adult              Education Documentation  No documentation found.  Education Comments  No comments found.

## 2024-07-17 NOTE — CONSULTS
Reason For Consult  Chronic kidney disease stage IV    History Of Present Illness  Rachid Yun is a 73 y.o. male with a past medical history of chronic kidney disease stage IV (baseline creatinine 1.9-2), diabetes mellitus, hypertension who presented to the hospital with a left foot ulcer.  Patient notably also had episodes of hallucination.  He is now alert and oriented and has no other complaints.  His creatinine is a little above baseline at 2.6.  We are consulted for management of chronic kidney disease.     Past Medical History  He has a past medical history of Arthritis, Coronary artery disease, Diabetes mellitus (Multi), Hypertension, and Stroke (Multi).    Surgical History  He has a past surgical history that includes Back surgery; Cardiac electrophysiology procedure (N/A, 11/29/2023); Cardiac catheterization (N/A, 12/28/2023); Cardiac catheterization (N/A, 12/28/2023); Radiofrequency ablation; Coronary angioplasty with stent; and Coronary angioplasty with stent.     Social History  He reports that he quit smoking about 36 years ago. His smoking use included cigarettes. He has been exposed to tobacco smoke. He has never used smokeless tobacco. He reports that he does not currently use alcohol. He reports that he does not use drugs.    Family History  Family History   Problem Relation Name Age of Onset    Stomach cancer Mother      Diabetes Mother      Coronary artery disease Father      Other (Pacemaker) Father      Other (S/p CABG) Sister      Other (S/p CABG) Brother      Ovarian cancer Sibling      Coronary artery disease Sibling          Allergies  Meperidine, Vancomycin, Hydromorphone, Other, and Rifampin    Review of Systems  10 point review of systems was obtained and is negative other than what is indicated above in the HPI     Physical Exam  General: Awake, alert, no acute distress  Head/ears/nose/throat:  Normocephalic, atraumatic, mild dry mucous membranes  Neck:  No jugular venous distention,  "neck supple  Respiratory:  Clear to auscultation bilaterally, normal respiratory effort  Cardiovascular:  S1 and S2, no rubs  Gastrointestinal:  Soft, positive bowel sounds, no rebound or guarding  Extremities:  No edema, cyanosis  Musculoskeletal:  No visible injury or deformity noted  Neurologic:  Alert and oriented, responsive, cooperative with exam  Psychiatric: normal mood and affect         I&O 24HR    Intake/Output Summary (Last 24 hours) at 7/17/2024 1159  Last data filed at 7/17/2024 1100  Gross per 24 hour   Intake 120 ml   Output 750 ml   Net -630 ml       Vitals 24HR  Heart Rate:  [60-80]   Temp:  [36.2 °C (97.2 °F)-36.5 °C (97.7 °F)]   Resp:  [13-18]   BP: (107-120)/(56-68)   Height:  [193 cm (6' 4\")]   Weight:  [102 kg (225 lb)-103 kg (226 lb)]   SpO2:  [98 %-100 %]       Relevant Results  Results for orders placed or performed during the hospital encounter of 07/16/24 (from the past 24 hour(s))   CBC and Auto Differential   Result Value Ref Range    WBC 8.9 4.4 - 11.3 x10*3/uL    nRBC 0.0 0.0 - 0.0 /100 WBCs    RBC 4.47 (L) 4.50 - 5.90 x10*6/uL    Hemoglobin 13.0 (L) 13.5 - 17.5 g/dL    Hematocrit 40.2 (L) 41.0 - 52.0 %    MCV 90 80 - 100 fL    MCH 29.1 26.0 - 34.0 pg    MCHC 32.3 32.0 - 36.0 g/dL    RDW 13.5 11.5 - 14.5 %    Platelets 283 150 - 450 x10*3/uL    Neutrophils % 73.6 40.0 - 80.0 %    Immature Granulocytes %, Automated 0.4 0.0 - 0.9 %    Lymphocytes % 16.5 13.0 - 44.0 %    Monocytes % 7.0 2.0 - 10.0 %    Eosinophils % 1.5 0.0 - 6.0 %    Basophils % 1.0 0.0 - 2.0 %    Neutrophils Absolute 6.55 (H) 1.60 - 5.50 x10*3/uL    Immature Granulocytes Absolute, Automated 0.04 0.00 - 0.50 x10*3/uL    Lymphocytes Absolute 1.47 0.80 - 3.00 x10*3/uL    Monocytes Absolute 0.62 0.05 - 0.80 x10*3/uL    Eosinophils Absolute 0.13 0.00 - 0.40 x10*3/uL    Basophils Absolute 0.09 0.00 - 0.10 x10*3/uL   Sedimentation Rate   Result Value Ref Range    Sedimentation Rate 48 (H) 0 - 20 mm/h   Blood Culture    " Specimen: Peripheral Venipuncture; Blood culture   Result Value Ref Range    Blood Culture Loaded on Instrument - Culture in progress    Blood Culture    Specimen: Peripheral Venipuncture; Blood culture   Result Value Ref Range    Blood Culture Loaded on Instrument - Culture in progress    POCT GLUCOSE   Result Value Ref Range    POCT Glucose 141 (H) 74 - 99 mg/dL   POCT GLUCOSE   Result Value Ref Range    POCT Glucose 140 (H) 74 - 99 mg/dL   Urinalysis with Reflex Microscopic   Result Value Ref Range    Color, Urine Yellow Light-Yellow, Yellow, Dark-Yellow    Appearance, Urine Clear Clear    Specific Gravity, Urine 1.022 1.005 - 1.035    pH, Urine 5.5 5.0, 5.5, 6.0, 6.5, 7.0, 7.5, 8.0    Protein, Urine 200 (2+) (A) NEGATIVE, 10 (TRACE), 20 (TRACE) mg/dL    Glucose, Urine Normal Normal mg/dL    Blood, Urine NEGATIVE NEGATIVE    Ketones, Urine NEGATIVE NEGATIVE mg/dL    Bilirubin, Urine NEGATIVE NEGATIVE    Urobilinogen, Urine Normal Normal mg/dL    Nitrite, Urine NEGATIVE NEGATIVE    Leukocyte Esterase, Urine NEGATIVE NEGATIVE   Microscopic Only, Urine   Result Value Ref Range    WBC, Urine NONE 1-5, NONE /HPF    RBC, Urine NONE NONE, 1-2, 3-5 /HPF    Mucus, Urine FEW Reference range not established. /LPF   POCT GLUCOSE   Result Value Ref Range    POCT Glucose 130 (H) 74 - 99 mg/dL   POCT GLUCOSE   Result Value Ref Range    POCT Glucose 138 (H) 74 - 99 mg/dL   Renal Function Panel   Result Value Ref Range    Glucose 113 (H) 65 - 99 mg/dL    Sodium 134 133 - 145 mmol/L    Potassium 4.2 3.4 - 5.1 mmol/L    Chloride 99 97 - 107 mmol/L    Bicarbonate 23 (L) 24 - 31 mmol/L    Urea Nitrogen 36 (H) 8 - 25 mg/dL    Creatinine 2.60 (H) 0.40 - 1.60 mg/dL    eGFR 25 (L) >60 mL/min/1.73m*2    Calcium 9.1 8.5 - 10.4 mg/dL    Phosphorus 3.9 2.5 - 4.5 mg/dL    Albumin 4.1 3.5 - 5.0 g/dL    Anion Gap 12 <=19 mmol/L   CBC and Auto Differential   Result Value Ref Range    WBC 7.1 4.4 - 11.3 x10*3/uL    nRBC 0.0 0.0 - 0.0 /100 WBCs     RBC 4.22 (L) 4.50 - 5.90 x10*6/uL    Hemoglobin 12.6 (L) 13.5 - 17.5 g/dL    Hematocrit 38.7 (L) 41.0 - 52.0 %    MCV 92 80 - 100 fL    MCH 29.9 26.0 - 34.0 pg    MCHC 32.6 32.0 - 36.0 g/dL    RDW 13.9 11.5 - 14.5 %    Platelets 259 150 - 450 x10*3/uL    Neutrophils % 79.0 40.0 - 80.0 %    Immature Granulocytes %, Automated 0.3 0.0 - 0.9 %    Lymphocytes % 10.7 13.0 - 44.0 %    Monocytes % 7.1 2.0 - 10.0 %    Eosinophils % 2.1 0.0 - 6.0 %    Basophils % 0.8 0.0 - 2.0 %    Neutrophils Absolute 5.60 (H) 1.60 - 5.50 x10*3/uL    Immature Granulocytes Absolute, Automated 0.02 0.00 - 0.50 x10*3/uL    Lymphocytes Absolute 0.76 (L) 0.80 - 3.00 x10*3/uL    Monocytes Absolute 0.50 0.05 - 0.80 x10*3/uL    Eosinophils Absolute 0.15 0.00 - 0.40 x10*3/uL    Basophils Absolute 0.06 0.00 - 0.10 x10*3/uL   POCT GLUCOSE   Result Value Ref Range    POCT Glucose 114 (H) 74 - 99 mg/dL          Assessment/Plan   Chronic kidney disease stage IV (baseline creatinine 1.9-2), his creatinine is slightly above baseline likely in the setting of infection, possible mild volume depletion  Diabetes mellitus   Hypertension  Diabetic foot ulcer    Plan: Start gentle IV normal saline at 70 cc an hour.  Agree with holding losartan.  Hold amlodipine to allow for higher blood pressure.  Infectious disease on board, on antibiotics, podiatry on board.  Renally dose gabapentin and all medications for GFR.  No indications for dialysis.  Thank you for your consultation.    Edelmira Costa MD

## 2024-07-17 NOTE — NURSING NOTE
Photos of right toe and left lateral foot taken and patient tolerated well. Patient is being followed by vascular. Wound dressing is santyl to wound bed and gauze with kerlex and ace,Daily and surgical shoe to left foot. Right toe is open to air.

## 2024-07-17 NOTE — CONSULTS
Inpatient consult to Infectious Diseases  Consult performed by: Michael Pelaez MD  Consult ordered by: William Florence DO            Primary MD: Izabel Marshall MD    Reason For Consult  Infected left foot ulcer    History Of Present Illness  Rachid Yun is a 73 y.o. male presenting with left foot ulcer.  He has a background history of type 2 diabetes.  He noticed a blister on his left foot about 2 weeks ago.  He has no antecedent trauma.  He was placed on cephalexin and doxycycline without improvement.  He saw his podiatrist and had evaluation for peripheral arterial disease.  Was reported to have been confused and hallucinating.  He was brought to the hospital for further evaluation and management  He was seen while undergoing MRI.  He has a background history of chronic kidney disease with a baseline creatinine of 1.9  He is on daptomycin and Zosyn     Past Medical History  He has a past medical history of Arthritis, Coronary artery disease, Diabetes mellitus (Multi), Hypertension, and Stroke (Multi).    Surgical History  He has a past surgical history that includes Back surgery; Cardiac electrophysiology procedure (N/A, 2023); Cardiac catheterization (N/A, 2023); Cardiac catheterization (N/A, 2023); Radiofrequency ablation; Coronary angioplasty with stent; and Coronary angioplasty with stent.     Social History     Occupational History    Not on file   Tobacco Use    Smoking status: Former     Current packs/day: 0.00     Types: Cigarettes     Quit date:      Years since quittin.5     Passive exposure: Past    Smokeless tobacco: Never   Vaping Use    Vaping status: Never Used   Substance and Sexual Activity    Alcohol use: Not Currently    Drug use: Never    Sexual activity: Defer     Travel History   Travel since 24    No documented travel since 24           Family History  Family History   Problem Relation Name Age of Onset    Stomach cancer Mother       Diabetes Mother      Coronary artery disease Father      Other (Pacemaker) Father      Other (S/p CABG) Sister      Other (S/p CABG) Brother      Ovarian cancer Sibling      Coronary artery disease Sibling       Allergies  Meperidine, Vancomycin, Hydromorphone, Other, and Rifampin     Immunization History   Administered Date(s) Administered    Flu vaccine, quadrivalent, high-dose, preservative free, age 65y+ (FLUZONE) 12/04/2023    Influenza, seasonal, injectable 09/03/2010    Moderna SARS-CoV-2 Vaccination 12/21/2021, 05/13/2022     Medications  Home medications:  Medications Prior to Admission   Medication Sig Dispense Refill Last Dose    amLODIPine (Norvasc) 5 mg tablet Take 1 tablet (5 mg) by mouth once daily.   7/16/2024    atorvastatin (Lipitor) 20 mg tablet Take 1 tablet (20 mg) by mouth once daily. 90 tablet 3 7/16/2024    cephalexin (Keflex) 500 mg capsule Take 1 capsule (500 mg) by mouth 4 times a day for 14 days. 56 capsule 0 7/16/2024    clopidogrel (Plavix) 75 mg tablet TAKE 1 TABLET BY MOUTH ONCE  DAILY 100 tablet 2 7/16/2024    donepezil (Aricept) 10 mg tablet Take 1 tablet (10 mg) by mouth once daily at bedtime.   7/15/2024    doxycycline (Vibramycin) 100 mg capsule Take 1 capsule (100 mg) by mouth 2 times a day for 14 days. Take with at least 8 ounces (large glass) of water, do not lie down for 30 minutes after 14 capsule 0 7/16/2024    DULoxetine (Cymbalta) 60 mg DR capsule TAKE 1 CAPSULE BY MOUTH ONCE  DAILY 90 capsule 3 7/16/2024    Eliquis 2.5 mg tablet Take 1 tablet (2.5 mg) by mouth 2 times a day. 180 tablet 3 Past Month    ferrous sulfate 325 (65 Fe) MG EC tablet Take 1 tablet by mouth 2 times a day. for 90 day(s)   7/16/2024    folic acid (Folvite) 400 mcg tablet Take 1 tablet (0.4 mg) by mouth once daily. for 90 day(s)   7/16/2024    lactulose 20 gram/30 mL oral solution TAKE 15 ML BY MOUTH ONCE DAILY 1350 mL 3 7/16/2024    Lantus U-100 Insulin 100 unit/mL injection 40 UNITS SUBCUTANEOUS  DAILY 90 DAY(S) 10 mL 11 7/15/2024    levETIRAcetam (Keppra) 500 mg tablet Take 1 tablet (500 mg) by mouth every 12 hours. for 90 day(s)   7/16/2024    losartan (Cozaar) 100 mg tablet TAKE 1 TABLET BY MOUTH EVERY DAY FOR 90 DAYS 90 tablet 3 7/16/2024    metoprolol succinate XL (Toprol-XL) 25 mg 24 hr tablet Take 1 tablet (25 mg) by mouth 2 times a day. Do not crush or chew. 180 tablet 3 7/16/2024    omega 3-dha-epa-fish oil (Fish OiL) 1,000 mg (120 mg-180 mg) capsule Take 1 capsule (1,000 mg) by mouth once daily.   7/16/2024    omeprazole (PriLOSEC) 20 mg DR capsule Take 1 capsule (20 mg) by mouth once daily in the morning. Take before meals.   7/16/2024    oxybutynin (Ditropan) 5 mg tablet Take 1 tablet (5 mg) by mouth 2 times a day.   7/16/2024    oxyCODONE (Roxicodone) 15 mg immediate release tablet Take 1 tablet (15 mg) by mouth every 6 hours if needed.   7/16/2024    polyethylene glycol (Glycolax, Miralax) 17 gram packet Take 17 g by mouth once daily. Do not start before December 31, 2023.   7/16/2024    sennosides (Senokot) 8.6 mg tablet Take 2 tablets (17.2 mg) by mouth once daily at bedtime.   7/15/2024    tamsulosin (Flomax) 0.4 mg 24 hr capsule Take 1 capsule (0.4 mg) by mouth once daily at bedtime.   7/15/2024    traZODone (Desyrel) 150 mg tablet Take 1 tablet (150 mg) by mouth once daily at bedtime. FOR 90 DAYS 90 tablet 3 7/15/2024    acetaminophen (Tylenol) 325 mg tablet Take 2 tablets (650 mg) by mouth every 6 hours if needed for mild pain (1 - 3). 30 tablet 0     bisacodyl (Dulcolax) 10 mg suppository Insert 1 suppository (10 mg) into the rectum once daily as needed for constipation. 12 suppository 0     blood sugar diagnostic strip 1 strip 3 times a day before meals. 300 strip 3     gabapentin (Neurontin) 600 mg tablet Take 1 tablet (600 mg) by mouth once daily for 3 days. Do not start before December 31, 2023. (Patient taking differently: Take 1 tablet (600 mg) by mouth once daily. Takes once  daily.) 3 tablet 0     ketoconazole (NIZOral) 2 % cream Apply topically once daily. 30 g 11     ketoconazole 1 % shampoo Apply 1 Application topically every 3 days. Shampoo daily, leave on for 5-10 minutes, then rinse. 200 mL 11     magnesium hydroxide (Milk of Magnesia) 400 mg/5 mL suspension Take 30 mL by mouth once daily as needed for constipation. 360 mL 0     mupirocin (Bactroban) 2 % ointment Apply topically 3 times a day for 10 days. apply to affected area 22 g 3      Current medications:  Scheduled medications  [START ON 7/17/2024] amLODIPine, 5 mg, oral, Daily  [START ON 7/17/2024] atorvastatin, 20 mg, oral, Daily  [START ON 7/17/2024] clopidogrel, 75 mg, oral, Daily  daptomycin, 10 mg/kg, intravenous, q24h KEVIN  donepezil, 10 mg, oral, Nightly  [START ON 7/17/2024] DULoxetine, 60 mg, oral, Daily  [START ON 7/17/2024] ferrous sulfate (325 mg ferrous sulfate), 65 mg of iron, oral, Daily  [START ON 7/17/2024] folic acid, 0.4 mg, oral, Daily  gabapentin, 600 mg, oral, BID  heparin (porcine), 5,000 Units, subcutaneous, q8h KEVIN  insulin glargine, 20 Units, subcutaneous, q24h  insulin lispro, 0-10 Units, subcutaneous, TID  levETIRAcetam, 500 mg, oral, q12h  metoprolol succinate XL, 25 mg, oral, BID  oxybutynin, 5 mg, oral, BID  [START ON 7/17/2024] pantoprazole, 20 mg, oral, Daily before breakfast  piperacillin-tazobactam, 2.25 g, intravenous, q6h  [START ON 7/17/2024] polyethylene glycol, 17 g, oral, Daily  tamsulosin, 0.4 mg, oral, Nightly      Continuous medications     PRN medications  PRN medications: acetaminophen **OR** acetaminophen **OR** acetaminophen, dextrose, dextrose, glucagon, glucagon, haloperidol lactate, ondansetron ODT **OR** ondansetron, oxyCODONE, traZODone    Review of Systems   All other systems reviewed and are negative.       Objective  Range of Vitals (last 24 hours)  Heart Rate:  [80-85]   Temp:  [36 °C (96.8 °F)-36.4 °C (97.5 °F)]   Resp:  [13-18]   BP: (114-120)/(56-74)   Height:  [193  "cm (6' 4\")]   Weight:  [102 kg (225 lb)-103 kg (226 lb)]   SpO2:  [100 %]   Daily Weight  07/16/24 : 102 kg (225 lb)    Body mass index is 27.39 kg/m².     Physical Exam  Constitutional:       Appearance: Normal appearance.   HENT:      Head: Normocephalic and atraumatic.      Nose: Nose normal.   Eyes:      General: No scleral icterus.     Extraocular Movements: Extraocular movements intact.      Conjunctiva/sclera: Conjunctivae normal.   Cardiovascular:      Rate and Rhythm: Normal rate.   Pulmonary:      Effort: Pulmonary effort is normal.   Musculoskeletal:      Cervical back: Neck supple.      Right lower leg: No edema.      Left lower leg: No edema.   Skin:     Comments: Left foot dressing   Neurological:      Mental Status: He is alert.   Psychiatric:         Behavior: Behavior is cooperative.          Relevant Results  Outside Hospital Results    Labs  Results from last 72 hours   Lab Units 07/16/24  1206   WBC AUTO x10*3/uL 8.9   HEMOGLOBIN g/dL 13.0*   HEMATOCRIT % 40.2*   PLATELETS AUTO x10*3/uL 283   NEUTROS PCT AUTO % 73.6   LYMPHS PCT AUTO % 16.5   MONOS PCT AUTO % 7.0   EOS PCT AUTO % 1.5     Results from last 72 hours   Lab Units 07/16/24  1035   SODIUM mmol/L 136   POTASSIUM mmol/L 4.3   CHLORIDE mmol/L 101   CO2 mmol/L 23*   BUN mg/dL 32*   CREATININE mg/dL 2.30*   GLUCOSE mg/dL 133*   CALCIUM mg/dL 9.6   ANION GAP mmol/L 12   EGFR mL/min/1.73m*2 29*     Results from last 72 hours   Lab Units 07/16/24  1035   ALK PHOS U/L 46   BILIRUBIN TOTAL mg/dL 0.5   PROTEIN TOTAL g/dL 7.6   ALT U/L 11   AST U/L 23   ALBUMIN g/dL 4.1     Estimated Creatinine Clearance: 35.1 mL/min (A) (by C-G formula based on SCr of 2.3 mg/dL (H)).  C-Reactive Protein   Date Value Ref Range Status   07/16/2024 <0.30 0.00 - 2.00 mg/dL Final     Sedimentation Rate   Date Value Ref Range Status   07/16/2024 48 (H) 0 - 20 mm/h Final   08/28/2023 27 (H) 0 - 20 MM/HR Final     Comment:     Performed at [place] [address] " "[place] [address]     No results found for: \"HIV1X2\", \"HIVCONF\", \"EBOBVW4PE\"  No results found for: \"HEPCABINIT\", \"HEPCAB\", \"HCVPCRQUANT\"  Microbiology  Reviewed-blood and wound cultures pending  Imaging  XR foot left 3+ views    Result Date: 7/16/2024  Interpreted By:  Nash Boyd, STUDY: XR FOOT LEFT 3+ VIEWS; ;  7/16/2024 10:57 am   INDICATION: Signs/Symptoms:Diabetic ulcer of left lateral foot.   COMPARISON: 02/01/2010   ACCESSION NUMBER(S): [id number]   ORDERING CLINICIAN: INOCENTE HEARD   FINDINGS: Soft tissue swelling. Chronic appearing irregularity of the 4th and 5th proximal phalanges. Degenerative changes of the 1st MTP joint and interphalangeal joints. No acute displaced fracture. No obvious acute lytic or erosive changes to suggest overt plain radiographic evidence of osteomyelitis. Calcaneal enthesopathy.       Soft tissue swelling without obvious acute osseous abnormality.     MACRO: None   Signed by: Nash Boyd 7/16/2024 11:13 AM Dictation workstation:   [id number]    Vascular US PVR without exercise    Result Date: 7/11/2024           [place] [id number]            Phone [phone]  Vascular Lab Report  [facility] US PVR WITHOUT EXERCISE Patient Name:      LORI Valdez Physician:  [id number] Deisi Ortega MD, RPVI Study Date:        7/11/2024            Ordering Provider:  [id number] PEYTON GONZALEZ MRN/PID:           [id number]             Fellow: Accession#:        [id number]         Technologist:       Sherri Gibson RVEDWIN Date of Birth/Age: 1950 / 73 years Technologist 2: Gender:            M                    Encounter#:         [id number] Admission Status:  Outpatient           Location Performed: [facility]  Diagnosis/ICD: Peripheral vascular disease, unspecified-I73.9 Indication:    Ulceration CPT Codes:  "    64539 Peripheral artery PVR (multi segmental pressure  Pertinent History: Diabetic wound left foot.  **Report Amended** Date and Time: 7/11/2024 at 2:35:25 PM  CONCLUSIONS: Incidental finding: irregular cardiac rhythm. Right Lower PVR: No evidence of arterial occlusive disease in the right lower extremity at rest. Right pressures of >220 mmHg suggest no compressibility of vessels and may make absolute Segmental Limb Pressures (SLP) unreliable. Decreased digital perfusion noted. Multiphasic flow is noted in the right common femoral artery, right popliteal artery, right posterior tibial artery and right dorsalis pedis artery. Results called by PVR and Doppler tracings due to non-compressible vessels. Third digit used for PPG tracing due to first and second toe amputations; unable to obtain TBI using third digit due to size of digit. the thrid digit PPG tracing is dampend consistnet with small vessel disease vs. vasoconstriction. Left Lower PVR: No evidence of arterial occlusive disease in the left lower extremity at rest. Left pressures of >220 mmHg suggest no compressibility of vessels and may make absolute Segmental Limb Pressures (SLP) unreliable. Decreased digital perfusion noted. Multiphasic flow is noted in the left common femoral artery, left popliteal artery, left posterior tibial artery and left dorsalis pedis artery. Results called by PVR and Doppler tracings due to non-compressible vessels. Moderately dampened digital tracing consistent with small vessel diease or vasoconstriction; TBI is likely partially non-compressible.  Imaging & Doppler Findings:  RIGHT Lower PVR                Pressures Ratios Right High Thigh               255 mmHg  2.04 Right Low Thigh                160 mmHg  1.28 Right Calf                     255 mmHg  2.04 Right Posterior Tibial (Ankle) 255 mmHg  2.04 Right Dorsalis Pedis (Ankle)   255 mmHg  2.04   LEFT Lower PVR                Pressures Ratios Left High Thigh                255 mmHg  2.04 Left Low Thigh                165 mmHg  1.32 Left Calf                     255 mmHg  2.04 Left Posterior Tibial (Ankle) 255 mmHg  2.04 Left Dorsalis Pedis (Ankle)   157 mmHg  1.26 Left Digit (Great Toe)        100 mmHg  0.80                      Right     Left Brachial Pressure 125 mmHg 123 mmHg   [id number] Deisi Ortega MD, RPVI Electronically signed by [id number] Deisi Ortega MD, RPVI on 7/11/2024 at 2:31:01 PM  ** Final (Updated) **      Assessment/Plan   Encephalopathy  Chronic kidney disease stage III  Type 2 diabetes with peripheral angiopathy without gangrene  Left diabetic foot ulcer, Gr[place] 2 versus 3  Left foot cellulitis    IV daptomycin  IV Zosyn  CK level  Wound culture  MRI left foot  Supportive care  Monitor temperature and WBC        Michael Pelaez MD

## 2024-07-17 NOTE — PROGRESS NOTES
Evaluation/Treatment    Patient Name: Rachid Yun  MRN: [id number]  : 1950  Today's Date: 24  Time Calculation  Start Time: 930  Stop Time: 959  Time Calculation (min): 29 min       Assessment:  OT Assessment: 74 yo male admitted with a non-healing lateral left foot ulcer presents slightly below baseline functional status d/t impaired activity tolerance and generalized weakness.  Pt would benefit from OT services for ADL retraining utilizing compensatory techniques and to provide progressive strengthening in order to maximize functional capacity and safety with mobility.  Pt agreeable to same.  Prognosis: Good  Barriers to Discharge: Other (Comment) (Fall risk)  Evaluation/Treatment Tolerance: Patient limited by pain, Patient limited by fatigue  Medical Staff Made Aware: Yes  End of Session Communication: Bedside nurse  End of Session Patient Position: Up in chair, Alarm off, not on at start of session  OT Assessment Results: Decreased ADL status, Decreased upper extremity strength, Decreased endurance, Decreased functional mobility, Decreased fine motor control, Decreased IADLs  Prognosis: Good  Barriers to Discharge: Other (Comment) (Fall risk)  Evaluation/Treatment Tolerance: Patient limited by pain, Patient limited by fatigue  Medical Staff Made Aware: Yes  Strengths: Ability to acquire knowledge, Premorbid level of function, Support of Caregivers  Barriers to Participation: Comorbidities    Plan:  Treatment Interventions: ADL retraining, Functional transfer training, UE strengthening/ROM, Endurance training, Patient/family training, Equipment evaluation/education, Compensatory technique education  OT Frequency: 4 times per week  OT Discharge Recommendations: Low intensity level of continued care  Equipment Recommended upon Discharge: Wheeled walker  OT Recommended Transfer Status: Assist of 1  OT - OK to Discharge: Yes  Treatment Interventions: ADL retraining, Functional transfer training, UE  strengthening/ROM, Endurance training, Patient/family training, Equipment evaluation/education, Compensatory technique education        Subjective   Current Problem:  1. Diabetic foot infection (Multi)        2. Failure of outpatient treatment        3. Diabetic ulcer of ankle (Multi)          General:   OT Received On: 07/17/24  General  Reason for Referral: Impaired ADLs  Referred By: Dr Florence  Past Medical History Relevant to Rehab: DM, CKD3, CAD, OP, a-fib, HTN, HLD  Family/Caregiver Present: No  Prior to Session Communication: Bedside nurse  Patient Position Received: Bed, 3 rail up, Alarm off, not on at start of session  Preferred Learning Style: verbal, visual  General Comment: 72 yo male admitted with a diabetic foot ulcer was cleared by nursing for therapy and agreeable to same.    Precautions:  Hearing/Visual Limitations: hearing diminished - no aides; +corrective lenses  Medical Precautions: Fall precautions  Precautions Comment: Left surgical shoe for ambulation    Pain:  Pain Assessment  Pain Assessment: 0-10  0-10 (Numeric) Pain Score: 4  Pain Type: Acute pain  Pain Location: Foot  Pain Orientation: Left  Pain Interventions: Other (Comment) (Nurse aware - medicated patient for pain prior to session)        Objective   Cognition:  Overall Cognitive Status: Within Functional Limits  Orientation Level: Oriented X4    Home Living:  Type of Home: Condo  Lives With: Other (Comment) (Son)  Home Adaptive Equipment: Walker rolling or standard, Wheelchair-power  Home Layout: Multi-level, Able to live on main level with bedroom/bathroom  Home Access: Stairs to enter with rails, Stairs to enter without rails  Entrance Stairs-Rails: None  Entrance Stairs-Number of Steps: 1  Bathroom Shower/Tub: Tub/shower unit  Bathroom Toilet: Handicapped height  Bathroom Equipment: Shower chair with back, Grab bars in shower  Home Living Comments: Pt lives with son who works from home    Prior Function:  Level of  "Emmet: Independent with ADLs and functional transfers, Needs assistance with homemaking  Receives Help From: Other (Comment) (Son assists with IADLs and provides transportation)  ADL Assistance: Independent  Ambulatory Assistance: Independent (with a rollator and son supervising as needed)  Vocational: Retired ()  Leisure: \"I can't do them anymore because of my legs\"  Hand Dominance: Left    ADL:  Eating Assistance: Stand by  Eating Deficit: Setup (assist with containers)  Grooming Assistance: Stand by  Grooming Deficit: Setup (seated)  Bathing Assistance: Minimal  Bathing Deficit: Steadying (assist with bilateral feet)  UE Dressing Assistance: Minimal  UE Dressing Deficit: Increased time to complete, Pull down in back  LE Dressing Assistance: Moderate  LE Dressing Deficit:  (assist to tie right shoe lace and properly fasten left surgical shoe - remainder of ADLs = per clinical judgement this date)  Toileting Assistance with Device: Minimal  Toileting Deficit: Steadying    Activity Tolerance:  Endurance: Decreased tolerance for upright activites (d/t chronic back pain with mobility)    Bed Mobility/Transfers: Bed Mobility  Bed Mobility: Yes  Bed Mobility 1  Bed Mobility 1: Supine to sitting  Level of Assistance 1: Close supervision  Bed Mobility Comments 1: toward the right side of bed with head elevated ~30 degrees    Transfers  Transfer: Yes  Transfer 1  Transfer From 1: Bed to  Transfer to 1: Stand  Technique 1: Sit to stand, Stand to sit  Transfer Device 1: Walker  Transfer Level of Assistance 1: Close supervision  Trials/Comments 1: from elevated bed height x 2  Transfers 2  Transfer From 2: Stand to  Transfer to 2: Sit, Chair with arms  Technique 2: Stand to sit  Transfer Device 2: Walker  Transfer Level of Assistance 2: Close supervision    Functional Mobility:  Functional Mobility  Functional Mobility Performed: Yes (Contact guard assist in bedroom with a 2 wheeled walker ~15'.  Kellie " slow and gait effortful.)    Sensation:  Sensation Comment: Endorses numbness in right hand at baseline    Strength:  Strength Comments: BUEs=~3+/5 grossly    Hand Function:  Hand Function  Gross Grasp: Impaired (right d/t decreased sensation and arthritic hand changes)  Coordination: Impaired (decreased FMC right d/t arthritic hand changes)    Extremities: RUE   RUE : Exceptions to WFL (~30 degrees shoulder flexion) and LUE   LUE: Exceptions to WFL (~30 degrees shoulder flexion with crepitus)    Outcome Measures: [facility] Daily Activity  Putting on and taking off regular lower body clothing: A little  Bathing (including washing, rinsing, drying): A little  Putting on and taking off regular upper body clothing: A little  Toileting, which includes using toilet, bedpan or urinal: A little  Taking care of personal grooming such as brushing teeth: A little  Eating Meals: A little  Daily Activity - Total Score: 18      Education Documentation  ADL Training, taught by Shirley Peres OT at 7/17/2024 11:21 AM.  Learner: Patient  Readiness: Acceptance  Method: Explanation  Response: Demonstrated Understanding  Comment: Initiated functional transfer/mobility and ADL retraining to promote safety and independence    OP EDUCATION:  Education  Individual(s) Educated: Patient  Education Provided: Fall precautons, Risk and benefits of OT discussed with patient or other, POC discussed and agreed upon  Risk and Benefits Discussed with Patient/Caregiver/Other: yes  Patient/Caregiver Demonstrated Understanding: yes  Plan of Care Discussed and Agreed Upon: yes  Patient Response to Education: Patient/Caregiver Verbalized Understanding of Information    Goals:  Encounter Problems       Encounter Problems (Active)       OT Goals       ADLs (Progressing)       Start:  07/17/24       Pt will complete toileting, bathing, and dressing tasks at a modified independent level using adaptive aides and with increased time as needed.          Functional transfers (Progressing)       Start:  07/17/24       Pt will complete toilet, bed, and chair transfers at a modified independent level from elevated seat heights using bilateral arm supports.         Activity tolerance  (Progressing)       Start:  07/17/24       Pt will tolerate 25 minutes of therapeutic activity in order to increase functional endurance needed for I/ADLs.                 normal...

## 2024-07-17 NOTE — CONSULTS
Reason For Consult  Left foot diabetic ulcer    History Of Present Illness  Rachid Yun is a 73 y.o. male presenting with hallucinations and altered mental status with history of left foot diabetic foot ulceration.  Patient was recently seen in my office for evaluation of diabetic foot ulceration to lateral column of left foot overlying fifth metatarsal base.  The ulcer at that time was clinically stable.  Patient was receiving IV antibiotics.  Patient had noninvasive vascular testing performed.  He had falsely elevated ABIs on the left lower extremity with nonpalpable dorsalis pedis pulse.  Patient's white blood cell count on admission was 7000.  CRP was within normal limits.  Patient is resting comfortably at bedside.  Denies nausea, vomiting, fever, chills, shortness of breath.  No other pedal complaints     Past Medical History  He has a past medical history of Arthritis, Coronary artery disease, Diabetes mellitus (Multi), Hypertension, and Stroke (Multi).    Surgical History  He has a past surgical history that includes Back surgery; Cardiac electrophysiology procedure (N/A, 11/29/2023); Cardiac catheterization (N/A, 12/28/2023); Cardiac catheterization (N/A, 12/28/2023); Radiofrequency ablation; Coronary angioplasty with stent; and Coronary angioplasty with stent.     Social History  He reports that he quit smoking about 36 years ago. His smoking use included cigarettes. He has been exposed to tobacco smoke. He has never used smokeless tobacco. He reports that he does not currently use alcohol. He reports that he does not use drugs.    Family History  Family History   Problem Relation Name Age of Onset    Stomach cancer Mother      Diabetes Mother      Coronary artery disease Father      Other (Pacemaker) Father      Other (S/p CABG) Sister      Other (S/p CABG) Brother      Ovarian cancer Sibling      Coronary artery disease Sibling          Allergies  Meperidine, Vancomycin, Hydromorphone, Other, and  "Rifampin    Review of Systems  Unremarkable     Physical Exam  Pedal pulses are nonpalpable to left lower extremity.  Full-thickness stage III ulceration noted to lateral column of left foot overlying styloid process of fifth metatarsal.  The ulceration is necrotic.  Surrounding erythema is noted.  No proximal streaking.  No drainage or discharge.  No direct probe to bone.  Foot and ankle are rectus.  No gross deformity noted.  Webspaces are clean, dry, and intact.  Multiple digital amputations are noted to the right foot.  No malodor is noted to the wound.  It is Homans' sign.  Muscle strength testing is 5 out of 5 for all groups tested.  Epicritic and neurovascular status is absent when tested with Gloster Deandre monofilament.  No lymphedema is noted.     Last Recorded Vitals  Blood pressure 110/61, pulse 60, temperature 36.2 °C (97.2 °F), temperature source Oral, resp. rate 17, height 1.93 m (6' 4\"), weight 102 kg (225 lb), SpO2 100%.    Relevant Results  Radiographs are negative for osteomyelitis of the left foot.  CRP is within normal limits.  White blood cell count is within normal limits.     Assessment/Plan     Patient has chronic ulceration to lateral column of the left foot.  Clinically this appears stable with no underlying evidence for osteomyelitis or abscess.  No surgical debridement is necessary at this point in time.  My recommendation is to continue with conservative wound care.  I ordered the patient Santyl ointment to apply over the wound for enzymatic debridement.  My recommendation is for the wound to be dressed daily with Santyl ointment and gauze dressing.  Continue to offload with a surgical shoe.  Recommended follow-up outpatient in my office in 2 to 3 weeks.  I am signing off on patient.  Please reconsult if condition changes.  Okay for discharge home on oral antibiotics.    I spent 25 minutes in the professional and overall care of this patient.      Christiano Khalil, SABA    "

## 2024-07-17 NOTE — CARE PLAN
The patient's goals for the shift include rest    The clinical goals for the shift include safety    Over the shift, the patient did   Problem: Pain  Goal: Takes deep breaths with improved pain control throughout the shift  7/17/2024 0137 by Lesley Connor RN  Outcome: Progressing  7/17/2024 0137 by Lesley Connor RN  Outcome: Progressing     Problem: Safety - Adult  Goal: Free from fall injury  7/17/2024 0137 by Lesley Connor RN  Outcome: Progressing  7/17/2024 0137 by Lesley Connor RN  Outcome: Progressing     Problem: Pain - Adult  Goal: Verbalizes/displays adequate comfort level or baseline comfort level  7/17/2024 0137 by Lesley Connor RN  Outcome: Progressing  7/17/2024 0137 by Lesley Connor RN  Outcome: Progressing   make progress toward the following goals.

## 2024-07-17 NOTE — NURSING NOTE
Left foot dressing cleansed with ns and dressing applied.  A photo was taken of the wound before it was wrapped but d/t connection issues the photo was not saved to pt's chart( multiple attempts made to save the photo)

## 2024-07-17 NOTE — PROGRESS NOTES
07/17/24 1701   [facility] Disability Status   Are you deaf or do you have serious difficulty hearing? N   Are you blind or do you have serious difficulty seeing, even when wearing glasses? N   Because of a physical, mental, or emotional condition, do you have serious difficulty concentrating, remembering, or making decisions? (5 years old or older) N   Do you have serious difficulty walking or climbing stairs? Y  ([facility] [address] [facility] [place])   Do you have serious difficulty dressing or bathing? N   Because of a physical, mental, or emotional condition, do you have serious difficulty doing errands alone such as visiting the doctor? N

## 2024-07-18 ENCOUNTER — DOCUMENTATION (OUTPATIENT)
Dept: HOME HEALTH SERVICES | Facility: HOME HEALTH | Age: 74
End: 2024-07-18
Payer: MEDICARE

## 2024-07-18 ENCOUNTER — PHARMACY VISIT (OUTPATIENT)
Dept: PHARMACY | Facility: CLINIC | Age: 74
End: 2024-07-18
Payer: COMMERCIAL

## 2024-07-18 ENCOUNTER — DOCUMENTATION (OUTPATIENT)
Dept: RESEARCH | Age: 74
End: 2024-07-18
Payer: MEDICARE

## 2024-07-18 ENCOUNTER — HOME HEALTH ADMISSION (OUTPATIENT)
Dept: HOME HEALTH SERVICES | Facility: HOME HEALTH | Age: 74
End: 2024-07-18
Payer: MEDICARE

## 2024-07-18 VITALS
RESPIRATION RATE: 16 BRPM | HEART RATE: 69 BPM | WEIGHT: 225 LBS | DIASTOLIC BLOOD PRESSURE: 83 MMHG | BODY MASS INDEX: 27.4 KG/M2 | OXYGEN SATURATION: 99 % | TEMPERATURE: 98.6 F | SYSTOLIC BLOOD PRESSURE: 146 MMHG | HEIGHT: 76 IN

## 2024-07-18 LAB
ALBUMIN SERPL-MCNC: 3.7 G/DL (ref 3.5–5)
ANION GAP SERPL CALC-SCNC: 11 MMOL/L
BACTERIA UR CULT: NORMAL
BASOPHILS # BLD AUTO: 0.06 X10*3/UL (ref 0–0.1)
BASOPHILS NFR BLD AUTO: 1.1 %
BUN SERPL-MCNC: 37 MG/DL (ref 8–25)
CALCIUM SERPL-MCNC: 8.9 MG/DL (ref 8.5–10.4)
CHLORIDE SERPL-SCNC: 102 MMOL/L (ref 97–107)
CO2 SERPL-SCNC: 23 MMOL/L (ref 24–31)
CREAT SERPL-MCNC: 2.6 MG/DL (ref 0.4–1.6)
EGFRCR SERPLBLD CKD-EPI 2021: 25 ML/MIN/1.73M*2
EOSINOPHIL # BLD AUTO: 0.22 X10*3/UL (ref 0–0.4)
EOSINOPHIL NFR BLD AUTO: 3.9 %
ERYTHROCYTE [DISTWIDTH] IN BLOOD BY AUTOMATED COUNT: 13.8 % (ref 11.5–14.5)
GLUCOSE BLD MANUAL STRIP-MCNC: 124 MG/DL (ref 74–99)
GLUCOSE BLD MANUAL STRIP-MCNC: 125 MG/DL (ref 74–99)
GLUCOSE SERPL-MCNC: 141 MG/DL (ref 65–99)
HCT VFR BLD AUTO: 37.1 % (ref 41–52)
HGB BLD-MCNC: 11.7 G/DL (ref 13.5–17.5)
IMM GRANULOCYTES # BLD AUTO: 0.02 X10*3/UL (ref 0–0.5)
IMM GRANULOCYTES NFR BLD AUTO: 0.4 % (ref 0–0.9)
LYMPHOCYTES # BLD AUTO: 0.5 X10*3/UL (ref 0.8–3)
LYMPHOCYTES NFR BLD AUTO: 8.8 %
MCH RBC QN AUTO: 29 PG (ref 26–34)
MCHC RBC AUTO-ENTMCNC: 31.5 G/DL (ref 32–36)
MCV RBC AUTO: 92 FL (ref 80–100)
MONOCYTES # BLD AUTO: 0.47 X10*3/UL (ref 0.05–0.8)
MONOCYTES NFR BLD AUTO: 8.3 %
NEUTROPHILS # BLD AUTO: 4.4 X10*3/UL (ref 1.6–5.5)
NEUTROPHILS NFR BLD AUTO: 77.5 %
NRBC BLD-RTO: 0 /100 WBCS (ref 0–0)
PHOSPHATE SERPL-MCNC: 3.6 MG/DL (ref 2.5–4.5)
PLATELET # BLD AUTO: 225 X10*3/UL (ref 150–450)
POTASSIUM SERPL-SCNC: 4.5 MMOL/L (ref 3.4–5.1)
RBC # BLD AUTO: 4.03 X10*6/UL (ref 4.5–5.9)
SODIUM SERPL-SCNC: 136 MMOL/L (ref 133–145)
WBC # BLD AUTO: 5.7 X10*3/UL (ref 4.4–11.3)

## 2024-07-18 PROCEDURE — 2500000001 HC RX 250 WO HCPCS SELF ADMINISTERED DRUGS (ALT 637 FOR MEDICARE OP): Performed by: INTERNAL MEDICINE

## 2024-07-18 PROCEDURE — 2500000002 HC RX 250 W HCPCS SELF ADMINISTERED DRUGS (ALT 637 FOR MEDICARE OP, ALT 636 FOR OP/ED): Performed by: INTERNAL MEDICINE

## 2024-07-18 PROCEDURE — 2500000004 HC RX 250 GENERAL PHARMACY W/ HCPCS (ALT 636 FOR OP/ED): Performed by: INTERNAL MEDICINE

## 2024-07-18 PROCEDURE — RXMED WILLOW AMBULATORY MEDICATION CHARGE

## 2024-07-18 PROCEDURE — 82947 ASSAY GLUCOSE BLOOD QUANT: CPT

## 2024-07-18 PROCEDURE — 36415 COLL VENOUS BLD VENIPUNCTURE: CPT | Performed by: INTERNAL MEDICINE

## 2024-07-18 PROCEDURE — 85025 COMPLETE CBC W/AUTO DIFF WBC: CPT | Performed by: INTERNAL MEDICINE

## 2024-07-18 PROCEDURE — 97535 SELF CARE MNGMENT TRAINING: CPT | Mod: GO

## 2024-07-18 PROCEDURE — 97530 THERAPEUTIC ACTIVITIES: CPT | Mod: GO

## 2024-07-18 PROCEDURE — 80069 RENAL FUNCTION PANEL: CPT | Performed by: INTERNAL MEDICINE

## 2024-07-18 PROCEDURE — 97116 GAIT TRAINING THERAPY: CPT | Mod: GP

## 2024-07-18 RX ORDER — AMOXICILLIN AND CLAVULANATE POTASSIUM 500; 125 MG/1; MG/1
1 TABLET, FILM COATED ORAL 2 TIMES DAILY
Qty: 28 TABLET | Refills: 0 | Status: SHIPPED | OUTPATIENT
Start: 2024-07-18 | End: 2024-08-01

## 2024-07-18 RX ORDER — GABAPENTIN 300 MG/1
300 CAPSULE ORAL 3 TIMES DAILY
Qty: 90 CAPSULE | Refills: 0 | Status: SHIPPED | OUTPATIENT
Start: 2024-07-18

## 2024-07-18 ASSESSMENT — COGNITIVE AND FUNCTIONAL STATUS - GENERAL
CLIMB 3 TO 5 STEPS WITH RAILING: A LITTLE
MOVING TO AND FROM BED TO CHAIR: A LITTLE
WALKING IN HOSPITAL ROOM: A LITTLE
HELP NEEDED FOR BATHING: A LITTLE
MOBILITY SCORE: 18
WALKING IN HOSPITAL ROOM: A LITTLE
TOILETING: A LITTLE
DAILY ACTIVITIY SCORE: 19
DRESSING REGULAR UPPER BODY CLOTHING: A LITTLE
TOILETING: A LITTLE
STANDING UP FROM CHAIR USING ARMS: A LITTLE
DRESSING REGULAR LOWER BODY CLOTHING: A LITTLE
DRESSING REGULAR LOWER BODY CLOTHING: A LITTLE
MOBILITY SCORE: 22
PERSONAL GROOMING: A LITTLE
DAILY ACTIVITIY SCORE: 20
DRESSING REGULAR UPPER BODY CLOTHING: A LITTLE
CLIMB 3 TO 5 STEPS WITH RAILING: A LOT
TURNING FROM BACK TO SIDE WHILE IN FLAT BAD: A LITTLE
HELP NEEDED FOR BATHING: A LITTLE

## 2024-07-18 ASSESSMENT — PAIN DESCRIPTION - ORIENTATION
ORIENTATION: LOWER
ORIENTATION: LOWER

## 2024-07-18 ASSESSMENT — PAIN DESCRIPTION - DESCRIPTORS
DESCRIPTORS: DISCOMFORT;PRESSURE
DESCRIPTORS: ACHING

## 2024-07-18 ASSESSMENT — PAIN - FUNCTIONAL ASSESSMENT
PAIN_FUNCTIONAL_ASSESSMENT: 0-10

## 2024-07-18 ASSESSMENT — PAIN SCALES - GENERAL
PAINLEVEL_OUTOF10: 6
PAINLEVEL_OUTOF10: 8
PAINLEVEL_OUTOF10: 3
PAINLEVEL_OUTOF10: 6
PAINLEVEL_OUTOF10: 6
PAINLEVEL_OUTOF10: 7
PAINLEVEL_OUTOF10: 6

## 2024-07-18 ASSESSMENT — ACTIVITIES OF DAILY LIVING (ADL): HOME_MANAGEMENT_TIME_ENTRY: 20

## 2024-07-18 ASSESSMENT — PAIN DESCRIPTION - LOCATION
LOCATION: BACK
LOCATION: BACK

## 2024-07-18 NOTE — PROGRESS NOTES
07/18/24 1249   Discharge Planning   Expected Discharge Disposition  Services     Met with patient and his daughter at bedside.  Patient continues to decline rehab.  Would like [facility] PT/OT and has chosen [facility].  PCP is Dr. BARTOLO Marshall. Will need an internal referral for [facility] PT/OT.  Will discharge home on oral antibiotics.

## 2024-07-18 NOTE — NURSING NOTE
Pt observed sitting up in bed, alert and oriented x 4 on room air, pleasant affect, smiling with greeting, with unlabored respirations, symmetrical chest expansions, exposed skin appropriate for ethnic background...    Pt was observed perusing the menu, stating he was just about to order lunch..    Pt reporting pain 6/10 at the back..    Pt denying immediate needs...    Pt reporting that he has been up out of bed, worked and ambulated with Therapy, and had been sitting up in chair at bedside most of the morning...

## 2024-07-18 NOTE — HH CARE COORDINATION
Home Care received a Referral for Nursing, Physical Therapy, and Occupational Therapy. We have processed the referral for a Start of Care on 07/19-07/20.     If you have any questions or concerns, please feel free to contact us at [phone]. Follow the prompts, enter your five digit zip code, and you will be directed to your care team on [facility] 1.

## 2024-07-18 NOTE — CARE PLAN
Problem: Pain - Adult  Goal: Verbalizes/displays adequate comfort level or baseline comfort level  Outcome: Progressing     Problem: Safety - Adult  Goal: Free from fall injury  Outcome: Progressing     Problem: Pain  Goal: Takes deep breaths with improved pain control throughout the shift  Outcome: Progressing  Goal: Turns in bed with improved pain control throughout the shift  Outcome: Progressing  Goal: Walks with improved pain control throughout the shift  Outcome: Progressing  Goal: Performs ADL's with improved pain control throughout shift  Outcome: Progressing  Goal: Participates in PT with improved pain control throughout the shift  Outcome: Progressing  Goal: Free from opioid side effects throughout the shift  Outcome: Progressing  Goal: Free from acute confusion related to pain meds throughout the shift  Outcome: Progressing     Problem: Skin  Goal: Decreased wound size/increased tissue granulation at next dressing change  Outcome: Progressing  Flowsheets (Taken 7/17/2024 2212)  Decreased wound size/increased tissue granulation at next dressing change:   Promote sleep for wound healing   Protective dressings over bony prominences   Utilize specialty bed per algorithm  Goal: Participates in plan/prevention/treatment measures  Outcome: Progressing  Flowsheets (Taken 7/17/2024 2212)  Participates in plan/prevention/treatment measures:   Discuss with provider PT/OT consult   Elevate heels   Increase activity/out of bed for meals  Goal: Prevent/manage excess moisture  Outcome: Progressing  Flowsheets (Taken 7/17/2024 2212)  Prevent/manage excess moisture:   Cleanse incontinence/protect with barrier cream   Follow provider orders for dressing changes   Moisturize dry skin   Monitor for/manage infection if present   Use wicking fabric (obtain order)  Goal: Prevent/minimize sheer/friction injuries  Outcome: Progressing  Flowsheets (Taken 7/17/2024 2212)  Prevent/minimize sheer/friction injuries:   Complete  micro-shifts as needed if patient unable. Adjust patient position to relieve pressure points, not a full turn   HOB 30 degrees or less   Increase activity/out of bed for meals   Turn/reposition every 2 hours/use positioning/transfer devices   Use pull sheet   Utilize specialty bed per algorithm  Goal: Promote/optimize nutrition  Outcome: Progressing  Flowsheets (Taken 7/17/2024 2212)  Promote/optimize nutrition:   Assist with feeding   Consume > 50% meals/supplements   Discuss with provider if NPO > 2 days   Monitor/record intake including meals   Offer water/supplements/favorite foods   Reassess MST if dietician not consulted  Goal: Promote skin healing  Outcome: Progressing  Flowsheets (Taken 7/17/2024 2212)  Promote skin healing:   Assess skin/pad under line(s)/device(s)   Ensure correct size (line/device) and apply per  instructions   Protective dressings over bony prominences   Rotate device position/do not position patient on device   Turn/reposition every 2 hours/use positioning/transfer devices   The patient's goals for the shift include rest    The clinical goals for the shift include Patient will remain safe and free froma falls this shift    Over the shift, the patient did not make progress toward the following goals. Barriers to progression include . Recommendations to address these barriers include .

## 2024-07-18 NOTE — CARE PLAN
The patient's goals for the shift include rest    The clinical goals for the shift include Pt will remain safe and free from falls, Pt will spend meals up in chair, and Pt will have pain controlled this shift.        Problem: Pain - Adult  Goal: Verbalizes/displays adequate comfort level or baseline comfort level  7/18/2024 1524 by Allie Gonsales RN  Outcome: Adequate for Discharge  7/18/2024 1523 by Allie Gonsales RN  Outcome: Adequate for Discharge  7/18/2024 1131 by Allie Gonsales RN  Outcome: Progressing  Flowsheets (Taken 7/18/2024 1131)  Verbalizes/displays adequate comfort level or baseline comfort level:   Encourage patient to monitor pain and request assistance   Assess pain using appropriate pain scale   Administer analgesics based on type and severity of pain and evaluate response   Implement non-pharmacological measures as appropriate and evaluate response   Consider cultural and social influences on pain and pain management   Notify Licensed Independent Practitioner if interventions unsuccessful or patient reports new pain     Problem: Safety - Adult  Goal: Free from fall injury  7/18/2024 1524 by Allie Gonsales RN  Outcome: Adequate for Discharge  7/18/2024 1523 by Allie Gonsales RN  Outcome: Adequate for Discharge  7/18/2024 1131 by Allie Gonsales RN  Outcome: Progressing  Flowsheets (Taken 7/18/2024 1131)  Free from fall injury:   Instruct family/caregiver on patient safety   Based on caregiver fall risk screen, instruct family/caregiver to ask for assistance with transferring infant if caregiver noted to have fall risk factors     Problem: Pain  Goal: Takes deep breaths with improved pain control throughout the shift  7/18/2024 1524 by Allie Gonsales RN  Outcome: Adequate for Discharge  7/18/2024 1523 by Allie Gonsales RN  Outcome: Adequate for Discharge  7/18/2024 1131 by Allie Gonsales RN  Outcome: Progressing  Goal: Turns in bed with improved pain control throughout the  shift  7/18/2024 1524 by Allie Gonsales RN  Outcome: Adequate for Discharge  7/18/2024 1523 by Allie Gonsales RN  Outcome: Adequate for Discharge  7/18/2024 1131 by Allie Gonsales RN  Outcome: Progressing  Goal: Walks with improved pain control throughout the shift  7/18/2024 1524 by Allie Gonsales RN  Outcome: Adequate for Discharge  7/18/2024 1523 by Allie Gonsales RN  Outcome: Adequate for Discharge  7/18/2024 1131 by Allie Gonsales RN  Outcome: Progressing  Goal: Performs ADL's with improved pain control throughout shift  7/18/2024 1524 by Allie Gonsales RN  Outcome: Adequate for Discharge  7/18/2024 1523 by Allie Gonsales RN  Outcome: Adequate for Discharge  7/18/2024 1131 by Allie Gonsales RN  Outcome: Progressing  Goal: Participates in PT with improved pain control throughout the shift  7/18/2024 1524 by Allie Gonsales RN  Outcome: Adequate for Discharge  7/18/2024 1523 by Allie Gonsales RN  Outcome: Adequate for Discharge  7/18/2024 1131 by Allie Gonsales RN  Outcome: Progressing  Goal: Free from opioid side effects throughout the shift  7/18/2024 1524 by Allie Gonsales RN  Outcome: Adequate for Discharge  7/18/2024 1523 by Allie Gonsales RN  Outcome: Adequate for Discharge  7/18/2024 1131 by Allie Gonsales RN  Outcome: Progressing  Goal: Free from acute confusion related to pain meds throughout the shift  7/18/2024 1524 by Allie Gonsales RN  Outcome: Adequate for Discharge  7/18/2024 1523 by Allie Gonsales RN  Outcome: Adequate for Discharge  7/18/2024 1131 by Allie Gonsales RN  Outcome: Progressing     Problem: Skin  Goal: Decreased wound size/increased tissue granulation at next dressing change  7/18/2024 1524 by Allie Gonsales RN  Outcome: Adequate for Discharge  7/18/2024 1523 by Allie Gonsales RN  Outcome: Adequate for Discharge  7/18/2024 1131 by Allie Gonsales RN  Outcome: Progressing  Flowsheets (Taken 7/18/2024 1131)  Decreased wound  size/increased tissue granulation at next dressing change:   Promote sleep for wound healing   Protective dressings over bony prominences  Goal: Participates in plan/prevention/treatment measures  7/18/2024 1524 by Allie Gonsales RN  Outcome: Adequate for Discharge  7/18/2024 1523 by Allie Gonsales RN  Outcome: Adequate for Discharge  7/18/2024 1131 by Allie Gonsales RN  Outcome: Progressing  Flowsheets (Taken 7/18/2024 1131)  Participates in plan/prevention/treatment measures:   Discuss with provider PT/OT consult   Elevate heels   Increase activity/out of bed for meals  Goal: Prevent/manage excess moisture  7/18/2024 1524 by Allie Gonsales RN  Outcome: Adequate for Discharge  7/18/2024 1523 by Allie Gonsales RN  Outcome: Adequate for Discharge  7/18/2024 1131 by Allie Gonsales RN  Outcome: Progressing  Flowsheets (Taken 7/18/2024 1131)  Prevent/manage excess moisture:   Cleanse incontinence/protect with barrier cream   Follow provider orders for dressing changes   Moisturize dry skin   Monitor for/manage infection if present  Goal: Prevent/minimize sheer/friction injuries  7/18/2024 1524 by Allie Gonsales RN  Outcome: Adequate for Discharge  7/18/2024 1523 by Allie Gonsales RN  Outcome: Adequate for Discharge  7/18/2024 1131 by Allie Gonsales RN  Outcome: Progressing  Flowsheets (Taken 7/18/2024 1131)  Prevent/minimize sheer/friction injuries:   Complete micro-shifts as needed if patient unable. Adjust patient position to relieve pressure points, not a full turn   Increase activity/out of bed for meals   HOB 30 degrees or less   Turn/reposition every 2 hours/use positioning/transfer devices   Use pull sheet  Goal: Promote/optimize nutrition  7/18/2024 1524 by Allie Gonsales RN  Outcome: Adequate for Discharge  7/18/2024 1523 by Allie Gonsales RN  Outcome: Adequate for Discharge  7/18/2024 1131 by Allie Gonsales RN  Outcome: Progressing  Flowsheets (Taken 7/18/2024  1131)  Promote/optimize nutrition:   Offer water/supplements/favorite foods   Consume > 50% meals/supplements   Monitor/record intake including meals  Goal: Promote skin healing  7/18/2024 1524 by Allie Gonsales RN  Outcome: Adequate for Discharge  7/18/2024 1523 by Allie Gonsales RN  Outcome: Adequate for Discharge  7/18/2024 1131 by Allie Gonsales RN  Outcome: Progressing  Flowsheets (Taken 7/18/2024 1131)  Promote skin healing:   Protective dressings over bony prominences   Turn/reposition every 2 hours/use positioning/transfer devices   Ensure correct size (line/device) and apply per  instructions   Rotate device position/do not position patient on device   Assess skin/pad under line(s)/device(s)

## 2024-07-18 NOTE — PROGRESS NOTES
Occupational Therapy    OT Treatment    Patient Name: Rachid Yun  MRN: [id number]  Today's Date: 7/18/2024  Time Calculation  Start Time: 1402  Stop Time: 1433  Time Calculation (min): 31 min        Assessment:  End of Session Communication: Bedside nurse  End of Session Patient Position: Bed, 3 rail up (all needs in reach, RN aware)  OT Assessment Results: Decreased ADL status, Decreased upper extremity strength, Decreased endurance, Decreased functional mobility, Decreased fine motor control, Decreased IADLs  Plan:  Treatment Interventions: ADL retraining, Functional transfer training, UE strengthening/ROM, Endurance training, Patient/family training, Equipment evaluation/education, Compensatory technique education  OT Frequency: 4 times per week  OT Discharge Recommendations: Low intensity level of continued care  Equipment Recommended upon Discharge: Wheeled walker  OT Recommended Transfer Status: Assist of 1  OT - OK to Discharge: Yes  Treatment Interventions: ADL retraining, Functional transfer training, UE strengthening/ROM, Endurance training, Patient/family training, Equipment evaluation/education, Compensatory technique education    Subjective   Previous Visit Info:  OT Last Visit  OT Received On: 07/18/24  General:  General  Past Medical History Relevant to Rehab: L foot diabetic ulcer, arthritis, CAD, DM, HTN, Stroke  Family/Caregiver Present: Yes  Caregiver Feedback:  (daughter)  Prior to Session Communication: Bedside nurse  Patient Position Received: Alarm off, not on at start of session (seated EOB)  General Comment: Cleared by nursing. Pt pleasant and agreeable to therapy  Precautions:  Medical Precautions: Fall precautions  Precautions Comment: L post-op shoe  Vital Signs:  Vital Signs  Heart Rate: 69  SpO2: 99 % (on RA)  Pain:  Pain Assessment  Pain Assessment: 0-10  0-10 (Numeric) Pain Score: 6  Pain Type: Chronic pain  Pain Location: Back  Pain Orientation: Lower  Pain Interventions:  Repositioned (nsg aware)    Objective    Cognition:  Cognition  Overall Cognitive Status: Within Functional Limits     Activities of Daily Living:      Grooming  Grooming Level of Assistance: Contact guard, Setup  Grooming Where Assessed: Standing sinkside (seated)  Grooming Comments: CGA for brushing teeth and combing hair standing at sink; set for washing hair and face in seated    LE Dressing  LE Dressing: Yes  Shoe Level of Assistance: Minimum assistance  LE Dressing Where Assessed: Edge of bed  LE Dressing Comments: donning doffin shoe and surgical shoe       Functional Standing Tolerance:  Time: ~10 minutes  Activity: ADL tasks  Functional Standing Tolerance Comments: required one seated rest break  Bed Mobility/Transfers: Bed Mobility  Bed Mobility:  (Close supervision seated EOB>supine)    Transfers  Transfer:  (CGA for balance and controlled descent sit<>stand bed level, VCs for safe hand and leg placement)    Toilet Transfers  Toilet Transfer Type: To and from  Toilet Transfer to: Raised toilet seat with rails  Toilet Transfers: Minimal assistance  Toilet Transfers Comments: Min A for balance and controlled desent to/from toilet with use of grab bars, increased effort to perform       Functional Mobility:  Functional Mobility  Functional Mobility Performed:  (CGA for balance/safety for functional mobility short household distance with use of RW, slow movements this date)  Sitting Balance:  Static Sitting Balance  Static Sitting-Level of Assistance: Distant supervision  Standing Balance:  Static Standing Balance  Static Standing-Level of Assistance: Contact guard    Outcome Measures:[facility] Daily Activity  Putting on and taking off regular lower body clothing: A little  Bathing (including washing, rinsing, drying): A little  Putting on and taking off regular upper body clothing: A little  Toileting, which includes using toilet, bedpan or urinal: A little  Taking care of personal grooming such as brushing  teeth: A little  Eating Meals: None  Daily Activity - Total Score: 19    Education Documentation  No documentation found.  Education Comments  No comments found.      Goals:  Encounter Problems       Encounter Problems (Active)       OT Goals       ADLs (Progressing)       Start:  07/17/24    Expected End:  08/15/24       Pt will complete toileting, bathing, and dressing tasks at a modified independent level using adaptive aides and with increased time as needed.         Functional transfers (Progressing)       Start:  07/17/24    Expected End:  08/15/24       Pt will complete toilet, bed, and chair transfers at a modified independent level from elevated seat heights using bilateral arm supports.         Activity tolerance  (Progressing)       Start:  07/17/24    Expected End:  08/15/24       Pt will tolerate 25 minutes of therapeutic activity in order to increase functional endurance needed for I/ADLs.

## 2024-07-18 NOTE — RESEARCH NOTES
Artificial Intelligence Monitoring in Nursing (AIMS Nursing) Study    Principle Investigator - Dr. Celio Taveras  Research Coordinator - Emma Winkler     Patient Name - Rachid Yun  Date - 7/18/2024 4:36 PM  Location - [address]    Rachid Yun was approached by Emma Winkler to talk about participating in the AIMS Nursing Study. The patient was approached, they are expecting to be discharged today. Study protocol was followed and patient was given study contact information.     Emma Winkler

## 2024-07-18 NOTE — CARE PLAN
The patient's goals for the shift include ambulation, safety, pain control     The clinical goals for the shift include Pt will remain safe and free from falls, Pt will spend meals up in chair, and Pt will have pain controlled this shift.        Problem: Pain - Adult  Goal: Verbalizes/displays adequate comfort level or baseline comfort level  Outcome: Progressing  Flowsheets (Taken 7/18/2024 1131)  Verbalizes/displays adequate comfort level or baseline comfort level:   Encourage patient to monitor pain and request assistance   Assess pain using appropriate pain scale   Administer analgesics based on type and severity of pain and evaluate response   Implement non-pharmacological measures as appropriate and evaluate response   Consider cultural and social influences on pain and pain management   Notify Licensed Independent Practitioner if interventions unsuccessful or patient reports new pain     Problem: Safety - Adult  Goal: Free from fall injury  Outcome: Progressing  Flowsheets (Taken 7/18/2024 1131)  Free from fall injury:   Instruct family/caregiver on patient safety   Based on caregiver fall risk screen, instruct family/caregiver to ask for assistance with transferring infant if caregiver noted to have fall risk factors     Problem: Pain  Goal: Takes deep breaths with improved pain control throughout the shift  Outcome: Progressing  Goal: Turns in bed with improved pain control throughout the shift  Outcome: Progressing  Goal: Walks with improved pain control throughout the shift  Outcome: Progressing  Goal: Performs ADL's with improved pain control throughout shift  Outcome: Progressing  Goal: Participates in PT with improved pain control throughout the shift  Outcome: Progressing  Goal: Free from opioid side effects throughout the shift  Outcome: Progressing  Goal: Free from acute confusion related to pain meds throughout the shift  Outcome: Progressing     Problem: Skin  Goal: Decreased wound size/increased  tissue granulation at next dressing change  Outcome: Progressing  Flowsheets (Taken 7/18/2024 1131)  Decreased wound size/increased tissue granulation at next dressing change:   Promote sleep for wound healing   Protective dressings over bony prominences  Goal: Participates in plan/prevention/treatment measures  Outcome: Progressing  Flowsheets (Taken 7/18/2024 1131)  Participates in plan/prevention/treatment measures:   Discuss with provider PT/OT consult   Elevate heels   Increase activity/out of bed for meals  Goal: Prevent/manage excess moisture  Outcome: Progressing  Flowsheets (Taken 7/18/2024 1131)  Prevent/manage excess moisture:   Cleanse incontinence/protect with barrier cream   Follow provider orders for dressing changes   Moisturize dry skin   Monitor for/manage infection if present  Goal: Prevent/minimize sheer/friction injuries  Outcome: Progressing  Flowsheets (Taken 7/18/2024 1131)  Prevent/minimize sheer/friction injuries:   Complete micro-shifts as needed if patient unable. Adjust patient position to relieve pressure points, not a full turn   Increase activity/out of bed for meals   HOB 30 degrees or less   Turn/reposition every 2 hours/use positioning/transfer devices   Use pull sheet  Goal: Promote/optimize nutrition  Outcome: Progressing  Flowsheets (Taken 7/18/2024 1131)  Promote/optimize nutrition:   Offer water/supplements/favorite foods   Consume > 50% meals/supplements   Monitor/record intake including meals  Goal: Promote skin healing  Outcome: Progressing  Flowsheets (Taken 7/18/2024 1131)  Promote skin healing:   Protective dressings over bony prominences   Turn/reposition every 2 hours/use positioning/transfer devices   Ensure correct size (line/device) and apply per  instructions   Rotate device position/do not position patient on device   Assess skin/pad under line(s)/device(s)

## 2024-07-18 NOTE — NURSING NOTE
Pt meds delivered to bedside by Pharmacy. Pt ambulated with assist to wheelchair, Pt escorted to his family's vehicle. Pt discharged.

## 2024-07-18 NOTE — PROGRESS NOTES
Rachid Yun is a 73 y.o. male on day 2 of admission presenting with Diabetic ulcer of ankle (Multi).    Subjective   Interval History:   Afebrile, no chills  No foot pain  No chest pain or shortness of breath.  No nausea vomiting or diarrhea    Review of Systems   All other systems reviewed and are negative.      Objective   Range of Vitals (last 24 hours)  Heart Rate:  [70-97]   Temp:  [36.4 °C (97.5 °F)-37 °C (98.6 °F)]   Resp:  [12-16]   BP: (100-146)/(62-83)   SpO2:  [95 %-100 %]   Daily Weight  07/16/24 : 102 kg (225 lb)    Body mass index is 27.39 kg/m².    Physical Exam  Constitutional:       Appearance: Normal appearance.   HENT:      Head: Normocephalic and atraumatic.      Nose: Nose normal.      Mouth/Throat:      Pharynx: Oropharynx is clear.   Eyes:      Extraocular Movements: Extraocular movements intact.      Conjunctiva/sclera: Conjunctivae normal.   Cardiovascular:      Rate and Rhythm: Normal rate and regular rhythm.   Pulmonary:      Effort: Pulmonary effort is normal.      Breath sounds: Normal breath sounds.   Abdominal:      General: Bowel sounds are normal.      Palpations: Abdomen is soft.      Tenderness: There is no abdominal tenderness.   Musculoskeletal:         General: No swelling. Normal range of motion.      Cervical back: Normal range of motion and neck supple.   Skin:     General: Skin is warm and dry.      Comments: Left lateral foot ulceration with eschar, priscilla-ulcer erythema with slight increased warmth and tenderness   Neurological:      General: No focal deficit present.      Mental Status: He is alert and oriented to person, place, and time.   Psychiatric:         Mood and Affect: Mood normal.        Antibiotics  linezolid (Zyvox)  mg in 300 mL  amLODIPine (Norvasc) tablet 5 mg  atorvastatin (Lipitor) tablet 20 mg  clopidogrel (Plavix) tablet 75 mg  donepezil (Aricept) tablet 10 mg  DULoxetine (Cymbalta) DR capsule 60 mg  folic acid (Folvite) tablet 0.4 mg  ferrous  sulfate (325 mg ferrous sulfate) tablet 1 tablet  gabapentin (Neurontin) tablet 600 mg  insulin glargine (Lantus) injection 20 Units  levETIRAcetam (Keppra) tablet 500 mg  metoprolol succinate XL (Toprol-XL) 24 hr tablet 25 mg  pantoprazole (ProtoNix) EC tablet 20 mg  oxybutynin (Ditropan) tablet 5 mg  oxyCODONE (Roxicodone) immediate release tablet 15 mg  tamsulosin (Flomax) 24 hr capsule 0.4 mg  traZODone (Desyrel) tablet 150 mg  heparin (porcine) injection 5,000 Units  polyethylene glycol (Glycolax, Miralax) packet 17 g  acetaminophen (Tylenol) tablet 650 mg  acetaminophen (Tylenol) oral liquid 650 mg  acetaminophen (Tylenol) suppository 650 mg  ondansetron ODT (Zofran-ODT) disintegrating tablet 4 mg  ondansetron (Zofran) injection 4 mg    glucagon (Glucagen) injection 1 mg  dextrose 50 % injection 25 g  glucagon (Glucagen) injection 1 mg  dextrose 50 % injection 12.5 g  insulin lispro (HumaLOG) injection 0-10 Units  DAPTOmycin (Cubicin) 1,000 mg in sodium chloride 0.9% 50 mL IV  piperacillin-tazobactam (Zosyn) 2.25 g in dextrose (iso) IV 50 mL  haloperidol lactate (Haldol) injection 2 mg  collagenase 250 unit/gram ointment  gabapentin (Neurontin) capsule 300 mg  sodium chloride 0.9% infusion      Relevant Results  Labs  Results from last 72 hours   Lab Units 07/18/24  0556 07/17/24  0559 07/16/24  1206   WBC AUTO x10*3/uL 5.7 7.1 8.9   HEMOGLOBIN g/dL 11.7* 12.6* 13.0*   HEMATOCRIT % 37.1* 38.7* 40.2*   PLATELETS AUTO x10*3/uL 225 259 283   NEUTROS PCT AUTO % 77.5 79.0 73.6   LYMPHS PCT AUTO % 8.8 10.7 16.5   MONOS PCT AUTO % 8.3 7.1 7.0   EOS PCT AUTO % 3.9 2.1 1.5     Results from last 72 hours   Lab Units 07/18/24  0556 07/17/24  0559 07/16/24  1035   SODIUM mmol/L 136 134 136   POTASSIUM mmol/L 4.5 4.2 4.3   CHLORIDE mmol/L 102 99 101   CO2 mmol/L 23* 23* 23*   BUN mg/dL 37* 36* 32*   CREATININE mg/dL 2.60* 2.60* 2.30*   GLUCOSE mg/dL 141* 113* 133*   CALCIUM mg/dL 8.9 9.1 9.6   ANION GAP mmol/L 11 12 12    EGFR mL/min/1.73m*2 25* 25* 29*   PHOSPHORUS mg/dL 3.6 3.9  --      Results from last 72 hours   Lab Units 07/18/24  0556 07/17/24  0559 07/16/24  1035   ALK PHOS U/L  --   --  46   BILIRUBIN TOTAL mg/dL  --   --  0.5   PROTEIN TOTAL g/dL  --   --  7.6   ALT U/L  --   --  11   AST U/L  --   --  23   ALBUMIN g/dL 3.7 4.1 4.1     Estimated Creatinine Clearance: 31.1 mL/min (A) (by C-G formula based on SCr of 2.6 mg/dL (H)).  C-Reactive Protein   Date Value Ref Range Status   07/16/2024 <0.30 0.00 - 2.00 mg/dL Final     Microbiology  Reviewed-wound culture pending  Imaging  MR foot left wo IV contrast    Result Date: 7/17/2024  Interpreted By:  Vishnu Martin, STUDY: MR FOOT LEFT WO IV CONTRAST;  7/16/2024 7:13 pm   INDICATION: Signs/Symptoms:concern for osteo.   COMPARISON: Left foot radiograph 07/16/2024   ACCESSION NUMBER(S): [id number]   ORDERING CLINICIAN: SEBAS HAWKINS   TECHNIQUE: Multiplanar, multisequence MR imaging of the left foot was obtained without contrast.   FINDINGS: OSSEOUS STRUCTURES: There is a ulceration overlying the base of 5th metatarsal bone. There is focal loss of cortical definition and T1-hyperintense, T2 hyperintense marrow signal suspicious for developing osteomyelitis. There is evidence of a healed fracture base of 5th metatarsal bone and also the neck of the 5th metatarsal bone.     JOINTS: Normal osteoarticular relationship of the midfoot and forefoot structures. No significant discrete chondral or osteochondral defects.  Mild osteoarthrosis of the 1st MTP joint.     SOFT TISSUES: There is an ulceration overlying the base of 5th metatarsal bone extending up to the bone surface. No fluid collection. There is mild soft tissue edema along the lateral aspect of the forefoot and 5th toe.     TENDONS/MUSCLES: Visualized tendons are intact with normal caliber and signal intensity. 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   LIGAMENTS:  Visualized ligaments, including the Lisfranc ligament and collateral ligaments of the MTP and interphalangeal joints, are intact.       Ulceration overlying the 5th metatarsal base with MR signal changes concerning for developing osteomyelitis.   Healed fractures at base of 5th metatarsal bone and 5th metatarsal neck.     MACRO: None.   Signed by: Vishnu Martin 7/17/2024 2:10 AM Dictation workstation:   [id number]    XR foot left 3+ views    Result Date: 7/16/2024  Interpreted By:  Nash Boyd, STUDY: XR FOOT LEFT 3+ VIEWS; ;  7/16/2024 10:57 am   INDICATION: Signs/Symptoms:Diabetic ulcer of left lateral foot.   COMPARISON: 02/01/2010   ACCESSION NUMBER(S): [id number]   ORDERING CLINICIAN: INOCENTE HEARD   FINDINGS: Soft tissue swelling. Chronic appearing irregularity of the 4th and 5th proximal phalanges. Degenerative changes of the 1st MTP joint and interphalangeal joints. No acute displaced fracture. No obvious acute lytic or erosive changes to suggest overt plain radiographic evidence of osteomyelitis. Calcaneal enthesopathy.       Soft tissue swelling without obvious acute osseous abnormality.     MACRO: None   Signed by: Nash Boyd 7/16/2024 11:13 AM Dictation workstation:   [id number]    Vascular US PVR without exercise    Result Date: 7/11/2024           [place] [address]            Phone [phone]  Vascular Lab Report  VASC US PVR WITHOUT EXERCISE Patient Name:      LORI Valdez Physician:  [id number] Deisi Ortega MD, RPVI Study Date:        7/11/2024            Ordering Provider:  [id number] PEYTON GONZALEZ MRN/PID:           [id number]             Fellow: Accession#:        [id number]         Technologist:       Sherri Gibson RVT Date of Birth/Age: 1950 / 73 years Technologist 2: Gender:            M                     Encounter#:         [id number] Admission Status:  Outpatient           Location Performed: [facility]  Diagnosis/ICD: Peripheral vascular disease, unspecified-I73.9 Indication:    Ulceration CPT Codes:     29501 Peripheral artery PVR (multi segmental pressure  Pertinent History: Diabetic wound left foot.  **Report Amended** Date and Time: 7/11/2024 at 2:35:25 PM  CONCLUSIONS: Incidental finding: irregular cardiac rhythm. Right Lower PVR: No evidence of arterial occlusive disease in the right lower extremity at rest. Right pressures of >220 mmHg suggest no compressibility of vessels and may make absolute Segmental Limb Pressures (SLP) unreliable. Decreased digital perfusion noted. Multiphasic flow is noted in the right common femoral artery, right popliteal artery, right posterior tibial artery and right dorsalis pedis artery. Results called by PVR and Doppler tracings due to non-compressible vessels. Third digit used for PPG tracing due to first and second toe amputations; unable to obtain TBI using third digit due to size of digit. the thrid digit PPG tracing is dampend consistnet with small vessel disease vs. vasoconstriction. Left Lower PVR: No evidence of arterial occlusive disease in the left lower extremity at rest. Left pressures of >220 mmHg suggest no compressibility of vessels and may make absolute Segmental Limb Pressures (SLP) unreliable. Decreased digital perfusion noted. Multiphasic flow is noted in the left common femoral artery, left popliteal artery, left posterior tibial artery and left dorsalis pedis artery. Results called by PVR and Doppler tracings due to non-compressible vessels. Moderately dampened digital tracing consistent with small vessel diease or vasoconstriction; TBI is likely partially non-compressible.  Imaging & Doppler Findings:  RIGHT Lower PVR                Pressures Ratios Right High Thigh               255 mmHg  2.04 Right Low Thigh                160 mmHg  1.28  Right Calf                     255 mmHg  2.04 Right Posterior Tibial (Ankle) 255 mmHg  2.04 Right Dorsalis Pedis (Ankle)   255 mmHg  2.04   LEFT Lower PVR                Pressures Ratios Left High Thigh               255 mmHg  2.04 Left Low Thigh                165 mmHg  1.32 Left Calf                     255 mmHg  2.04 Left Posterior Tibial (Ankle) 255 mmHg  2.04 Left Dorsalis Pedis (Ankle)   157 mmHg  1.26 Left Digit (Great Toe)        100 mmHg  0.80                      Right     Left Brachial Pressure 125 mmHg 123 mmHg   [id number] Deisi Ortega MD, RPVI Electronically signed by [id number] Deisi Ortega MD, RPVI on 7/11/2024 at 2:31:01 PM  ** Final (Updated) **      Assessment/Plan     Encephalopathy-resolved  Chronic kidney disease stage III  Type 2 diabetes with peripheral angiopathy without gangrene  Left diabetic foot ulcer, Grimes 4  Left foot cellulitis  Abnormal MRI-discussed with podiatry-patient does not have osteomyelitis     IV daptomycin-while inpatient  IV Zosyn-while inpatient  CK level 157 on 7/16/24  Follow-up wound culture  Follow-up blood cultures  Follow-up urine culture  Supportive care  Monitor temperature and WBC  Patient at time of discharge on Augmentin pending final culture results  Follow-up with podiatrist close discharge    Michael Pelaez MD

## 2024-07-18 NOTE — NURSING NOTE
Pt discharge instructions, new medications and their common possible side effects, and follow - up appointments reviewed. Questions encouraged and answered. Pt verbalized understanding of discharge instructions and demonstrated understanding through teach back. Pt PIV removed and Pt tolerated well. Pt dressed, Pt belongings gathered and given to Pt, Pt and verbalized all are accounted for.

## 2024-07-18 NOTE — PROGRESS NOTES
Rachid Yun is a 73 y.o. male on day 2 of admission presenting with Diabetic ulcer of ankle (Multi).      Subjective   Reports foot pain is improving, no other complaints, Cr unchanged stable at 2.6.        Objective          Vitals 24HR  Heart Rate:  [69-97]   Temp:  [36.4 °C (97.5 °F)-37 °C (98.6 °F)]   Resp:  [12-16]   BP: (100-146)/(70-83)   SpO2:  [95 %-100 %]       Intake/Output last 3 Shifts:    Intake/Output Summary (Last 24 hours) at 7/18/2024 1602  Last data filed at 7/18/2024 1523  Gross per 24 hour   Intake 3395.32 ml   Output 1401 ml   Net 1994.32 ml       Physical Exam  General: Awake, alert, no acute distress  Respiratory:  Mildly diminished breath sounds, normal respiratory effort  Cardiovascular:  S1 and S2, no rubs  Gastrointestinal:  Soft, positive bowel sounds, no rebound or guarding  Extremities:  No edema, cyanosis    Relevant Results  Results for orders placed or performed during the hospital encounter of 07/16/24 (from the past 24 hour(s))   POCT GLUCOSE   Result Value Ref Range    POCT Glucose 162 (H) 74 - 99 mg/dL   Renal Function Panel   Result Value Ref Range    Glucose 141 (H) 65 - 99 mg/dL    Sodium 136 133 - 145 mmol/L    Potassium 4.5 3.4 - 5.1 mmol/L    Chloride 102 97 - 107 mmol/L    Bicarbonate 23 (L) 24 - 31 mmol/L    Urea Nitrogen 37 (H) 8 - 25 mg/dL    Creatinine 2.60 (H) 0.40 - 1.60 mg/dL    eGFR 25 (L) >60 mL/min/1.73m*2    Calcium 8.9 8.5 - 10.4 mg/dL    Phosphorus 3.6 2.5 - 4.5 mg/dL    Albumin 3.7 3.5 - 5.0 g/dL    Anion Gap 11 <=19 mmol/L   CBC and Auto Differential   Result Value Ref Range    WBC 5.7 4.4 - 11.3 x10*3/uL    nRBC 0.0 0.0 - 0.0 /100 WBCs    RBC 4.03 (L) 4.50 - 5.90 x10*6/uL    Hemoglobin 11.7 (L) 13.5 - 17.5 g/dL    Hematocrit 37.1 (L) 41.0 - 52.0 %    MCV 92 80 - 100 fL    MCH 29.0 26.0 - 34.0 pg    MCHC 31.5 (L) 32.0 - 36.0 g/dL    RDW 13.8 11.5 - 14.5 %    Platelets 225 150 - 450 x10*3/uL    Neutrophils % 77.5 40.0 - 80.0 %    Immature Granulocytes %,  Automated 0.4 0.0 - 0.9 %    Lymphocytes % 8.8 13.0 - 44.0 %    Monocytes % 8.3 2.0 - 10.0 %    Eosinophils % 3.9 0.0 - 6.0 %    Basophils % 1.1 0.0 - 2.0 %    Neutrophils Absolute 4.40 1.60 - 5.50 x10*3/uL    Immature Granulocytes Absolute, Automated 0.02 0.00 - 0.50 x10*3/uL    Lymphocytes Absolute 0.50 (L) 0.80 - 3.00 x10*3/uL    Monocytes Absolute 0.47 0.05 - 0.80 x10*3/uL    Eosinophils Absolute 0.22 0.00 - 0.40 x10*3/uL    Basophils Absolute 0.06 0.00 - 0.10 x10*3/uL   POCT GLUCOSE   Result Value Ref Range    POCT Glucose 125 (H) 74 - 99 mg/dL   POCT GLUCOSE   Result Value Ref Range    POCT Glucose 124 (H) 74 - 99 mg/dL            Assessment/Plan   Chronic kidney disease stage IV (baseline creatinine 1.9-2), his creatinine is slightly above baseline but stable  Diabetes mellitus   Hypertension  Diabetic foot ulcer     Plan: On gentle IV fluids. Holding losartan.  Infectious disease on board, on antibiotics, podiatry on board.  Renally dose gabapentin and all medications for GFR.  No indications for dialysis. After discharge can follow up with renal function panel in 1 week and office visit in 2 weeks. Ok for discharge from renal standpoint.     Edelmira Costa MD

## 2024-07-18 NOTE — DISCHARGE SUMMARY
Discharge Diagnosis  Diabetic ulcer of ankle (Multi)    Issues Requiring Follow-Up  Finish course of antibiotics and follow-up with podiatry infectious disease closely    Discharge Meds     Your medication list        START taking these medications        Instructions Last Dose Given Next Dose Due   amoxicillin-pot clavulanate 500-125 mg tablet  Commonly known as: Augmentin      Take 1 tablet by mouth 2 times a day for 14 days.       collagenase 250 unit/gram ointment  Start taking on: July 19, 2024      Apply topically once daily.       gabapentin 300 mg capsule  Commonly known as: Neurontin  Replaces: gabapentin 600 mg tablet      Take 1 capsule (300 mg) by mouth 3 times a day.              CONTINUE taking these medications        Instructions Last Dose Given Next Dose Due   amLODIPine 5 mg tablet  Commonly known as: Norvasc           atorvastatin 20 mg tablet  Commonly known as: Lipitor      Take 1 tablet (20 mg) by mouth once daily.       blood sugar diagnostic strip      1 strip 3 times a day before meals.       clopidogrel 75 mg tablet  Commonly known as: Plavix      TAKE 1 TABLET BY MOUTH ONCE  DAILY       donepezil 10 mg tablet  Commonly known as: Aricept      Take 1 tablet (10 mg) by mouth once daily at bedtime.       DULoxetine 60 mg DR capsule  Commonly known as: Cymbalta      TAKE 1 CAPSULE BY MOUTH ONCE  DAILY       Eliquis 2.5 mg tablet  Generic drug: apixaban      Take 1 tablet (2.5 mg) by mouth 2 times a day.       ferrous sulfate 325 (65 Fe) MG EC tablet           Fish OiL 1,000 mg (120 mg-180 mg) capsule  Generic drug: omega 3-dha-epa-fish oil           folic acid 400 mcg tablet  Commonly known as: Folvite           ketoconazole 2 % cream  Commonly known as: NIZOral      Apply topically once daily.       ketoconazole 1 % shampoo      Apply 1 Application topically every 3 days. Shampoo daily, leave on for 5-10 minutes, then rinse.       Lantus U-100 Insulin 100 unit/mL injection  Generic drug:  insulin glargine      40 UNITS SUBCUTANEOUS DAILY 90 DAY(S)       levETIRAcetam 500 mg tablet  Commonly known as: Keppra           losartan 100 mg tablet  Commonly known as: Cozaar      TAKE 1 TABLET BY MOUTH EVERY DAY FOR 90 DAYS       metoprolol succinate XL 25 mg 24 hr tablet  Commonly known as: Toprol-XL      Take 1 tablet (25 mg) by mouth 2 times a day. Do not crush or chew.       omeprazole 20 mg DR capsule  Commonly known as: PriLOSEC           oxybutynin 5 mg tablet  Commonly known as: Ditropan           oxyCODONE 15 mg immediate release tablet  Commonly known as: Roxicodone           polyethylene glycol 17 gram packet  Commonly known as: Glycolax, Miralax      Take 17 g by mouth once daily. Do not start before December 31, 2023.       tamsulosin 0.4 mg 24 hr capsule  Commonly known as: Flomax           traZODone 150 mg tablet  Commonly known as: Desyrel      Take 1 tablet (150 mg) by mouth once daily at bedtime. FOR 90 DAYS              STOP taking these medications      acetaminophen 325 mg tablet  Commonly known as: Tylenol        bisacodyl 10 mg suppository  Commonly known as: Dulcolax        cephalexin 500 mg capsule  Commonly known as: Keflex        doxycycline 100 mg capsule  Commonly known as: Vibramycin        gabapentin 600 mg tablet  Commonly known as: Neurontin  Replaced by: gabapentin 300 mg capsule        lactulose 20 gram/30 mL oral solution        magnesium hydroxide 400 mg/5 mL suspension  Commonly known as: Milk of Magnesia        mupirocin 2 % ointment  Commonly known as: Bactroban        sennosides 8.6 mg tablet  Commonly known as: Senokot                  Where to Get Your Medications        These medications were sent to [facility]  [id number] [facility] [address]      Hours: 9 AM to 6 PM Mon-Fri, 9 AM to 1 PM Sat Phone: [phone]   amoxicillin-pot clavulanate 500-125 mg tablet  collagenase 250 unit/gram ointment  gabapentin 300 mg capsule         Test Results  Pending At Discharge  Pending Labs       Order Current Status    Blood Culture Preliminary result    Blood Culture Preliminary result    Tissue/Wound Culture/Smear Preliminary result            Hospital Course   HPI: Rachid Yun is a 73 y.o. male presenting with left foot ulcer.  On the left lateral foot about 2 weeks ago, patient noticed a blister, started on Keflex 4 times a day and doxycycline twice a day by primary care physician and told to go to podiatry to be evaluated.  Seen by Dr. Khalil, and has an appointment with vascular surgery to assess for arterial insufficiency on the 11th; this note states that there is no evidence of arterial insufficiency.  Last night, family stated the patient was confused and in fact hallucinating, responding to things that were not in the room which is unusual for him, but does happen when he has infections in the past.  No fevers chills.  No nausea.  No major drainage.     Hospital course: MRI showed findings concerning for very early osteomyelitis, but was evaluated by podiatry who, domination with the low CRP, and after reviewing imaging, did not feel that this was osteomyelitis and no further intervention was recommended quickly signed off.  Antibiotics started, transition to by mouth Augmentin for 2 weeks.  Chronic kidney disease was largely stable with a slight uptick in creatinine, stabilizing.  Seen by physical therapy Occupational Therapy and acute rehab was recommended, but patient and family wish for him to return home    Pertinent Physical Exam At Time of Discharge  Physical Exam    General: Elderly male, nontoxic, seen ambulating with walker with therapy.  Eyes: Clear sclera  Neck: No JVD  Cardio: Regular rate rhythm  Respiratory: Clear to auscultation.  Abdomen: Soft nondistended  Extremities: Lower extremity edema  Skin: Warm dry  Psychiatric: Appropriate mood and affect    Outpatient Follow-Up  Future Appointments   Date Time Provider Department Center    7/22/2024  3:15 PM Izabel Marshall MD [id number] Psychiatric   12/4/2024 11:15 AM Phill Hare DO [id number] Psychiatric         William Florence DO

## 2024-07-18 NOTE — PROGRESS NOTES
Physical Therapy    Physical Therapy Treatment    Patient Name: Rachid Yun  MRN: [id number]  Today's Date: 7/18/2024  Time Calculation  Start Time: 1058  Stop Time: 1118  Time Calculation (min): 20 min    Assessment/Plan   PT Assessment  PT Assessment Results: Decreased strength, Decreased endurance, Decreased range of motion, Impaired balance, Decreased mobility, Decreased coordination, Obesity, Pain  Rehab Prognosis: Good  Evaluation/Treatment Tolerance: Patient tolerated treatment well  Medical Staff Made Aware: Yes  Strengths: Ability to acquire knowledge, Attitude of self, Support of Caregivers  Barriers to Participation: Comorbidities  End of Session Communication: Bedside nurse  Assessment Comment: pt demonstrated improvement this session with ability to ambulate further distance; pt req CGA for all transfers and ambulation this session; pt continues to be limited due to fatigue and increased pain; pt would benefit from continued skilled physical therapy to address functional deficits.  End of Session Patient Position: Bed, 3 rail up, Alarm on (call bell in reach, all needs met. RN aware)  PT Plan  Inpatient/Swing Bed or Outpatient: Inpatient  PT Plan  Treatment/Interventions: Bed mobility, Transfer training, Gait training, Stair training, Balance training, Neuromuscular re-education, Strengthening, Endurance training, Range of motion, Therapeutic exercise, Therapeutic activity, Home exercise program, Postural re-education  PT Plan: Ongoing PT  PT Frequency: 4 times per week  PT Discharge Recommendations: High intensity level of continued care  Equipment Recommended upon Discharge: Wheeled walker  PT Recommended Transfer Status: Assist x1, Assistive device  PT - OK to Discharge: Yes      General Visit Information:   PT  Visit  PT Received On: 07/18/24  Response to Previous Treatment: Patient with no complaints from previous session.  General  Reason for Referral: impaired mobility  Referred By:   Naman  Past Medical History Relevant to Rehab: L foot diabetic ulcer, arthritis, CAD, DM, HTN, Stroke  Family/Caregiver Present: No  Prior to Session Communication: Bedside nurse  Patient Position Received: Up in chair, Alarm off, not on at start of session  Preferred Learning Style: verbal, visual  General Comment: pt cleared for therapy via RN, agreeable to participate, (+) IV    Subjective   Precautions:  Precautions  Medical Precautions: Fall precautions  Precautions Comment: L post-op shoe      Objective   Pain:  Pain Assessment  Pain Assessment: 0-10  0-10 (Numeric) Pain Score: 6  Pain Type: Chronic pain, Acute pain  Pain Location:  (back, B knees, and L foot)  Pain Interventions:  (repositioning and mobility; RN aware of pain level)  Cognition:  Cognition  Overall Cognitive Status: Within Functional Limits  Orientation Level: Oriented X4  Attention: Within Functional Limits  Safety/Judgement: Within Functional Limits  Coordination:  Movements are Fluid and Coordinated: No  Coordination Comment: decreased rate/accuracy of movement  Postural Control:  Postural Control  Postural Control: Impaired  Posture Comment: fwd flexed posture over RW with ambulation  Static Sitting Balance  Static Sitting-Balance Support: Bilateral upper extremity supported, Feet supported  Static Sitting-Level of Assistance: Close supervision  Static Sitting-Comment/Number of Minutes: good  Static Standing Balance  Static Standing-Balance Support: Bilateral upper extremity supported (on RW)  Static Standing-Level of Assistance: Contact guard  Static Standing-Comment/Number of Minutes: fair+  Extremity/Trunk Assessments:  RLE   RLE : Exceptions to WFL  Strength RLE  RLE Overall Strength: Greater than or equal to 3/5 as evidenced by functional mobility  LLE   LLE : Exceptions to WFL  Strength LLE  LLE Overall Strength:  (pt with L LE foot drop)  Activity Tolerance:  Activity Tolerance  Endurance: Decreased tolerance for upright  activites  Activity Tolerance Comments: pt fatigues with increased mobility; difficulty maintaining upright posture with ambulation due to LBP  Rate of Perceived Exertion (RPE): 4/10  Treatments:       Bed Mobility  Bed Mobility: Yes  Bed Mobility 1  Bed Mobility 1: Sitting to supine  Level of Assistance 1: Close supervision  Bed Mobility Comments 1: sit > supine with mod I and use of bed rails to lower trunk    Ambulation/Gait Training  Ambulation/Gait Training Performed: Yes  Ambulation/Gait Training 1  Surface 1: Level tile  Device 1: Rolling walker  Assistance 1: Contact guard  Quality of Gait 1: Wide base of support, Foot slap, Diminished heel strike, Foot drop/steppage gait, Forward flexed posture  Comments/Distance (ft) 1: pt amb 2x15' with use of RW and CGA for safety, stability and line management; VCs for proximity with walker and upright posture  Transfers  Transfer: Yes  Transfer 1  Transfer From 1: Sit to, Stand to  Transfer to 1: Stand, Sit  Technique 1: Sit to stand, Stand to sit  Transfer Device 1: Walker  Transfer Level of Assistance 1: Contact guard  Trials/Comments 1: VCs for safe hand placement on chair and bed; VCs for safe foot placement to avoid sliding with transfer; PT blocking L foot from sliding during transfer    Stairs  Stairs: No         Outcome Measures:  [facility] Basic Mobility  Turning from your back to your side while in a flat bed without using bedrails: None  Moving from lying on your back to sitting on the side of a flat bed without using bedrails: A little  Moving to and from bed to chair (including a wheelchair): A little  Standing up from a chair using your arms (e.g. wheelchair or bedside chair): A little  To walk in hospital room: A little  Climbing 3-5 steps with railing: A lot  Basic Mobility - Total Score: 18    Education Documentation  Precautions, taught by Farooq Avila, PT at 7/18/2024  3:09 PM.  Learner: Patient  Readiness: Acceptance  Method:  Explanation  Response: Verbalizes Understanding, Demonstrated Understanding    Body Mechanics, taught by Farooq Avila PT at 7/18/2024  3:09 PM.  Learner: Patient  Readiness: Acceptance  Method: Explanation  Response: Verbalizes Understanding, Demonstrated Understanding    Mobility Training, taught by Farooq Avila PT at 7/18/2024  3:09 PM.  Learner: Patient  Readiness: Acceptance  Method: Explanation  Response: Verbalizes Understanding, Demonstrated Understanding    Education Comments  No comments found.        OP EDUCATION:  Outpatient Education  Individual(s) Educated: Patient  Education Provided: Body Mechanics, Fall Risk, Posture  Risk and Benefits Discussed with Patient/Caregiver/Other: yes  Patient/Caregiver Demonstrated Understanding: yes  Plan of Care Discussed and Agreed Upon: yes  Patient Response to Education: Patient/Caregiver Verbalized Understanding of Information  Education Comment: educated on safe mobility techniques    Encounter Problems       Encounter Problems (Active)       PT Problem       Patient will ambulate 75' distance using walker with modified independent (Progressing)       Start:  07/17/24    Expected End:  07/31/24         Goal Note       Patient will ambulate 75' distance using walker with modified independent              Patient will ambulate up and down a curb/single step with modified independent using walker (Progressing)       Start:  07/17/24    Expected End:  07/31/24         Goal Note       Patient will ambulate up and down a curb/single step with modified independent using walker              Patient will perform chair to and from bed transfer with modified independent (Progressing)       Start:  07/17/24    Expected End:  07/31/24         Goal Note       Patient will perform chair to and from bed transfer with modified independent                 Pain - Adult

## 2024-07-19 ENCOUNTER — DOCUMENTATION (OUTPATIENT)
Dept: CARE COORDINATION | Facility: CLINIC | Age: 74
End: 2024-07-19
Payer: MEDICARE

## 2024-07-19 LAB
BACTERIA SPEC CULT: ABNORMAL
BACTERIA SPEC CULT: ABNORMAL
GRAM STN SPEC: ABNORMAL
GRAM STN SPEC: ABNORMAL

## 2024-07-19 NOTE — PROGRESS NOTES
Discharge Facility: [facility]   Discharge Diagnosis: Diabetic ulcer of ankle (Multi)   Admission Date: 7/16/24  Discharge Date: 7/18/24    PCP Appointment Date: 7/22/24  Specialist Appointment Date: ID, podiatry, nephrology  Hospital Encounter and Summary Linked: Yes    PCP follow up appointment within 2 business days of discharge.

## 2024-07-20 LAB
BACTERIA BLD CULT: NORMAL
BACTERIA BLD CULT: NORMAL

## 2024-07-22 ENCOUNTER — HOME CARE VISIT (OUTPATIENT)
Dept: HOME HEALTH SERVICES | Facility: HOME HEALTH | Age: 74
End: 2024-07-22
Payer: MEDICARE

## 2024-07-22 ENCOUNTER — OFFICE VISIT (OUTPATIENT)
Dept: PRIMARY CARE | Facility: CLINIC | Age: 74
End: 2024-07-22
Payer: MEDICARE

## 2024-07-22 VITALS
TEMPERATURE: 97.7 F | OXYGEN SATURATION: 97 % | SYSTOLIC BLOOD PRESSURE: 138 MMHG | HEART RATE: 104 BPM | RESPIRATION RATE: 16 BRPM | DIASTOLIC BLOOD PRESSURE: 82 MMHG

## 2024-07-22 VITALS
OXYGEN SATURATION: 97 % | WEIGHT: 226 LBS | HEART RATE: 83 BPM | RESPIRATION RATE: 16 BRPM | SYSTOLIC BLOOD PRESSURE: 122 MMHG | BODY MASS INDEX: 27.52 KG/M2 | DIASTOLIC BLOOD PRESSURE: 68 MMHG | HEIGHT: 76 IN

## 2024-07-22 DIAGNOSIS — I25.10 ATHEROSCLEROSIS OF NATIVE CORONARY ARTERY OF NATIVE HEART WITHOUT ANGINA PECTORIS: ICD-10-CM

## 2024-07-22 DIAGNOSIS — M19.012 ARTHRITIS OF LEFT SHOULDER REGION: ICD-10-CM

## 2024-07-22 DIAGNOSIS — L97.522 ULCER OF LEFT FOOT WITH FAT LAYER EXPOSED (MULTI): ICD-10-CM

## 2024-07-22 DIAGNOSIS — M21.372 LEFT FOOT DROP: ICD-10-CM

## 2024-07-22 DIAGNOSIS — E11.42 DIABETIC POLYNEUROPATHY ASSOCIATED WITH TYPE 2 DIABETES MELLITUS (MULTI): Primary | ICD-10-CM

## 2024-07-22 DIAGNOSIS — I10 PRIMARY HYPERTENSION: ICD-10-CM

## 2024-07-22 DIAGNOSIS — G47.33 OBSTRUCTIVE SLEEP APNEA SYNDROME: ICD-10-CM

## 2024-07-22 DIAGNOSIS — N18.32 STAGE 3B CHRONIC KIDNEY DISEASE (MULTI): ICD-10-CM

## 2024-07-22 DIAGNOSIS — I48.11 LONGSTANDING PERSISTENT ATRIAL FIBRILLATION (MULTI): ICD-10-CM

## 2024-07-22 DIAGNOSIS — M47.816 LUMBAR SPONDYLOSIS: ICD-10-CM

## 2024-07-22 PROCEDURE — 3074F SYST BP LT 130 MM HG: CPT | Performed by: INTERNAL MEDICINE

## 2024-07-22 PROCEDURE — 3008F BODY MASS INDEX DOCD: CPT | Performed by: INTERNAL MEDICINE

## 2024-07-22 PROCEDURE — 99214 OFFICE O/P EST MOD 30 MIN: CPT | Performed by: INTERNAL MEDICINE

## 2024-07-22 PROCEDURE — 1157F ADVNC CARE PLAN IN RCRD: CPT | Performed by: INTERNAL MEDICINE

## 2024-07-22 PROCEDURE — 3049F LDL-C 100-129 MG/DL: CPT | Performed by: INTERNAL MEDICINE

## 2024-07-22 PROCEDURE — 1111F DSCHRG MED/CURRENT MED MERGE: CPT | Performed by: INTERNAL MEDICINE

## 2024-07-22 PROCEDURE — 1125F AMNT PAIN NOTED PAIN PRSNT: CPT | Performed by: INTERNAL MEDICINE

## 2024-07-22 PROCEDURE — 1159F MED LIST DOCD IN RCRD: CPT | Performed by: INTERNAL MEDICINE

## 2024-07-22 PROCEDURE — 3044F HG A1C LEVEL LT 7.0%: CPT | Performed by: INTERNAL MEDICINE

## 2024-07-22 PROCEDURE — 3078F DIAST BP <80 MM HG: CPT | Performed by: INTERNAL MEDICINE

## 2024-07-22 PROCEDURE — G0299 HHS/HOSPICE OF RN EA 15 MIN: HCPCS

## 2024-07-22 PROCEDURE — 4010F ACE/ARB THERAPY RXD/TAKEN: CPT | Performed by: INTERNAL MEDICINE

## 2024-07-22 ASSESSMENT — ENCOUNTER SYMPTOMS
CHANGE IN APPETITE: UNCHANGED
LOSS OF SENSATION IN FEET: 1
DYSPNEA ACTIVITY LEVEL: AFTER AMBULATING LESS THAN 10 FT
SPUTUM COLOR: CLEAR
OCCASIONAL FEELINGS OF UNSTEADINESS: 1
SHORTNESS OF BREATH: 1
SUBJECTIVE PAIN PROGRESSION: GRADUALLY WORSENING
PAIN LOCATION - PAIN QUALITY: ACHE
MUSCLE WEAKNESS: 1
DIARRHEA: 0
PALPITATIONS: 0
PERSON REPORTING PAIN: PATIENT
APPETITE LEVEL: FAIR
SPUTUM PRODUCTION: 1
COUGH: 0
SPUTUM CONSISTENCY: THICK
ABDOMINAL PAIN: 0
BOWEL PATTERN NORMAL: 1
TREMORS: 1
DIZZINESS: 0
ARTHRALGIAS: 1
DESCRIPTION OF MEMORY LOSS: SHORT TERM
NAUSEA: 0
TROUBLE SWALLOWING: 1
DRY SKIN: 1
CONSTIPATION: 0
PAIN LOCATION: GENERALIZED
FATIGUE: 1
LOWEST PAIN SEVERITY IN PAST 24 HOURS: 4/10
HYPERTENSION: 1
DEPRESSION: 0
PAIN LOCATION - PAIN SEVERITY: 6/10
SEIZURES: 1
HIGHEST PAIN SEVERITY IN PAST 24 HOURS: 10/10
BACK PAIN: 1
STOOL FREQUENCY: LESS THAN DAILY
LOSS OF SENSATION IN FEET: 0
SHORTNESS OF BREATH: 0
FORGETFULNESS: 1
OCCASIONAL FEELINGS OF UNSTEADINESS: 0
PAIN: 1
LAST BOWEL MOVEMENT: 67043
DEPRESSION: 0

## 2024-07-22 ASSESSMENT — LIFESTYLE VARIABLES: SMOKING_STATUS: 0

## 2024-07-22 ASSESSMENT — PAIN SCALES - PAIN ASSESSMENT IN ADVANCED DEMENTIA (PAINAD)
FACIALEXPRESSION: 0 - SMILING OR INEXPRESSIVE.
TOTALSCORE: 0
FACIALEXPRESSION: 0
BODYLANGUAGE: 0 - RELAXED.
NEGVOCALIZATION: 0 - NONE.
BODYLANGUAGE: 0
NEGVOCALIZATION: 0
CONSOLABILITY: 0 - NO NEED TO CONSOLE.
CONSOLABILITY: 0
BREATHING: 0

## 2024-07-22 ASSESSMENT — PAIN SCALES - GENERAL: PAINLEVEL: 6

## 2024-07-22 ASSESSMENT — ACTIVITIES OF DAILY LIVING (ADL)
CURRENT_FUNCTION: ONE PERSON
OASIS_M1830: 03
ENTERING_EXITING_HOME: MODERATE ASSIST
AMBULATION ASSISTANCE: ONE PERSON

## 2024-07-22 NOTE — PROGRESS NOTES
"Subjective   Patient ID: Rachid Yun is a 73 y.o. male who presents for 2 week check up.    Patient has been on antibiotics for his L foot. He was hospitalized 7/16/2024 for hallucinations. Complains of intense, constant, lower back pain that began after undergoing surgery for lumbar spondylosi. He received an injection to his back by his pain doctor, but it provided no benefit. Back pain radiates up his back and down to his knees. Owns CPAP but has not used it for 3 years over not being able to tolerate it. Hx of CAD but does not have congestive heart failure. Regarding exercise, patient does not walk. He gets around in a walker and desires an electric wheelchair to move around. In regards to using a manual wheelchair, patient is unable to grab hold of the wheels and propel them due to DIANE hand/finger weakness.    Diagnostics Reviewed:2021 XR right shoulder revealed arthritis.  Labs Reviewed: 7/18/2024 glucose was 124. 7/01/2024 A1c 6.1.     Deformities hands DIANE  Weakness on hand  DIANE  L drop foot    Review of Systems   Respiratory:  Negative for cough and shortness of breath.    Cardiovascular:  Negative for chest pain and palpitations.   Gastrointestinal:  Negative for abdominal pain, constipation, diarrhea and nausea.   Musculoskeletal:  Positive for arthralgias (L foot; R shoulder) and back pain (severe).   Neurological:  Negative for dizziness.       Objective   /68   Pulse 83   Resp 16   Ht 1.93 m (6' 4\")   Wt 103 kg (226 lb)   SpO2 97%   BMI 27.51 kg/m²     Physical Exam  Cardiovascular:      Rate and Rhythm: Normal rate and regular rhythm.      Heart sounds: Normal heart sounds.   Pulmonary:      Breath sounds: Normal breath sounds.   Abdominal:      Palpations: Abdomen is soft. There is no hepatomegaly.      Tenderness: There is no abdominal tenderness.   Musculoskeletal:      Right hand: Deformity present. Decreased strength (decreased ).      Left hand: Deformity present. " Decreased strength (decreased ).      Right lower leg: No edema.      Left lower leg: No edema.      Left foot: Foot drop present.   Neurological:      Mental Status: He is alert and oriented to person, place, and time.      Gait: Gait normal.   Psychiatric:         Mood and Affect: Mood normal.         Behavior: Behavior normal.         Assessment/Plan   Diagnoses and all orders for this visit:  Diabetic polyneuropathy associated with type 2 diabetes mellitus (Multi)  -     CBC and Auto Differential; Future  -     Comprehensive Metabolic Panel; Future  -     Power mobility device  Ulcer of left foot with fat layer exposed (Multi)  -     Power mobility device  Longstanding persistent atrial fibrillation (Multi)  Obstructive sleep apnea syndrome  -     In-Center Sleep Study (Non-Sleep Provider Only); Future  Primary hypertension  Stage 3b chronic kidney disease (Multi)  Atherosclerosis of native coronary artery of native heart without angina pectoris  Arthritis of left shoulder region  Lumbar spondylosis  -     Referral to Pain Medicine; Future  -     Power mobility device  Left foot drop       Scribe Attestation  By signing my name below, IMaura, Scrchad   attest that this documentation has been prepared under the direction and in the presence of Izabel Marshall MD.

## 2024-07-24 ENCOUNTER — HOME CARE VISIT (OUTPATIENT)
Dept: HOME HEALTH SERVICES | Facility: HOME HEALTH | Age: 74
End: 2024-07-24
Payer: MEDICARE

## 2024-07-24 PROCEDURE — G0156 HHCP-SVS OF AIDE,EA 15 MIN: HCPCS

## 2024-07-25 ENCOUNTER — HOME CARE VISIT (OUTPATIENT)
Dept: HOME HEALTH SERVICES | Facility: HOME HEALTH | Age: 74
End: 2024-07-25
Payer: MEDICARE

## 2024-07-25 VITALS
TEMPERATURE: 97.9 F | HEART RATE: 98 BPM | SYSTOLIC BLOOD PRESSURE: 122 MMHG | RESPIRATION RATE: 16 BRPM | DIASTOLIC BLOOD PRESSURE: 64 MMHG | OXYGEN SATURATION: 98 %

## 2024-07-25 PROCEDURE — G0300 HHS/HOSPICE OF LPN EA 15 MIN: HCPCS

## 2024-07-26 ENCOUNTER — PATIENT OUTREACH (OUTPATIENT)
Dept: CARE COORDINATION | Facility: CLINIC | Age: 74
End: 2024-07-26

## 2024-07-26 ENCOUNTER — HOME CARE VISIT (OUTPATIENT)
Dept: HOME HEALTH SERVICES | Facility: HOME HEALTH | Age: 74
End: 2024-07-26
Payer: MEDICARE

## 2024-07-26 PROCEDURE — G0156 HHCP-SVS OF AIDE,EA 15 MIN: HCPCS

## 2024-07-26 ASSESSMENT — ENCOUNTER SYMPTOMS
LAST BOWEL MOVEMENT: 67046
APPETITE LEVEL: GOOD
DENIES PAIN: 1
CHANGE IN APPETITE: UNCHANGED

## 2024-07-26 NOTE — PROGRESS NOTES
Unable to reach patient for call back after patient's follow up appointment with PCP.   JOANTHANM with call back number for patient to call if needed   If no voicemail available call attempts x 2 were made to contact the patient to assist with any questions or concerns patient may have.

## 2024-07-27 ENCOUNTER — HOME CARE VISIT (OUTPATIENT)
Dept: HOME HEALTH SERVICES | Facility: HOME HEALTH | Age: 74
End: 2024-07-27
Payer: MEDICARE

## 2024-07-27 PROCEDURE — G0152 HHCP-SERV OF OT,EA 15 MIN: HCPCS

## 2024-07-28 VITALS
SYSTOLIC BLOOD PRESSURE: 124 MMHG | HEART RATE: 99 BPM | DIASTOLIC BLOOD PRESSURE: 72 MMHG | OXYGEN SATURATION: 98 % | RESPIRATION RATE: 18 BRPM | TEMPERATURE: 97.3 F

## 2024-07-28 ASSESSMENT — ENCOUNTER SYMPTOMS
LOWEST PAIN SEVERITY IN PAST 24 HOURS: 2/10
PAIN: 1
SUBJECTIVE PAIN PROGRESSION: WAXING AND WANING
PAIN SEVERITY GOAL: 0/10
PERSON REPORTING PAIN: PATIENT
HIGHEST PAIN SEVERITY IN PAST 24 HOURS: 6/10

## 2024-07-28 ASSESSMENT — PAIN SCALES - PAIN ASSESSMENT IN ADVANCED DEMENTIA (PAINAD)
CONSOLABILITY: 0
CONSOLABILITY: 0 - NO NEED TO CONSOLE.
FACIALEXPRESSION: 0 - SMILING OR INEXPRESSIVE.
BREATHING: 0
NEGVOCALIZATION: 0 - NONE.
BODYLANGUAGE: 0 - RELAXED.
FACIALEXPRESSION: 0
BODYLANGUAGE: 0
TOTALSCORE: 0
NEGVOCALIZATION: 0

## 2024-07-28 ASSESSMENT — ACTIVITIES OF DAILY LIVING (ADL)
FEEDING_WITHIN_DEFINED_LIMITS: 1
GROOMING_WITHIN_DEFINED_LIMITS: 1

## 2024-07-29 ENCOUNTER — HOME CARE VISIT (OUTPATIENT)
Dept: HOME HEALTH SERVICES | Facility: HOME HEALTH | Age: 74
End: 2024-07-29
Payer: MEDICARE

## 2024-07-29 VITALS — SYSTOLIC BLOOD PRESSURE: 156 MMHG | OXYGEN SATURATION: 97 % | DIASTOLIC BLOOD PRESSURE: 79 MMHG | HEART RATE: 70 BPM

## 2024-07-29 PROCEDURE — G0151 HHCP-SERV OF PT,EA 15 MIN: HCPCS | Performed by: PHYSICAL THERAPIST

## 2024-07-29 ASSESSMENT — ENCOUNTER SYMPTOMS
OCCASIONAL FEELINGS OF UNSTEADINESS: 1
PAIN LOCATION: BACK
MUSCLE WEAKNESS: 1
LIMITED RANGE OF MOTION: 1
PERSON REPORTING PAIN: PATIENT
PAIN: 1

## 2024-07-30 ENCOUNTER — HOME CARE VISIT (OUTPATIENT)
Dept: HOME HEALTH SERVICES | Facility: HOME HEALTH | Age: 74
End: 2024-07-30
Payer: MEDICARE

## 2024-07-30 ENCOUNTER — LAB REQUISITION (OUTPATIENT)
Dept: LAB | Facility: LAB | Age: 74
End: 2024-07-30
Payer: MEDICARE

## 2024-07-30 VITALS
RESPIRATION RATE: 16 BRPM | SYSTOLIC BLOOD PRESSURE: 138 MMHG | HEART RATE: 86 BPM | TEMPERATURE: 97.7 F | OXYGEN SATURATION: 98 % | DIASTOLIC BLOOD PRESSURE: 86 MMHG

## 2024-07-30 DIAGNOSIS — E11.621 TYPE 2 DIABETES MELLITUS WITH FOOT ULCER (CODE) (MULTI): ICD-10-CM

## 2024-07-30 DIAGNOSIS — I12.9 HYPERTENSIVE CHRONIC KIDNEY DISEASE WITH STAGE 1 THROUGH STAGE 4 CHRONIC KIDNEY DISEASE, OR UNSPECIFIED CHRONIC KIDNEY DISEASE: ICD-10-CM

## 2024-07-30 LAB
ALBUMIN SERPL-MCNC: 3.7 G/DL (ref 3.5–5)
ALBUMIN SERPL-MCNC: 3.7 G/DL (ref 3.5–5)
ALP BLD-CCNC: 65 U/L (ref 35–125)
ALT SERPL-CCNC: 26 U/L (ref 5–40)
ANION GAP SERPL CALC-SCNC: 17 MMOL/L
ANION GAP SERPL CALC-SCNC: 17 MMOL/L
AST SERPL-CCNC: 35 U/L (ref 5–40)
BASOPHILS # BLD AUTO: 0.08 X10*3/UL (ref 0–0.1)
BASOPHILS NFR BLD AUTO: 0.8 %
BILIRUB SERPL-MCNC: 1.1 MG/DL (ref 0.1–1.2)
BUN SERPL-MCNC: 32 MG/DL (ref 8–25)
BUN SERPL-MCNC: 32 MG/DL (ref 8–25)
CALCIUM SERPL-MCNC: 9 MG/DL (ref 8.5–10.4)
CALCIUM SERPL-MCNC: 9 MG/DL (ref 8.5–10.4)
CHLORIDE SERPL-SCNC: 101 MMOL/L (ref 97–107)
CHLORIDE SERPL-SCNC: 101 MMOL/L (ref 97–107)
CO2 SERPL-SCNC: 23 MMOL/L (ref 24–31)
CO2 SERPL-SCNC: 23 MMOL/L (ref 24–31)
CREAT SERPL-MCNC: 1.6 MG/DL (ref 0.4–1.6)
CREAT SERPL-MCNC: 1.6 MG/DL (ref 0.4–1.6)
EGFRCR SERPLBLD CKD-EPI 2021: 45 ML/MIN/1.73M*2
EGFRCR SERPLBLD CKD-EPI 2021: 45 ML/MIN/1.73M*2
EOSINOPHIL # BLD AUTO: 0.09 X10*3/UL (ref 0–0.4)
EOSINOPHIL NFR BLD AUTO: 0.9 %
ERYTHROCYTE [DISTWIDTH] IN BLOOD BY AUTOMATED COUNT: 14 % (ref 11.5–14.5)
GLUCOSE SERPL-MCNC: 208 MG/DL (ref 65–99)
GLUCOSE SERPL-MCNC: 208 MG/DL (ref 65–99)
HCT VFR BLD AUTO: 34.2 % (ref 41–52)
HGB BLD-MCNC: 10.7 G/DL (ref 13.5–17.5)
IMM GRANULOCYTES # BLD AUTO: 0.03 X10*3/UL (ref 0–0.5)
IMM GRANULOCYTES NFR BLD AUTO: 0.3 % (ref 0–0.9)
LYMPHOCYTES # BLD AUTO: 0.54 X10*3/UL (ref 0.8–3)
LYMPHOCYTES NFR BLD AUTO: 5.6 %
MCH RBC QN AUTO: 28.8 PG (ref 26–34)
MCHC RBC AUTO-ENTMCNC: 31.3 G/DL (ref 32–36)
MCV RBC AUTO: 92 FL (ref 80–100)
MONOCYTES # BLD AUTO: 0.3 X10*3/UL (ref 0.05–0.8)
MONOCYTES NFR BLD AUTO: 3.1 %
NEUTROPHILS # BLD AUTO: 8.54 X10*3/UL (ref 1.6–5.5)
NEUTROPHILS NFR BLD AUTO: 89.3 %
NRBC BLD-RTO: 0 /100 WBCS (ref 0–0)
PHOSPHATE SERPL-MCNC: 2.9 MG/DL (ref 2.5–4.5)
PLATELET # BLD AUTO: 177 X10*3/UL (ref 150–450)
POTASSIUM SERPL-SCNC: 4.2 MMOL/L (ref 3.4–5.1)
POTASSIUM SERPL-SCNC: 4.2 MMOL/L (ref 3.4–5.1)
PROT SERPL-MCNC: 6.5 G/DL (ref 5.9–7.9)
RBC # BLD AUTO: 3.71 X10*6/UL (ref 4.5–5.9)
SODIUM SERPL-SCNC: 141 MMOL/L (ref 133–145)
SODIUM SERPL-SCNC: 141 MMOL/L (ref 133–145)
WBC # BLD AUTO: 9.6 X10*3/UL (ref 4.4–11.3)

## 2024-07-30 PROCEDURE — 85025 COMPLETE CBC W/AUTO DIFF WBC: CPT

## 2024-07-30 PROCEDURE — 80053 COMPREHEN METABOLIC PANEL: CPT

## 2024-07-30 PROCEDURE — G0156 HHCP-SVS OF AIDE,EA 15 MIN: HCPCS

## 2024-07-30 PROCEDURE — G0300 HHS/HOSPICE OF LPN EA 15 MIN: HCPCS

## 2024-07-30 PROCEDURE — 80069 RENAL FUNCTION PANEL: CPT

## 2024-07-30 ASSESSMENT — ENCOUNTER SYMPTOMS
DENIES PAIN: 1
LAST BOWEL MOVEMENT: 67051
CHANGE IN APPETITE: UNCHANGED
APPETITE LEVEL: GOOD

## 2024-07-31 DIAGNOSIS — I50.9 CHRONIC HEART FAILURE, UNSPECIFIED HEART FAILURE TYPE (MULTI): ICD-10-CM

## 2024-07-31 DIAGNOSIS — E11.9 TYPE 2 DIABETES MELLITUS WITHOUT COMPLICATION, WITHOUT LONG-TERM CURRENT USE OF INSULIN (MULTI): Primary | ICD-10-CM

## 2024-08-01 ENCOUNTER — HOME CARE VISIT (OUTPATIENT)
Dept: HOME HEALTH SERVICES | Facility: HOME HEALTH | Age: 74
End: 2024-08-01
Payer: MEDICARE

## 2024-08-01 VITALS
TEMPERATURE: 95.9 F | OXYGEN SATURATION: 100 % | SYSTOLIC BLOOD PRESSURE: 118 MMHG | HEART RATE: 87 BPM | RESPIRATION RATE: 18 BRPM | DIASTOLIC BLOOD PRESSURE: 70 MMHG

## 2024-08-01 PROCEDURE — G0152 HHCP-SERV OF OT,EA 15 MIN: HCPCS

## 2024-08-01 ASSESSMENT — ENCOUNTER SYMPTOMS
LOWEST PAIN SEVERITY IN PAST 24 HOURS: 2/10
HIGHEST PAIN SEVERITY IN PAST 24 HOURS: 5/10
PERSON REPORTING PAIN: PATIENT
PAIN: 1
PAIN SEVERITY GOAL: 0/10
SUBJECTIVE PAIN PROGRESSION: GRADUALLY IMPROVING

## 2024-08-01 ASSESSMENT — ACTIVITIES OF DAILY LIVING (ADL)
BATHING ASSESSED: 1
DRESSING_LB_CURRENT_FUNCTION: MINIMUM ASSIST
BATHING_CURRENT_FUNCTION: MINIMUM ASSIST

## 2024-08-01 ASSESSMENT — PAIN SCALES - PAIN ASSESSMENT IN ADVANCED DEMENTIA (PAINAD)
FACIALEXPRESSION: 0 - SMILING OR INEXPRESSIVE.
CONSOLABILITY: 0 - NO NEED TO CONSOLE.
CONSOLABILITY: 0
BODYLANGUAGE: 0 - RELAXED.
TOTALSCORE: 0
NEGVOCALIZATION: 0 - NONE.
NEGVOCALIZATION: 0
BODYLANGUAGE: 0
FACIALEXPRESSION: 0
BREATHING: 0

## 2024-08-02 ENCOUNTER — HOME CARE VISIT (OUTPATIENT)
Dept: HOME HEALTH SERVICES | Facility: HOME HEALTH | Age: 74
End: 2024-08-02
Payer: MEDICARE

## 2024-08-02 VITALS
OXYGEN SATURATION: 98 % | SYSTOLIC BLOOD PRESSURE: 118 MMHG | HEART RATE: 100 BPM | RESPIRATION RATE: 20 BRPM | TEMPERATURE: 97.7 F | DIASTOLIC BLOOD PRESSURE: 70 MMHG

## 2024-08-02 PROCEDURE — G0299 HHS/HOSPICE OF RN EA 15 MIN: HCPCS

## 2024-08-02 PROCEDURE — G0156 HHCP-SVS OF AIDE,EA 15 MIN: HCPCS

## 2024-08-02 ASSESSMENT — ENCOUNTER SYMPTOMS
APPETITE LEVEL: GOOD
FATIGUE: 1
FORGETFULNESS: 1
CHANGE IN APPETITE: UNCHANGED
PAIN LOCATION - PAIN SEVERITY: 5/10
DEPRESSION: 0
STOOL FREQUENCY: LESS THAN DAILY
DRY SKIN: 1
MUSCLE WEAKNESS: 1
DESCRIPTION OF MEMORY LOSS: SHORT TERM
PAIN LOCATION - PAIN DURATION: VARIES
PAIN SEVERITY GOAL: 0/10
PAIN LOCATION: BACK
PAIN LOCATION - EXACERBATING FACTORS: MOVEMENTS
DYSPNEA ACTIVITY LEVEL: AFTER AMBULATING LESS THAN 10 FT
PERSON REPORTING PAIN: PATIENT
LAST BOWEL MOVEMENT: 67054
PAIN LOCATION - PAIN QUALITY: ACHE
HIGHEST PAIN SEVERITY IN PAST 24 HOURS: 7/10
LOSS OF SENSATION IN FEET: 1
PAIN LOCATION - PAIN FREQUENCY: INTERMITTENT
SHORTNESS OF BREATH: 1
BOWEL PATTERN NORMAL: 1
TINGLING: 1
OCCASIONAL FEELINGS OF UNSTEADINESS: 1
PAIN: 1
PAIN LOCATION - RELIEVING FACTORS: REST
SUBJECTIVE PAIN PROGRESSION: UNCHANGED

## 2024-08-02 ASSESSMENT — PAIN SCALES - PAIN ASSESSMENT IN ADVANCED DEMENTIA (PAINAD)
CONSOLABILITY: 0
FACIALEXPRESSION: 0
NEGVOCALIZATION: 0 - NONE.
CONSOLABILITY: 0 - NO NEED TO CONSOLE.
FACIALEXPRESSION: 0 - SMILING OR INEXPRESSIVE.
NEGVOCALIZATION: 0
TOTALSCORE: 0
BODYLANGUAGE: 0
BREATHING: 0
BODYLANGUAGE: 0 - RELAXED.

## 2024-08-02 ASSESSMENT — ACTIVITIES OF DAILY LIVING (ADL)
AMBULATION ASSISTANCE: STAND BY ASSIST
MONEY MANAGEMENT (EXPENSES/BILLS): NEEDS ASSISTANCE
AMBULATION ASSISTANCE: 1

## 2024-08-05 ENCOUNTER — HOME CARE VISIT (OUTPATIENT)
Dept: HOME HEALTH SERVICES | Facility: HOME HEALTH | Age: 74
End: 2024-08-05
Payer: MEDICARE

## 2024-08-05 VITALS
SYSTOLIC BLOOD PRESSURE: 130 MMHG | TEMPERATURE: 96.7 F | DIASTOLIC BLOOD PRESSURE: 81 MMHG | HEART RATE: 82 BPM | OXYGEN SATURATION: 98 %

## 2024-08-05 PROCEDURE — G0151 HHCP-SERV OF PT,EA 15 MIN: HCPCS | Performed by: PHYSICAL THERAPIST

## 2024-08-05 ASSESSMENT — ENCOUNTER SYMPTOMS
LIMITED RANGE OF MOTION: 1
PAIN LOCATION: BACK
PAIN LOCATION - PAIN SEVERITY: 4/10
PAIN: 1
PERSON REPORTING PAIN: PATIENT
MUSCLE WEAKNESS: 1

## 2024-08-06 ENCOUNTER — HOME CARE VISIT (OUTPATIENT)
Dept: HOME HEALTH SERVICES | Facility: HOME HEALTH | Age: 74
End: 2024-08-06
Payer: MEDICARE

## 2024-08-06 VITALS
RESPIRATION RATE: 14 BRPM | SYSTOLIC BLOOD PRESSURE: 130 MMHG | TEMPERATURE: 98 F | OXYGEN SATURATION: 98 % | HEART RATE: 80 BPM | DIASTOLIC BLOOD PRESSURE: 76 MMHG

## 2024-08-06 PROCEDURE — G0156 HHCP-SVS OF AIDE,EA 15 MIN: HCPCS

## 2024-08-06 PROCEDURE — G0299 HHS/HOSPICE OF RN EA 15 MIN: HCPCS

## 2024-08-06 ASSESSMENT — PAIN SCALES - PAIN ASSESSMENT IN ADVANCED DEMENTIA (PAINAD)
BODYLANGUAGE: 0 - RELAXED.
BODYLANGUAGE: 0
TOTALSCORE: 0
FACIALEXPRESSION: 0 - SMILING OR INEXPRESSIVE.
NEGVOCALIZATION: 0 - NONE.
BREATHING: 0
CONSOLABILITY: 0 - NO NEED TO CONSOLE.
FACIALEXPRESSION: 0
CONSOLABILITY: 0
NEGVOCALIZATION: 0

## 2024-08-06 ASSESSMENT — ENCOUNTER SYMPTOMS
PAIN SEVERITY GOAL: 0/10
PAIN LOCATION - RELIEVING FACTORS: MEDICATIONS
DESCRIPTION OF MEMORY LOSS: SHORT TERM
TREMORS: 1
PAIN LOCATION - EXACERBATING FACTORS: UNSURE
LOWEST PAIN SEVERITY IN PAST 24 HOURS: 4/10
DEPRESSION: 0
DRY SKIN: 1
DYSPNEA ACTIVITY LEVEL: AFTER AMBULATING 10 - 20 FT
BOWEL PATTERN NORMAL: 1
PAIN LOCATION - PAIN SEVERITY: 5/10
PAIN LOCATION - PAIN DURATION: CONSTANT
LAST BOWEL MOVEMENT: 67057
APPETITE LEVEL: GOOD
OCCASIONAL FEELINGS OF UNSTEADINESS: 1
TINGLING: 1
FORGETFULNESS: 1
PAIN LOCATION - PAIN QUALITY: ACHE
HIGHEST PAIN SEVERITY IN PAST 24 HOURS: 7/10
CHANGE IN APPETITE: UNCHANGED
MUSCLE WEAKNESS: 1
PAIN LOCATION - PAIN FREQUENCY: CONSTANT
PAIN LOCATION: BACK
PERSON REPORTING PAIN: PATIENT
SHORTNESS OF BREATH: 1
LOWER EXTREMITY EDEMA: 1
STOOL FREQUENCY: LESS THAN DAILY
SUBJECTIVE PAIN PROGRESSION: UNCHANGED
PAIN: 1
LOSS OF SENSATION IN FEET: 1
RHINORRHEA: 1
FATIGUE: 1

## 2024-08-06 ASSESSMENT — ACTIVITIES OF DAILY LIVING (ADL)
CURRENT_FUNCTION: ONE PERSON
AMBULATION ASSISTANCE: ONE PERSON
MONEY MANAGEMENT (EXPENSES/BILLS): NEEDS ASSISTANCE

## 2024-08-08 ENCOUNTER — HOME CARE VISIT (OUTPATIENT)
Dept: HOME HEALTH SERVICES | Facility: HOME HEALTH | Age: 74
End: 2024-08-08
Payer: MEDICARE

## 2024-08-09 ENCOUNTER — HOME CARE VISIT (OUTPATIENT)
Dept: HOME HEALTH SERVICES | Facility: HOME HEALTH | Age: 74
End: 2024-08-09
Payer: MEDICARE

## 2024-08-09 VITALS
DIASTOLIC BLOOD PRESSURE: 64 MMHG | HEART RATE: 71 BPM | OXYGEN SATURATION: 98 % | RESPIRATION RATE: 20 BRPM | TEMPERATURE: 97.5 F | SYSTOLIC BLOOD PRESSURE: 136 MMHG

## 2024-08-09 PROCEDURE — G0299 HHS/HOSPICE OF RN EA 15 MIN: HCPCS

## 2024-08-09 PROCEDURE — G0156 HHCP-SVS OF AIDE,EA 15 MIN: HCPCS

## 2024-08-09 ASSESSMENT — ENCOUNTER SYMPTOMS
CHANGE IN APPETITE: UNCHANGED
PAIN LOCATION - PAIN SEVERITY: 6/10
PAIN LOCATION - RELIEVING FACTORS: REST
LAST BOWEL MOVEMENT: 67061
LIMITED RANGE OF MOTION: 1
PAIN: 1
PAIN LOCATION - PAIN SEVERITY: 5/10
PAIN SEVERITY GOAL: 0/10
PAIN LOCATION: LEFT LEG
HIGHEST PAIN SEVERITY IN PAST 24 HOURS: 6/10
PAIN LOCATION - EXACERBATING FACTORS: BENDING
LOWEST PAIN SEVERITY IN PAST 24 HOURS: 4/10
LOSS OF SENSATION: 1
PAIN LOCATION - EXACERBATING FACTORS: WALKING
PAIN LOCATION - PAIN FREQUENCY: CONSTANT
APPETITE LEVEL: GOOD
PAIN LOCATION - PAIN FREQUENCY: CONSTANT
PAIN LOCATION - PAIN DURATION: CONTINUOUS
PERSON REPORTING PAIN: PATIENT
MUSCLE WEAKNESS: 1
STOOL FREQUENCY: LESS THAN DAILY
PAIN LOCATION - RELIEVING FACTORS: REST
SUBJECTIVE PAIN PROGRESSION: UNCHANGED
PAIN LOCATION: BACK
PAIN LOCATION - PAIN DURATION: CONTINUOUS

## 2024-08-09 ASSESSMENT — ACTIVITIES OF DAILY LIVING (ADL)
AMBULATION ASSISTANCE: STAND BY ASSIST
CURRENT_FUNCTION: STAND BY ASSIST

## 2024-08-12 ENCOUNTER — HOME CARE VISIT (OUTPATIENT)
Dept: HOME HEALTH SERVICES | Facility: HOME HEALTH | Age: 74
End: 2024-08-12
Payer: MEDICARE

## 2024-08-12 VITALS
SYSTOLIC BLOOD PRESSURE: 137 MMHG | DIASTOLIC BLOOD PRESSURE: 71 MMHG | TEMPERATURE: 97.1 F | OXYGEN SATURATION: 98 % | HEART RATE: 72 BPM

## 2024-08-12 PROCEDURE — G0151 HHCP-SERV OF PT,EA 15 MIN: HCPCS | Performed by: PHYSICAL THERAPIST

## 2024-08-12 ASSESSMENT — ENCOUNTER SYMPTOMS
PAIN LOCATION - PAIN SEVERITY: 5/10
PAIN LOCATION: LEFT FOOT
PERSON REPORTING PAIN: PATIENT
PAIN: 1
PAIN LOCATION: BACK
PAIN LOCATION - PAIN SEVERITY: 5/10

## 2024-08-13 ENCOUNTER — HOME CARE VISIT (OUTPATIENT)
Dept: HOME HEALTH SERVICES | Facility: HOME HEALTH | Age: 74
End: 2024-08-13
Payer: MEDICARE

## 2024-08-13 VITALS
DIASTOLIC BLOOD PRESSURE: 80 MMHG | SYSTOLIC BLOOD PRESSURE: 138 MMHG | OXYGEN SATURATION: 98 % | RESPIRATION RATE: 16 BRPM | HEART RATE: 80 BPM | TEMPERATURE: 97.8 F

## 2024-08-13 PROCEDURE — G0299 HHS/HOSPICE OF RN EA 15 MIN: HCPCS

## 2024-08-13 PROCEDURE — G0156 HHCP-SVS OF AIDE,EA 15 MIN: HCPCS

## 2024-08-13 ASSESSMENT — ENCOUNTER SYMPTOMS
SPUTUM CONSISTENCY: THICK
DRY SKIN: 1
HIGHEST PAIN SEVERITY IN PAST 24 HOURS: 4/10
FORGETFULNESS: 1
DEPRESSION: 0
PAIN: 1
PAIN SEVERITY GOAL: 0/10
BOWEL PATTERN NORMAL: 1
CHANGE IN APPETITE: UNCHANGED
LOSS OF SENSATION IN FEET: 0
LAST BOWEL MOVEMENT: 67065
SUBJECTIVE PAIN PROGRESSION: GRADUALLY IMPROVING
DYSPNEA ACTIVITY LEVEL: AFTER AMBULATING 10 - 20 FT
MUSCLE WEAKNESS: 1
PAIN LOCATION - PAIN SEVERITY: 4/10
PAIN LOCATION: LEFT FOOT
PERSON REPORTING PAIN: PATIENT
SHORTNESS OF BREATH: 1
OCCASIONAL FEELINGS OF UNSTEADINESS: 1
FATIGUE: 1
RHINORRHEA: 1
SPUTUM COLOR: WHITE
SPUTUM PRODUCTION: 1
APPETITE LEVEL: GOOD
LOWEST PAIN SEVERITY IN PAST 24 HOURS: 3/10
STOOL FREQUENCY: LESS THAN DAILY

## 2024-08-13 ASSESSMENT — PAIN SCALES - PAIN ASSESSMENT IN ADVANCED DEMENTIA (PAINAD)
FACIALEXPRESSION: 0 - SMILING OR INEXPRESSIVE.
TOTALSCORE: 0
FACIALEXPRESSION: 0
BREATHING: 0
NEGVOCALIZATION: 0 - NONE.
CONSOLABILITY: 0 - NO NEED TO CONSOLE.
NEGVOCALIZATION: 0
BODYLANGUAGE: 0
BODYLANGUAGE: 0 - RELAXED.
CONSOLABILITY: 0

## 2024-08-13 ASSESSMENT — ACTIVITIES OF DAILY LIVING (ADL)
CURRENT_FUNCTION: ONE PERSON
MONEY MANAGEMENT (EXPENSES/BILLS): NEEDS ASSISTANCE
AMBULATION ASSISTANCE: ONE PERSON

## 2024-08-16 ENCOUNTER — HOME CARE VISIT (OUTPATIENT)
Dept: HOME HEALTH SERVICES | Facility: HOME HEALTH | Age: 74
End: 2024-08-16
Payer: MEDICARE

## 2024-08-16 VITALS — DIASTOLIC BLOOD PRESSURE: 72 MMHG | SYSTOLIC BLOOD PRESSURE: 126 MMHG

## 2024-08-16 PROCEDURE — G0156 HHCP-SVS OF AIDE,EA 15 MIN: HCPCS

## 2024-08-16 PROCEDURE — G0300 HHS/HOSPICE OF LPN EA 15 MIN: HCPCS

## 2024-08-16 PROCEDURE — G0152 HHCP-SERV OF OT,EA 15 MIN: HCPCS

## 2024-08-16 ASSESSMENT — PAIN SCALES - PAIN ASSESSMENT IN ADVANCED DEMENTIA (PAINAD)
BODYLANGUAGE: 0
CONSOLABILITY: 0 - NO NEED TO CONSOLE.
FACIALEXPRESSION: 0
FACIALEXPRESSION: 0 - SMILING OR INEXPRESSIVE.
NEGVOCALIZATION: 0 - NONE.
BREATHING: 0
TOTALSCORE: 0
BODYLANGUAGE: 0 - RELAXED.
NEGVOCALIZATION: 0
CONSOLABILITY: 0

## 2024-08-16 ASSESSMENT — ACTIVITIES OF DAILY LIVING (ADL)
DRESSING_LB_CURRENT_FUNCTION: INDEPENDENT
BATHING ASSESSED: 1
BATHING_CURRENT_FUNCTION: INDEPENDENT
TOILETING: 1
GROOMING_WITHIN_DEFINED_LIMITS: 1
TOILETING: INDEPENDENT

## 2024-08-16 ASSESSMENT — ENCOUNTER SYMPTOMS
CHANGE IN APPETITE: UNCHANGED
HIGHEST PAIN SEVERITY IN PAST 24 HOURS: 6/10
LOWEST PAIN SEVERITY IN PAST 24 HOURS: 4/10
SUBJECTIVE PAIN PROGRESSION: GRADUALLY IMPROVING
LAST BOWEL MOVEMENT: 67067
PAIN: 1
APPETITE LEVEL: GOOD
PAIN: 1
PAIN SEVERITY GOAL: 0/10

## 2024-08-19 ENCOUNTER — HOME CARE VISIT (OUTPATIENT)
Dept: HOME HEALTH SERVICES | Facility: HOME HEALTH | Age: 74
End: 2024-08-19
Payer: MEDICARE

## 2024-08-19 VITALS
HEART RATE: 77 BPM | OXYGEN SATURATION: 99 % | SYSTOLIC BLOOD PRESSURE: 119 MMHG | TEMPERATURE: 97.1 F | DIASTOLIC BLOOD PRESSURE: 77 MMHG

## 2024-08-19 PROCEDURE — G0151 HHCP-SERV OF PT,EA 15 MIN: HCPCS | Performed by: PHYSICAL THERAPIST

## 2024-08-19 ASSESSMENT — ENCOUNTER SYMPTOMS
MUSCLE WEAKNESS: 1
PAIN LOCATION - PAIN SEVERITY: 5/10
PAIN LOCATION: LEFT FOOT
PERSON REPORTING PAIN: PATIENT
PAIN: 1
PAIN LOCATION - PAIN SEVERITY: 5/10
PAIN LOCATION: BACK

## 2024-08-20 ENCOUNTER — HOME CARE VISIT (OUTPATIENT)
Dept: HOME HEALTH SERVICES | Facility: HOME HEALTH | Age: 74
End: 2024-08-20
Payer: MEDICARE

## 2024-08-20 VITALS
RESPIRATION RATE: 18 BRPM | DIASTOLIC BLOOD PRESSURE: 80 MMHG | OXYGEN SATURATION: 98 % | SYSTOLIC BLOOD PRESSURE: 120 MMHG | TEMPERATURE: 97.4 F | HEART RATE: 88 BPM

## 2024-08-20 PROCEDURE — G0299 HHS/HOSPICE OF RN EA 15 MIN: HCPCS

## 2024-08-20 PROCEDURE — G0156 HHCP-SVS OF AIDE,EA 15 MIN: HCPCS

## 2024-08-20 ASSESSMENT — PAIN SCALES - PAIN ASSESSMENT IN ADVANCED DEMENTIA (PAINAD)
TOTALSCORE: 0
FACIALEXPRESSION: 0
BODYLANGUAGE: 0 - RELAXED.
CONSOLABILITY: 0
BODYLANGUAGE: 0
NEGVOCALIZATION: 0
CONSOLABILITY: 0 - NO NEED TO CONSOLE.
BREATHING: 0
FACIALEXPRESSION: 0 - SMILING OR INEXPRESSIVE.
NEGVOCALIZATION: 0 - NONE.

## 2024-08-20 ASSESSMENT — ENCOUNTER SYMPTOMS
RHINORRHEA: 1
PAIN LOCATION - RELIEVING FACTORS: UNSURE
PAIN LOCATION - PAIN QUALITY: ACHE
PAIN LOCATION - EXACERBATING FACTORS: MOVEMENT
TINGLING: 1
PAIN SEVERITY GOAL: 0/10
DESCRIPTION OF MEMORY LOSS: SHORT TERM
SPUTUM PRODUCTION: 1
OCCASIONAL FEELINGS OF UNSTEADINESS: 1
PERSON REPORTING PAIN: PATIENT
STOOL FREQUENCY: DAILY
PAIN: 1
DESCRIPTION OF MEMORY LOSS: LONG TERM
PAIN LOCATION: BACK
FATIGUE: 1
APPETITE LEVEL: GOOD
DYSPNEA ACTIVITY LEVEL: AFTER AMBULATING LESS THAN 10 FT
LOSS OF SENSATION IN FEET: 1
BOWEL PATTERN NORMAL: 1
FORGETFULNESS: 1
PAIN LOCATION - PAIN SEVERITY: 6/10
MUSCLE WEAKNESS: 1
LAST BOWEL MOVEMENT: 67072
PAIN LOCATION - PAIN FREQUENCY: CONSTANT
CHANGE IN APPETITE: UNCHANGED
LOWEST PAIN SEVERITY IN PAST 24 HOURS: 3/10
SUBJECTIVE PAIN PROGRESSION: UNCHANGED
HIGHEST PAIN SEVERITY IN PAST 24 HOURS: 7/10
SPUTUM CONSISTENCY: THICK
DEPRESSION: 0
SPUTUM COLOR: CLEAR
SHORTNESS OF BREATH: 1
SPUTUM AMOUNT: SCANT
DRY SKIN: 1
PAIN LOCATION - PAIN DURATION: CONSTANT

## 2024-08-21 ENCOUNTER — APPOINTMENT (OUTPATIENT)
Dept: CARDIAC REHAB | Facility: CLINIC | Age: 74
End: 2024-08-21
Payer: MEDICARE

## 2024-08-23 ENCOUNTER — HOME CARE VISIT (OUTPATIENT)
Dept: HOME HEALTH SERVICES | Facility: HOME HEALTH | Age: 74
End: 2024-08-23
Payer: MEDICARE

## 2024-08-23 VITALS
SYSTOLIC BLOOD PRESSURE: 130 MMHG | OXYGEN SATURATION: 99 % | TEMPERATURE: 97.7 F | DIASTOLIC BLOOD PRESSURE: 82 MMHG | HEART RATE: 91 BPM | RESPIRATION RATE: 16 BRPM

## 2024-08-23 PROCEDURE — G0300 HHS/HOSPICE OF LPN EA 15 MIN: HCPCS

## 2024-08-23 PROCEDURE — G0156 HHCP-SVS OF AIDE,EA 15 MIN: HCPCS

## 2024-08-26 ENCOUNTER — HOME CARE VISIT (OUTPATIENT)
Dept: HOME HEALTH SERVICES | Facility: HOME HEALTH | Age: 74
End: 2024-08-26
Payer: MEDICARE

## 2024-08-26 ENCOUNTER — APPOINTMENT (OUTPATIENT)
Dept: CARDIAC REHAB | Facility: CLINIC | Age: 74
End: 2024-08-26
Payer: MEDICARE

## 2024-08-26 VITALS
TEMPERATURE: 96.8 F | SYSTOLIC BLOOD PRESSURE: 124 MMHG | DIASTOLIC BLOOD PRESSURE: 72 MMHG | HEART RATE: 91 BPM | OXYGEN SATURATION: 98 %

## 2024-08-26 PROCEDURE — G0151 HHCP-SERV OF PT,EA 15 MIN: HCPCS | Performed by: PHYSICAL THERAPIST

## 2024-08-26 ASSESSMENT — ACTIVITIES OF DAILY LIVING (ADL): AMBULATION ASSISTANCE ON FLAT SURFACES: 1

## 2024-08-26 ASSESSMENT — ENCOUNTER SYMPTOMS
PAIN LOCATION: BACK
PAIN LOCATION - PAIN SEVERITY: 5/10
PAIN: 1

## 2024-08-27 ENCOUNTER — HOME CARE VISIT (OUTPATIENT)
Dept: HOME HEALTH SERVICES | Facility: HOME HEALTH | Age: 74
End: 2024-08-27
Payer: MEDICARE

## 2024-08-27 PROCEDURE — G0156 HHCP-SVS OF AIDE,EA 15 MIN: HCPCS

## 2024-08-28 ENCOUNTER — HOME CARE VISIT (OUTPATIENT)
Dept: HOME HEALTH SERVICES | Facility: HOME HEALTH | Age: 74
End: 2024-08-28
Payer: MEDICARE

## 2024-08-28 VITALS
OXYGEN SATURATION: 99 % | RESPIRATION RATE: 16 BRPM | DIASTOLIC BLOOD PRESSURE: 76 MMHG | SYSTOLIC BLOOD PRESSURE: 140 MMHG | TEMPERATURE: 97.9 F | HEART RATE: 88 BPM

## 2024-08-28 PROCEDURE — G0300 HHS/HOSPICE OF LPN EA 15 MIN: HCPCS

## 2024-08-28 ASSESSMENT — ENCOUNTER SYMPTOMS
DENIES PAIN: 1
APPETITE LEVEL: GOOD
CHANGE IN APPETITE: UNCHANGED
APPETITE LEVEL: GOOD
LAST BOWEL MOVEMENT: 67079
DENIES PAIN: 1

## 2024-08-30 ENCOUNTER — HOME CARE VISIT (OUTPATIENT)
Dept: HOME HEALTH SERVICES | Facility: HOME HEALTH | Age: 74
End: 2024-08-30
Payer: MEDICARE

## 2024-08-30 ENCOUNTER — PATIENT OUTREACH (OUTPATIENT)
Dept: PRIMARY CARE | Facility: CLINIC | Age: 74
End: 2024-08-30

## 2024-08-30 VITALS
DIASTOLIC BLOOD PRESSURE: 72 MMHG | RESPIRATION RATE: 16 BRPM | SYSTOLIC BLOOD PRESSURE: 140 MMHG | TEMPERATURE: 97.9 F | HEART RATE: 86 BPM | OXYGEN SATURATION: 98 %

## 2024-08-30 PROCEDURE — G0156 HHCP-SVS OF AIDE,EA 15 MIN: HCPCS

## 2024-08-30 PROCEDURE — G0300 HHS/HOSPICE OF LPN EA 15 MIN: HCPCS

## 2024-08-30 ASSESSMENT — ENCOUNTER SYMPTOMS
DENIES PAIN: 1
LAST BOWEL MOVEMENT: 67082
CHANGE IN APPETITE: UNCHANGED
APPETITE LEVEL: GOOD

## 2024-08-30 NOTE — PROGRESS NOTES
Successful outreach to patient regarding hospitalization as patient continues TCM program.   At time of outreach call the patient feels as if their condition has (remained the same) since initial visit with PCP or specialist.  Questions or concerns addressed at this time with patient.     Patient continues to see home care and podiatrist for wound care. He is upset that home PT has ended and feels he could have used some more therapy. States he is managing okay. Denied any further questions or concerns.

## 2024-09-03 ENCOUNTER — HOME CARE VISIT (OUTPATIENT)
Dept: HOME HEALTH SERVICES | Facility: HOME HEALTH | Age: 74
End: 2024-09-03
Payer: MEDICARE

## 2024-09-03 VITALS
OXYGEN SATURATION: 98 % | TEMPERATURE: 98.3 F | HEART RATE: 72 BPM | DIASTOLIC BLOOD PRESSURE: 84 MMHG | SYSTOLIC BLOOD PRESSURE: 128 MMHG | RESPIRATION RATE: 20 BRPM

## 2024-09-03 PROCEDURE — G0156 HHCP-SVS OF AIDE,EA 15 MIN: HCPCS

## 2024-09-03 PROCEDURE — G0299 HHS/HOSPICE OF RN EA 15 MIN: HCPCS

## 2024-09-03 ASSESSMENT — ENCOUNTER SYMPTOMS
LOSS OF SENSATION IN FEET: 1
LAST BOWEL MOVEMENT: 67086
LOWER EXTREMITY EDEMA: 1
FORGETFULNESS: 1
PAIN LOCATION - EXACERBATING FACTORS: WALKING
PAIN LOCATION: LEFT FOOT
HIGHEST PAIN SEVERITY IN PAST 24 HOURS: 7/10
PAIN LOCATION: BACK
PAIN: 1
BOWEL PATTERN NORMAL: 1
DESCRIPTION OF MEMORY LOSS: SHORT TERM
PAIN LOCATION - PAIN SEVERITY: 6/10
HEADACHES: 1
DEPRESSION: 0
APPETITE LEVEL: GOOD
PAIN LOCATION - PAIN QUALITY: ACHE, STABBING
SUBJECTIVE PAIN PROGRESSION: UNCHANGED
PAIN LOCATION - PAIN DURATION: VARIES
STOOL FREQUENCY: LESS THAN DAILY
PAIN LOCATION - PAIN FREQUENCY: INTERMITTENT
LOWEST PAIN SEVERITY IN PAST 24 HOURS: 5/10
PAIN LOCATION - PAIN QUALITY: STINGING, BURNING
PAIN LOCATION - PAIN DURATION: VARIES
PAIN LOCATION - EXACERBATING FACTORS: CERTAIN MOVEMENTS
CHANGE IN APPETITE: UNCHANGED
OCCASIONAL FEELINGS OF UNSTEADINESS: 1
DYSPNEA ACTIVITY LEVEL: AFTER AMBULATING 10 - 20 FT
FATIGUE: 1
PAIN LOCATION - PAIN SEVERITY: 5/10
PAIN LOCATION - RELIEVING FACTORS: REST
PERSON REPORTING PAIN: PATIENT
RHINORRHEA: 1
PAIN LOCATION - RELIEVING FACTORS: REST
SHORTNESS OF BREATH: 1
TREMORS: 1
DRY SKIN: 1
PAIN LOCATION - PAIN FREQUENCY: INTERMITTENT

## 2024-09-03 ASSESSMENT — PAIN SCALES - PAIN ASSESSMENT IN ADVANCED DEMENTIA (PAINAD)
FACIALEXPRESSION: 0 - SMILING OR INEXPRESSIVE.
BREATHING: 0
NEGVOCALIZATION: 0
NEGVOCALIZATION: 0 - NONE.
TOTALSCORE: 0
BODYLANGUAGE: 0 - RELAXED.
CONSOLABILITY: 0 - NO NEED TO CONSOLE.
BODYLANGUAGE: 0
FACIALEXPRESSION: 0
CONSOLABILITY: 0

## 2024-09-03 ASSESSMENT — ACTIVITIES OF DAILY LIVING (ADL)
MONEY MANAGEMENT (EXPENSES/BILLS): NEEDS ASSISTANCE
AMBULATION ASSISTANCE: ONE PERSON
CURRENT_FUNCTION: ONE PERSON

## 2024-09-06 ENCOUNTER — HOME CARE VISIT (OUTPATIENT)
Dept: HOME HEALTH SERVICES | Facility: HOME HEALTH | Age: 74
End: 2024-09-06
Payer: MEDICARE

## 2024-09-06 VITALS
SYSTOLIC BLOOD PRESSURE: 136 MMHG | DIASTOLIC BLOOD PRESSURE: 82 MMHG | HEART RATE: 76 BPM | TEMPERATURE: 97.8 F | RESPIRATION RATE: 16 BRPM | OXYGEN SATURATION: 98 %

## 2024-09-06 PROCEDURE — G0156 HHCP-SVS OF AIDE,EA 15 MIN: HCPCS

## 2024-09-06 PROCEDURE — G0300 HHS/HOSPICE OF LPN EA 15 MIN: HCPCS

## 2024-09-06 ASSESSMENT — ENCOUNTER SYMPTOMS
LAST BOWEL MOVEMENT: 67089
PAIN: 1
CHANGE IN APPETITE: UNCHANGED
APPETITE LEVEL: GOOD

## 2024-09-10 ENCOUNTER — HOME CARE VISIT (OUTPATIENT)
Dept: HOME HEALTH SERVICES | Facility: HOME HEALTH | Age: 74
End: 2024-09-10
Payer: MEDICARE

## 2024-09-10 VITALS
HEART RATE: 100 BPM | OXYGEN SATURATION: 97 % | TEMPERATURE: 98.1 F | SYSTOLIC BLOOD PRESSURE: 130 MMHG | DIASTOLIC BLOOD PRESSURE: 82 MMHG | RESPIRATION RATE: 16 BRPM

## 2024-09-10 PROCEDURE — G0156 HHCP-SVS OF AIDE,EA 15 MIN: HCPCS

## 2024-09-10 PROCEDURE — G0300 HHS/HOSPICE OF LPN EA 15 MIN: HCPCS

## 2024-09-10 ASSESSMENT — ENCOUNTER SYMPTOMS
SUBJECTIVE PAIN PROGRESSION: GRADUALLY WORSENING
CHANGE IN APPETITE: UNCHANGED
LAST BOWEL MOVEMENT: 67093
LOWEST PAIN SEVERITY IN PAST 24 HOURS: 10/10
PAIN SEVERITY GOAL: 0/10
HIGHEST PAIN SEVERITY IN PAST 24 HOURS: 10/10
APPETITE LEVEL: GOOD
PAIN: 1

## 2024-09-13 ENCOUNTER — HOME CARE VISIT (OUTPATIENT)
Dept: HOME HEALTH SERVICES | Facility: HOME HEALTH | Age: 74
End: 2024-09-13
Payer: MEDICARE

## 2024-09-13 VITALS
DIASTOLIC BLOOD PRESSURE: 86 MMHG | OXYGEN SATURATION: 98 % | HEART RATE: 88 BPM | RESPIRATION RATE: 18 BRPM | TEMPERATURE: 97.8 F | SYSTOLIC BLOOD PRESSURE: 128 MMHG

## 2024-09-13 PROCEDURE — G0156 HHCP-SVS OF AIDE,EA 15 MIN: HCPCS

## 2024-09-13 PROCEDURE — G0299 HHS/HOSPICE OF RN EA 15 MIN: HCPCS

## 2024-09-13 ASSESSMENT — ENCOUNTER SYMPTOMS
PERSON REPORTING PAIN: PATIENT
SPUTUM AMOUNT: SCANT
MUSCLE WEAKNESS: 1
SUBJECTIVE PAIN PROGRESSION: UNCHANGED
LOWER EXTREMITY EDEMA: 1
PAIN LOCATION - RELIEVING FACTORS: REST
DRY SKIN: 1
FORGETFULNESS: 1
PAIN LOCATION - PAIN DURATION: CONSTANT
PAIN LOCATION: LEFT FOOT
APPETITE LEVEL: GOOD
SPUTUM CONSISTENCY: THICK
HIGHEST PAIN SEVERITY IN PAST 24 HOURS: 7/10
CHANGE IN APPETITE: UNCHANGED
FATIGUE: 1
PAIN LOCATION - PAIN FREQUENCY: CONSTANT
SHORTNESS OF BREATH: 1
SPUTUM PRODUCTION: 1
PAIN LOCATION - EXACERBATING FACTORS: CERTAIN MOVEMENTS
LOWEST PAIN SEVERITY IN PAST 24 HOURS: 7/10
PAIN: 1
PAIN SEVERITY GOAL: 0/10
DESCRIPTION OF MEMORY LOSS: SHORT TERM
LOSS OF SENSATION IN FEET: 0
DEPRESSION: 0
PAIN LOCATION - PAIN SEVERITY: 7/10
SPUTUM COLOR: WHITE
OCCASIONAL FEELINGS OF UNSTEADINESS: 1

## 2024-09-13 ASSESSMENT — PAIN SCALES - PAIN ASSESSMENT IN ADVANCED DEMENTIA (PAINAD)
BODYLANGUAGE: 0 - RELAXED.
BODYLANGUAGE: 0
FACIALEXPRESSION: 0
NEGVOCALIZATION: 0
BREATHING: 0
FACIALEXPRESSION: 0 - SMILING OR INEXPRESSIVE.
TOTALSCORE: 0
CONSOLABILITY: 0
CONSOLABILITY: 0 - NO NEED TO CONSOLE.
NEGVOCALIZATION: 0 - NONE.

## 2024-09-13 ASSESSMENT — ACTIVITIES OF DAILY LIVING (ADL): MONEY MANAGEMENT (EXPENSES/BILLS): NEEDS ASSISTANCE

## 2024-09-17 ENCOUNTER — HOME CARE VISIT (OUTPATIENT)
Dept: HOME HEALTH SERVICES | Facility: HOME HEALTH | Age: 74
End: 2024-09-17
Payer: MEDICARE

## 2024-09-17 VITALS
OXYGEN SATURATION: 97 % | DIASTOLIC BLOOD PRESSURE: 72 MMHG | TEMPERATURE: 98 F | SYSTOLIC BLOOD PRESSURE: 128 MMHG | RESPIRATION RATE: 16 BRPM | HEART RATE: 84 BPM

## 2024-09-17 DIAGNOSIS — I10 PRIMARY HYPERTENSION: ICD-10-CM

## 2024-09-17 PROCEDURE — G0156 HHCP-SVS OF AIDE,EA 15 MIN: HCPCS

## 2024-09-17 PROCEDURE — G0299 HHS/HOSPICE OF RN EA 15 MIN: HCPCS

## 2024-09-17 ASSESSMENT — ENCOUNTER SYMPTOMS
SPUTUM COLOR: CLEAR
PAIN LOCATION - EXACERBATING FACTORS: WALKING
APPETITE LEVEL: GOOD
PAIN: 1
CHANGE IN APPETITE: UNCHANGED
TINGLING: 1
RHINORRHEA: 1
LOWEST PAIN SEVERITY IN PAST 24 HOURS: 7/10
DYSPNEA ACTIVITY LEVEL: AFTER AMBULATING 10 - 20 FT
LOWER EXTREMITY EDEMA: 1
SPUTUM PRODUCTION: 1
OCCASIONAL FEELINGS OF UNSTEADINESS: 1
PAIN LOCATION - PAIN FREQUENCY: CONSTANT
LOSS OF SENSATION IN FEET: 1
AGITATION: 1
DESCRIPTION OF MEMORY LOSS: SHORT TERM
SPUTUM CONSISTENCY: THICK
FORGETFULNESS: 1
PAIN LOCATION - RELIEVING FACTORS: MEDICATIONS
PAIN SEVERITY GOAL: 0/10
PAIN LOCATION - PAIN DURATION: CONSTANT
FATIGUE: 1
TREMORS: 1
SUBJECTIVE PAIN PROGRESSION: UNCHANGED
PAIN LOCATION: LEFT FOOT
PAIN LOCATION - PAIN SEVERITY: 7/10
HIGHEST PAIN SEVERITY IN PAST 24 HOURS: 7/10
SHORTNESS OF BREATH: 1
DEPRESSION: 0
PERSON REPORTING PAIN: PATIENT
DRY SKIN: 1
SPUTUM AMOUNT: SCANT

## 2024-09-17 ASSESSMENT — ACTIVITIES OF DAILY LIVING (ADL)
OASIS_M1830: 03
CURRENT_FUNCTION: STAND BY ASSIST
AMBULATION ASSISTANCE: STAND BY ASSIST
ENTERING_EXITING_HOME: MODERATE ASSIST

## 2024-09-17 ASSESSMENT — PAIN SCALES - PAIN ASSESSMENT IN ADVANCED DEMENTIA (PAINAD)
NEGVOCALIZATION: 0 - NONE.
BREATHING: 0
TOTALSCORE: 0
CONSOLABILITY: 0
BODYLANGUAGE: 0 - RELAXED.
FACIALEXPRESSION: 0 - SMILING OR INEXPRESSIVE.
FACIALEXPRESSION: 0
NEGVOCALIZATION: 0
BODYLANGUAGE: 0
CONSOLABILITY: 0 - NO NEED TO CONSOLE.

## 2024-09-18 RX ORDER — AMLODIPINE BESYLATE 5 MG/1
5 TABLET ORAL DAILY
Qty: 90 TABLET | Refills: 3 | Status: SHIPPED | OUTPATIENT
Start: 2024-09-18

## 2024-09-19 ENCOUNTER — HOME CARE VISIT (OUTPATIENT)
Dept: HOME HEALTH SERVICES | Facility: HOME HEALTH | Age: 74
End: 2024-09-19
Payer: MEDICARE

## 2024-09-19 PROCEDURE — G0156 HHCP-SVS OF AIDE,EA 15 MIN: HCPCS

## 2024-09-20 ENCOUNTER — HOME CARE VISIT (OUTPATIENT)
Dept: HOME HEALTH SERVICES | Facility: HOME HEALTH | Age: 74
End: 2024-09-20
Payer: MEDICARE

## 2024-09-20 VITALS
DIASTOLIC BLOOD PRESSURE: 82 MMHG | SYSTOLIC BLOOD PRESSURE: 120 MMHG | HEART RATE: 103 BPM | TEMPERATURE: 98.6 F | RESPIRATION RATE: 16 BRPM | OXYGEN SATURATION: 98 %

## 2024-09-20 PROCEDURE — G0300 HHS/HOSPICE OF LPN EA 15 MIN: HCPCS

## 2024-09-20 ASSESSMENT — ENCOUNTER SYMPTOMS
CHANGE IN APPETITE: UNCHANGED
APPETITE LEVEL: GOOD
DENIES PAIN: 1
LAST BOWEL MOVEMENT: 67102

## 2024-09-24 ENCOUNTER — HOME CARE VISIT (OUTPATIENT)
Dept: HOME HEALTH SERVICES | Facility: HOME HEALTH | Age: 74
End: 2024-09-24
Payer: MEDICARE

## 2024-09-24 VITALS
RESPIRATION RATE: 20 BRPM | HEART RATE: 84 BPM | SYSTOLIC BLOOD PRESSURE: 130 MMHG | DIASTOLIC BLOOD PRESSURE: 82 MMHG | OXYGEN SATURATION: 98 % | TEMPERATURE: 98 F

## 2024-09-24 PROCEDURE — G0156 HHCP-SVS OF AIDE,EA 15 MIN: HCPCS

## 2024-09-24 PROCEDURE — G0299 HHS/HOSPICE OF RN EA 15 MIN: HCPCS

## 2024-09-24 ASSESSMENT — ENCOUNTER SYMPTOMS
DIZZINESS: 1
HIGHEST PAIN SEVERITY IN PAST 24 HOURS: 7/10
SPUTUM CONSISTENCY: THICK
RHINORRHEA: 1
PAIN: 1
CHANGE IN APPETITE: UNCHANGED
TINGLING: 1
PAIN LOCATION - RELIEVING FACTORS: REST
FORGETFULNESS: 1
PAIN LOCATION - EXACERBATING FACTORS: WALKING
DESCRIPTION OF MEMORY LOSS: SHORT TERM
PAIN LOCATION: LEFT FOOT
DYSPNEA ACTIVITY LEVEL: AFTER AMBULATING 10 - 20 FT
PAIN LOCATION - PAIN SEVERITY: 5/10
DEPRESSION: 0
DRY SKIN: 1
FATIGUE: 1
PAIN LOCATION - PAIN DURATION: CONSTANT
PERSON REPORTING PAIN: PATIENT
LOSS OF SENSATION IN FEET: 1
PAIN SEVERITY GOAL: 0/10
LOWER EXTREMITY EDEMA: 1
SHORTNESS OF BREATH: 1
LOWEST PAIN SEVERITY IN PAST 24 HOURS: 4/10
SPUTUM PRODUCTION: 1
SUBJECTIVE PAIN PROGRESSION: UNCHANGED
TREMORS: 1
PAIN LOCATION - PAIN FREQUENCY: CONSTANT
OCCASIONAL FEELINGS OF UNSTEADINESS: 1
SPUTUM AMOUNT: SCANT
SPUTUM COLOR: WHITE
APPETITE LEVEL: GOOD

## 2024-09-24 ASSESSMENT — PAIN SCALES - PAIN ASSESSMENT IN ADVANCED DEMENTIA (PAINAD)
NEGVOCALIZATION: 0 - NONE.
NEGVOCALIZATION: 0
CONSOLABILITY: 0
BODYLANGUAGE: 0 - RELAXED.
BREATHING: 0
TOTALSCORE: 0
FACIALEXPRESSION: 0
BODYLANGUAGE: 0
FACIALEXPRESSION: 0 - SMILING OR INEXPRESSIVE.
CONSOLABILITY: 0 - NO NEED TO CONSOLE.

## 2024-09-27 ENCOUNTER — HOME CARE VISIT (OUTPATIENT)
Dept: HOME HEALTH SERVICES | Facility: HOME HEALTH | Age: 74
End: 2024-09-27
Payer: MEDICARE

## 2024-09-27 VITALS
TEMPERATURE: 98.7 F | SYSTOLIC BLOOD PRESSURE: 130 MMHG | DIASTOLIC BLOOD PRESSURE: 72 MMHG | RESPIRATION RATE: 16 BRPM | HEART RATE: 76 BPM | OXYGEN SATURATION: 98 %

## 2024-09-27 PROCEDURE — G0300 HHS/HOSPICE OF LPN EA 15 MIN: HCPCS

## 2024-09-27 ASSESSMENT — ENCOUNTER SYMPTOMS
CHANGE IN APPETITE: UNCHANGED
LAST BOWEL MOVEMENT: 67109
APPETITE LEVEL: GOOD
DENIES PAIN: 1

## 2024-09-30 ENCOUNTER — OFFICE VISIT (OUTPATIENT)
Dept: WOUND CARE | Facility: HOSPITAL | Age: 74
End: 2024-09-30
Payer: MEDICARE

## 2024-09-30 DIAGNOSIS — M86.672 CHRONIC OSTEOMYELITIS INVOLVING ANKLE AND FOOT, LEFT (MULTI): Primary | ICD-10-CM

## 2024-09-30 DIAGNOSIS — L03.90 WOUND CELLULITIS: Primary | ICD-10-CM

## 2024-09-30 PROCEDURE — 99213 OFFICE O/P EST LOW 20 MIN: CPT | Mod: 25

## 2024-09-30 PROCEDURE — 11042 DBRDMT SUBQ TIS 1ST 20SQCM/<: CPT

## 2024-09-30 PROCEDURE — 87077 CULTURE AEROBIC IDENTIFY: CPT | Mod: WESLAB | Performed by: INTERNAL MEDICINE

## 2024-10-01 ENCOUNTER — HOME CARE VISIT (OUTPATIENT)
Dept: HOME HEALTH SERVICES | Facility: HOME HEALTH | Age: 74
End: 2024-10-01
Payer: MEDICARE

## 2024-10-01 ENCOUNTER — LAB (OUTPATIENT)
Dept: LAB | Facility: LAB | Age: 74
End: 2024-10-01
Payer: MEDICARE

## 2024-10-01 VITALS
DIASTOLIC BLOOD PRESSURE: 78 MMHG | OXYGEN SATURATION: 96 % | RESPIRATION RATE: 16 BRPM | TEMPERATURE: 98.3 F | HEART RATE: 92 BPM | SYSTOLIC BLOOD PRESSURE: 142 MMHG

## 2024-10-01 DIAGNOSIS — L97.309 DIABETIC ULCER OF ANKLE (MULTI): ICD-10-CM

## 2024-10-01 DIAGNOSIS — E11.42 DIABETIC POLYNEUROPATHY ASSOCIATED WITH TYPE 2 DIABETES MELLITUS (MULTI): ICD-10-CM

## 2024-10-01 DIAGNOSIS — N18.4 CHRONIC KIDNEY DISEASE, STAGE 4 (SEVERE) (MULTI): ICD-10-CM

## 2024-10-01 DIAGNOSIS — D50.8 IRON DEFICIENCY ANEMIA SECONDARY TO INADEQUATE DIETARY IRON INTAKE: Primary | ICD-10-CM

## 2024-10-01 DIAGNOSIS — L03.90 WOUND CELLULITIS: ICD-10-CM

## 2024-10-01 DIAGNOSIS — E11.622 DIABETIC ULCER OF ANKLE (MULTI): ICD-10-CM

## 2024-10-01 LAB
ALBUMIN SERPL BCP-MCNC: 3.4 G/DL (ref 3.4–5)
ALP SERPL-CCNC: 48 U/L (ref 33–136)
ALT SERPL W P-5'-P-CCNC: 14 U/L (ref 10–52)
ANION GAP SERPL CALCULATED.3IONS-SCNC: 9 MMOL/L (ref 10–20)
AST SERPL W P-5'-P-CCNC: 14 U/L (ref 9–39)
BASOPHILS # BLD AUTO: 0.05 X10*3/UL (ref 0–0.1)
BASOPHILS NFR BLD AUTO: 0.5 %
BILIRUB SERPL-MCNC: 0.8 MG/DL (ref 0–1.2)
BUN SERPL-MCNC: 32 MG/DL (ref 6–23)
CALCIUM SERPL-MCNC: 8.3 MG/DL (ref 8.6–10.3)
CHLORIDE SERPL-SCNC: 103 MMOL/L (ref 98–107)
CO2 SERPL-SCNC: 30 MMOL/L (ref 21–32)
CREAT SERPL-MCNC: 1.28 MG/DL (ref 0.5–1.3)
CREAT UR-MCNC: 78.5 MG/DL (ref 20–370)
CRP SERPL-MCNC: 7.77 MG/DL
EGFRCR SERPLBLD CKD-EPI 2021: 59 ML/MIN/1.73M*2
EOSINOPHIL # BLD AUTO: 0.1 X10*3/UL (ref 0–0.4)
EOSINOPHIL NFR BLD AUTO: 1.1 %
ERYTHROCYTE [DISTWIDTH] IN BLOOD BY AUTOMATED COUNT: 14.7 % (ref 11.5–14.5)
GLUCOSE SERPL-MCNC: 200 MG/DL (ref 74–99)
HCT VFR BLD AUTO: 33 % (ref 41–52)
HGB BLD-MCNC: 10.4 G/DL (ref 13.5–17.5)
IMM GRANULOCYTES # BLD AUTO: 0.06 X10*3/UL (ref 0–0.5)
IMM GRANULOCYTES NFR BLD AUTO: 0.6 % (ref 0–0.9)
LYMPHOCYTES # BLD AUTO: 0.87 X10*3/UL (ref 0.8–3)
LYMPHOCYTES NFR BLD AUTO: 9.2 %
MCH RBC QN AUTO: 28.6 PG (ref 26–34)
MCHC RBC AUTO-ENTMCNC: 31.5 G/DL (ref 32–36)
MCV RBC AUTO: 91 FL (ref 80–100)
MICROALBUMIN UR-MCNC: >2250 MG/L
MICROALBUMIN/CREAT UR: NORMAL MG/G{CREAT}
MONOCYTES # BLD AUTO: 0.7 X10*3/UL (ref 0.05–0.8)
MONOCYTES NFR BLD AUTO: 7.4 %
NEUTROPHILS # BLD AUTO: 7.63 X10*3/UL (ref 1.6–5.5)
NEUTROPHILS NFR BLD AUTO: 81.2 %
NRBC BLD-RTO: 0 /100 WBCS (ref 0–0)
PHOSPHATE SERPL-MCNC: 3.7 MG/DL (ref 2.5–4.9)
PLATELET # BLD AUTO: 184 X10*3/UL (ref 150–450)
POTASSIUM SERPL-SCNC: 4.3 MMOL/L (ref 3.5–5.3)
PROT SERPL-MCNC: 5.9 G/DL (ref 6.4–8.2)
RBC # BLD AUTO: 3.64 X10*6/UL (ref 4.5–5.9)
SODIUM SERPL-SCNC: 138 MMOL/L (ref 136–145)
WBC # BLD AUTO: 9.4 X10*3/UL (ref 4.4–11.3)

## 2024-10-01 PROCEDURE — 86140 C-REACTIVE PROTEIN: CPT

## 2024-10-01 PROCEDURE — G0156 HHCP-SVS OF AIDE,EA 15 MIN: HCPCS

## 2024-10-01 PROCEDURE — 82043 UR ALBUMIN QUANTITATIVE: CPT

## 2024-10-01 PROCEDURE — 83550 IRON BINDING TEST: CPT

## 2024-10-01 PROCEDURE — 84100 ASSAY OF PHOSPHORUS: CPT

## 2024-10-01 PROCEDURE — 82570 ASSAY OF URINE CREATININE: CPT

## 2024-10-01 PROCEDURE — 82607 VITAMIN B-12: CPT

## 2024-10-01 PROCEDURE — 85025 COMPLETE CBC W/AUTO DIFF WBC: CPT

## 2024-10-01 PROCEDURE — 82746 ASSAY OF FOLIC ACID SERUM: CPT

## 2024-10-01 PROCEDURE — G0299 HHS/HOSPICE OF RN EA 15 MIN: HCPCS

## 2024-10-01 PROCEDURE — 82728 ASSAY OF FERRITIN: CPT

## 2024-10-01 PROCEDURE — 83540 ASSAY OF IRON: CPT

## 2024-10-01 PROCEDURE — 80053 COMPREHEN METABOLIC PANEL: CPT

## 2024-10-01 PROCEDURE — 83970 ASSAY OF PARATHORMONE: CPT

## 2024-10-01 ASSESSMENT — ENCOUNTER SYMPTOMS
PERSON REPORTING PAIN: PATIENT
FATIGUE: 1
DYSPNEA ACTIVITY LEVEL: AFTER AMBULATING 10 - 20 FT
RHINORRHEA: 1
HIGHEST PAIN SEVERITY IN PAST 24 HOURS: 7/10
SUBJECTIVE PAIN PROGRESSION: UNCHANGED
PAIN LOCATION - PAIN SEVERITY: 6/10
LAST BOWEL MOVEMENT: 67114
DESCRIPTION OF MEMORY LOSS: SHORT TERM
LOWEST PAIN SEVERITY IN PAST 24 HOURS: 5/10
STOOL FREQUENCY: LESS THAN DAILY
PAIN SEVERITY GOAL: 0/10
LOSS OF SENSATION IN FEET: 1
MUSCLE WEAKNESS: 1
SHORTNESS OF BREATH: 1
PAIN: 1
PAIN LOCATION - PAIN DURATION: CONSTANT
PAIN LOCATION - PAIN FREQUENCY: CONSTANT
FORGETFULNESS: 1
LOWER EXTREMITY EDEMA: 1
SPUTUM AMOUNT: SCANT
APPETITE LEVEL: GOOD
PAIN LOCATION - RELIEVING FACTORS: REST
CHANGE IN APPETITE: UNCHANGED
SPUTUM PRODUCTION: 1
SPUTUM CONSISTENCY: THICK
DRY SKIN: 1
BOWEL PATTERN NORMAL: 1
TINGLING: 1
PAIN LOCATION - PAIN QUALITY: ACHE
SPUTUM COLOR: WHITE
PAIN LOCATION - EXACERBATING FACTORS: WALKING
OCCASIONAL FEELINGS OF UNSTEADINESS: 1
DEPRESSION: 0
PAIN LOCATION: LEFT FOOT

## 2024-10-01 ASSESSMENT — PAIN SCALES - PAIN ASSESSMENT IN ADVANCED DEMENTIA (PAINAD)
NEGVOCALIZATION: 0
FACIALEXPRESSION: 0
CONSOLABILITY: 0 - NO NEED TO CONSOLE.
BODYLANGUAGE: 0 - RELAXED.
BREATHING: 0
NEGVOCALIZATION: 0 - NONE.
BODYLANGUAGE: 0
FACIALEXPRESSION: 0 - SMILING OR INEXPRESSIVE.
TOTALSCORE: 0
CONSOLABILITY: 0

## 2024-10-01 ASSESSMENT — ACTIVITIES OF DAILY LIVING (ADL)
AMBULATION ASSISTANCE: STAND BY ASSIST
MONEY MANAGEMENT (EXPENSES/BILLS): NEEDS ASSISTANCE
CURRENT_FUNCTION: STAND BY ASSIST

## 2024-10-02 LAB — PTH-INTACT SERPL-MCNC: 110.6 PG/ML (ref 18.5–88)

## 2024-10-04 ENCOUNTER — HOME CARE VISIT (OUTPATIENT)
Dept: HOME HEALTH SERVICES | Facility: HOME HEALTH | Age: 74
End: 2024-10-04
Payer: MEDICARE

## 2024-10-04 VITALS
TEMPERATURE: 97 F | SYSTOLIC BLOOD PRESSURE: 126 MMHG | HEART RATE: 65 BPM | RESPIRATION RATE: 18 BRPM | DIASTOLIC BLOOD PRESSURE: 68 MMHG | OXYGEN SATURATION: 96 %

## 2024-10-04 PROCEDURE — G0299 HHS/HOSPICE OF RN EA 15 MIN: HCPCS

## 2024-10-04 PROCEDURE — G0156 HHCP-SVS OF AIDE,EA 15 MIN: HCPCS

## 2024-10-05 LAB
FERRITIN SERPL-MCNC: 157 NG/ML (ref 20–300)
IRON SATN MFR SERPL: 10 % (ref 25–45)
IRON SERPL-MCNC: 27 UG/DL (ref 35–150)
TIBC SERPL-MCNC: 260 UG/DL (ref 240–445)
UIBC SERPL-MCNC: 233 UG/DL (ref 110–370)

## 2024-10-06 LAB
FOLATE SERPL-MCNC: 8.6 NG/ML
VIT B12 SERPL-MCNC: 574 PG/ML (ref 211–911)

## 2024-10-07 ENCOUNTER — OFFICE VISIT (OUTPATIENT)
Dept: WOUND CARE | Facility: HOSPITAL | Age: 74
End: 2024-10-07
Payer: MEDICARE

## 2024-10-07 DIAGNOSIS — L03.116 CELLULITIS OF LEFT FOOT: Primary | ICD-10-CM

## 2024-10-07 DIAGNOSIS — A49.02 MRSA (METHICILLIN RESISTANT STAPHYLOCOCCUS AUREUS) INFECTION: ICD-10-CM

## 2024-10-07 PROCEDURE — 11042 DBRDMT SUBQ TIS 1ST 20SQCM/<: CPT

## 2024-10-07 RX ORDER — EPINEPHRINE 0.3 MG/.3ML
0.3 INJECTION SUBCUTANEOUS EVERY 5 MIN PRN
OUTPATIENT
Start: 2024-10-09

## 2024-10-07 RX ORDER — FAMOTIDINE 10 MG/ML
20 INJECTION INTRAVENOUS ONCE AS NEEDED
OUTPATIENT
Start: 2024-10-09

## 2024-10-07 RX ORDER — DIPHENHYDRAMINE HYDROCHLORIDE 50 MG/ML
50 INJECTION INTRAMUSCULAR; INTRAVENOUS AS NEEDED
OUTPATIENT
Start: 2024-10-09

## 2024-10-07 RX ORDER — ALBUTEROL SULFATE 0.83 MG/ML
3 SOLUTION RESPIRATORY (INHALATION) AS NEEDED
OUTPATIENT
Start: 2024-10-09

## 2024-10-08 ENCOUNTER — HOME CARE VISIT (OUTPATIENT)
Dept: HOME HEALTH SERVICES | Facility: HOME HEALTH | Age: 74
End: 2024-10-08
Payer: MEDICARE

## 2024-10-08 VITALS
SYSTOLIC BLOOD PRESSURE: 140 MMHG | OXYGEN SATURATION: 97 % | RESPIRATION RATE: 12 BRPM | HEART RATE: 76 BPM | TEMPERATURE: 97.6 F | DIASTOLIC BLOOD PRESSURE: 80 MMHG

## 2024-10-08 PROCEDURE — G0299 HHS/HOSPICE OF RN EA 15 MIN: HCPCS

## 2024-10-08 PROCEDURE — G0156 HHCP-SVS OF AIDE,EA 15 MIN: HCPCS

## 2024-10-08 ASSESSMENT — ENCOUNTER SYMPTOMS
PAIN LOCATION - PAIN SEVERITY: 5/10
HIGHEST PAIN SEVERITY IN PAST 24 HOURS: 7/10
APPETITE LEVEL: GOOD
SPUTUM COLOR: WHITE
PAIN LOCATION - RELIEVING FACTORS: REST
DEPRESSION: 0
COUGH: 1
PERSON REPORTING PAIN: PATIENT
PAIN LOCATION - EXACERBATING FACTORS: MOVEMENTS
RHINORRHEA: 1
DRY SKIN: 1
SPUTUM PRODUCTION: 1
PAIN LOCATION - PAIN FREQUENCY: CONSTANT
LOSS OF SENSATION IN FEET: 1
SPUTUM CONSISTENCY: THICK
OCCASIONAL FEELINGS OF UNSTEADINESS: 1
LOWEST PAIN SEVERITY IN PAST 24 HOURS: 5/10
CHANGE IN APPETITE: UNCHANGED
SUBJECTIVE PAIN PROGRESSION: UNCHANGED
PAIN LOCATION - PAIN DURATION: CONSTANT
FATIGUE: 1
LOWER EXTREMITY EDEMA: 1
PAIN LOCATION - PAIN QUALITY: THROBBING
PAIN SEVERITY GOAL: 0/10
PAIN LOCATION: LEFT FOOT
MUSCLE WEAKNESS: 1
SHORTNESS OF BREATH: 1
PAIN: 1
DYSPNEA ACTIVITY LEVEL: AFTER AMBULATING 10 - 20 FT
DESCRIPTION OF MEMORY LOSS: SHORT TERM
FORGETFULNESS: 1
SPUTUM AMOUNT: SCANT

## 2024-10-08 ASSESSMENT — PAIN SCALES - PAIN ASSESSMENT IN ADVANCED DEMENTIA (PAINAD)
BREATHING: 0
TOTALSCORE: 0
BODYLANGUAGE: 0 - RELAXED.
NEGVOCALIZATION: 0
FACIALEXPRESSION: 0 - SMILING OR INEXPRESSIVE.
CONSOLABILITY: 0 - NO NEED TO CONSOLE.
BODYLANGUAGE: 0
CONSOLABILITY: 0
FACIALEXPRESSION: 0
NEGVOCALIZATION: 0 - NONE.

## 2024-10-11 ENCOUNTER — HOME CARE VISIT (OUTPATIENT)
Dept: HOME HEALTH SERVICES | Facility: HOME HEALTH | Age: 74
End: 2024-10-11
Payer: MEDICARE

## 2024-10-11 VITALS
RESPIRATION RATE: 16 BRPM | SYSTOLIC BLOOD PRESSURE: 140 MMHG | DIASTOLIC BLOOD PRESSURE: 86 MMHG | HEART RATE: 73 BPM | OXYGEN SATURATION: 99 % | TEMPERATURE: 97.9 F

## 2024-10-11 PROCEDURE — G0156 HHCP-SVS OF AIDE,EA 15 MIN: HCPCS

## 2024-10-11 PROCEDURE — G0300 HHS/HOSPICE OF LPN EA 15 MIN: HCPCS

## 2024-10-11 ASSESSMENT — ENCOUNTER SYMPTOMS
APPETITE LEVEL: GOOD
CHANGE IN APPETITE: UNCHANGED
DENIES PAIN: 1
LAST BOWEL MOVEMENT: 67123

## 2024-10-13 ENCOUNTER — HOSPITAL ENCOUNTER (INPATIENT)
Facility: HOSPITAL | Age: 74
LOS: 6 days | Discharge: NURSING, REHAB OR LONG-TERM CARE | DRG: 312 | End: 2024-10-19
Attending: STUDENT IN AN ORGANIZED HEALTH CARE EDUCATION/TRAINING PROGRAM | Admitting: NURSE PRACTITIONER
Payer: MEDICARE

## 2024-10-13 ENCOUNTER — APPOINTMENT (OUTPATIENT)
Dept: RADIOLOGY | Facility: HOSPITAL | Age: 74
DRG: 312 | End: 2024-10-13
Payer: MEDICARE

## 2024-10-13 ENCOUNTER — APPOINTMENT (OUTPATIENT)
Dept: CARDIOLOGY | Facility: HOSPITAL | Age: 74
DRG: 312 | End: 2024-10-13
Payer: MEDICARE

## 2024-10-13 ENCOUNTER — HOME CARE VISIT (OUTPATIENT)
Dept: HOME HEALTH SERVICES | Facility: HOME HEALTH | Age: 74
End: 2024-10-13
Payer: MEDICARE

## 2024-10-13 ENCOUNTER — HOSPITAL ENCOUNTER (EMERGENCY)
Facility: HOSPITAL | Age: 74
Discharge: ANOTHER HOSPITAL | DRG: 312 | End: 2024-10-13
Attending: STUDENT IN AN ORGANIZED HEALTH CARE EDUCATION/TRAINING PROGRAM
Payer: MEDICARE

## 2024-10-13 VITALS
DIASTOLIC BLOOD PRESSURE: 83 MMHG | WEIGHT: 228 LBS | OXYGEN SATURATION: 97 % | HEIGHT: 76 IN | SYSTOLIC BLOOD PRESSURE: 127 MMHG | TEMPERATURE: 97.9 F | BODY MASS INDEX: 27.76 KG/M2 | RESPIRATION RATE: 16 BRPM | HEART RATE: 78 BPM

## 2024-10-13 DIAGNOSIS — R55 SYNCOPE, UNSPECIFIED SYNCOPE TYPE: Primary | ICD-10-CM

## 2024-10-13 DIAGNOSIS — E86.0 DEHYDRATION: ICD-10-CM

## 2024-10-13 DIAGNOSIS — E11.621 DIABETIC ULCER OF LEFT MIDFOOT ASSOCIATED WITH TYPE 2 DIABETES MELLITUS, UNSPECIFIED ULCER STAGE (MULTI): Chronic | ICD-10-CM

## 2024-10-13 DIAGNOSIS — R55 SYNCOPE, NON CARDIAC: Primary | ICD-10-CM

## 2024-10-13 DIAGNOSIS — A49.02 MRSA INFECTION: ICD-10-CM

## 2024-10-13 DIAGNOSIS — E11.622 DIABETIC ULCER OF ANKLE (MULTI): ICD-10-CM

## 2024-10-13 DIAGNOSIS — L97.309 DIABETIC ULCER OF ANKLE (MULTI): ICD-10-CM

## 2024-10-13 DIAGNOSIS — L97.429 DIABETIC ULCER OF LEFT MIDFOOT ASSOCIATED WITH TYPE 2 DIABETES MELLITUS, UNSPECIFIED ULCER STAGE (MULTI): Chronic | ICD-10-CM

## 2024-10-13 LAB
ALBUMIN SERPL BCP-MCNC: 3.4 G/DL (ref 3.4–5)
ALP SERPL-CCNC: 58 U/L (ref 33–136)
ALT SERPL W P-5'-P-CCNC: 31 U/L (ref 10–52)
ANION GAP SERPL CALCULATED.3IONS-SCNC: 16 MMOL/L (ref 10–20)
APPEARANCE UR: CLEAR
AST SERPL W P-5'-P-CCNC: 20 U/L (ref 9–39)
BASOPHILS # BLD AUTO: 0.05 X10*3/UL (ref 0–0.1)
BASOPHILS NFR BLD AUTO: 0.5 %
BILIRUB SERPL-MCNC: 1.6 MG/DL (ref 0–1.2)
BILIRUB UR STRIP.AUTO-MCNC: NEGATIVE MG/DL
BNP SERPL-MCNC: 91 PG/ML (ref 0–99)
BUN SERPL-MCNC: 34 MG/DL (ref 6–23)
CALCIUM SERPL-MCNC: 8.7 MG/DL (ref 8.6–10.3)
CARDIAC TROPONIN I PNL SERPL HS: 7 NG/L (ref 0–20)
CARDIAC TROPONIN I PNL SERPL HS: 8 NG/L (ref 0–20)
CHLORIDE SERPL-SCNC: 104 MMOL/L (ref 98–107)
CO2 SERPL-SCNC: 19 MMOL/L (ref 21–32)
COLOR UR: YELLOW
CREAT SERPL-MCNC: 1.7 MG/DL (ref 0.5–1.3)
EGFRCR SERPLBLD CKD-EPI 2021: 42 ML/MIN/1.73M*2
EOSINOPHIL # BLD AUTO: 0.1 X10*3/UL (ref 0–0.4)
EOSINOPHIL NFR BLD AUTO: 1 %
ERYTHROCYTE [DISTWIDTH] IN BLOOD BY AUTOMATED COUNT: 15.6 % (ref 11.5–14.5)
FLUAV RNA RESP QL NAA+PROBE: NOT DETECTED
FLUBV RNA RESP QL NAA+PROBE: NOT DETECTED
GLUCOSE BLD MANUAL STRIP-MCNC: 111 MG/DL (ref 74–99)
GLUCOSE BLD MANUAL STRIP-MCNC: 181 MG/DL (ref 74–99)
GLUCOSE SERPL-MCNC: 207 MG/DL (ref 74–99)
GLUCOSE UR STRIP.AUTO-MCNC: ABNORMAL MG/DL
HCT VFR BLD AUTO: 43 % (ref 41–52)
HGB BLD-MCNC: 13.9 G/DL (ref 13.5–17.5)
IMM GRANULOCYTES # BLD AUTO: 0.07 X10*3/UL (ref 0–0.5)
IMM GRANULOCYTES NFR BLD AUTO: 0.7 % (ref 0–0.9)
KETONES UR STRIP.AUTO-MCNC: NEGATIVE MG/DL
LACTATE SERPL-SCNC: 1.7 MMOL/L (ref 0.4–2)
LACTATE SERPL-SCNC: 5 MMOL/L (ref 0.4–2)
LEUKOCYTE ESTERASE UR QL STRIP.AUTO: NEGATIVE
LYMPHOCYTES # BLD AUTO: 1.71 X10*3/UL (ref 0.8–3)
LYMPHOCYTES NFR BLD AUTO: 16.8 %
MAGNESIUM SERPL-MCNC: 1.59 MG/DL (ref 1.6–2.4)
MCH RBC QN AUTO: 29 PG (ref 26–34)
MCHC RBC AUTO-ENTMCNC: 32.3 G/DL (ref 32–36)
MCV RBC AUTO: 90 FL (ref 80–100)
MONOCYTES # BLD AUTO: 0.68 X10*3/UL (ref 0.05–0.8)
MONOCYTES NFR BLD AUTO: 6.7 %
MUCOUS THREADS #/AREA URNS AUTO: NORMAL /LPF
NEUTROPHILS # BLD AUTO: 7.57 X10*3/UL (ref 1.6–5.5)
NEUTROPHILS NFR BLD AUTO: 74.3 %
NITRITE UR QL STRIP.AUTO: NEGATIVE
NRBC BLD-RTO: 0 /100 WBCS (ref 0–0)
PH UR STRIP.AUTO: 6 [PH]
PLATELET # BLD AUTO: 255 X10*3/UL (ref 150–450)
POTASSIUM SERPL-SCNC: 4.2 MMOL/L (ref 3.5–5.3)
PROT SERPL-MCNC: 6.3 G/DL (ref 6.4–8.2)
PROT UR STRIP.AUTO-MCNC: ABNORMAL MG/DL
RBC # BLD AUTO: 4.8 X10*6/UL (ref 4.5–5.9)
RBC # UR STRIP.AUTO: ABNORMAL /UL
RBC #/AREA URNS AUTO: NORMAL /HPF
SARS-COV-2 RNA RESP QL NAA+PROBE: NOT DETECTED
SODIUM SERPL-SCNC: 135 MMOL/L (ref 136–145)
SP GR UR STRIP.AUTO: >1.05
UROBILINOGEN UR STRIP.AUTO-MCNC: NORMAL MG/DL
WBC # BLD AUTO: 10.2 X10*3/UL (ref 4.4–11.3)
WBC #/AREA URNS AUTO: NORMAL /HPF

## 2024-10-13 PROCEDURE — 2550000001 HC RX 255 CONTRASTS: Performed by: STUDENT IN AN ORGANIZED HEALTH CARE EDUCATION/TRAINING PROGRAM

## 2024-10-13 PROCEDURE — 85025 COMPLETE CBC W/AUTO DIFF WBC: CPT

## 2024-10-13 PROCEDURE — 83735 ASSAY OF MAGNESIUM: CPT

## 2024-10-13 PROCEDURE — 84484 ASSAY OF TROPONIN QUANT: CPT

## 2024-10-13 PROCEDURE — 99223 1ST HOSP IP/OBS HIGH 75: CPT | Performed by: NURSE PRACTITIONER

## 2024-10-13 PROCEDURE — 96361 HYDRATE IV INFUSION ADD-ON: CPT

## 2024-10-13 PROCEDURE — 1200000002 HC GENERAL ROOM WITH TELEMETRY DAILY

## 2024-10-13 PROCEDURE — 96367 TX/PROPH/DG ADDL SEQ IV INF: CPT

## 2024-10-13 PROCEDURE — 2500000004 HC RX 250 GENERAL PHARMACY W/ HCPCS (ALT 636 FOR OP/ED)

## 2024-10-13 PROCEDURE — 93005 ELECTROCARDIOGRAM TRACING: CPT

## 2024-10-13 PROCEDURE — 70450 CT HEAD/BRAIN W/O DYE: CPT

## 2024-10-13 PROCEDURE — 96366 THER/PROPH/DIAG IV INF ADDON: CPT

## 2024-10-13 PROCEDURE — 2500000004 HC RX 250 GENERAL PHARMACY W/ HCPCS (ALT 636 FOR OP/ED): Performed by: STUDENT IN AN ORGANIZED HEALTH CARE EDUCATION/TRAINING PROGRAM

## 2024-10-13 PROCEDURE — 81001 URINALYSIS AUTO W/SCOPE: CPT | Performed by: NURSE PRACTITIONER

## 2024-10-13 PROCEDURE — G0390 TRAUMA RESPONS W/HOSP CRITI: HCPCS

## 2024-10-13 PROCEDURE — 36415 COLL VENOUS BLD VENIPUNCTURE: CPT

## 2024-10-13 PROCEDURE — 96365 THER/PROPH/DIAG IV INF INIT: CPT | Mod: 59

## 2024-10-13 PROCEDURE — 87636 SARSCOV2 & INF A&B AMP PRB: CPT

## 2024-10-13 PROCEDURE — 74177 CT ABD & PELVIS W/CONTRAST: CPT | Performed by: RADIOLOGY

## 2024-10-13 PROCEDURE — 2500000001 HC RX 250 WO HCPCS SELF ADMINISTERED DRUGS (ALT 637 FOR MEDICARE OP): Performed by: NURSE PRACTITIONER

## 2024-10-13 PROCEDURE — 83880 ASSAY OF NATRIURETIC PEPTIDE: CPT

## 2024-10-13 PROCEDURE — 82947 ASSAY GLUCOSE BLOOD QUANT: CPT

## 2024-10-13 PROCEDURE — 71260 CT THORAX DX C+: CPT | Performed by: RADIOLOGY

## 2024-10-13 PROCEDURE — 80053 COMPREHEN METABOLIC PANEL: CPT

## 2024-10-13 PROCEDURE — 87075 CULTR BACTERIA EXCEPT BLOOD: CPT | Mod: WESLAB,59

## 2024-10-13 PROCEDURE — 72125 CT NECK SPINE W/O DYE: CPT

## 2024-10-13 PROCEDURE — 96375 TX/PRO/DX INJ NEW DRUG ADDON: CPT | Mod: 59

## 2024-10-13 PROCEDURE — 99285 EMERGENCY DEPT VISIT HI MDM: CPT | Mod: 25

## 2024-10-13 PROCEDURE — 83605 ASSAY OF LACTIC ACID: CPT

## 2024-10-13 PROCEDURE — 72125 CT NECK SPINE W/O DYE: CPT | Performed by: RADIOLOGY

## 2024-10-13 PROCEDURE — 70450 CT HEAD/BRAIN W/O DYE: CPT | Performed by: RADIOLOGY

## 2024-10-13 PROCEDURE — 2500000002 HC RX 250 W HCPCS SELF ADMINISTERED DRUGS (ALT 637 FOR MEDICARE OP, ALT 636 FOR OP/ED): Performed by: NURSE PRACTITIONER

## 2024-10-13 PROCEDURE — 74177 CT ABD & PELVIS W/CONTRAST: CPT

## 2024-10-13 RX ORDER — MAGNESIUM SULFATE HEPTAHYDRATE 40 MG/ML
2 INJECTION, SOLUTION INTRAVENOUS ONCE
Status: COMPLETED | OUTPATIENT
Start: 2024-10-13 | End: 2024-10-13

## 2024-10-13 RX ORDER — INSULIN LISPRO 100 [IU]/ML
0-5 INJECTION, SOLUTION INTRAVENOUS; SUBCUTANEOUS
Status: DISCONTINUED | OUTPATIENT
Start: 2024-10-14 | End: 2024-10-19 | Stop reason: HOSPADM

## 2024-10-13 RX ORDER — DEXTROSE 50 % IN WATER (D50W) INTRAVENOUS SYRINGE
12.5
Status: DISCONTINUED | OUTPATIENT
Start: 2024-10-13 | End: 2024-10-19 | Stop reason: HOSPADM

## 2024-10-13 RX ORDER — DONEPEZIL HYDROCHLORIDE 10 MG/1
10 TABLET, FILM COATED ORAL NIGHTLY
Status: DISCONTINUED | OUTPATIENT
Start: 2024-10-13 | End: 2024-10-19 | Stop reason: HOSPADM

## 2024-10-13 RX ORDER — ACETAMINOPHEN 160 MG/5ML
650 SOLUTION ORAL EVERY 4 HOURS PRN
Status: DISCONTINUED | OUTPATIENT
Start: 2024-10-13 | End: 2024-10-19 | Stop reason: HOSPADM

## 2024-10-13 RX ORDER — BISACODYL 5 MG
10 TABLET, DELAYED RELEASE (ENTERIC COATED) ORAL DAILY PRN
Status: DISCONTINUED | OUTPATIENT
Start: 2024-10-13 | End: 2024-10-19 | Stop reason: HOSPADM

## 2024-10-13 RX ORDER — FOLIC ACID 0.4 MG
0.4 TABLET ORAL DAILY
Status: DISCONTINUED | OUTPATIENT
Start: 2024-10-13 | End: 2024-10-19 | Stop reason: HOSPADM

## 2024-10-13 RX ORDER — GABAPENTIN 300 MG/1
300 CAPSULE ORAL 3 TIMES DAILY
Status: DISCONTINUED | OUTPATIENT
Start: 2024-10-13 | End: 2024-10-19 | Stop reason: HOSPADM

## 2024-10-13 RX ORDER — ACETAMINOPHEN 650 MG/1
650 SUPPOSITORY RECTAL EVERY 4 HOURS PRN
Status: DISCONTINUED | OUTPATIENT
Start: 2024-10-13 | End: 2024-10-19 | Stop reason: HOSPADM

## 2024-10-13 RX ORDER — FERROUS SULFATE 325(65) MG
65 TABLET ORAL DAILY
Status: DISCONTINUED | OUTPATIENT
Start: 2024-10-13 | End: 2024-10-19 | Stop reason: HOSPADM

## 2024-10-13 RX ORDER — OXYCODONE HYDROCHLORIDE 5 MG/1
15 TABLET ORAL EVERY 6 HOURS PRN
Status: DISCONTINUED | OUTPATIENT
Start: 2024-10-13 | End: 2024-10-19 | Stop reason: HOSPADM

## 2024-10-13 RX ORDER — ONDANSETRON HYDROCHLORIDE 2 MG/ML
4 INJECTION, SOLUTION INTRAVENOUS EVERY 8 HOURS PRN
Status: DISCONTINUED | OUTPATIENT
Start: 2024-10-13 | End: 2024-10-19 | Stop reason: HOSPADM

## 2024-10-13 RX ORDER — DULOXETIN HYDROCHLORIDE 60 MG/1
60 CAPSULE, DELAYED RELEASE ORAL DAILY
Status: DISCONTINUED | OUTPATIENT
Start: 2024-10-14 | End: 2024-10-19 | Stop reason: HOSPADM

## 2024-10-13 RX ORDER — ACETAMINOPHEN 500 MG
5 TABLET ORAL NIGHTLY PRN
Status: DISCONTINUED | OUTPATIENT
Start: 2024-10-13 | End: 2024-10-19 | Stop reason: HOSPADM

## 2024-10-13 RX ORDER — LOSARTAN POTASSIUM 100 MG/1
100 TABLET ORAL DAILY
Status: DISCONTINUED | OUTPATIENT
Start: 2024-10-13 | End: 2024-10-13

## 2024-10-13 RX ORDER — ACETAMINOPHEN 325 MG/1
650 TABLET ORAL EVERY 4 HOURS PRN
Status: DISCONTINUED | OUTPATIENT
Start: 2024-10-13 | End: 2024-10-19 | Stop reason: HOSPADM

## 2024-10-13 RX ORDER — METOPROLOL SUCCINATE 25 MG/1
25 TABLET, EXTENDED RELEASE ORAL 2 TIMES DAILY
Status: DISCONTINUED | OUTPATIENT
Start: 2024-10-13 | End: 2024-10-19 | Stop reason: HOSPADM

## 2024-10-13 RX ORDER — TAMSULOSIN HYDROCHLORIDE 0.4 MG/1
0.4 CAPSULE ORAL NIGHTLY
Status: DISCONTINUED | OUTPATIENT
Start: 2024-10-13 | End: 2024-10-19 | Stop reason: HOSPADM

## 2024-10-13 RX ORDER — MORPHINE SULFATE 4 MG/ML
4 INJECTION, SOLUTION INTRAMUSCULAR; INTRAVENOUS ONCE
Status: COMPLETED | OUTPATIENT
Start: 2024-10-13 | End: 2024-10-13

## 2024-10-13 RX ORDER — PANTOPRAZOLE SODIUM 40 MG/1
40 TABLET, DELAYED RELEASE ORAL
Status: DISCONTINUED | OUTPATIENT
Start: 2024-10-14 | End: 2024-10-19 | Stop reason: HOSPADM

## 2024-10-13 RX ORDER — ONDANSETRON 4 MG/1
4 TABLET, ORALLY DISINTEGRATING ORAL EVERY 8 HOURS PRN
Status: DISCONTINUED | OUTPATIENT
Start: 2024-10-13 | End: 2024-10-19 | Stop reason: HOSPADM

## 2024-10-13 RX ORDER — ATORVASTATIN CALCIUM 20 MG/1
20 TABLET, FILM COATED ORAL DAILY
Status: DISCONTINUED | OUTPATIENT
Start: 2024-10-13 | End: 2024-10-19 | Stop reason: HOSPADM

## 2024-10-13 RX ORDER — DEXTROSE 50 % IN WATER (D50W) INTRAVENOUS SYRINGE
25
Status: DISCONTINUED | OUTPATIENT
Start: 2024-10-13 | End: 2024-10-19 | Stop reason: HOSPADM

## 2024-10-13 RX ORDER — AMLODIPINE BESYLATE 5 MG/1
5 TABLET ORAL DAILY
Status: DISCONTINUED | OUTPATIENT
Start: 2024-10-14 | End: 2024-10-14

## 2024-10-13 RX ORDER — OXYCODONE HYDROCHLORIDE 5 MG/1
15 TABLET ORAL EVERY 6 HOURS PRN
Status: CANCELLED | OUTPATIENT
Start: 2024-10-13

## 2024-10-13 RX ORDER — LEVETIRACETAM 500 MG/1
500 TABLET ORAL 2 TIMES DAILY
Status: DISCONTINUED | OUTPATIENT
Start: 2024-10-13 | End: 2024-10-19 | Stop reason: HOSPADM

## 2024-10-13 RX ORDER — INSULIN GLARGINE 100 [IU]/ML
30 INJECTION, SOLUTION SUBCUTANEOUS NIGHTLY
Status: DISCONTINUED | OUTPATIENT
Start: 2024-10-13 | End: 2024-10-19 | Stop reason: HOSPADM

## 2024-10-13 RX ORDER — ONDANSETRON HYDROCHLORIDE 2 MG/ML
4 INJECTION, SOLUTION INTRAVENOUS ONCE
Status: COMPLETED | OUTPATIENT
Start: 2024-10-13 | End: 2024-10-13

## 2024-10-13 RX ORDER — CLOPIDOGREL BISULFATE 75 MG/1
75 TABLET ORAL DAILY
Status: DISCONTINUED | OUTPATIENT
Start: 2024-10-13 | End: 2024-10-19 | Stop reason: HOSPADM

## 2024-10-13 RX ADMIN — MORPHINE SULFATE 4 MG: 4 INJECTION, SOLUTION INTRAMUSCULAR; INTRAVENOUS at 12:54

## 2024-10-13 RX ADMIN — SODIUM CHLORIDE 500 ML: 900 INJECTION, SOLUTION INTRAVENOUS at 10:20

## 2024-10-13 RX ADMIN — SODIUM CHLORIDE 500 ML: 900 INJECTION, SOLUTION INTRAVENOUS at 08:55

## 2024-10-13 RX ADMIN — PIPERACILLIN SODIUM AND TAZOBACTAM SODIUM 4.5 G: 4; .5 INJECTION, SOLUTION INTRAVENOUS at 08:55

## 2024-10-13 RX ADMIN — MAGNESIUM SULFATE HEPTAHYDRATE 2 G: 40 INJECTION, SOLUTION INTRAVENOUS at 11:33

## 2024-10-13 RX ADMIN — IOHEXOL 75 ML: 350 INJECTION, SOLUTION INTRAVENOUS at 09:08

## 2024-10-13 RX ADMIN — SODIUM CHLORIDE 1000 ML: 900 INJECTION, SOLUTION INTRAVENOUS at 10:19

## 2024-10-13 RX ADMIN — ONDANSETRON 4 MG: 2 INJECTION INTRAMUSCULAR; INTRAVENOUS at 12:54

## 2024-10-13 SDOH — ECONOMIC STABILITY: HOUSING INSECURITY: AT ANY TIME IN THE PAST 12 MONTHS, WERE YOU HOMELESS OR LIVING IN A SHELTER (INCLUDING NOW)?: NO

## 2024-10-13 SDOH — ECONOMIC STABILITY: INCOME INSECURITY: IN THE PAST 12 MONTHS HAS THE ELECTRIC, GAS, OIL, OR WATER COMPANY THREATENED TO SHUT OFF SERVICES IN YOUR HOME?: NO

## 2024-10-13 SDOH — SOCIAL STABILITY: SOCIAL INSECURITY: ARE THERE ANY APPARENT SIGNS OF INJURIES/BEHAVIORS THAT COULD BE RELATED TO ABUSE/NEGLECT?: NO

## 2024-10-13 SDOH — ECONOMIC STABILITY: FOOD INSECURITY: WITHIN THE PAST 12 MONTHS, THE FOOD YOU BOUGHT JUST DIDN'T LAST AND YOU DIDN'T HAVE MONEY TO GET MORE.: NEVER TRUE

## 2024-10-13 SDOH — ECONOMIC STABILITY: HOUSING INSECURITY: IN THE PAST 12 MONTHS, HOW MANY TIMES HAVE YOU MOVED WHERE YOU WERE LIVING?: 0

## 2024-10-13 SDOH — SOCIAL STABILITY: SOCIAL INSECURITY: HAS ANYONE EVER THREATENED TO HURT YOUR FAMILY OR YOUR PETS?: NO

## 2024-10-13 SDOH — SOCIAL STABILITY: SOCIAL INSECURITY
WITHIN THE LAST YEAR, HAVE YOU BEEN RAPED OR FORCED TO HAVE ANY KIND OF SEXUAL ACTIVITY BY YOUR PARTNER OR EX-PARTNER?: NO

## 2024-10-13 SDOH — HEALTH STABILITY: PHYSICAL HEALTH: ON AVERAGE, HOW MANY DAYS PER WEEK DO YOU ENGAGE IN MODERATE TO STRENUOUS EXERCISE (LIKE A BRISK WALK)?: 0 DAYS

## 2024-10-13 SDOH — ECONOMIC STABILITY: FOOD INSECURITY: HOW HARD IS IT FOR YOU TO PAY FOR THE VERY BASICS LIKE FOOD, HOUSING, MEDICAL CARE, AND HEATING?: SOMEWHAT HARD

## 2024-10-13 SDOH — SOCIAL STABILITY: SOCIAL INSECURITY: DO YOU FEEL ANYONE HAS EXPLOITED OR TAKEN ADVANTAGE OF YOU FINANCIALLY OR OF YOUR PERSONAL PROPERTY?: NO

## 2024-10-13 SDOH — SOCIAL STABILITY: SOCIAL INSECURITY: DOES ANYONE TRY TO KEEP YOU FROM HAVING/CONTACTING OTHER FRIENDS OR DOING THINGS OUTSIDE YOUR HOME?: NO

## 2024-10-13 SDOH — SOCIAL STABILITY: SOCIAL INSECURITY: HAVE YOU HAD THOUGHTS OF HARMING ANYONE ELSE?: NO

## 2024-10-13 SDOH — SOCIAL STABILITY: SOCIAL INSECURITY: WERE YOU ABLE TO COMPLETE ALL THE BEHAVIORAL HEALTH SCREENINGS?: YES

## 2024-10-13 SDOH — SOCIAL STABILITY: SOCIAL INSECURITY: WITHIN THE LAST YEAR, HAVE YOU BEEN HUMILIATED OR EMOTIONALLY ABUSED IN OTHER WAYS BY YOUR PARTNER OR EX-PARTNER?: NO

## 2024-10-13 SDOH — HEALTH STABILITY: MENTAL HEALTH
DO YOU FEEL STRESS - TENSE, RESTLESS, NERVOUS, OR ANXIOUS, OR UNABLE TO SLEEP AT NIGHT BECAUSE YOUR MIND IS TROUBLED ALL THE TIME - THESE DAYS?: NOT AT ALL

## 2024-10-13 SDOH — SOCIAL STABILITY: SOCIAL INSECURITY: HAVE YOU HAD ANY THOUGHTS OF HARMING ANYONE ELSE?: NO

## 2024-10-13 SDOH — SOCIAL STABILITY: SOCIAL INSECURITY: WITHIN THE LAST YEAR, HAVE YOU BEEN AFRAID OF YOUR PARTNER OR EX-PARTNER?: NO

## 2024-10-13 SDOH — SOCIAL STABILITY: SOCIAL INSECURITY: DO YOU FEEL UNSAFE GOING BACK TO THE PLACE WHERE YOU ARE LIVING?: NO

## 2024-10-13 SDOH — SOCIAL STABILITY: SOCIAL NETWORK: HOW OFTEN DO YOU ATTEND CHURCH OR RELIGIOUS SERVICES?: NEVER

## 2024-10-13 SDOH — HEALTH STABILITY: PHYSICAL HEALTH: ON AVERAGE, HOW MANY MINUTES DO YOU ENGAGE IN EXERCISE AT THIS LEVEL?: 0 MIN

## 2024-10-13 SDOH — ECONOMIC STABILITY: FOOD INSECURITY: WITHIN THE PAST 12 MONTHS, YOU WORRIED THAT YOUR FOOD WOULD RUN OUT BEFORE YOU GOT THE MONEY TO BUY MORE.: NEVER TRUE

## 2024-10-13 SDOH — ECONOMIC STABILITY: TRANSPORTATION INSECURITY: IN THE PAST 12 MONTHS, HAS LACK OF TRANSPORTATION KEPT YOU FROM MEDICAL APPOINTMENTS OR FROM GETTING MEDICATIONS?: NO

## 2024-10-13 SDOH — SOCIAL STABILITY: SOCIAL NETWORK: HOW OFTEN DO YOU GET TOGETHER WITH FRIENDS OR RELATIVES?: TWICE A WEEK

## 2024-10-13 SDOH — SOCIAL STABILITY: SOCIAL NETWORK
DO YOU BELONG TO ANY CLUBS OR ORGANIZATIONS SUCH AS CHURCH GROUPS, UNIONS, FRATERNAL OR ATHLETIC GROUPS, OR SCHOOL GROUPS?: NO

## 2024-10-13 SDOH — SOCIAL STABILITY: SOCIAL INSECURITY: ARE YOU MARRIED, WIDOWED, DIVORCED, SEPARATED, NEVER MARRIED, OR LIVING WITH A PARTNER?: MARRIED

## 2024-10-13 SDOH — SOCIAL STABILITY: SOCIAL INSECURITY
WITHIN THE LAST YEAR, HAVE YOU BEEN KICKED, HIT, SLAPPED, OR OTHERWISE PHYSICALLY HURT BY YOUR PARTNER OR EX-PARTNER?: NO

## 2024-10-13 SDOH — SOCIAL STABILITY: SOCIAL NETWORK: HOW OFTEN DO YOU ATTEND MEETINGS OF THE CLUBS OR ORGANIZATIONS YOU BELONG TO?: 1 TO 4 TIMES PER YEAR

## 2024-10-13 SDOH — SOCIAL STABILITY: SOCIAL INSECURITY: ABUSE: ADULT

## 2024-10-13 SDOH — SOCIAL STABILITY: SOCIAL INSECURITY: ARE YOU OR HAVE YOU BEEN THREATENED OR ABUSED PHYSICALLY, EMOTIONALLY, OR SEXUALLY BY ANYONE?: NO

## 2024-10-13 SDOH — ECONOMIC STABILITY: HOUSING INSECURITY: IN THE LAST 12 MONTHS, WAS THERE A TIME WHEN YOU WERE NOT ABLE TO PAY THE MORTGAGE OR RENT ON TIME?: PATIENT DECLINED

## 2024-10-13 SDOH — SOCIAL STABILITY: SOCIAL NETWORK: IN A TYPICAL WEEK, HOW MANY TIMES DO YOU TALK ON THE PHONE WITH FAMILY, FRIENDS, OR NEIGHBORS?: TWICE A WEEK

## 2024-10-13 ASSESSMENT — LIFESTYLE VARIABLES
HOW MANY STANDARD DRINKS CONTAINING ALCOHOL DO YOU HAVE ON A TYPICAL DAY: PATIENT DOES NOT DRINK
SUBSTANCE_ABUSE_PAST_12_MONTHS: NO
HOW OFTEN DO YOU HAVE 6 OR MORE DRINKS ON ONE OCCASION: NEVER
EVER HAD A DRINK FIRST THING IN THE MORNING TO STEADY YOUR NERVES TO GET RID OF A HANGOVER: NO
PRESCIPTION_ABUSE_PAST_12_MONTHS: NO
HAVE PEOPLE ANNOYED YOU BY CRITICIZING YOUR DRINKING: NO
SKIP TO QUESTIONS 9-10: 1
AUDIT-C TOTAL SCORE: 0
EVER FELT BAD OR GUILTY ABOUT YOUR DRINKING: NO
HAVE YOU EVER FELT YOU SHOULD CUT DOWN ON YOUR DRINKING: NO
HOW OFTEN DO YOU HAVE A DRINK CONTAINING ALCOHOL: NEVER
AUDIT-C TOTAL SCORE: 0
TOTAL SCORE: 0

## 2024-10-13 ASSESSMENT — COLUMBIA-SUICIDE SEVERITY RATING SCALE - C-SSRS
2. HAVE YOU ACTUALLY HAD ANY THOUGHTS OF KILLING YOURSELF?: NO
2. HAVE YOU ACTUALLY HAD ANY THOUGHTS OF KILLING YOURSELF?: NO
1. IN THE PAST MONTH, HAVE YOU WISHED YOU WERE DEAD OR WISHED YOU COULD GO TO SLEEP AND NOT WAKE UP?: NO
6. HAVE YOU EVER DONE ANYTHING, STARTED TO DO ANYTHING, OR PREPARED TO DO ANYTHING TO END YOUR LIFE?: NO
1. IN THE PAST MONTH, HAVE YOU WISHED YOU WERE DEAD OR WISHED YOU COULD GO TO SLEEP AND NOT WAKE UP?: NO
6. HAVE YOU EVER DONE ANYTHING, STARTED TO DO ANYTHING, OR PREPARED TO DO ANYTHING TO END YOUR LIFE?: NO

## 2024-10-13 ASSESSMENT — PAIN - FUNCTIONAL ASSESSMENT
PAIN_FUNCTIONAL_ASSESSMENT: 0-10

## 2024-10-13 ASSESSMENT — PAIN SCALES - GENERAL
PAINLEVEL_OUTOF10: 8
PAINLEVEL_OUTOF10: 8
PAINLEVEL_OUTOF10: 5 - MODERATE PAIN
PAINLEVEL_OUTOF10: 0 - NO PAIN
PAINLEVEL_OUTOF10: 8

## 2024-10-13 ASSESSMENT — HEART SCORE
AGE: 65+
HEART SCORE: 4
RISK FACTORS: >2 RISK FACTORS OR HX OF ATHEROSCLEROTIC DISEASE
HISTORY: SLIGHTLY SUSPICIOUS
ECG: NORMAL
TROPONIN: LESS THAN OR EQUAL TO NORMAL LIMIT

## 2024-10-13 ASSESSMENT — ACTIVITIES OF DAILY LIVING (ADL)
LACK_OF_TRANSPORTATION: NO
HEARING - LEFT EAR: FUNCTIONAL
HEARING - RIGHT EAR: FUNCTIONAL
ASSISTIVE_DEVICE: WALKER
JUDGMENT_ADEQUATE_SAFELY_COMPLETE_DAILY_ACTIVITIES: YES
GROOMING: INDEPENDENT
DRESSING YOURSELF: INDEPENDENT
ADEQUATE_TO_COMPLETE_ADL: YES
WALKS IN HOME: NEEDS ASSISTANCE
PATIENT'S MEMORY ADEQUATE TO SAFELY COMPLETE DAILY ACTIVITIES?: YES
FEEDING YOURSELF: INDEPENDENT
BATHING: NEEDS ASSISTANCE
TOILETING: INDEPENDENT
LACK_OF_TRANSPORTATION: NO

## 2024-10-13 ASSESSMENT — COGNITIVE AND FUNCTIONAL STATUS - GENERAL
PATIENT BASELINE BEDBOUND: NO
MOBILITY SCORE: 19
DRESSING REGULAR UPPER BODY CLOTHING: A LITTLE
DRESSING REGULAR LOWER BODY CLOTHING: A LITTLE
PERSONAL GROOMING: A LITTLE
WALKING IN HOSPITAL ROOM: A LITTLE
MOVING TO AND FROM BED TO CHAIR: A LITTLE
STANDING UP FROM CHAIR USING ARMS: A LITTLE
CLIMB 3 TO 5 STEPS WITH RAILING: A LOT
DAILY ACTIVITIY SCORE: 19
TOILETING: A LITTLE
HELP NEEDED FOR BATHING: A LITTLE

## 2024-10-13 ASSESSMENT — PAIN DESCRIPTION - LOCATION: LOCATION: BACK

## 2024-10-13 ASSESSMENT — PATIENT HEALTH QUESTIONNAIRE - PHQ9
SUM OF ALL RESPONSES TO PHQ9 QUESTIONS 1 & 2: 0
2. FEELING DOWN, DEPRESSED OR HOPELESS: NOT AT ALL
1. LITTLE INTEREST OR PLEASURE IN DOING THINGS: NOT AT ALL

## 2024-10-13 ASSESSMENT — PAIN DESCRIPTION - ORIENTATION: ORIENTATION: LOWER

## 2024-10-13 NOTE — H&P
History Of Present Illness  Rachid Yun is a 74 y.o. male presenting with syncope.  Patient and family states he was getting up from the bathroom and walking down the [place] and tripped on his walker and fell with a syncopal episode.  CT was negative for acute process.  On arrival to the emergency room at [facility] he was complaining of some chest wall pain along with right-sided head pain which has resolved at this time.  Patient states that he does take Eliquis and Plavix for atrial fibrillation.  Currently he denies any chest pain, shortness of breath, or abdominal pain.  Further denies any headache, dizziness, nausea/vomiting.     Past Medical History  Past Medical History:   Diagnosis Date    Arthritis     Coronary artery disease     Diabetes mellitus (Multi)     Hypertension     Stroke (Multi)        Surgical History  Past Surgical History:   Procedure Laterality Date    BACK SURGERY      06/2024    CARDIAC CATHETERIZATION N/A 12/28/2023    Procedure: Left Heart Cath, With LV, Angriography And Grafts;  Surgeon: Phill Hare DO;  Location: [facility] Cardiac Cath Lab;  Service: Cardiovascular;  Laterality: N/A;    CARDIAC CATHETERIZATION N/A 12/28/2023    Procedure: PCI;  Surgeon: Phill Hare DO;  Location: [facility] Cardiac Cath Lab;  Service: Cardiovascular;  Laterality: N/A;    CARDIAC ELECTROPHYSIOLOGY PROCEDURE N/A 11/29/2023    Procedure: Cardioversion;  Surgeon: Phill Hare DO;  Location: [facility] Cardiac Cath Lab;  Service: Cardiovascular;  Laterality: N/A;    CORONARY ANGIOPLASTY WITH STENT PLACEMENT      CORONARY ANGIOPLASTY WITH STENT PLACEMENT      cardiac stent 12/2023    RADIOFREQUENCY ABLATION          Social History  He reports that he quit smoking about 36 years ago. His smoking use included cigarettes. He has been exposed to tobacco smoke. He has never used smokeless tobacco. He reports that he does not currently use alcohol. He reports that he does not use drugs.    Family History  Family History  "  Problem Relation Name Age of Onset    Stomach cancer Mother      Diabetes Mother      Coronary artery disease Father      Other (Pacemaker) Father      Other (S/p CABG) Sister      Other (S/p CABG) Brother      Ovarian cancer Sibling      Coronary artery disease Sibling          Allergies  Meperidine, Vancomycin, Hydromorphone, Other, and Rifampin    Review of Systems  All systems reviewed and negative except as noted in HPI  Physical Exam  Constitutional: No acute distress, calm, cooperative  HEENT: PERRL, normocephalic, atraumatic, mucous membranes moist  Cardiovascular: Regular rhythm and rate,   Respiratory: Lungs clear to auscultation,   Gastrointestinal: Bowel sounds positive x 4, soft, nontender  Neurologic: Alert and oriented x 3 equal strength bilaterally  Musculoskeletal: Able to move all extremities, no edema  Skin: Right foot wound with bandage  Last Recorded Vitals  Blood pressure 146/75, pulse 90, temperature 36.1 °C (97 °F), temperature source Temporal, resp. rate 19, height 1.93 m (6' 3.98\"), weight 103 kg (228 lb), SpO2 97%.     Assessment/Plan   Assessment & Plan  Syncope, non cardiac  CT negative  MRI/MRA  Consult neurology  Orthostatic vital signs  Diabetic ulcer of left midfoot associated with type 2 diabetes mellitus (Multi)  Patient states that he was scheduled with ID for outpatient antibiotics  Consult wound care for dressings  Consult infectious diseases  Type 2 diabetes mellitus without complication (Multi)  Hypoglycemia protocol  Lantus 30 units nightly  Insulin sliding scale  Stage 3b chronic kidney disease (Multi)  Appears to be at baseline  Continue to monitor  Primary hypertension  Continue home amlodipine and metoprolol  Hold losartan  Mixed hyperlipidemia  Continue home atorvastatin  Atrial fibrillation (Multi)  Continue home apixaban and Plavix           I spent 75 minutes in the professional and overall care of this patient.      Celio Lira, APRN-CNP    "

## 2024-10-13 NOTE — ASSESSMENT & PLAN NOTE
Patient states that he was scheduled with ID for outpatient antibiotics  Consult wound care for dressings  Consult infectious diseases

## 2024-10-13 NOTE — ED PROVIDER NOTES
HPI   Chief Complaint   Patient presents with    Fall       Patient is a 74-year-old male presenting to the emergency department for evaluation after syncopal episode at home.  Patient states he got up from the couch and was walking to the bathroom when he felt lightheaded and syncopized.  He states he lives with his daughter and his daughter heard him fall he was reportedly only out for a few seconds.  He states he did hit his head and does admit to being on Eliquis for history of A-fib.  He does admit to pain behind his right ear but otherwise no pain in any of his extremities.  He also admits to some chest wall tenderness.  He has a history of arthritis, coronary artery disease, diabetes, hypertension, and CVA.  He was reportedly hypotensive at home.  He denies any chest pain prior to the syncopal episode.  He denies shortness of breath, fevers, chills, nausea, vomiting, abdominal pain, cough, congestion, headaches, tingling, hematuria, dysuria, constipation, diarrhea.              Patient History   Past Medical History:   Diagnosis Date    Arthritis     Coronary artery disease     Diabetes mellitus (Multi)     Hypertension     Stroke (Multi)      Past Surgical History:   Procedure Laterality Date    BACK SURGERY      06/2024    CARDIAC CATHETERIZATION N/A 12/28/2023    Procedure: Left Heart Cath, With LV, Angriography And Grafts;  Surgeon: Phill Hare DO;  Location: [facility] Cardiac Cath Lab;  Service: Cardiovascular;  Laterality: N/A;    CARDIAC CATHETERIZATION N/A 12/28/2023    Procedure: PCI;  Surgeon: Phill Hare DO;  Location: [facility] Cardiac Cath Lab;  Service: Cardiovascular;  Laterality: N/A;    CARDIAC ELECTROPHYSIOLOGY PROCEDURE N/A 11/29/2023    Procedure: Cardioversion;  Surgeon: Phill Hare DO;  Location: [facility] Cardiac Cath Lab;  Service: Cardiovascular;  Laterality: N/A;    CORONARY ANGIOPLASTY WITH STENT PLACEMENT      CORONARY ANGIOPLASTY WITH STENT PLACEMENT      cardiac stent 12/2023     RADIOFREQUENCY ABLATION       Family History   Problem Relation Name Age of Onset    Stomach cancer Mother      Diabetes Mother      Coronary artery disease Father      Other (Pacemaker) Father      Other (S/p CABG) Sister      Other (S/p CABG) Brother      Ovarian cancer Sibling      Coronary artery disease Sibling       Social History     Tobacco Use    Smoking status: Former     Current packs/day: 0.00     Types: Cigarettes     Quit date:      Years since quittin.8     Passive exposure: Past    Smokeless tobacco: Never   Vaping Use    Vaping status: Never Used   Substance Use Topics    Alcohol use: Not Currently    Drug use: Never       Physical Exam   ED Triage Vitals   Temp Pulse Resp BP   -- -- -- --      SpO2 Temp src Heart Rate Source Patient Position   -- -- -- --      BP Location FiO2 (%)     -- --       Physical Exam  Vitals and nursing note reviewed.   Constitutional:       General: He is not in acute distress.     Appearance: Normal appearance. He is not ill-appearing or toxic-appearing.   HENT:      Head: Normocephalic and atraumatic.      Nose: Nose normal.      Mouth/Throat:      Mouth: Mucous membranes are dry.   Eyes:      Extraocular Movements: Extraocular movements intact.      Pupils: Pupils are equal, round, and reactive to light.   Cardiovascular:      Rate and Rhythm: Normal rate and regular rhythm.      Pulses: Normal pulses.   Pulmonary:      Effort: Pulmonary effort is normal.      Breath sounds: Normal breath sounds. No wheezing, rhonchi or rales.   Abdominal:      Palpations: Abdomen is soft.      Tenderness: There is no abdominal tenderness.   Musculoskeletal:         General: Normal range of motion.      Cervical back: Normal range of motion. No tenderness.   Skin:     General: Skin is warm and dry.   Neurological:      General: No focal deficit present.      Mental Status: He is alert and oriented to person, place, and time.      Sensory: No sensory deficit.      Motor: No  weakness.   Psychiatric:         Mood and Affect: Mood normal.         Behavior: Behavior normal.           ED Course & MDM   ED Course as of 10/13/24 1317   Sun Oct 13, 2024   0858 I personally reviewed and interpreted the EKG @0854: Afib 81, normal axis, no appreciable ischemia, prolonged Qtc 466 ms, and prior EKG on 12/24/2023 reviewed without any appreciable specific/identifiable changes. [BC]      ED Course User Index  [BC] Tramaine Lentz MD         Diagnoses as of 10/13/24 1317   Syncope, unspecified syncope type   Dehydration                 No data recorded       HEART Score: 4 (10/13/24 1144 : lAina Arceo PA-C)                         Medical Decision Making  **Disclaimer parts of this chart have been completed using voice recognition software. Please excuse any errors of transcription.     Patient seen in conjunction with attending physician Dr. Lentz.    HPI: Detailed above.    Exam: A medically appropriate exam performed, outlined above, given the known history and presentation.    History obtained from: Patient    EKG: Reviewed and interpreted by my attending physician    Labs/Diagnostics:  Labs Reviewed   CBC WITH AUTO DIFFERENTIAL - Abnormal       Result Value    WBC 10.2      nRBC 0.0      RBC 4.80      Hemoglobin 13.9      Hematocrit 43.0      MCV 90      MCH 29.0      MCHC 32.3      RDW 15.6 (*)     Platelets 255      Neutrophils % 74.3      Immature Granulocytes %, Automated 0.7      Lymphocytes % 16.8      Monocytes % 6.7      Eosinophils % 1.0      Basophils % 0.5      Neutrophils Absolute 7.57 (*)     Immature Granulocytes Absolute, Automated 0.07      Lymphocytes Absolute 1.71      Monocytes Absolute 0.68      Eosinophils Absolute 0.10      Basophils Absolute 0.05     MAGNESIUM - Abnormal    Magnesium 1.59 (*)    COMPREHENSIVE METABOLIC PANEL - Abnormal    Glucose 207 (*)     Sodium 135 (*)     Potassium 4.2      Chloride 104      Bicarbonate 19 (*)     Anion Gap 16      Urea  Nitrogen 34 (*)     Creatinine 1.70 (*)     eGFR 42 (*)     Calcium 8.7      Albumin 3.4      Alkaline Phosphatase 58      Total Protein 6.3 (*)     AST 20      Bilirubin, Total 1.6 (*)     ALT 31     LACTATE - Abnormal    Lactate 5.0 (*)     Narrative:     Venipuncture immediately after or during the administration of Metamizole may lead to falsely low results. Testing should be performed immediately prior to Metamizole dosing.   B-TYPE NATRIURETIC PEPTIDE - Normal    BNP 91      Narrative:        <100 pg/mL - Heart failure unlikely  100-299 pg/mL - Intermediate probability of acute heart                  failure exacerbation. Correlate with clinical                  context and patient history.    >=300 pg/mL - Heart Failure likely. Correlate with clinical                  context and patient history.    BNP testing is performed using different testing methodology at [facility] than at other [facility]. Direct result comparisons should only be made within the same method.      SERIAL TROPONIN-INITIAL - Normal    Troponin I, High Sensitivity 8      Narrative:     Less than 99th percentile of normal range cutoff-  Female and children under 18 years old <14 ng/L; Male <21 ng/L: Negative  Repeat testing should be performed if clinically indicated.     Female and children under 18 years old 14-50 ng/L; Male 21-50 ng/L:  Consistent with possible cardiac damage and possible increased clinical   risk. Serial measurements may help to assess extent of myocardial damage.     >50 ng/L: Consistent with cardiac damage, increased clinical risk and  myocardial infarction. Serial measurements may help assess extent of   myocardial damage.      NOTE: Children less than 1 year old may have higher baseline troponin   levels and results should be interpreted in conjunction with the overall   clinical context.     NOTE: Troponin I testing is performed using a different   testing methodology at [facility]  [place] than at other   [facility]. Direct result comparisons should only   be made within the same method.   SERIAL TROPONIN, 1 HOUR - Normal    Troponin I, High Sensitivity 7      Narrative:     Less than 99th percentile of normal range cutoff-  Female and children under 18 years old <14 ng/L; Male <21 ng/L: Negative  Repeat testing should be performed if clinically indicated.     Female and children under 18 years old 14-50 ng/L; Male 21-50 ng/L:  Consistent with possible cardiac damage and possible increased clinical   risk. Serial measurements may help to assess extent of myocardial damage.     >50 ng/L: Consistent with cardiac damage, increased clinical risk and  myocardial infarction. Serial measurements may help assess extent of   myocardial damage.      NOTE: Children less than 1 year old may have higher baseline troponin   levels and results should be interpreted in conjunction with the overall   clinical context.     NOTE: Troponin I testing is performed using a different   testing methodology at [facility] than at [facility]. Direct result comparisons should only   be made within the same method.   LACTATE - Normal    Lactate 1.7      Narrative:     Venipuncture immediately after or during the administration of Metamizole may lead to falsely low results. Testing should be performed immediately prior to Metamizole dosing.   BLOOD CULTURE   BLOOD CULTURE   TROPONIN SERIES- (INITIAL, 1 HR)    Narrative:     The following orders were created for panel order Troponin I Series, High Sensitivity (0, 1 HR).  Procedure                               Abnormality         Status                     ---------                               -----------         ------                     Troponin I, High Sensiti...[[id number]]  Normal              Final result               Troponin, High Sensitivi...[[id number]]  Normal              Final result                 Please view results for  these tests on the individual orders.   URINALYSIS WITH REFLEX CULTURE AND MICROSCOPIC    Narrative:     The following orders were created for panel order Urinalysis with Reflex Culture and Microscopic.  Procedure                               Abnormality         Status                     ---------                               -----------         ------                     Urinalysis with Reflex C...[[id number]]                                                 Extra Urine Gray Tube[[id number]]                                                         Please view results for these tests on the individual orders.   URINALYSIS WITH REFLEX CULTURE AND MICROSCOPIC   EXTRA URINE GRAY TUBE   SARS-COV-2 PCR   INFLUENZA A AND B PCR     CT chest abdomen pelvis w IV contrast   Final Result   CHEST:   1. No acute abnormalities within the chest.   2. Dense coronary and aortic calcification.   3. Hyperinflation.        ABDOMEN-PELVIS:   1. Advanced multilevel discogenic degenerative changes.   2. Tiny gallstones.   3. No acute abnormalities.        Signed by: Tamara Snowden 10/13/2024 10:45 AM   Dictation workstation:   [id number]      CT head wo IV contrast   Final Result   No evidence of acute cortical infarct or intracranial hemorrhage.        Chronic changes as described.        MACRO:   None        Signed by: Tamara Snowden 10/13/2024 9:53 AM   Dictation workstation:   [id number]      CT cervical spine wo IV contrast   Final Result   No evidence for an acute fracture or subluxation of the cervical   spine. Multilevel discogenic degenerative changes.        MACRO:   None        Signed by: Tamara Snowden 10/13/2024 10:01 AM   Dictation workstation:   [id number]        EMERGENCY DEPARTMENT COURSE and DIFFERENTIAL DIAGNOSIS/MDM:  Patient is a 74-year-old male presenting to the emergency department for evaluation after syncopal episode at home.  On physical exam vital signs remarkable for systolic hypertension but otherwise stable and  patient is in no acute distress.  Patient's mucosal membranes are dry.  No obvious head injury or trauma.  Diagnostic labs and imaging ordered.  Patient was complaining of some chest wall tenderness and therefore CT of the chest abdomen pelvis was ordered given the patient's fall.  Initial troponin 8 and repeat troponin 7 therefore low suspicion for ACS/CAD.  Initial lactate 5.0 concerning for possible dehydration given patient's white blood cell count was normal.  Patient given 2 L of fluids.  Repeat lactic 1.7.  BNP normal.  Magnesium 1.59.  CMP remarkable for sodium of 135 as well as a creatinine of 1.7 and EGFR of 42 not significantly different from patient's baseline.  CT of the chest abdomen and pelvis showed no acute abnormalities with dense coronary and aortic calcification and hyperinflation of the lungs.  No acute abnormalities noted in the abdomen.  CT of the head showed no evidence of acute cortical infarct or intracranial hemorrhage.  CT of the cervical spine showed no acute fracture or subluxation with multilevel discogenic degenerative changes.  Patient's heart score this time is 4.  Given the patient's heart score as well as syncopal episode we feel patient warrants admission for further management of syncope and dehydration.  Due to capacity of beds at [facility] I called hospitalist at [facility] via transfer center. Dr. Chong agreed to accept patient. Patient was transferred in stable condition.     Vitals:    Vitals:    10/13/24 1005 10/13/24 1006 10/13/24 1008 10/13/24 1012   BP: 124/73      Pulse: 78      Resp: 16      Temp:       SpO2: 98% 95% 98% 96%   Weight:       Height:         History Limited by:    None    Independent history obtained from:    None    External records reviewed:    None    Diagnostics interpreted by me:    CT Scan(s) see MDM    Discussions with other clinicians:    Hospitalist/Admitting Team Dr. Chong    Chronic conditions impacting care:    Diabetes and Heart  Disease    Social determinants of health affecting care:    None    Diagnostic tests considered but not performed: None    ED Medications managed:    Medications   magnesium sulfate 2 g in sterile water for injection 50 mL (2 g intravenous New Bag 10/13/24 1133)   sodium chloride 0.9 % bolus 500 mL (0 mL intravenous Stopped 10/13/24 1021)   piperacillin-tazobactam (Zosyn) 4.5 g in dextrose (iso)  mL (0 g intravenous Stopped 10/13/24 1021)   iohexol (OMNIPaque) 350 mg iodine/mL solution 75 mL (75 mL intravenous Given 10/13/24 0908)   sodium chloride 0.9 % bolus 1,000 mL (0 mL intravenous Stopped 10/13/24 1132)   sodium chloride 0.9 % bolus 500 mL (0 mL intravenous Stopped 10/13/24 1132)   morphine injection 4 mg (4 mg intravenous Given 10/13/24 1254)   ondansetron (Zofran) injection 4 mg (4 mg intravenous Given 10/13/24 1254)       Prescription drugs considered:    None    Screenings:     HEART Score: 4          Procedure  Procedures     Alina Arceo PA-C  10/13/24 2715

## 2024-10-13 NOTE — NURSING NOTE
Patient arrived from [facility] at this time. Alert and oriented to room. Call light and personal belongings within reach. Patient's family at bedside and aware of transfer.

## 2024-10-13 NOTE — CARE PLAN
The patient's goals for the shift include  monitor labs, meet with doctors    The clinical goals for the shift include monitor labs and vitals, meet with doctors    Problem: Fall/Injury  Goal: Not fall by end of shift  Outcome: Progressing  Goal: Be free from injury by end of the shift  Outcome: Progressing  Goal: Verbalize understanding of personal risk factors for fall in the hospital  Outcome: Progressing  Goal: Verbalize understanding of risk factor reduction measures to prevent injury from fall in the home  Outcome: Progressing  Goal: Use assistive devices by end of the shift  Outcome: Progressing  Goal: Pace activities to prevent fatigue by end of the shift  Outcome: Progressing     Problem: Diabetes  Goal: Achieve decreasing blood glucose levels by end of shift  Outcome: Progressing  Goal: Increase stability of blood glucose readings by end of shift  Outcome: Progressing  Goal: Decrease in ketones present in urine by end of shift  Outcome: Progressing  Goal: Maintain electrolyte levels within acceptable range throughout shift  Outcome: Progressing  Goal: Maintain glucose levels >70mg/dl to <250mg/dl throughout shift  Outcome: Progressing  Goal: No changes in neurological exam by end of shift  Outcome: Progressing  Goal: Learn about and adhere to nutrition recommendations by end of shift  Outcome: Progressing  Goal: Vital signs within normal range for age by end of shift  Outcome: Progressing  Goal: Increase self care and/or family involovement by end of shift  Outcome: Progressing  Goal: Receive DSME education by end of shift  Outcome: Progressing

## 2024-10-13 NOTE — ED PROVIDER NOTES
"The patient was seen in conjunction with the advanced practice provider, and I performed a substantive portion of the encounter. The following is my supervisory note.    History:  Presents ED by EMS for reported single episode attempted to ambulate to the bathroom.  Next he knew he woke up on the ground.  States he hit his face.  Did not appreciate any prodromal/antecedent symptoms prior to event.  Presently getting some mild to moderate right-sided head pain and chest wall pain.  Not endorsing any significant neurodeficits of extremities and x-rays any neck/back pain.  States he does take Eliquis for his history of A-fib.  Presently not experiencing any significant headache, vision changes, dysarthria, he does not report any GI/ symptoms.  Denies any appreciable infectious symptoms to include fever/chills.  Denies any other significant systemic symptoms or complaints.    VS:  /73   Pulse 78   Temp 36.6 °C (97.9 °F)   Resp 16   Ht 1.93 m (6' 4\")   Wt 103 kg (228 lb)   SpO2 96%   BMI 27.75 kg/m²      Physical exam:  CONST: alert, normal appearance, no acute distress, does not appear ill/toxic  HEAD: normocephalic, atraumatic  NECK: No midline C-spine tenderness, no step-offs or deformities, full AROM without any pain or limitation  ENT: MMM, no rhinorrhea/congestion, posterior oropharynx clear and oral secretions well controlled  EYES: PEPRL, EOMI, no scleral icterus, no nystagmus  CV: RRR, no murmurs, 2+ equal/symmetrical pulses x4  PULM: CTAB, no respiratory distress, not requiring supplental O2  ABD: soft, flat/non-tender/non-distended, no mass  MSK: No obvious trauma, injuries, or deformities, and no pain with PROM  SKIN: warm/dry, no pallor or jaundice, no rash  NEURO: A&Ox4, no facial asymmetry, no focal neuro deficits, gross strength/motor/sensation intact x4, normal gait      I personally reviewed and interpreted the following studies: EKG is A-fib 81, normal axis, no appreciable ischemia, " prolonged QTc 466 ms, labs are significant for hyperglycemia without AGMA, mild hypomagnesemia likely nutritional and associated CKD, images are notable for CTH negative for traumatic ICH, CT C/A/P negative traumatic processes, and CT C-spine negative for traumatic/malignant alignment or vertebral fracture .      MDM:  Patient presented to the ED for syncopal episode with head strike on AC medications.  Concerning PMHx of GIB on Eliquis, HTN, CAD, CVA, DM 2, CKD 3.    Per Chart Review: Nothing pertinent to this ED encounter.    Assessment/evaluation with syncope and collapse likely cardiogenic with high likelihood of intermittent malignant cardiac arrhythmia. No concerning history, clinical evidence/work-up, or exam findings for the concerning differentials of traumatic ICH, traumatic cervical vertebral processes, traumatic intrathoracic/intra-abdominal/pelvic processes, significant electrolyte/metabolic derangement, anemia. These conditions have been thoroughly evaluated and determined to be sufficiently unlikely to be the etiology of patient's presenting symptoms.       ED Course/Diagnosis:  ED Course as of 10/13/24 1313   Sun Oct 13, 2024   0858 I personally reviewed and interpreted the EKG @0854: Afib 81, normal axis, no appreciable ischemia, prolonged Qtc 466 ms, and prior EKG on 12/24/2023 reviewed without any appreciable specific/identifiable changes. [BC]      ED Course User Index  [BC] Tramaine Lentz MD         Diagnoses as of 10/13/24 1313   Syncope, unspecified syncope type   Dehydration       1. Syncope, unspecified syncope type        2. Dehydration                 Tramaine Lentz MD  10/13/24 1313

## 2024-10-13 NOTE — NURSING NOTE
Per patient he had a follow up appointment scheduled for tomorrow 10/14 with Infectious Disease for an MRI of the left foot and possible PICC line placement. Patient and family requesting that this be placed in his chart.

## 2024-10-14 ENCOUNTER — APPOINTMENT (OUTPATIENT)
Dept: RADIOLOGY | Facility: HOSPITAL | Age: 74
End: 2024-10-14
Payer: MEDICARE

## 2024-10-14 ENCOUNTER — APPOINTMENT (OUTPATIENT)
Dept: WOUND CARE | Facility: HOSPITAL | Age: 74
End: 2024-10-14
Payer: MEDICARE

## 2024-10-14 ENCOUNTER — APPOINTMENT (OUTPATIENT)
Dept: RADIOLOGY | Facility: HOSPITAL | Age: 74
DRG: 312 | End: 2024-10-14
Payer: MEDICARE

## 2024-10-14 ENCOUNTER — APPOINTMENT (OUTPATIENT)
Dept: CARDIOLOGY | Facility: HOSPITAL | Age: 74
DRG: 312 | End: 2024-10-14
Payer: MEDICARE

## 2024-10-14 LAB
ANION GAP SERPL CALCULATED.3IONS-SCNC: 12 MMOL/L (ref 10–20)
BUN SERPL-MCNC: 34 MG/DL (ref 6–23)
CALCIUM SERPL-MCNC: 8.2 MG/DL (ref 8.6–10.3)
CHLORIDE SERPL-SCNC: 103 MMOL/L (ref 98–107)
CK SERPL-CCNC: 116 U/L (ref 0–325)
CO2 SERPL-SCNC: 22 MMOL/L (ref 21–32)
CREAT SERPL-MCNC: 1.38 MG/DL (ref 0.5–1.3)
EGFRCR SERPLBLD CKD-EPI 2021: 54 ML/MIN/1.73M*2
ERYTHROCYTE [DISTWIDTH] IN BLOOD BY AUTOMATED COUNT: 15.5 % (ref 11.5–14.5)
GLUCOSE BLD MANUAL STRIP-MCNC: 128 MG/DL (ref 74–99)
GLUCOSE BLD MANUAL STRIP-MCNC: 156 MG/DL (ref 74–99)
GLUCOSE BLD MANUAL STRIP-MCNC: 169 MG/DL (ref 74–99)
GLUCOSE BLD MANUAL STRIP-MCNC: 173 MG/DL (ref 74–99)
GLUCOSE SERPL-MCNC: 153 MG/DL (ref 74–99)
HCT VFR BLD AUTO: 33.2 % (ref 41–52)
HGB BLD-MCNC: 10.7 G/DL (ref 13.5–17.5)
MCH RBC QN AUTO: 28.8 PG (ref 26–34)
MCHC RBC AUTO-ENTMCNC: 32.2 G/DL (ref 32–36)
MCV RBC AUTO: 90 FL (ref 80–100)
NRBC BLD-RTO: 0 /100 WBCS (ref 0–0)
PLATELET # BLD AUTO: 165 X10*3/UL (ref 150–450)
POTASSIUM SERPL-SCNC: 4 MMOL/L (ref 3.5–5.3)
RBC # BLD AUTO: 3.71 X10*6/UL (ref 4.5–5.9)
SODIUM SERPL-SCNC: 133 MMOL/L (ref 136–145)
WBC # BLD AUTO: 8.1 X10*3/UL (ref 4.4–11.3)

## 2024-10-14 PROCEDURE — 2500000001 HC RX 250 WO HCPCS SELF ADMINISTERED DRUGS (ALT 637 FOR MEDICARE OP): Performed by: NURSE PRACTITIONER

## 2024-10-14 PROCEDURE — 85027 COMPLETE CBC AUTOMATED: CPT | Performed by: NURSE PRACTITIONER

## 2024-10-14 PROCEDURE — 73718 MRI LOWER EXTREMITY W/O DYE: CPT | Mod: LT

## 2024-10-14 PROCEDURE — 82947 ASSAY GLUCOSE BLOOD QUANT: CPT

## 2024-10-14 PROCEDURE — 97165 OT EVAL LOW COMPLEX 30 MIN: CPT | Mod: GO

## 2024-10-14 PROCEDURE — 97161 PT EVAL LOW COMPLEX 20 MIN: CPT | Mod: GP

## 2024-10-14 PROCEDURE — 73718 MRI LOWER EXTREMITY W/O DYE: CPT | Mod: LEFT SIDE | Performed by: RADIOLOGY

## 2024-10-14 PROCEDURE — 70551 MRI BRAIN STEM W/O DYE: CPT | Performed by: RADIOLOGY

## 2024-10-14 PROCEDURE — 70551 MRI BRAIN STEM W/O DYE: CPT

## 2024-10-14 PROCEDURE — 93306 TTE W/DOPPLER COMPLETE: CPT

## 2024-10-14 PROCEDURE — 80048 BASIC METABOLIC PNL TOTAL CA: CPT | Performed by: NURSE PRACTITIONER

## 2024-10-14 PROCEDURE — 70544 MR ANGIOGRAPHY HEAD W/O DYE: CPT

## 2024-10-14 PROCEDURE — 36415 COLL VENOUS BLD VENIPUNCTURE: CPT | Performed by: NURSE PRACTITIONER

## 2024-10-14 PROCEDURE — 97530 THERAPEUTIC ACTIVITIES: CPT | Mod: GP

## 2024-10-14 PROCEDURE — 99223 1ST HOSP IP/OBS HIGH 75: CPT | Performed by: INTERNAL MEDICINE

## 2024-10-14 PROCEDURE — 1200000002 HC GENERAL ROOM WITH TELEMETRY DAILY

## 2024-10-14 PROCEDURE — 99232 SBSQ HOSP IP/OBS MODERATE 35: CPT | Performed by: NURSE PRACTITIONER

## 2024-10-14 PROCEDURE — 93306 TTE W/DOPPLER COMPLETE: CPT | Performed by: INTERNAL MEDICINE

## 2024-10-14 PROCEDURE — 2500000004 HC RX 250 GENERAL PHARMACY W/ HCPCS (ALT 636 FOR OP/ED): Performed by: NURSE PRACTITIONER

## 2024-10-14 PROCEDURE — 2500000002 HC RX 250 W HCPCS SELF ADMINISTERED DRUGS (ALT 637 FOR MEDICARE OP, ALT 636 FOR OP/ED): Performed by: NURSE PRACTITIONER

## 2024-10-14 PROCEDURE — 82550 ASSAY OF CK (CPK): CPT | Performed by: NURSE PRACTITIONER

## 2024-10-14 RX ORDER — DAPTOMYCIN IN SODIUM CHLORIDE 500 MG/50ML
500 INJECTION, SOLUTION INTRAVENOUS
Status: DISCONTINUED | OUTPATIENT
Start: 2024-10-14 | End: 2024-10-19 | Stop reason: HOSPADM

## 2024-10-14 SDOH — HEALTH STABILITY: PHYSICAL HEALTH
HOW OFTEN DO YOU NEED TO HAVE SOMEONE HELP YOU WHEN YOU READ INSTRUCTIONS, PAMPHLETS, OR OTHER WRITTEN MATERIAL FROM YOUR DOCTOR OR PHARMACY?: NEVER

## 2024-10-14 ASSESSMENT — PAIN - FUNCTIONAL ASSESSMENT
PAIN_FUNCTIONAL_ASSESSMENT: 0-10
PAIN_FUNCTIONAL_ASSESSMENT: 0-10
PAIN_FUNCTIONAL_ASSESSMENT: FLACC (FACE, LEGS, ACTIVITY, CRY, CONSOLABILITY)
PAIN_FUNCTIONAL_ASSESSMENT: 0-10

## 2024-10-14 ASSESSMENT — PAIN DESCRIPTION - LOCATION
LOCATION: SHOULDER
LOCATION: BACK

## 2024-10-14 ASSESSMENT — ENCOUNTER SYMPTOMS
WOUND: 1
DIZZINESS: 1
GASTROINTESTINAL NEGATIVE: 1
CONSTITUTIONAL NEGATIVE: 1
CARDIOVASCULAR NEGATIVE: 1
ARTHRALGIAS: 1
EYES NEGATIVE: 1
MUSCULOSKELETAL NEGATIVE: 1
RESPIRATORY NEGATIVE: 1
JOINT SWELLING: 1
ENDOCRINE NEGATIVE: 1
PSYCHIATRIC NEGATIVE: 1

## 2024-10-14 ASSESSMENT — COGNITIVE AND FUNCTIONAL STATUS - GENERAL
CLIMB 3 TO 5 STEPS WITH RAILING: TOTAL
EATING MEALS: A LITTLE
MOVING TO AND FROM BED TO CHAIR: A LOT
MOBILITY SCORE: 19
STANDING UP FROM CHAIR USING ARMS: A LOT
CLIMB 3 TO 5 STEPS WITH RAILING: A LOT
MOBILITY SCORE: 13
PERSONAL GROOMING: A LITTLE
TURNING FROM BACK TO SIDE WHILE IN FLAT BAD: A LITTLE
DRESSING REGULAR LOWER BODY CLOTHING: A LITTLE
DRESSING REGULAR LOWER BODY CLOTHING: A LITTLE
STANDING UP FROM CHAIR USING ARMS: A LITTLE
MOVING FROM LYING ON BACK TO SITTING ON SIDE OF FLAT BED WITH BEDRAILS: A LITTLE
HELP NEEDED FOR BATHING: A LITTLE
WALKING IN HOSPITAL ROOM: A LITTLE
DAILY ACTIVITIY SCORE: 19
PERSONAL GROOMING: A LITTLE
WALKING IN HOSPITAL ROOM: A LOT
MOVING TO AND FROM BED TO CHAIR: A LITTLE
DRESSING REGULAR UPPER BODY CLOTHING: A LITTLE
DRESSING REGULAR UPPER BODY CLOTHING: A LITTLE
TOILETING: A LITTLE
TOILETING: A LITTLE
DAILY ACTIVITIY SCORE: 17
HELP NEEDED FOR BATHING: A LOT

## 2024-10-14 ASSESSMENT — PAIN SCALES - GENERAL
PAINLEVEL_OUTOF10: 7
PAINLEVEL_OUTOF10: 5 - MODERATE PAIN
PAINLEVEL_OUTOF10: 0 - NO PAIN
PAINLEVEL_OUTOF10: 5 - MODERATE PAIN
PAINLEVEL_OUTOF10: 3
PAINLEVEL_OUTOF10: 8
PAINLEVEL_OUTOF10: 5 - MODERATE PAIN

## 2024-10-14 ASSESSMENT — ACTIVITIES OF DAILY LIVING (ADL)
ADL_ASSISTANCE: NEEDS ASSISTANCE
BATHING_ASSISTANCE: MODERATE

## 2024-10-14 ASSESSMENT — PAIN DESCRIPTION - ORIENTATION
ORIENTATION: LEFT
ORIENTATION: LEFT

## 2024-10-14 NOTE — ASSESSMENT & PLAN NOTE
74 year old male with history of A-fib on Eliquis and Plavix, CAD, HTN, CVA, DM, arthritis presenting with concerns for syncope. Previous episodes of syncope surrounding MI. No focal weakness. Exam aside from reduced pp to RUE is non focal. Awaiting result of MRI MRA. Will reassess tomorrow.

## 2024-10-14 NOTE — PROGRESS NOTES
Rachid Yun is a 74 y.o. male on day 1 of admission presenting with Syncope, non cardiac.      Subjective   Patient seen and examined. Resting in bed. States he feels ok today, denies any further episodes of syncope. He denies any dizziness at this time. Discussed with RN, BP did drop with position change this morning.        Objective     Last Recorded Vitals  /72 (BP Location: Right arm, Patient Position: Lying)   Pulse 71   Temp 36 °C (96.8 °F) (Temporal)   Resp 18   Wt 103 kg (228 lb)   SpO2 98%   Intake/Output last 3 Shifts:    Intake/Output Summary (Last 24 hours) at 10/14/2024 1207  Last data filed at 10/14/2024 1148  Gross per 24 hour   Intake 650 ml   Output 1550 ml   Net -900 ml       Admission Weight  Weight: 103 kg (228 lb) (10/13/24 1637)    Daily Weight  10/13/24 : 103 kg (228 lb)    Image Results  ECG 12 lead  Atrial fibrillation  Abnormal ECG  When compared with ECG of 24-DEC-2023 15:59,  No significant change was found      Physical Exam    General: Alert and oriented x3, pleasant.   Cardiac: Irregular rate and rhythm, S1/S2 , no murmur.   Pulmonary: Clear to auscultation on room air.   Abdomen: Soft, round, nontender. BS +x4.   Extremities: No edema.  Skin: No rashes or lesions.      Relevant Results    Scheduled medications  amLODIPine, 5 mg, oral, Daily  apixaban, 2.5 mg, oral, BID  atorvastatin, 20 mg, oral, Daily  clopidogrel, 75 mg, oral, Daily  daptomycin, 500 mg, intravenous, q24h KEVIN  donepezil, 10 mg, oral, Nightly  DULoxetine, 60 mg, oral, Daily  ferrous sulfate (325 mg ferrous sulfate), 65 mg of iron, oral, Daily  folic acid, 0.4 mg, oral, Daily  gabapentin, 300 mg, oral, TID  insulin glargine, 30 Units, subcutaneous, Nightly  insulin lispro, 0-5 Units, subcutaneous, TID  levETIRAcetam, 500 mg, oral, BID  metoprolol succinate XL, 25 mg, oral, BID  pantoprazole, 40 mg, oral, Daily before breakfast  tamsulosin, 0.4 mg, oral, Nightly  traZODone, 150 mg, oral,  Nightly      Continuous medications     PRN medications  PRN medications: acetaminophen **OR** acetaminophen **OR** acetaminophen, bisacodyl, dextrose, dextrose, glucagon, glucagon, melatonin, ondansetron ODT **OR** ondansetron, oxyCODONE     Results for orders placed or performed during the hospital encounter of 10/13/24 (from the past 24 hour(s))   Urinalysis with Reflex Culture and Microscopic   Result Value Ref Range    Color, Urine Yellow Light-Yellow, Yellow, Dark-Yellow    Appearance, Urine Clear Clear    Specific Gravity, Urine >1.050 (N) 1.005 - 1.035    pH, Urine 6.0 5.0, 5.5, 6.0, 6.5, 7.0, 7.5, 8.0    Protein, Urine 600 (3+) (A) NEGATIVE, 10 (TRACE), 20 (TRACE) mg/dL    Glucose, Urine 30 (TRACE) (A) Normal mg/dL    Blood, Urine 0.1 (1+) (A) NEGATIVE    Ketones, Urine NEGATIVE NEGATIVE mg/dL    Bilirubin, Urine NEGATIVE NEGATIVE    Urobilinogen, Urine Normal Normal mg/dL    Nitrite, Urine NEGATIVE NEGATIVE    Leukocyte Esterase, Urine NEGATIVE NEGATIVE   Urinalysis Microscopic   Result Value Ref Range    WBC, Urine 1-5 1-5, NONE /HPF    RBC, Urine 1-2 NONE, 1-2, 3-5 /HPF    Mucus, Urine FEW Reference range not established. /LPF   POCT GLUCOSE   Result Value Ref Range    POCT Glucose 181 (H) 74 - 99 mg/dL   POCT GLUCOSE   Result Value Ref Range    POCT Glucose 111 (H) 74 - 99 mg/dL   CBC   Result Value Ref Range    WBC 8.1 4.4 - 11.3 x10*3/uL    nRBC 0.0 0.0 - 0.0 /100 WBCs    RBC 3.71 (L) 4.50 - 5.90 x10*6/uL    Hemoglobin 10.7 (L) 13.5 - 17.5 g/dL    Hematocrit 33.2 (L) 41.0 - 52.0 %    MCV 90 80 - 100 fL    MCH 28.8 26.0 - 34.0 pg    MCHC 32.2 32.0 - 36.0 g/dL    RDW 15.5 (H) 11.5 - 14.5 %    Platelets 165 150 - 450 x10*3/uL   Basic metabolic panel   Result Value Ref Range    Glucose 153 (H) 74 - 99 mg/dL    Sodium 133 (L) 136 - 145 mmol/L    Potassium 4.0 3.5 - 5.3 mmol/L    Chloride 103 98 - 107 mmol/L    Bicarbonate 22 21 - 32 mmol/L    Anion Gap 12 10 - 20 mmol/L    Urea Nitrogen 34 (H) 6 - 23 mg/dL     Creatinine 1.38 (H) 0.50 - 1.30 mg/dL    eGFR 54 (L) >60 mL/min/1.73m*2    Calcium 8.2 (L) 8.6 - 10.3 mg/dL   POCT GLUCOSE   Result Value Ref Range    POCT Glucose 128 (H) 74 - 99 mg/dL   Creatine Kinase   Result Value Ref Range    Creatine Kinase 116 0 - 325 U/L   POCT GLUCOSE   Result Value Ref Range    POCT Glucose 173 (H) 74 - 99 mg/dL             Assessment/Plan      Syncope and Collapse  -Neurology and Cardiology consults.   -Suspect from orthostatic hypotension, BP did drop with position change.   -CT brain and C spine with no acute findings.   -CT chest/sb/pelvis with no acute findings.   -MRI/MRA head pending.   -Monitor orthostatic VS and med changes per cardio.   -Monitor on telemetry.   -PT/OT evals, recommendation for moderate intensity therapy at this time.     DM Foot Ulcer  -Has had issues with this for many months now, following outpatient with ID.   -ID to see.   -He had wound culture done on 9/30/24 which grew MRSA with resistance.   -Started on daptomycin by ID.   -Will obtain MRI L foot.   -Local wound care.     DM 2 with Neuropathy  -HgA1c 6.1 (7/1/24).   -Continue lantus and SSI coverage.   -Monitor blood sugars.     CKD 3  -Appears to be near his baseline renal function at this time.   -Monitor closely. He did receive IV contrast in the ED yesterday.     Atrial Fibrillation  -Continue Eliquis.   -BB on board, HR low at times, appreciate cardio input.     HTN  -On amlodipine and BB at home, parameters added.   -BP on the low end, drops when standing. Cardio to see.   -HR intermittently low.     HLD  -Continue statin.     DVT Risk  -Eliquis.     Plan  Neurology, Cardiology and ID consults.   Orthostatic +, med adjustments per cardiology.   MRI/MRA head pending.   Will obtain MRI L foot, started on IV daptomycin by ID.   PT/OT recommend moderate intensity therapy at this time, SS on board for DC planning.         Jenna Alvarado, HERBERT-CNP

## 2024-10-14 NOTE — NURSING NOTE
Orthostatics taken at this time. Hospitalist NP aware of results and inability to obtain standing

## 2024-10-14 NOTE — PROGRESS NOTES
Physical Therapy    Physical Therapy Evaluation and Treatment    Patient Name: Rachid Yun  MRN: [id number]  Department: [address]  Room: [address] [place], [place] [address]  Today's Date: 10/14/2024   Time Calculation  Start Time: 0940  Stop Time: 1024  Time Calculation (min): 44 min    Assessment/Plan   PT Assessment  PT Assessment Results: Decreased strength, Decreased endurance, Impaired balance, Decreased mobility, Decreased coordination, Decreased safety awareness, Pain, Impaired sensation  Rehab Prognosis: Good  Barriers to Discharge: assist x 1-2  Evaluation/Treatment Tolerance: Patient limited by fatigue, Patient limited by pain  Medical Staff Made Aware: Yes  End of Session Communication: Bedside nurse  End of Session Patient Position: Up in chair, Alarm on      Assessment Comment: 73 y/o male who presented to ED with syncope. Pt reportedly uses rollator at baseline and requires assistance for ADLs. Family provides daily assistance, and multiple types of adaptive equpiment present at home. Pt is currently limited by more pain and weakness than at baseline. Is an increased falls risk at this time. Would benefit from cont PT services while in-house to maximize safe mobility.        IP OR SWING BED PT PLAN  Inpatient or Swing Bed: Inpatient  PT Plan  Treatment/Interventions: Bed mobility, Transfer training, Gait training, Balance training, Stair training, Neuromuscular re-education, Strengthening, Endurance training, Therapeutic exercise, Therapeutic activity  PT Plan: Ongoing PT  PT Frequency: 5 times per week  PT Discharge Recommendations: Moderate intensity level of continued care (Pt has increased family support; family may decide for pt to return home upon dc.)  Equipment Recommended upon Discharge: Wheeled walker  PT Recommended Transfer Status: Assist x2, Assistive device  PT - OK to Discharge: Yes    Subjective   General Visit Information:  General  Reason for Referral: impaired mobility; syncope  Past Medical History Relevant  "to Rehab: arthritis, CAD, DM, HTN, A-fib  Missed Visit: No  Family/Caregiver Present: Yes  Caregiver Feedback: son at bedside for intial half of session; supportive  Prior to Session Communication: Bedside nurse  Patient Position Received: Bed, 3 rail up, Alarm off, not on at start of session  Preferred Learning Style: verbal, visual  General Comment: Pt sitting upright in bed, with son at bedside, upon PT arrival. Agreeable to participate.  Home Living:  Home Living  Type of Home: House  Lives With:  (Family)  Home Adaptive Equipment: Walker rolling or standard, Cane, Wheelchair-manual (rollator x 2 (tall, stander rollator).  Bed rails on bed.)  Home Layout: One level  Home Access: Stairs to enter with rails  Entrance Stairs-Number of Steps: 1 PRASAD from garage  Bathroom Toilet: Adaptive toilet seating, Handicapped height  Bathroom Equipment: Grab bars in shower, Shower chair with back, Tub transfer bench, Raised toilet seat with rails, Grab bars around toilet  Home Living Comments: Lives with adult son and daughter. Family available to assist throughout day. Bed and chairs have been elevated to allow for easier transitions.  Prior Level of Function:  Prior Function Per Pt/Caregiver Report  Level of [place]: Needs assistance with ADLs, Needs assistance with homemaking, Needs assistance with functional transfers  Receives Help From: Family  Prior Function Comments: Pt amb primarily with rollator. Pt indicated it is \"tall and I put my arms on it\" (thoracic, platform heigth rollator). Able to amb without assistance. Requires elevated surfaces to perform transfers. Requires assist for ADL tasks, dressing and bathing.  Precautions:  Precautions  Medical Precautions: Fall precautions     Vital Signs (Past 2hrs)        Date/Time Vitals Session Patient Position Pulse Resp SpO2 BP MAP (mmHg)    10/14/24 1020 --  --  --  --  --  155/73  104     10/14/24 1025 --  --  --  --  --  115/59  93                   Vital Signs " "Comment: Supine BP: 155/73 (104). Sitting BP: 115/59 (93).  Attempted, but unable to accurately assess standing BP due to pt's tremors and uncontrolled movements when in standing.     Objective   Pain:  Pain Assessment  Pain Assessment: FLACC (Face, Legs, Activity, Cry, Consolability)  0-10 (Numeric) Pain Score: 3  Pain Type: Chronic pain  Pain Location: Back  Pain Interventions: Repositioned (RN aware)  Cognition:  Cognition  Overall Cognitive Status: Within Functional Limits  Orientation Level: Oriented X4  Cognition Comments: able to follow commands    General Assessments:  General Observation  General Observation: pleasant/cooperative     Activity Tolerance  Endurance: Decreased tolerance for upright activites  Activity Tolerance Comments: fatigues easily. limited by pain and generalized weakness.    Sensation  Sensation Comment: decreased sensation L foot. Diminished sensatoin R foot (\"better than the other foot, but it's still not good.\")    Strength  Strength Comments: RLE grossly 4/5,  LLE grossly 3+/5. Increased tremors observed in all extremities, primarily R hand.         Postural Control  Posture Comment: rounded shoulders, flexed posture primarily in standing    Static Sitting Balance  Static Sitting-Level of Assistance: Close supervision, Distant supervision  Static Sitting-Comment/Number of Minutes: sitting EOB, with UE support  Dynamic Sitting Balance  Dynamic Sitting-Comments: CGA    Static Standing Balance  Static Standing-Level of Assistance: Minimum assistance, Moderate assistance  Static Standing-Comment/Number of Minutes: B UE support of walker. First stand, pt stood about 1-2 minutes with UE support of walker and min/mod assist. Flexed knees and hips. observed increased tremors in extremities. Attempted to assess BP, however unable to due to uncontrolled tremors in extemities. Pt with increased c/o back pain and weakness, 'This is worse than normal.\" Son observed and confirmed.  Dynamic " Standing Balance  Dynamic Standing-Comments: UE support of walker, assist x 2 for safety  Functional Assessments:  Bed Mobility  Bed Mobility: Yes  Bed Mobility 1  Bed Mobility 1: Supine to sitting  Level of Assistance 1: Minimum assistance  Bed Mobility Comments 1: HOB elevated. Increased time to complete, use of bedrails.  Has bedrails at home.    Transfers  Transfer: Yes  Transfer 1  Technique 1: Sit to stand, Stand to sit  Transfer Device 1: Walker  Transfer Level of Assistance 1: Moderate assistance  Trials/Comments 1: x2 trials from elevated EOB  Transfers 2  Transfer From 2: Bed to  Transfer to 2: Chair with arms  Technique 2: Sit to stand, Stand to sit  Transfer Device 2: Walker  Transfer Level of Assistance 2: Moderate assistance  Trials/Comments 2: shuffle-like gait with minimal foot clearance. flexed posture. walker manipulation provided by therapist. about 1 ft from bed to chair.       Extremity/Trunk Assessments:  RLE   RLE : Exceptions to WFL  Strength RLE  RLE Overall Strength:  (grossly 4/5 observed)  LLE   LLE : Exceptions to WFL  Strength LLE  LLE Overall Strength: Deficits, Due to pain (grossly 3/5 observed.)  Outcome Measures:  [facility] Basic Mobility  Turning from your back to your side while in a flat bed without using bedrails: A little  Moving from lying on your back to sitting on the side of a flat bed without using bedrails: A little  Moving to and from bed to chair (including a wheelchair): A lot  Standing up from a chair using your arms (e.g. wheelchair or bedside chair): A lot  To walk in hospital room: A lot  Climbing 3-5 steps with railing: Total  Basic Mobility - Total Score: 13    Treatment  Sat on EOB for about 20 minutes with supervision. Performed x 10 L LAQs, with min assist, RLE without assist. Stood x 2 trials. First standing trial, attempted to assess standing BP. Repeated cueing to shift wt anteriorly and promote trunk extension into upright posture.     Encounter Problems        Encounter Problems (Active)       Balance       standing (Progressing)       Start:  10/14/24    Expected End:  10/28/24       Pt will stand with UE support of walker, supervision, no LOB, for 2 minutes            Mobility       LTG - Patient will be able to go up and down a curb/step with the appropriate device (Progressing)       Start:  10/14/24    Expected End:  10/28/24            bed mobility (Progressing)       Start:  10/14/24    Expected End:  10/28/24       Pt will perform sup to/from sit transfer with supervision          ambulation (Progressing)       Start:  10/14/24    Expected End:  10/28/24       Pt will amb > 30  ft with wheeled walker and CGA            PT Transfers       sit to stand (Progressing)       Start:  10/14/24    Expected End:  10/28/24       Pt will perform sit to stand transfer with walker and CGA         bed to chair (Progressing)       Start:  10/14/24    Expected End:  10/28/24       Pt will perform bed to chair transfer with walker and CGA            Pain - Adult          Safety       LTG - Patient will utilize safety techniques (Progressing)       Start:  10/14/24    Expected End:  10/28/24                   Education Documentation  Precautions, taught by Angelina Poon PT at 10/14/2024 11:22 AM.  Learner: Patient  Readiness: Acceptance  Method: Explanation  Response: Verbalizes Understanding    Body Mechanics, taught by Angelina Poon PT at 10/14/2024 11:22 AM.  Learner: Patient  Readiness: Acceptance  Method: Explanation  Response: Verbalizes Understanding    Mobility Training, taught by Angelina Poon PT at 10/14/2024 11:22 AM.  Learner: Patient  Readiness: Acceptance  Method: Explanation  Response: Verbalizes Understanding    Education Comments  No comments found.

## 2024-10-14 NOTE — CONSULTS
Wound Care Consult     Visit Date: 10/14/2024      Patient Name: Rachid Yun         MRN: [id number]           YOB: 1950    Consulted for wound care. A 74 y.o. male patient admitted for   Syncope, non cardiac [R55]   Past Medical History:   Diagnosis Date    Arthritis     Coronary artery disease     Diabetes mellitus (Multi)     Hypertension     Stroke (Multi)       Past Surgical History:   Procedure Laterality Date    BACK SURGERY      06/2024    CARDIAC CATHETERIZATION N/A 12/28/2023    Procedure: Left Heart Cath, With LV, Angriography And Grafts;  Surgeon: Phill Hare DO;  Location: [facility] Cardiac Cath Lab;  Service: Cardiovascular;  Laterality: N/A;    CARDIAC CATHETERIZATION N/A 12/28/2023    Procedure: PCI;  Surgeon: Phill Hare DO;  Location: [facility] Cardiac Cath Lab;  Service: Cardiovascular;  Laterality: N/A;    CARDIAC ELECTROPHYSIOLOGY PROCEDURE N/A 11/29/2023    Procedure: Cardioversion;  Surgeon: Phill Hare DO;  Location: [facility] Cardiac Cath Lab;  Service: Cardiovascular;  Laterality: N/A;    CORONARY ANGIOPLASTY WITH STENT PLACEMENT      CORONARY ANGIOPLASTY WITH STENT PLACEMENT      cardiac stent 12/2023    RADIOFREQUENCY ABLATION        Scheduled medications  amLODIPine, 5 mg, oral, Daily  apixaban, 2.5 mg, oral, BID  atorvastatin, 20 mg, oral, Daily  clopidogrel, 75 mg, oral, Daily  daptomycin, 500 mg, intravenous, q24h KEVIN  donepezil, 10 mg, oral, Nightly  DULoxetine, 60 mg, oral, Daily  ferrous sulfate (325 mg ferrous sulfate), 65 mg of iron, oral, Daily  folic acid, 0.4 mg, oral, Daily  gabapentin, 300 mg, oral, TID  insulin glargine, 30 Units, subcutaneous, Nightly  insulin lispro, 0-5 Units, subcutaneous, TID  levETIRAcetam, 500 mg, oral, BID  metoprolol succinate XL, 25 mg, oral, BID  pantoprazole, 40 mg, oral, Daily before breakfast  tamsulosin, 0.4 mg, oral, Nightly  traZODone, 150 mg, oral, Nightly      Continuous medications     PRN medications  PRN medications: acetaminophen  **OR** acetaminophen **OR** acetaminophen, bisacodyl, dextrose, dextrose, glucagon, glucagon, melatonin, ondansetron ODT **OR** ondansetron, oxyCODONE     Allergies   Allergen Reactions    Meperidine Other     Passes out     Vancomycin Hives     & chest pain    Hydromorphone Anxiety    Other Unknown     Ripampin    Rifampin Unknown     Dizzy/ oriented         Susceptibility data from last 90 days.  Collected Specimen Info Organism Aztreonam Cefepime Ceftazidime Ciprofloxacin Clindamycin Erythromycin Levofloxacin Oxacillin Piperacillin/Tazobactam Tetracycline Tobramycin Trimethoprim/Sulfamethoxazole Vancomycin   09/30/24 Tissue/Biopsy from Wound/Tissue Methicillin Resistant Staphylococcus aureus (MRSA)      R  R   R   R   S  S     Mixed Gram-Positive and Gram-Negative Bacteria                07/17/24 Tissue/Biopsy from Wound/Tissue Pseudomonas aeruginosa  S  S  S  S    S   S   S       Mixed Gram-Positive and Gram-Negative Bacteria                       Pertinent Labs:   Albumin   Date Value Ref Range Status   10/13/2024 3.4 3.4 - 5.0 g/dL Final     Albumin, Urine Random   Date Value Ref Range Status   10/01/2024 >2,250.0 Not established mg/L Final       Wound Assessment:  Wound 07/16/24 Diabetic Ulcer Foot Lateral;Left (Active)   Wound Image   10/13/24 1635   Site Assessment Red 10/14/24 0927   Selena-Wound Assessment Red 10/14/24 0927   Wound Length (cm) 2 cm 10/14/24 0927   Wound Width (cm) 2 cm 10/14/24 0927   Wound Surface Area (cm^2) 4 cm^2 10/14/24 0927   Wound Depth (cm) 0.6 cm 10/14/24 0927   Wound Volume (cm^3) 2.4 cm^3 10/14/24 0927   Wound Healing % -782 10/14/24 0927   Margins Well-defined edges 10/14/24 0927   Dressing Non adherent 10/14/24 0927   Dressing Changed New 10/14/24 0927       Reason for Consult: wound care evaluation and dressing recommendations        Wound History: Wound is present on admission and photographed by nursing    Wound Team Assessment: Patient sitting in bedside chair, cleaning  up, agreeable to exam and cardiology provider visited during exam. Infectious disease ID consulted, discussed case with ALYSHA Oakes CNP, MRSA + wound culture, and being managed by ID. Cleaned wound with soap/water, rinsed, patted dry applied melgisorb Ag and mepilex recommend daily dressing change.      Recommendations: See above      Patient is provided a [facility] bed frame with [employer] Mattress Luis Enrique Score is 20. Bedside nurse Margie Walker RN updated, continue pressure injury prevention interventions and nursing to continue to follow provider orders. Re consult wound RN PRN.    Abbey Ayala BSN RN [facility]  10/14/2024  12:11 PM

## 2024-10-14 NOTE — CONSULTS
Inpatient consult to Neurology  Consult performed by: HERBERT Aguirre-CNP  Consult ordered by: HERBERT Quezada-KORIN          History Of Present Illness  Rachid Yun is a 74 y.o. male with history of A-fib on Eliquis and Plavix, CAD, HTN, CVA, DM, arthritis presenting with concerns for syncope.  His son is at bedside  Patient is awake alert and oriented to all tells me that aside from feeling overall fatigued and having increased joint pain he is back to his baseline.  Previously had 3 other episodes of syncope last in December 2023 when he also was found to have had a heart attack and blockage requiring a stent.  They note that he was doing well up until yesterday he was walking to the restroom when all of a sudden he had an episode of syncope lasting approximately 1 minute with noted diaphoresis.  No shaking, loss of bowel or bladder, headache, dizziness, focal weakness.  Denies missed doses of Eliquis or Plavix.        Past Medical History  Past Medical History:   Diagnosis Date    Arthritis     Coronary artery disease     Diabetes mellitus (Multi)     Hypertension     Stroke (Multi)      Surgical History  Past Surgical History:   Procedure Laterality Date    BACK SURGERY      06/2024    CARDIAC CATHETERIZATION N/A 12/28/2023    Procedure: Left Heart Cath, With LV, Angriography And Grafts;  Surgeon: Phill Hare DO;  Location: [facility] Cardiac Cath Lab;  Service: Cardiovascular;  Laterality: N/A;    CARDIAC CATHETERIZATION N/A 12/28/2023    Procedure: PCI;  Surgeon: Phill Hare DO;  Location: [facility] Cardiac Cath [facility];  Service: Cardiovascular;  Laterality: N/A;    CARDIAC ELECTROPHYSIOLOGY PROCEDURE N/A 11/29/2023    Procedure: Cardioversion;  Surgeon: Phill Hare DO;  Location: [facility] Cardiac Cath [facility];  Service: Cardiovascular;  Laterality: N/A;    CORONARY ANGIOPLASTY WITH STENT PLACEMENT      CORONARY ANGIOPLASTY WITH STENT PLACEMENT      cardiac stent 12/2023    RADIOFREQUENCY ABLATION       Social  History  Social History     Tobacco Use    Smoking status: Former     Current packs/day: 0.00     Types: Cigarettes     Quit date:      Years since quittin.8     Passive exposure: Past    Smokeless tobacco: Never   Vaping Use    Vaping status: Never Used   Substance Use Topics    Alcohol use: Not Currently    Drug use: Never     Allergies  Meperidine, Vancomycin, Hydromorphone, Other, and Rifampin  Medications Prior to Admission   Medication Sig Dispense Refill Last Dose    amLODIPine (Norvasc) 5 mg tablet Take 1 tablet (5 mg) by mouth once daily. 90 tablet 3     atorvastatin (Lipitor) 20 mg tablet Take 1 tablet (20 mg) by mouth once daily. 90 tablet 3     blood sugar diagnostic strip 1 strip 3 times a day before meals. 300 strip 3     cephalexin (Keflex) 500 mg capsule Take 500 mg by mouth 4 times a day. Indications: an infection of the skin and the tissue below the skin       clopidogrel (Plavix) 75 mg tablet TAKE 1 TABLET BY MOUTH ONCE  DAILY 100 tablet 2     collagenase 250 unit/gram ointment Apply topically once daily. (Patient taking differently: Apply  topically once daily. to left foot wound  Indications: a skin ulcer) 30 g 2     donepezil (Aricept) 10 mg tablet TAKE 1 TABLET BY MOUTH ONCE  DAILY AT BEDTIME 100 tablet 2     doxycycline (Vibra-Tabs) 100 mg tablet Take 100 mg by mouth 2 times a day. Indications: an infection of the skin and the tissue below the skin       DULoxetine (Cymbalta) 60 mg DR capsule TAKE 1 CAPSULE BY MOUTH ONCE  DAILY 90 capsule 3     Eliquis 2.5 mg tablet Take 1 tablet (2.5 mg) by mouth 2 times a day. 180 tablet 3     ferrous sulfate 325 (65 Fe) MG EC tablet Take 1 tablet by mouth 2 times a day. for 90 day(s)       folic acid (Folvite) 400 mcg tablet Take 1 tablet (0.4 mg) by mouth once daily. for 90 day(s)       gabapentin (Neurontin) 300 mg capsule Take 1 capsule (300 mg) by mouth 3 times a day. 90 capsule 0     ketoconazole (NIZOral) 2 % cream Apply topically once  daily. (Patient taking differently: Apply 1 Application topically once daily. apply to affected area on Scalp   Indications: a skin infection due to the fungus Candida) 30 g 11     ketoconazole 1 % shampoo Apply 1 Application topically every 3 days. Shampoo daily, leave on for 5-10 minutes, then rinse. 200 mL 11     Lantus U-100 Insulin 100 unit/mL injection 40 UNITS SUBCUTANEOUS DAILY 90 DAY(S) 10 mL 11     levETIRAcetam (Keppra) 500 mg tablet 1 TABLET ORALLY EVERY 12 HRS 90 DAY(S) 180 tablet 3     losartan (Cozaar) 100 mg tablet TAKE 1 TABLET BY MOUTH EVERY DAY FOR 90 DAYS 90 tablet 3     metoprolol succinate XL (Toprol-XL) 25 mg 24 hr tablet Take 1 tablet (25 mg) by mouth 2 times a day. Do not crush or chew. 180 tablet 3     omega 3-dha-epa-fish oil (Fish OiL) 1,000 mg (120 mg-180 mg) capsule Take 1 capsule (1,000 mg) by mouth once daily.       omeprazole (PriLOSEC) 20 mg DR capsule Take 1 capsule by mouth once daily in the morning. Take before meals.       oxyCODONE (Roxicodone) 15 mg immediate release tablet Take 1 tablet (15 mg) by mouth every 6 hours if needed.       polyethylene glycol (Glycolax, Miralax) 17 gram packet Take 17 g by mouth once daily. Do not start before December 31, 2023.       sulfamethoxazole-trimethoprim (Bactrim DS) 800-160 mg tablet Take 1 tablet by mouth 2 times a day. Indications: an infection of the skin and the tissue below the skin       tamsulosin (Flomax) 0.4 mg 24 hr capsule Take 1 capsule by mouth once daily at bedtime.       traZODone (Desyrel) 150 mg tablet Take 1 tablet (150 mg) by mouth once daily at bedtime. FOR 90 DAYS 90 tablet 3        Review of Systems   Respiratory: Negative.     Cardiovascular:  Positive for chest pain.   Musculoskeletal:  Positive for arthralgias and joint swelling.   Neurological:  Positive for syncope.     Neurological Exam  Neurologic exam:    Awake alert oriented to all, no dysarthria, no aphasia  Naming repetition intact, speech is  "fluent  PERRL, EOMI without ptosis or nystagmus, visual fields intact, face symmetrical, tongue midline  Strength in upper and lower extremities is intact, mild weakness to hand grasp and strength at left ankle d/t joint deformities  Sensation is reduced to RUE  FTN intact Difficulty with isaac to joint deformities  Heel-to-shin without ataxia  Reflexes  2 throughout,  Toes downgoing bilaterally  Gait not assessed at this time    Physical Exam  Cardiovascular:      Rate and Rhythm: Normal rate.   Pulmonary:      Effort: Pulmonary effort is normal.   Musculoskeletal:      Cervical back: Normal range of motion.       Last Recorded Vitals  Blood pressure 115/59, pulse 59, temperature 35.8 °C (96.4 °F), temperature source Temporal, resp. rate 18, height 1.93 m (6' 3.98\"), weight 103 kg (228 lb), SpO2 99%.    Relevant Results                                      I have personally reviewed the following imaging results ECG 12 lead    Result Date: 10/14/2024  Atrial fibrillation Abnormal ECG When compared with ECG of 24-DEC-2023 15:59, No significant change was found    CT chest abdomen pelvis w IV contrast    Result Date: 10/13/2024  Interpreted By:  Tamara Snowden, STUDY: CT CHEST ABDOMEN PELVIS W IV CONTRAST;  10/13/2024 9:27 am   INDICATION: Signs/Symptoms:fall, syncope.     COMPARISON: None.   ACCESSION NUMBER(S): [id number]   ORDERING CLINICIAN: DOTTIE REYNA   TECHNIQUE: CT of the chest, abdomen, and pelvis was performed.  Contiguous axial images were obtained at 3 mm slice thickness through the chest, abdomen and pelvis. Coronal and sagittal reconstructions at 3 mm slice thickness were performed. Contrast was administered intravenously without immediate complication.   FINDINGS: CHEST:   LUNG/PLEURA/LARGE AIRWAYS: The lungs are hyperinflated. No suspicious lung nodules or infiltrates. Punctate calcified granuloma in the right lung base.   VESSELS: Aortic calcification. No aneurysmal dilatation. Normal caliber main " pulmonary artery.   HEART: No cardiomegaly. No pericardial effusion. Dense coronary artery calcification.   MEDIASTINUM AND ATIF: No significant mediastinal or hilar adenopathy. Subcentimeter lymph nodes are noted which are nonspecific and may be reactive.   CHEST WALL AND LOWER NECK: Grossly unremarkable thyroid gland.   ABDOMEN:   LIVER: Tiny hypodensity in the hepatic dome likely a tiny cyst. The liver is otherwise unremarkable.   BILE DUCTS: No bile duct dilatation.   GALLBLADDER: Likely tiny gallstones. No gallbladder wall thickening.   PANCREAS: Unremarkable pancreas.   SPLEEN: Unremarkable spleen.   ADRENAL GLANDS: No adrenal masses.   KIDNEYS AND URETERS: Cortical thinning of both kidneys. Likely a tiny subcentimeter upper pole left renal cyst. No hydroureter. No definite stones.   PELVIS:   BLADDER: Suboptimally distended and evaluated. Apparent bladder wall thickening at least partially due to lack of distention.   REPRODUCTIVE ORGANS: Calcifications in the prostate.   BOWEL: Large amount of stool. Rectal distention with stool. No evidence of bowel obstruction.     VESSELS: Aortic calcification. No aneurysmal dilatation.   IVC filter.   PERITONEUM/RETROPERITONEUM/LYMPH NODES: No retroperitoneal adenopathy. No ascites. Unremarkable appendix.   BONE AND SOFT TISSUE: Discogenic degenerative changes. Vacuum disc at multiple levels. Mild loss of height of multiple thoracic and lumbar spine vertebral bodies. Intramedullary sclerosis of the proximal left femur is suboptimally included and may represent bone infarcts or an enchondroma.       CHEST: 1. No acute abnormalities within the chest. 2. Dense coronary and aortic calcification. 3. Hyperinflation.   ABDOMEN-PELVIS: 1. Advanced multilevel discogenic degenerative changes. 2. Tiny gallstones. 3. No acute abnormalities.   Signed by: Tamara Snowden 10/13/2024 10:45 AM Dictation workstation:   [id number]    CT cervical spine wo IV contrast    Result Date:  10/13/2024  Interpreted By:  Tamara Snowden, STUDY: CT CERVICAL SPINE WO IV CONTRAST;  10/13/2024 9:27 am   INDICATION: Signs/Symptoms:neck trauma after syncopal episode.     COMPARISON: 12/24/2023   ACCESSION NUMBER(S): [id number]   ORDERING CLINICIAN: DOTTIE REYNA   TECHNIQUE: Axial CT images of the cervical spine are obtained. Axial, coronal and sagittal reconstructions are provided for review.   FINDINGS: Grade 1 anterolisthesis of C4 on C5. Straightening of normal cervical lordosis. Disc space loss from C6 through T1. No erosions. No fracture dislocation. Hypertrophic changes of the C1-C2 articulation. Mild central canal stenosis and varying degrees of neural foraminal narrowing at multiple levels.       No evidence for an acute fracture or subluxation of the cervical spine. Multilevel discogenic degenerative changes.   MACRO: None   Signed by: Tamara Snowden 10/13/2024 10:01 AM Dictation workstation:   [id number]    CT head wo IV contrast    Result Date: 10/13/2024  Interpreted By:  Tamara Snowden, STUDY: CT HEAD WO IV CONTRAST;  10/13/2024 9:27 am   INDICATION: Signs/Symptoms:head injury on eliquis.     COMPARISON: 12/24/2023   ACCESSION NUMBER(S): [id number]   ORDERING CLINICIAN: DOTTIE REYNA   TECHNIQUE: Noncontrast axial CT scan of head was performed. Angled reformats in brain and bone windows were generated. The images were reviewed in bone, brain, blood and soft tissue windows.   FINDINGS: The ventricles, cisterns and sulci are prominent, consistent with mild diffuse volume loss. There are areas of nonspecific white matter hypodensity, which are probably age-related or microvascular in nature.   Gray-white differentiation is intact and there is no evidence of acute cortical infarct. No mass, mass effect or midline shift is seen. There is no evidence of hemorrhage.   The visualized paranasal sinuses are clear.         No evidence of acute cortical infarct or intracranial hemorrhage.   Chronic changes as  described.   MACRO: None   Signed by: Tamara Snowden 10/13/2024 9:53 AM Dictation workstation:   [id number]  .      Assessment/Plan   Assessment & Plan  Syncope, non cardiac  74 year old male with history of A-fib on Eliquis and Plavix, CAD, HTN, CVA, DM, arthritis presenting with concerns for syncope. Previous episodes of syncope surrounding MI. No focal weakness. Exam aside from reduced pp to RUE is non focal. Awaiting result of MRI MRA. Will reassess tomorrow.   Atrial fibrillation (Multi)    Type 2 diabetes mellitus without complication (Multi)    Primary hypertension    Stage 3b chronic kidney disease (Multi)    Mixed hyperlipidemia    Diabetic ulcer of left midfoot associated with type 2 diabetes mellitus (Multi)                   Elizabeth Díaz, APRN-CNP

## 2024-10-14 NOTE — CONSULTS
Inpatient consult to Cardiology  Consult performed by: Julian Barajas MD  Consult ordered by: HERBERT Aguilar-CNP  Reason for consult: Syncope        History Of Present Illness:    Rachid Yun is a 74 y.o. male presenting with syncope.  Patient with history of coronary disease status post PCI to left circumflex with drug-eluting stent in December 2023 by Dr. Hare.  Patient sees Dr. Hobson as an outpatient.  Had also echocardiogram in October 2023 which was within normal limits.  Has residual moderate RCA 50%.  Patient was at usual state of health until yesterday.  While he was using walker at home he became all of a sudden dizzy and then he passed out.  No major prodromal symptoms.  His daughter found him with open eyes.  Patient was transferred to the emergency room.  Subsequently was hospitalized.  So far workup is unremarkable except for one-time episode of orthostatic hypotension from lying to sitting.  Patient complaining of multiple aches and pains from his fall.  He has also problem with osteoarthritis chronically.  Patient denies having chest pain palpitations dizziness or nausea prior or after his syncopal episode.  Telemetry reviewed and he is in atrial fibrillation with controlled heart rate.    Review of systems.  10 point review of systems otherwise negative     Last Recorded Vitals:  Vitals:    10/14/24 0719 10/14/24 1020 10/14/24 1025 10/14/24 1100   BP: 109/70 155/73 115/59 116/72   BP Location: Left arm Left arm Left arm Right arm   Patient Position: Lying Lying Sitting Lying   Pulse: 59   71   Resp: 18   18   Temp: 35.8 °C (96.4 °F)   36 °C (96.8 °F)   TempSrc: Temporal   Temporal   SpO2: 99%   98%   Weight:       Height:           Last Labs:  CBC - 10/14/2024:  5:05 AM  8.1 10.7 165    33.2      CMP - 10/14/2024:  5:06 AM  8.2 6.3 20 --- 1.6   3.7 3.4 31 58      PTT - 10/23/2023: 11:31 PM  1.1   11.9 22.3     Troponin I, High Sensitivity   Date/Time Value Ref Range Status   10/13/2024  "10:28 AM 7 0 - 20 ng/L Final   10/13/2024 08:48 AM 8 0 - 20 ng/L Final     BNP   Date/Time Value Ref Range Status   10/13/2024 08:48 AM 91 0 - 99 pg/mL Final     Hemoglobin A1C   Date/Time Value Ref Range Status   07/01/2024 12:25 PM 6.1 (H) See below % Final   10/24/2023 05:18 AM 6.3 (H) See below % Final     LDL Calculated   Date/Time Value Ref Range Status   07/01/2024 12:25  65 - 130 mg/dL Final      Last I/O:  I/O last 3 completed shifts:  In: - (0 mL/kg)   Out: 600 (5.8 mL/kg) [Urine:600 (0.2 mL/kg/hr)]  Weight: 103.4 kg     Past Cardiology Tests (Last 3 Years):  EKG:  ECG 12 lead 10/13/2024 (Preliminary)      ECG 12 lead (Clinic Performed) 06/03/2024      ECG 12 lead 12/24/2023    Echo:  Transthoracic Echo (TTE) Complete 10/24/2023    Ejection Fractions:  No results found for: \"EF\"  Cath:  Cardiac catheterization - coronary 12/28/2023      Cardiac catheterization - coronary 12/28/2023    Stress Test:  Nuclear Stress Test 12/26/2023    Cardiac Imaging:  No results found for this or any previous visit from the past 1095 days.      Past Medical History:  He has a past medical history of Arthritis, Coronary artery disease, Diabetes mellitus (Multi), Hypertension, and Stroke (Multi).    Past Surgical History:  He has a past surgical history that includes Back surgery; Cardiac electrophysiology procedure (N/A, 11/29/2023); Cardiac catheterization (N/A, 12/28/2023); Cardiac catheterization (N/A, 12/28/2023); Radiofrequency ablation; Coronary angioplasty with stent; and Coronary angioplasty with stent.      Social History:  He reports that he quit smoking about 36 years ago. His smoking use included cigarettes. He has been exposed to tobacco smoke. He has never used smokeless tobacco. He reports that he does not currently use alcohol. He reports that he does not use drugs.    Family History:  Family History   Problem Relation Name Age of Onset    Stomach cancer Mother      Diabetes Mother      Coronary artery " disease Father      Other (Pacemaker) Father      Other (S/p CABG) Sister      Other (S/p CABG) Brother      Ovarian cancer Sibling      Coronary artery disease Sibling          Allergies:  Meperidine, Vancomycin, Hydromorphone, Other, and Rifampin    Inpatient Medications:  Scheduled medications   Medication Dose Route Frequency    amLODIPine  5 mg oral Daily    apixaban  2.5 mg oral BID    atorvastatin  20 mg oral Daily    clopidogrel  75 mg oral Daily    daptomycin  500 mg intravenous q24h KEVIN    donepezil  10 mg oral Nightly    DULoxetine  60 mg oral Daily    ferrous sulfate (325 mg ferrous sulfate)  65 mg of iron oral Daily    folic acid  0.4 mg oral Daily    gabapentin  300 mg oral TID    insulin glargine  30 Units subcutaneous Nightly    insulin lispro  0-5 Units subcutaneous TID    levETIRAcetam  500 mg oral BID    metoprolol succinate XL  25 mg oral BID    pantoprazole  40 mg oral Daily before breakfast    tamsulosin  0.4 mg oral Nightly    traZODone  150 mg oral Nightly     PRN medications   Medication    acetaminophen    Or    acetaminophen    Or    acetaminophen    bisacodyl    dextrose    dextrose    glucagon    glucagon    melatonin    ondansetron ODT    Or    ondansetron    oxyCODONE     Continuous Medications   Medication Dose Last Rate     Outpatient Medications:  Current Outpatient Medications   Medication Instructions    amLODIPine (NORVASC) 5 mg, oral, Daily    atorvastatin (LIPITOR) 20 mg, oral, Daily    blood sugar diagnostic strip 1 strip, miscellaneous, 3 times daily before meals    cephalexin (KEFLEX) 500 mg, oral, 4 times daily    clopidogrel (PLAVIX) 75 mg, oral, Daily    collagenase 250 unit/gram ointment Topical, Daily    donepezil (ARICEPT) 10 mg, oral, Nightly    doxycycline (VIBRA-TABS) 100 mg, oral, 2 times daily    DULoxetine (CYMBALTA) 60 mg, oral, Daily    Eliquis 2.5 mg, oral, 2 times daily    ferrous sulfate 325 (65 Fe) MG EC tablet 1 tablet, oral, 2 times daily, for 90 day(s)     folic acid (Folvite) 400 mcg tablet 1 tablet, oral, Daily, for 90 day(s)    gabapentin (NEURONTIN) 300 mg, oral, 3 times daily    ketoconazole (NIZOral) 2 % cream Topical, Daily    ketoconazole 1 % shampoo 1 Application, topical (top), Every 3 days, Shampoo daily, leave on for 5-10 minutes, then rinse.    Lantus U-100 Insulin 100 unit/mL injection 40 UNITS SUBCUTANEOUS DAILY 90 DAY(S)    levETIRAcetam (Keppra) 500 mg tablet 1 TABLET ORALLY EVERY 12 HRS 90 DAY(S)    losartan (COZAAR) 100 mg, oral, Daily    metoprolol succinate XL (TOPROL-XL) 25 mg, oral, 2 times daily, Do not crush or chew.    omega 3-dha-epa-fish oil (Fish OiL) 1,000 mg (120 mg-180 mg) capsule 1 capsule, oral, Daily    omeprazole (PriLOSEC) 20 mg DR capsule 1 capsule, oral, Daily before breakfast    oxyCODONE (Roxicodone) 15 mg immediate release tablet 1 tablet, oral, Every 6 hours PRN    polyethylene glycol (GLYCOLAX, MIRALAX) 17 g, oral, Daily    sulfamethoxazole-trimethoprim (Bactrim DS) 800-160 mg tablet 1 tablet, oral, 2 times daily    tamsulosin (Flomax) 0.4 mg 24 hr capsule 1 capsule, oral, Nightly    traZODone (DESYREL) 150 mg, oral, Nightly, FOR 90 DAYS       Physical Exam:  General: Patient is in no acute distress.  HEENT: atraumatic normocephalic.  Neck: is supple jugular venous pressure within normal limits no thyromegaly.  Cardiovascular irregular rate and rhythm normal heart sounds no murmurs rubs or gallops.  Lungs: clear to auscultation bilaterally.  Abdomen: is soft nontender.  Extremities warm to touch no edema.  Neurologic examination: patient is awake alert oriented to person, place, date/time.  Psychiatric examination: patient has good insight denies feeling suicidal and depressed.  Pulses 2+ intact bilaterally     Assessment/Plan   1 syncope likely vasovagal.  But no major prodromal symptoms.  I will stop his amlodipine.  Will monitor blood pressure and heart rate.  Will repeat orthostatics in a.m.  If his blood pressure  elevated then we will consider small dose ACE or ARB's.  His heart rate well-controlled on metoprolol and amiodarone.  Recommend if his orthostatics improves monitor for 1 week at discharge to rule out any major bradycardia or tachyarrhythmias as cause of his syncope.    2.  Atrial fibrillation.  Continue amiodarone and metoprolol will monitor.  Continue oral anticoagulation with Eliquis.    3.  Coronary artery disease for now we will continue Plavix for total of 12 months after PCI then we will switch him to baby aspirin and addition of the Eliquis.  Continue nightly statin.  The patient has no chest pain.  Troponin within normal limits.  EKG does not show any ischemia.  Doubt this is related to ischemic event.    4.  Hyperlipidemia continue statin.    5.  Tremor patient has been having issues with tremor I am not sure if he has some underlying Parkinson's disease which may contribute to orthostatic hypotension.  Recommend neurology follow-up as an outpatient.    6.  Diabetes mellitus management by primary team.    Thank you for the consult      Code Status:  Full Code      Julian Barajas MD

## 2024-10-14 NOTE — CARE PLAN
The patient's goals for the shift include      The clinical goals for the shift include safety awareness, DM monitoring, pain management    Problem: Fall/Injury  Goal: Not fall by end of shift  10/13/2024 2321 by Ekta Knowles RN  Outcome: Progressing  10/13/2024 2321 by Ekta Knowles RN  Outcome: Progressing  Goal: Be free from injury by end of the shift  10/13/2024 2321 by Ekta Knowles RN  Outcome: Progressing  10/13/2024 2321 by Ekta Knowles RN  Outcome: Progressing  Goal: Verbalize understanding of personal risk factors for fall in the hospital  10/13/2024 2321 by Ekta Knowles RN  Outcome: Progressing  10/13/2024 2321 by Ekta Knowles RN  Outcome: Progressing  Goal: Verbalize understanding of risk factor reduction measures to prevent injury from fall in the home  10/13/2024 2321 by Ekta Knowles RN  Outcome: Progressing  10/13/2024 2321 by Ekta Knowles RN  Outcome: Progressing  Goal: Use assistive devices by end of the shift  10/13/2024 2321 by Ekta Knowles RN  Outcome: Progressing  10/13/2024 2321 by Ekta Knowles RN  Outcome: Progressing  Goal: Pace activities to prevent fatigue by end of the shift  10/13/2024 2321 by Ekta Knowles RN  Outcome: Progressing  10/13/2024 2321 by Ekta Knowles RN  Outcome: Progressing     Problem: Pain - Adult  Goal: Verbalizes/displays adequate comfort level or baseline comfort level  Outcome: Progressing     Problem: Safety - Adult  Goal: Free from fall injury  Outcome: Progressing     Problem: Chronic Conditions and Co-morbidities  Goal: Patient's chronic conditions and co-morbidity symptoms are monitored and maintained or improved  Outcome: Progressing

## 2024-10-14 NOTE — CONSULTS
Inpatient consult to Infectious Diseases  Consult performed by: HERBERT Bellamy-CNP  Consult ordered by: SANJAY Quezada  Reason for consult: left foot ulcer        Primary MD: Izabel Marshall MD      History Of Present Illness  Rachid Yun is a 74 y.o. male past medical history of diabetes mellitus, left foot ulcer chronic kidney disease..  He presented to the emergency room after syncopal event.  Initially reported shortness of breath, chest discomfort and right-sided headache, reports the symptoms have resolved.  He is afebrile, denies chills or sweats.  He is on room air with normal oxygenation saturating 99%.  He denies abdominal pain nausea vomiting or diarrhea.  He is followed by Dr. Pelaez at the [facility] wound care center.  He was being arranged for IV antibiotics at the infusion center and outpatient MRI.  He denies any left foot pain.  Left lateral foot ulcer with large amount of granulation tissue, mild surrounding erythema, left foot swelling and increased drainage reported.  Labs with no leukocytosis.  Recent wound culture on 2024 with MRSA.  CT of cervical spine with no acute findings.  CT of head with no acute findings.  CT of chest abdomen and pelvis with no acute findings.  He was started on IV daptomycin.    Past Medical History  He has a past medical history of Arthritis, Coronary artery disease, Diabetes mellitus (Multi), Hypertension, and Stroke (Multi).    Surgical History  He has a past surgical history that includes Back surgery; Cardiac electrophysiology procedure (N/A, 2023); Cardiac catheterization (N/A, 2023); Cardiac catheterization (N/A, 2023); Radiofrequency ablation; Coronary angioplasty with stent; and Coronary angioplasty with stent.     Social History     Occupational History    Not on file   Tobacco Use    Smoking status: Former     Current packs/day: 0.00     Types: Cigarettes     Quit date:      Years since quittin.8      Passive exposure: Past    Smokeless tobacco: Never   Vaping Use    Vaping status: Never Used   Substance and Sexual Activity    Alcohol use: Not Currently    Drug use: Never    Sexual activity: Defer     Travel History   Travel since 09/14/24    No documented travel since 09/14/24           Family History  Family History   Problem Relation Name Age of Onset    Stomach cancer Mother      Diabetes Mother      Coronary artery disease Father      Other (Pacemaker) Father      Other (S/p CABG) Sister      Other (S/p CABG) Brother      Ovarian cancer Sibling      Coronary artery disease Sibling       Allergies  Meperidine, Vancomycin, Hydromorphone, Other, and Rifampin     Immunization History   Administered Date(s) Administered    Flu vaccine, quadrivalent, high-dose, preservative free, age 65y+ (FLUZONE) 12/04/2023    Influenza, seasonal, injectable 09/03/2010    Moderna SARS-CoV-2 Vaccination 12/21/2021, 05/13/2022     Medications  Home medications:  Medications Prior to Admission   Medication Sig Dispense Refill Last Dose    amLODIPine (Norvasc) 5 mg tablet Take 1 tablet (5 mg) by mouth once daily. 90 tablet 3     atorvastatin (Lipitor) 20 mg tablet Take 1 tablet (20 mg) by mouth once daily. 90 tablet 3     blood sugar diagnostic strip 1 strip 3 times a day before meals. 300 strip 3     cephalexin (Keflex) 500 mg capsule Take 500 mg by mouth 4 times a day. Indications: an infection of the skin and the tissue below the skin       clopidogrel (Plavix) 75 mg tablet TAKE 1 TABLET BY MOUTH ONCE  DAILY 100 tablet 2     collagenase 250 unit/gram ointment Apply topically once daily. (Patient taking differently: Apply  topically once daily. to left foot wound  Indications: a skin ulcer) 30 g 2     donepezil (Aricept) 10 mg tablet TAKE 1 TABLET BY MOUTH ONCE  DAILY AT BEDTIME 100 tablet 2     doxycycline (Vibra-Tabs) 100 mg tablet Take 100 mg by mouth 2 times a day. Indications: an infection of the skin and the tissue below the  skin       DULoxetine (Cymbalta) 60 mg DR capsule TAKE 1 CAPSULE BY MOUTH ONCE  DAILY 90 capsule 3     Eliquis 2.5 mg tablet Take 1 tablet (2.5 mg) by mouth 2 times a day. 180 tablet 3     ferrous sulfate 325 (65 Fe) MG EC tablet Take 1 tablet by mouth 2 times a day. for 90 day(s)       folic acid (Folvite) 400 mcg tablet Take 1 tablet (0.4 mg) by mouth once daily. for 90 day(s)       gabapentin (Neurontin) 300 mg capsule Take 1 capsule (300 mg) by mouth 3 times a day. 90 capsule 0     ketoconazole (NIZOral) 2 % cream Apply topically once daily. (Patient taking differently: Apply 1 Application topically once daily. apply to affected area on Scalp   Indications: a skin infection due to the fungus Candida) 30 g 11     ketoconazole 1 % shampoo Apply 1 Application topically every 3 days. Shampoo daily, leave on for 5-10 minutes, then rinse. 200 mL 11     Lantus U-100 Insulin 100 unit/mL injection 40 UNITS SUBCUTANEOUS DAILY 90 DAY(S) 10 mL 11     levETIRAcetam (Keppra) 500 mg tablet 1 TABLET ORALLY EVERY 12 HRS 90 DAY(S) 180 tablet 3     losartan (Cozaar) 100 mg tablet TAKE 1 TABLET BY MOUTH EVERY DAY FOR 90 DAYS 90 tablet 3     metoprolol succinate XL (Toprol-XL) 25 mg 24 hr tablet Take 1 tablet (25 mg) by mouth 2 times a day. Do not crush or chew. 180 tablet 3     omega 3-dha-epa-fish oil (Fish OiL) 1,000 mg (120 mg-180 mg) capsule Take 1 capsule (1,000 mg) by mouth once daily.       omeprazole (PriLOSEC) 20 mg DR capsule Take 1 capsule by mouth once daily in the morning. Take before meals.       oxyCODONE (Roxicodone) 15 mg immediate release tablet Take 1 tablet (15 mg) by mouth every 6 hours if needed.       polyethylene glycol (Glycolax, Miralax) 17 gram packet Take 17 g by mouth once daily. Do not start before December 31, 2023.       sulfamethoxazole-trimethoprim (Bactrim DS) 800-160 mg tablet Take 1 tablet by mouth 2 times a day. Indications: an infection of the skin and the tissue below the skin        "tamsulosin (Flomax) 0.4 mg 24 hr capsule Take 1 capsule by mouth once daily at bedtime.       traZODone (Desyrel) 150 mg tablet Take 1 tablet (150 mg) by mouth once daily at bedtime. FOR 90 DAYS 90 tablet 3      Current medications:  Scheduled medications  amLODIPine, 5 mg, oral, Daily  apixaban, 2.5 mg, oral, BID  atorvastatin, 20 mg, oral, Daily  clopidogrel, 75 mg, oral, Daily  daptomycin, 500 mg, intravenous, q24h KEVIN  donepezil, 10 mg, oral, Nightly  DULoxetine, 60 mg, oral, Daily  ferrous sulfate (325 mg ferrous sulfate), 65 mg of iron, oral, Daily  folic acid, 0.4 mg, oral, Daily  gabapentin, 300 mg, oral, TID  insulin glargine, 30 Units, subcutaneous, Nightly  insulin lispro, 0-5 Units, subcutaneous, TID  levETIRAcetam, 500 mg, oral, BID  metoprolol succinate XL, 25 mg, oral, BID  pantoprazole, 40 mg, oral, Daily before breakfast  tamsulosin, 0.4 mg, oral, Nightly  traZODone, 150 mg, oral, Nightly      Continuous medications     PRN medications  PRN medications: acetaminophen **OR** acetaminophen **OR** acetaminophen, bisacodyl, dextrose, dextrose, glucagon, glucagon, melatonin, ondansetron ODT **OR** ondansetron, oxyCODONE    Review of Systems   Constitutional: Negative.    HENT: Negative.     Eyes: Negative.    Respiratory: Negative.     Cardiovascular: Negative.    Gastrointestinal: Negative.    Endocrine: Negative.    Genitourinary: Negative.    Musculoskeletal: Negative.    Skin:  Positive for wound.   Neurological:  Positive for dizziness and syncope.   Psychiatric/Behavioral: Negative.          Objective  Range of Vitals (last 24 hours)  Heart Rate:  [59-91]   Temp:  [35.8 °C (96.4 °F)-37 °C (98.6 °F)]   Resp:  [16-20]   BP: (102-166)/()   Height:  [193 cm (6' 3.98\")]   Weight:  [103 kg (228 lb)]   SpO2:  [95 %-99 %]   Daily Weight  10/13/24 : 103 kg (228 lb)    Body mass index is 27.76 kg/m².     Physical Exam  Constitutional:       Appearance: Normal appearance.   HENT:      Head: " Normocephalic and atraumatic.      Nose: Nose normal.      Mouth/Throat:      Mouth: Mucous membranes are moist.      Pharynx: Oropharynx is clear.   Eyes:      General: No scleral icterus.  Cardiovascular:      Rate and Rhythm: Normal rate and regular rhythm.   Pulmonary:      Effort: Pulmonary effort is normal.      Breath sounds: Normal breath sounds.   Abdominal:      General: Bowel sounds are normal.      Palpations: Abdomen is soft.   Musculoskeletal:         General: Normal range of motion.      Cervical back: Normal range of motion and neck supple.   Skin:     General: Skin is warm and dry.      Comments: Left lateral foot ulcer with large amount of granulation tissue, mild surrounding erythema, left foot swelling and increased drainage reported.     Neurological:      Mental Status: He is alert.      Comments: Awake, alert   Psychiatric:         Mood and Affect: Mood normal.         Behavior: Behavior normal.          Relevant Results  Outside Hospital Results  No  Labs  Results from last 72 hours   Lab Units 10/14/24  0505 10/13/24  0848   WBC AUTO x10*3/uL 8.1 10.2   HEMOGLOBIN g/dL 10.7* 13.9   HEMATOCRIT % 33.2* 43.0   PLATELETS AUTO x10*3/uL 165 255   NEUTROS PCT AUTO %  --  74.3   LYMPHS PCT AUTO %  --  16.8   MONOS PCT AUTO %  --  6.7   EOS PCT AUTO %  --  1.0     Results from last 72 hours   Lab Units 10/14/24  0506 10/13/24  0848   SODIUM mmol/L 133* 135*   POTASSIUM mmol/L 4.0 4.2   CHLORIDE mmol/L 103 104   CO2 mmol/L 22 19*   BUN mg/dL 34* 34*   CREATININE mg/dL 1.38* 1.70*   GLUCOSE mg/dL 153* 207*   CALCIUM mg/dL 8.2* 8.7   ANION GAP mmol/L 12 16   EGFR mL/min/1.73m*2 54* 42*     Results from last 72 hours   Lab Units 10/13/24  0848   ALK PHOS U/L 58   BILIRUBIN TOTAL mg/dL 1.6*   PROTEIN TOTAL g/dL 6.3*   ALT U/L 31   AST U/L 20   ALBUMIN g/dL 3.4     Estimated Creatinine Clearance: 57.7 mL/min (A) (by C-G formula based on SCr of 1.38 mg/dL (H)).  C-Reactive Protein   Date Value Ref Range  "Status   10/01/2024 7.77 (H) <1.00 mg/dL Final   07/16/2024 <0.30 0.00 - 2.00 mg/dL Final     Sedimentation Rate   Date Value Ref Range Status   07/16/2024 48 (H) 0 - 20 mm/h Final   08/28/2023 27 (H) 0 - 20 MM/HR Final     Comment:     Performed at [address] [address]     No results found for: \"HIV1X2\", \"HIVCONF\", \"HPYOEB1YI\"  No results found for: \"HEPCABINIT\", \"HEPCAB\", \"HCVPCRQUANT\"  Microbiology  Susceptibility data from last 90 days.  Collected Specimen Info Organism Aztreonam Cefepime Ceftazidime Ciprofloxacin Clindamycin Erythromycin Levofloxacin Oxacillin Piperacillin/Tazobactam Tetracycline Tobramycin Trimethoprim/Sulfamethoxazole Vancomycin   09/30/24 Tissue/Biopsy from Wound/Tissue Methicillin Resistant Staphylococcus aureus (MRSA)      R  R   R   R   S  S     Mixed Gram-Positive and Gram-Negative Bacteria                07/17/24 Tissue/Biopsy from Wound/Tissue Pseudomonas aeruginosa  S  S  S  S    S   S   S       Mixed Gram-Positive and Gram-Negative Bacteria                      Imaging  ECG 12 lead    Result Date: 10/14/2024  Atrial fibrillation Abnormal ECG When compared with ECG of 24-DEC-2023 15:59, No significant change was found    CT chest abdomen pelvis w IV contrast    Result Date: 10/13/2024  Interpreted By:  Tamara Snowden, STUDY: CT CHEST ABDOMEN PELVIS W IV CONTRAST;  10/13/2024 9:27 am   INDICATION: Signs/Symptoms:fall, syncope.     COMPARISON: None.   ACCESSION NUMBER(S): [id number]   ORDERING CLINICIAN: DOTTIE REYNA   TECHNIQUE: CT of the chest, abdomen, and pelvis was performed.  Contiguous axial images were obtained at 3 mm slice thickness through the chest, abdomen and pelvis. Coronal and sagittal reconstructions at 3 mm slice thickness were performed. Contrast was administered intravenously without immediate complication.   FINDINGS: CHEST:   LUNG/PLEURA/LARGE AIRWAYS: The lungs are hyperinflated. No suspicious lung nodules or infiltrates. Punctate " calcified granuloma in the right lung base.   VESSELS: Aortic calcification. No aneurysmal dilatation. Normal caliber main pulmonary artery.   HEART: No cardiomegaly. No pericardial effusion. Dense coronary artery calcification.   MEDIASTINUM AND ATIF: No significant mediastinal or hilar adenopathy. Subcentimeter lymph nodes are noted which are nonspecific and may be reactive.   CHEST WALL AND LOWER NECK: Grossly unremarkable thyroid gland.   ABDOMEN:   LIVER: Tiny hypodensity in the hepatic dome likely a tiny cyst. The liver is otherwise unremarkable.   BILE DUCTS: No bile duct dilatation.   GALLBLADDER: Likely tiny gallstones. No gallbladder wall thickening.   PANCREAS: Unremarkable pancreas.   SPLEEN: Unremarkable spleen.   ADRENAL GLANDS: No adrenal masses.   KIDNEYS AND URETERS: Cortical thinning of both kidneys. Likely a tiny subcentimeter upper pole left renal cyst. No hydroureter. No definite stones.   PELVIS:   BLADDER: Suboptimally distended and evaluated. Apparent bladder wall thickening at least partially due to lack of distention.   REPRODUCTIVE ORGANS: Calcifications in the prostate.   BOWEL: Large amount of stool. Rectal distention with stool. No evidence of bowel obstruction.     VESSELS: Aortic calcification. No aneurysmal dilatation.   IVC filter.   PERITONEUM/RETROPERITONEUM/LYMPH NODES: No retroperitoneal adenopathy. No ascites. Unremarkable appendix.   BONE AND SOFT TISSUE: Discogenic degenerative changes. Vacuum disc at multiple levels. Mild loss of height of multiple thoracic and lumbar spine vertebral bodies. Intramedullary sclerosis of the proximal left femur is suboptimally included and may represent bone infarcts or an enchondroma.       CHEST: 1. No acute abnormalities within the chest. 2. Dense coronary and aortic calcification. 3. Hyperinflation.   ABDOMEN-PELVIS: 1. Advanced multilevel discogenic degenerative changes. 2. Tiny gallstones. 3. No acute abnormalities.   Signed by: Tamara  Sp 10/13/2024 10:45 AM Dictation workstation:   [id number]    CT cervical spine wo IV contrast    Result Date: 10/13/2024  Interpreted By:  Tamara Snowden, STUDY: CT CERVICAL SPINE WO IV CONTRAST;  10/13/2024 9:27 am   INDICATION: Signs/Symptoms:neck trauma after syncopal episode.     COMPARISON: 12/24/2023   ACCESSION NUMBER(S): [id number]   ORDERING CLINICIAN: DOTTIE REYNA   TECHNIQUE: Axial CT images of the cervical spine are obtained. Axial, coronal and sagittal reconstructions are provided for review.   FINDINGS: Grade 1 anterolisthesis of C4 on C5. Straightening of normal cervical lordosis. Disc space loss from C6 through T1. No erosions. No fracture dislocation. Hypertrophic changes of the C1-C2 articulation. Mild central canal stenosis and varying degrees of neural foraminal narrowing at multiple levels.       No evidence for an acute fracture or subluxation of the cervical spine. Multilevel discogenic degenerative changes.   MACRO: None   Signed by: Tamara Snowden 10/13/2024 10:01 AM Dictation workstation:   [id number]    CT head wo IV contrast    Result Date: 10/13/2024  Interpreted By:  Tamara Snowden, STUDY: CT HEAD WO IV CONTRAST;  10/13/2024 9:27 am   INDICATION: Signs/Symptoms:head injury on eliquis.     COMPARISON: 12/24/2023   ACCESSION NUMBER(S): [id number]   ORDERING CLINICIAN: DOTTIE REYNA   TECHNIQUE: Noncontrast axial CT scan of head was performed. Angled reformats in brain and bone windows were generated. The images were reviewed in bone, brain, blood and soft tissue windows.   FINDINGS: The ventricles, cisterns and sulci are prominent, consistent with mild diffuse volume loss. There are areas of nonspecific white matter hypodensity, which are probably age-related or microvascular in nature.   Gray-white differentiation is intact and there is no evidence of acute cortical infarct. No mass, mass effect or midline shift is seen. There is no evidence of hemorrhage.   The visualized paranasal  sinuses are clear.         No evidence of acute cortical infarct or intracranial hemorrhage.   Chronic changes as described.   MACRO: None   Signed by: Tamara Snowden 10/13/2024 9:53 AM Dictation workstation:   [id number]      Assessment/Plan   Type 2 diabetes mellitus with peripheral angiopathy without gangrene   Left diabetic foot ulcer  Chronic kidney diease, stage 3  Syncope  Recent positive wound culture -MRSA       IV daptomycin  CK level  Monitor temperature and WBC  Left Foot MRI  Follow-up blood cultures  Neurology follow up   Local care  Further recommendations based on work up      Total time spent caring for the patient today was 35 minutes. This includes time spent before the visit reviewing the chart, time spent during the visit, and time spent after the visit on documentation.   Polina Oakes, APRN-CNP

## 2024-10-14 NOTE — PROGRESS NOTES
10/14/24 1150   Discharge Planning   Living Arrangements Children   Support Systems Children   Assistance Needed independent w rollator   Type of Residence Private residence   Number of Stairs to Enter Residence 1   Number of Stairs Within Residence 0   Do you have animals or pets at home? No   Who is requesting discharge planning? Provider   Home or Post Acute Services In home services   Type of Home Care Services Home PT;Home health aide   Expected Discharge Disposition Replaced by [facility]  ([facility])   Does the patient need discharge transport arranged? No     Pending MRI of foot.  May need one time dose of antibiotic  vs. Long term antibiotic.  Current with [facility].    Will resume

## 2024-10-14 NOTE — CARE PLAN
The patient's goals for the shift include  safety, MRI, antibiotics    The clinical goals for the shift include safety awareness, DM monitoring, pain management        Problem: Fall/Injury  Goal: Not fall by end of shift  Outcome: Progressing  Goal: Be free from injury by end of the shift  Outcome: Progressing  Goal: Verbalize understanding of personal risk factors for fall in the hospital  Outcome: Progressing  Goal: Verbalize understanding of risk factor reduction measures to prevent injury from fall in the home  Outcome: Progressing  Goal: Use assistive devices by end of the shift  Outcome: Progressing  Goal: Pace activities to prevent fatigue by end of the shift  Outcome: Progressing     Problem: Diabetes  Goal: Achieve decreasing blood glucose levels by end of shift  Outcome: Progressing  Goal: Increase stability of blood glucose readings by end of shift  Outcome: Progressing  Goal: Decrease in ketones present in urine by end of shift  Outcome: Progressing  Goal: Maintain electrolyte levels within acceptable range throughout shift  Outcome: Progressing  Goal: Maintain glucose levels >70mg/dl to <250mg/dl throughout shift  Outcome: Progressing  Goal: No changes in neurological exam by end of shift  Outcome: Progressing  Goal: Learn about and adhere to nutrition recommendations by end of shift  Outcome: Progressing  Goal: Vital signs within normal range for age by end of shift  Outcome: Progressing  Goal: Increase self care and/or family involovement by end of shift  Outcome: Progressing  Goal: Receive DSME education by end of shift  Outcome: Progressing     Problem: Pain - Adult  Goal: Verbalizes/displays adequate comfort level or baseline comfort level  Outcome: Progressing     Problem: Safety - Adult  Goal: Free from fall injury  Outcome: Progressing     Problem: Discharge Planning  Goal: Discharge to home or other facility with appropriate resources  Outcome: Progressing     Problem: Chronic Conditions and  Co-morbidities  Goal: Patient's chronic conditions and co-morbidity symptoms are monitored and maintained or improved  Outcome: Progressing

## 2024-10-14 NOTE — PROGRESS NOTES
Occupational Therapy    Evaluation    Patient Name: Rachid Yun  MRN: [id number]  Department: [address]  Room: [address] [place] [address]  Today's Date: 10/14/2024  Time Calculation  Start Time: 1205  Stop Time: 1225  Time Calculation (min): 20 min    Assessment  IP OT Assessment  OT Assessment: 75 y/o male presents with syncope, Mod A for xfers, decreased activity tolerance. On Eval pt presents with difficulty with ADLs, decreased strength, decreased balance, decreased endurance, and decreased functional mobility. Pt would benefit from continued skilled OT services to maximize independence in all of these areas prior to discharge.  Prognosis: Good  Barriers to Discharge:  (Mod A for xfers)  Evaluation/Treatment Tolerance: Patient limited by fatigue  Medical Staff Made Aware: Yes  End of Session Communication: Bedside nurse  End of Session Patient Position: Bed, 3 rail up, Alarm on  Plan:  Treatment Interventions: ADL retraining, Functional transfer training, Endurance training, Patient/family training, Equipment evaluation/education, Neuromuscular reeducation, Compensatory technique education  OT Frequency: 3 times per week  OT Discharge Recommendations: Moderate intensity level of continued care  Equipment Recommended upon Discharge: Wheeled walker  OT Recommended Transfer Status: Assist of 2  OT - OK to Discharge: Yes    Subjective   Current Problem:  1. Syncope, non cardiac  Transthoracic Echo (TTE) Complete    Transthoracic Echo (TTE) Complete        General:  General  Reason for Referral: decreased ADL's d/t syncope  Referred By: Tiffany Chong MD  Past Medical History Relevant to Rehab: arthritis, CAD, DM, HTN, A-fib  Family/Caregiver Present: No  Prior to Session Communication: Bedside nurse  Patient Position Received: Bed, 3 rail up, Alarm off, not on at start of session  Preferred Learning Style: verbal, visual  General Comment: pt cleared by RN, pt supine in bed, pt agreeable for OT  Precautions:  Medical Precautions:  Fall precautions    Vital Sign (Past 2hrs)           Pain:  Pain Assessment  Pain Assessment: 0-10  0-10 (Numeric) Pain Score: 5 - Moderate pain (RN informed)  Pain Type: Chronic pain  Pain Location: Shoulder  Pain Orientation: Left    Objective   Cognition:  Overall Cognitive Status: Within Functional Limits  Orientation Level: Oriented X4     Home Living:  Type of Home: House  Lives With: Adult children (Son)  Home Adaptive Equipment: Walker rolling or standard, Cane, Wheelchair-manual  Home Layout: One level  Home Access: Stairs to enter with rails  Entrance Stairs-Rails: Right  Entrance Stairs-Number of Steps: 1  Bathroom Shower/Tub: Tub only  Bathroom Toilet: Adaptive toilet seating, Handicapped height  Bathroom Equipment: Grab bars in shower, Shower chair with back, Tub transfer bench, Raised toilet seat with rails, Grab bars around toilet  Home Living Comments: Pt lives with son, son supportive, assists as needed.   Prior Function:  Level of [place]: Needs assistance with ADLs, Needs assistance with homemaking, Needs assistance with functional transfers  Receives Help From: Family  ADL Assistance: Needs assistance (per pt home aide assist with, dressing LB/UB, bathing.)  Homemaking Assistance: Needs assistance  Homemaking Assistance Comments: Needs assistance  Ambulatory Assistance: Independent (using rollator)  Vocational: Retired  Hand Dominance: Left  Prior Function Comments: per pt needs A with ADL/IADLs, home aide assists with ADL. Son A with IADLs. Pt reports 4 falls within 6 month.    ADL:  Eating Assistance: Stand by  Eating Deficit: Setup (pt eating lunch at bed level, HOB elevated)  Grooming Assistance: Stand by  Grooming Deficit: Setup, Supervision/safety, Increased time to complete (anticipated seated)  Bathing Assistance: Moderate  Bathing Deficit: Setup, Supervision/safety, Increased time to complete  (anticipated seated)  UE Dressing Assistance: Minimal  UE Dressing Deficit: Pull around  back (pt don/doff gown seated EOB)  LE Dressing Assistance: Minimal  LE Dressing Deficit: Setup, Supervision/safety, Increased time to complete (Pt able to bring B feet to self. Pt don/doff socks seated EOB.)  Toileting Assistance with Device: Stand by  Toileting Deficit: Setup, Supervison/safety, Increased time to complete (anticipated seated)  ADL Comments: Increased angela eto complete the task d/t pt with decreased activity tolerance  Activity Tolerance:  Endurance: Decreased tolerance for upright activites  Bed Mobility/Transfers: Bed Mobility  Bed Mobility: Yes  Bed Mobility 1  Bed Mobility 1: Supine to sitting, Sitting to supine  Level of Assistance 1: Minimum assistance  Bed Mobility Comments 1: HOB elevated    Transfers  Transfer: Yes  Transfer 1  Transfer From 1: Bed to  Transfer to 1: Stand  Technique 1: Sit to stand  Transfer Device 1: Walker  Transfer Level of Assistance 1: Moderate assistance, +2  Trials/Comments 1: VC for hand placement and FWW mgmt  Transfers 2  Transfer From 2: Stand to  Transfer to 2: Chair with arms  Technique 2: Stand to sit  Transfer Device 2: Walker  Transfer Level of Assistance 2: Minimum assistance  Trials/Comments 2: VC for hand placement prior to descent  Transfers 3  Transfer From 3: Chair with arms to  Transfer to 3: Stand  Technique 3: Sit to stand  Transfer Device 3: Walker  Transfer Level of Assistance 3: Moderate assistance, +2  Trials/Comments 3: VC for hand placement and FWW mgmt  Transfers 4  Transfer From 4: Stand to  Transfer to 4: Bed  Technique 4: Stand to sit  Transfer Device 4: Walker  Transfer Level of Assistance 4: Minimum assistance  Trials/Comments 4: VC for hand placement prior to descent    Functional Mobility:  Functional Mobility  Functional Mobility Performed: No    Vision: Vision - Basic Assessment  Current Vision: Wears glasses all the time   and    Sensation:  Light Touch: Partial deficits in the RUE, Partial deficits in the LUE  Sensation Comment:  pt reports chronic numbness and tingling B hands  Strength:  Strength Comments: BUE's at least >/= 3-/5 grossly     Coordination:  Movements are Fluid and Coordinated: No   Hand Function:  Hand Function  Gross Grasp: Functional  Coordination: Functional  Extremities: RUE   RUE : Exceptions to WFL and LUE   LUE: Exceptions to WFL    Outcome Measures: [facility] Daily Activity  Putting on and taking off regular lower body clothing: A little  Bathing (including washing, rinsing, drying): A lot  Putting on and taking off regular upper body clothing: A little  Toileting, which includes using toilet, bedpan or urinal: A little  Taking care of personal grooming such as brushing teeth: A little  Eating Meals: A little  Daily Activity - Total Score: 17    Education Documentation  Body Mechanics, taught by Vandana Haynes OT at 10/14/2024  1:13 PM.  Learner: Patient  Readiness: Acceptance  Method: Explanation  Response: Verbalizes Understanding    Precautions, taught by Vandana Haynes OT at 10/14/2024  1:13 PM.  Learner: Patient  Readiness: Acceptance  Method: Explanation  Response: Verbalizes Understanding    ADL Training, taught by Vandana Haynes OT at 10/14/2024  1:13 PM.  Learner: Patient  Readiness: Acceptance  Method: Explanation  Response: Verbalizes Understanding    Education Comments  No comments found.      Goals:   Encounter Problems       Encounter Problems (Active)       OT Goals       ADLs (Progressing)       Start:  10/14/24    Expected End:  10/28/24       Pt will complete ADL's with Mod I using AE/compensatory techniques as needed.         Functional mobility        Start:  10/14/24    Expected End:  10/28/24       Pt will complete functional mobility Mod I with LRD.         Functional transfer  (Progressing)       Start:  10/14/24    Expected End:  10/28/24       Pt will complete functional transfers including but not limited to: bed, chair, toilet with MOD I using LRD (least restrictive device).          Activity tolerance        Start:  10/14/24    Expected End:  10/28/24       Patient will demonstrate improved activity tolerance AEB completing functional task for >/= 15 minutes while remaining hemodynamically stable.

## 2024-10-15 ENCOUNTER — APPOINTMENT (OUTPATIENT)
Dept: NEUROLOGY | Facility: HOSPITAL | Age: 74
DRG: 312 | End: 2024-10-15
Payer: MEDICARE

## 2024-10-15 ENCOUNTER — APPOINTMENT (OUTPATIENT)
Dept: NEUROLOGY | Facility: HOSPITAL | Age: 74
End: 2024-10-15
Payer: MEDICARE

## 2024-10-15 LAB
ANION GAP SERPL CALCULATED.3IONS-SCNC: 11 MMOL/L (ref 10–20)
ANION GAP SERPL CALCULATED.3IONS-SCNC: 15 MMOL/L (ref 10–20)
AORTIC VALVE PEAK VELOCITY: 1.04 M/S
AV PEAK GRADIENT: 4.3 MMHG
AVA (PEAK VEL): 3.1 CM2
BUN SERPL-MCNC: 40 MG/DL (ref 6–23)
BUN SERPL-MCNC: 40 MG/DL (ref 6–23)
CALCIUM SERPL-MCNC: 8.3 MG/DL (ref 8.6–10.3)
CALCIUM SERPL-MCNC: 8.6 MG/DL (ref 8.6–10.3)
CHLORIDE SERPL-SCNC: 100 MMOL/L (ref 98–107)
CHLORIDE SERPL-SCNC: 101 MMOL/L (ref 98–107)
CO2 SERPL-SCNC: 19 MMOL/L (ref 21–32)
CO2 SERPL-SCNC: 22 MMOL/L (ref 21–32)
CREAT SERPL-MCNC: 1.55 MG/DL (ref 0.5–1.3)
CREAT SERPL-MCNC: 1.63 MG/DL (ref 0.5–1.3)
EGFRCR SERPLBLD CKD-EPI 2021: 44 ML/MIN/1.73M*2
EGFRCR SERPLBLD CKD-EPI 2021: 47 ML/MIN/1.73M*2
EJECTION FRACTION: 48 %
ERYTHROCYTE [DISTWIDTH] IN BLOOD BY AUTOMATED COUNT: 15.2 % (ref 11.5–14.5)
GLUCOSE BLD MANUAL STRIP-MCNC: 127 MG/DL (ref 74–99)
GLUCOSE BLD MANUAL STRIP-MCNC: 146 MG/DL (ref 74–99)
GLUCOSE BLD MANUAL STRIP-MCNC: 150 MG/DL (ref 74–99)
GLUCOSE BLD MANUAL STRIP-MCNC: 247 MG/DL (ref 74–99)
GLUCOSE SERPL-MCNC: 142 MG/DL (ref 74–99)
GLUCOSE SERPL-MCNC: 145 MG/DL (ref 74–99)
HCT VFR BLD AUTO: 32.4 % (ref 41–52)
HGB BLD-MCNC: 10.4 G/DL (ref 13.5–17.5)
LEFT ATRIUM VOLUME AREA LENGTH INDEX BSA: 43.3 ML/M2
LEFT VENTRICULAR OUTFLOW TRACT DIAMETER: 2.5 CM
MCH RBC QN AUTO: 28.7 PG (ref 26–34)
MCHC RBC AUTO-ENTMCNC: 32.1 G/DL (ref 32–36)
MCV RBC AUTO: 90 FL (ref 80–100)
NRBC BLD-RTO: 0 /100 WBCS (ref 0–0)
PLATELET # BLD AUTO: 151 X10*3/UL (ref 150–450)
POTASSIUM SERPL-SCNC: 4.4 MMOL/L (ref 3.5–5.3)
POTASSIUM SERPL-SCNC: 4.7 MMOL/L (ref 3.5–5.3)
Q ONSET: 208 MS
QRS COUNT: 14 BEATS
QRS DURATION: 92 MS
QT INTERVAL: 402 MS
QTC CALCULATION(BAZETT): 466 MS
QTC FREDERICIA: 444 MS
R AXIS: 34 DEGREES
RBC # BLD AUTO: 3.62 X10*6/UL (ref 4.5–5.9)
RIGHT VENTRICLE FREE WALL PEAK S': 11.5 CM/S
SODIUM SERPL-SCNC: 129 MMOL/L (ref 136–145)
SODIUM SERPL-SCNC: 130 MMOL/L (ref 136–145)
T AXIS: 90 DEGREES
T OFFSET: 409 MS
TRICUSPID ANNULAR PLANE SYSTOLIC EXCURSION: 1.2 CM
VENTRICULAR RATE: 81 BPM
WBC # BLD AUTO: 8.1 X10*3/UL (ref 4.4–11.3)

## 2024-10-15 PROCEDURE — 80048 BASIC METABOLIC PNL TOTAL CA: CPT | Performed by: NURSE PRACTITIONER

## 2024-10-15 PROCEDURE — 99232 SBSQ HOSP IP/OBS MODERATE 35: CPT

## 2024-10-15 PROCEDURE — 2500000004 HC RX 250 GENERAL PHARMACY W/ HCPCS (ALT 636 FOR OP/ED): Performed by: NURSE PRACTITIONER

## 2024-10-15 PROCEDURE — 97110 THERAPEUTIC EXERCISES: CPT | Mod: GP

## 2024-10-15 PROCEDURE — 2500000001 HC RX 250 WO HCPCS SELF ADMINISTERED DRUGS (ALT 637 FOR MEDICARE OP): Performed by: NURSE PRACTITIONER

## 2024-10-15 PROCEDURE — 99232 SBSQ HOSP IP/OBS MODERATE 35: CPT | Performed by: NURSE PRACTITIONER

## 2024-10-15 PROCEDURE — 97530 THERAPEUTIC ACTIVITIES: CPT | Mod: GP

## 2024-10-15 PROCEDURE — 97535 SELF CARE MNGMENT TRAINING: CPT | Mod: GO

## 2024-10-15 PROCEDURE — 97530 THERAPEUTIC ACTIVITIES: CPT | Mod: GO

## 2024-10-15 PROCEDURE — 36415 COLL VENOUS BLD VENIPUNCTURE: CPT | Performed by: NURSE PRACTITIONER

## 2024-10-15 PROCEDURE — 2500000002 HC RX 250 W HCPCS SELF ADMINISTERED DRUGS (ALT 637 FOR MEDICARE OP, ALT 636 FOR OP/ED): Performed by: NURSE PRACTITIONER

## 2024-10-15 PROCEDURE — 95816 EEG AWAKE AND DROWSY: CPT | Performed by: PSYCHIATRY & NEUROLOGY

## 2024-10-15 PROCEDURE — 95816 EEG AWAKE AND DROWSY: CPT

## 2024-10-15 PROCEDURE — 85027 COMPLETE CBC AUTOMATED: CPT | Performed by: NURSE PRACTITIONER

## 2024-10-15 PROCEDURE — 1200000002 HC GENERAL ROOM WITH TELEMETRY DAILY

## 2024-10-15 PROCEDURE — 97116 GAIT TRAINING THERAPY: CPT | Mod: GP

## 2024-10-15 PROCEDURE — 82947 ASSAY GLUCOSE BLOOD QUANT: CPT

## 2024-10-15 ASSESSMENT — PAIN SCALES - GENERAL
PAINLEVEL_OUTOF10: 6
PAINLEVEL_OUTOF10: 7
PAINLEVEL_OUTOF10: 5 - MODERATE PAIN
PAINLEVEL_OUTOF10: 0 - NO PAIN
PAINLEVEL_OUTOF10: 7
PAINLEVEL_OUTOF10: 6
PAINLEVEL_OUTOF10: 6
PAINLEVEL_OUTOF10: 5 - MODERATE PAIN
PAINLEVEL_OUTOF10: 8

## 2024-10-15 ASSESSMENT — COGNITIVE AND FUNCTIONAL STATUS - GENERAL
MOBILITY SCORE: 19
DAILY ACTIVITIY SCORE: 13
CLIMB 3 TO 5 STEPS WITH RAILING: TOTAL
PERSONAL GROOMING: A LITTLE
CLIMB 3 TO 5 STEPS WITH RAILING: A LOT
WALKING IN HOSPITAL ROOM: TOTAL
HELP NEEDED FOR BATHING: A LOT
EATING MEALS: A LITTLE
STANDING UP FROM CHAIR USING ARMS: A LITTLE
PERSONAL GROOMING: A LITTLE
DAILY ACTIVITIY SCORE: 18
DRESSING REGULAR UPPER BODY CLOTHING: A LITTLE
DRESSING REGULAR UPPER BODY CLOTHING: A LITTLE
TOILETING: TOTAL
DRESSING REGULAR LOWER BODY CLOTHING: TOTAL
MOVING TO AND FROM BED TO CHAIR: A LITTLE
MOVING TO AND FROM BED TO CHAIR: A LOT
MOBILITY SCORE: 12
WALKING IN HOSPITAL ROOM: A LITTLE
MOVING FROM LYING ON BACK TO SITTING ON SIDE OF FLAT BED WITH BEDRAILS: A LITTLE
DRESSING REGULAR LOWER BODY CLOTHING: A LITTLE
TURNING FROM BACK TO SIDE WHILE IN FLAT BAD: A LITTLE
TOILETING: A LITTLE
HELP NEEDED FOR BATHING: A LITTLE
STANDING UP FROM CHAIR USING ARMS: A LOT
EATING MEALS: A LITTLE

## 2024-10-15 ASSESSMENT — ACTIVITIES OF DAILY LIVING (ADL)
BATHING_WHERE_ASSESSED: OTHER (COMMENT)
BATHING_LEVEL_OF_ASSISTANCE: MINIMUM ASSISTANCE
HOME_MANAGEMENT_TIME_ENTRY: 25

## 2024-10-15 ASSESSMENT — PAIN DESCRIPTION - LOCATION
LOCATION: BACK

## 2024-10-15 ASSESSMENT — PAIN DESCRIPTION - ORIENTATION
ORIENTATION: LOWER
ORIENTATION: LOWER

## 2024-10-15 ASSESSMENT — PAIN - FUNCTIONAL ASSESSMENT
PAIN_FUNCTIONAL_ASSESSMENT: 0-10
PAIN_FUNCTIONAL_ASSESSMENT: 0-10
PAIN_FUNCTIONAL_ASSESSMENT: UNABLE TO SELF-REPORT
PAIN_FUNCTIONAL_ASSESSMENT: 0-10
PAIN_FUNCTIONAL_ASSESSMENT: 0-10

## 2024-10-15 ASSESSMENT — PAIN SCALES - WONG BAKER: WONGBAKER_NUMERICALRESPONSE: NO HURT

## 2024-10-15 ASSESSMENT — PAIN SCALES - PAIN ASSESSMENT IN ADVANCED DEMENTIA (PAINAD): TOTALSCORE: MEDICATION (SEE MAR)

## 2024-10-15 NOTE — CARE PLAN
The patient's goals for the shift include      The clinical goals for the shift include pain management, MRI, safety awareness      Problem: Fall/Injury  Goal: Not fall by end of shift  Outcome: Progressing  Goal: Be free from injury by end of the shift  Outcome: Progressing  Goal: Verbalize understanding of personal risk factors for fall in the hospital  Outcome: Progressing  Goal: Verbalize understanding of risk factor reduction measures to prevent injury from fall in the home  Outcome: Progressing  Goal: Use assistive devices by end of the shift  Outcome: Progressing  Goal: Pace activities to prevent fatigue by end of the shift  Outcome: Progressing     Problem: Diabetes  Goal: Achieve decreasing blood glucose levels by end of shift  Outcome: Progressing  Goal: Increase stability of blood glucose readings by end of shift  Outcome: Progressing  Goal: Decrease in ketones present in urine by end of shift  Outcome: Progressing  Goal: Maintain electrolyte levels within acceptable range throughout shift  Outcome: Progressing  Goal: Maintain glucose levels >70mg/dl to <250mg/dl throughout shift  Outcome: Progressing  Goal: No changes in neurological exam by end of shift  Outcome: Progressing  Goal: Learn about and adhere to nutrition recommendations by end of shift  Outcome: Progressing  Goal: Vital signs within normal range for age by end of shift  Outcome: Progressing  Goal: Increase self care and/or family involovement by end of shift  Outcome: Progressing  Goal: Receive DSME education by end of shift  Outcome: Progressing     Problem: Safety - Adult  Goal: Free from fall injury  Outcome: Progressing     Problem: Pain - Adult  Goal: Verbalizes/displays adequate comfort level or baseline comfort level  Outcome: Progressing     Problem: Chronic Conditions and Co-morbidities  Goal: Patient's chronic conditions and co-morbidity symptoms are monitored and maintained or improved  Outcome: Progressing

## 2024-10-15 NOTE — PROGRESS NOTES
Occupational Therapy    Occupational Therapy Treatment    Name: Rachid Yun  MRN: [id number]  Department: [address]  Room: [address] [place] [address]  Date: 10/15/24  Time Calculation  Start Time: 1424  Stop Time: 1503  Time Calculation (min): 39 min    Assessment:  OT Assessment: Patient will continue to benefit from skilled OT to maximize patient's safety and independence with daily tasks.  Prognosis: Good  Barriers to Discharge: Other (Comment) (assist for txfers and ADLs, high fall risk)  Evaluation/Treatment Tolerance: Patient tolerated treatment well  End of Session Communication: Bedside nurse  End of Session Patient Position: Up in chair, Alarm off, caregiver present, Alarm off, not on at start of session  Plan:  Treatment Interventions: ADL retraining, Functional transfer training, Endurance training, UE strengthening/ROM, Patient/family training, Compensatory technique education  OT Frequency: 3 times per week  OT Discharge Recommendations: Moderate intensity level of continued care  Equipment Recommended upon Discharge: Wheeled walker  OT Recommended Transfer Status: Assist of 1  OT - OK to Discharge: Yes    Subjective   Previous Visit Info:  OT Last Visit  OT Received On: 10/15/24  General:  General  Reason for Referral: decline in ADLs; syncope  Referred By: Tiffany Chong MD  Past Medical History Relevant to Rehab: arthritis, CAD, DM, HTN, A-fib  Family/Caregiver Present: Yes  Caregiver Feedback: daughter at bedside, supportive  Prior to Session Communication: Bedside nurse  Patient Position Received: Bed, 2 rail up, Alarm off, caregiver present, Alarm off, not on at start of session  Preferred Learning Style: verbal, visual  General Comment: Patient is cleared by nursing for therapy. Patient sitting EOB upon arrival and agreeable to participate. Daughter at bedside  Precautions:  LE Weight Bearing Status: Left Partial Weight Bearing  Medical Precautions: Fall precautions  Precautions Comment: Left post OP shoe,  partial WB    Pain Assessment:  Pain Assessment  Pain Assessment: 0-10  0-10 (Numeric) Pain Score: 6  Pain Type: Chronic pain  Pain Location: Generalized  Pain Interventions: Repositioned, Ambulation/increased activity (RN aware)     Objective   Cognition:  Cognition Comments: able to follow commands throughout  Activities of Daily Living: Grooming  Grooming Level of Assistance: Setup  Grooming Where Assessed: Chair  Grooming Comments: wash face, brush teeth    UE Bathing  UE Bathing Level of Assistance: Minimum assistance  UE Bathing Where Assessed: Other (Comment) (bedside chair)  UE Bathing Comments: UB sponge bath, assistance for thoroughness    LE Bathing  LE Bathing Level of Assistance: Minimum assistance  LE Bathing Where Assessed: Other (Comment) (bedside chair)  LE Bathing Comments: assistance to wash feet    UE Dressing  UE Dressing Level of Assistance: Minimum assistance  UE Dressing Where Assessed: Chair  UE Dressing Comments: doff/don gown    LE Dressing  LE Dressing: Yes  Sock Level of Assistance: Maximum assistance  Adult Briefs Level of Assistance: Maximum assistance  LE Dressing Where Assessed: Chair  LE Dressing Comments: assistance to doff/don socks. assistance to don brief over feet and knees while seated, assistance to don over hips in standing. assistance to don L post OP shoe    Toileting  Toileting Comments: Dependent assist to wash priscilla area in standing at 2WW    Functional Standing Tolerance:  Functional Standing Tolerance  Time: ~3 minutes  Activity: Min A with 2WW, during ADLs  Bed Mobility/Transfers:      Transfers  Transfer: Yes  Transfer 1  Transfer From 1: Bed to  Transfer to 1: Stand  Technique 1: Sit to stand  Transfer Device 1: Walker  Transfer Level of Assistance 1: Minimum assistance  Trials/Comments 1: patient then turns with Min A to be seated in the chair  Transfers 2  Transfer From 2: Chair with arms to  Transfer to 2: Stand  Technique 2: Sit to stand  Transfer Device 2:  Walker  Transfer Level of Assistance 2: Minimum assistance  Trials/Comments 2: increased time to gain upright position. Min A for safety. completes x2 trials    Functional Mobility:  Functional Mobility  Functional Mobility Performed: Yes  Functional Mobility 1  Surface 1: Level tile  Device 1: Rolling walker  Assistance 1: Minimum assistance, Moderate verbal cues  Comments 1: patient appears to drag his L foot during mobility. cues for correct sequencing of steps. forward flexed posture  Sitting Balance:  Static Sitting Balance  Static Sitting-Balance Support: Feet supported, No upper extremity supported  Static Sitting-Level of Assistance: Distant supervision  Standing Balance:  Static Standing Balance  Static Standing-Balance Support: Bilateral upper extremity supported  Static Standing-Level of Assistance: Minimum assistance  Static Standing-Comment/Number of Minutes: ~3 minutes    Therapy/Activity: Therapeutic Activity  Therapeutic Activity 1: patient completes functional mobility using 2WW with Min A and cues for correct sequencing of steps. decreased coordination and stride length with L LE. patient completes ~5 feet x2 attempts, seated rest break between    RUE   RUE : Exceptions to WFL (impaired at shoulder) and LUE   LUE: Exceptions to WFL (impaired at shoulder)    Outcome Measures:  [facility] Daily Activity  Putting on and taking off regular lower body clothing: Total  Bathing (including washing, rinsing, drying): A lot  Putting on and taking off regular upper body clothing: A little  Toileting, which includes using toilet, bedpan or urinal: Total  Taking care of personal grooming such as brushing teeth: A little  Eating Meals: A little  Daily Activity - Total Score: 13    Education Documentation  Body Mechanics, taught by Junie Urbano OT at 10/15/2024  3:12 PM.  Learner: Patient  Readiness: Acceptance  Method: Explanation, Demonstration  Response: Verbalizes Understanding, Needs  Reinforcement    Precautions, taught by Junie Urbano OT at 10/15/2024  3:12 PM.  Learner: Patient  Readiness: Acceptance  Method: Explanation, Demonstration  Response: Verbalizes Understanding, Needs Reinforcement    ADL Training, taught by Junie Urbano OT at 10/15/2024  3:12 PM.  Learner: Patient  Readiness: Acceptance  Method: Explanation, Demonstration  Response: Verbalizes Understanding, Needs Reinforcement    Education Comments  No comments found.      Goals:  Encounter Problems       Encounter Problems (Active)       OT Goals       ADLs (Progressing)       Start:  10/14/24    Expected End:  10/28/24       Pt will complete ADL's with Mod I using AE/compensatory techniques as needed.         Functional mobility  (Progressing)       Start:  10/14/24    Expected End:  10/28/24       Pt will complete functional mobility Mod I with LRD.         Functional transfer  (Progressing)       Start:  10/14/24    Expected End:  10/28/24       Pt will complete functional transfers including but not limited to: bed, chair, toilet with MOD I using LRD (least restrictive device).         Activity tolerance  (Progressing)       Start:  10/14/24    Expected End:  10/28/24       Patient will demonstrate improved activity tolerance AEB completing functional task for >/= 15 minutes while remaining hemodynamically stable.

## 2024-10-15 NOTE — CARE PLAN
Problem: Fall/Injury  Goal: Not fall by end of shift  Outcome: Progressing  Goal: Be free from injury by end of the shift  Outcome: Progressing  Goal: Verbalize understanding of personal risk factors for fall in the hospital  Outcome: Progressing  Goal: Verbalize understanding of risk factor reduction measures to prevent injury from fall in the home  Outcome: Progressing  Goal: Use assistive devices by end of the shift  Outcome: Progressing  Goal: Pace activities to prevent fatigue by end of the shift  Outcome: Progressing     Problem: Diabetes  Goal: Achieve decreasing blood glucose levels by end of shift  Outcome: Progressing  Goal: Increase stability of blood glucose readings by end of shift  Outcome: Progressing  Goal: Decrease in ketones present in urine by end of shift  Outcome: Progressing  Goal: Maintain electrolyte levels within acceptable range throughout shift  Outcome: Progressing  Goal: Maintain glucose levels >70mg/dl to <250mg/dl throughout shift  Outcome: Progressing  Goal: No changes in neurological exam by end of shift  Outcome: Progressing  Goal: Learn about and adhere to nutrition recommendations by end of shift  Outcome: Progressing  Goal: Vital signs within normal range for age by end of shift  Outcome: Progressing  Goal: Increase self care and/or family involovement by end of shift  Outcome: Progressing  Goal: Receive DSME education by end of shift  Outcome: Progressing     Problem: Pain - Adult  Goal: Verbalizes/displays adequate comfort level or baseline comfort level  Outcome: Progressing     Problem: Safety - Adult  Goal: Free from fall injury  Outcome: Progressing     Problem: Discharge Planning  Goal: Discharge to home or other facility with appropriate resources  Outcome: Progressing     Problem: Chronic Conditions and Co-morbidities  Goal: Patient's chronic conditions and co-morbidity symptoms are monitored and maintained or improved  Outcome: Progressing

## 2024-10-15 NOTE — PROGRESS NOTES
"Rachid Yun is a 74 y.o. male on day 2 of admission presenting with Syncope, non cardiac.      Subjective     Patient sitting upright in chair at bedside tells me he feels back to baseline.  Denies further episodes of lightheadedness.  No new concerns.  MRI of the brain without acute findings.  EEG pending       Objective     Last Recorded Vitals  Blood pressure 142/85, pulse 78, temperature 36 °C (96.8 °F), temperature source Temporal, resp. rate 17, height 1.93 m (6' 3.98\"), weight 103 kg (228 lb), SpO2 99%.  Neurologic exam:     Awake alert oriented to all, no dysarthria, no aphasia  Naming repetition intact, speech is fluent  PERRL, EOMI without ptosis or nystagmus, visual fields intact, face symmetrical, tongue midline  Strength in upper and lower extremities is intact, mild weakness to hand grasp and strength at left ankle d/t joint deformities  Sensation is reduced to RUE  FTN intact Difficulty with isaac to joint deformities  Heel-to-shin without ataxia  Reflexes  2 throughout,  Toes downgoing bilaterally  Gait not assessed at this time     Physical Exam  Cardiovascular:      Rate and Rhythm: Normal rate.   Pulmonary:      Effort: Pulmonary effort is normal.   Musculoskeletal:      Cervical back: Normal range of motion.   Relevant Results                                                  Assessment/Plan      Assessment & Plan  Syncope, non cardiac  74 year old male with history of A-fib on Eliquis and Plavix, CAD, HTN, CVA, DM, arthritis presenting with concerns for syncope. Previous episodes of syncope surrounding MI. No focal weakness.  MRI of the brain without acute findings.  EEG is pending.  Patient notes that he feels back to baseline no further episodes of lightheadedness.  It is also noted that he did have episodes of orthostasis and blood pressure medications were adjusted by cardiology.  Pending EEG anticipate we will sign off.  Atrial fibrillation (Multi)    Type 2 diabetes mellitus without " complication (Multi)    Primary hypertension    Stage 3b chronic kidney disease (Multi)    Mixed hyperlipidemia    Diabetic ulcer of left midfoot associated with type 2 diabetes mellitus (Multi)                 Elizabeth Díaz, APRN-CNP

## 2024-10-15 NOTE — PROGRESS NOTES
Spiritual Care Visit    Clinical Encounter Type  Visited With: Patient  Routine Visit: Introduction    Evangelical Encounters  Evangelical Needs: Prayer     Annotation:  provided patient support while rounding the Unit.  introduced  services of    explained the role of the  in providing emotional and spiritual support for patient's and family while in admitted to the hospital.  Patient was seated in the chair next to the bed, Patient was able to participated int he visit. Patient states that he has been having falls and each time his injuries get worse. Patient stated that he good family support. 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 Patient expressed appreciations for the visit today. Prayer was offered for the patient's recovery. No  other spiritual or Episcopal needs were expressed. Spiritual care will remain available for support as requested.                                    Taxonomy  Intended Effects: Establish rapport and connectedness, Build relationship of care and support, Demonstrate caring and concern  Methods: Encourage sharing of feelings, Encourage self reflection, Explore spiritual/Episcopal beliefs, Offer support  Interventions: Active listening, Ask guided questions, Ask guided questions about cultural and Episcopal values, [place]

## 2024-10-15 NOTE — ASSESSMENT & PLAN NOTE
74 year old male with history of A-fib on Eliquis and Plavix, CAD, HTN, CVA, DM, arthritis presenting with concerns for syncope. Previous episodes of syncope surrounding MI. No focal weakness.  MRI of the brain without acute findings.  EEG is pending.  Patient notes that he feels back to baseline no further episodes of lightheadedness.  It is also noted that he did have episodes of orthostasis and blood pressure medications were adjusted by cardiology.  Pending EEG anticipate we will sign off.

## 2024-10-15 NOTE — PROGRESS NOTES
"Rachid Yun is a 74 y.o. male on day 2 of admission presenting with Syncope, non cardiac.    Subjective   Patient upright in chair.  He denies chest pain shortness of breath or palpitations.  States he sometimes is dizzy when he first wakes up in the morning has to stand up slowly.       Objective     Physical Exam  Cardiovascular:      Rate and Rhythm: Normal rate. Rhythm irregular.      Pulses: Normal pulses.      Heart sounds: Normal heart sounds.   Pulmonary:      Effort: Pulmonary effort is normal.      Breath sounds: Normal breath sounds.   Abdominal:      General: Abdomen is flat.      Palpations: Abdomen is soft.   Musculoskeletal:      Right lower leg: No edema.      Left lower leg: No edema.   Skin:     Capillary Refill: Capillary refill takes less than 2 seconds.   Neurological:      Mental Status: He is alert and oriented to person, place, and time.         Last Recorded Vitals  Blood pressure 157/83, pulse 60, temperature 36.4 °C (97.5 °F), temperature source Temporal, resp. rate 16, height 1.93 m (6' 3.98\"), weight 103 kg (228 lb), SpO2 98%.  Intake/Output last 3 Shifts:  I/O last 3 completed shifts:  In: 1100 (10.6 mL/kg) [P.O.:1100]  Out: 3100 (30 mL/kg) [Urine:3100 (0.8 mL/kg/hr)]  Weight: 103.4 kg     Relevant Results      Results for orders placed or performed during the hospital encounter of 10/13/24 (from the past 24 hour(s))   Creatine Kinase   Result Value Ref Range    Creatine Kinase 116 0 - 325 U/L   POCT GLUCOSE   Result Value Ref Range    POCT Glucose 173 (H) 74 - 99 mg/dL   Transthoracic Echo (TTE) Complete   Result Value Ref Range    BSA 2.35 m2   POCT GLUCOSE   Result Value Ref Range    POCT Glucose 169 (H) 74 - 99 mg/dL   POCT GLUCOSE   Result Value Ref Range    POCT Glucose 156 (H) 74 - 99 mg/dL   CBC   Result Value Ref Range    WBC 8.1 4.4 - 11.3 x10*3/uL    nRBC 0.0 0.0 - 0.0 /100 WBCs    RBC 3.62 (L) 4.50 - 5.90 x10*6/uL    Hemoglobin 10.4 (L) 13.5 - 17.5 g/dL    Hematocrit 32.4 " (L) 41.0 - 52.0 %    MCV 90 80 - 100 fL    MCH 28.7 26.0 - 34.0 pg    MCHC 32.1 32.0 - 36.0 g/dL    RDW 15.2 (H) 11.5 - 14.5 %    Platelets 151 150 - 450 x10*3/uL   Basic Metabolic Panel   Result Value Ref Range    Glucose 142 (H) 74 - 99 mg/dL    Sodium 129 (L) 136 - 145 mmol/L    Potassium 4.4 3.5 - 5.3 mmol/L    Chloride 100 98 - 107 mmol/L    Bicarbonate 22 21 - 32 mmol/L    Anion Gap 11 10 - 20 mmol/L    Urea Nitrogen 40 (H) 6 - 23 mg/dL    Creatinine 1.55 (H) 0.50 - 1.30 mg/dL    eGFR 47 (L) >60 mL/min/1.73m*2    Calcium 8.3 (L) 8.6 - 10.3 mg/dL   POCT GLUCOSE   Result Value Ref Range    POCT Glucose 127 (H) 74 - 99 mg/dL              Assessment/Plan   Assessment & Plan  Syncope, non cardiac    Atrial fibrillation (Multi)    Type 2 diabetes mellitus without complication (Multi)    Primary hypertension    Stage 3b chronic kidney disease (Multi)    Mixed hyperlipidemia    Diabetic ulcer of left midfoot associated with type 2 diabetes mellitus (Multi)    1 syncope likely vasovagal.  But no major prodromal symptoms.  I will stop his amlodipine.  Will monitor blood pressure and heart rate.  Will repeat orthostatics in a.m.  If his blood pressure elevated then we will consider small dose ACE or ARB's.  His heart rate well-controlled on metoprolol and amiodarone.  Recommend if his orthostatics improves monitor for 1 week at discharge to rule out any major bradycardia or tachyarrhythmias as cause of his syncope.     2.  Atrial fibrillation.  Continue amiodarone and metoprolol will monitor.  Continue oral anticoagulation with Eliquis.     3.  Coronary artery disease for now we will continue Plavix for total of 12 months after PCI then we will switch him to baby aspirin and addition of the Eliquis.  Continue nightly statin.  The patient has no chest pain.  Troponin within normal limits.  EKG does not show any ischemia.  Doubt this is related to ischemic event.     4.  Hyperlipidemia continue statin.     5.  Tremor  patient has been having issues with tremor I am not sure if he has some underlying Parkinson's disease which may contribute to orthostatic hypotension.  Recommend neurology follow-up as an outpatient.     6.  Diabetes mellitus management by primary team.       10/15: As above.  Patient remains in a rate controlled atrial fibrillation.  Continue amiodarone and metoprolol.  Will recheck orthostatic vital signs this morning.  Blood pressure stable most recent reading 121/64.  Amlodipine was discontinued,  appears to be tolerating well.  Lab work this morning reveals 129, potassium of 4.4, creatinine 1.55.  He did receive IV contrast most likely contrast-induced NOAM on top of CKD stage III.  Patient overall stable from a cardiac perspective.  Will have him follow-up with Dr. Hare in 2 to 3 weeks.       I spent 20 minutes in the professional and overall care of this patient.      Zohra Marion PA-C

## 2024-10-15 NOTE — CONSULTS
Consults    Reason For Consult  Chronic ulceration left foot with underlying osteomyelitis    History Of Present Illness  Rachid Yun is a 74 y.o. male presenting with fall following syncopal episode.  Patient has history of stage III ulceration of the lateral column left foot.  The ulceration has been present for several months.  He has been treating this conservatively with wound care and the wound care clinic.  The patient's white blood cell count is 8.1 and within normal range.  He denies nausea vomiting fever or chills.  Patient has had previous and recent MRI of the left foot indicative of underlying osteomyelitis of the fifth metatarsal base.  Patient is fully aware of diagnosis.  We have elected to treat this conservatively because of risk of peripheral vascular disease and poor healing circumstances.  No other pedal complaints     Past Medical History  He has a past medical history of Arthritis, Coronary artery disease, Diabetes mellitus (Multi), Hypertension, and Stroke (Multi).    Surgical History  He has a past surgical history that includes Back surgery; Cardiac electrophysiology procedure (N/A, 11/29/2023); Cardiac catheterization (N/A, 12/28/2023); Cardiac catheterization (N/A, 12/28/2023); Radiofrequency ablation; Coronary angioplasty with stent; and Coronary angioplasty with stent.     Social History  He reports that he quit smoking about 36 years ago. His smoking use included cigarettes. He has been exposed to tobacco smoke. He has never used smokeless tobacco. He reports that he does not currently use alcohol. He reports that he does not use drugs.    Family History  Family History   Problem Relation Name Age of Onset    Stomach cancer Mother      Diabetes Mother      Coronary artery disease Father      Other (Pacemaker) Father      Other (S/p CABG) Sister      Other (S/p CABG) Brother      Ovarian cancer Sibling      Coronary artery disease Sibling          Allergies  Meperidine, Vancomycin,  Hydromorphone, Other, and Rifampin    Review of Systems    REVIEW OF SYSTEMS  GENERAL:  Negative for malaise, significant weight loss, fever  HEENT:  No changes in hearing or vision, no nose bleeds or other nasal problems and Negative for frequent or significant headaches  NECK:  Negative for lumps, goiter, pain and significant neck swelling  RESPIRATORY:  Negative for cough, wheezing and shortness of breath  CARDIOVASCULAR:  Negative for chest pain, leg swelling and palpitations  GI:  Negative for abdominal discomfort, blood in stools or black stools and change in bowel habits  :  Negative for dysuria, frequency and incontinence  MUSCULOSKELETAL:  Negative for joint pain or swelling, back pain, and muscle pain.  SKIN:  Negative for lesions, rash, and itching  PSYCH:  Negative for sleep disturbance, mood disorder and recent psychosocial stressors  HEMATOLOGY/LYMPHOLOGY:  Negative for prolonged bleeding, bruising easily, and swollen nodes.  ENDOCRINE:  Negative for cold or heat intolerance, polyuria, polydipsia and goiter  NEURO: Negative, denies any burning, tingling or numbness     Objective:   Vasc: DP and PT pulses are faintly palpable bilateral.  CFT is less than 3 seconds bilateral.  Skin temperature is warm to cool proximal to distal bilateral.      Neuro: Protective sensation is diminished when tested with the [place] monofilament to 8 out of 10 sites bilateral.      Derm: Full-thickness stage III ulceration noted to lateral aspect of left foot overlying fifth metatarsal base.  Granular tissue was noted within the wound.  No malodor.  No drainage.  No direct probe to bone.  No undermining of the ulceration is noted.  Thick hyperkeratotic rim of tissue was noted surrounding the ulceration.  Diameter of the ulceration is 2.0 cm in size.    Ortho: Muscle strength is 5/5 for all pedal groups tested.  Ankle joint, subtalar joint, 1st MPJ and lesser MPJ ROM is full and without pain or crepitus.  The foot type is  "rectus bilateral off weight bearing.  There are no structural deformities noted.       Physical Exam     Last Recorded Vitals  Blood pressure 142/85, pulse 78, temperature 36 °C (96.8 °F), temperature source Temporal, resp. rate 17, height 1.93 m (6' 3.98\"), weight 103 kg (228 lb), SpO2 99%.    Relevant Results  White blood cell count 8000.  MRI of left foot shows increased resorption near the ulceration site overlying the fifth metatarsal base indicative of chronic osteomyelitis.     Assessment/Plan     Stage III ulceration left foot: Patient was seen today at bedside.  Examination of the left foot was performed.  I reviewed the patient's recent MRI of his left foot.  I had a lengthy conversation today with the patient and his daughter regarding the prognosis and long-term treatment for the ulceration.  They are fully aware of underlying osteomyelitis.  At this point I would like to continue with conservative treatment options due to risk of a nonhealing wound following surgical debridement.  I am also concerned because this is the attachment point of the peroneus brevis tendon and this would destabilize the left foot and provide further difficulty with ambulation if debridement was necessary.  My recommendation at this point in time is to have the patient receive 8 weeks of IV antibiotics through a PICC line and continue with local wound care.  I would prefer to avoid any surgical intervention unless absolutely necessary at this point due to the patient's multiple underlying medical comorbidities which would further impact wound healing from surgical debridement.  Patient may also be candidate for hyperbaric oxygen treatment.  Continue with daily dressing changes with Aquacel Ag and Mepilex pad.  Patient may be partial weightbearing to left foot with surgical shoe.  I will follow-up with patient outpatient.  Please reconsult if condition changes    I spent 25 minutes in the professional and overall care of this " patient.

## 2024-10-15 NOTE — PROGRESS NOTES
Rachid Yun is a 74 y.o. male on day 2 of admission presenting with Syncope, non cardiac.    Subjective   Interval History:   Left foot MRI showed evidence of osteomyelitis  Afebrile, no chills  Remains on room air  No shortness of breath cough or chest pain  No nausea, vomiting or diarrhea       Objective   Range of Vitals (last 24 hours)  Heart Rate:  [57-90]   Temp:  [35.8 °C (96.4 °F)-36.4 °C (97.5 °F)]   Resp:  [16-18]   BP: (116-157)/(67-83)   SpO2:  [96 %-99 %]   Daily Weight  10/13/24 : 103 kg (228 lb)    Body mass index is 27.76 kg/m².    Physical Exam  Constitutional:       Appearance: Normal appearance.   HENT:      Head: Normocephalic and atraumatic.      Nose: Nose normal.      Mouth/Throat:      Mouth: Mucous membranes are moist.      Pharynx: Oropharynx is clear.   Eyes:      General: No scleral icterus.  Cardiovascular:      Rate and Rhythm: Normal rate and regular rhythm.   Pulmonary:      Effort: Pulmonary effort is normal.      Breath sounds: Normal breath sounds.   Abdominal:      General: Bowel sounds are normal.      Palpations: Abdomen is soft.   Musculoskeletal:         General: Normal range of motion.      Cervical back: Normal range of motion and neck supple.   Skin:     General: Skin is warm and dry.      Comments: Left lateral foot ulcer with large amount of granulation tissue, mild surrounding erythema, left foot swelling and increased drainage reported.     Neurological:      Mental Status: He is alert.      Comments: Awake, alert   Psychiatric:         Mood and Affect: Mood normal.         Behavior: Behavior normal.     Antibiotics  cephalexin - 500 mg  DAPTOmycin - 500 mg/50 mL  doxycycline - 100 mg  sulfamethoxazole-trimethoprim - 800-160 mg    Relevant Results  Labs  Results from last 72 hours   Lab Units 10/15/24  0516 10/14/24  0505 10/13/24  0848   WBC AUTO x10*3/uL 8.1 8.1 10.2   HEMOGLOBIN g/dL 10.4* 10.7* 13.9   HEMATOCRIT % 32.4* 33.2* 43.0   PLATELETS AUTO x10*3/uL 151  165 255   NEUTROS PCT AUTO %  --   --  74.3   LYMPHS PCT AUTO %  --   --  16.8   MONOS PCT AUTO %  --   --  6.7   EOS PCT AUTO %  --   --  1.0     Results from last 72 hours   Lab Units 10/15/24  0516 10/14/24  0506 10/13/24  0848   SODIUM mmol/L 129* 133* 135*   POTASSIUM mmol/L 4.4 4.0 4.2   CHLORIDE mmol/L 100 103 104   CO2 mmol/L 22 22 19*   BUN mg/dL 40* 34* 34*   CREATININE mg/dL 1.55* 1.38* 1.70*   GLUCOSE mg/dL 142* 153* 207*   CALCIUM mg/dL 8.3* 8.2* 8.7   ANION GAP mmol/L 11 12 16   EGFR mL/min/1.73m*2 47* 54* 42*     Results from last 72 hours   Lab Units 10/13/24  0848   ALK PHOS U/L 58   BILIRUBIN TOTAL mg/dL 1.6*   PROTEIN TOTAL g/dL 6.3*   ALT U/L 31   AST U/L 20   ALBUMIN g/dL 3.4     Estimated Creatinine Clearance: 51.3 mL/min (A) (by C-G formula based on SCr of 1.55 mg/dL (H)).  C-Reactive Protein   Date Value Ref Range Status   10/01/2024 7.77 (H) <1.00 mg/dL Final   07/16/2024 <0.30 0.00 - 2.00 mg/dL Final     Microbiology  Blood culture pending  Wound culture -_MRSA      Imaging  MR angio head wo IV contrast    Result Date: 10/15/2024  Interpreted By:  Cristiana Frank, STUDY: MR ANGIO HEAD WO IV CONTRAST;  10/14/2024 9:15 am   INDICATION: Signs/Symptoms:syncope.     COMPARISON: None.   ACCESSION NUMBER(S): [id number]   ORDERING CLINICIAN: SHAMIKA MILAN   TECHNIQUE: Time-of-flight MRA of the head was performed. The images were reviewed as source images and maximum intensity projections.   FINDINGS:     Anterior circulation:    There is expected flow signal in bilateral intracranial internal carotid arteries, bilateral carotid terminals, bilateral proximal anterior and middle cerebral arteries.   Posterior circulation:    Bilateral intracranial vertebral arteries, vertebrobasilar junction, basilar artery and proximal posterior cerebral arteries demonstrate expected flow signal.   There is a 0.3 x 0.3 cm outpouching from the basilar tip compatible with an aneurysm.       1.  There is no evidence for  hemodynamically significant stenosis or large branch vessel cutoffs of the visualized intracranial vasculature. 2. 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   MACRO: None   Signed by: Cristiana Frank 10/15/2024 7:59 AM Dictation workstation:   [id number]    MR foot left wo IV contrast    Result Date: 10/15/2024  Interpreted By:  Raúl Infante, STUDY: MR FOOT LEFT WO IV CONTRAST; ;  10/14/2024 10:02 pm   INDICATION: Signs/Symptoms:left foot infection.     COMPARISON: MRI 07/16/2024   ACCESSION NUMBER(S): [id number]   ORDERING CLINICIAN: TARA ANDRES   TECHNIQUE: Multiplanar multisequence MRI obtained of the left foot.   FINDINGS: Skin ulceration is demonstrated along the 5th metatarsal base. There is skin thickening and subcutaneous edema deep to the level of the skin ulceration with component of cellulitis. Of note, subtle linear appearing area of T2 weighted hyperintensity is demonstrated from the area of skin ulceration into the 5th metatarsal base with focal fistulous tract of concern. No subcutaneous abscess formation demonstrated. There is loss of cortical margin within the 5th metatarsal base about the region of the fistulous tract with generalized intermediate T1 weighted signal intensity with corresponding T2 weighted hyperintensity with marrow edema throughout the 5th metatarsal base extending into the mid 5th metatarsal shaft. No well-marginated abscess formation about the ulceration. Of note, findings are in the region of the 5th tarsometatarsal articulation with mild asymmetric fluid about the articulation as well as mild marrow edema within the cuboid bone lateral margin of the articulation with component of septic arthritis osteomyelitis about the articulation not excluded with mild osteomyelitis within the cuboid bone a possibility. Component of findings may simply be reactive.   Remainder of the midfoot osseous structures demonstrate normal marrow signal intensity. Intertarsal articulations  are unremarkable. Lisfranc joint is otherwise congruent. There is mild Lisfranc joint osteoarthritis. Lisfranc ligament complex unremarkable.   Forefoot 2nd through 4th metatarsal shafts demonstrate no focal marrow edema. There is remote healed fracture deformity of the distal 5th metatarsal shafts.   Tibialis anterior, extensor hallucis longus and extensor digitorum tendons are unremarkable. Flexor hallucis longus and flexor digitorum tendons are unremarkable.   Musculature of the foot demonstrates diffuse atrophy. There is component mild intermediate signal intensity and chronic diabetic myopathy. Mild asymmetric edema within the abductor digiti minimi with infectious myositis of concern. Visualized portions of the plantar fascia are unremarkable   1st MTP joint demonstrates moderate osteoarthritic degenerative change. Tibial sesamoid bone demonstrates a few ossific fragments along the proximal margin with sequela of remote fracture deformity. There is mild tibial sesamoid metatarsal osteoarthritis. Fibular sesamoid bone are unremarkable. The 2nd through 5th MTP joints demonstrate no focal marrow edema. No joint effusions. Digits distally demonstrate no focal marrow edema.               1. Skin ulceration lateral base 5th metatarsal with cellulitis and fistulous tract extending into the cortical margin. No discrete abscess formation demonstrated about the ulceration. However, there is osteomyelitis throughout the 5th metatarsal base extending to near the mid 5th metatarsal shafts. Also, mild asymmetric fluid about the 5th tarsometatarsal articulation with mild marrow edema along the lateral cuboid bone about the 5th tarsometatarsal articulation. This may simply be reactive. However, component of septic arthritis osteomyelitis is not excluded.     MACRO: None   Signed by: Raúl Infante 10/15/2024 7:59 AM Dictation workstation:   [id number]    MR brain wo IV contrast    Result Date: 10/15/2024  Interpreted By:   Cristiana Frank, STUDY: MR BRAIN WO IV CONTRAST;  10/14/2024 9:11 am   INDICATION: Signs/Symptoms:syncope.     COMPARISON: MRI 08/31/2010   ACCESSION NUMBER(S): [id number]   ORDERING CLINICIAN: SHAMIKA MILAN   TECHNIQUE: Axial T2, FLAIR, DWI, gradient echo T2 and sagittal and coronal T1 weighted images of brain were acquired.   FINDINGS: CSF Spaces: The ventricles, sulci and basal cisterns are prominent compatible with age related involutional changes and mild-to-moderate volume loss.   Parenchyma: There is no diffusion restriction abnormality to suggest acute infarct.  There is encephalomalacia and gliosis within the right centrum semiovale which likely represents the sequela of a prior infarct. There is a mild degree of nonspecific subcortical and periventricular T2 and FLAIR hyperintense signal which is compatible with microangiopathy. Old lacunar infarcts within the cerebellum. No abnormal susceptibility artifact. There is no mass effect or midline shift.   Paranasal Sinuses and Mastoids: Visualized paranasal sinuses and mastoid air cells are predominantly clear..       No evidence of acute infarct, intracranial mass effect or midline shift.   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   Mild degree of nonspecific white matter signal compatible with microangiopathy.   MACRO: None   Signed by: Cristiana Frank 10/15/2024 7:54 AM Dictation workstation:   [id number]    ECG 12 lead    Result Date: 10/14/2024  Atrial fibrillation Abnormal ECG When compared with ECG of 24-DEC-2023 15:59, No significant change was found    CT chest abdomen pelvis w IV contrast    Result Date: 10/13/2024  Interpreted By:  Tamara Snowden, STUDY: CT CHEST ABDOMEN PELVIS W IV CONTRAST;  10/13/2024 9:27 am   INDICATION: Signs/Symptoms:fall, syncope.     COMPARISON: None.   ACCESSION NUMBER(S): [id number]   ORDERING CLINICIAN: DOTTIE REYNA   TECHNIQUE: CT of the chest, abdomen, and pelvis was performed.  Contiguous axial images were  obtained at 3 mm slice thickness through the chest, abdomen and pelvis. Coronal and sagittal reconstructions at 3 mm slice thickness were performed. Contrast was administered intravenously without immediate complication.   FINDINGS: CHEST:   LUNG/PLEURA/LARGE AIRWAYS: The lungs are hyperinflated. No suspicious lung nodules or infiltrates. Punctate calcified granuloma in the right lung base.   VESSELS: Aortic calcification. No aneurysmal dilatation. Normal caliber main pulmonary artery.   HEART: No cardiomegaly. No pericardial effusion. Dense coronary artery calcification.   MEDIASTINUM AND ATIF: No significant mediastinal or hilar adenopathy. Subcentimeter lymph nodes are noted which are nonspecific and may be reactive.   CHEST WALL AND LOWER NECK: Grossly unremarkable thyroid gland.   ABDOMEN:   LIVER: Tiny hypodensity in the hepatic dome likely a tiny cyst. The liver is otherwise unremarkable.   BILE DUCTS: No bile duct dilatation.   GALLBLADDER: Likely tiny gallstones. No gallbladder wall thickening.   PANCREAS: Unremarkable pancreas.   SPLEEN: Unremarkable spleen.   ADRENAL GLANDS: No adrenal masses.   KIDNEYS AND URETERS: Cortical thinning of both kidneys. Likely a tiny subcentimeter upper pole left renal cyst. No hydroureter. No definite stones.   PELVIS:   BLADDER: Suboptimally distended and evaluated. Apparent bladder wall thickening at least partially due to lack of distention.   REPRODUCTIVE ORGANS: Calcifications in the prostate.   BOWEL: Large amount of stool. Rectal distention with stool. No evidence of bowel obstruction.     VESSELS: Aortic calcification. No aneurysmal dilatation.   IVC filter.   PERITONEUM/RETROPERITONEUM/LYMPH NODES: No retroperitoneal adenopathy. No ascites. Unremarkable appendix.   BONE AND SOFT TISSUE: Discogenic degenerative changes. Vacuum disc at multiple levels. Mild loss of height of multiple thoracic and lumbar spine vertebral bodies. Intramedullary sclerosis of the proximal  left femur is suboptimally included and may represent bone infarcts or an enchondroma.       CHEST: 1. No acute abnormalities within the chest. 2. Dense coronary and aortic calcification. 3. Hyperinflation.   ABDOMEN-PELVIS: 1. Advanced multilevel discogenic degenerative changes. 2. Tiny gallstones. 3. No acute abnormalities.   Signed by: Tamara Snowden 10/13/2024 10:45 AM Dictation workstation:   [id number]    CT cervical spine wo IV contrast    Result Date: 10/13/2024  Interpreted By:  Tamara Snowden, STUDY: CT CERVICAL SPINE WO IV CONTRAST;  10/13/2024 9:27 am   INDICATION: Signs/Symptoms:neck trauma after syncopal episode.     COMPARISON: 12/24/2023   ACCESSION NUMBER(S): [id number]   ORDERING CLINICIAN: DOTTIE REYNA   TECHNIQUE: Axial CT images of the cervical spine are obtained. Axial, coronal and sagittal reconstructions are provided for review.   FINDINGS: Grade 1 anterolisthesis of C4 on C5. Straightening of normal cervical lordosis. Disc space loss from C6 through T1. No erosions. No fracture dislocation. Hypertrophic changes of the C1-C2 articulation. Mild central canal stenosis and varying degrees of neural foraminal narrowing at multiple levels.       No evidence for an acute fracture or subluxation of the cervical spine. Multilevel discogenic degenerative changes.   MACRO: None   Signed by: Tamara Snowden 10/13/2024 10:01 AM Dictation workstation:   [id number]    CT head wo IV contrast    Result Date: 10/13/2024  Interpreted By:  Tamara Snowden, STUDY: CT HEAD WO IV CONTRAST;  10/13/2024 9:27 am   INDICATION: Signs/Symptoms:head injury on eliquis.     COMPARISON: 12/24/2023   ACCESSION NUMBER(S): [id number]   ORDERING CLINICIAN: DOTTIE REYNA   TECHNIQUE: Noncontrast axial CT scan of head was performed. Angled reformats in brain and bone windows were generated. The images were reviewed in bone, brain, blood and soft tissue windows.   FINDINGS: The ventricles, cisterns and sulci are prominent, consistent  with mild diffuse volume loss. There are areas of nonspecific white matter hypodensity, which are probably age-related or microvascular in nature.   Gray-white differentiation is intact and there is no evidence of acute cortical infarct. No mass, mass effect or midline shift is seen. There is no evidence of hemorrhage.   The visualized paranasal sinuses are clear.         No evidence of acute cortical infarct or intracranial hemorrhage.   Chronic changes as described.   MACRO: None   Signed by: Tamara Snowden 10/13/2024 9:53 AM Dictation workstation:   [id number]        Assessment/Plan   Type 2 diabetes mellitus with peripheral angiopathy without gangrene   Left diabetic foot ulcer-MRI evidence osteomyelitis   Chronic kidney diease, stage 3  Syncope  Recent positive wound culture -MRSA         IV daptomycin  Monitor CK level  Monitor temperature and WBC  Follow-up blood cultures  Neurology follow up   Local care  Podiatry consult-MRI showed evidence osteomyelitis   Further recommendations based on work up    Discussed with Dr. Pelaez      Tentative long term plan IV daptomycin    Total time spent caring for the patient today was 25 minutes. This includes time spent before the visit reviewing the chart, time spent during the visit, and time spent after the visit on documentation.     Polina Oakes, APRN-CNP

## 2024-10-15 NOTE — PROGRESS NOTES
10/15/24 1050   Discharge Planning   Expected Discharge Disposition Home Heal  (resume [facility])   Does the patient need discharge transport arranged? No     Possible home antibiotic vs.  1 time dose Delvance.    [facility] to resume PT and possibly add SN for antibiotic.    Rn-TCC following for needs.

## 2024-10-15 NOTE — PROGRESS NOTES
Rachid Yun is a 74 y.o. male on day 2 of admission presenting with Syncope, non cardiac.    Subjective   Interval History:   Left foot MRI showed evidence of osteomyelitis  Afebrile, no chills  Remains on room air  No shortness of breath cough or chest pain  No nausea, vomiting or diarrhea       Objective   Range of Vitals (last 24 hours)  Heart Rate:  [57-97]   Temp:  [35.8 °C (96.4 °F)-36.4 °C (97.5 °F)]   Resp:  [16-18]   BP: (121-157)/(64-85)   SpO2:  [96 %-99 %]   Daily Weight  10/13/24 : 103 kg (228 lb)    Body mass index is 27.76 kg/m².    Physical Exam  Constitutional:       Appearance: Normal appearance.   HENT:      Head: Normocephalic and atraumatic.      Nose: Nose normal.      Mouth/Throat:      Mouth: Mucous membranes are moist.      Pharynx: Oropharynx is clear.   Eyes:      General: No scleral icterus.  Cardiovascular:      Rate and Rhythm: Normal rate and regular rhythm.   Pulmonary:      Effort: Pulmonary effort is normal.      Breath sounds: Normal breath sounds.   Abdominal:      General: Bowel sounds are normal.      Palpations: Abdomen is soft.   Musculoskeletal:         General: Normal range of motion.      Cervical back: Normal range of motion and neck supple.   Skin:     General: Skin is warm and dry.      Comments: Left lateral foot ulcer with large amount of granulation tissue, mild surrounding erythema, left foot swelling and increased drainage reported.     Neurological:      Mental Status: He is alert.      Comments: Awake, alert   Psychiatric:         Mood and Affect: Mood normal.         Behavior: Behavior normal.     Antibiotics  cephalexin - 500 mg  DAPTOmycin - 500 mg/50 mL  doxycycline - 100 mg  sulfamethoxazole-trimethoprim - 800-160 mg    Relevant Results  Labs  Results from last 72 hours   Lab Units 10/15/24  0516 10/14/24  0505 10/13/24  0848   WBC AUTO x10*3/uL 8.1 8.1 10.2   HEMOGLOBIN g/dL 10.4* 10.7* 13.9   HEMATOCRIT % 32.4* 33.2* 43.0   PLATELETS AUTO x10*3/uL 151  165 255   NEUTROS PCT AUTO %  --   --  74.3   LYMPHS PCT AUTO %  --   --  16.8   MONOS PCT AUTO %  --   --  6.7   EOS PCT AUTO %  --   --  1.0     Results from last 72 hours   Lab Units 10/15/24  0516 10/14/24  0506 10/13/24  0848   SODIUM mmol/L 129* 133* 135*   POTASSIUM mmol/L 4.4 4.0 4.2   CHLORIDE mmol/L 100 103 104   CO2 mmol/L 22 22 19*   BUN mg/dL 40* 34* 34*   CREATININE mg/dL 1.55* 1.38* 1.70*   GLUCOSE mg/dL 142* 153* 207*   CALCIUM mg/dL 8.3* 8.2* 8.7   ANION GAP mmol/L 11 12 16   EGFR mL/min/1.73m*2 47* 54* 42*     Results from last 72 hours   Lab Units 10/13/24  0848   ALK PHOS U/L 58   BILIRUBIN TOTAL mg/dL 1.6*   PROTEIN TOTAL g/dL 6.3*   ALT U/L 31   AST U/L 20   ALBUMIN g/dL 3.4     Estimated Creatinine Clearance: 51.3 mL/min (A) (by C-G formula based on SCr of 1.55 mg/dL (H)).  C-Reactive Protein   Date Value Ref Range Status   10/01/2024 7.77 (H) <1.00 mg/dL Final   07/16/2024 <0.30 0.00 - 2.00 mg/dL Final     Microbiology  Blood culture pending  Wound culture -_MRSA      Imaging  MR angio head wo IV contrast    Result Date: 10/15/2024  Interpreted By:  Cristiana Frank, STUDY: MR ANGIO HEAD WO IV CONTRAST;  10/14/2024 9:15 am   INDICATION: Signs/Symptoms:syncope.     COMPARISON: None.   ACCESSION NUMBER(S): [id number]   ORDERING CLINICIAN: SHAMIKA MILAN   TECHNIQUE: Time-of-flight MRA of the head was performed. The images were reviewed as source images and maximum intensity projections.   FINDINGS:     Anterior circulation:    There is expected flow signal in bilateral intracranial internal carotid arteries, bilateral carotid terminals, bilateral proximal anterior and middle cerebral arteries.   Posterior circulation:    Bilateral intracranial vertebral arteries, vertebrobasilar junction, basilar artery and proximal posterior cerebral arteries demonstrate expected flow signal.   There is a 0.3 x 0.3 cm outpouching from the basilar tip compatible with an aneurysm.       1.  There is no evidence for  hemodynamically significant stenosis or large branch vessel cutoffs of the visualized intracranial vasculature. 2. 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   MACRO: None   Signed by: Cristiana Frank 10/15/2024 7:59 AM Dictation workstation:   [id number]    MR foot left wo IV contrast    Result Date: 10/15/2024  Interpreted By:  Raúl Infante, STUDY: MR FOOT LEFT WO IV CONTRAST; ;  10/14/2024 10:02 pm   INDICATION: Signs/Symptoms:left foot infection.     COMPARISON: MRI 07/16/2024   ACCESSION NUMBER(S): [id number]   ORDERING CLINICIAN: TARA ANDRES   TECHNIQUE: Multiplanar multisequence MRI obtained of the left foot.   FINDINGS: Skin ulceration is demonstrated along the 5th metatarsal base. There is skin thickening and subcutaneous edema deep to the level of the skin ulceration with component of cellulitis. Of note, subtle linear appearing area of T2 weighted hyperintensity is demonstrated from the area of skin ulceration into the 5th metatarsal base with focal fistulous tract of concern. No subcutaneous abscess formation demonstrated. There is loss of cortical margin within the 5th metatarsal base about the region of the fistulous tract with generalized intermediate T1 weighted signal intensity with corresponding T2 weighted hyperintensity with marrow edema throughout the 5th metatarsal base extending into the mid 5th metatarsal shaft. No well-marginated abscess formation about the ulceration. Of note, findings are in the region of the 5th tarsometatarsal articulation with mild asymmetric fluid about the articulation as well as mild marrow edema within the cuboid bone lateral margin of the articulation with component of septic arthritis osteomyelitis about the articulation not excluded with mild osteomyelitis within the cuboid bone a possibility. Component of findings may simply be reactive.   Remainder of the midfoot osseous structures demonstrate normal marrow signal intensity. Intertarsal articulations  are unremarkable. Lisfranc joint is otherwise congruent. There is mild Lisfranc joint osteoarthritis. Lisfranc ligament complex unremarkable.   Forefoot 2nd through 4th metatarsal shafts demonstrate no focal marrow edema. There is remote healed fracture deformity of the distal 5th metatarsal shafts.   Tibialis anterior, extensor hallucis longus and extensor digitorum tendons are unremarkable. Flexor hallucis longus and flexor digitorum tendons are unremarkable.   Musculature of the foot demonstrates diffuse atrophy. There is component mild intermediate signal intensity and chronic diabetic myopathy. Mild asymmetric edema within the abductor digiti minimi with infectious myositis of concern. Visualized portions of the plantar fascia are unremarkable   1st MTP joint demonstrates moderate osteoarthritic degenerative change. Tibial sesamoid bone demonstrates a few ossific fragments along the proximal margin with sequela of remote fracture deformity. There is mild tibial sesamoid metatarsal osteoarthritis. Fibular sesamoid bone are unremarkable. The 2nd through 5th MTP joints demonstrate no focal marrow edema. No joint effusions. Digits distally demonstrate no focal marrow edema.               1. Skin ulceration lateral base 5th metatarsal with cellulitis and fistulous tract extending into the cortical margin. No discrete abscess formation demonstrated about the ulceration. However, there is osteomyelitis throughout the 5th metatarsal base extending to near the mid 5th metatarsal shafts. Also, mild asymmetric fluid about the 5th tarsometatarsal articulation with mild marrow edema along the lateral cuboid bone about the 5th tarsometatarsal articulation. This may simply be reactive. However, component of septic arthritis osteomyelitis is not excluded.     MACRO: None   Signed by: Raúl Infante 10/15/2024 7:59 AM Dictation workstation:   [id number]    MR brain wo IV contrast    Result Date: 10/15/2024  Interpreted By:   Cristiana Frank, STUDY: MR BRAIN WO IV CONTRAST;  10/14/2024 9:11 am   INDICATION: Signs/Symptoms:syncope.     COMPARISON: MRI 08/31/2010   ACCESSION NUMBER(S): [id number]   ORDERING CLINICIAN: SHAMIKA MILAN   TECHNIQUE: Axial T2, FLAIR, DWI, gradient echo T2 and sagittal and coronal T1 weighted images of brain were acquired.   FINDINGS: CSF Spaces: The ventricles, sulci and basal cisterns are prominent compatible with age related involutional changes and mild-to-moderate volume loss.   Parenchyma: There is no diffusion restriction abnormality to suggest acute infarct.  There is encephalomalacia and gliosis within the right centrum semiovale which likely represents the sequela of a prior infarct. There is a mild degree of nonspecific subcortical and periventricular T2 and FLAIR hyperintense signal which is compatible with microangiopathy. Old lacunar infarcts within the cerebellum. No abnormal susceptibility artifact. There is no mass effect or midline shift.   Paranasal Sinuses and Mastoids: Visualized paranasal sinuses and mastoid air cells are predominantly clear..       No evidence of acute infarct, intracranial mass effect or midline shift.   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   Mild degree of nonspecific white matter signal compatible with microangiopathy.   MACRO: None   Signed by: Cristiana Frank 10/15/2024 7:54 AM Dictation workstation:   [id number]    ECG 12 lead    Result Date: 10/14/2024  Atrial fibrillation Abnormal ECG When compared with ECG of 24-DEC-2023 15:59, No significant change was found    CT chest abdomen pelvis w IV contrast    Result Date: 10/13/2024  Interpreted By:  Tamara Snowden, STUDY: CT CHEST ABDOMEN PELVIS W IV CONTRAST;  10/13/2024 9:27 am   INDICATION: Signs/Symptoms:fall, syncope.     COMPARISON: None.   ACCESSION NUMBER(S): [id number]   ORDERING CLINICIAN: DOTTIE REYNA   TECHNIQUE: CT of the chest, abdomen, and pelvis was performed.  Contiguous axial images were  obtained at 3 mm slice thickness through the chest, abdomen and pelvis. Coronal and sagittal reconstructions at 3 mm slice thickness were performed. Contrast was administered intravenously without immediate complication.   FINDINGS: CHEST:   LUNG/PLEURA/LARGE AIRWAYS: The lungs are hyperinflated. No suspicious lung nodules or infiltrates. Punctate calcified granuloma in the right lung base.   VESSELS: Aortic calcification. No aneurysmal dilatation. Normal caliber main pulmonary artery.   HEART: No cardiomegaly. No pericardial effusion. Dense coronary artery calcification.   MEDIASTINUM AND ATIF: No significant mediastinal or hilar adenopathy. Subcentimeter lymph nodes are noted which are nonspecific and may be reactive.   CHEST WALL AND LOWER NECK: Grossly unremarkable thyroid gland.   ABDOMEN:   LIVER: Tiny hypodensity in the hepatic dome likely a tiny cyst. The liver is otherwise unremarkable.   BILE DUCTS: No bile duct dilatation.   GALLBLADDER: Likely tiny gallstones. No gallbladder wall thickening.   PANCREAS: Unremarkable pancreas.   SPLEEN: Unremarkable spleen.   ADRENAL GLANDS: No adrenal masses.   KIDNEYS AND URETERS: Cortical thinning of both kidneys. Likely a tiny subcentimeter upper pole left renal cyst. No hydroureter. No definite stones.   PELVIS:   BLADDER: Suboptimally distended and evaluated. Apparent bladder wall thickening at least partially due to lack of distention.   REPRODUCTIVE ORGANS: Calcifications in the prostate.   BOWEL: Large amount of stool. Rectal distention with stool. No evidence of bowel obstruction.     VESSELS: Aortic calcification. No aneurysmal dilatation.   IVC filter.   PERITONEUM/RETROPERITONEUM/LYMPH NODES: No retroperitoneal adenopathy. No ascites. Unremarkable appendix.   BONE AND SOFT TISSUE: Discogenic degenerative changes. Vacuum disc at multiple levels. Mild loss of height of multiple thoracic and lumbar spine vertebral bodies. Intramedullary sclerosis of the proximal  left femur is suboptimally included and may represent bone infarcts or an enchondroma.       CHEST: 1. No acute abnormalities within the chest. 2. Dense coronary and aortic calcification. 3. Hyperinflation.   ABDOMEN-PELVIS: 1. Advanced multilevel discogenic degenerative changes. 2. Tiny gallstones. 3. No acute abnormalities.   Signed by: Tamara Snowden 10/13/2024 10:45 AM Dictation workstation:   [id number]    CT cervical spine wo IV contrast    Result Date: 10/13/2024  Interpreted By:  Tamara Snowden, STUDY: CT CERVICAL SPINE WO IV CONTRAST;  10/13/2024 9:27 am   INDICATION: Signs/Symptoms:neck trauma after syncopal episode.     COMPARISON: 12/24/2023   ACCESSION NUMBER(S): [id number]   ORDERING CLINICIAN: DOTTIE REYNA   TECHNIQUE: Axial CT images of the cervical spine are obtained. Axial, coronal and sagittal reconstructions are provided for review.   FINDINGS: Grade 1 anterolisthesis of C4 on C5. Straightening of normal cervical lordosis. Disc space loss from C6 through T1. No erosions. No fracture dislocation. Hypertrophic changes of the C1-C2 articulation. Mild central canal stenosis and varying degrees of neural foraminal narrowing at multiple levels.       No evidence for an acute fracture or subluxation of the cervical spine. Multilevel discogenic degenerative changes.   MACRO: None   Signed by: Tamara Snowden 10/13/2024 10:01 AM Dictation workstation:   [id number]    CT head wo IV contrast    Result Date: 10/13/2024  Interpreted By:  Tamara Snowden, STUDY: CT HEAD WO IV CONTRAST;  10/13/2024 9:27 am   INDICATION: Signs/Symptoms:head injury on eliquis.     COMPARISON: 12/24/2023   ACCESSION NUMBER(S): [id number]   ORDERING CLINICIAN: DOTTIE REYNA   TECHNIQUE: Noncontrast axial CT scan of head was performed. Angled reformats in brain and bone windows were generated. The images were reviewed in bone, brain, blood and soft tissue windows.   FINDINGS: The ventricles, cisterns and sulci are prominent, consistent  with mild diffuse volume loss. There are areas of nonspecific white matter hypodensity, which are probably age-related or microvascular in nature.   Gray-white differentiation is intact and there is no evidence of acute cortical infarct. No mass, mass effect or midline shift is seen. There is no evidence of hemorrhage.   The visualized paranasal sinuses are clear.         No evidence of acute cortical infarct or intracranial hemorrhage.   Chronic changes as described.   MACRO: None   Signed by: Tamara Snodwen 10/13/2024 9:53 AM Dictation workstation:   [id number]        Assessment/Plan   Type 2 diabetes mellitus with peripheral angiopathy without gangrene   Left diabetic foot ulcer-MRI evidence osteomyelitis   Chronic kidney diease, stage 3  Syncope  Recent positive wound culture -MRSA         IV daptomycin  Monitor CK level  Monitor temperature and WBC  Follow-up blood cultures  Neurology follow up   Local care  Podiatry consult-MRI showed evidence osteomyelitis   Further recommendations based on work up    Discussed with Dr. Pelaez      Tentative long term plan IV daptomycin for 6 weeks    Total time spent caring for the patient today was 25 minutes. This includes time spent before the visit reviewing the chart, time spent during the visit, and time spent after the visit on documentation.     Polina Oakes, APRN-CNP

## 2024-10-15 NOTE — PROGRESS NOTES
Rachid Yun is a 74 y.o. male on day 2 of admission presenting with Syncope, non cardiac.      Subjective   Patient seen and examined. Sitting up in the chair, eating breakfast. Denies any dizziness. No events overnight.       Objective     Last Recorded Vitals  /83 (BP Location: Left arm, Patient Position: Sitting)   Pulse 60   Temp 36.4 °C (97.5 °F) (Temporal)   Resp 16   Wt 103 kg (228 lb)   SpO2 98%   Intake/Output last 3 Shifts:    Intake/Output Summary (Last 24 hours) at 10/15/2024 0921  Last data filed at 10/15/2024 0900  Gross per 24 hour   Intake 1100 ml   Output 3250 ml   Net -2150 ml       Admission Weight  Weight: 103 kg (228 lb) (10/13/24 1637)    Daily Weight  10/13/24 : 103 kg (228 lb)    Image Results  MR foot left wo IV contrast  Narrative: Interpreted By:  Raúl Infante,   STUDY:  MR FOOT LEFT WO IV CONTRAST; ;  10/14/2024 10:02 pm      INDICATION:  Signs/Symptoms:left foot infection.          COMPARISON:  MRI 07/16/2024      ACCESSION NUMBER(S):  [id number]      ORDERING CLINICIAN:  TARA ANDRES      TECHNIQUE:  Multiplanar multisequence MRI obtained of the left foot.      FINDINGS:  Skin ulceration is demonstrated along the 5th metatarsal base. There  is skin thickening and subcutaneous edema deep to the level of the  skin ulceration with component of cellulitis. Of note, subtle linear  appearing area of T2 weighted hyperintensity is demonstrated from the  area of skin ulceration into the 5th metatarsal base with focal  fistulous tract of concern. No subcutaneous abscess formation  demonstrated. There is loss of cortical margin within the 5th  metatarsal base about the region of the fistulous tract with  generalized intermediate T1 weighted signal intensity with  corresponding T2 weighted hyperintensity with marrow edema throughout  the 5th metatarsal base extending into the mid 5th metatarsal shaft.  No well-marginated abscess formation about the ulceration. Of  note,  findings are in the region of the 5th tarsometatarsal articulation  with mild asymmetric fluid about the articulation as well as mild  marrow edema within the cuboid bone lateral margin of the  articulation with component of septic arthritis osteomyelitis about  the articulation not excluded with mild osteomyelitis within the  cuboid bone a possibility. Component of findings may simply be  reactive.      Remainder of the midfoot osseous structures demonstrate normal marrow  signal intensity. Intertarsal articulations are unremarkable.  Lisfranc joint is otherwise congruent. There is mild Lisfranc joint  osteoarthritis. Lisfranc ligament complex unremarkable.      Forefoot 2nd through 4th metatarsal shafts demonstrate no focal  marrow edema. There is remote healed fracture deformity of the distal  5th metatarsal shafts.      Tibialis anterior, extensor hallucis longus and extensor digitorum  tendons are unremarkable. Flexor hallucis longus and flexor digitorum  tendons are unremarkable.      Musculature of the foot demonstrates diffuse atrophy. There is  component mild intermediate signal intensity and chronic diabetic  myopathy. Mild asymmetric edema within the abductor digiti minimi  with infectious myositis of concern. Visualized portions of the  plantar fascia are unremarkable      1st MTP joint demonstrates moderate osteoarthritic degenerative  change. Tibial sesamoid bone demonstrates a few ossific fragments  along the proximal margin with sequela of remote fracture deformity.  There is mild tibial sesamoid metatarsal osteoarthritis. Fibular  sesamoid bone are unremarkable. The 2nd through 5th MTP joints  demonstrate no focal marrow edema. No joint effusions. Digits  distally demonstrate no focal marrow edema.                      Impression: 1. Skin ulceration lateral base 5th metatarsal with cellulitis and  fistulous tract extending into the cortical margin. No discrete  abscess formation demonstrated  about the ulceration. However, there  is osteomyelitis throughout the 5th metatarsal base extending to near  the mid 5th metatarsal shafts. Also, mild asymmetric fluid about the  5th tarsometatarsal articulation with mild marrow edema along the  lateral cuboid bone about the 5th tarsometatarsal articulation. This  may simply be reactive. However, component of septic arthritis  osteomyelitis is not excluded.          MACRO:  None      Signed by: Raúl Infante 10/15/2024 7:59 AM  Dictation workstation:   [id number]  MR angio head wo IV contrast  Narrative: Interpreted By:  Cristiana Frank,   STUDY:  MR ANGIO HEAD WO IV CONTRAST;  10/14/2024 9:15 am      INDICATION:  Signs/Symptoms:syncope.          COMPARISON:  None.      ACCESSION NUMBER(S):  [id number]      ORDERING CLINICIAN:  SHAMIKA MILAN      TECHNIQUE:  Time-of-flight MRA of the head was performed. The images were  reviewed as source images and maximum intensity projections.      FINDINGS:          Anterior circulation:    There is expected flow signal in bilateral  intracranial internal carotid arteries, bilateral carotid terminals,  bilateral proximal anterior and middle cerebral arteries.      Posterior circulation:    Bilateral intracranial vertebral arteries,  vertebrobasilar junction, basilar artery and proximal posterior  cerebral arteries demonstrate expected flow signal.      There is a 0.3 x 0.3 cm outpouching from the basilar tip compatible  with an aneurysm.      Impression: 1.  There is no evidence for hemodynamically significant stenosis or  large branch vessel cutoffs of the visualized intracranial  vasculature.  2. 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      MACRO:  None      Signed by: Cristiana Frank 10/15/2024 7:59 AM  Dictation workstation:   [id number]  MR brain wo IV contrast  Narrative: Interpreted By:  Cristiana Frank,   STUDY:  MR BRAIN WO IV CONTRAST;  10/14/2024 9:11 am      INDICATION:  Signs/Symptoms:syncope.           COMPARISON:  MRI 08/31/2010      ACCESSION NUMBER(S):  [id number]      ORDERING CLINICIAN:  SHAMIKA MILAN      TECHNIQUE:  Axial T2, FLAIR, DWI, gradient echo T2 and sagittal and coronal T1  weighted images of brain were acquired.      FINDINGS:  CSF Spaces: The ventricles, sulci and basal cisterns are prominent  compatible with age related involutional changes and mild-to-moderate  volume loss.      Parenchyma: There is no diffusion restriction abnormality to suggest  acute infarct.  There is encephalomalacia and gliosis within the  right centrum semiovale which likely represents the sequela of a  prior infarct. There is a mild degree of nonspecific subcortical and  periventricular T2 and FLAIR hyperintense signal which is compatible  with microangiopathy. Old lacunar infarcts within the cerebellum. No  abnormal susceptibility artifact. There is no mass effect or midline  shift.      Paranasal Sinuses and Mastoids: Visualized paranasal sinuses and  mastoid air cells are predominantly clear..      Impression: No evidence of acute infarct, intracranial mass effect or midline  shift.      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      Mild degree of nonspecific white matter signal compatible with  microangiopathy.      MACRO:  None      Signed by: Cristiana Frank 10/15/2024 7:54 AM  Dictation workstation:   [id number]      Physical Exam    General: Alert and oriented x3, pleasant.   Cardiac: Irregular rate and rhythm, S1/S2 , no murmur.   Pulmonary: Clear to auscultation on room air.   Abdomen: Soft, round, nontender. BS +x4.   Extremities: No edema.  Skin: No rashes or lesions. Dressing in place to the L foot, no drainage seen.     Relevant Results    Scheduled medications  apixaban, 2.5 mg, oral, BID  atorvastatin, 20 mg, oral, Daily  clopidogrel, 75 mg, oral, Daily  daptomycin, 500 mg, intravenous, q24h KEVIN  donepezil, 10 mg, oral, Nightly  DULoxetine, 60 mg, oral, Daily  ferrous sulfate (325 mg ferrous  sulfate), 65 mg of iron, oral, Daily  folic acid, 0.4 mg, oral, Daily  gabapentin, 300 mg, oral, TID  insulin glargine, 30 Units, subcutaneous, Nightly  insulin lispro, 0-5 Units, subcutaneous, TID  levETIRAcetam, 500 mg, oral, BID  metoprolol succinate XL, 25 mg, oral, BID  pantoprazole, 40 mg, oral, Daily before breakfast  tamsulosin, 0.4 mg, oral, Nightly  traZODone, 150 mg, oral, Nightly      Continuous medications     PRN medications  PRN medications: acetaminophen **OR** acetaminophen **OR** acetaminophen, bisacodyl, dextrose, dextrose, glucagon, glucagon, melatonin, ondansetron ODT **OR** ondansetron, oxyCODONE     Results for orders placed or performed during the hospital encounter of 10/13/24 (from the past 24 hour(s))   Creatine Kinase   Result Value Ref Range    Creatine Kinase 116 0 - 325 U/L   POCT GLUCOSE   Result Value Ref Range    POCT Glucose 173 (H) 74 - 99 mg/dL   Transthoracic Echo (TTE) Complete   Result Value Ref Range    BSA 2.35 m2   POCT GLUCOSE   Result Value Ref Range    POCT Glucose 169 (H) 74 - 99 mg/dL   POCT GLUCOSE   Result Value Ref Range    POCT Glucose 156 (H) 74 - 99 mg/dL   CBC   Result Value Ref Range    WBC 8.1 4.4 - 11.3 x10*3/uL    nRBC 0.0 0.0 - 0.0 /100 WBCs    RBC 3.62 (L) 4.50 - 5.90 x10*6/uL    Hemoglobin 10.4 (L) 13.5 - 17.5 g/dL    Hematocrit 32.4 (L) 41.0 - 52.0 %    MCV 90 80 - 100 fL    MCH 28.7 26.0 - 34.0 pg    MCHC 32.1 32.0 - 36.0 g/dL    RDW 15.2 (H) 11.5 - 14.5 %    Platelets 151 150 - 450 x10*3/uL   Basic Metabolic Panel   Result Value Ref Range    Glucose 142 (H) 74 - 99 mg/dL    Sodium 129 (L) 136 - 145 mmol/L    Potassium 4.4 3.5 - 5.3 mmol/L    Chloride 100 98 - 107 mmol/L    Bicarbonate 22 21 - 32 mmol/L    Anion Gap 11 10 - 20 mmol/L    Urea Nitrogen 40 (H) 6 - 23 mg/dL    Creatinine 1.55 (H) 0.50 - 1.30 mg/dL    eGFR 47 (L) >60 mL/min/1.73m*2    Calcium 8.3 (L) 8.6 - 10.3 mg/dL   POCT GLUCOSE   Result Value Ref Range    POCT Glucose 127 (H) 74 - 99  mg/dL             Assessment/Plan      Syncope and Collapse  -Neurology and Cardiology follows.    -Suspect from orthostatic hypotension, BP did drop with position change yesterday.   -CT brain and C spine with no acute findings.   -CT chest/abd/pelvis with no acute findings.   -MRI/MRA head done, no acute infarct, does show aneurysm.    -Monitor orthostatic VS and med changes per cardio. Amlodipine was stopped yesterday, repeat orthos to be done this morning.   -EEG pending read. He has known seizure hx on keppra.  -Monitor on telemetry.   -PT/OT evals, recommendation for moderate intensity therapy at this time.      DM Foot Ulcer  -Has had issues with this for many months now, following outpatient with ID.   -ID follows.   -He had wound culture done on 9/30/24 which grew MRSA with resistance.   -Started on daptomycin by ID. Abx plan for DC needed.   -MRI L foot does show evidence of OM.   -Local wound care.      DM 2 with Neuropathy  -HgA1c 6.1 (7/1/24).   -Continue lantus and SSI coverage.   -Monitor blood sugars.      CKD 3  -Appears to be near his baseline renal function at this time.   -Monitor closely. He did receive IV contrast in the ED yesterday.     Hyponatremia  -NA dropped 129 this AM, repeat BMP at noon. If still low will investigate.   -Monitor.     Atrial Fibrillation  -Continue Eliquis.   -BB on board, HR low at times, appreciate cardio input.      HTN  -On amlodipine and BB at home, amlodipine stopped by cardio.   -BP stable.   -HR intermittently low.      HLD  -Continue statin.      DVT Risk  -Eliquis.      Plan  Neurology, Cardiology and ID follows.   Orthostatic +, med adjustments per cardiology. Will repeat this AM.  EEG pending read, echo pending read.   IV abx per ID. MRI foot shows evidence of OM.   Repeat BMP at noon for low sodium level.   PT/OT recommend moderate intensity therapy at this time, SS on board for DC planning.   Updated patient's daughter Kelley on the phone. All questions  answered.         Jenna Alvarado, APRN-CNP

## 2024-10-15 NOTE — PROGRESS NOTES
"Physical Therapy    Physical Therapy Treatment    Patient Name: Rachid Yun  MRN: [id number]  Department: [address]  Room: [address] [place] [address]  Today's Date: 10/15/2024  Time Calculation  Start Time: 0836  Stop Time: 0914  Time Calculation (min): 38 min         Assessment/Plan   PT Assessment  PT Assessment Results: Decreased strength, Decreased endurance, Impaired balance, Decreased mobility, Decreased coordination, Decreased safety awareness, Pain, Impaired sensation  Rehab Prognosis: Good  Barriers to Discharge: assist x 1-2  Evaluation/Treatment Tolerance: Patient limited by fatigue, Patient limited by pain  Medical Staff Made Aware: Yes  End of Session Communication: Bedside nurse  End of Session Patient Position: Up in chair, Alarm on    Assessment Comment: Tolerated transfer to chair. Able to progress to short ambulation trial (about 5 ft) fwd with walker and close chair follow. Mod assist x 1-2 for safe mobility. \"That's the furthest I've walked since I fell,\" pt stated. Pt continues to require assist x 1-2 for safe transfers.       PT Plan  Treatment/Interventions: Bed mobility, Transfer training, Gait training, Balance training, Neuromuscular re-education, Strengthening, Endurance training, Therapeutic exercise, Therapeutic activity  PT Plan: Ongoing PT  PT Frequency: 5 times per week  PT Discharge Recommendations: Moderate intensity level of continued care  Equipment Recommended upon Discharge: Wheeled walker  PT Recommended Transfer Status: Assist x2  PT - OK to Discharge: Yes      General Visit Information:   PT  Visit  PT Received On: 10/15/24  General  Reason for Referral: impaired mobility; syncope  Past Medical History Relevant to Rehab: arthritis, CAD, DM, HTN, A-fib  Missed Visit: No  Family/Caregiver Present: No  Prior to Session Communication: Bedside nurse  Patient Position Received: Bed, 3 rail up, Alarm on  Preferred Learning Style: verbal, visual  General Comment: Pt supine in bed upon arrival. Cleared " for mobility per nursing. Pt agreeable to participate. Student nurse at bedside to [facility] vitals.    Subjective   Precautions:  Precautions  Medical Precautions: Fall precautions            Objective   Pain:  Pain Assessment  Pain Assessment: 0-10  0-10 (Numeric) Pain Score: 6  Pain Type: Chronic pain  Pain Location: Back  Pain Orientation: Lower  Pain Interventions: Repositioned (RN aware)  Cognition:  Cognition  Orientation Level: Oriented X4  Cognition Comments: able to follow commands appropriately    Postural Control:  Postural Control  Posture Comment: rounded shoulders, flexed posture primarily in standing    Activity Tolerance:  Activity Tolerance  Endurance: Decreased tolerance for upright activites  Treatments:  Therapeutic Exercise  Therapeutic Exercise Performed: Yes  Therapeutic Exercise Activity 1: x15 B LAQs, assistance with LLE. x15 B seated marches.    Therapeutic Activity  Therapeutic Activity Performed: Yes  Therapeutic Activity 1: Pt sat on EOB about 10-15 minutes prior to transferring to chair.  Repeated cueing to promote upright posture and pursed-lip breathing. Performed LE ther ex. Rest breaks in between all tasks.    Bed Mobility 1  Bed Mobility 1: Supine to sitting  Level of Assistance 1: Minimum assistance  Bed Mobility Comments 1: HOB elevated. use of bedrails    Ambulation/Gait Training  Ambulation/Gait Training Performed: Yes  Ambulation/Gait Training 1  Surface 1: Level tile  Device 1: Rolling walker  Gait Support Devices: Wheelchair follow  Assistance 1: Moderate assistance  Quality of Gait 1:  (decreased fawad, flexed posture, minimal foot clearance, shuffle-liek gait with decreased stance time and WBing in LLE.)  Comments/Distance (ft) 1: about 5 ft fwd    Transfer 1  Technique 1: Sit to stand  Transfer Device 1: Walker  Transfer Level of Assistance 1: Moderate assistance  Trials/Comments 1: x2 trials. First from elevated EOB, second from chair with arms  Transfers 2  Technique 2:  Stand to sit  Transfer Device 2: Walker  Transfer Level of Assistance 2: Moderate assistance  Trials/Comments 2: cueing for hand placement and proper positioning  Transfers 3  Transfer From 3: Bed to  Transfer to 3: Chair with arms  Technique 3: Sit to stand, Stand to sit, To right  Transfer Device 3: Walker  Transfer Level of Assistance 3: +2, Minimum assistance  Trials/Comments 3: walker management provided by therapist.         Outcome Measures:  [facility] Basic Mobility  Turning from your back to your side while in a flat bed without using bedrails: A little  Moving from lying on your back to sitting on the side of a flat bed without using bedrails: A little  Moving to and from bed to chair (including a wheelchair): A lot  Standing up from a chair using your arms (e.g. wheelchair or bedside chair): A lot  To walk in hospital room: Total  Climbing 3-5 steps with railing: Total  Basic Mobility - Total Score: 12    Education Documentation  Precautions, taught by Angelina Poon PT at 10/15/2024 11:22 AM.  Learner: Patient  Readiness: Acceptance  Method: Explanation  Response: Verbalizes Understanding    Body Mechanics, taught by Angelina Poon PT at 10/15/2024 11:22 AM.  Learner: Patient  Readiness: Acceptance  Method: Explanation  Response: Verbalizes Understanding    Mobility Training, taught by Angelina Poon PT at 10/15/2024 11:22 AM.  Learner: Patient  Readiness: Acceptance  Method: Explanation  Response: Verbalizes Understanding    Education Comments  No comments found.        OP EDUCATION:       Encounter Problems       Encounter Problems (Active)       Balance       standing (Progressing)       Start:  10/14/24    Expected End:  10/28/24       Pt will stand with UE support of walker, supervision, no LOB, for 2 minutes            Mobility       LTG - Patient will be able to go up and down a curb/step with the appropriate device (Progressing)       Start:  10/14/24    Expected End:  10/28/24            bed mobility  (Progressing)       Start:  10/14/24    Expected End:  10/28/24       Pt will perform sup to/from sit transfer with supervision          ambulation (Progressing)       Start:  10/14/24    Expected End:  10/28/24       Pt will amb > 30  ft with wheeled walker and CGA            PT Transfers       sit to stand (Progressing)       Start:  10/14/24    Expected End:  10/28/24       Pt will perform sit to stand transfer with walker and CGA         bed to chair (Progressing)       Start:  10/14/24    Expected End:  10/28/24       Pt will perform bed to chair transfer with walker and CGA            Pain - Adult          Safety       LTG - Patient will utilize safety techniques (Progressing)       Start:  10/14/24    Expected End:  10/28/24

## 2024-10-16 LAB
ANION GAP SERPL CALCULATED.3IONS-SCNC: 11 MMOL/L (ref 10–20)
BUN SERPL-MCNC: 43 MG/DL (ref 6–23)
CALCIUM SERPL-MCNC: 8.5 MG/DL (ref 8.6–10.3)
CHLORIDE SERPL-SCNC: 102 MMOL/L (ref 98–107)
CO2 SERPL-SCNC: 21 MMOL/L (ref 21–32)
CREAT SERPL-MCNC: 1.52 MG/DL (ref 0.5–1.3)
CREAT UR-MCNC: 39.3 MG/DL (ref 20–370)
EGFRCR SERPLBLD CKD-EPI 2021: 48 ML/MIN/1.73M*2
ERYTHROCYTE [DISTWIDTH] IN BLOOD BY AUTOMATED COUNT: 15.1 % (ref 11.5–14.5)
GLUCOSE BLD MANUAL STRIP-MCNC: 121 MG/DL (ref 74–99)
GLUCOSE BLD MANUAL STRIP-MCNC: 124 MG/DL (ref 74–99)
GLUCOSE BLD MANUAL STRIP-MCNC: 125 MG/DL (ref 74–99)
GLUCOSE BLD MANUAL STRIP-MCNC: 167 MG/DL (ref 74–99)
GLUCOSE SERPL-MCNC: 167 MG/DL (ref 74–99)
HCT VFR BLD AUTO: 31.7 % (ref 41–52)
HGB BLD-MCNC: 10.4 G/DL (ref 13.5–17.5)
MCH RBC QN AUTO: 28.7 PG (ref 26–34)
MCHC RBC AUTO-ENTMCNC: 32.8 G/DL (ref 32–36)
MCV RBC AUTO: 88 FL (ref 80–100)
NRBC BLD-RTO: 0 /100 WBCS (ref 0–0)
OSMOLALITY SERPL: 291 MOSM/KG (ref 280–300)
PLATELET # BLD AUTO: 145 X10*3/UL (ref 150–450)
POTASSIUM SERPL-SCNC: 4.4 MMOL/L (ref 3.5–5.3)
RBC # BLD AUTO: 3.62 X10*6/UL (ref 4.5–5.9)
SODIUM SERPL-SCNC: 130 MMOL/L (ref 136–145)
SODIUM UR-SCNC: 13 MMOL/L
SODIUM/CREAT UR-RTO: 33 MMOL/G CREAT
WBC # BLD AUTO: 6.5 X10*3/UL (ref 4.4–11.3)

## 2024-10-16 PROCEDURE — 2500000001 HC RX 250 WO HCPCS SELF ADMINISTERED DRUGS (ALT 637 FOR MEDICARE OP): Performed by: NURSE PRACTITIONER

## 2024-10-16 PROCEDURE — 2500000002 HC RX 250 W HCPCS SELF ADMINISTERED DRUGS (ALT 637 FOR MEDICARE OP, ALT 636 FOR OP/ED): Performed by: NURSE PRACTITIONER

## 2024-10-16 PROCEDURE — 99232 SBSQ HOSP IP/OBS MODERATE 35: CPT | Performed by: NURSE PRACTITIONER

## 2024-10-16 PROCEDURE — 1200000002 HC GENERAL ROOM WITH TELEMETRY DAILY

## 2024-10-16 PROCEDURE — 2500000004 HC RX 250 GENERAL PHARMACY W/ HCPCS (ALT 636 FOR OP/ED): Performed by: NURSE PRACTITIONER

## 2024-10-16 PROCEDURE — 97110 THERAPEUTIC EXERCISES: CPT | Mod: GP,CQ

## 2024-10-16 PROCEDURE — 97535 SELF CARE MNGMENT TRAINING: CPT | Mod: GO

## 2024-10-16 PROCEDURE — 83935 ASSAY OF URINE OSMOLALITY: CPT | Mod: TRILAB,WESLAB | Performed by: NURSE PRACTITIONER

## 2024-10-16 PROCEDURE — 97116 GAIT TRAINING THERAPY: CPT | Mod: GP,CQ

## 2024-10-16 PROCEDURE — 83930 ASSAY OF BLOOD OSMOLALITY: CPT | Mod: TRILAB,WESLAB | Performed by: NURSE PRACTITIONER

## 2024-10-16 PROCEDURE — 97110 THERAPEUTIC EXERCISES: CPT | Mod: GO

## 2024-10-16 PROCEDURE — 82947 ASSAY GLUCOSE BLOOD QUANT: CPT

## 2024-10-16 PROCEDURE — 82570 ASSAY OF URINE CREATININE: CPT | Performed by: NURSE PRACTITIONER

## 2024-10-16 PROCEDURE — 80048 BASIC METABOLIC PNL TOTAL CA: CPT | Performed by: NURSE PRACTITIONER

## 2024-10-16 PROCEDURE — 85027 COMPLETE CBC AUTOMATED: CPT | Performed by: NURSE PRACTITIONER

## 2024-10-16 RX ORDER — POLYETHYLENE GLYCOL 3350 17 G/17G
17 POWDER, FOR SOLUTION ORAL DAILY
Status: DISCONTINUED | OUTPATIENT
Start: 2024-10-16 | End: 2024-10-19 | Stop reason: HOSPADM

## 2024-10-16 RX ORDER — DAPTOMYCIN 50 MG/ML
500 INJECTION, POWDER, LYOPHILIZED, FOR SOLUTION INTRAVENOUS DAILY
Qty: 27000 MG | Refills: 0 | Status: SHIPPED
Start: 2024-10-16 | End: 2024-12-09

## 2024-10-16 RX ORDER — DOCUSATE SODIUM 100 MG/1
100 CAPSULE, LIQUID FILLED ORAL 2 TIMES DAILY
Status: DISCONTINUED | OUTPATIENT
Start: 2024-10-16 | End: 2024-10-19 | Stop reason: HOSPADM

## 2024-10-16 ASSESSMENT — COGNITIVE AND FUNCTIONAL STATUS - GENERAL
TURNING FROM BACK TO SIDE WHILE IN FLAT BAD: A LITTLE
MOBILITY SCORE: 12
CLIMB 3 TO 5 STEPS WITH RAILING: TOTAL
PERSONAL GROOMING: A LITTLE
HELP NEEDED FOR BATHING: A LITTLE
DAILY ACTIVITIY SCORE: 18
CLIMB 3 TO 5 STEPS WITH RAILING: A LOT
TOILETING: A LITTLE
DAILY ACTIVITIY SCORE: 14
DRESSING REGULAR UPPER BODY CLOTHING: A LITTLE
HELP NEEDED FOR BATHING: A LOT
DRESSING REGULAR UPPER BODY CLOTHING: A LITTLE
DRESSING REGULAR LOWER BODY CLOTHING: A LITTLE
MOVING TO AND FROM BED TO CHAIR: A LOT
WALKING IN HOSPITAL ROOM: A LITTLE
STANDING UP FROM CHAIR USING ARMS: A LOT
DRESSING REGULAR LOWER BODY CLOTHING: TOTAL
PERSONAL GROOMING: A LITTLE
MOVING FROM LYING ON BACK TO SITTING ON SIDE OF FLAT BED WITH BEDRAILS: A LITTLE
EATING MEALS: A LITTLE
STANDING UP FROM CHAIR USING ARMS: A LITTLE
MOBILITY SCORE: 19
TOILETING: TOTAL
MOVING TO AND FROM BED TO CHAIR: A LITTLE
WALKING IN HOSPITAL ROOM: TOTAL

## 2024-10-16 ASSESSMENT — PAIN DESCRIPTION - DESCRIPTORS: DESCRIPTORS: ACHING

## 2024-10-16 ASSESSMENT — PAIN SCALES - GENERAL
PAINLEVEL_OUTOF10: 4
PAINLEVEL_OUTOF10: 2
PAINLEVEL_OUTOF10: 7
PAINLEVEL_OUTOF10: 3
PAINLEVEL_OUTOF10: 6
PAINLEVEL_OUTOF10: 6
PAINLEVEL_OUTOF10: 5 - MODERATE PAIN
PAINLEVEL_OUTOF10: 7

## 2024-10-16 ASSESSMENT — PAIN DESCRIPTION - LOCATION
LOCATION: GENERALIZED
LOCATION: BACK
LOCATION: GENERALIZED

## 2024-10-16 ASSESSMENT — ACTIVITIES OF DAILY LIVING (ADL): HOME_MANAGEMENT_TIME_ENTRY: 15

## 2024-10-16 ASSESSMENT — PAIN - FUNCTIONAL ASSESSMENT
PAIN_FUNCTIONAL_ASSESSMENT: 0-10

## 2024-10-16 ASSESSMENT — PAIN DESCRIPTION - ORIENTATION: ORIENTATION: LOWER

## 2024-10-16 NOTE — PROGRESS NOTES
Rachid Yun is a 74 y.o. male on day 3 of admission presenting with Syncope, non cardiac.    Subjective   Interval History:   Interval discussion with daughter via telephone at bedside   Afebrile, no chills  Remains on room air  No shortness of breath cough or chest pain  No nausea, vomiting or diarrhea       Objective   Range of Vitals (last 24 hours)  Heart Rate:  []   Temp:  [35.9 °C (96.6 °F)-36.5 °C (97.7 °F)]   Resp:  [16-18]   BP: (117-149)/(58-88)   SpO2:  [94 %-99 %]   Daily Weight  10/13/24 : 103 kg (228 lb)    Body mass index is 27.76 kg/m².    Physical Exam  Constitutional:       Appearance: Normal appearance.   HENT:      Head: Normocephalic and atraumatic.      Nose: Nose normal.      Mouth/Throat:      Mouth: Mucous membranes are moist.      Pharynx: Oropharynx is clear.   Eyes:      General: No scleral icterus.  Cardiovascular:      Rate and Rhythm: Normal rate and regular rhythm.   Pulmonary:      Effort: Pulmonary effort is normal.      Breath sounds: Normal breath sounds.   Abdominal:      General: Bowel sounds are normal.      Palpations: Abdomen is soft.   Musculoskeletal:         General: Normal range of motion.      Cervical back: Normal range of motion and neck supple.   Skin:     General: Skin is warm and dry.      Comments: Left lateral foot ulcer with large amount of granulation tissue, mild surrounding erythema, left foot swelling and increased drainage reported.     Neurological:      Mental Status: He is alert.      Comments: Awake, alert   Psychiatric:         Mood and Affect: Mood normal.         Behavior: Behavior normal.     Antibiotics  cephalexin - 500 mg  DAPTOmycin - 500 mg/50 mL  doxycycline - 100 mg  sulfamethoxazole-trimethoprim - 800-160 mg    Relevant Results  Labs  Results from last 72 hours   Lab Units 10/16/24  0516 10/15/24  0516 10/14/24  0505   WBC AUTO x10*3/uL 6.5 8.1 8.1   HEMOGLOBIN g/dL 10.4* 10.4* 10.7*   HEMATOCRIT % 31.7* 32.4* 33.2*   PLATELETS AUTO  x10*3/uL 145* 151 165     Results from last 72 hours   Lab Units 10/16/24  0516 10/15/24  1135 10/15/24  0516   SODIUM mmol/L 130* 130* 129*   POTASSIUM mmol/L 4.4 4.7 4.4   CHLORIDE mmol/L 102 101 100   CO2 mmol/L 21 19* 22   BUN mg/dL 43* 40* 40*   CREATININE mg/dL 1.52* 1.63* 1.55*   GLUCOSE mg/dL 167* 145* 142*   CALCIUM mg/dL 8.5* 8.6 8.3*   ANION GAP mmol/L 11 15 11   EGFR mL/min/1.73m*2 48* 44* 47*           Estimated Creatinine Clearance: 52.3 mL/min (A) (by C-G formula based on SCr of 1.52 mg/dL (H)).  C-Reactive Protein   Date Value Ref Range Status   10/01/2024 7.77 (H) <1.00 mg/dL Final   07/16/2024 <0.30 0.00 - 2.00 mg/dL Final     Microbiology  Blood culture pending  Wound culture -_MRSA      Imaging  MR angio head wo IV contrast    Result Date: 10/15/2024  Interpreted By:  Cristiana Frank, STUDY: MR ANGIO HEAD WO IV CONTRAST;  10/14/2024 9:15 am   INDICATION: Signs/Symptoms:syncope.     COMPARISON: None.   ACCESSION NUMBER(S): [id number]   ORDERING CLINICIAN: SHAMIKA MILAN   TECHNIQUE: Time-of-flight MRA of the head was performed. The images were reviewed as source images and maximum intensity projections.   FINDINGS:     Anterior circulation:    There is expected flow signal in bilateral intracranial internal carotid arteries, bilateral carotid terminals, bilateral proximal anterior and middle cerebral arteries.   Posterior circulation:    Bilateral intracranial vertebral arteries, vertebrobasilar junction, basilar artery and proximal posterior cerebral arteries demonstrate expected flow signal.   There is a 0.3 x 0.3 cm outpouching from the basilar tip compatible with an aneurysm.       1.  There is no evidence for hemodynamically significant stenosis or large branch vessel cutoffs of the visualized intracranial vasculature. 2. 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   MACRO: None   Signed by: Cristiana Frank 10/15/2024 7:59 AM Dictation workstation:   [id number]    MR foot left wo IV  contrast    Result Date: 10/15/2024  Interpreted By:  Raúl Infante, STUDY: MR FOOT LEFT WO IV CONTRAST; ;  10/14/2024 10:02 pm   INDICATION: Signs/Symptoms:left foot infection.     COMPARISON: MRI 07/16/2024   ACCESSION NUMBER(S): [id number]   ORDERING CLINICIAN: TARA ANDRES   TECHNIQUE: Multiplanar multisequence MRI obtained of the left foot.   FINDINGS: Skin ulceration is demonstrated along the 5th metatarsal base. There is skin thickening and subcutaneous edema deep to the level of the skin ulceration with component of cellulitis. Of note, subtle linear appearing area of T2 weighted hyperintensity is demonstrated from the area of skin ulceration into the 5th metatarsal base with focal fistulous tract of concern. No subcutaneous abscess formation demonstrated. There is loss of cortical margin within the 5th metatarsal base about the region of the fistulous tract with generalized intermediate T1 weighted signal intensity with corresponding T2 weighted hyperintensity with marrow edema throughout the 5th metatarsal base extending into the mid 5th metatarsal shaft. No well-marginated abscess formation about the ulceration. Of note, findings are in the region of the 5th tarsometatarsal articulation with mild asymmetric fluid about the articulation as well as mild marrow edema within the cuboid bone lateral margin of the articulation with component of septic arthritis osteomyelitis about the articulation not excluded with mild osteomyelitis within the cuboid bone a possibility. Component of findings may simply be reactive.   Remainder of the midfoot osseous structures demonstrate normal marrow signal intensity. Intertarsal articulations are unremarkable. Lisfranc joint is otherwise congruent. There is mild Lisfranc joint osteoarthritis. Lisfranc ligament complex unremarkable.   Forefoot 2nd through 4th metatarsal shafts demonstrate no focal marrow edema. There is remote healed fracture deformity of the distal  5th metatarsal shafts.   Tibialis anterior, extensor hallucis longus and extensor digitorum tendons are unremarkable. Flexor hallucis longus and flexor digitorum tendons are unremarkable.   Musculature of the foot demonstrates diffuse atrophy. There is component mild intermediate signal intensity and chronic diabetic myopathy. Mild asymmetric edema within the abductor digiti minimi with infectious myositis of concern. Visualized portions of the plantar fascia are unremarkable   1st MTP joint demonstrates moderate osteoarthritic degenerative change. Tibial sesamoid bone demonstrates a few ossific fragments along the proximal margin with sequela of remote fracture deformity. There is mild tibial sesamoid metatarsal osteoarthritis. Fibular sesamoid bone are unremarkable. The 2nd through 5th MTP joints demonstrate no focal marrow edema. No joint effusions. Digits distally demonstrate no focal marrow edema.               1. Skin ulceration lateral base 5th metatarsal with cellulitis and fistulous tract extending into the cortical margin. No discrete abscess formation demonstrated about the ulceration. However, there is osteomyelitis throughout the 5th metatarsal base extending to near the mid 5th metatarsal shafts. Also, mild asymmetric fluid about the 5th tarsometatarsal articulation with mild marrow edema along the lateral cuboid bone about the 5th tarsometatarsal articulation. This may simply be reactive. However, component of septic arthritis osteomyelitis is not excluded.     MACRO: None   Signed by: Raúl Infante 10/15/2024 7:59 AM Dictation workstation:   [id number]    MR brain wo IV contrast    Result Date: 10/15/2024  Interpreted By:  Cristiana Frank, STUDY: MR BRAIN WO IV CONTRAST;  10/14/2024 9:11 am   INDICATION: Signs/Symptoms:syncope.     COMPARISON: MRI 08/31/2010   ACCESSION NUMBER(S): [id number]   ORDERING CLINICIAN: SHAMIKA MILAN   TECHNIQUE: Axial T2, FLAIR, DWI, gradient echo T2 and sagittal and  coronal T1 weighted images of brain were acquired.   FINDINGS: CSF Spaces: The ventricles, sulci and basal cisterns are prominent compatible with age related involutional changes and mild-to-moderate volume loss.   Parenchyma: There is no diffusion restriction abnormality to suggest acute infarct.  There is encephalomalacia and gliosis within the right centrum semiovale which likely represents the sequela of a prior infarct. There is a mild degree of nonspecific subcortical and periventricular T2 and FLAIR hyperintense signal which is compatible with microangiopathy. Old lacunar infarcts within the cerebellum. No abnormal susceptibility artifact. There is no mass effect or midline shift.   Paranasal Sinuses and Mastoids: Visualized paranasal sinuses and mastoid air cells are predominantly clear..       No evidence of acute infarct, intracranial mass effect or midline shift.   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   Mild degree of nonspecific white matter signal compatible with microangiopathy.   MACRO: None   Signed by: Cristiana Frank 10/15/2024 7:54 AM Dictation workstation:   [id number]    ECG 12 lead    Result Date: 10/14/2024  Atrial fibrillation Abnormal ECG When compared with ECG of 24-DEC-2023 15:59, No significant change was found    CT chest abdomen pelvis w IV contrast    Result Date: 10/13/2024  Interpreted By:  Tamara Snowden, STUDY: CT CHEST ABDOMEN PELVIS W IV CONTRAST;  10/13/2024 9:27 am   INDICATION: Signs/Symptoms:fall, syncope.     COMPARISON: None.   ACCESSION NUMBER(S): [id number]   ORDERING CLINICIAN: DOTTIE REYNA   TECHNIQUE: CT of the chest, abdomen, and pelvis was performed.  Contiguous axial images were obtained at 3 mm slice thickness through the chest, abdomen and pelvis. Coronal and sagittal reconstructions at 3 mm slice thickness were performed. Contrast was administered intravenously without immediate complication.   FINDINGS: CHEST:   LUNG/PLEURA/LARGE AIRWAYS: The lungs are  hyperinflated. No suspicious lung nodules or infiltrates. Punctate calcified granuloma in the right lung base.   VESSELS: Aortic calcification. No aneurysmal dilatation. Normal caliber main pulmonary artery.   HEART: No cardiomegaly. No pericardial effusion. Dense coronary artery calcification.   MEDIASTINUM AND ATIF: No significant mediastinal or hilar adenopathy. Subcentimeter lymph nodes are noted which are nonspecific and may be reactive.   CHEST WALL AND LOWER NECK: Grossly unremarkable thyroid gland.   ABDOMEN:   LIVER: Tiny hypodensity in the hepatic dome likely a tiny cyst. The liver is otherwise unremarkable.   BILE DUCTS: No bile duct dilatation.   GALLBLADDER: Likely tiny gallstones. No gallbladder wall thickening.   PANCREAS: Unremarkable pancreas.   SPLEEN: Unremarkable spleen.   ADRENAL GLANDS: No adrenal masses.   KIDNEYS AND URETERS: Cortical thinning of both kidneys. Likely a tiny subcentimeter upper pole left renal cyst. No hydroureter. No definite stones.   PELVIS:   BLADDER: Suboptimally distended and evaluated. Apparent bladder wall thickening at least partially due to lack of distention.   REPRODUCTIVE ORGANS: Calcifications in the prostate.   BOWEL: Large amount of stool. Rectal distention with stool. No evidence of bowel obstruction.     VESSELS: Aortic calcification. No aneurysmal dilatation.   IVC filter.   PERITONEUM/RETROPERITONEUM/LYMPH NODES: No retroperitoneal adenopathy. No ascites. Unremarkable appendix.   BONE AND SOFT TISSUE: Discogenic degenerative changes. Vacuum disc at multiple levels. Mild loss of height of multiple thoracic and lumbar spine vertebral bodies. Intramedullary sclerosis of the proximal left femur is suboptimally included and may represent bone infarcts or an enchondroma.       CHEST: 1. No acute abnormalities within the chest. 2. Dense coronary and aortic calcification. 3. Hyperinflation.   ABDOMEN-PELVIS: 1. Advanced multilevel discogenic degenerative changes.  2. Tiny gallstones. 3. No acute abnormalities.   Signed by: Tamara Snowden 10/13/2024 10:45 AM Dictation workstation:   [id number]    CT cervical spine wo IV contrast    Result Date: 10/13/2024  Interpreted By:  Tamara Snowden, STUDY: CT CERVICAL SPINE WO IV CONTRAST;  10/13/2024 9:27 am   INDICATION: Signs/Symptoms:neck trauma after syncopal episode.     COMPARISON: 12/24/2023   ACCESSION NUMBER(S): [id number]   ORDERING CLINICIAN: DOTTIE REYNA   TECHNIQUE: Axial CT images of the cervical spine are obtained. Axial, coronal and sagittal reconstructions are provided for review.   FINDINGS: Grade 1 anterolisthesis of C4 on C5. Straightening of normal cervical lordosis. Disc space loss from C6 through T1. No erosions. No fracture dislocation. Hypertrophic changes of the C1-C2 articulation. Mild central canal stenosis and varying degrees of neural foraminal narrowing at multiple levels.       No evidence for an acute fracture or subluxation of the cervical spine. Multilevel discogenic degenerative changes.   MACRO: None   Signed by: Tamara Snowden 10/13/2024 10:01 AM Dictation workstation:   [id number]    CT head wo IV contrast    Result Date: 10/13/2024  Interpreted By:  Tamara Snowden, STUDY: CT HEAD WO IV CONTRAST;  10/13/2024 9:27 am   INDICATION: Signs/Symptoms:head injury on eliquis.     COMPARISON: 12/24/2023   ACCESSION NUMBER(S): [id number]   ORDERING CLINICIAN: DOTTIE REYNA   TECHNIQUE: Noncontrast axial CT scan of head was performed. Angled reformats in brain and bone windows were generated. The images were reviewed in bone, brain, blood and soft tissue windows.   FINDINGS: The ventricles, cisterns and sulci are prominent, consistent with mild diffuse volume loss. There are areas of nonspecific white matter hypodensity, which are probably age-related or microvascular in nature.   Gray-white differentiation is intact and there is no evidence of acute cortical infarct. No mass, mass effect or midline shift is  seen. There is no evidence of hemorrhage.   The visualized paranasal sinuses are clear.         No evidence of acute cortical infarct or intracranial hemorrhage.   Chronic changes as described.   MACRO: None   Signed by: Tamara Snowden 10/13/2024 9:53 AM Dictation workstation:   [id number]        Assessment/Plan   Type 2 diabetes mellitus with peripheral angiopathy without gangrene   Left diabetic foot ulcer-culture grew MRSA -MRI evidence osteomyelitis   Chronic kidney diease, stage 3  Syncope  MRSA infection        IV daptomycin  Monitor CK level  Monitor temperature and WBC  Follow-up blood cultures  Neurology follow up   Local care  Podiatry consulted-note reviewed -recommend 8 weeks IV antibiotics   IR consulted for Leno catheter placement  Discharge planning-home with       Long term plan IV daptomycin 500 mg daily for 6 -8 weeks till 12/9/2024-see discharge rec  Weekly CBC with diff, CMP, CK  Follow up with Latanya Taylor  Follow up with podiatry    Total time spent caring for the patient today was 25 minutes. This includes time spent before the visit reviewing the chart, time spent during the visit, and time spent after the visit on documentation.     Polina Oakes, HERBERT-CNP

## 2024-10-16 NOTE — PROGRESS NOTES
10/16/24 1055   Discharge Planning   Expected Discharge Disposition [facility]  (undecided at this time)   Does the patient need discharge transport arranged? No     Patient needs 6 weeks antibiotic.  Current with [facility].    Undecided whether to go home with [facility] for the SN and PT or go to SNF.   List provided.  Patient to talk with children and get back with me.      1519- Patient decided he wants to go home with [facility]  SN & PT.  Asked     ID to put in order for home infusion to price.

## 2024-10-16 NOTE — CONSULTS
Reason For Consult  Chronic kidney disease stage III    History Of Present Illness  Rachid Yun is a 74 y.o. male with a past medical history of chronic kidney disease stage III, diabetes mellitus, hypertension presented to the hospital with a syncopal episode.  Reports he was dizzy and lightheaded prior to this.  He also reports some loose stools and he is being treated for lower extremity infection otherwise he has no complaints.  His GFR is at baseline.  His sodium is mildly low at 130.  He reports drinking about 4 to 5 L of water a day however.  Notably received IV contrast in the emergency room.  Has a history of orthostatic hypotension.  Noted amlodipine was stopped..  We are consulted for management of chronic kidney disease.     Past Medical History  He has a past medical history of Arthritis, Coronary artery disease, Diabetes mellitus (Multi), Hypertension, and Stroke (Multi).    Surgical History  He has a past surgical history that includes Back surgery; Cardiac electrophysiology procedure (N/A, 11/29/2023); Cardiac catheterization (N/A, 12/28/2023); Cardiac catheterization (N/A, 12/28/2023); Radiofrequency ablation; Coronary angioplasty with stent; and Coronary angioplasty with stent.     Social History  He reports that he quit smoking about 36 years ago. His smoking use included cigarettes. He has been exposed to tobacco smoke. He has never used smokeless tobacco. He reports that he does not currently use alcohol. He reports that he does not use drugs.    Family History  Family History   Problem Relation Name Age of Onset    Stomach cancer Mother      Diabetes Mother      Coronary artery disease Father      Other (Pacemaker) Father      Other (S/p CABG) Sister      Other (S/p CABG) Brother      Ovarian cancer Sibling      Coronary artery disease Sibling          Allergies  Meperidine, Vancomycin, Hydromorphone, Other, and Rifampin    Review of Systems  10 point review of systems was obtained and is  negative other than what is indicated above in the HPI     Physical Exam  General: Awake, alert, no acute distress  Head/ears/nose/throat:  Normocephalic, atraumatic, moist mucous membranes  Eyes: conjunctiva clear, no scleral icterus  Neck:  No jugular venous distention, neck supple  Respiratory: Mildly diminished breath sounds, normal respiratory effort  Cardiovascular:  S1 and S2, no rubs  Gastrointestinal:  Soft, positive bowel sounds, no rebound or guarding  Extremities:  No edema, cyanosis  Musculoskeletal:  No deformity noted  Neurologic:  Alert, responsive, cooperative with exam  Psychiatric: normal mood and affect         I&O 24HR    Intake/Output Summary (Last 24 hours) at 10/16/2024 1200  Last data filed at 10/16/2024 0909  Gross per 24 hour   Intake 800 ml   Output 2650 ml   Net -1850 ml       Vitals 24HR  Heart Rate:  []   Temp:  [35.9 °C (96.6 °F)-36.5 °C (97.7 °F)]   Resp:  [16-18]   BP: (117-149)/(58-88)   SpO2:  [94 %-99 %]       Relevant Results  Results for orders placed or performed during the hospital encounter of 10/13/24 (from the past 24 hours)   POCT GLUCOSE   Result Value Ref Range    POCT Glucose 150 (H) 74 - 99 mg/dL   POCT GLUCOSE   Result Value Ref Range    POCT Glucose 247 (H) 74 - 99 mg/dL   CBC   Result Value Ref Range    WBC 6.5 4.4 - 11.3 x10*3/uL    nRBC 0.0 0.0 - 0.0 /100 WBCs    RBC 3.62 (L) 4.50 - 5.90 x10*6/uL    Hemoglobin 10.4 (L) 13.5 - 17.5 g/dL    Hematocrit 31.7 (L) 41.0 - 52.0 %    MCV 88 80 - 100 fL    MCH 28.7 26.0 - 34.0 pg    MCHC 32.8 32.0 - 36.0 g/dL    RDW 15.1 (H) 11.5 - 14.5 %    Platelets 145 (L) 150 - 450 x10*3/uL   Basic Metabolic Panel   Result Value Ref Range    Glucose 167 (H) 74 - 99 mg/dL    Sodium 130 (L) 136 - 145 mmol/L    Potassium 4.4 3.5 - 5.3 mmol/L    Chloride 102 98 - 107 mmol/L    Bicarbonate 21 21 - 32 mmol/L    Anion Gap 11 10 - 20 mmol/L    Urea Nitrogen 43 (H) 6 - 23 mg/dL    Creatinine 1.52 (H) 0.50 - 1.30 mg/dL    eGFR 48 (L) >60  mL/min/1.73m*2    Calcium 8.5 (L) 8.6 - 10.3 mg/dL   POCT GLUCOSE   Result Value Ref Range    POCT Glucose 124 (H) 74 - 99 mg/dL   POCT GLUCOSE   Result Value Ref Range    POCT Glucose 125 (H) 74 - 99 mg/dL     *Note: Due to a large number of results and/or encounters for the requested time period, some results have not been displayed. A complete set of results can be found in Results Review.          Assessment/Plan   Chronic kidney disease stage III (baseline creatinine 1.5-1.7), creatinine currently at baseline  Syncope  Hypertension  Diabetes mellitus type 2  Hyponatremia, likely from excessive water intake    Plan: Monitor orthostatic vitals.  If orthostatic vitals become positive will consider midodrine or fludrocortisone. Agree with stopping amlodipine. Check serum osmolality, urine sodium, urine osmolality.  Initiate fluid restriction 1.6 L daily.  Consider switching off of Flomax to an alternate agent given his history of orthostatic hypotension.  Thank you for your consultation.    Edelmira Costa MD

## 2024-10-16 NOTE — PROGRESS NOTES
Physical Therapy    Physical Therapy Treatment    Patient Name: Rachid Yun  MRN: [id number]  Department: [address]  Room: [address] [place] [address]  Today's Date: 10/16/2024  Time Calculation  Start Time: 0720  Stop Time: 0755  Time Calculation (min): 35 min         Assessment/Plan   PT Assessment  Rehab Prognosis: Good  Barriers to Discharge: assist x 1-2  End of Session Communication: Bedside nurse  Assessment Comment: Gait 7' x 2 forward with bedside chair follow with RW with Min A x 2 with small slow step to gait with drop foot L LE Seated rest between walks needed due to fatique and back pain  End of Session Patient Position: Up in chair, Alarm on (Needs atr each)     PT Plan  Treatment/Interventions: Bed mobility, Transfer training, Gait training, Balance training, Neuromuscular re-education, Endurance training, Range of motion, Therapeutic exercise, Therapeutic activity  PT Plan: Ongoing PT  PT Frequency: 5 times per week  PT Discharge Recommendations: Moderate intensity level of continued care  Equipment Recommended upon Discharge: Wheeled walker  PT Recommended Transfer Status: Assist x2, Assistive device  PT - OK to Discharge: Yes      General Visit Information:   PT  Visit  PT Received On: 10/16/24  General  Reason for Referral: impaired mobility  Referred By: Tiffany Chong MD  Past Medical History Relevant to Rehab: arthritis, CAD, DM, HTN, A-fib  Prior to Session Communication: Bedside nurse  Patient Position Received: Bed, 2 rail up, Alarm on  Preferred Learning Style: verbal, visual  General Comment: Cleared by nurse. Patient agreeable to therapy    Subjective   Precautions:  Precautions  LE Weight Bearing Status: Left Partial Weight Bearing  Medical Precautions: Fall precautions  Precautions Comment: Left post OP shoe, partial WB    Vital Signs (Past 2hrs)        Date/Time Vitals Session Patient Position Pulse Resp SpO2 BP MAP (mmHg)    10/16/24 0704 --  --  73  18  94 %  124/58  93                          Objective   Pain:  Pain Assessment  Pain Assessment: 0-10  0-10 (Numeric) Pain Score: 5 - Moderate pain  Pain Type: Chronic pain  Pain Location: Back  Pain Orientation: Lower  Pain Descriptors: Aching  Pain Frequency: Constant/continuous  Pain Onset: Ongoing  Multiple Pain Sites: Two  Pain 2  Pain Score 2: 2  Pain Type 2: Acute pain  Pain Location 2: Foot  Pain Orientation 2: Left  Cognition:  Cognition  Overall Cognitive Status: Within Functional Limits  Orientation Level: Oriented X4  Cognition Comments: Able to follow commands through  Coordination:     Postural Control:  Postural Control  Posture Comment: rounded shoulders, flexed posture primarily in standing  Static Sitting Balance  Static Sitting-Level of Assistance: Close supervision  Static Sitting-Comment/Number of Minutes: sitting EOB withR UE support  Dynamic Sitting Balance  Dynamic Sitting-Comments: CGA  Static Standing Balance  Static Standing-Level of Assistance: Minimum assistance, Moderate assistance (x 2)  Static Standing-Comment/Number of Minutes: B UE support limited L UE Mod/Min A x 2 depending from what surface Tremors noted Increased back pain with standing  Dynamic Standing Balance  Dynamic Standing-Balance Support: Bilateral upper extremity supported  Dynamic Standing-Level of Assistance: Minimum assistance (x 2)  Dynamic Standing-Balance: Forward lean  Dynamic Standing-Comments: Min A x 2 with walker for safety  Extremity/Trunk Assessments:    Activity Tolerance:  Activity Tolerance  Activity Tolerance Comments: Fatiques easily Limited by pain with generalized weakness  Treatments:  Therapeutic Exercise  Therapeutic Exercise Performed: Yes  Therapeutic Exercise Activity 1: B LE supine ther ex: ankle pumps,quad/gluteal sets, heelsides, SAQs, heelsldies x 10 L LE x 15 R LE    Bed Mobility  Bed Mobility: Yes  Bed Mobility 1  Bed Mobility 1: Supine to sitting  Level of Assistance 1: Minimum assistance, +2 (from elevated  bed)    Ambulation/Gait Training  Ambulation/Gait Training Performed: Yes  Ambulation/Gait Training 1  Surface 1: Level tile  Device 1: Rolling walker  Gait Support Devices:  (bed chair follow)  Assistance 1: Minimum assistance (x 2 for safety)  Quality of Gait 1: Inconsistent stride length, Forward flexed posture, Shuffling gait (PWB L LE with post op shoe)  Comments/Distance (ft) 1: 7' forward with Min A x 2 with shuffle  like gait with slow step to gait pattern PWB L LE with post op shoe Seated rest between walks  Transfers  Transfer: Yes  Transfer 1  Transfer From 1: Bed to  Transfer to 1: Stand  Technique 1: Sit to stand  Transfer Device 1: Walker  Transfer Level of Assistance 1: Minimum assistance, +2  Trials/Comments 1: x 1 STS from bed with Min A x 2 for safety PWB L LE  Transfers 2  Transfer From 2: Chair with arms to  Transfer to 2: Stand  Technique 2: Sit to stand  Transfer Device 2: Walker  Transfer Level of Assistance 2: Moderate assistance, +2 (lower surface requiring increased assist)  Trials/Comments 2: x 2 trials Mod A x 2 due to lower surface with extra time to gain upright position Cues for safe hand placement PWB L LE  Transfers 3  Transfer From 3: Stand to  Transfer to 3: Sit, Chair with arms  Technique 3: Stand to sit  Transfer Device 3: Walker  Transfer Level of Assistance 3: Minimum assistance, +2  Trials/Comments 3: x 1 trial STS with Min A x 2 with cues for hand placement PWB L LE    Outcome Measures:  [facility] Basic Mobility  Turning from your back to your side while in a flat bed without using bedrails: A little  Moving from lying on your back to sitting on the side of a flat bed without using bedrails: A little  Moving to and from bed to chair (including a wheelchair): A lot  Standing up from a chair using your arms (e.g. wheelchair or bedside chair): A lot  To walk in hospital room: Total  Climbing 3-5 steps with railing: Total  Basic Mobility - Total Score: 12    Education  Documentation  Precautions, taught by Raomna Monteiro PTA at 10/16/2024  8:32 AM.  Learner: Patient  Readiness: Acceptance  Method: Explanation  Response: Verbalizes Understanding, Needs Reinforcement    Body Mechanics, taught by Ramona Monteiro PTA at 10/16/2024  8:32 AM.  Learner: Patient  Readiness: Acceptance  Method: Explanation  Response: Verbalizes Understanding, Needs Reinforcement    Home Exercise Program, taught by Ramona Monteiro PTA at 10/16/2024  8:32 AM.  Learner: Patient  Readiness: Acceptance  Method: Explanation  Response: Verbalizes Understanding, Needs Reinforcement    Mobility Training, taught by Ramona Monteiro PTA at 10/16/2024  8:32 AM.  Learner: Patient  Readiness: Acceptance  Method: Explanation  Response: Verbalizes Understanding, Needs Reinforcement    Education Comments  No comments found.        OP EDUCATION:       Encounter Problems       Encounter Problems (Active)       Balance       standing (Progressing)       Start:  10/14/24    Expected End:  10/28/24       Pt will stand with UE support of walker, supervision, no LOB, for 2 minutes            Mobility       LTG - Patient will be able to go up and down a curb/step with the appropriate device (Progressing)       Start:  10/14/24    Expected End:  10/28/24            bed mobility (Progressing)       Start:  10/14/24    Expected End:  10/28/24       Pt will perform sup to/from sit transfer with supervision          ambulation (Progressing)       Start:  10/14/24    Expected End:  10/28/24       Pt will amb > 30  ft with wheeled walker and CGA            PT Transfers       sit to stand (Progressing)       Start:  10/14/24    Expected End:  10/28/24       Pt will perform sit to stand transfer with walker and CGA         bed to chair (Progressing)       Start:  10/14/24    Expected End:  10/28/24       Pt will perform bed to chair transfer with walker and CGA            Pain - Adult          Safety       LTG - Patient will  utilize safety techniques (Progressing)       Start:  10/14/24    Expected End:  10/28/24

## 2024-10-16 NOTE — PROGRESS NOTES
Rachid Yun is a 74 y.o. male on day 3 of admission presenting with Syncope, non cardiac.      Subjective   Patient seen and examined. Resting in bed. No dizziness with standing. No events overnight.        Objective     Last Recorded Vitals  /58 (BP Location: Left arm, Patient Position: Lying)   Pulse 73   Temp 36.5 °C (97.7 °F) (Temporal)   Resp 18   Wt 103 kg (228 lb)   SpO2 94%   Intake/Output last 3 Shifts:    Intake/Output Summary (Last 24 hours) at 10/16/2024 0937  Last data filed at 10/16/2024 0909  Gross per 24 hour   Intake 1350 ml   Output 2650 ml   Net -1300 ml       Admission Weight  Weight: 103 kg (228 lb) (10/13/24 1637)    Daily Weight  10/13/24 : 103 kg (228 lb)    Image Results  EEG  IMPRESSION    Impression  This routine EEG is indicative of mild diffuse encephalopathy. No epileptiform activity or lateralizing signs.    A full report will be scanned into the patient's chart at a later time.    This report has been interpreted and electronically signed by      Physical Exam    General: Alert and oriented x3, pleasant.   Cardiac: Irregular rate and rhythm, S1/S2 , no murmur.   Pulmonary: Clear to auscultation on room air.   Abdomen: Soft, round, nontender. BS +x4.   Extremities: No edema.  Skin: No rashes or lesions. Dressing in place to the L foot, no drainage seen.     Relevant Results    Scheduled medications  apixaban, 2.5 mg, oral, BID  atorvastatin, 20 mg, oral, Daily  clopidogrel, 75 mg, oral, Daily  daptomycin, 500 mg, intravenous, q24h KEVIN  docusate sodium, 100 mg, oral, BID  donepezil, 10 mg, oral, Nightly  DULoxetine, 60 mg, oral, Daily  ferrous sulfate (325 mg ferrous sulfate), 65 mg of iron, oral, Daily  folic acid, 0.4 mg, oral, Daily  gabapentin, 300 mg, oral, TID  insulin glargine, 30 Units, subcutaneous, Nightly  insulin lispro, 0-5 Units, subcutaneous, TID  levETIRAcetam, 500 mg, oral, BID  metoprolol succinate XL, 25 mg, oral, BID  pantoprazole, 40 mg, oral, Daily  before breakfast  polyethylene glycol, 17 g, oral, Daily  tamsulosin, 0.4 mg, oral, Nightly  traZODone, 150 mg, oral, Nightly      Continuous medications     PRN medications  PRN medications: acetaminophen **OR** acetaminophen **OR** acetaminophen, bisacodyl, dextrose, dextrose, glucagon, glucagon, melatonin, ondansetron ODT **OR** ondansetron, oxyCODONE     Results for orders placed or performed during the hospital encounter of 10/13/24 (from the past 24 hours)   POCT GLUCOSE   Result Value Ref Range    POCT Glucose 146 (H) 74 - 99 mg/dL   Basic metabolic panel   Result Value Ref Range    Glucose 145 (H) 74 - 99 mg/dL    Sodium 130 (L) 136 - 145 mmol/L    Potassium 4.7 3.5 - 5.3 mmol/L    Chloride 101 98 - 107 mmol/L    Bicarbonate 19 (L) 21 - 32 mmol/L    Anion Gap 15 10 - 20 mmol/L    Urea Nitrogen 40 (H) 6 - 23 mg/dL    Creatinine 1.63 (H) 0.50 - 1.30 mg/dL    eGFR 44 (L) >60 mL/min/1.73m*2    Calcium 8.6 8.6 - 10.3 mg/dL   POCT GLUCOSE   Result Value Ref Range    POCT Glucose 150 (H) 74 - 99 mg/dL   POCT GLUCOSE   Result Value Ref Range    POCT Glucose 247 (H) 74 - 99 mg/dL   CBC   Result Value Ref Range    WBC 6.5 4.4 - 11.3 x10*3/uL    nRBC 0.0 0.0 - 0.0 /100 WBCs    RBC 3.62 (L) 4.50 - 5.90 x10*6/uL    Hemoglobin 10.4 (L) 13.5 - 17.5 g/dL    Hematocrit 31.7 (L) 41.0 - 52.0 %    MCV 88 80 - 100 fL    MCH 28.7 26.0 - 34.0 pg    MCHC 32.8 32.0 - 36.0 g/dL    RDW 15.1 (H) 11.5 - 14.5 %    Platelets 145 (L) 150 - 450 x10*3/uL   Basic Metabolic Panel   Result Value Ref Range    Glucose 167 (H) 74 - 99 mg/dL    Sodium 130 (L) 136 - 145 mmol/L    Potassium 4.4 3.5 - 5.3 mmol/L    Chloride 102 98 - 107 mmol/L    Bicarbonate 21 21 - 32 mmol/L    Anion Gap 11 10 - 20 mmol/L    Urea Nitrogen 43 (H) 6 - 23 mg/dL    Creatinine 1.52 (H) 0.50 - 1.30 mg/dL    eGFR 48 (L) >60 mL/min/1.73m*2    Calcium 8.5 (L) 8.6 - 10.3 mg/dL   POCT GLUCOSE   Result Value Ref Range    POCT Glucose 124 (H) 74 - 99 mg/dL     *Note: Due to a  large number of results and/or encounters for the requested time period, some results have not been displayed. A complete set of results can be found in Results Review.             Assessment/Plan      Syncope and Collapse  -Neurology and Cardiology follows, both have signed off.     -Suspect from orthostatic hypotension, BP did drop with position change.   -CT brain and C spine with no acute findings.   -CT chest/abd/pelvis with no acute findings.   -MRI/MRA head done, no acute infarct, does show aneurysm- will follow outpatient to monitor with neuro.    -Monitor orthostatic VS and med changes per cardio. Amlodipine was stopped, repeat orthos now negative.  -EEG normal.   -Monitor on telemetry.   -PT/OT evals, recommendation for moderate intensity therapy at this time.      DM Foot Ulcer with Osteomyelitis  -Has had issues with this for many months now, following outpatient with ID and podiatry.   -ID follows. Podiatry saw, no surgical intervention recommended, they also recommended long term IV abx.   -He had wound culture done on 9/30/24 which grew MRSA with resistance.   -Started on daptomycin by ID. Plan for 6-8 weeks of abx, will see if he is able to have PICC with his renal function.   -MRI L foot does show evidence of OM.   -Local wound care.      DM 2 with Neuropathy  -HgA1c 6.1 (7/1/24).   -Continue lantus and SSI coverage.   -Monitor blood sugars.      CKD 3  -Appears to be near his baseline renal function at this time.   -Monitor closely. He did receive IV contrast in the ED.     Hyponatremia  -NA dropped 129 and remains 130.   -Renal consult.   -Check serum osmo, urine osmo and sodium levels.    -Monitor.      Atrial Fibrillation  -Continue Eliquis.   -BB, continue.     HTN  -On amlodipine and BB at home, amlodipine stopped by cardio.   -BP stable.      HLD  -Continue statin.      DVT Risk  -Eliquis.      Plan  Neurology, Cardiology signed off. Orthos now negative off the amlodipine, will not resume at  DC.     ID follows, Podiatry saw, no need for surgical intervention.   IV abx per ID. Needs long term IV daptomycin, will see if he is able to have PICC or if he will need different access.   Renal consulted for persistent hyponatremia, labs ordered.   PT/OT recommend moderate intensity therapy at this time, SS on board for DC planning. SNF vs [facility]?   Updated daughter Kelley over the phone.         Jenna Alvarado, APRN-CNP

## 2024-10-16 NOTE — CARE PLAN
Problem: Fall/Injury  Goal: Not fall by end of shift  Outcome: Progressing  Goal: Be free from injury by end of the shift  Outcome: Progressing  Goal: Verbalize understanding of personal risk factors for fall in the hospital  Outcome: Progressing     Problem: Pain - Adult  Goal: Verbalizes/displays adequate comfort level or baseline comfort level  Outcome: Progressing   The patient's goals for the shift include      The clinical goals for the shift include Safety

## 2024-10-16 NOTE — PROGRESS NOTES
Occupational Therapy Treatment    Name: Rachid Yun  MRN: [id number]  Department: [address]  Room: [address] [place] [address]  Date: 10/16/24  Time Calculation  Start Time: 1223  Stop Time: 1254  Time Calculation (min): 31 min    Assessment:  OT Assessment: Pt was receptive to therapy and instructions throughout and demonstrated progress toward established goals.  Pt would benefit from continued OT skilled services in order to further progress functional capacity and safety with mobility.  Prognosis: Good  Barriers to Discharge: Other (Comment) (assist with ADLs, 1-2 person assist with transfers, high falls risk)  Evaluation/Treatment Tolerance: Patient tolerated treatment well  Medical Staff Made Aware: Yes  End of Session Communication: Bedside nurse  End of Session Patient Position: Up in chair, Alarm on    Plan:  Treatment Interventions: ADL retraining, Functional transfer training, UE strengthening/ROM, Endurance training, Patient/family training, Compensatory technique education  OT Frequency: 3 times per week  OT Discharge Recommendations: Moderate intensity level of continued care  Equipment Recommended upon Discharge: Wheeled walker  OT Recommended Transfer Status: Assist of 2  OT - OK to Discharge: Yes        Subjective   Previous Visit Info:  OT Last Visit  OT Received On: 10/16/24    General:  General  Reason for Referral: Impaired ADLs  Past Medical History Relevant to Rehab: arthritis, CAD, DM, HTN, A-fib  Prior to Session Communication: Bedside nurse  Patient Position Received: Bed, 2 rail up, Alarm on  Preferred Learning Style: verbal, visual  General Comment: Cleared by nurse. Patient agreeable to therapy    Precautions:  LE Weight Bearing Status: Left Partial Weight Bearing  Medical Precautions: Fall precautions  Precautions Comment: Left post OP shoe, partial WB    Pain Assessment:  Pain Assessment  Pain Assessment: 0-10  0-10 (Numeric) Pain Score: 5 - Moderate pain  Pain Type: Chronic pain  Pain Location:  Back  Pain Orientation: Lower, Other (Comment) (and bilateral shoulders, left>right)  Pain Interventions: Repositioned  Response to Interventions: nurse aware and pre-mediated for pain         Objective   Cognition:  Overall Cognitive Status: Within Functional Limits  Orientation Level: Oriented X4    Activities of Daily Living: Toileting  Toileting Level of Assistance: Dependent  Where Assessed: Bedside commode    Functional Standing Tolerance:  Functional Standing Tolerance  Time: ~3 minutes  Activity: Min A with 2WW, during ADLs    Bed Mobility/Transfers: Bed Mobility 1  Bed Mobility 1: Supine to sitting  Level of Assistance 1: Minimum assistance  Bed Mobility Comments 1: assist with trunk toward the right side of bed with head elevated ~40 degrees    Transfer 1  Transfer From 1: Bed to  Transfer to 1: Stand  Technique 1: Sit to stand  Transfer Device 1: Walker  Transfer Level of Assistance 1: Minimum assistance, +2  Trials/Comments 1: from elevated bed height with cues for hand placement  Transfers 2  Transfer From 2: Bed to  Transfer to 2: Commode-standard  Technique 2: Stand pivot, To left  Transfer Device 2: Walker  Transfer Level of Assistance 2: Moderate assistance  Trials/Comments 2: with cues for hand placement  Transfers 3  Transfer From 3: Commode-standard to  Transfer to 3: Chair with arms  Technique 3: Stand pivot, To right  Transfer Device 3: Walker  Transfer Level of Assistance 3: Moderate assistance, +2  Trials/Comments 3: with cues for hand placement and increased assistance d/t lower surface    Therapy/Activity: Therapeutic Exercise  Therapeutic Exercise Performed: Yes (Instructed in/performed BUE dowel exercises within available range in order to increase strength and functional endurance needed for ADLs.)    Outcome Measures:  [facility] Daily Activity  Putting on and taking off regular lower body clothing: Total  Bathing (including washing, rinsing, drying): A lot  Putting on and taking off regular  upper body clothing: A little  Toileting, which includes using toilet, bedpan or urinal: Total  Taking care of personal grooming such as brushing teeth: A little  Eating Meals: None  Daily Activity - Total Score: 14    Goals:  Encounter Problems       Encounter Problems (Active)       OT Goals       ADLs (Progressing)       Start:  10/14/24    Expected End:  10/28/24       Pt will complete ADL's with Mod I using AE/compensatory techniques as needed.         Functional mobility  (Progressing)       Start:  10/14/24    Expected End:  10/28/24       Pt will complete functional mobility Mod I with LRD.         Functional transfer  (Progressing)       Start:  10/14/24    Expected End:  10/28/24       Pt will complete functional transfers including but not limited to: bed, chair, toilet with MOD I using LRD (least restrictive device).         Activity tolerance  (Progressing)       Start:  10/14/24    Expected End:  10/28/24       Patient will demonstrate improved activity tolerance AEB completing functional task for >/= 15 minutes while remaining hemodynamically stable.

## 2024-10-17 ENCOUNTER — TELEPHONE (OUTPATIENT)
Dept: PREOP | Facility: HOSPITAL | Age: 74
End: 2024-10-17

## 2024-10-17 LAB
ALBUMIN SERPL BCP-MCNC: 3.3 G/DL (ref 3.4–5)
ANION GAP SERPL CALCULATED.3IONS-SCNC: 12 MMOL/L (ref 10–20)
BUN SERPL-MCNC: 40 MG/DL (ref 6–23)
CALCIUM SERPL-MCNC: 8.5 MG/DL (ref 8.6–10.3)
CHLORIDE SERPL-SCNC: 106 MMOL/L (ref 98–107)
CO2 SERPL-SCNC: 23 MMOL/L (ref 21–32)
CREAT SERPL-MCNC: 1.44 MG/DL (ref 0.5–1.3)
EGFRCR SERPLBLD CKD-EPI 2021: 51 ML/MIN/1.73M*2
GLUCOSE BLD MANUAL STRIP-MCNC: 100 MG/DL (ref 74–99)
GLUCOSE BLD MANUAL STRIP-MCNC: 192 MG/DL (ref 74–99)
GLUCOSE BLD MANUAL STRIP-MCNC: 196 MG/DL (ref 74–99)
GLUCOSE BLD MANUAL STRIP-MCNC: 91 MG/DL (ref 74–99)
GLUCOSE SERPL-MCNC: 109 MG/DL (ref 74–99)
OSMOLALITY UR: 225 MOSM/KG (ref 200–1200)
PHOSPHATE SERPL-MCNC: 4.1 MG/DL (ref 2.5–4.9)
POTASSIUM SERPL-SCNC: 4.5 MMOL/L (ref 3.5–5.3)
SODIUM SERPL-SCNC: 136 MMOL/L (ref 136–145)

## 2024-10-17 PROCEDURE — 2500000001 HC RX 250 WO HCPCS SELF ADMINISTERED DRUGS (ALT 637 FOR MEDICARE OP): Performed by: NURSE PRACTITIONER

## 2024-10-17 PROCEDURE — 99232 SBSQ HOSP IP/OBS MODERATE 35: CPT | Performed by: NURSE PRACTITIONER

## 2024-10-17 PROCEDURE — 80069 RENAL FUNCTION PANEL: CPT | Performed by: INTERNAL MEDICINE

## 2024-10-17 PROCEDURE — 97116 GAIT TRAINING THERAPY: CPT | Mod: GP,CQ

## 2024-10-17 PROCEDURE — 97110 THERAPEUTIC EXERCISES: CPT | Mod: GP,CQ

## 2024-10-17 PROCEDURE — 2500000002 HC RX 250 W HCPCS SELF ADMINISTERED DRUGS (ALT 637 FOR MEDICARE OP, ALT 636 FOR OP/ED): Performed by: NURSE PRACTITIONER

## 2024-10-17 PROCEDURE — 2500000004 HC RX 250 GENERAL PHARMACY W/ HCPCS (ALT 636 FOR OP/ED): Performed by: NURSE PRACTITIONER

## 2024-10-17 PROCEDURE — 82947 ASSAY GLUCOSE BLOOD QUANT: CPT

## 2024-10-17 PROCEDURE — 97535 SELF CARE MNGMENT TRAINING: CPT | Mod: GO

## 2024-10-17 PROCEDURE — 1200000002 HC GENERAL ROOM WITH TELEMETRY DAILY

## 2024-10-17 ASSESSMENT — PAIN SCALES - GENERAL
PAINLEVEL_OUTOF10: 5 - MODERATE PAIN
PAINLEVEL_OUTOF10: 5 - MODERATE PAIN
PAINLEVEL_OUTOF10: 3
PAINLEVEL_OUTOF10: 5 - MODERATE PAIN
PAINLEVEL_OUTOF10: 6
PAINLEVEL_OUTOF10: 7
PAINLEVEL_OUTOF10: 8
PAINLEVEL_OUTOF10: 8
PAINLEVEL_OUTOF10: 7
PAINLEVEL_OUTOF10: 4
PAINLEVEL_OUTOF10: 7

## 2024-10-17 ASSESSMENT — COGNITIVE AND FUNCTIONAL STATUS - GENERAL
CLIMB 3 TO 5 STEPS WITH RAILING: TOTAL
TURNING FROM BACK TO SIDE WHILE IN FLAT BAD: A LITTLE
MOVING FROM LYING ON BACK TO SITTING ON SIDE OF FLAT BED WITH BEDRAILS: A LITTLE
TOILETING: TOTAL
DRESSING REGULAR UPPER BODY CLOTHING: A LITTLE
MOVING TO AND FROM BED TO CHAIR: A LOT
DRESSING REGULAR LOWER BODY CLOTHING: TOTAL
DAILY ACTIVITIY SCORE: 14
HELP NEEDED FOR BATHING: A LOT
PERSONAL GROOMING: A LITTLE
STANDING UP FROM CHAIR USING ARMS: A LOT
MOBILITY SCORE: 12
WALKING IN HOSPITAL ROOM: TOTAL

## 2024-10-17 ASSESSMENT — ACTIVITIES OF DAILY LIVING (ADL)
BATHING_LEVEL_OF_ASSISTANCE: MINIMUM ASSISTANCE
HOME_MANAGEMENT_TIME_ENTRY: 24
BATHING_WHERE_ASSESSED: SITTING SINKSIDE

## 2024-10-17 ASSESSMENT — PAIN DESCRIPTION - ORIENTATION
ORIENTATION: LEFT

## 2024-10-17 ASSESSMENT — PAIN - FUNCTIONAL ASSESSMENT
PAIN_FUNCTIONAL_ASSESSMENT: 0-10

## 2024-10-17 ASSESSMENT — PAIN DESCRIPTION - LOCATION
LOCATION: FOOT

## 2024-10-17 NOTE — PROGRESS NOTES
Physical Therapy    Physical Therapy Treatment    Patient Name: Rachid Yun  MRN: [id number]  Department: [address]  Room: [address] [place] [address]  Today's Date: 10/17/2024  Time Calculation  Start Time: 0930  Stop Time: 1000  Time Calculation (min): 30 min         Assessment/Plan   PT Assessment  Rehab Prognosis: Good  Barriers to Discharge: Assist x 2 for gait/ transfers for safety with PWB L LE  Evaluation/Treatment Tolerance: Patient limited by pain, Patient limited by fatigue  Barriers to Participation: Ability to acquire knowledge  End of Session Communication: Bedside nurse  Assessment Comment: 10' into bathrrom out of bathroom with OT Min A x 2 for gait/transfers  End of Session Patient Position: Bed, 3 rail up, Alarm on     PT Plan  Treatment/Interventions: Bed mobility, Transfer training, Gait training, Balance training, Strengthening, Endurance training, Range of motion, Therapeutic activity, Therapeutic exercise  PT Plan: Ongoing PT  PT Frequency: 5 times per week  PT Discharge Recommendations: Moderate intensity level of continued care  Equipment Recommended upon Discharge: Wheeled walker  PT Recommended Transfer Status: Assist x2, Assistive device  PT - OK to Discharge: Yes      General Visit Information:   PT  Visit  PT Received On: 10/17/24  General  Reason for Referral: impaired mobility  Referred By: Tiffany Chong MD  Past Medical History Relevant to Rehab: arthritis, CAD, DM, HTN, A-fib  Prior to Session Communication: Bedside nurse  Patient Position Received: Bed, 2 rail up, Alarm on  Preferred Learning Style: verbal, visual  General Comment: Cleared by nurse. Patient agreeable to therapy    Subjective   Precautions:  Precautions  LE Weight Bearing Status: Left Partial Weight Bearing  Medical Precautions: Fall precautions  Precautions Comment: Left post OP shoe, partial WB    Vital Signs (Past 2hrs)                 Objective   Pain:  Pain Assessment  Pain Assessment: 0-10  0-10 (Numeric) Pain Score: 5 -  Moderate pain  Pain Type: Chronic pain  Pain Location: Foot  Pain Orientation: Left  Pain Frequency: Constant/continuous  Pain Onset: Ongoing  Multiple Pain Sites: Two  Pain 2  Pain Score 2: 7  Pain Type 2: Chronic pain  Pain Location 2: Back  Pain Orientation 2: Lower  Response to Interventions 2: L shoulder limited  Cognition:  Cognition  Overall Cognitive Status: Within Functional Limits  Orientation Level: Oriented X4  Cognition Comments: Able to follow commands  Coordination:     Postural Control:  Postural Control  Posture Comment: rounded shoulders, flexed posture primarily in standing  Static Sitting Balance  Static Sitting-Level of Assistance: Close supervision  Static Sitting-Comment/Number of Minutes: sitting on EOB  Dynamic Sitting Balance  Dynamic Sitting-Comments: CGA  Static Standing Balance  Static Standing-Level of Assistance: Minimum assistance (x 2 Cues for PWB L LE)  Static Standing-Comment/Number of Minutes: PWB L LE with post op shoe  Dynamic Standing Balance  Dynamic Standing-Balance Support: Bilateral upper extremity supported  Dynamic Standing-Level of Assistance: Minimum assistance (x 2)  Dynamic Standing-Balance: Forward lean  Dynamic Standing-Comments: Min A x 2 with PWB L LE limited L shoulder  Extremity/Trunk Assessments:    Activity Tolerance:  Activity Tolerance  Endurance: Decreased tolerance for upright activites  Activity Tolerance Comments: Fatiques easily Limited by multiple pain areaa  Treatments:  Therapeutic Exercise  Therapeutic Exercise Performed: Yes  Therapeutic Exercise Activity 1: B LE supine ther ex: ankle pumps,quad/gluteal sets, heelsides, SAQs, heelsldies, SAQs x 10 L LE x 15 R LE Extra time needed    Bed Mobility  Bed Mobility: Yes  Bed Mobility 1  Bed Mobility 1: Supine to sitting  Level of Assistance 1: Minimum assistance  Bed Mobility Comments 1: Assist for trunk up to right with HOB elevated 40 degrees    Ambulation/Gait Training  Ambulation/Gait Training  Performed: Yes  Ambulation/Gait Training 1  Surface 1: Level tile  Device 1: Rolling walker  Assistance 1: Minimum assistance (x 2)  Quality of Gait 1: Inconsistent stride length, Forward flexed posture, Shuffling gait, Foot drop/steppage gait (Foot drop L LE with PWB L LE)  Comments/Distance (ft) 1: 10' x 1  with RW with Min A x 2 to steady with small slow shuffling step to gait pattern with PWWB L LE  Transfers  Transfer: Yes  Transfer 1  Transfer From 1: Bed to  Transfer to 1: Stand  Technique 1: Sit to stand  Transfer Device 1: Walker  Transfer Level of Assistance 1: Minimum assistance, +2  Trials/Comments 1: STS from elevated with Min A x 2 to steady with PWB L LE with cues for safe hand placement  Transfers 2  Transfer From 2: Stand to  Transfer to 2: Sit, Toilet  Technique 2: Stand to sit  Transfer Device 2: Walker  Transfer Level of Assistance 2: Minimum assistance, +2  Trials/Comments 2: STS to toilet with Min A x 2 to steady with PWB L LE with assist for safe hand placement    Outcome Measures:  [facility] Basic Mobility  Turning from your back to your side while in a flat bed without using bedrails: A little  Moving from lying on your back to sitting on the side of a flat bed without using bedrails: A little  Moving to and from bed to chair (including a wheelchair): A lot  Standing up from a chair using your arms (e.g. wheelchair or bedside chair): A lot  To walk in hospital room: Total  Climbing 3-5 steps with railing: Total  Basic Mobility - Total Score: 12    Education Documentation  Precautions, taught by Ramona Monteiro PTA at 10/17/2024 10:32 AM.  Learner: Patient  Readiness: Acceptance  Method: Explanation  Response: Verbalizes Understanding, Needs Reinforcement    Body Mechanics, taught by Ramona Monteiro PTA at 10/17/2024 10:32 AM.  Learner: Patient  Readiness: Acceptance  Method: Explanation  Response: Verbalizes Understanding, Needs Reinforcement    Home Exercise Program, taught by Ramona BLANTON  WILLIE Monteiro at 10/17/2024 10:32 AM.  Learner: Patient  Readiness: Acceptance  Method: Explanation  Response: Verbalizes Understanding, Needs Reinforcement    Mobility Training, taught by Ramona Monteiro PTA at 10/17/2024 10:32 AM.  Learner: Patient  Readiness: Acceptance  Method: Explanation  Response: Verbalizes Understanding, Needs Reinforcement    Education Comments  No comments found.        OP EDUCATION:       Encounter Problems       Encounter Problems (Active)       Balance       standing (Progressing)       Start:  10/14/24    Expected End:  10/28/24       Pt will stand with UE support of walker, supervision, no LOB, for 2 minutes            Mobility       LTG - Patient will be able to go up and down a curb/step with the appropriate device (Progressing)       Start:  10/14/24    Expected End:  10/28/24            bed mobility (Progressing)       Start:  10/14/24    Expected End:  10/28/24       Pt will perform sup to/from sit transfer with supervision          ambulation (Progressing)       Start:  10/14/24    Expected End:  10/28/24       Pt will amb > 30  ft with wheeled walker and CGA            PT Transfers       sit to stand (Progressing)       Start:  10/14/24    Expected End:  10/28/24       Pt will perform sit to stand transfer with walker and CGA         bed to chair (Progressing)       Start:  10/14/24    Expected End:  10/28/24       Pt will perform bed to chair transfer with walker and CGA            Pain - Adult          Safety       LTG - Patient will utilize safety techniques (Progressing)       Start:  10/14/24    Expected End:  10/28/24

## 2024-10-17 NOTE — PROGRESS NOTES
10/17/24 1120   Discharge Planning   Expected Discharge Disposition [facility]  ([facility])   Does the patient need discharge transport arranged? No     6 Weeks of Daptomycin ordered.  Will need SN and PT with [facility].    Order for infusion sent to  infusion team for pricing.  Patient to go to [facility] for Leno catheter tomorrow.    1408- Spoke with patient regarding amount of antibiotic.   infusion left , as, they were unable to reach patient.  Patient going to speak with son and daughter soon to decide  SNF   vs. [facility].    1630- Patient chose to go to rehab for Antibiotic and therapy.  Choices submitted to [facility] for referral:   [facility], [facility] .     Precert will be needed.

## 2024-10-17 NOTE — PROGRESS NOTES
Rachid Yun is a 74 y.o. male on day 4 of admission presenting with Syncope, non cardiac.    Subjective   Interval History:   Daughter  at bedside   Leno catheter placement planned tomorrow   Afebrile, no chills  Remains on room air  No shortness of breath cough or chest pain  No nausea, vomiting or diarrhea       Objective   Range of Vitals (last 24 hours)  Heart Rate:  [73-86]   Temp:  [36.2 °C (97.2 °F)-36.5 °C (97.7 °F)]   Resp:  [14-16]   BP: (119-155)/(68-87)   SpO2:  [96 %-98 %]   Daily Weight  10/13/24 : 103 kg (228 lb)    Body mass index is 27.76 kg/m².    Physical Exam  Constitutional:       Appearance: Normal appearance.   HENT:      Head: Normocephalic and atraumatic.      Nose: Nose normal.      Mouth/Throat:      Mouth: Mucous membranes are moist.      Pharynx: Oropharynx is clear.   Eyes:      General: No scleral icterus.  Cardiovascular:      Rate and Rhythm: Normal rate and regular rhythm.   Pulmonary:      Effort: Pulmonary effort is normal.      Breath sounds: Normal breath sounds.   Abdominal:      General: Bowel sounds are normal.      Palpations: Abdomen is soft.   Musculoskeletal:         General: Normal range of motion.      Cervical back: Normal range of motion and neck supple.   Skin:     General: Skin is warm and dry.      Comments: Left lateral foot ulcer with large amount of granulation tissue, resolving mild surrounding erythema, left foot swelling      Neurological:      Mental Status: He is alert.      Comments: Awake, alert   Psychiatric:         Mood and Affect: Mood normal.         Behavior: Behavior normal.     Antibiotics  cephalexin - 500 mg  DAPTOmycin - 500 mg, 500 mg/50 mL  doxycycline - 100 mg  sulfamethoxazole-trimethoprim - 800-160 mg    Relevant Results  Labs  Results from last 72 hours   Lab Units 10/16/24  0516 10/15/24  0516   WBC AUTO x10*3/uL 6.5 8.1   HEMOGLOBIN g/dL 10.4* 10.4*   HEMATOCRIT % 31.7* 32.4*   PLATELETS AUTO x10*3/uL 145* 151     Results from last  72 hours   Lab Units 10/17/24  0541 10/16/24  0516 10/15/24  1135   SODIUM mmol/L 136 130* 130*   POTASSIUM mmol/L 4.5 4.4 4.7   CHLORIDE mmol/L 106 102 101   CO2 mmol/L 23 21 19*   BUN mg/dL 40* 43* 40*   CREATININE mg/dL 1.44* 1.52* 1.63*   GLUCOSE mg/dL 109* 167* 145*   CALCIUM mg/dL 8.5* 8.5* 8.6   ANION GAP mmol/L 12 11 15   EGFR mL/min/1.73m*2 51* 48* 44*   PHOSPHORUS mg/dL 4.1  --   --      Results from last 72 hours   Lab Units 10/17/24  0541   ALBUMIN g/dL 3.3*       Estimated Creatinine Clearance: 55.3 mL/min (A) (by C-G formula based on SCr of 1.44 mg/dL (H)).  C-Reactive Protein   Date Value Ref Range Status   10/01/2024 7.77 (H) <1.00 mg/dL Final   07/16/2024 <0.30 0.00 - 2.00 mg/dL Final     Microbiology  Blood culture pending  Wound culture -_MRSA      Imaging  MR angio head wo IV contrast    Result Date: 10/15/2024  Interpreted By:  Cristiana Frank, STUDY: MR ANGIO HEAD WO IV CONTRAST;  10/14/2024 9:15 am   INDICATION: Signs/Symptoms:syncope.     COMPARISON: None.   ACCESSION NUMBER(S): [id number]   ORDERING CLINICIAN: SHAMIKA MILAN   TECHNIQUE: Time-of-flight MRA of the head was performed. The images were reviewed as source images and maximum intensity projections.   FINDINGS:     Anterior circulation:    There is expected flow signal in bilateral intracranial internal carotid arteries, bilateral carotid terminals, bilateral proximal anterior and middle cerebral arteries.   Posterior circulation:    Bilateral intracranial vertebral arteries, vertebrobasilar junction, basilar artery and proximal posterior cerebral arteries demonstrate expected flow signal.   There is a 0.3 x 0.3 cm outpouching from the basilar tip compatible with an aneurysm.       1.  There is no evidence for hemodynamically significant stenosis or large branch vessel cutoffs of the visualized intracranial vasculature. 2. 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   MACRO: None   Signed by: Cristiana Frank 10/15/2024 7:59 AM  Dictation workstation:   [id number]    MR foot left wo IV contrast    Result Date: 10/15/2024  Interpreted By:  Raúl Infante, STUDY: MR FOOT LEFT WO IV CONTRAST; ;  10/14/2024 10:02 pm   INDICATION: Signs/Symptoms:left foot infection.     COMPARISON: MRI 07/16/2024   ACCESSION NUMBER(S): [id number]   ORDERING CLINICIAN: TARA ANDRES   TECHNIQUE: Multiplanar multisequence MRI obtained of the left foot.   FINDINGS: Skin ulceration is demonstrated along the 5th metatarsal base. There is skin thickening and subcutaneous edema deep to the level of the skin ulceration with component of cellulitis. Of note, subtle linear appearing area of T2 weighted hyperintensity is demonstrated from the area of skin ulceration into the 5th metatarsal base with focal fistulous tract of concern. No subcutaneous abscess formation demonstrated. There is loss of cortical margin within the 5th metatarsal base about the region of the fistulous tract with generalized intermediate T1 weighted signal intensity with corresponding T2 weighted hyperintensity with marrow edema throughout the 5th metatarsal base extending into the mid 5th metatarsal shaft. No well-marginated abscess formation about the ulceration. Of note, findings are in the region of the 5th tarsometatarsal articulation with mild asymmetric fluid about the articulation as well as mild marrow edema within the cuboid bone lateral margin of the articulation with component of septic arthritis osteomyelitis about the articulation not excluded with mild osteomyelitis within the cuboid bone a possibility. Component of findings may simply be reactive.   Remainder of the midfoot osseous structures demonstrate normal marrow signal intensity. Intertarsal articulations are unremarkable. Lisfranc joint is otherwise congruent. There is mild Lisfranc joint osteoarthritis. Lisfranc ligament complex unremarkable.   Forefoot 2nd through 4th metatarsal shafts demonstrate no focal marrow edema.  There is remote healed fracture deformity of the distal 5th metatarsal shafts.   Tibialis anterior, extensor hallucis longus and extensor digitorum tendons are unremarkable. Flexor hallucis longus and flexor digitorum tendons are unremarkable.   Musculature of the foot demonstrates diffuse atrophy. There is component mild intermediate signal intensity and chronic diabetic myopathy. Mild asymmetric edema within the abductor digiti minimi with infectious myositis of concern. Visualized portions of the plantar fascia are unremarkable   1st MTP joint demonstrates moderate osteoarthritic degenerative change. Tibial sesamoid bone demonstrates a few ossific fragments along the proximal margin with sequela of remote fracture deformity. There is mild tibial sesamoid metatarsal osteoarthritis. Fibular sesamoid bone are unremarkable. The 2nd through 5th MTP joints demonstrate no focal marrow edema. No joint effusions. Digits distally demonstrate no focal marrow edema.               1. Skin ulceration lateral base 5th metatarsal with cellulitis and fistulous tract extending into the cortical margin. No discrete abscess formation demonstrated about the ulceration. However, there is osteomyelitis throughout the 5th metatarsal base extending to near the mid 5th metatarsal shafts. Also, mild asymmetric fluid about the 5th tarsometatarsal articulation with mild marrow edema along the lateral cuboid bone about the 5th tarsometatarsal articulation. This may simply be reactive. However, component of septic arthritis osteomyelitis is not excluded.     MACRO: None   Signed by: Raúl Infante 10/15/2024 7:59 AM Dictation workstation:   [id number]    MR brain wo IV contrast    Result Date: 10/15/2024  Interpreted By:  Cristiana Frank, STUDY: MR BRAIN WO IV CONTRAST;  10/14/2024 9:11 am   INDICATION: Signs/Symptoms:syncope.     COMPARISON: MRI 08/31/2010   ACCESSION NUMBER(S): [id number]   ORDERING CLINICIAN: SHAMIKA MILAN   TECHNIQUE:  Axial T2, FLAIR, DWI, gradient echo T2 and sagittal and coronal T1 weighted images of brain were acquired.   FINDINGS: CSF Spaces: The ventricles, sulci and basal cisterns are prominent compatible with age related involutional changes and mild-to-moderate volume loss.   Parenchyma: There is no diffusion restriction abnormality to suggest acute infarct.  There is encephalomalacia and gliosis within the right centrum semiovale which likely represents the sequela of a prior infarct. There is a mild degree of nonspecific subcortical and periventricular T2 and FLAIR hyperintense signal which is compatible with microangiopathy. Old lacunar infarcts within the cerebellum. No abnormal susceptibility artifact. There is no mass effect or midline shift.   Paranasal Sinuses and Mastoids: Visualized paranasal sinuses and mastoid air cells are predominantly clear..       No evidence of acute infarct, intracranial mass effect or midline shift.   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   Mild degree of nonspecific white matter signal compatible with microangiopathy.   MACRO: None   Signed by: Cristiana Frank 10/15/2024 7:54 AM Dictation workstation:   [id number]    ECG 12 lead    Result Date: 10/14/2024  Atrial fibrillation Abnormal ECG When compared with ECG of 24-DEC-2023 15:59, No significant change was found    CT chest abdomen pelvis w IV contrast    Result Date: 10/13/2024  Interpreted By:  Tamara Snowden, STUDY: CT CHEST ABDOMEN PELVIS W IV CONTRAST;  10/13/2024 9:27 am   INDICATION: Signs/Symptoms:fall, syncope.     COMPARISON: None.   ACCESSION NUMBER(S): [id number]   ORDERING CLINICIAN: DOTTIE REYNA   TECHNIQUE: CT of the chest, abdomen, and pelvis was performed.  Contiguous axial images were obtained at 3 mm slice thickness through the chest, abdomen and pelvis. Coronal and sagittal reconstructions at 3 mm slice thickness were performed. Contrast was administered intravenously without immediate complication.    FINDINGS: CHEST:   LUNG/PLEURA/LARGE AIRWAYS: The lungs are hyperinflated. No suspicious lung nodules or infiltrates. Punctate calcified granuloma in the right lung base.   VESSELS: Aortic calcification. No aneurysmal dilatation. Normal caliber main pulmonary artery.   HEART: No cardiomegaly. No pericardial effusion. Dense coronary artery calcification.   MEDIASTINUM AND ATIF: No significant mediastinal or hilar adenopathy. Subcentimeter lymph nodes are noted which are nonspecific and may be reactive.   CHEST WALL AND LOWER NECK: Grossly unremarkable thyroid gland.   ABDOMEN:   LIVER: Tiny hypodensity in the hepatic dome likely a tiny cyst. The liver is otherwise unremarkable.   BILE DUCTS: No bile duct dilatation.   GALLBLADDER: Likely tiny gallstones. No gallbladder wall thickening.   PANCREAS: Unremarkable pancreas.   SPLEEN: Unremarkable spleen.   ADRENAL GLANDS: No adrenal masses.   KIDNEYS AND URETERS: Cortical thinning of both kidneys. Likely a tiny subcentimeter upper pole left renal cyst. No hydroureter. No definite stones.   PELVIS:   BLADDER: Suboptimally distended and evaluated. Apparent bladder wall thickening at least partially due to lack of distention.   REPRODUCTIVE ORGANS: Calcifications in the prostate.   BOWEL: Large amount of stool. Rectal distention with stool. No evidence of bowel obstruction.     VESSELS: Aortic calcification. No aneurysmal dilatation.   IVC filter.   PERITONEUM/RETROPERITONEUM/LYMPH NODES: No retroperitoneal adenopathy. No ascites. Unremarkable appendix.   BONE AND SOFT TISSUE: Discogenic degenerative changes. Vacuum disc at multiple levels. Mild loss of height of multiple thoracic and lumbar spine vertebral bodies. Intramedullary sclerosis of the proximal left femur is suboptimally included and may represent bone infarcts or an enchondroma.       CHEST: 1. No acute abnormalities within the chest. 2. Dense coronary and aortic calcification. 3. Hyperinflation.    ABDOMEN-PELVIS: 1. Advanced multilevel discogenic degenerative changes. 2. Tiny gallstones. 3. No acute abnormalities.   Signed by: Tamara Snowden 10/13/2024 10:45 AM Dictation workstation:   [id number]    CT cervical spine wo IV contrast    Result Date: 10/13/2024  Interpreted By:  Tamara Snowden, STUDY: CT CERVICAL SPINE WO IV CONTRAST;  10/13/2024 9:27 am   INDICATION: Signs/Symptoms:neck trauma after syncopal episode.     COMPARISON: 12/24/2023   ACCESSION NUMBER(S): [id number]   ORDERING CLINICIAN: DOTTIE REYNA   TECHNIQUE: Axial CT images of the cervical spine are obtained. Axial, coronal and sagittal reconstructions are provided for review.   FINDINGS: Grade 1 anterolisthesis of C4 on C5. Straightening of normal cervical lordosis. Disc space loss from C6 through T1. No erosions. No fracture dislocation. Hypertrophic changes of the C1-C2 articulation. Mild central canal stenosis and varying degrees of neural foraminal narrowing at multiple levels.       No evidence for an acute fracture or subluxation of the cervical spine. Multilevel discogenic degenerative changes.   MACRO: None   Signed by: Tamara Snowden 10/13/2024 10:01 AM Dictation workstation:   [id number]    CT head wo IV contrast    Result Date: 10/13/2024  Interpreted By:  Tamara Snowden, STUDY: CT HEAD WO IV CONTRAST;  10/13/2024 9:27 am   INDICATION: Signs/Symptoms:head injury on eliquis.     COMPARISON: 12/24/2023   ACCESSION NUMBER(S): [id number]   ORDERING CLINICIAN: DOTTIE REYNA   TECHNIQUE: Noncontrast axial CT scan of head was performed. Angled reformats in brain and bone windows were generated. The images were reviewed in bone, brain, blood and soft tissue windows.   FINDINGS: The ventricles, cisterns and sulci are prominent, consistent with mild diffuse volume loss. There are areas of nonspecific white matter hypodensity, which are probably age-related or microvascular in nature.   Gray-white differentiation is intact and there is no evidence  of acute cortical infarct. No mass, mass effect or midline shift is seen. There is no evidence of hemorrhage.   The visualized paranasal sinuses are clear.         No evidence of acute cortical infarct or intracranial hemorrhage.   Chronic changes as described.   MACRO: None   Signed by: Tamara Snowden 10/13/2024 9:53 AM Dictation workstation:   [id number]        Assessment/Plan   Type 2 diabetes mellitus with peripheral angiopathy without gangrene   Left diabetic foot ulcer-culture grew MRSA -MRI evidence osteomyelitis   Chronic kidney diease, stage 3  Syncope  MRSA infection        IV daptomycin  Monitor CK level  Monitor temperature and WBC  Follow-up blood cultures  Neurology follow up   Local care  Podiatry consulted-note reviewed -recommend 8 weeks IV antibiotics   IR consulted for Leno catheter placement  Discharge planning-home with       Long term plan IV daptomycin 500 mg daily for 6 -8 weeks till 12/9/2024-see discharge rec  Weekly CBC with diff, CMP, CK  Follow up with Latanya Taylor  Follow up with podiatry    Total time spent caring for the patient today was 20 minutes. This includes time spent before the visit reviewing the chart, time spent during the visit, and time spent after the visit on documentation.     Polina Oakes, APRN-CNP

## 2024-10-17 NOTE — PROGRESS NOTES
Rachid Yun is a 74 y.o. male on day 4 of admission presenting with Syncope, non cardiac.      Subjective   Patient seen and examined. Resting in bed. No complaints this AM.        Objective     Last Recorded Vitals  /69 (BP Location: Left arm, Patient Position: Lying)   Pulse 74   Temp 36.3 °C (97.3 °F) (Temporal)   Resp 16   Wt 103 kg (228 lb)   SpO2 98%   Intake/Output last 3 Shifts:    Intake/Output Summary (Last 24 hours) at 10/17/2024 0922  Last data filed at 10/17/2024 0900  Gross per 24 hour   Intake 500 ml   Output 2900 ml   Net -2400 ml       Admission Weight  Weight: 103 kg (228 lb) (10/13/24 1637)    Daily Weight  10/13/24 : 103 kg (228 lb)    Image Results    No new imaging to review.     Physical Exam    General: Alert and oriented x3, pleasant.   Cardiac: Irregular rate and rhythm, S1/S2 , no murmur.   Pulmonary: Clear to auscultation on room air.   Abdomen: Soft, round, nontender. BS +x4.   Extremities: No edema.  Skin: No rashes or lesions. Dressing in place to the L foot, no drainage seen.    Relevant Results    Scheduled medications  apixaban, 2.5 mg, oral, BID  atorvastatin, 20 mg, oral, Daily  clopidogrel, 75 mg, oral, Daily  daptomycin, 500 mg, intravenous, q24h KEVIN  docusate sodium, 100 mg, oral, BID  donepezil, 10 mg, oral, Nightly  DULoxetine, 60 mg, oral, Daily  ferrous sulfate (325 mg ferrous sulfate), 65 mg of iron, oral, Daily  folic acid, 0.4 mg, oral, Daily  gabapentin, 300 mg, oral, TID  insulin glargine, 30 Units, subcutaneous, Nightly  insulin lispro, 0-5 Units, subcutaneous, TID  levETIRAcetam, 500 mg, oral, BID  metoprolol succinate XL, 25 mg, oral, BID  pantoprazole, 40 mg, oral, Daily before breakfast  polyethylene glycol, 17 g, oral, Daily  tamsulosin, 0.4 mg, oral, Nightly  traZODone, 150 mg, oral, Nightly      Continuous medications     PRN medications  PRN medications: acetaminophen **OR** acetaminophen **OR** acetaminophen, bisacodyl, dextrose, dextrose,  glucagon, glucagon, melatonin, ondansetron ODT **OR** ondansetron, oxyCODONE     Results for orders placed or performed during the hospital encounter of 10/13/24 (from the past 24 hours)   POCT GLUCOSE   Result Value Ref Range    POCT Glucose 125 (H) 74 - 99 mg/dL   Osmolality, urine   Result Value Ref Range    Osmolality, Urine Random 225 200 - 1,200 mOsm/kg   Sodium, Urine Random   Result Value Ref Range    Sodium, Urine Random 13 mmol/L    Creatinine, Urine Random 39.3 20.0 - 370.0 mg/dL    Sodium/Creatinine Ratio 33 Not established. mmol/g Creat   POCT GLUCOSE   Result Value Ref Range    POCT Glucose 121 (H) 74 - 99 mg/dL   POCT GLUCOSE   Result Value Ref Range    POCT Glucose 167 (H) 74 - 99 mg/dL   Renal Function Panel   Result Value Ref Range    Glucose 109 (H) 74 - 99 mg/dL    Sodium 136 136 - 145 mmol/L    Potassium 4.5 3.5 - 5.3 mmol/L    Chloride 106 98 - 107 mmol/L    Bicarbonate 23 21 - 32 mmol/L    Anion Gap 12 10 - 20 mmol/L    Urea Nitrogen 40 (H) 6 - 23 mg/dL    Creatinine 1.44 (H) 0.50 - 1.30 mg/dL    eGFR 51 (L) >60 mL/min/1.73m*2    Calcium 8.5 (L) 8.6 - 10.3 mg/dL    Phosphorus 4.1 2.5 - 4.9 mg/dL    Albumin 3.3 (L) 3.4 - 5.0 g/dL   POCT GLUCOSE   Result Value Ref Range    POCT Glucose 91 74 - 99 mg/dL     *Note: Due to a large number of results and/or encounters for the requested time period, some results have not been displayed. A complete set of results can be found in Results Review.             Assessment/Plan      Syncope and Collapse  -Neurology and Cardiology signed off.   -Suspect from orthostatic hypotension, BP did drop with position change initially.   -CT brain and C spine with no acute findings.   -CT chest/abd/pelvis with no acute findings.   -MRI/MRA head done, no acute infarct, does show aneurysm- will follow outpatient to monitor with neuro.    -Amlodipine was stopped, repeat orthos now negative.  -EEG normal.   -Monitor on telemetry.   -PT/OT evals, recommendation for moderate  intensity therapy.      DM Foot Ulcer with Osteomyelitis  -Has had issues with this for many months now, following outpatient with ID and podiatry.   -ID follows. Podiatry saw, no surgical intervention recommended, they also recommended long term IV abx.   -He had wound culture done on 9/30/24 which grew MRSA with resistance.   -Started on daptomycin by ID. Plan for 6-8 weeks of abx.   -IR consulted for ramona catheter placement. Hopefully this will be placed today.   -MRI L foot does show evidence of OM.   -Local wound care.      DM 2 with Neuropathy  -HgA1c 6.1 (7/1/24).   -Continue lantus and SSI coverage.   -Monitor blood sugars.      CKD 3  -Appears to be near his baseline renal function at this time.   -Monitor closely. He did receive IV contrast in the ED.     Hyponatremia  -NA now improved, 136.   -Renal follows and he was placed on fluid restriction.   -Monitor.      Atrial Fibrillation  -Continue Eliquis.   -BB, continue.     HTN  -On amlodipine and BB at home, amlodipine stopped by cardio, will not resume at DC.   -BP stable.      HLD  -Continue statin.      DVT Risk  -Eliquis.      Plan  Neurology, Cardiology signed off. Orthos now negative off the amlodipine, will not resume at DC.     ID follows, Podiatry saw, no need for surgical intervention.   IV abx per ID. Needs long term IV daptomycin.   IR consulted for ramona catheter placement. Hopefully this can be placed today.   Renal follows, NA now normalized, he is on 1.6L fluid restriction.   PT/OT recommend moderate intensity therapy at this time, SS on board for DC planning. Plan fo [facility] upon DC. Need ramona placed and abx for home arranged then can DC.         HERBERT Aguilar-CNP

## 2024-10-17 NOTE — CARE PLAN
Problem: Fall/Injury  Goal: Not fall by end of shift  Outcome: Progressing  Goal: Be free from injury by end of the shift  Outcome: Progressing  Goal: Verbalize understanding of personal risk factors for fall in the hospital  Outcome: Progressing     Problem: Pain - Adult  Goal: Verbalizes/displays adequate comfort level or baseline comfort level  Outcome: Progressing   The patient's goals for the shift include      The clinical goals for the shift include Pain Management

## 2024-10-17 NOTE — PROGRESS NOTES
"Rachid Yun is a 74 y.o. male on day 4 of admission presenting with Syncope, non cardiac.    Subjective   Seen for chronic disease stage III is very well-known to my practice admitted with syncopal episode patient feels well he has no complaints no overnight events noted       Objective     Physical Exam  Neck:      Vascular: No carotid bruit.   Cardiovascular:      Rate and Rhythm: Normal rate and regular rhythm.      Heart sounds: No murmur heard.     No friction rub. No gallop.   Pulmonary:      Breath sounds: No wheezing, rhonchi or rales.   Chest:      Chest wall: No tenderness.   Abdominal:      General: There is no distension.      Tenderness: There is no abdominal tenderness. There is no guarding or rebound.   Musculoskeletal:         General: No swelling or tenderness.      Cervical back: Neck supple.      Right lower leg: No edema.      Left lower leg: No edema.   Lymphadenopathy:      Cervical: No cervical adenopathy.         Last Recorded Vitals  Blood pressure 144/87, pulse 78, temperature 36.2 °C (97.2 °F), temperature source Temporal, resp. rate 16, height 1.93 m (6' 3.98\"), weight 103 kg (228 lb), SpO2 97%.    Intake/Output last 3 Shifts:  I/O last 3 completed shifts:  In: 750 (7.3 mL/kg) [P.O.:750]  Out: 4500 (43.5 mL/kg) [Urine:4500 (1.2 mL/kg/hr)]  Weight: 103.4 kg     Current Facility-Administered Medications:     acetaminophen (Tylenol) tablet 650 mg, 650 mg, oral, q4h PRN **OR** acetaminophen (Tylenol) oral liquid 650 mg, 650 mg, oral, q4h PRN **OR** acetaminophen (Tylenol) suppository 650 mg, 650 mg, rectal, q4h PRN, SANJAY Quezada    apixaban (Eliquis) tablet 2.5 mg, 2.5 mg, oral, BID, SANJAY Quezada, 2.5 mg at 10/17/24 0919    atorvastatin (Lipitor) tablet 20 mg, 20 mg, oral, Daily, SANJAY Quezada, 20 mg at 10/17/24 0919    bisacodyl (Dulcolax) EC tablet 10 mg, 10 mg, oral, Daily PRN, Celio Lira, APRN-CNP, 10 mg at 10/16/24 0934    clopidogrel (Plavix) tablet " 75 mg, 75 mg, oral, Daily, SANJAY Quezada, 75 mg at 10/17/24 0919    DAPTOmycin (Cubicin) 500 mg in sodium chloride 0.9% IV 50 mL, 500 mg, intravenous, q24h KEVIN, Polina KEYA Oakes, APRN-CNP, Stopped at 10/17/24 0931    dextrose 50 % injection 12.5 g, 12.5 g, intravenous, q15 min PRN, SANJAY Quezada    dextrose 50 % injection 25 g, 25 g, intravenous, q15 min PRN, SANJAY Quezada    docusate sodium (Colace) capsule 100 mg, 100 mg, oral, BID, Jennacolette Alvarado, APRN-CNP, 100 mg at 10/17/24 0919    donepezil (Aricept) tablet 10 mg, 10 mg, oral, Nightly, SANJAY Quezada, 10 mg at 10/16/24 2054    DULoxetine (Cymbalta) DR capsule 60 mg, 60 mg, oral, Daily, SANJAY Quezada, 60 mg at 10/17/24 0920    ferrous sulfate (325 mg ferrous sulfate) tablet 325 mg, 65 mg of iron, oral, Daily, SANJAY Quezada, 325 mg at 10/17/24 0919    folic acid (Folvite) tablet 0.4 mg, 0.4 mg, oral, Daily, SANJAY Quezada, 0.4 mg at 10/17/24 0919    gabapentin (Neurontin) capsule 300 mg, 300 mg, oral, TID, SANJAY Quezada, 300 mg at 10/17/24 0919    glucagon (Glucagen) injection 1 mg, 1 mg, intramuscular, q15 min PRN, SANJAY Quezada    glucagon (Glucagen) injection 1 mg, 1 mg, intramuscular, q15 min PRN, SANJAY Quezada    insulin glargine (Lantus) injection 30 Units, 30 Units, subcutaneous, Nightly, SANJAY Quezada, 30 Units at 10/16/24 2055    insulin lispro (HumaLOG) injection 0-5 Units, 0-5 Units, subcutaneous, TID, SANJAY Quezada, 1 Units at 10/14/24 1604    levETIRAcetam (Keppra) tablet 500 mg, 500 mg, oral, BID, SANJAY Quezada, 500 mg at 10/17/24 0920    melatonin tablet 5 mg, 5 mg, oral, Nightly PRN, SANJAY Quezada    metoprolol succinate XL (Toprol-XL) 24 hr tablet 25 mg, 25 mg, oral, BID, SANJAY Aguilar, 25 mg at 10/17/24 0919    ondansetron ODT (Zofran-ODT) disintegrating tablet 4 mg, 4 mg, oral, q8h PRN **OR**  ondansetron (Zofran) injection 4 mg, 4 mg, intravenous, q8h PRN, Celio Lira APRN-CNP    oxyCODONE (Roxicodone) immediate release tablet 15 mg, 15 mg, oral, q6h PRN, Celio Lira APRN-CNP, 15 mg at 10/17/24 0919    pantoprazole (ProtoNix) EC tablet 40 mg, 40 mg, oral, Daily before breakfast, HERBERT Quezada-CNP, 40 mg at 10/17/24 0541    polyethylene glycol (Glycolax, Miralax) packet 17 g, 17 g, oral, Daily, Jenna Marcloly, APRN-CNP, 17 g at 10/17/24 0919    tamsulosin (Flomax) 24 hr capsule 0.4 mg, 0.4 mg, oral, Nightly, HERBERT Quezada-CNP, 0.4 mg at 10/16/24 2054    traZODone (Desyrel) tablet 150 mg, 150 mg, oral, Nightly, HERBERT Quezada-CNP, 150 mg at 10/16/24 2054   Relevant Results    Results for orders placed or performed during the hospital encounter of 10/13/24 (from the past 96 hours)   Urinalysis with Reflex Culture and Microscopic   Result Value Ref Range    Color, Urine Yellow Light-Yellow, Yellow, Dark-Yellow    Appearance, Urine Clear Clear    Specific Gravity, Urine >1.050 (N) 1.005 - 1.035    pH, Urine 6.0 5.0, 5.5, 6.0, 6.5, 7.0, 7.5, 8.0    Protein, Urine 600 (3+) (A) NEGATIVE, 10 (TRACE), 20 (TRACE) mg/dL    Glucose, Urine 30 (TRACE) (A) Normal mg/dL    Blood, Urine 0.1 (1+) (A) NEGATIVE    Ketones, Urine NEGATIVE NEGATIVE mg/dL    Bilirubin, Urine NEGATIVE NEGATIVE    Urobilinogen, Urine Normal Normal mg/dL    Nitrite, Urine NEGATIVE NEGATIVE    Leukocyte Esterase, Urine NEGATIVE NEGATIVE   Urinalysis Microscopic   Result Value Ref Range    WBC, Urine 1-5 1-5, NONE /HPF    RBC, Urine 1-2 NONE, 1-2, 3-5 /HPF    Mucus, Urine FEW Reference range not established. /LPF   POCT GLUCOSE   Result Value Ref Range    POCT Glucose 181 (H) 74 - 99 mg/dL   POCT GLUCOSE   Result Value Ref Range    POCT Glucose 111 (H) 74 - 99 mg/dL   CBC   Result Value Ref Range    WBC 8.1 4.4 - 11.3 x10*3/uL    nRBC 0.0 0.0 - 0.0 /100 WBCs    RBC 3.71 (L) 4.50 - 5.90 x10*6/uL    Hemoglobin 10.7 (L) 13.5 -  17.5 g/dL    Hematocrit 33.2 (L) 41.0 - 52.0 %    MCV 90 80 - 100 fL    MCH 28.8 26.0 - 34.0 pg    MCHC 32.2 32.0 - 36.0 g/dL    RDW 15.5 (H) 11.5 - 14.5 %    Platelets 165 150 - 450 x10*3/uL   Basic metabolic panel   Result Value Ref Range    Glucose 153 (H) 74 - 99 mg/dL    Sodium 133 (L) 136 - 145 mmol/L    Potassium 4.0 3.5 - 5.3 mmol/L    Chloride 103 98 - 107 mmol/L    Bicarbonate 22 21 - 32 mmol/L    Anion Gap 12 10 - 20 mmol/L    Urea Nitrogen 34 (H) 6 - 23 mg/dL    Creatinine 1.38 (H) 0.50 - 1.30 mg/dL    eGFR 54 (L) >60 mL/min/1.73m*2    Calcium 8.2 (L) 8.6 - 10.3 mg/dL   POCT GLUCOSE   Result Value Ref Range    POCT Glucose 128 (H) 74 - 99 mg/dL   Creatine Kinase   Result Value Ref Range    Creatine Kinase 116 0 - 325 U/L   POCT GLUCOSE   Result Value Ref Range    POCT Glucose 173 (H) 74 - 99 mg/dL   Transthoracic Echo (TTE) Complete   Result Value Ref Range    AV pk anahi 1.04 m/s    LVOT diam 2.50 cm    Tricuspid annular plane systolic excursion 1.2 cm    LA vol index A/L 43.3 ml/m2    LV EF 48 %    RV free wall pk S' 11.50 cm/s    AV pk grad 4.3 mmHg    Aortic Valve Area by Continuity of Peak Velocity 3.10 cm2   POCT GLUCOSE   Result Value Ref Range    POCT Glucose 169 (H) 74 - 99 mg/dL   POCT GLUCOSE   Result Value Ref Range    POCT Glucose 156 (H) 74 - 99 mg/dL   CBC   Result Value Ref Range    WBC 8.1 4.4 - 11.3 x10*3/uL    nRBC 0.0 0.0 - 0.0 /100 WBCs    RBC 3.62 (L) 4.50 - 5.90 x10*6/uL    Hemoglobin 10.4 (L) 13.5 - 17.5 g/dL    Hematocrit 32.4 (L) 41.0 - 52.0 %    MCV 90 80 - 100 fL    MCH 28.7 26.0 - 34.0 pg    MCHC 32.1 32.0 - 36.0 g/dL    RDW 15.2 (H) 11.5 - 14.5 %    Platelets 151 150 - 450 x10*3/uL   Basic Metabolic Panel   Result Value Ref Range    Glucose 142 (H) 74 - 99 mg/dL    Sodium 129 (L) 136 - 145 mmol/L    Potassium 4.4 3.5 - 5.3 mmol/L    Chloride 100 98 - 107 mmol/L    Bicarbonate 22 21 - 32 mmol/L    Anion Gap 11 10 - 20 mmol/L    Urea Nitrogen 40 (H) 6 - 23 mg/dL    Creatinine  1.55 (H) 0.50 - 1.30 mg/dL    eGFR 47 (L) >60 mL/min/1.73m*2    Calcium 8.3 (L) 8.6 - 10.3 mg/dL   POCT GLUCOSE   Result Value Ref Range    POCT Glucose 127 (H) 74 - 99 mg/dL   POCT GLUCOSE   Result Value Ref Range    POCT Glucose 146 (H) 74 - 99 mg/dL   Basic metabolic panel   Result Value Ref Range    Glucose 145 (H) 74 - 99 mg/dL    Sodium 130 (L) 136 - 145 mmol/L    Potassium 4.7 3.5 - 5.3 mmol/L    Chloride 101 98 - 107 mmol/L    Bicarbonate 19 (L) 21 - 32 mmol/L    Anion Gap 15 10 - 20 mmol/L    Urea Nitrogen 40 (H) 6 - 23 mg/dL    Creatinine 1.63 (H) 0.50 - 1.30 mg/dL    eGFR 44 (L) >60 mL/min/1.73m*2    Calcium 8.6 8.6 - 10.3 mg/dL   POCT GLUCOSE   Result Value Ref Range    POCT Glucose 150 (H) 74 - 99 mg/dL   POCT GLUCOSE   Result Value Ref Range    POCT Glucose 247 (H) 74 - 99 mg/dL   CBC   Result Value Ref Range    WBC 6.5 4.4 - 11.3 x10*3/uL    nRBC 0.0 0.0 - 0.0 /100 WBCs    RBC 3.62 (L) 4.50 - 5.90 x10*6/uL    Hemoglobin 10.4 (L) 13.5 - 17.5 g/dL    Hematocrit 31.7 (L) 41.0 - 52.0 %    MCV 88 80 - 100 fL    MCH 28.7 26.0 - 34.0 pg    MCHC 32.8 32.0 - 36.0 g/dL    RDW 15.1 (H) 11.5 - 14.5 %    Platelets 145 (L) 150 - 450 x10*3/uL   Basic Metabolic Panel   Result Value Ref Range    Glucose 167 (H) 74 - 99 mg/dL    Sodium 130 (L) 136 - 145 mmol/L    Potassium 4.4 3.5 - 5.3 mmol/L    Chloride 102 98 - 107 mmol/L    Bicarbonate 21 21 - 32 mmol/L    Anion Gap 11 10 - 20 mmol/L    Urea Nitrogen 43 (H) 6 - 23 mg/dL    Creatinine 1.52 (H) 0.50 - 1.30 mg/dL    eGFR 48 (L) >60 mL/min/1.73m*2    Calcium 8.5 (L) 8.6 - 10.3 mg/dL   Osmolality   Result Value Ref Range    Osmolality, Serum 291 280 - 300 mOsm/kg   POCT GLUCOSE   Result Value Ref Range    POCT Glucose 124 (H) 74 - 99 mg/dL   POCT GLUCOSE   Result Value Ref Range    POCT Glucose 125 (H) 74 - 99 mg/dL   Osmolality, urine   Result Value Ref Range    Osmolality, Urine Random 225 200 - 1,200 mOsm/kg   Sodium, Urine Random   Result Value Ref Range    Sodium,  Urine Random 13 mmol/L    Creatinine, Urine Random 39.3 20.0 - 370.0 mg/dL    Sodium/Creatinine Ratio 33 Not established. mmol/g Creat   POCT GLUCOSE   Result Value Ref Range    POCT Glucose 121 (H) 74 - 99 mg/dL   POCT GLUCOSE   Result Value Ref Range    POCT Glucose 167 (H) 74 - 99 mg/dL   Renal Function Panel   Result Value Ref Range    Glucose 109 (H) 74 - 99 mg/dL    Sodium 136 136 - 145 mmol/L    Potassium 4.5 3.5 - 5.3 mmol/L    Chloride 106 98 - 107 mmol/L    Bicarbonate 23 21 - 32 mmol/L    Anion Gap 12 10 - 20 mmol/L    Urea Nitrogen 40 (H) 6 - 23 mg/dL    Creatinine 1.44 (H) 0.50 - 1.30 mg/dL    eGFR 51 (L) >60 mL/min/1.73m*2    Calcium 8.5 (L) 8.6 - 10.3 mg/dL    Phosphorus 4.1 2.5 - 4.9 mg/dL    Albumin 3.3 (L) 3.4 - 5.0 g/dL   POCT GLUCOSE   Result Value Ref Range    POCT Glucose 91 74 - 99 mg/dL       Assessment/Plan   Chronic kidney disease stage IIIb seemed that his creatinine level is at baseline plan continue to monitor very closely  Syncope is in progress  Hypertension  Diabetes mellitus type 2  Hyponatremia, likely from excessive water intake is back to normal 136 today               Ariel Costa MD

## 2024-10-17 NOTE — CARE PLAN
The patient's goals for the shift include  manage pain, comfort and safety, therapy, rest.     The clinical goals for the shift include manage pain, monitor labs and VS, Orthos, IV antibx, Leno placement, maintain FR (1600cc), PT/OT, maintain safety, promote rest, discharge planning, wound care.    No barriers towards meeting these goals. Plan of care ongoing, no additional needs at this time. Call light and personal belongings within reach. Plan for Leno cath @1030am tomorrow morning at [place].       Problem: Fall/Injury  Goal: Not fall by end of shift  Outcome: Progressing  Goal: Be free from injury by end of the shift  Outcome: Progressing  Goal: Verbalize understanding of personal risk factors for fall in the hospital  Outcome: Progressing  Goal: Verbalize understanding of risk factor reduction measures to prevent injury from fall in the home  Outcome: Progressing  Goal: Use assistive devices by end of the shift  Outcome: Progressing  Goal: Pace activities to prevent fatigue by end of the shift  Outcome: Progressing     Problem: Diabetes  Goal: Achieve decreasing blood glucose levels by end of shift  Outcome: Progressing  Goal: Increase stability of blood glucose readings by end of shift  Outcome: Progressing  Goal: Decrease in ketones present in urine by end of shift  Outcome: Progressing  Goal: Maintain electrolyte levels within acceptable range throughout shift  Outcome: Progressing  Goal: Maintain glucose levels >70mg/dl to <250mg/dl throughout shift  Outcome: Progressing  Goal: No changes in neurological exam by end of shift  Outcome: Progressing  Goal: Learn about and adhere to nutrition recommendations by end of shift  Outcome: Progressing  Goal: Vital signs within normal range for age by end of shift  Outcome: Progressing  Goal: Increase self care and/or family involovement by end of shift  Outcome: Progressing  Goal: Receive DSME education by end of shift  Outcome: Progressing     Problem: Pain -  Adult  Goal: Verbalizes/displays adequate comfort level or baseline comfort level  Outcome: Progressing     Problem: Safety - Adult  Goal: Free from fall injury  Outcome: Progressing     Problem: Discharge Planning  Goal: Discharge to home or other facility with appropriate resources  Outcome: Progressing     Problem: Chronic Conditions and Co-morbidities  Goal: Patient's chronic conditions and co-morbidity symptoms are monitored and maintained or improved  Outcome: Progressing     Problem: Pain  Goal: Takes deep breaths with improved pain control throughout the shift  Outcome: Progressing  Goal: Turns in bed with improved pain control throughout the shift  Outcome: Progressing  Goal: Walks with improved pain control throughout the shift  Outcome: Progressing  Goal: Performs ADL's with improved pain control throughout shift  Outcome: Progressing  Goal: Participates in PT with improved pain control throughout the shift  Outcome: Progressing  Goal: Free from opioid side effects throughout the shift  Outcome: Progressing  Goal: Free from acute confusion related to pain meds throughout the shift  Outcome: Progressing

## 2024-10-17 NOTE — PROGRESS NOTES
Occupational Therapy    Occupational Therapy Treatment    Name: Rachid Yun  MRN: [id number]  Department: [address]  Room: [facility]441-  Date: 10/17/24  Time Calculation  Start Time: 0958  Stop Time: 1022  Time Calculation (min): 24 min    Assessment:  OT Assessment: Patient will continue to benefit from skilled OT to maximize patient's safety and independence with daily tasks.  Prognosis: Good  Barriers to Discharge: Other (Comment) (assist with ADLs, 1-2 person assist txfers)  Evaluation/Treatment Tolerance: Patient tolerated treatment well  End of Session Communication: Bedside nurse  End of Session Patient Position: Bed, 3 rail up, Alarm on  Plan:  Treatment Interventions: ADL retraining, Functional transfer training, UE strengthening/ROM, Endurance training, Equipment evaluation/education, Patient/family training, Compensatory technique education  OT Frequency: 3 times per week  OT Discharge Recommendations: Moderate intensity level of continued care  Equipment Recommended upon Discharge: Wheeled walker  OT Recommended Transfer Status: Assist of 2  OT - OK to Discharge: Yes    Subjective   Previous Visit Info:  OT Last Visit  OT Received On: 10/17/24  General:  General  Reason for Referral: decline in ADLs, syncope  Referred By: Tiffany Chong MD  Past Medical History Relevant to Rehab: arthritis, CAD, DM, HTN, A-fib  Family/Caregiver Present: No  Prior to Session Communication: Bedside nurse  Patient Position Received: Up in bathroom (PT in room)  Preferred Learning Style: verbal, visual  General Comment: Patient cleared by nursing for therapy. Patient on the toilet upon arrival, PT in room, patient requesting to get washed up. Agreeable to OT  Precautions:  LE Weight Bearing Status: Left Partial Weight Bearing  Medical Precautions: Fall precautions  Precautions Comment: Left post OP shoe, partial WB    Pain Assessment:  Pain Assessment  Pain Assessment: 0-10  0-10 (Numeric) Pain Score: 5 - Moderate pain  Pain  Type: Chronic pain  Pain Location: Foot  Pain Orientation: Left  Multiple Pain Sites: Two  Pain 2  Pain Score 2: 7  Pain Type 2: Chronic pain  Pain Location 2: Back  Pain Orientation 2: Lower  Response to Interventions 2: L shoulder ROM limited     Objective   Cognition:  Overall Cognitive Status: Within Functional Limits  Orientation Level: Oriented X4  Cognition Comments: able to follow commands throughout  Activities of Daily Living: Grooming  Grooming Level of Assistance: Setup  Grooming Where Assessed: Sitting sinkside  Grooming Comments: brush teeth and wash face    UE Bathing  UE Bathing Level of Assistance: Minimum assistance  UE Bathing Where Assessed: Sitting sinkside  UE Bathing Comments: UB sponge bath, assistance to wash R UE and assist for thoroughness for L UE    LE Bathing  LE Bathing Level of Assistance: Minimum assistance  LE Bathing Where Assessed: Sitting sinkside  LE Bathing Comments: assist to wash both feet    UE Dressing  UE Dressing Level of Assistance: Minimum assistance  UE Dressing Where Assessed: Other (Comment) (seated on toilet)  UE Dressing Comments: assist to doff/don gown, pull around back and line management    LE Dressing  Sock Level of Assistance: Dependent  LE Dressing Where Assessed: Bed level  LE Dressing Comments: doff socks. don PRAFO boot to L LE, RN aware    Toileting  Toileting Level of Assistance: Dependent  Where Assessed: Toilet  Toileting Comments: posterior priscilla area    Bed Mobility/Transfers: Bed Mobility  Bed Mobility: Yes  Bed Mobility 1  Bed Mobility 1: Sitting to supine  Level of Assistance 1: Minimum assistance  Bed Mobility Comments 1: assist for B LE    Transfers  Transfer: Yes  Transfer 1  Transfer From 1: Toilet to  Transfer to 1: Stand  Technique 1: Sit to stand  Transfer Device 1: Walker  Transfer Level of Assistance 1: Minimum assistance, +2  Trials/Comments 1: cues for proper hand placement. BSC frame over toilet    Functional Mobility:  Functional  Mobility  Functional Mobility Performed: Yes  Functional Mobility 1  Surface 1: Level tile  Device 1: Rolling walker  Assistance 1: Minimum assistance (x2)  Comments 1: to bed from the bathroom  Sitting Balance:  Static Sitting Balance  Static Sitting-Balance Support: Feet supported, No upper extremity supported  Static Sitting-Level of Assistance: Distant supervision  Standing Balance:  Static Standing Balance  Static Standing-Balance Support: Bilateral upper extremity supported  Static Standing-Level of Assistance: Minimum assistance    RUE   RUE : Exceptions to WFL (impaired shoulder) and LUE   LUE: Exceptions to WFL (impaired shoulder)    Outcome Measures:  [facility] Daily Activity  Putting on and taking off regular lower body clothing: Total  Bathing (including washing, rinsing, drying): A lot  Putting on and taking off regular upper body clothing: A little  Toileting, which includes using toilet, bedpan or urinal: Total  Taking care of personal grooming such as brushing teeth: A little  Eating Meals: None  Daily Activity - Total Score: 14    Education Documentation  Body Mechanics, taught by Junie Urbano OT at 10/17/2024 11:10 AM.  Learner: Patient  Readiness: Acceptance  Method: Explanation, Demonstration  Response: Verbalizes Understanding, Needs Reinforcement    Precautions, taught by Junie Urbano OT at 10/17/2024 11:10 AM.  Learner: Patient  Readiness: Acceptance  Method: Explanation, Demonstration  Response: Verbalizes Understanding, Needs Reinforcement    ADL Training, taught by Junie Urbano OT at 10/17/2024 11:10 AM.  Learner: Patient  Readiness: Acceptance  Method: Explanation, Demonstration  Response: Verbalizes Understanding, Needs Reinforcement    Education Comments  No comments found.      Goals:  Encounter Problems       Encounter Problems (Active)       OT Goals       ADLs (Progressing)       Start:  10/14/24    Expected End:  10/28/24       Pt will complete ADL's with Mod I using  AE/compensatory techniques as needed.         Functional mobility  (Progressing)       Start:  10/14/24    Expected End:  10/28/24       Pt will complete functional mobility Mod I with LRD.         Functional transfer  (Progressing)       Start:  10/14/24    Expected End:  10/28/24       Pt will complete functional transfers including but not limited to: bed, chair, toilet with MOD I using LRD (least restrictive device).         Activity tolerance  (Progressing)       Start:  10/14/24    Expected End:  10/28/24       Patient will demonstrate improved activity tolerance AEB completing functional task for >/= 15 minutes while remaining hemodynamically stable.

## 2024-10-18 ENCOUNTER — HOSPITAL ENCOUNTER (INPATIENT)
Dept: CARDIOLOGY | Facility: HOSPITAL | Age: 74
Discharge: HOME | End: 2024-10-18
Payer: MEDICARE

## 2024-10-18 VITALS
DIASTOLIC BLOOD PRESSURE: 90 MMHG | HEART RATE: 68 BPM | TEMPERATURE: 96.9 F | OXYGEN SATURATION: 100 % | SYSTOLIC BLOOD PRESSURE: 179 MMHG | RESPIRATION RATE: 16 BRPM

## 2024-10-18 LAB
ANION GAP SERPL CALCULATED.3IONS-SCNC: 10 MMOL/L (ref 10–20)
BACTERIA BLD CULT: NORMAL
BACTERIA BLD CULT: NORMAL
BUN SERPL-MCNC: 37 MG/DL (ref 6–23)
CALCIUM SERPL-MCNC: 8.2 MG/DL (ref 8.6–10.3)
CHLORIDE SERPL-SCNC: 108 MMOL/L (ref 98–107)
CK SERPL-CCNC: 43 U/L (ref 0–325)
CO2 SERPL-SCNC: 24 MMOL/L (ref 21–32)
CREAT SERPL-MCNC: 1.43 MG/DL (ref 0.5–1.3)
EGFRCR SERPLBLD CKD-EPI 2021: 51 ML/MIN/1.73M*2
GLUCOSE BLD MANUAL STRIP-MCNC: 123 MG/DL (ref 74–99)
GLUCOSE BLD MANUAL STRIP-MCNC: 85 MG/DL (ref 74–99)
GLUCOSE BLD MANUAL STRIP-MCNC: 93 MG/DL (ref 74–99)
GLUCOSE SERPL-MCNC: 134 MG/DL (ref 74–99)
POTASSIUM SERPL-SCNC: 4.2 MMOL/L (ref 3.5–5.3)
SODIUM SERPL-SCNC: 138 MMOL/L (ref 136–145)

## 2024-10-18 PROCEDURE — 2780000003 HC OR 278 NO HCPCS

## 2024-10-18 PROCEDURE — 7100000010 HC PHASE TWO TIME - EACH INCREMENTAL 1 MINUTE

## 2024-10-18 PROCEDURE — 97110 THERAPEUTIC EXERCISES: CPT | Mod: GP,CQ

## 2024-10-18 PROCEDURE — 0JH63XZ INSERTION OF TUNNELED VASCULAR ACCESS DEVICE INTO CHEST SUBCUTANEOUS TISSUE AND FASCIA, PERCUTANEOUS APPROACH: ICD-10-PCS | Performed by: STUDENT IN AN ORGANIZED HEALTH CARE EDUCATION/TRAINING PROGRAM

## 2024-10-18 PROCEDURE — 2500000004 HC RX 250 GENERAL PHARMACY W/ HCPCS (ALT 636 FOR OP/ED): Performed by: STUDENT IN AN ORGANIZED HEALTH CARE EDUCATION/TRAINING PROGRAM

## 2024-10-18 PROCEDURE — 02HV33Z INSERTION OF INFUSION DEVICE INTO SUPERIOR VENA CAVA, PERCUTANEOUS APPROACH: ICD-10-PCS | Performed by: STUDENT IN AN ORGANIZED HEALTH CARE EDUCATION/TRAINING PROGRAM

## 2024-10-18 PROCEDURE — C1751 CATH, INF, PER/CENT/MIDLINE: HCPCS

## 2024-10-18 PROCEDURE — 36558 INSERT TUNNELED CV CATH: CPT | Performed by: STUDENT IN AN ORGANIZED HEALTH CARE EDUCATION/TRAINING PROGRAM

## 2024-10-18 PROCEDURE — 82947 ASSAY GLUCOSE BLOOD QUANT: CPT

## 2024-10-18 PROCEDURE — 82550 ASSAY OF CK (CPK): CPT | Performed by: NURSE PRACTITIONER

## 2024-10-18 PROCEDURE — 2500000001 HC RX 250 WO HCPCS SELF ADMINISTERED DRUGS (ALT 637 FOR MEDICARE OP): Performed by: NURSE PRACTITIONER

## 2024-10-18 PROCEDURE — 36558 INSERT TUNNELED CV CATH: CPT

## 2024-10-18 PROCEDURE — 7100000009 HC PHASE TWO TIME - INITIAL BASE CHARGE

## 2024-10-18 PROCEDURE — 1200000002 HC GENERAL ROOM WITH TELEMETRY DAILY

## 2024-10-18 PROCEDURE — 80048 BASIC METABOLIC PNL TOTAL CA: CPT | Performed by: NURSE PRACTITIONER

## 2024-10-18 PROCEDURE — C1894 INTRO/SHEATH, NON-LASER: HCPCS

## 2024-10-18 PROCEDURE — 2500000004 HC RX 250 GENERAL PHARMACY W/ HCPCS (ALT 636 FOR OP/ED): Performed by: NURSE PRACTITIONER

## 2024-10-18 PROCEDURE — 36415 COLL VENOUS BLD VENIPUNCTURE: CPT | Performed by: NURSE PRACTITIONER

## 2024-10-18 PROCEDURE — 97116 GAIT TRAINING THERAPY: CPT | Mod: GP,CQ

## 2024-10-18 PROCEDURE — 2720000007 HC OR 272 NO HCPCS

## 2024-10-18 PROCEDURE — 2500000002 HC RX 250 W HCPCS SELF ADMINISTERED DRUGS (ALT 637 FOR MEDICARE OP, ALT 636 FOR OP/ED): Performed by: NURSE PRACTITIONER

## 2024-10-18 PROCEDURE — 99232 SBSQ HOSP IP/OBS MODERATE 35: CPT | Performed by: NURSE PRACTITIONER

## 2024-10-18 RX ORDER — LIDOCAINE HYDROCHLORIDE 10 MG/ML
INJECTION, SOLUTION EPIDURAL; INFILTRATION; INTRACAUDAL; PERINEURAL AS NEEDED
Status: DISCONTINUED | OUTPATIENT
Start: 2024-10-18 | End: 2024-10-18 | Stop reason: HOSPADM

## 2024-10-18 ASSESSMENT — COGNITIVE AND FUNCTIONAL STATUS - GENERAL
MOVING FROM LYING ON BACK TO SITTING ON SIDE OF FLAT BED WITH BEDRAILS: A LITTLE
MOBILITY SCORE: 13
DAILY ACTIVITIY SCORE: 20
DRESSING REGULAR UPPER BODY CLOTHING: A LITTLE
TURNING FROM BACK TO SIDE WHILE IN FLAT BAD: A LITTLE
MOBILITY SCORE: 13
STANDING UP FROM CHAIR USING ARMS: A LOT
WALKING IN HOSPITAL ROOM: A LOT
TOILETING: A LITTLE
MOVING FROM LYING ON BACK TO SITTING ON SIDE OF FLAT BED WITH BEDRAILS: A LITTLE
MOVING TO AND FROM BED TO CHAIR: A LOT
DRESSING REGULAR LOWER BODY CLOTHING: A LITTLE
MOVING TO AND FROM BED TO CHAIR: A LOT
CLIMB 3 TO 5 STEPS WITH RAILING: TOTAL
EATING MEALS: A LITTLE
TURNING FROM BACK TO SIDE WHILE IN FLAT BAD: A LITTLE
WALKING IN HOSPITAL ROOM: A LOT
STANDING UP FROM CHAIR USING ARMS: A LOT
CLIMB 3 TO 5 STEPS WITH RAILING: TOTAL

## 2024-10-18 ASSESSMENT — PAIN - FUNCTIONAL ASSESSMENT
PAIN_FUNCTIONAL_ASSESSMENT: 0-10
PAIN_FUNCTIONAL_ASSESSMENT: 0-10
PAIN_FUNCTIONAL_ASSESSMENT: UNABLE TO SELF-REPORT
PAIN_FUNCTIONAL_ASSESSMENT: 0-10

## 2024-10-18 ASSESSMENT — PAIN DESCRIPTION - LOCATION
LOCATION: GENERALIZED
LOCATION: BACK
LOCATION: FOOT

## 2024-10-18 ASSESSMENT — PAIN DESCRIPTION - ORIENTATION
ORIENTATION: LEFT
ORIENTATION: LOWER

## 2024-10-18 ASSESSMENT — PAIN SCALES - GENERAL
PAINLEVEL_OUTOF10: 7
PAINLEVEL_OUTOF10: 9
PAINLEVEL_OUTOF10: 6
PAINLEVEL_OUTOF10: 7
PAINLEVEL_OUTOF10: 3
PAINLEVEL_OUTOF10: 0 - NO PAIN
PAINLEVEL_OUTOF10: 3

## 2024-10-18 NOTE — NURSING NOTE
[facility] here to take patient to [place] for [facility] Cath placement.

## 2024-10-18 NOTE — PROGRESS NOTES
Rachid Yun is a 74 y.o. male on day 5 of admission presenting with Syncope, non cardiac.    Subjective   Interval History:   Leno catheter placement   Afebrile, no chills  Remains on room air  No shortness of breath cough or chest pain  No nausea, vomiting or diarrhea       Objective   Range of Vitals (last 24 hours)  Heart Rate:  [68-95]   Temp:  [36.1 °C (96.9 °F)-36.8 °C (98.2 °F)]   Resp:  [10-18]   BP: (125-183)/(65-96)   SpO2:  [96 %-100 %]   Daily Weight  10/13/24 : 103 kg (228 lb)    Body mass index is 27.76 kg/m².    Physical Exam  Constitutional:       Appearance: Normal appearance.   HENT:      Head: Normocephalic and atraumatic.      Nose: Nose normal.      Mouth/Throat:      Mouth: Mucous membranes are moist.      Pharynx: Oropharynx is clear.   Eyes:      General: No scleral icterus.  Cardiovascular:      Rate and Rhythm: Normal rate and regular rhythm.   Pulmonary:      Effort: Pulmonary effort is normal.      Breath sounds: Normal breath sounds.   Abdominal:      General: Bowel sounds are normal.      Palpations: Abdomen is soft.   Musculoskeletal:         General: Normal range of motion.      Cervical back: Normal range of motion and neck supple.   Skin:     General: Skin is warm and dry.      Comments: Left lateral foot ulcer with large amount of granulation tissue, resolving mild surrounding erythema, left foot swelling      Neurological:      Mental Status: He is alert.      Comments: Awake, alert   Psychiatric:         Mood and Affect: Mood normal.         Behavior: Behavior normal.     Antibiotics  cephalexin - 500 mg  DAPTOmycin - 500 mg, 500 mg/50 mL  doxycycline - 100 mg  sulfamethoxazole-trimethoprim - 800-160 mg    Relevant Results  Labs  Results from last 72 hours   Lab Units 10/16/24  0516   WBC AUTO x10*3/uL 6.5   HEMOGLOBIN g/dL 10.4*   HEMATOCRIT % 31.7*   PLATELETS AUTO x10*3/uL 145*     Results from last 72 hours   Lab Units 10/18/24  0525 10/17/24  0541 10/16/24  0516   SODIUM  mmol/L 138 136 130*   POTASSIUM mmol/L 4.2 4.5 4.4   CHLORIDE mmol/L 108* 106 102   CO2 mmol/L 24 23 21   BUN mg/dL 37* 40* 43*   CREATININE mg/dL 1.43* 1.44* 1.52*   GLUCOSE mg/dL 134* 109* 167*   CALCIUM mg/dL 8.2* 8.5* 8.5*   ANION GAP mmol/L 10 12 11   EGFR mL/min/1.73m*2 51* 51* 48*   PHOSPHORUS mg/dL  --  4.1  --      Results from last 72 hours   Lab Units 10/17/24  0541   ALBUMIN g/dL 3.3*       Estimated Creatinine Clearance: 55.6 mL/min (A) (by C-G formula based on SCr of 1.43 mg/dL (H)).  C-Reactive Protein   Date Value Ref Range Status   10/01/2024 7.77 (H) <1.00 mg/dL Final   07/16/2024 <0.30 0.00 - 2.00 mg/dL Final     Microbiology  Blood culture pending  Wound culture -_MRSA      Imaging  MR angio head wo IV contrast    Result Date: 10/15/2024  Interpreted By:  Cristiana Frank, STUDY: MR ANGIO HEAD WO IV CONTRAST;  10/14/2024 9:15 am   INDICATION: Signs/Symptoms:syncope.     COMPARISON: None.   ACCESSION NUMBER(S): [id number]   ORDERING CLINICIAN: SHAMIKA MILAN   TECHNIQUE: Time-of-flight MRA of the head was performed. The images were reviewed as source images and maximum intensity projections.   FINDINGS:     Anterior circulation:    There is expected flow signal in bilateral intracranial internal carotid arteries, bilateral carotid terminals, bilateral proximal anterior and middle cerebral arteries.   Posterior circulation:    Bilateral intracranial vertebral arteries, vertebrobasilar junction, basilar artery and proximal posterior cerebral arteries demonstrate expected flow signal.   There is a 0.3 x 0.3 cm outpouching from the basilar tip compatible with an aneurysm.       1.  There is no evidence for hemodynamically significant stenosis or large branch vessel cutoffs of the visualized intracranial vasculature. 2. 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   MACRO: None   Signed by: Cristiana Frank 10/15/2024 7:59 AM Dictation workstation:   [id number]    MR foot left wo IV contrast    Result Date:  10/15/2024  Interpreted By:  Raúl Infante, STUDY: MR FOOT LEFT WO IV CONTRAST; ;  10/14/2024 10:02 pm   INDICATION: Signs/Symptoms:left foot infection.     COMPARISON: MRI 07/16/2024   ACCESSION NUMBER(S): [id number]   ORDERING CLINICIAN: TARA ANDRES   TECHNIQUE: Multiplanar multisequence MRI obtained of the left foot.   FINDINGS: Skin ulceration is demonstrated along the 5th metatarsal base. There is skin thickening and subcutaneous edema deep to the level of the skin ulceration with component of cellulitis. Of note, subtle linear appearing area of T2 weighted hyperintensity is demonstrated from the area of skin ulceration into the 5th metatarsal base with focal fistulous tract of concern. No subcutaneous abscess formation demonstrated. There is loss of cortical margin within the 5th metatarsal base about the region of the fistulous tract with generalized intermediate T1 weighted signal intensity with corresponding T2 weighted hyperintensity with marrow edema throughout the 5th metatarsal base extending into the mid 5th metatarsal shaft. No well-marginated abscess formation about the ulceration. Of note, findings are in the region of the 5th tarsometatarsal articulation with mild asymmetric fluid about the articulation as well as mild marrow edema within the cuboid bone lateral margin of the articulation with component of septic arthritis osteomyelitis about the articulation not excluded with mild osteomyelitis within the cuboid bone a possibility. Component of findings may simply be reactive.   Remainder of the midfoot osseous structures demonstrate normal marrow signal intensity. Intertarsal articulations are unremarkable. Lisfranc joint is otherwise congruent. There is mild Lisfranc joint osteoarthritis. Lisfranc ligament complex unremarkable.   Forefoot 2nd through 4th metatarsal shafts demonstrate no focal marrow edema. There is remote healed fracture deformity of the distal 5th metatarsal shafts.    Tibialis anterior, extensor hallucis longus and extensor digitorum tendons are unremarkable. Flexor hallucis longus and flexor digitorum tendons are unremarkable.   Musculature of the foot demonstrates diffuse atrophy. There is component mild intermediate signal intensity and chronic diabetic myopathy. Mild asymmetric edema within the abductor digiti minimi with infectious myositis of concern. Visualized portions of the plantar fascia are unremarkable   1st MTP joint demonstrates moderate osteoarthritic degenerative change. Tibial sesamoid bone demonstrates a few ossific fragments along the proximal margin with sequela of remote fracture deformity. There is mild tibial sesamoid metatarsal osteoarthritis. Fibular sesamoid bone are unremarkable. The 2nd through 5th MTP joints demonstrate no focal marrow edema. No joint effusions. Digits distally demonstrate no focal marrow edema.               1. Skin ulceration lateral base 5th metatarsal with cellulitis and fistulous tract extending into the cortical margin. No discrete abscess formation demonstrated about the ulceration. However, there is osteomyelitis throughout the 5th metatarsal base extending to near the mid 5th metatarsal shafts. Also, mild asymmetric fluid about the 5th tarsometatarsal articulation with mild marrow edema along the lateral cuboid bone about the 5th tarsometatarsal articulation. This may simply be reactive. However, component of septic arthritis osteomyelitis is not excluded.     MACRO: None   Signed by: Raúl Infante 10/15/2024 7:59 AM Dictation workstation:   [id number]    MR brain wo IV contrast    Result Date: 10/15/2024  Interpreted By:  Cristiana Frank, STUDY: MR BRAIN WO IV CONTRAST;  10/14/2024 9:11 am   INDICATION: Signs/Symptoms:syncope.     COMPARISON: MRI 08/31/2010   ACCESSION NUMBER(S): [id number]   ORDERING CLINICIAN: SHAMIKA MILAN   TECHNIQUE: Axial T2, FLAIR, DWI, gradient echo T2 and sagittal and coronal T1 weighted images  of brain were acquired.   FINDINGS: CSF Spaces: The ventricles, sulci and basal cisterns are prominent compatible with age related involutional changes and mild-to-moderate volume loss.   Parenchyma: There is no diffusion restriction abnormality to suggest acute infarct.  There is encephalomalacia and gliosis within the right centrum semiovale which likely represents the sequela of a prior infarct. There is a mild degree of nonspecific subcortical and periventricular T2 and FLAIR hyperintense signal which is compatible with microangiopathy. Old lacunar infarcts within the cerebellum. No abnormal susceptibility artifact. There is no mass effect or midline shift.   Paranasal Sinuses and Mastoids: Visualized paranasal sinuses and mastoid air cells are predominantly clear..       No evidence of acute infarct, intracranial mass effect or midline shift.   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   Mild degree of nonspecific white matter signal compatible with microangiopathy.   MACRO: None   Signed by: Cristiana Frank 10/15/2024 7:54 AM Dictation workstation:   [id number]    ECG 12 lead    Result Date: 10/14/2024  Atrial fibrillation Abnormal ECG When compared with ECG of 24-DEC-2023 15:59, No significant change was found    CT chest abdomen pelvis w IV contrast    Result Date: 10/13/2024  Interpreted By:  Tamara Snowden, STUDY: CT CHEST ABDOMEN PELVIS W IV CONTRAST;  10/13/2024 9:27 am   INDICATION: Signs/Symptoms:fall, syncope.     COMPARISON: None.   ACCESSION NUMBER(S): [id number]   ORDERING CLINICIAN: DOTTIE REYNA   TECHNIQUE: CT of the chest, abdomen, and pelvis was performed.  Contiguous axial images were obtained at 3 mm slice thickness through the chest, abdomen and pelvis. Coronal and sagittal reconstructions at 3 mm slice thickness were performed. Contrast was administered intravenously without immediate complication.   FINDINGS: CHEST:   LUNG/PLEURA/LARGE AIRWAYS: The lungs are hyperinflated. No  suspicious lung nodules or infiltrates. Punctate calcified granuloma in the right lung base.   VESSELS: Aortic calcification. No aneurysmal dilatation. Normal caliber main pulmonary artery.   HEART: No cardiomegaly. No pericardial effusion. Dense coronary artery calcification.   MEDIASTINUM AND ATIF: No significant mediastinal or hilar adenopathy. Subcentimeter lymph nodes are noted which are nonspecific and may be reactive.   CHEST WALL AND LOWER NECK: Grossly unremarkable thyroid gland.   ABDOMEN:   LIVER: Tiny hypodensity in the hepatic dome likely a tiny cyst. The liver is otherwise unremarkable.   BILE DUCTS: No bile duct dilatation.   GALLBLADDER: Likely tiny gallstones. No gallbladder wall thickening.   PANCREAS: Unremarkable pancreas.   SPLEEN: Unremarkable spleen.   ADRENAL GLANDS: No adrenal masses.   KIDNEYS AND URETERS: Cortical thinning of both kidneys. Likely a tiny subcentimeter upper pole left renal cyst. No hydroureter. No definite stones.   PELVIS:   BLADDER: Suboptimally distended and evaluated. Apparent bladder wall thickening at least partially due to lack of distention.   REPRODUCTIVE ORGANS: Calcifications in the prostate.   BOWEL: Large amount of stool. Rectal distention with stool. No evidence of bowel obstruction.     VESSELS: Aortic calcification. No aneurysmal dilatation.   IVC filter.   PERITONEUM/RETROPERITONEUM/LYMPH NODES: No retroperitoneal adenopathy. No ascites. Unremarkable appendix.   BONE AND SOFT TISSUE: Discogenic degenerative changes. Vacuum disc at multiple levels. Mild loss of height of multiple thoracic and lumbar spine vertebral bodies. Intramedullary sclerosis of the proximal left femur is suboptimally included and may represent bone infarcts or an enchondroma.       CHEST: 1. No acute abnormalities within the chest. 2. Dense coronary and aortic calcification. 3. Hyperinflation.   ABDOMEN-PELVIS: 1. Advanced multilevel discogenic degenerative changes. 2. Tiny gallstones.  3. No acute abnormalities.   Signed by: Tamara Snowden 10/13/2024 10:45 AM Dictation workstation:   [id number]    CT cervical spine wo IV contrast    Result Date: 10/13/2024  Interpreted By:  Tamara Snowden, STUDY: CT CERVICAL SPINE WO IV CONTRAST;  10/13/2024 9:27 am   INDICATION: Signs/Symptoms:neck trauma after syncopal episode.     COMPARISON: 12/24/2023   ACCESSION NUMBER(S): [id number]   ORDERING CLINICIAN: DOTTIE REYNA   TECHNIQUE: Axial CT images of the cervical spine are obtained. Axial, coronal and sagittal reconstructions are provided for review.   FINDINGS: Grade 1 anterolisthesis of C4 on C5. Straightening of normal cervical lordosis. Disc space loss from C6 through T1. No erosions. No fracture dislocation. Hypertrophic changes of the C1-C2 articulation. Mild central canal stenosis and varying degrees of neural foraminal narrowing at multiple levels.       No evidence for an acute fracture or subluxation of the cervical spine. Multilevel discogenic degenerative changes.   MACRO: None   Signed by: Tamara Snowden 10/13/2024 10:01 AM Dictation workstation:   [id number]    CT head wo IV contrast    Result Date: 10/13/2024  Interpreted By:  Tamara Snowden, STUDY: CT HEAD WO IV CONTRAST;  10/13/2024 9:27 am   INDICATION: Signs/Symptoms:head injury on eliquis.     COMPARISON: 12/24/2023   ACCESSION NUMBER(S): [id number]   ORDERING CLINICIAN: DOTTIE REYNA   TECHNIQUE: Noncontrast axial CT scan of head was performed. Angled reformats in brain and bone windows were generated. The images were reviewed in bone, brain, blood and soft tissue windows.   FINDINGS: The ventricles, cisterns and sulci are prominent, consistent with mild diffuse volume loss. There are areas of nonspecific white matter hypodensity, which are probably age-related or microvascular in nature.   Gray-white differentiation is intact and there is no evidence of acute cortical infarct. No mass, mass effect or midline shift is seen. There is no  evidence of hemorrhage.   The visualized paranasal sinuses are clear.         No evidence of acute cortical infarct or intracranial hemorrhage.   Chronic changes as described.   MACRO: None   Signed by: Tamara Snowden 10/13/2024 9:53 AM Dictation workstation:   [id number]        Assessment/Plan   Type 2 diabetes mellitus with peripheral angiopathy without gangrene   Left diabetic foot ulcer-culture grew MRSA -MRI evidence osteomyelitis   Chronic kidney diease, stage 3  Syncope  MRSA infection        IV daptomycin  Monitor CK level  Monitor temperature and WBC  Follow-up blood cultures  Neurology follow up   Local care  Podiatry consulted-note reviewed -recommend 8 weeks IV antibiotics   Leno catheter placed       Long term plan IV daptomycin 500 mg daily for 6 -8 weeks till 12/9/2024-see discharge rec  Weekly CBC with diff, CMP, CK  Follow up with Latanya Taylor  Follow up with podiatry    Total time spent caring for the patient today was 20 minutes. This includes time spent before the visit reviewing the chart, time spent during the visit, and time spent after the visit on documentation.     Polina Oakes, APRN-CNP

## 2024-10-18 NOTE — CARE PLAN
The patient's goals for the shift include      The clinical goals for the shift include pain management, comfort, maintain safety, increase in mobility tolerance, OOB to chair, work with PT/OT, Hohns cath placed, IV antibiotics, and monitor labs/VS.    Problem: Fall/Injury  Goal: Not fall by end of shift  Outcome: Progressing  Goal: Be free from injury by end of the shift  Outcome: Progressing  Goal: Verbalize understanding of personal risk factors for fall in the hospital  Outcome: Progressing  Goal: Verbalize understanding of risk factor reduction measures to prevent injury from fall in the home  Outcome: Progressing  Goal: Use assistive devices by end of the shift  Outcome: Progressing  Goal: Pace activities to prevent fatigue by end of the shift  Outcome: Progressing     Problem: Diabetes  Goal: Achieve decreasing blood glucose levels by end of shift  Outcome: Progressing     Problem: Diabetes  Goal: Increase stability of blood glucose readings by end of shift  Outcome: Progressing     Problem: Diabetes  Goal: Maintain electrolyte levels within acceptable range throughout shift  Outcome: Progressing     Problem: Diabetes  Goal: Maintain glucose levels >70mg/dl to <250mg/dl throughout shift  Outcome: Progressing     Problem: Diabetes  Goal: No changes in neurological exam by end of shift  Outcome: Progressing     Problem: Diabetes  Goal: Vital signs within normal range for age by end of shift  Outcome: Progressing     Problem: Diabetes  Goal: Increase self care and/or family involovement by end of shift  Outcome: Progressing     Problem: Pain - Adult  Goal: Verbalizes/displays adequate comfort level or baseline comfort level  Outcome: Progressing     Problem: Safety - Adult  Goal: Free from fall injury  Outcome: Progressing     Problem: Chronic Conditions and Co-morbidities  Goal: Patient's chronic conditions and co-morbidity symptoms are monitored and maintained or improved  Outcome: Progressing     Problem:  Pain  Goal: Takes deep breaths with improved pain control throughout the shift  Outcome: Progressing  Goal: Turns in bed with improved pain control throughout the shift  Outcome: Progressing  Goal: Walks with improved pain control throughout the shift  Outcome: Progressing  Goal: Performs ADL's with improved pain control throughout shift  Outcome: Progressing  Goal: Participates in PT with improved pain control throughout the shift  Outcome: Progressing  Goal: Free from opioid side effects throughout the shift  Outcome: Progressing  Goal: Free from acute confusion related to pain meds throughout the shift  Outcome: Progressing     Problem: Deep Vein Thrombosis  Goal: I will remain free from complications of deep vein thrombosis and maintain current level of mobility  Outcome: Progressing     Problem: Discharge Planning  Goal: Discharge to home or other facility with appropriate resources  Outcome: Progressing

## 2024-10-18 NOTE — CARE PLAN
Problem: Fall/Injury  Goal: Not fall by end of shift  Outcome: Progressing  Goal: Be free from injury by end of the shift  Outcome: Progressing  Goal: Verbalize understanding of personal risk factors for fall in the hospital  Outcome: Progressing     Problem: Pain  Goal: Takes deep breaths with improved pain control throughout the shift  Outcome: Progressing  Goal: Turns in bed with improved pain control throughout the shift  Outcome: Progressing  Goal: Walks with improved pain control throughout the shift  Outcome: Progressing   The patient's goals for the shift include      The clinical goals for the shift include Safety

## 2024-10-18 NOTE — PROGRESS NOTES
Patient is seen for chronic kidney stage III he was admitted to hospital with syncopal episode renal function remains at baseline with a creatinine of 1.43 normal electrolytes we will continue to monitor with you

## 2024-10-18 NOTE — PROGRESS NOTES
Physical Therapy    Physical Therapy Treatment    Patient Name: Rachid Yun  MRN: [id number]  Department: [address]  Room: [address] [place] [address]  Today's Date: 10/18/2024  Time Calculation  Start Time: 1511  Stop Time: 1543  Time Calculation (min): 32 min         Assessment/Plan   PT Assessment  PT Assessment Results: Decreased strength, Decreased endurance, Impaired balance, Decreased mobility, Decreased coordination, Decreased safety awareness, Pain, Impaired sensation  Rehab Prognosis: Good  Barriers to Discharge: Assist x 2 for gait/ transfers for safety with PWB L LE  Evaluation/Treatment Tolerance: Patient tolerated treatment well, Patient limited by pain, Patient limited by fatigue  Medical Staff Made Aware: Yes  End of Session Communication: PCT/NA/CTA  End of Session Patient Position: Up in chair, Alarm on     PT Plan  Treatment/Interventions: Transfer training, Gait training, Balance training, Strengthening, Endurance training, Therapeutic exercise, Therapeutic activity  PT Plan: Ongoing PT  PT Frequency: 5 times per week  PT Discharge Recommendations: Moderate intensity level of continued care  Equipment Recommended upon Discharge: Wheeled walker  PT Recommended Transfer Status: Assist x2, Assistive device  PT - OK to Discharge: Yes      General Visit Information:   PT  Visit  PT Received On: 10/18/24  Response to Previous Treatment: Patient with no complaints from previous session.  General  Reason for Referral: Impaired mobility; syncope  Referred By: Tiffany Chong MD  Past Medical History Relevant to Rehab: arthritis, CAD, DM, HTN, A-fib  Family/Caregiver Present: Yes  Caregiver Feedback: Daughter in midway through visit.  Prior to Session Communication: Bedside nurse  Patient Position Received: Up in chair, Alarm off, not on at start of session  Preferred Learning Style: verbal, visual  General Comment: Cleared per nurse to see pt for therapy. Pt agreeable.    Subjective   Precautions:  Precautions  LE Weight  Bearing Status: Left Partial Weight Bearing  Medical Precautions: Fall precautions  Precautions Comment: Per podietry note 10/15/24; Stage III ulceraction of the left lateral column of foot. Underlying osteomyelitis of the fifth metatarsal base. Treating conservatively. Left post OP shoe, partial WBing.    Objective   Pain:  Pain Assessment  Pain Assessment: 0-10  0-10 (Numeric) Pain Score: 7  Pain Type: Chronic pain  Pain Location: Back  Pain Interventions:  (RN made aware of pain rating and request for pain meds.)  Pain 2  Pain Score 2: 7  Pain Type 2: Acute pain  Pain Location 2: Shoulder  Cognition:  Cognition  Overall Cognitive Status: Within Functional Limits  Orientation Level: Oriented X4    Activity Tolerance:  Activity Tolerance  Endurance: Tolerates 10 - 20 min exercise with multiple rests  Treatments:  Therapeutic Exercise  Therapeutic Exercise Activity 1: Pt performed seated bilat LE ankle pumps, LAQ, hip flexion x20 reps each. Raegan hip adduction and resisted hip abduction x15 reps each.    Ambulation/Gait Training 1  Surface 1: Level tile  Device 1: Rolling walker  Assistance 1: Minimum assistance (x2)  Quality of Gait 1: Decreased step length, Foot drop/steppage gait, Forward flexed posture  Comments/Distance (ft) 1: Pt ambulated with RW ~12' x1 min assist of 2. Pt demonstrated slow, step to gait pattern sequencing, able to adhere to PWBing left LE with bilat UE support on walker. Slightly forward flexed posture,  bilat step height.    Transfer 1  Transfer From 1: Chair with arms to  Transfer to 1: Stand  Technique 1: Sit to stand  Transfer Device 1: Walker  Transfer Level of Assistance 1: Minimum assistance, +2  Transfers 2  Transfer From 2: Stand to  Transfer to 2: Sit, Chair with arms  Technique 2: Stand to sit  Transfer Level of Assistance 2: Minimum assistance, +2    Outcome Measures:  [facility] Basic Mobility  Turning from your back to your side while in a flat bed without using bedrails: A  little  Moving from lying on your back to sitting on the side of a flat bed without using bedrails: A little  Moving to and from bed to chair (including a wheelchair): A lot  Standing up from a chair using your arms (e.g. wheelchair or bedside chair): A lot  To walk in hospital room: A lot  Climbing 3-5 steps with railing: Total  Basic Mobility - Total Score: 13    Education Documentation  Precautions, taught by Niki Ferguson PTA at 10/18/2024  3:59 PM.  Learner: Patient  Readiness: Acceptance  Method: Explanation  Response: Verbalizes Understanding    Body Mechanics, taught by Niki Ferguson PTA at 10/18/2024  3:59 PM.  Learner: Patient  Readiness: Acceptance  Method: Explanation  Response: Verbalizes Understanding    Home Exercise Program, taught by Niki Ferguson PTA at 10/18/2024  3:59 PM.  Learner: Patient  Readiness: Acceptance  Method: Explanation  Response: Verbalizes Understanding    Mobility Training, taught by Niki Ferguson PTA at 10/18/2024  3:59 PM.  Learner: Patient  Readiness: Acceptance  Method: Explanation  Response: Verbalizes Understanding    Education Comments  No comments found.        OP EDUCATION:       Encounter Problems       Encounter Problems (Active)       Balance       standing (Progressing)       Start:  10/14/24    Expected End:  10/28/24       Pt will stand with UE support of walker, supervision, no LOB, for 2 minutes            Mobility       LTG - Patient will be able to go up and down a curb/step with the appropriate device (Progressing)       Start:  10/14/24    Expected End:  10/28/24            bed mobility (Progressing)       Start:  10/14/24    Expected End:  10/28/24       Pt will perform sup to/from sit transfer with supervision          ambulation (Progressing)       Start:  10/14/24    Expected End:  10/28/24       Pt will amb > 30  ft with wheeled walker and CGA            PT Transfers       sit to stand (Progressing)       Start:  10/14/24    Expected End:  10/28/24        Pt will perform sit to stand transfer with walker and CGA         bed to chair (Progressing)       Start:  10/14/24    Expected End:  10/28/24       Pt will perform bed to chair transfer with walker and CGA            Pain - Adult          Safety       LTG - Patient will utilize safety techniques (Progressing)       Start:  10/14/24    Expected End:  10/28/24

## 2024-10-18 NOTE — PROGRESS NOTES
Physical Therapy                 Therapy Communication Note    Patient Name: Rachid Yun  MRN: [id number]  Department: [facility]  Room: [address] [place], [place] [address]  Today's Date: 10/18/2024     Discipline: Physical Therapy    Missed Visit Reason: Missed Visit Reason: Patient in a medical procedure (Pt currently at [facility] for Hohns cath placement.)    Missed Time: Attempt    Comment:

## 2024-10-18 NOTE — PROGRESS NOTES
10/18/24 1505   Discharge Planning   Expected Discharge Disposition SNF  (still pending acceptance)   Does the patient need discharge transport arranged? Yes   RoundTrip coordination needed? Yes   Has discharge transport been arranged? No     5 No's from different facilities.  Pending [facility] at this time.  Leno to right chest.    [facility]- [facility] accepted - Precert submitted

## 2024-10-18 NOTE — PROGRESS NOTES
Rachid Yun is a 74 y.o. male on day 5 of admission presenting with Syncope, non cardiac.      Subjective   Patient seen and examined. Resting in bed. He has no complaints this morning. Just has not been sleeping well. Denies any current dizziness or pain. Afebrile.        Objective     Last Recorded Vitals  /75 (BP Location: Left arm, Patient Position: Lying)   Pulse 80   Temp 36.4 °C (97.5 °F) (Temporal)   Resp 15   Wt 103 kg (228 lb)   SpO2 96%   Intake/Output last 3 Shifts:    Intake/Output Summary (Last 24 hours) at 10/18/2024 0843  Last data filed at 10/18/2024 0700  Gross per 24 hour   Intake 1200 ml   Output 1500 ml   Net -300 ml       Admission Weight  Weight: 103 kg (228 lb) (10/13/24 1637)    Daily Weight  10/13/24 : 103 kg (228 lb)    Image Results    No new imaging to review.    Physical Exam    General: Alert and oriented x3, pleasant.   Cardiac: Irregular rate and rhythm, S1/S2 , no murmur.   Pulmonary: Clear to auscultation on room air.   Abdomen: Soft, round, nontender. BS +x4.   Extremities: No edema.  Skin: No rashes or lesions. Dressing in place to the L foot, no drainage seen.    Relevant Results    Scheduled medications  apixaban, 2.5 mg, oral, BID  atorvastatin, 20 mg, oral, Daily  clopidogrel, 75 mg, oral, Daily  daptomycin, 500 mg, intravenous, q24h KEVIN  docusate sodium, 100 mg, oral, BID  donepezil, 10 mg, oral, Nightly  DULoxetine, 60 mg, oral, Daily  ferrous sulfate (325 mg ferrous sulfate), 65 mg of iron, oral, Daily  folic acid, 0.4 mg, oral, Daily  gabapentin, 300 mg, oral, TID  insulin glargine, 30 Units, subcutaneous, Nightly  insulin lispro, 0-5 Units, subcutaneous, TID  levETIRAcetam, 500 mg, oral, BID  metoprolol succinate XL, 25 mg, oral, BID  pantoprazole, 40 mg, oral, Daily before breakfast  polyethylene glycol, 17 g, oral, Daily  tamsulosin, 0.4 mg, oral, Nightly  traZODone, 150 mg, oral, Nightly      Continuous medications     PRN medications  PRN medications:  acetaminophen **OR** acetaminophen **OR** acetaminophen, bisacodyl, dextrose, dextrose, glucagon, glucagon, melatonin, ondansetron ODT **OR** ondansetron, oxyCODONE     Results for orders placed or performed during the hospital encounter of 10/13/24 (from the past 24 hours)   POCT GLUCOSE   Result Value Ref Range    POCT Glucose 100 (H) 74 - 99 mg/dL   POCT GLUCOSE   Result Value Ref Range    POCT Glucose 196 (H) 74 - 99 mg/dL   POCT GLUCOSE   Result Value Ref Range    POCT Glucose 192 (H) 74 - 99 mg/dL   Basic Metabolic Panel   Result Value Ref Range    Glucose 134 (H) 74 - 99 mg/dL    Sodium 138 136 - 145 mmol/L    Potassium 4.2 3.5 - 5.3 mmol/L    Chloride 108 (H) 98 - 107 mmol/L    Bicarbonate 24 21 - 32 mmol/L    Anion Gap 10 10 - 20 mmol/L    Urea Nitrogen 37 (H) 6 - 23 mg/dL    Creatinine 1.43 (H) 0.50 - 1.30 mg/dL    eGFR 51 (L) >60 mL/min/1.73m*2    Calcium 8.2 (L) 8.6 - 10.3 mg/dL   POCT GLUCOSE   Result Value Ref Range    POCT Glucose 93 74 - 99 mg/dL             Assessment/Plan      Syncope and Collapse  -Neurology and Cardiology signed off.   -Suspect from orthostatic hypotension, BP did drop with position change initially.   -CT brain and C spine with no acute findings.   -CT chest/abd/pelvis with no acute findings.   -MRI/MRA head done, no acute infarct, does show aneurysm- will follow outpatient to monitor with neuro.    -Amlodipine was stopped, repeat orthos now negative.  -EEG normal.   -Monitor on telemetry.   -PT/OT evals, recommendation for moderate intensity therapy. Plan for SNF.     DM Foot Ulcer with Osteomyelitis  -Has had issues with this for many months now, following outpatient with ID and podiatry.   -ID follows. Podiatry saw, no surgical intervention recommended, they also recommended long term IV abx.   -He had wound culture done on 9/30/24 which grew MRSA with resistance.   -Started on daptomycin by ID. Plan for 6-8 weeks of abx.   -IR consulted for ramona catheter placement, scheduled  for this AM at 10:30.   -MRI L foot does show evidence of OM.   -Local wound care.      DM 2 with Neuropathy  -HgA1c 6.1 (7/1/24).   -Continue lantus and SSI coverage.   -Monitor blood sugars.      CKD 3  -Appears to be near his baseline renal function at this time.   -Monitor closely. He did receive IV contrast in the ED.     Hyponatremia  -NA now improved.  -Renal follows and he was placed on fluid restriction. Feel this is from excess water intake.   -Monitor.      Atrial Fibrillation  -Continue Eliquis.   -BB, continue.     HTN  -On amlodipine and BB at home, amlodipine stopped by cardio, will not resume at DC.   -BP stable.      HLD  -Continue statin.      DVT Risk  -Eliquis.      Plan  Neurology, Cardiology signed off. Orthos now negative off the amlodipine, will not resume at DC.     ID follows, Podiatry saw, no need for surgical intervention.   IV abx per ID. Needs long term IV daptomycin.   IR consulted for ramona catheter placement, scheduled for 10:30 this morning.   Renal follows, NA now normalized, he is on 1.6L fluid restriction, continue.    PT/OT recommend moderate intensity therapy at this time, SS on board for DC planning. Plan now [place] SNF [place].   Pending SNF facility and precert, can DC once ramona in place and facility arrangements made.         HERBERT Aguilar-CNP

## 2024-10-18 NOTE — POST-PROCEDURE NOTE
Interventional Radiology Brief Postprocedure Note    Attending: Ray Dawson MD     Assistant: none    Diagnosis: long term IV antibiotic requested    Description of procedure: tunneled central venous catheter placement     Anesthesia:  Local    Complications: None    Estimated Blood Loss: none    Medications (Filter: Administrations occurring from 1233 to 1233 on 10/18/24) As of 10/18/24 1233      None          No specimens collected      See detailed result report with images in PACS.    The patient tolerated the procedure well without incident or complication and is in stable condition.

## 2024-10-18 NOTE — NURSING NOTE
Agree with previous assessment. Patient awake OOB sitting in chair working with PT. Call light and belongings within reach. Will continue plan of care.

## 2024-10-19 VITALS
WEIGHT: 228 LBS | HEIGHT: 76 IN | SYSTOLIC BLOOD PRESSURE: 152 MMHG | OXYGEN SATURATION: 98 % | BODY MASS INDEX: 27.76 KG/M2 | TEMPERATURE: 97.5 F | HEART RATE: 79 BPM | RESPIRATION RATE: 17 BRPM | DIASTOLIC BLOOD PRESSURE: 91 MMHG

## 2024-10-19 LAB
GLUCOSE BLD MANUAL STRIP-MCNC: 103 MG/DL (ref 74–99)
GLUCOSE BLD MANUAL STRIP-MCNC: 107 MG/DL (ref 74–99)

## 2024-10-19 PROCEDURE — 99239 HOSP IP/OBS DSCHRG MGMT >30: CPT | Performed by: NURSE PRACTITIONER

## 2024-10-19 PROCEDURE — 2500000001 HC RX 250 WO HCPCS SELF ADMINISTERED DRUGS (ALT 637 FOR MEDICARE OP): Performed by: NURSE PRACTITIONER

## 2024-10-19 PROCEDURE — 2500000002 HC RX 250 W HCPCS SELF ADMINISTERED DRUGS (ALT 637 FOR MEDICARE OP, ALT 636 FOR OP/ED): Performed by: NURSE PRACTITIONER

## 2024-10-19 PROCEDURE — 82947 ASSAY GLUCOSE BLOOD QUANT: CPT

## 2024-10-19 PROCEDURE — 2500000004 HC RX 250 GENERAL PHARMACY W/ HCPCS (ALT 636 FOR OP/ED): Performed by: NURSE PRACTITIONER

## 2024-10-19 RX ORDER — OXYCODONE HYDROCHLORIDE 15 MG/1
15 TABLET ORAL EVERY 6 HOURS PRN
Qty: 8 TABLET | Refills: 0 | Status: SHIPPED | OUTPATIENT
Start: 2024-10-19

## 2024-10-19 RX ORDER — GABAPENTIN 300 MG/1
300 CAPSULE ORAL 3 TIMES DAILY
Qty: 6 CAPSULE | Refills: 0 | Status: SHIPPED | OUTPATIENT
Start: 2024-10-19

## 2024-10-19 ASSESSMENT — PAIN DESCRIPTION - LOCATION
LOCATION: BACK
LOCATION: BACK

## 2024-10-19 ASSESSMENT — PAIN - FUNCTIONAL ASSESSMENT
PAIN_FUNCTIONAL_ASSESSMENT: 0-10
PAIN_FUNCTIONAL_ASSESSMENT: 0-10
PAIN_FUNCTIONAL_ASSESSMENT: UNABLE TO SELF-REPORT

## 2024-10-19 ASSESSMENT — PAIN SCALES - GENERAL
PAINLEVEL_OUTOF10: 3
PAINLEVEL_OUTOF10: 7
PAINLEVEL_OUTOF10: 7
PAINLEVEL_OUTOF10: 3

## 2024-10-19 ASSESSMENT — PAIN DESCRIPTION - ORIENTATION: ORIENTATION: LOWER

## 2024-10-19 NOTE — NURSING NOTE
Assumed care of patient, received report from off going RN. Patient sitting up in bed, awake and alert.  Bed locked and low, call light & personal belongings in reach.

## 2024-10-19 NOTE — PROGRESS NOTES
Rachid Yun is a 74 y.o. male on day 6 of admission presenting with Syncope, non cardiac.    Subjective   Interval History:       Afebrile, no chills  No foot pain  No chest pain or shortness of breath  No nausea vomiting or diarrhea    Review of Systems   All other systems reviewed and are negative.      Objective   Range of Vitals (last 24 hours)  Heart Rate:  []   Temp:  [36.1 °C (97 °F)-36.6 °C (97.9 °F)]   Resp:  [15-20]   BP: (134-183)/(72-96)   SpO2:  [97 %-100 %]   Daily Weight  10/13/24 : 103 kg (228 lb)    Body mass index is 27.76 kg/m².    Physical Exam  Constitutional:       Appearance: Normal appearance.   HENT:      Head: Normocephalic and atraumatic.      Nose: Nose normal.      Mouth/Throat:      Mouth: Mucous membranes are moist.      Pharynx: Oropharynx is clear.   Eyes:      General: No scleral icterus.  Cardiovascular:      Rate and Rhythm: Normal rate and regular rhythm.   Pulmonary:      Effort: Pulmonary effort is normal.      Breath sounds: Normal breath sounds.   Abdominal:      General: Bowel sounds are normal.      Palpations: Abdomen is soft.   Musculoskeletal:         General: Normal range of motion.      Cervical back: Normal range of motion and neck supple.   Skin:     General: Skin is warm and dry.      Comments: Left lateral foot ulcer with large amount of granulation tissue, resolving mild surrounding erythema, left foot swelling      Neurological:      Mental Status: He is alert.      Comments: Awake, alert   Psychiatric:         Mood and Affect: Mood normal.         Behavior: Behavior normal.     Antibiotics  cephalexin - 500 mg  DAPTOmycin - 500 mg, 500 mg/50 mL  doxycycline - 100 mg  sulfamethoxazole-trimethoprim - 800-160 mg    Relevant Results  Labs      Results from last 72 hours   Lab Units 10/18/24  0525 10/17/24  0541   SODIUM mmol/L 138 136   POTASSIUM mmol/L 4.2 4.5   CHLORIDE mmol/L 108* 106   CO2 mmol/L 24 23   BUN mg/dL 37* 40*   CREATININE mg/dL 1.43* 1.44*    GLUCOSE mg/dL 134* 109*   CALCIUM mg/dL 8.2* 8.5*   ANION GAP mmol/L 10 12   EGFR mL/min/1.73m*2 51* 51*   PHOSPHORUS mg/dL  --  4.1     Results from last 72 hours   Lab Units 10/17/24  0541   ALBUMIN g/dL 3.3*     Estimated Creatinine Clearance: 55.6 mL/min (A) (by C-G formula based on SCr of 1.43 mg/dL (H)).  C-Reactive Protein   Date Value Ref Range Status   10/01/2024 7.77 (H) <1.00 mg/dL Final   07/16/2024 <0.30 0.00 - 2.00 mg/dL Final     Microbiology  Susceptibility data from last 90 days.  Collected Specimen Info Organism Clindamycin Erythromycin Oxacillin Tetracycline Trimethoprim/Sulfamethoxazole Vancomycin   09/30/24 Tissue/Biopsy from Wound/Tissue Methicillin Resistant Staphylococcus aureus (MRSA)  R  R  R  R  S  S     Mixed Gram-Positive and Gram-Negative Bacteria             Imaging  IR CVC tunneled    Result Date: 10/18/2024  Interpreted By:  Ray Dawson, STUDY: IR CVC TUNNELED; 10/18/2024 12:41 pm   INDICATION: ramona catheter for  longterm outpatient IV antibioitcs.   COMPARISON: None.   ACCESSION NUMBER(S): [id number]   ORDERING CLINICIAN: TARA ANDRES   TECHNIQUE: INTERVENTIONALIST(S): Ray Dawson MD   CONSENT: The patient was informed of the nature of the proposed procedure. The purposes, alternatives, risks, and benefits were explained and discussed. All questions were answered and consent was obtained.   RADIATION EXPOSURE: Fluoroscopy time:  0.1 min.     MEDICATION/CONTRAST: No additional   TIME OUT: A time out was performed immediately prior to procedure start with the interventional team, correctly identifying the patient name, date of birth, MRN, procedure, anatomy (including marking of site and side), patient position, procedure consent form, relevant laboratory and imaging test results, antibiotic administration, safety precautions, and procedure-specific equipment needs.   COMPLICATIONS: No immediate adverse events identified.   FINDINGS: In the recumbent position,  the patient was positioned on the angiography table. The  right supraclavicular and infraclavicular cutaneous tissues were prepared and draped in usual sterile manner.   The supraclavicular access site was screened with gray-scale ultrasound with subsequent subcutaneous instillation of Lidocaine 1% local anesthesia. Ultrasound images demonstrate a patent  right internal jugular vein. Under direct ultrasound guidance and Seldinger/micropuncture technique, the  right internal jugular vein was accessed. An ultrasound digital spot image was acquired and stored on the  PACS. After confirmation of location, a 018 [place]-Mandril guidewire was inserted to secure location. The guidewire was advanced into the inferior vena cava utilizing intermittent fluoroscopy. The micro-access needle was removed over the guidewire. Utilizing a coaxial dilator sheath system, subsequent access tract dilation was performed to an eventual peel-away sheath dilator system.   After Lidocaine 1% local anesthesia, a subcutaneous tunnel tract was created from the  right infraclavicular chest to the venous access site. After continuity of the tunnel tract and venous access site was obtained, a  single lumen indwelling small bore central venous catheter (tunneled PICC) was then placed with tract continuity and its central catheter tip(s) to reside at the cavoatrial junction. A fluoroscopic spot image of the chest was acquired in the AP projection to confirm optimal location.   The catheter port was aspirated and flushed without resistance with normal saline. The catheter port was then charged with heparin. The venous access site was closed and sterilely dressed. The external portions of the catheter were secured with a purse-string 2-0 polypropylene suture and sterile dressings.   The patient tolerated the procedure without complication.       1.  Uncomplicated placement of a  right lumen indwelling infraclavicular small bore central venous catheter  (tunneled PICC). Flushed and ready to use.   I was present for and/or performed the critical portions of the procedure and immediately available throughout the entire procedure.   I personally reviewed the image(s) / study and resident interpretation. I agree with the findings as stated.   Performed and dictated at [facility].   Signed by: Ray Dawson 10/18/2024 12:48 PM Dictation workstation:   [id number]    ECG 12 lead    Result Date: 10/15/2024  Atrial fibrillation Abnormal ECG When compared with ECG of 24-DEC-2023 15:59, No significant change was found Confirmed by Julian Barajas ([id number]) on 10/15/2024 9:17:05 PM    EEG    IMPRESSION Impression This routine EEG is indicative of mild diffuse encephalopathy. No epileptiform activity or lateralizing signs. A full report will be scanned into the patient's chart at a later time. This report has been interpreted and electronically signed by    Transthoracic Echo (TTE) Complete    Result Date: 10/15/2024            [place] [address]             Phone [phone] TRANSTHORACIC ECHOCARDIOGRAM REPORT Patient Name:     LORI DILL SUELLEN Valdez Physician:   [id number] Barajas MD Study Date:       10/14/2024           Ordering Provider:   [id number] BARAJAS MRN/PID:          [id number]             Fellow: Accession#:       [id number]         Nurse: Date of           1950 / 74 years Sonographer:         Marlen Lam RDCS Birth/Age: Gender:           M                    Additional Staff: Height:           190.50 cm            Admit Date: Weight:           103.42 kg            Admission Status:    Inpatient - Routine BSA / BMI:        2.32 m2 / 28.50      Department Location: [facility]                   kg/m2 Blood Pressure: 116 /72 mmHg Study Type:    TRANSTHORACIC ECHO (TTE) COMPLETE Diagnosis/ICD:  Syncope-R55 Indication:    Syncope, non cardiac CPT Codes:     Echo Complete w Full Doppler-30130 Patient History: Diabetes:          Yes Pertinent History: Syncope, HTN, A-Fib and Pericardial Effusion, HF, ASHD, CKD. Study Detail: The following Echo studies were performed: 2D, M-Mode, Doppler and               color flow.  PHYSICIAN INTERPRETATION: Left Ventricle: Left ventricular ejection fraction is mildly decreased, by visual estimate at 45-50%. The patient is in atrial fibrillation which may influence the estimate of left ventricular function and transvalvular flows. There is global hypokinesis of the left ventricle with minor regional variations. The left ventricular cavity size is upper limits of normal. Spectral Doppler shows a normal pattern of left ventricular diastolic filling. Left Atrium: The left atrium is mildly dilated. Right Ventricle: The right ventricle is mildly enlarged. There is reduced right ventricular systolic function. Right Atrium: The right atrium is upper limits of normal in size. Aortic Valve: The aortic valve is trileaflet. There is no evidence of aortic valve regurgitation. The peak instantaneous gradient of the aortic valve is 4.3 mmHg. Mitral Valve: The mitral valve is normal in structure. There is mild mitral valve regurgitation. Tricuspid Valve: The tricuspid valve is structurally normal. There is trace tricuspid regurgitation. The right ventricular systolic pressure is unable to be estimated. Pulmonic Valve: The pulmonic valve is structurally normal. There is physiologic pulmonic valve regurgitation. Pericardium: Trivial pericardial effusion. There is a pericardial fat pad present. Aorta: The aortic root is normal. Systemic Veins: The inferior vena cava was not well visualized.  CONCLUSIONS:  1. Left ventricular ejection fraction is mildly decreased, by visual estimate at 45-50%.  2. There is global hypokinesis of the left ventricle with minor regional variations.  3. There is  reduced right ventricular systolic function.  4. Mildly enlarged right ventricle.  5. Mild mitral valve regurgitation.  6. Trace tricuspid regurgitation is visualized.  7. The patient is in atrial fibrillation which may influence the estimate of left ventricular function and transvalvular flows. QUANTITATIVE DATA SUMMARY:  2D MEASUREMENTS:         Normal Ranges: LAs:             4.70 cm (2.7-4.0cm)  LA VOLUME:                    Normal Ranges: LA Vol A4C:        87.6 ml    (22+/-6mL/m2) LA Vol A2C:        102.1 ml LA Vol BP:         100.6 ml LA Vol Index A4C:  37.8ml/m2 LA Vol Index A2C:  44.0 ml/m2 LA Vol Index BP:   43.3 ml/m2 LA Area A4C:       24.3 cm2 LA Area A2C:       27.9 cm2 LA Major Axis A4C: 5.7 cm LA Major Axis A2C: 6.5 cm LA Volume Index:   40.4 ml/m2 LA Vol A4C:        81.2 ml LA Vol A2C:        97.6 ml LA Vol Index BSA:  38.5 ml/m2  RA VOLUME BY A/L METHOD:          Normal Ranges: RA Area A4C:             21.2 cm2  M-MODE MEASUREMENTS:         Normal Ranges: Ao Root:             2.80 cm (2.0-3.7cm)  AORTA MEASUREMENTS:         Normal Ranges: Asc Ao, d:          3.00 cm (2.1-3.4cm)  LV SYSTOLIC FUNCTION BY 2D PLANIMETRY (MOD):                      Normal Ranges: EF-Visual:      48 % LV EF Reported: 48 %  LV DIASTOLIC FUNCTION:           Normal Ranges: MV e'                  0.096 m/s (>8.0) MV lateral e'          0.09 m/s MV medial e'           0.10 m/s  AORTIC VALVE:           Normal Ranges: AoV Vmax:      1.04 m/s (<=1.7m/s) AoV Peak P.3 mmHg (<20mmHg) LVOT Max Josr:  0.66 m/s (<=1.1m/s) LVOT Diameter: 2.50 cm  (1.8-2.4cm) AoV Area,Vmax: 3.10 cm2 (2.5-4.5cm2)  AORTIC INSUFFICIENCY: AI Vmax:       2.17 m/s AI Half-time:  719 msec AI Decel Rate: 103.56 cm/s2  RIGHT VENTRICLE: RV Basal 3.41 cm RV Mid   2.48 cm RV Major 7.8 cm TAPSE:   12.2 mm RV s'    0.12 m/s  [id number] Julian Barajas MD Electronically signed on 10/15/2024 at 12:21:59 PM  ** Final **     MR angio head wo IV contrast    Result Date:  10/15/2024  Interpreted By:  Cristiana Frank, STUDY: MR ANGIO HEAD WO IV CONTRAST;  10/14/2024 9:15 am   INDICATION: Signs/Symptoms:syncope.     COMPARISON: None.   ACCESSION NUMBER(S): [id number]   ORDERING CLINICIAN: HSAMIKA MILAN   TECHNIQUE: Time-of-flight MRA of the head was performed. The images were reviewed as source images and maximum intensity projections.   FINDINGS:     Anterior circulation:    There is expected flow signal in bilateral intracranial internal carotid arteries, bilateral carotid terminals, bilateral proximal anterior and middle cerebral arteries.   Posterior circulation:    Bilateral intracranial vertebral arteries, vertebrobasilar junction, basilar artery and proximal posterior cerebral arteries demonstrate expected flow signal.   There is a 0.3 x 0.3 cm outpouching from the basilar tip compatible with an aneurysm.       1.  There is no evidence for hemodynamically significant stenosis or large branch vessel cutoffs of the visualized intracranial vasculature. 2. 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   MACRO: None   Signed by: Cristiana Frank 10/15/2024 7:59 AM Dictation workstation:   [id number]    MR foot left wo IV contrast    Result Date: 10/15/2024  Interpreted By:  Raúl Infante, STUDY: MR FOOT LEFT WO IV CONTRAST; ;  10/14/2024 10:02 pm   INDICATION: Signs/Symptoms:left foot infection.     COMPARISON: MRI 07/16/2024   ACCESSION NUMBER(S): [id number]   ORDERING CLINICIAN: TARA ANDRES   TECHNIQUE: Multiplanar multisequence MRI obtained of the left foot.   FINDINGS: Skin ulceration is demonstrated along the 5th metatarsal base. There is skin thickening and subcutaneous edema deep to the level of the skin ulceration with component of cellulitis. Of note, subtle linear appearing area of T2 weighted hyperintensity is demonstrated from the area of skin ulceration into the 5th metatarsal base with focal fistulous tract of concern. No subcutaneous abscess formation demonstrated.  There is loss of cortical margin within the 5th metatarsal base about the region of the fistulous tract with generalized intermediate T1 weighted signal intensity with corresponding T2 weighted hyperintensity with marrow edema throughout the 5th metatarsal base extending into the mid 5th metatarsal shaft. No well-marginated abscess formation about the ulceration. Of note, findings are in the region of the 5th tarsometatarsal articulation with mild asymmetric fluid about the articulation as well as mild marrow edema within the cuboid bone lateral margin of the articulation with component of septic arthritis osteomyelitis about the articulation not excluded with mild osteomyelitis within the cuboid bone a possibility. Component of findings may simply be reactive.   Remainder of the midfoot osseous structures demonstrate normal marrow signal intensity. Intertarsal articulations are unremarkable. Lisfranc joint is otherwise congruent. There is mild Lisfranc joint osteoarthritis. Lisfranc ligament complex unremarkable.   Forefoot 2nd through 4th metatarsal shafts demonstrate no focal marrow edema. There is remote healed fracture deformity of the distal 5th metatarsal shafts.   Tibialis anterior, extensor hallucis longus and extensor digitorum tendons are unremarkable. Flexor hallucis longus and flexor digitorum tendons are unremarkable.   Musculature of the foot demonstrates diffuse atrophy. There is component mild intermediate signal intensity and chronic diabetic myopathy. Mild asymmetric edema within the abductor digiti minimi with infectious myositis of concern. Visualized portions of the plantar fascia are unremarkable   1st MTP joint demonstrates moderate osteoarthritic degenerative change. Tibial sesamoid bone demonstrates a few ossific fragments along the proximal margin with sequela of remote fracture deformity. There is mild tibial sesamoid metatarsal osteoarthritis. Fibular sesamoid bone are unremarkable. The  2nd through 5th MTP joints demonstrate no focal marrow edema. No joint effusions. Digits distally demonstrate no focal marrow edema.               1. Skin ulceration lateral base 5th metatarsal with cellulitis and fistulous tract extending into the cortical margin. No discrete abscess formation demonstrated about the ulceration. However, there is osteomyelitis throughout the 5th metatarsal base extending to near the mid 5th metatarsal shafts. Also, mild asymmetric fluid about the 5th tarsometatarsal articulation with mild marrow edema along the lateral cuboid bone about the 5th tarsometatarsal articulation. This may simply be reactive. However, component of septic arthritis osteomyelitis is not excluded.     MACRO: None   Signed by: Raúl Infante 10/15/2024 7:59 AM Dictation workstation:   [id number]    MR brain wo IV contrast    Result Date: 10/15/2024  Interpreted By:  Cristiana Frank, STUDY: MR BRAIN WO IV CONTRAST;  10/14/2024 9:11 am   INDICATION: Signs/Symptoms:syncope.     COMPARISON: MRI 08/31/2010   ACCESSION NUMBER(S): [id number]   ORDERING CLINICIAN: SHAMIKA MILAN   TECHNIQUE: Axial T2, FLAIR, DWI, gradient echo T2 and sagittal and coronal T1 weighted images of brain were acquired.   FINDINGS: CSF Spaces: The ventricles, sulci and basal cisterns are prominent compatible with age related involutional changes and mild-to-moderate volume loss.   Parenchyma: There is no diffusion restriction abnormality to suggest acute infarct.  There is encephalomalacia and gliosis within the right centrum semiovale which likely represents the sequela of a prior infarct. There is a mild degree of nonspecific subcortical and periventricular T2 and FLAIR hyperintense signal which is compatible with microangiopathy. Old lacunar infarcts within the cerebellum. No abnormal susceptibility artifact. There is no mass effect or midline shift.   Paranasal Sinuses and Mastoids: Visualized paranasal sinuses and mastoid air cells are  predominantly clear..       No evidence of acute infarct, intracranial mass effect or midline shift.   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   Mild degree of nonspecific white matter signal compatible with microangiopathy.   MACRO: None   Signed by: Cristiana Frank 10/15/2024 7:54 AM Dictation workstation:   [id number]    CT chest abdomen pelvis w IV contrast    Result Date: 10/13/2024  Interpreted By:  Tamara Snowden, STUDY: CT CHEST ABDOMEN PELVIS W IV CONTRAST;  10/13/2024 9:27 am   INDICATION: Signs/Symptoms:fall, syncope.     COMPARISON: None.   ACCESSION NUMBER(S): [id number]   ORDERING CLINICIAN: DOTTIE REYNA   TECHNIQUE: CT of the chest, abdomen, and pelvis was performed.  Contiguous axial images were obtained at 3 mm slice thickness through the chest, abdomen and pelvis. Coronal and sagittal reconstructions at 3 mm slice thickness were performed. Contrast was administered intravenously without immediate complication.   FINDINGS: CHEST:   LUNG/PLEURA/LARGE AIRWAYS: The lungs are hyperinflated. No suspicious lung nodules or infiltrates. Punctate calcified granuloma in the right lung base.   VESSELS: Aortic calcification. No aneurysmal dilatation. Normal caliber main pulmonary artery.   HEART: No cardiomegaly. No pericardial effusion. Dense coronary artery calcification.   MEDIASTINUM AND ATIF: No significant mediastinal or hilar adenopathy. Subcentimeter lymph nodes are noted which are nonspecific and may be reactive.   CHEST WALL AND LOWER NECK: Grossly unremarkable thyroid gland.   ABDOMEN:   LIVER: Tiny hypodensity in the hepatic dome likely a tiny cyst. The liver is otherwise unremarkable.   BILE DUCTS: No bile duct dilatation.   GALLBLADDER: Likely tiny gallstones. No gallbladder wall thickening.   PANCREAS: Unremarkable pancreas.   SPLEEN: Unremarkable spleen.   ADRENAL GLANDS: No adrenal masses.   KIDNEYS AND URETERS: Cortical thinning of both kidneys. Likely a tiny subcentimeter upper pole  left renal cyst. No hydroureter. No definite stones.   PELVIS:   BLADDER: Suboptimally distended and evaluated. Apparent bladder wall thickening at least partially due to lack of distention.   REPRODUCTIVE ORGANS: Calcifications in the prostate.   BOWEL: Large amount of stool. Rectal distention with stool. No evidence of bowel obstruction.     VESSELS: Aortic calcification. No aneurysmal dilatation.   IVC filter.   PERITONEUM/RETROPERITONEUM/LYMPH NODES: No retroperitoneal adenopathy. No ascites. Unremarkable appendix.   BONE AND SOFT TISSUE: Discogenic degenerative changes. Vacuum disc at multiple levels. Mild loss of height of multiple thoracic and lumbar spine vertebral bodies. Intramedullary sclerosis of the proximal left femur is suboptimally included and may represent bone infarcts or an enchondroma.       CHEST: 1. No acute abnormalities within the chest. 2. Dense coronary and aortic calcification. 3. Hyperinflation.   ABDOMEN-PELVIS: 1. Advanced multilevel discogenic degenerative changes. 2. Tiny gallstones. 3. No acute abnormalities.   Signed by: Tamara Snowden 10/13/2024 10:45 AM Dictation workstation:   [id number]    CT cervical spine wo IV contrast    Result Date: 10/13/2024  Interpreted By:  Tamara Snowden, STUDY: CT CERVICAL SPINE WO IV CONTRAST;  10/13/2024 9:27 am   INDICATION: Signs/Symptoms:neck trauma after syncopal episode.     COMPARISON: 12/24/2023   ACCESSION NUMBER(S): [id number]   ORDERING CLINICIAN: DOTTIE REYNA   TECHNIQUE: Axial CT images of the cervical spine are obtained. Axial, coronal and sagittal reconstructions are provided for review.   FINDINGS: Grade 1 anterolisthesis of C4 on C5. Straightening of normal cervical lordosis. Disc space loss from C6 through T1. No erosions. No fracture dislocation. Hypertrophic changes of the C1-C2 articulation. Mild central canal stenosis and varying degrees of neural foraminal narrowing at multiple levels.       No evidence for an acute fracture or  subluxation of the cervical spine. Multilevel discogenic degenerative changes.   MACRO: None   Signed by: Tamara Snowden 10/13/2024 10:01 AM Dictation workstation:   [id number]    CT head wo IV contrast    Result Date: 10/13/2024  Interpreted By:  Tamara Snowden, STUDY: CT HEAD WO IV CONTRAST;  10/13/2024 9:27 am   INDICATION: Signs/Symptoms:head injury on eliquis.     COMPARISON: 12/24/2023   ACCESSION NUMBER(S): [id number]   ORDERING CLINICIAN: DOTTIE REYNA   TECHNIQUE: Noncontrast axial CT scan of head was performed. Angled reformats in brain and bone windows were generated. The images were reviewed in bone, brain, blood and soft tissue windows.   FINDINGS: The ventricles, cisterns and sulci are prominent, consistent with mild diffuse volume loss. There are areas of nonspecific white matter hypodensity, which are probably age-related or microvascular in nature.   Gray-white differentiation is intact and there is no evidence of acute cortical infarct. No mass, mass effect or midline shift is seen. There is no evidence of hemorrhage.   The visualized paranasal sinuses are clear.         No evidence of acute cortical infarct or intracranial hemorrhage.   Chronic changes as described.   MACRO: None   Signed by: Tamara Snowden 10/13/2024 9:53 AM Dictation workstation:   [id number]     Assessment/Plan   Type 2 diabetes mellitus with peripheral angiopathy without gangrene   Left diabetic foot ulcer-culture grew MRSA -MRI evidence osteomyelitis   Chronic kidney diease, stage 3  Syncope  MRSA infection        IV daptomycin  Monitor CK level  Monitor temperature and WBC  Follow-up blood cultures  Neurology follow up   Local care  Podiatry consulted-note reviewed -recommend 8 weeks IV antibiotics   Leno catheter placed        Long term plan IV daptomycin 500 mg daily for 6 -8 weeks till 12/9/2024-see discharge rec  Weekly CBC with diff, CMP, CK  Follow up with Latanya Taylor  Follow up with podiatry         Michael Pelaez,  MD

## 2024-10-19 NOTE — CARE PLAN
The patient's goals for the shift include  pain management, rest     The clinical goals for the shift include pain management, rest, comfort, safety, monitor labs and vitals        Problem: Fall/Injury  Goal: Not fall by end of shift  Outcome: Progressing  Goal: Be free from injury by end of the shift  Outcome: Progressing  Goal: Verbalize understanding of personal risk factors for fall in the hospital  Outcome: Progressing  Goal: Verbalize understanding of risk factor reduction measures to prevent injury from fall in the home  Outcome: Progressing  Goal: Use assistive devices by end of the shift  Outcome: Progressing  Goal: Pace activities to prevent fatigue by end of the shift  Outcome: Progressing     Problem: Diabetes  Goal: Achieve decreasing blood glucose levels by end of shift  Outcome: Progressing  Goal: Increase stability of blood glucose readings by end of shift  Outcome: Progressing  Goal: Decrease in ketones present in urine by end of shift  Outcome: Progressing  Goal: Maintain electrolyte levels within acceptable range throughout shift  Outcome: Progressing  Goal: Maintain glucose levels >70mg/dl to <250mg/dl throughout shift  Outcome: Progressing  Goal: No changes in neurological exam by end of shift  Outcome: Progressing  Goal: Learn about and adhere to nutrition recommendations by end of shift  Outcome: Progressing  Goal: Vital signs within normal range for age by end of shift  Outcome: Progressing  Goal: Increase self care and/or family involovement by end of shift  Outcome: Progressing  Goal: Receive DSME education by end of shift  Outcome: Progressing     Problem: Pain - Adult  Goal: Verbalizes/displays adequate comfort level or baseline comfort level  Outcome: Progressing     Problem: Safety - Adult  Goal: Free from fall injury  Outcome: Progressing     Problem: Discharge Planning  Goal: Discharge to home or other facility with appropriate resources  Outcome: Progressing     Problem: Chronic  Conditions and Co-morbidities  Goal: Patient's chronic conditions and co-morbidity symptoms are monitored and maintained or improved  Outcome: Progressing     Problem: Pain  Goal: Takes deep breaths with improved pain control throughout the shift  Outcome: Progressing  Goal: Turns in bed with improved pain control throughout the shift  Outcome: Progressing  Goal: Walks with improved pain control throughout the shift  Outcome: Progressing  Goal: Performs ADL's with improved pain control throughout shift  Outcome: Progressing  Goal: Participates in PT with improved pain control throughout the shift  Outcome: Progressing  Goal: Free from opioid side effects throughout the shift  Outcome: Progressing  Goal: Free from acute confusion related to pain meds throughout the shift  Outcome: Progressing     Problem: Deep Vein Thrombosis  Goal: I will remain free from complications of deep vein thrombosis and maintain current level of mobility  Outcome: Progressing

## 2024-10-19 NOTE — PROGRESS NOTES
10/19/24 1113   Discharge Planning   Expected Discharge Disposition SNF  ([facility])   Does the patient need discharge transport arranged? Yes   RoundT[employer] coordination needed? Yes   Has discharge transport been arranged? Yes   What day is the transport expected? 10/19/24   What time is the transport expected? 1300

## 2024-10-19 NOTE — CARE PLAN
The patient's goals for the shift include      The clinical goals for the shift include pain management, rest, comfort, safety, monitor labs and vitals      Problem: Fall/Injury  Goal: Not fall by end of shift  Outcome: Progressing  Goal: Be free from injury by end of the shift  Outcome: Progressing  Goal: Verbalize understanding of personal risk factors for fall in the hospital  Outcome: Progressing  Goal: Verbalize understanding of risk factor reduction measures to prevent injury from fall in the home  Outcome: Progressing  Goal: Use assistive devices by end of the shift  Outcome: Progressing  Goal: Pace activities to prevent fatigue by end of the shift  Outcome: Progressing     Problem: Diabetes  Goal: Achieve decreasing blood glucose levels by end of shift  Outcome: Progressing  Goal: Increase stability of blood glucose readings by end of shift  Outcome: Progressing  Goal: Decrease in ketones present in urine by end of shift  Outcome: Progressing  Goal: Maintain electrolyte levels within acceptable range throughout shift  Outcome: Progressing  Goal: Maintain glucose levels >70mg/dl to <250mg/dl throughout shift  Outcome: Progressing  Goal: No changes in neurological exam by end of shift  Outcome: Progressing  Goal: Learn about and adhere to nutrition recommendations by end of shift  Outcome: Progressing  Goal: Vital signs within normal range for age by end of shift  Outcome: Progressing  Goal: Increase self care and/or family involovement by end of shift  Outcome: Progressing  Goal: Receive DSME education by end of shift  Outcome: Progressing     Problem: Pain - Adult  Goal: Verbalizes/displays adequate comfort level or baseline comfort level  Outcome: Progressing     Problem: Safety - Adult  Goal: Free from fall injury  Outcome: Progressing     Problem: Discharge Planning  Goal: Discharge to home or other facility with appropriate resources  Outcome: Progressing     Problem: Chronic Conditions and  Co-morbidities  Goal: Patient's chronic conditions and co-morbidity symptoms are monitored and maintained or improved  Outcome: Progressing     Problem: Pain  Goal: Takes deep breaths with improved pain control throughout the shift  Outcome: Progressing  Goal: Turns in bed with improved pain control throughout the shift  Outcome: Progressing  Goal: Walks with improved pain control throughout the shift  Outcome: Progressing  Goal: Performs ADL's with improved pain control throughout shift  Outcome: Progressing  Goal: Participates in PT with improved pain control throughout the shift  Outcome: Progressing  Goal: Free from opioid side effects throughout the shift  Outcome: Progressing  Goal: Free from acute confusion related to pain meds throughout the shift  Outcome: Progressing     Problem: Deep Vein Thrombosis  Goal: I will remain free from complications of deep vein thrombosis and maintain current level of mobility  Outcome: Progressing

## 2024-10-19 NOTE — PROGRESS NOTES
History for chronically stage III his renal function remains stable his creatinine level is 1.43 nothing to add from renal point of view I will sign off please call me if needed

## 2024-10-21 ENCOUNTER — APPOINTMENT (OUTPATIENT)
Dept: PRIMARY CARE | Facility: CLINIC | Age: 74
End: 2024-10-21
Payer: MEDICARE

## 2024-11-04 ENCOUNTER — OFFICE VISIT (OUTPATIENT)
Dept: WOUND CARE | Facility: HOSPITAL | Age: 74
End: 2024-11-04
Payer: MEDICARE

## 2024-11-04 DIAGNOSIS — M54.50 ACUTE LOW BACK PAIN, UNSPECIFIED BACK PAIN LATERALITY, UNSPECIFIED WHETHER SCIATICA PRESENT: ICD-10-CM

## 2024-11-04 PROCEDURE — 11042 DBRDMT SUBQ TIS 1ST 20SQCM/<: CPT

## 2024-11-05 ENCOUNTER — HOSPITAL ENCOUNTER (OUTPATIENT)
Dept: RADIOLOGY | Facility: HOSPITAL | Age: 74
Discharge: HOME | End: 2024-11-05
Payer: MEDICARE

## 2024-11-05 DIAGNOSIS — M54.50 ACUTE LOW BACK PAIN, UNSPECIFIED BACK PAIN LATERALITY, UNSPECIFIED WHETHER SCIATICA PRESENT: ICD-10-CM

## 2024-11-05 PROCEDURE — 72110 X-RAY EXAM L-2 SPINE 4/>VWS: CPT | Performed by: RADIOLOGY

## 2024-11-05 PROCEDURE — 72110 X-RAY EXAM L-2 SPINE 4/>VWS: CPT

## 2024-11-11 ENCOUNTER — APPOINTMENT (OUTPATIENT)
Dept: WOUND CARE | Facility: HOSPITAL | Age: 74
End: 2024-11-11
Payer: MEDICARE

## 2024-11-18 ENCOUNTER — OFFICE VISIT (OUTPATIENT)
Dept: WOUND CARE | Facility: HOSPITAL | Age: 74
End: 2024-11-18
Payer: MEDICARE

## 2024-11-18 PROCEDURE — 99213 OFFICE O/P EST LOW 20 MIN: CPT

## 2024-11-25 ENCOUNTER — OFFICE VISIT (OUTPATIENT)
Dept: WOUND CARE | Facility: HOSPITAL | Age: 74
End: 2024-11-25
Payer: MEDICARE

## 2024-11-25 PROCEDURE — 99212 OFFICE O/P EST SF 10 MIN: CPT

## 2024-12-02 ENCOUNTER — APPOINTMENT (OUTPATIENT)
Dept: WOUND CARE | Facility: HOSPITAL | Age: 74
End: 2024-12-02
Payer: MEDICARE

## 2024-12-04 ENCOUNTER — OFFICE VISIT (OUTPATIENT)
Dept: CARDIOLOGY | Facility: CLINIC | Age: 74
End: 2024-12-04
Payer: MEDICARE

## 2024-12-04 VITALS
DIASTOLIC BLOOD PRESSURE: 70 MMHG | SYSTOLIC BLOOD PRESSURE: 126 MMHG | WEIGHT: 235 LBS | OXYGEN SATURATION: 97 % | HEART RATE: 64 BPM | BODY MASS INDEX: 28.62 KG/M2

## 2024-12-04 DIAGNOSIS — R55 SYNCOPE, NON CARDIAC: ICD-10-CM

## 2024-12-04 DIAGNOSIS — I48.11 LONGSTANDING PERSISTENT ATRIAL FIBRILLATION (MULTI): ICD-10-CM

## 2024-12-04 DIAGNOSIS — E78.2 MIXED HYPERLIPIDEMIA: ICD-10-CM

## 2024-12-04 DIAGNOSIS — I10 PRIMARY HYPERTENSION: ICD-10-CM

## 2024-12-04 DIAGNOSIS — I25.10 ASHD (ARTERIOSCLEROTIC HEART DISEASE): Primary | ICD-10-CM

## 2024-12-04 PROCEDURE — 3078F DIAST BP <80 MM HG: CPT | Performed by: INTERNAL MEDICINE

## 2024-12-04 PROCEDURE — 1159F MED LIST DOCD IN RCRD: CPT | Performed by: INTERNAL MEDICINE

## 2024-12-04 PROCEDURE — 1157F ADVNC CARE PLAN IN RCRD: CPT | Performed by: INTERNAL MEDICINE

## 2024-12-04 PROCEDURE — 3044F HG A1C LEVEL LT 7.0%: CPT | Performed by: INTERNAL MEDICINE

## 2024-12-04 PROCEDURE — 3060F POS MICROALBUMINURIA REV: CPT | Performed by: INTERNAL MEDICINE

## 2024-12-04 PROCEDURE — 3074F SYST BP LT 130 MM HG: CPT | Performed by: INTERNAL MEDICINE

## 2024-12-04 PROCEDURE — 3049F LDL-C 100-129 MG/DL: CPT | Performed by: INTERNAL MEDICINE

## 2024-12-04 PROCEDURE — 99214 OFFICE O/P EST MOD 30 MIN: CPT | Performed by: INTERNAL MEDICINE

## 2024-12-04 PROCEDURE — 1036F TOBACCO NON-USER: CPT | Performed by: INTERNAL MEDICINE

## 2024-12-04 PROCEDURE — 1125F AMNT PAIN NOTED PAIN PRSNT: CPT | Performed by: INTERNAL MEDICINE

## 2024-12-04 RX ORDER — DOCUSATE SODIUM 100 MG/1
100 CAPSULE, LIQUID FILLED ORAL 2 TIMES DAILY
COMMUNITY

## 2024-12-04 RX ORDER — ACETAMINOPHEN 325 MG/1
325 TABLET ORAL EVERY 6 HOURS PRN
COMMUNITY

## 2024-12-04 ASSESSMENT — PATIENT HEALTH QUESTIONNAIRE - PHQ9
SUM OF ALL RESPONSES TO PHQ9 QUESTIONS 1 AND 2: 0
2. FEELING DOWN, DEPRESSED OR HOPELESS: NOT AT ALL
1. LITTLE INTEREST OR PLEASURE IN DOING THINGS: NOT AT ALL

## 2024-12-04 ASSESSMENT — ENCOUNTER SYMPTOMS
DEPRESSION: 1
PARESTHESIAS: 0
NUMBNESS: 0
PALPITATIONS: 0
SYNCOPE: 1
ABDOMINAL PAIN: 0
HEMATURIA: 0
COUGH: 0
BLURRED VISION: 0
DYSURIA: 0
DYSPNEA ON EXERTION: 0
OCCASIONAL FEELINGS OF UNSTEADINESS: 1
LOSS OF SENSATION IN FEET: 1
BACK PAIN: 1
SHORTNESS OF BREATH: 0

## 2024-12-04 ASSESSMENT — PAIN SCALES - GENERAL: PAINLEVEL_OUTOF10: 5

## 2024-12-04 NOTE — ASSESSMENT & PLAN NOTE
Now in persistent atrial fibrillation with well-controlled heart rate on simple beta-blocker therapy.  Currently on Eliquis for stroke prevention but daughter states they are unclear whether it will be affordable once he is discharged from the rehabilitation center.  If not we will need to transition to warfarin therapy and likely refer to the anticoagulation clinic.  Daughter will call if that is necessary.

## 2024-12-04 NOTE — ASSESSMENT & PLAN NOTE
No recent lipid panels.  Will place an order for beginning of the year and this will be done if Dr. Marshall orders any labs otherwise they will have it done separately on return visit.

## 2024-12-04 NOTE — PROGRESS NOTES
Subjective   Rachid Yun is a 74 y.o. male.  Patient admitted with a syncopal episode.  Etiology was not clear but thought that vasovagal episode likely.  Antihypertensive agents were reduced and has had no recurrent episodes.  Unfortunately he has developed osteomyelitis in his left foot and is now at a rehabilitation center receiving IV antibiotic therapy.  No chest pain or anginal symptoms.    Chief Complaint:  6 month f/u and Syncope    Syncope  Associated symptoms include back pain. Pertinent negatives include no abdominal pain, chest pain, malaise/fatigue or palpitations.       Review of Systems   Constitutional: Negative for malaise/fatigue.   HENT:  Negative for congestion.    Eyes:  Negative for blurred vision.   Cardiovascular:  Positive for syncope. Negative for chest pain, dyspnea on exertion and palpitations.   Respiratory:  Negative for cough and shortness of breath.    Musculoskeletal:  Positive for back pain. Negative for joint pain.   Gastrointestinal:  Negative for abdominal pain.   Genitourinary:  Negative for dysuria and hematuria.   Neurological:  Negative for numbness and paresthesias.       Objective   Constitutional:       Appearance: Not in distress.   Eyes:      Conjunctiva/sclera: Conjunctivae normal.   Neck:      Vascular: JVD normal.   Pulmonary:      Breath sounds: Normal breath sounds. No wheezing. No rhonchi. No rales.   Cardiovascular:      Normal rate. Irregularly irregular rhythm.      Murmurs: There is no murmur.      No gallop.  No click. No rub.   Abdominal:      Palpations: Abdomen is soft.   Neurological:      General: No focal deficit present.      Mental Status: Alert.         Lab Review:   Lab Requisition on 10/29/2024   Component Date Value    Glucose 10/29/2024 133 (H)     Sodium 10/29/2024 139     Potassium 10/29/2024 4.1     Chloride 10/29/2024 102     Bicarbonate 10/29/2024 31     Anion Gap 10/29/2024 10     Urea Nitrogen 10/29/2024 30 (H)     Creatinine 10/29/2024  1.45 (H)     eGFR 10/29/2024 51 (L)     Calcium 10/29/2024 8.1 (L)     Albumin 10/29/2024 3.0 (L)     Alkaline Phosphatase 10/29/2024 57     Total Protein 10/29/2024 5.0 (L)     AST 10/29/2024 24     Bilirubin, Total 10/29/2024 0.4     ALT 10/29/2024 24     Creatine Kinase 10/29/2024 186     WBC 10/29/2024 4.7     nRBC 10/29/2024 0.0     RBC 10/29/2024 3.52 (L)     Hemoglobin 10/29/2024 10.5 (L)     Hematocrit 10/29/2024 32.7 (L)     MCV 10/29/2024 93     MCH 10/29/2024 29.8     MCHC 10/29/2024 32.1     RDW 10/29/2024 16.1 (H)     Platelets 10/29/2024 144 (L)    Lab Requisition on 10/27/2024   Component Date Value    Glucose 10/27/2024 170 (H)     Sodium 10/27/2024 134 (L)     Potassium 10/27/2024 4.4     Chloride 10/27/2024 100     Bicarbonate 10/27/2024 27     Anion Gap 10/27/2024 11     Urea Nitrogen 10/27/2024 34 (H)     Creatinine 10/27/2024 1.44 (H)     eGFR 10/27/2024 51 (L)     Calcium 10/27/2024 7.7 (L)     WBC 10/27/2024 4.6     nRBC 10/27/2024 0.0     RBC 10/27/2024 3.55 (L)     Hemoglobin 10/27/2024 10.3 (L)     Hematocrit 10/27/2024 32.6 (L)     MCV 10/27/2024 92     MCH 10/27/2024 29.0     MCHC 10/27/2024 31.6 (L)     RDW 10/27/2024 15.5 (H)     Platelets 10/27/2024 150     Neutrophils % 10/27/2024 71.7     Immature Granulocytes %,* 10/27/2024 0.4     Lymphocytes % 10/27/2024 16.4     Monocytes % 10/27/2024 7.8     Eosinophils % 10/27/2024 2.6     Basophils % 10/27/2024 1.1     Neutrophils Absolute 10/27/2024 3.33     Immature Granulocytes Ab* 10/27/2024 0.02     Lymphocytes Absolute 10/27/2024 0.76 (L)     Monocytes Absolute 10/27/2024 0.36     Eosinophils Absolute 10/27/2024 0.12     Basophils Absolute 10/27/2024 0.05    Admission on 10/13/2024, Discharged on 10/19/2024   Component Date Value    Color, Urine 10/13/2024 Yellow     Appearance, Urine 10/13/2024 Clear     Specific Gravity, Urine 10/13/2024 >1.050 (N)     pH, Urine 10/13/2024 6.0     Protein, Urine 10/13/2024 600 (3+) (A)     Glucose,  Urine 10/13/2024 30 (TRACE) (A)     Blood, Urine 10/13/2024 0.1 (1+) (A)     Ketones, Urine 10/13/2024 NEGATIVE     Bilirubin, Urine 10/13/2024 NEGATIVE     Urobilinogen, Urine 10/13/2024 Normal     Nitrite, Urine 10/13/2024 NEGATIVE     Leukocyte Esterase, Urine 10/13/2024 NEGATIVE     WBC 10/14/2024 8.1     nRBC 10/14/2024 0.0     RBC 10/14/2024 3.71 (L)     Hemoglobin 10/14/2024 10.7 (L)     Hematocrit 10/14/2024 33.2 (L)     MCV 10/14/2024 90     MCH 10/14/2024 28.8     MCHC 10/14/2024 32.2     RDW 10/14/2024 15.5 (H)     Platelets 10/14/2024 165     Glucose 10/14/2024 153 (H)     Sodium 10/14/2024 133 (L)     Potassium 10/14/2024 4.0     Chloride 10/14/2024 103     Bicarbonate 10/14/2024 22     Anion Gap 10/14/2024 12     Urea Nitrogen 10/14/2024 34 (H)     Creatinine 10/14/2024 1.38 (H)     eGFR 10/14/2024 54 (L)     Calcium 10/14/2024 8.2 (L)     POCT Glucose 10/13/2024 181 (H)     WBC, Urine 10/13/2024 1-5     RBC, Urine 10/13/2024 1-2     Mucus, Urine 10/13/2024 FEW     POCT Glucose 10/13/2024 111 (H)     POCT Glucose 10/14/2024 128 (H)     Creatine Kinase 10/14/2024 116     POCT Glucose 10/14/2024 173 (H)     AV pk anahi 10/14/2024 1.04     LVOT diam 10/14/2024 2.50     Tricuspid annular plane * 10/14/2024 1.2     LA vol index A/L 10/14/2024 43.3     LV EF 10/14/2024 48     RV free wall pk S' 10/14/2024 11.50     AV pk grad 10/14/2024 4.3     Aortic Valve Area by Con* 10/14/2024 3.10     POCT Glucose 10/14/2024 169 (H)     WBC 10/15/2024 8.1     nRBC 10/15/2024 0.0     RBC 10/15/2024 3.62 (L)     Hemoglobin 10/15/2024 10.4 (L)     Hematocrit 10/15/2024 32.4 (L)     MCV 10/15/2024 90     MCH 10/15/2024 28.7     MCHC 10/15/2024 32.1     RDW 10/15/2024 15.2 (H)     Platelets 10/15/2024 151     Glucose 10/15/2024 142 (H)     Sodium 10/15/2024 129 (L)     Potassium 10/15/2024 4.4     Chloride 10/15/2024 100     Bicarbonate 10/15/2024 22     Anion Gap 10/15/2024 11     Urea Nitrogen 10/15/2024 40 (H)      Creatinine 10/15/2024 1.55 (H)     eGFR 10/15/2024 47 (L)     Calcium 10/15/2024 8.3 (L)     POCT Glucose 10/14/2024 156 (H)     POCT Glucose 10/15/2024 127 (H)     Glucose 10/15/2024 145 (H)     Sodium 10/15/2024 130 (L)     Potassium 10/15/2024 4.7     Chloride 10/15/2024 101     Bicarbonate 10/15/2024 19 (L)     Anion Gap 10/15/2024 15     Urea Nitrogen 10/15/2024 40 (H)     Creatinine 10/15/2024 1.63 (H)     eGFR 10/15/2024 44 (L)     Calcium 10/15/2024 8.6     POCT Glucose 10/15/2024 146 (H)     POCT Glucose 10/15/2024 150 (H)     WBC 10/16/2024 6.5     nRBC 10/16/2024 0.0     RBC 10/16/2024 3.62 (L)     Hemoglobin 10/16/2024 10.4 (L)     Hematocrit 10/16/2024 31.7 (L)     MCV 10/16/2024 88     MCH 10/16/2024 28.7     MCHC 10/16/2024 32.8     RDW 10/16/2024 15.1 (H)     Platelets 10/16/2024 145 (L)     Glucose 10/16/2024 167 (H)     Sodium 10/16/2024 130 (L)     Potassium 10/16/2024 4.4     Chloride 10/16/2024 102     Bicarbonate 10/16/2024 21     Anion Gap 10/16/2024 11     Urea Nitrogen 10/16/2024 43 (H)     Creatinine 10/16/2024 1.52 (H)     eGFR 10/16/2024 48 (L)     Calcium 10/16/2024 8.5 (L)     POCT Glucose 10/15/2024 247 (H)     Osmolality, Urine Random 10/16/2024 225     Osmolality, Serum 10/16/2024 291     Sodium, Urine Random 10/16/2024 13     Creatinine, Urine Random 10/16/2024 39.3     Sodium/Creatinine Ratio 10/16/2024 33     POCT Glucose 10/16/2024 124 (H)     POCT Glucose 10/16/2024 125 (H)     POCT Glucose 10/16/2024 121 (H)     Glucose 10/17/2024 109 (H)     Sodium 10/17/2024 136     Potassium 10/17/2024 4.5     Chloride 10/17/2024 106     Bicarbonate 10/17/2024 23     Anion Gap 10/17/2024 12     Urea Nitrogen 10/17/2024 40 (H)     Creatinine 10/17/2024 1.44 (H)     eGFR 10/17/2024 51 (L)     Calcium 10/17/2024 8.5 (L)     Phosphorus 10/17/2024 4.1     Albumin 10/17/2024 3.3 (L)     POCT Glucose 10/16/2024 167 (H)     POCT Glucose 10/17/2024 91     POCT Glucose 10/17/2024 100 (H)     POCT  Glucose 10/17/2024 196 (H)     Glucose 10/18/2024 134 (H)     Sodium 10/18/2024 138     Potassium 10/18/2024 4.2     Chloride 10/18/2024 108 (H)     Bicarbonate 10/18/2024 24     Anion Gap 10/18/2024 10     Urea Nitrogen 10/18/2024 37 (H)     Creatinine 10/18/2024 1.43 (H)     eGFR 10/18/2024 51 (L)     Calcium 10/18/2024 8.2 (L)     POCT Glucose 10/17/2024 192 (H)     POCT Glucose 10/18/2024 93     Creatine Kinase 10/18/2024 43     POCT Glucose 10/18/2024 85     POCT Glucose 10/18/2024 123 (H)     POCT Glucose 10/19/2024 103 (H)     POCT Glucose 10/19/2024 107 (H)    Admission on 10/13/2024, Discharged on 10/13/2024   Component Date Value    WBC 10/13/2024 10.2     nRBC 10/13/2024 0.0     RBC 10/13/2024 4.80     Hemoglobin 10/13/2024 13.9     Hematocrit 10/13/2024 43.0     MCV 10/13/2024 90     MCH 10/13/2024 29.0     MCHC 10/13/2024 32.3     RDW 10/13/2024 15.6 (H)     Platelets 10/13/2024 255     Neutrophils % 10/13/2024 74.3     Immature Granulocytes %,* 10/13/2024 0.7     Lymphocytes % 10/13/2024 16.8     Monocytes % 10/13/2024 6.7     Eosinophils % 10/13/2024 1.0     Basophils % 10/13/2024 0.5     Neutrophils Absolute 10/13/2024 7.57 (H)     Immature Granulocytes Ab* 10/13/2024 0.07     Lymphocytes Absolute 10/13/2024 1.71     Monocytes Absolute 10/13/2024 0.68     Eosinophils Absolute 10/13/2024 0.10     Basophils Absolute 10/13/2024 0.05     Magnesium 10/13/2024 1.59 (L)     Glucose 10/13/2024 207 (H)     Sodium 10/13/2024 135 (L)     Potassium 10/13/2024 4.2     Chloride 10/13/2024 104     Bicarbonate 10/13/2024 19 (L)     Anion Gap 10/13/2024 16     Urea Nitrogen 10/13/2024 34 (H)     Creatinine 10/13/2024 1.70 (H)     eGFR 10/13/2024 42 (L)     Calcium 10/13/2024 8.7     Albumin 10/13/2024 3.4     Alkaline Phosphatase 10/13/2024 58     Total Protein 10/13/2024 6.3 (L)     AST 10/13/2024 20     Bilirubin, Total 10/13/2024 1.6 (H)     ALT 10/13/2024 31     Ventricular Rate 10/13/2024 81     QRS Duration  10/13/2024 92     QT Interval 10/13/2024 402     QTC Calculation(Bazett) 10/13/2024 466     R Axis 10/13/2024 34     T Axis 10/13/2024 90     QRS Count 10/13/2024 14     Q Onset 10/13/2024 208     T Offset 10/13/2024 409     QTC Fredericia 10/13/2024 444     BNP 10/13/2024 91     Troponin I, High Sensiti* 10/13/2024 8     Lactate 10/13/2024 5.0 (HH)     Blood Culture 10/13/2024 No growth at 4 days -  FINAL REPORT     Blood Culture 10/13/2024 No growth at 4 days -  FINAL REPORT     Troponin I, High Sensiti* 10/13/2024 7     Lactate 10/13/2024 1.7     Coronavirus 2019, PCR 10/13/2024 Not Detected     Flu A Result 10/13/2024 Not Detected     Flu B Result 10/13/2024 Not Detected        Assessment/Plan   The primary encounter diagnosis was ASHD (arteriosclerotic heart disease). Diagnoses of Mixed hyperlipidemia, Primary hypertension, and Longstanding persistent atrial fibrillation (Multi) were also pertinent to this visit.    ASHD (arteriosclerotic heart disease)  Stable with no anginal type symptoms.  We will continue standard risk factor modification and follow on a clinical basis.    Atrial fibrillation (Multi)  Now in persistent atrial fibrillation with well-controlled heart rate on simple beta-blocker therapy.  Currently on Eliquis for stroke prevention but daughter states they are unclear whether it will be affordable once he is discharged from the rehabilitation center.  If not we will need to transition to warfarin therapy and likely refer to the anticoagulation clinic.  Daughter will call if that is necessary.    Mixed hyperlipidemia  No recent lipid panels.  Will place an order for beginning of the year and this will be done if Dr. Marshall orders any labs otherwise they will have it done separately on return visit.    Primary hypertension  Blood pressure currently adequately controlled on current regimen no change will be made at this time.  Will continue to follow on a regular basis.    Syncope, non cardiac  No  recurrent episodes of syncope.  Will monitor clinically.  Likely a vasovagal reaction.

## 2024-12-04 NOTE — ASSESSMENT & PLAN NOTE
Blood pressure currently adequately controlled on current regimen no change will be made at this time.  Will continue to follow on a regular basis.

## 2024-12-04 NOTE — ASSESSMENT & PLAN NOTE
Stable with no anginal type symptoms.  We will continue standard risk factor modification and follow on a clinical basis.

## 2024-12-09 ENCOUNTER — OFFICE VISIT (OUTPATIENT)
Dept: WOUND CARE | Facility: HOSPITAL | Age: 74
End: 2024-12-09
Payer: MEDICARE

## 2024-12-09 DIAGNOSIS — Z95.828 PRESENCE OF TEMPORARY CENTRAL VENOUS CATHETER: Primary | ICD-10-CM

## 2024-12-09 PROCEDURE — 99212 OFFICE O/P EST SF 10 MIN: CPT

## 2024-12-10 ENCOUNTER — DOCUMENTATION (OUTPATIENT)
Dept: HOME HEALTH SERVICES | Facility: HOME HEALTH | Age: 74
End: 2024-12-10
Payer: MEDICARE

## 2024-12-10 ENCOUNTER — HOME HEALTH ADMISSION (OUTPATIENT)
Dept: HOME HEALTH SERVICES | Facility: HOME HEALTH | Age: 74
End: 2024-12-10
Payer: MEDICARE

## 2024-12-10 NOTE — HH CARE COORDINATION
Home Care received a Referral for Nursing, Physical Therapy, Occupational Therapy, and Home Health Aide. We have processed the referral for a Start of Care on 24-48 hrs.     If you have any questions or concerns, please feel free to contact us at [phone]. Follow the prompts, enter your five digit zip code, and you will be directed to your care team on [facility].

## 2024-12-11 ENCOUNTER — HOSPITAL ENCOUNTER (OUTPATIENT)
Dept: CARDIOLOGY | Facility: HOSPITAL | Age: 74
Discharge: HOME | End: 2024-12-11
Payer: MEDICARE

## 2024-12-11 ENCOUNTER — PATIENT OUTREACH (OUTPATIENT)
Dept: PRIMARY CARE | Facility: CLINIC | Age: 74
End: 2024-12-11
Payer: MEDICARE

## 2024-12-11 ENCOUNTER — DOCUMENTATION (OUTPATIENT)
Dept: PRIMARY CARE | Facility: CLINIC | Age: 74
End: 2024-12-11
Payer: MEDICARE

## 2024-12-11 VITALS
HEART RATE: 69 BPM | SYSTOLIC BLOOD PRESSURE: 134 MMHG | RESPIRATION RATE: 16 BRPM | DIASTOLIC BLOOD PRESSURE: 64 MMHG | TEMPERATURE: 96.1 F

## 2024-12-11 DIAGNOSIS — I48.91 ATRIAL FIBRILLATION, UNSPECIFIED TYPE (MULTI): ICD-10-CM

## 2024-12-11 DIAGNOSIS — Z95.828 PRESENCE OF TEMPORARY CENTRAL VENOUS CATHETER: ICD-10-CM

## 2024-12-11 PROCEDURE — 2500000004 HC RX 250 GENERAL PHARMACY W/ HCPCS (ALT 636 FOR OP/ED): Performed by: NURSE PRACTITIONER

## 2024-12-11 PROCEDURE — 36589 REMOVAL TUNNELED CV CATH: CPT | Performed by: NURSE PRACTITIONER

## 2024-12-11 PROCEDURE — 7100000010 HC PHASE TWO TIME - EACH INCREMENTAL 1 MINUTE

## 2024-12-11 PROCEDURE — 7100000009 HC PHASE TWO TIME - INITIAL BASE CHARGE

## 2024-12-11 PROCEDURE — 99222 1ST HOSP IP/OBS MODERATE 55: CPT | Performed by: NURSE PRACTITIONER

## 2024-12-11 RX ORDER — LIDOCAINE HYDROCHLORIDE 10 MG/ML
INJECTION, SOLUTION EPIDURAL; INFILTRATION; INTRACAUDAL; PERINEURAL AS NEEDED
Status: DISCONTINUED | OUTPATIENT
Start: 2024-12-11 | End: 2024-12-11 | Stop reason: HOSPADM

## 2024-12-11 RX ORDER — APIXABAN 2.5 MG/1
2.5 TABLET, FILM COATED ORAL 2 TIMES DAILY
Qty: 180 TABLET | Refills: 3 | Status: SHIPPED | OUTPATIENT
Start: 2024-12-11 | End: 2025-12-11

## 2024-12-11 ASSESSMENT — PAIN SCALES - GENERAL
PAINLEVEL_OUTOF10: 2

## 2024-12-11 ASSESSMENT — ENCOUNTER SYMPTOMS
ENDOCRINE NEGATIVE: 1
NEUROLOGICAL NEGATIVE: 1
GASTROINTESTINAL NEGATIVE: 1
CONSTITUTIONAL NEGATIVE: 1
HEMATOLOGIC/LYMPHATIC NEGATIVE: 1
RESPIRATORY NEGATIVE: 1
ALLERGIC/IMMUNOLOGIC NEGATIVE: 1
EYES NEGATIVE: 1
CARDIOVASCULAR NEGATIVE: 1
MUSCULOSKELETAL NEGATIVE: 1
PSYCHIATRIC NEGATIVE: 1

## 2024-12-11 ASSESSMENT — PAIN - FUNCTIONAL ASSESSMENT
PAIN_FUNCTIONAL_ASSESSMENT: 0-10

## 2024-12-11 ASSESSMENT — PAIN DESCRIPTION - DESCRIPTORS
DESCRIPTORS: ACHING

## 2024-12-11 NOTE — POST-PROCEDURE NOTE
Risks, benefits, and alternatives of right IJ tunneled central venous catheter discussed with patient who verbalizes understanding and signed consent form to proceed     right upper chest prepped and draped using sterile technique     Area instilled with 5cc of 1% lidocaine     right IJ tunneled central venous catheter successfully removed without complication     Hemostasis achieved with manual compression     Site covered with dry, sterile dressing with tegaderm     Less than 1cc blood loss     Patient tolerated procedure well, VSS, discharged to home in stable condition

## 2024-12-11 NOTE — PROGRESS NOTES
Discharge Facility: [facility]  Discharge Diagnosis: Osteomyelitis of left foot/ankle  Admission Date: 10/19/2024  Discharge Date: 12/10/2024    PCP Appointment Date: Tasked to office due to availability  Specialist Appointment Date: Cardio 12/11/2024, 06/05/2025 Wound 12/30/2024, Pain Med 01/13/2025  Hospital Encounter and Summary Linked: No    See discharge assessment below for further details      Medications  Medications reviewed with patient/caregiver?: Yes (12/11/2024 10:38 AM)  Is the patient having any side effects they believe may be caused by any medication additions or changes?: No (12/11/2024 10:38 AM)  Does the patient have all medications ordered at discharge?: Yes (12/11/2024 10:38 AM)  Prescription Comments: patient has all needed medications (12/11/2024 10:38 AM)  Is the patient taking all medications as directed (includes completed medication regime)?: Yes (12/11/2024 10:38 AM)  Medication Comments: patient may have affordability issues with eliquis (12/11/2024 10:38 AM)    Appointments  Does the patient have a primary care provider?: Yes (12/11/2024 10:38 AM)  Care Management Interventions: -- (Tasked to office due to availability) (12/11/2024 10:38 AM)  Has the patient kept scheduled appointments due by today?: Yes (12/11/2024 10:38 AM)    Self Management  What is the home health agency?: [facility] (12/11/2024 10:38 AM)  Has home health visited the patient within 72 hours of discharge?: Call prior to 72 hours (12/11/2024 10:38 AM)  What Durable Medical Equipment (DME) was ordered?: N/A (12/11/2024 10:38 AM)    Patient Teaching  Does the patient have access to their discharge instructions?: Yes (12/11/2024 10:38 AM)  Care Management Interventions: Reviewed instructions with patient (12/11/2024 10:38 AM)  What is the patient's perception of their health status since discharge?: Improving (12/11/2024 10:38 AM)  Is the patient/caregiver able to teach back the hierarchy of who to call/visit for  symptoms/problems? PCP, Specialist, Home Health nurse, Urgent Care, ED, 911: Yes (12/11/2024 10:38 AM)  Patient/Caregiver Education Comments: CM spoke to patients daughter Mariely via phone. She states that he is doing okay at home. They have all needed medications at this time. There may be some affordability issues with eliquis in the future. PCP follow up has been tasked to the office for scheduling due to availability. Mariely has no questions at this time and was thankful for this call. (12/11/2024 10:38 AM)

## 2024-12-11 NOTE — H&P
History Of Present Illness  Rachid Yun is a 74 y.o. 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 PMH includes DMII, dementia, iron deficiency anemia, HTN, sleep apnea, CKD, pericardial effusion, HLD, HFmrEF    Past Medical History:  Past Medical History:   Diagnosis Date    Arthritis     Coronary artery disease     Diabetes mellitus (Multi)     Hypertension     Stroke (Multi)         Past Surgical History:  Past Surgical History:   Procedure Laterality Date    BACK SURGERY      06/2024    CARDIAC CATHETERIZATION N/A 12/28/2023    Procedure: Left Heart Cath, With LV, Angriography And Grafts;  Surgeon: Phill Hare DO;  Location: [facility];  Service: Cardiovascular;  Laterality: N/A;    CARDIAC CATHETERIZATION N/A 12/28/2023    Procedure: PCI;  Surgeon: Phill Hare DO;  Location: [facility];  Service: Cardiovascular;  Laterality: N/A;    CARDIAC ELECTROPHYSIOLOGY PROCEDURE N/A 11/29/2023    Procedure: Cardioversion;  Surgeon: Phill Hare DO;  Location: [facility];  Service: Cardiovascular;  Laterality: N/A;    CORONARY ANGIOPLASTY WITH STENT PLACEMENT      CORONARY ANGIOPLASTY WITH STENT PLACEMENT      cardiac stent 12/2023    RADIOFREQUENCY ABLATION            Social History:   reports that he quit smoking about 36 years ago. His smoking use included cigarettes. He has been exposed to tobacco smoke. He has never used smokeless tobacco. He reports that he does not currently use alcohol. He reports that he does not use drugs.     Family History:  Family History   Problem Relation Name Age of Onset    Stomach cancer Mother      Diabetes Mother      Coronary artery disease Father      Other (Pacemaker) Father      Other (S/p CABG) Sister      Other (S/p CABG) Brother      Ovarian cancer Sibling      Coronary artery disease Sibling          Allergies:  Allergies   Allergen Reactions     Meperidine Other     Passes out     Vancomycin Hives     & chest pain    Hydromorphone Anxiety    Other Unknown     Ripampin    Rifampin Unknown     Dizzy/ oriented         Home Medications:  Current Outpatient Medications   Medication Instructions    acetaminophen (TYLENOL) 325 mg, Every 6 hours PRN    atorvastatin (LIPITOR) 20 mg, oral, Daily    blood sugar diagnostic strip 1 strip, miscellaneous, 3 times daily before meals    clopidogrel (PLAVIX) 75 mg, oral, Daily    collagenase 250 unit/gram ointment Topical, Daily    docusate sodium (COLACE) 100 mg, 2 times daily    donepezil (ARICEPT) 10 mg, oral, Nightly    DULoxetine (CYMBALTA) 60 mg, oral, Daily    Eliquis 2.5 mg, oral, 2 times daily    ferrous sulfate 325 (65 Fe) MG EC tablet 1 tablet, 2 times daily    folic acid (Folvite) 400 mcg tablet 1 tablet, Daily    gabapentin (NEURONTIN) 300 mg, oral, 3 times daily    ketoconazole (NIZOral) 2 % cream Topical, Daily    ketoconazole 1 % shampoo 1 Application, topical (top), Every 3 days, Shampoo daily, leave on for 5-10 minutes, then rinse.    Lantus U-100 Insulin 100 unit/mL injection 40 UNITS SUBCUTANEOUS DAILY 90 DAY(S)    levETIRAcetam (Keppra) 500 mg tablet 1 TABLET ORALLY EVERY 12 HRS 90 DAY(S)    metoprolol succinate XL (TOPROL-XL) 25 mg, oral, 2 times daily, Do not crush or chew.    omega 3-dha-epa-fish oil (Fish OiL) 1,000 mg (120 mg-180 mg) capsule 1 capsule, Daily    omeprazole (PriLOSEC) 20 mg DR capsule 1 capsule, Daily before breakfast    oxyCODONE (ROXICODONE) 15 mg, oral, Every 6 hours PRN    polyethylene glycol (GLYCOLAX, MIRALAX) 17 g, oral, Daily    tamsulosin (Flomax) 0.4 mg 24 hr capsule 1 capsule, Nightly    traZODone (DESYREL) 150 mg, oral, Nightly       Inpatient Medications:  Scheduled medications   Medication Dose Route Frequency     PRN medications   Medication    lidocaine PF     Continuous Medications   Medication Dose Last Rate         Review of Systems   Constitutional: Negative.     HENT: Negative.     Eyes: Negative.    Respiratory: Negative.     Cardiovascular: Negative.    Gastrointestinal: Negative.    Endocrine: Negative.    Genitourinary: Negative.    Musculoskeletal: Negative.    Skin: Negative.    Allergic/Immunologic: Negative.    Neurological: Negative.    Hematological: Negative.    Psychiatric/Behavioral: Negative.            Physical Exam  Constitutional:       General: He is awake. He is not in acute distress.     Appearance: He is not ill-appearing.   Cardiovascular:      Rate and Rhythm: Normal rate and regular rhythm.      Pulses: Normal pulses.           Radial pulses are 2+ on the right side and 2+ on the left side.      Heart sounds: Normal heart sounds. No murmur heard.  Pulmonary:      Effort: Pulmonary effort is normal.      Breath sounds: Normal breath sounds and air entry.   Abdominal:      General: Bowel sounds are normal.      Palpations: Abdomen is soft.      Tenderness: There is no abdominal tenderness.   Musculoskeletal:      Right lower leg: No edema.      Left lower leg: No edema.   Skin:     General: Skin is warm and dry.   Neurological:      General: No focal deficit present.      Mental Status: He is alert and oriented to person, place, and time.      GCS: GCS eye subscore is 4. GCS verbal subscore is 5. GCS motor subscore is 6.   Psychiatric:         Mood and Affect: Mood normal.         Behavior: Behavior is cooperative.           Last Recorded Vitals  Blood pressure 143/74, pulse 74, temperature 35.6 °C (96.1 °F), temperature source Temporal, resp. rate 16.         Vitals from the Past 24 Hours  Heart Rate:  [74]   Temp:  [35.6 °C (96.1 °F)]   Resp:  [16]   BP: (143)/(74)          Relevant Results    Labs    CBC:   Recent Labs     10/29/24  1515 10/27/24  1017 10/16/24  0516 10/15/24  0516 10/14/24  0505 10/13/24  0848   WBC 4.7 4.6 6.5 8.1 8.1 10.2   HGB 10.5* 10.3* 10.4* 10.4* 10.7* 13.9   HCT 32.7* 32.6* 31.7* 32.4* 33.2* 43.0   * 150 145* 151  "165 255   MCV 93 92 88 90 90 90     BMP/CMP:   Recent Labs     10/29/24  1515 10/27/24  1017 10/18/24  0525 10/17/24  0541 10/16/24  0516 10/15/24  1135 10/14/24  0506 10/13/24  0848 10/01/24  1301 07/30/24  1230 07/17/24  0559 07/16/24  1035 07/01/24  1225    134* 138 136 130* 130*   < > 135* 138 141  141   < > 136 139   K 4.1 4.4 4.2 4.5 4.4 4.7   < > 4.2 4.3 4.2  4.2   < > 4.3 5.0    100 108* 106 102 101   < > 104 103 101  101   < > 101 104   BUN 30* 34* 37* 40* 43* 40*   < > 34* 32* 32*  32*   < > 32* 25   CREATININE 1.45* 1.44* 1.43* 1.44* 1.52* 1.63*   < > 1.70* 1.28 1.60  1.60   < > 2.30* 1.90*   CO2 31 27 24 23 21 19*   < > 19* 30 23*  23*   < > 23* 23*   CALCIUM 8.1* 7.7* 8.2* 8.5* 8.5* 8.6   < > 8.7 8.3* 9.0  9.0   < > 9.6 8.8   PROT 5.0*  --   --   --   --   --   --  6.3* 5.9* 6.5  --  7.6 6.3   BILITOT 0.4  --   --   --   --   --   --  1.6* 0.8 1.1  --  0.5 0.3   ALKPHOS 57  --   --   --   --   --   --  58 48 65  --  46 36   ALT 24  --   --   --   --   --   --  31 14 26  --  11 9   AST 24  --   --   --   --   --   --  20 14 35  --  23 16   GLUCOSE 133* 170* 134* 109* 167* 145*   < > 207* 200* 208*  208*   < > 133* 108*    < > = values in this interval not displayed.      Magnesium:   Recent Labs     10/13/24  0848 10/24/23  0518   MG 1.59* 2.20     Lipid Panel:   Recent Labs     07/01/24  1225   CHOL 171   HDL 36.0*   CHHDL 4.8   TRIG 129     Cardiac       No lab exists for component: \"CK\", \"CKMBP\"   Hemoglobin A1C:   Recent Labs     07/01/24  1225 10/24/23  0518 01/30/23  1032 12/01/21  1228 10/17/21  1352   HGBA1C 6.1* 6.3* 6.7* 5.8 7.0*     TSH/ Free T4:   Recent Labs     12/25/23  0431 10/24/23  0518   TSH 0.95 0.63     Iron:   Recent Labs     10/13/24  0848 10/01/24  1301 11/06/22  0730 01/19/22  1317   FERRITIN  --  157 234 163   TIBC  --  260 366 367   IRONSAT  --  10* 13* 17.7   BNP 91  --   --   --      Coag:     ABO: No results found for: \"ABO\"    Past Cardiology Tests (Last " 3 Years):    EKG:  Recent Labs     10/13/24  0853 12/24/23  1600   VENTRATE 81 73   QRSDUR 92 90   QTCFRED 444 416   QTCCALCB 466 429     Encounter Date: 10/13/24   ECG 12 lead   Result Value    Ventricular Rate 81    QRS Duration 92    QT Interval 402    QTC Calculation(Bazett) 466    R Axis 34    T Axis 90    QRS Count 14    Q Onset 208    T Offset 409    QTC Fredericia 444    Narrative    Atrial fibrillation  Abnormal ECG  When compared with ECG of 24-DEC-2023 15:59,  No significant change was found  Confirmed by Julian Barajas ([id number]) on 10/15/2024 9:17:05 PM     Echo:  Echocardiogram:   Transthoracic Echo (TTE) Complete 10/14/2024    PHYSICIAN INTERPRETATION:  Left Ventricle: Left ventricular ejection fraction is mildly decreased, by visual estimate at 45-50%. The patient is in atrial fibrillation which may influence the estimate of left ventricular function and transvalvular flows. There is global hypokinesis of the left ventricle with minor regional variations. The left ventricular cavity size is upper limits of normal. Spectral Doppler shows a normal pattern of left ventricular diastolic filling.  Left Atrium: The left atrium is mildly dilated.  Right Ventricle: The right ventricle is mildly enlarged. There is reduced right ventricular systolic function.  Right Atrium: The right atrium is upper limits of normal in size.  Aortic Valve: The aortic valve is trileaflet. There is no evidence of aortic valve regurgitation. The peak instantaneous gradient of the aortic valve is 4.3 mmHg.  Mitral Valve: The mitral valve is normal in structure. There is mild mitral valve regurgitation.  Tricuspid Valve: The tricuspid valve is structurally normal. There is trace tricuspid regurgitation. The right ventricular systolic pressure is unable to be estimated.  Pulmonic Valve: The pulmonic valve is structurally normal. There is physiologic pulmonic valve regurgitation.  Pericardium: Trivial pericardial effusion. There is a  pericardial fat pad present.  Aorta: The aortic root is normal.  Systemic Veins: The inferior vena cava was not well visualized.      CONCLUSIONS:  1. Left ventricular ejection fraction is mildly decreased, by visual estimate at 45-50%.  2. There is global hypokinesis of the left ventricle with minor regional variations.  3. There is reduced right ventricular systolic function.  4. Mildly enlarged right ventricle.  5. Mild mitral valve regurgitation.  6. Trace tricuspid regurgitation is visualized.  7. The patient is in atrial fibrillation which may influence the estimate of left ventricular function and transvalvular flows.      Ejection Fractions:  LV EF   Date/Time Value Ref Range Status   10/14/2024 02:22 PM 48 %        Stress Test:  Nuclear:No results found for this or any previous visit from the past 1800 days.    Metabolic Stress: No results found for this or any previous visit from the past 1800 days.    Cardiac Imaging:  Cardiac Scoring: No results found for this or any previous visit from the past 1800 days.    Cardiac MRI: No results found for this or any previous visit from the past 1800 days.         Assessment/Plan  Assessment/Plan   Assessment & Plan  Presence of temporary central venous catheter    Hx of MRSA to left diabetic foot ulcer with osteomyelitis, completion of IV antibiotics  -ramona catheter removal on 12/11/24         I spent 30 minutes in the professional and overall care of this patient.      Elmer Lopez, APRN-CNP, DNP

## 2024-12-11 NOTE — DISCHARGE INSTRUCTIONS
Central Venous Catheter Removal- Patient and Family Education    A trained health care provider, as ordered by your doctor has removed your Central Venous  Catheter. The following instructions will provide a guideline to decrease the risk of  complications while caring for your removal site as it heals.    Central Venous Catheter Removed:   ______ Port   ______ Pheresis Catheter   ______ Dialysis Catheter   __XXX____ Tunneled Central Catheter   ______ PICC Line    Activity:  ? No exercising, lifting heavy objects, or strenuous activities for the next seven days.    Pain Management:  ? Expect some minor discomfort at the surgical site for the next few days.  ? You may use over-the-counter medications such as Acetaminophen (Tylenol) or  Ibuprofen (Advil or Motrin) for minor discomfort, unless restricted for some other  reasons.    Care of your incision after device removal:  ? Keep dressing dry for 2 days  ? If you have sterile paper strips over the incision allow them to fall off on their own.  ? You may shower 48 hours after the dressing is removed but do not soak the incision for 2 weeks  (no swimming, bathing or hot tubs until completely healed).    Medications:  ? Resume taking all your previous medications.  ? If you are taking blood-thinning medication, please follow special instructions by your  doctor.    Call your Doctor right away, if you have any of these signs:  ? Fever higher than a temperature of 101.5°F or chills.  ? Swelling or severe pain on the side the catheter was removed.  ? Increased bleeding, redness or drainage at or around the removal site.     How to Reach your Doctor:    Call [phone] with problems or questions.     This info is a general resource. It is not meant to replace your health care provider’s advice.  Ask your doctor or health care team any questions. Always follow their instructions.

## 2024-12-14 ENCOUNTER — HOME CARE VISIT (OUTPATIENT)
Dept: HOME HEALTH SERVICES | Facility: HOME HEALTH | Age: 74
End: 2024-12-14
Payer: MEDICARE

## 2024-12-14 VITALS
RESPIRATION RATE: 20 BRPM | DIASTOLIC BLOOD PRESSURE: 68 MMHG | HEART RATE: 70 BPM | OXYGEN SATURATION: 98 % | SYSTOLIC BLOOD PRESSURE: 126 MMHG | TEMPERATURE: 96.7 F | BODY MASS INDEX: 27.76 KG/M2 | HEIGHT: 76 IN | WEIGHT: 228 LBS

## 2024-12-14 PROCEDURE — G0299 HHS/HOSPICE OF RN EA 15 MIN: HCPCS

## 2024-12-14 SDOH — ECONOMIC STABILITY: HOUSING INSECURITY: HOME SAFETY: PT COMPLETED IV ATB FOR MRSA

## 2024-12-14 ASSESSMENT — ACTIVITIES OF DAILY LIVING (ADL)
CURRENT_FUNCTION: STAND BY ASSIST
DRESSING_UB_CURRENT_FUNCTION: STAND BY ASSIST
PHYSICAL TRANSFERS ASSESSED: 1
AMBULATION ASSISTANCE: 1
TRANSPORTATION ASSESSED: 1
LAUNDRY ASSESSED: 1
ENTERING_EXITING_HOME: MODERATE ASSIST
FEEDING ASSESSED: 1
TOILETING: STAND BY ASSIST
TRANSPORTATION: DEPENDENT
SHOPPING: DEPENDENT
USING THE TELPHONE: INDEPENDENT
TOILETING: 1
TELEPHONE USE ASSESSED: 1
GROOMING_CURRENT_FUNCTION: STAND BY ASSIST
BATHING_CURRENT_FUNCTION: MODERATE ASSIST
DRESSING_LB_CURRENT_FUNCTION: STAND BY ASSIST
OASIS_M1830: 03
LIGHT HOUSEKEEPING: DEPENDENT
ORAL_CARE_ASSESSED: 1
AMBULATION ASSISTANCE: STAND BY ASSIST
PREPARING MEALS: DEPENDENT
SHOPPING ASSESSED: 1
BATHING ASSESSED: 1
LAUNDRY: DEPENDENT
GROOMING ASSESSED: 1
ORAL_CARE_CURRENT_FUNCTION: INDEPENDENT
FEEDING: SUPERVISION
HOUSEKEEPING ASSESSED: 1

## 2024-12-14 ASSESSMENT — LIFESTYLE VARIABLES: SMOKING_STATUS: 0

## 2024-12-14 ASSESSMENT — ENCOUNTER SYMPTOMS
LOSS OF SENSATION: 1
PAIN LOCATION: LEFT SHOULDER
DIZZINESS: 1
PAIN: 1
HIGHEST PAIN SEVERITY IN PAST 24 HOURS: 7/10
LIMITED RANGE OF MOTION: 1
APPETITE LEVEL: GOOD
PAIN LOCATION - PAIN FREQUENCY: FREQUENT
SYNCOPE: 1
PALPITATIONS: 1
FATIGUE: 1
FATIGUES EASILY: 1
LOSS OF SENSATION IN FEET: 1
LOWEST PAIN SEVERITY IN PAST 24 HOURS: 4/10
CHANGE IN APPETITE: UNCHANGED
BLURRED VISION: 1
PAIN SEVERITY GOAL: 0/10
PAIN LOCATION - PAIN DURATION: VARIES
DESCRIPTION OF MEMORY LOSS: LONG TERM
MUSCLE WEAKNESS: 1
PAIN LOCATION - PAIN QUALITY: SHARP THROBBING
PERSON REPORTING PAIN: PATIENT
PAIN LOCATION - PAIN SEVERITY: 6/10
SKIN LESIONS: 1
SUBJECTIVE PAIN PROGRESSION: UNCHANGED
PAIN LOCATION - RELIEVING FACTORS: REST PAIN MED
DEPRESSED MOOD: 1
PAIN LOCATION - PAIN QUALITY: SHARP THROBBING
DESCRIPTION OF MEMORY LOSS: IMMEDIATE
FORGETFULNESS: 1
DEPRESSION: 1
DESCRIPTION OF MEMORY LOSS: SHORT TERM
OCCASIONAL FEELINGS OF UNSTEADINESS: 1
PAIN LOCATION - PAIN DURATION: VARIES
LAST BOWEL MOVEMENT: 67188
TROUBLE SWALLOWING: 1
ONSET QUALITY: INTERMITTENT
PAIN LOCATION - PAIN FREQUENCY: FREQUENT
PAIN LOCATION - PAIN SEVERITY: 6/10
PAIN LOCATION: BACK

## 2024-12-15 ENCOUNTER — HOME CARE VISIT (OUTPATIENT)
Dept: HOME HEALTH SERVICES | Facility: HOME HEALTH | Age: 74
End: 2024-12-15
Payer: MEDICARE

## 2024-12-17 ENCOUNTER — HOME CARE VISIT (OUTPATIENT)
Dept: HOME HEALTH SERVICES | Facility: HOME HEALTH | Age: 74
End: 2024-12-17
Payer: MEDICARE

## 2024-12-17 PROCEDURE — G0156 HHCP-SVS OF AIDE,EA 15 MIN: HCPCS

## 2024-12-17 PROCEDURE — G0152 HHCP-SERV OF OT,EA 15 MIN: HCPCS

## 2024-12-17 ASSESSMENT — ENCOUNTER SYMPTOMS
LOWEST PAIN SEVERITY IN PAST 24 HOURS: 8/10
PAIN LOCATION: GENERALIZED
DEPRESSION: 1
PAIN LOCATION - PAIN FREQUENCY: CONSTANT
PAIN LOCATION - PAIN SEVERITY: 8/10
SUBJECTIVE PAIN PROGRESSION: UNCHANGED
PAIN: 1
PERSON REPORTING PAIN: PATIENT
PAIN SEVERITY GOAL: 4/10
HIGHEST PAIN SEVERITY IN PAST 24 HOURS: 8/10

## 2024-12-18 ENCOUNTER — HOME CARE VISIT (OUTPATIENT)
Dept: HOME HEALTH SERVICES | Facility: HOME HEALTH | Age: 74
End: 2024-12-18
Payer: MEDICARE

## 2024-12-18 VITALS
TEMPERATURE: 98.7 F | HEART RATE: 72 BPM | OXYGEN SATURATION: 99 % | SYSTOLIC BLOOD PRESSURE: 130 MMHG | RESPIRATION RATE: 16 BRPM | DIASTOLIC BLOOD PRESSURE: 86 MMHG

## 2024-12-18 PROCEDURE — G0300 HHS/HOSPICE OF LPN EA 15 MIN: HCPCS

## 2024-12-18 ASSESSMENT — ACTIVITIES OF DAILY LIVING (ADL)
CURRENT_FUNCTION: MINIMUM ASSIST
PHYSICAL TRANSFERS ASSESSED: 1
GROOMING_CURRENT_FUNCTION: MINIMUM ASSIST
TOILETING: 1
BATHING ASSESSED: 1
AMBULATION ASSISTANCE: 1
AMBULATION ASSISTANCE: CONTACT GUARD ASSIST
PREPARING MEALS: DEPENDENT
BATHING_CURRENT_FUNCTION: MODERATE ASSIST
FEEDING ASSESSED: 1
DRESSING_LB_CURRENT_FUNCTION: MODERATE ASSIST
GROOMING ASSESSED: 1
TOILETING: MODERATE ASSIST
FEEDING: CONTACT GUARD ASSIST
DRESSING_UB_CURRENT_FUNCTION: MINIMUM ASSIST

## 2024-12-18 ASSESSMENT — ENCOUNTER SYMPTOMS
DENIES PAIN: 1
LAST BOWEL MOVEMENT: 67191
APPETITE LEVEL: GOOD
CHANGE IN APPETITE: UNCHANGED

## 2024-12-19 ENCOUNTER — HOME CARE VISIT (OUTPATIENT)
Dept: HOME HEALTH SERVICES | Facility: HOME HEALTH | Age: 74
End: 2024-12-19
Payer: MEDICARE

## 2024-12-19 VITALS
OXYGEN SATURATION: 99 % | DIASTOLIC BLOOD PRESSURE: 78 MMHG | HEART RATE: 77 BPM | SYSTOLIC BLOOD PRESSURE: 151 MMHG | RESPIRATION RATE: 16 BRPM

## 2024-12-19 VITALS — TEMPERATURE: 98 F | SYSTOLIC BLOOD PRESSURE: 154 MMHG | DIASTOLIC BLOOD PRESSURE: 80 MMHG

## 2024-12-19 PROCEDURE — G0151 HHCP-SERV OF PT,EA 15 MIN: HCPCS

## 2024-12-19 PROCEDURE — G0158 HHC OT ASSISTANT EA 15: HCPCS | Mod: CO

## 2024-12-19 SDOH — HEALTH STABILITY: PHYSICAL HEALTH: EXERCISE COMMENTS: REVIEWED SUPINE AP QS GE HEEL SLIDES HIP ABD SLR AS TOL THAT HE WAS DOING ON SNF

## 2024-12-19 ASSESSMENT — ACTIVITIES OF DAILY LIVING (ADL): AMBULATION_DISTANCE/DURATION_TOLERATED: 30 FEET

## 2024-12-20 ENCOUNTER — HOME CARE VISIT (OUTPATIENT)
Dept: HOME HEALTH SERVICES | Facility: HOME HEALTH | Age: 74
End: 2024-12-20
Payer: MEDICARE

## 2024-12-20 VITALS
TEMPERATURE: 98.7 F | RESPIRATION RATE: 16 BRPM | SYSTOLIC BLOOD PRESSURE: 148 MMHG | HEART RATE: 76 BPM | DIASTOLIC BLOOD PRESSURE: 68 MMHG | OXYGEN SATURATION: 100 %

## 2024-12-20 PROCEDURE — G0300 HHS/HOSPICE OF LPN EA 15 MIN: HCPCS

## 2024-12-20 PROCEDURE — G0156 HHCP-SVS OF AIDE,EA 15 MIN: HCPCS

## 2024-12-20 ASSESSMENT — ENCOUNTER SYMPTOMS
CHANGE IN APPETITE: UNCHANGED
APPETITE LEVEL: GOOD
LAST BOWEL MOVEMENT: 67193
DENIES PAIN: 1
MUSCLE WEAKNESS: 1

## 2024-12-21 ASSESSMENT — ACTIVITIES OF DAILY LIVING (ADL)
AMBULATION ASSISTANCE: STAND BY ASSIST
AMBULATION ASSISTANCE: SUPERVISION
CURRENT_FUNCTION: STAND BY ASSIST
PHYSICAL TRANSFERS ASSESSED: 1
TOILETING: 1
AMBULATION ASSISTANCE: 1
TOILETING: STAND BY ASSIST

## 2024-12-23 ENCOUNTER — HOME CARE VISIT (OUTPATIENT)
Dept: HOME HEALTH SERVICES | Facility: HOME HEALTH | Age: 74
End: 2024-12-23
Payer: MEDICARE

## 2024-12-23 VITALS
RESPIRATION RATE: 20 BRPM | DIASTOLIC BLOOD PRESSURE: 80 MMHG | HEART RATE: 100 BPM | TEMPERATURE: 98.8 F | OXYGEN SATURATION: 98 % | SYSTOLIC BLOOD PRESSURE: 144 MMHG

## 2024-12-23 VITALS — TEMPERATURE: 97.9 F | SYSTOLIC BLOOD PRESSURE: 168 MMHG | DIASTOLIC BLOOD PRESSURE: 98 MMHG

## 2024-12-23 PROCEDURE — G0158 HHC OT ASSISTANT EA 15: HCPCS | Mod: CO

## 2024-12-23 PROCEDURE — G0299 HHS/HOSPICE OF RN EA 15 MIN: HCPCS

## 2024-12-23 PROCEDURE — G0151 HHCP-SERV OF PT,EA 15 MIN: HCPCS

## 2024-12-23 ASSESSMENT — ENCOUNTER SYMPTOMS
PAIN LOCATION - PAIN DURATION: MOST OF DAY
SKIN LESIONS: 1
HIGHEST PAIN SEVERITY IN PAST 24 HOURS: 7/10
PAIN LOCATION - PAIN SEVERITY: 7/10
FATIGUES EASILY: 1
PAIN: 1
APPETITE LEVEL: GOOD
PAIN: 1
PAIN LOCATION - PAIN SEVERITY: 6/10
SUBJECTIVE PAIN PROGRESSION: GRADUALLY WORSENING
PAIN LOCATION: BACK
PAIN LOCATION - PAIN FREQUENCY: FREQUENT
PAIN LOCATION: COCCYX
PERSON REPORTING PAIN: PATIENT
CHANGE IN APPETITE: UNCHANGED
SUBJECTIVE PAIN PROGRESSION: UNCHANGED
PAIN LOCATION - EXACERBATING FACTORS: MOVEMENT
PAIN LOCATION - PAIN FREQUENCY: CONSTANT
PAIN LOCATION - EXACERBATING FACTORS: ANY USE
PAIN SEVERITY GOAL: 0/10
FORGETFULNESS: 1
LIMITED RANGE OF MOTION: 1
MUSCLE WEAKNESS: 1
HIGHEST PAIN SEVERITY IN PAST 24 HOURS: 8/10
FATIGUE: 1
PAIN LOCATION: LEFT SHOULDER
PAIN LOCATION - PAIN SEVERITY: 8/10
LOWEST PAIN SEVERITY IN PAST 24 HOURS: 5/10
LOWEST PAIN SEVERITY IN PAST 24 HOURS: 5/10
DESCRIPTION OF MEMORY LOSS: LONG TERM
PAIN LOCATION - EXACERBATING FACTORS: ANY ACTIVITY
DESCRIPTION OF MEMORY LOSS: IMMEDIATE
PAIN LOCATION - PAIN DURATION: CONTINUOUS
DEPRESSED MOOD: 1
DESCRIPTION OF MEMORY LOSS: SHORT TERM

## 2024-12-23 NOTE — CASE COMMUNICATION
Pt stubbed his 4th LT toe, took off initial layer of skin, it was bleeding but looked healthy>  [employer] based drsg with bandaid over, picture to chart

## 2024-12-24 ENCOUNTER — HOME CARE VISIT (OUTPATIENT)
Dept: HOME HEALTH SERVICES | Facility: HOME HEALTH | Age: 74
End: 2024-12-24
Payer: MEDICARE

## 2024-12-24 PROCEDURE — G0156 HHCP-SVS OF AIDE,EA 15 MIN: HCPCS

## 2024-12-26 ENCOUNTER — HOME CARE VISIT (OUTPATIENT)
Dept: HOME HEALTH SERVICES | Facility: HOME HEALTH | Age: 74
End: 2024-12-26
Payer: MEDICARE

## 2024-12-26 ENCOUNTER — PATIENT OUTREACH (OUTPATIENT)
Dept: PRIMARY CARE | Facility: CLINIC | Age: 74
End: 2024-12-26
Payer: MEDICARE

## 2024-12-26 VITALS
SYSTOLIC BLOOD PRESSURE: 139 MMHG | DIASTOLIC BLOOD PRESSURE: 74 MMHG | OXYGEN SATURATION: 99 % | RESPIRATION RATE: 16 BRPM | TEMPERATURE: 97.2 F

## 2024-12-26 VITALS
HEART RATE: 90 BPM | SYSTOLIC BLOOD PRESSURE: 138 MMHG | RESPIRATION RATE: 16 BRPM | DIASTOLIC BLOOD PRESSURE: 77 MMHG | OXYGEN SATURATION: 98 %

## 2024-12-26 PROCEDURE — G0158 HHC OT ASSISTANT EA 15: HCPCS | Mod: CO

## 2024-12-26 NOTE — PROGRESS NOTES
14 day call to patients daughter Mariely after hospitalization.  At time of outreach call Mareily feels as if their condition has improved since last visit.

## 2024-12-27 ENCOUNTER — HOME CARE VISIT (OUTPATIENT)
Dept: HOME HEALTH SERVICES | Facility: HOME HEALTH | Age: 74
End: 2024-12-27
Payer: MEDICARE

## 2024-12-27 PROCEDURE — G0156 HHCP-SVS OF AIDE,EA 15 MIN: HCPCS

## 2024-12-28 ASSESSMENT — ACTIVITIES OF DAILY LIVING (ADL)
AMBULATION ASSISTANCE: CONTACT GUARD ASSIST
PHYSICAL TRANSFERS ASSESSED: 1
CURRENT_FUNCTION: STAND BY ASSIST
CURRENT_FUNCTION: CONTACT GUARD ASSIST
AMBULATION ASSISTANCE: 1

## 2024-12-30 ENCOUNTER — HOME CARE VISIT (OUTPATIENT)
Dept: HOME HEALTH SERVICES | Facility: HOME HEALTH | Age: 74
End: 2024-12-30
Payer: MEDICARE

## 2024-12-30 ENCOUNTER — OFFICE VISIT (OUTPATIENT)
Dept: WOUND CARE | Facility: HOSPITAL | Age: 74
End: 2024-12-30
Payer: MEDICARE

## 2024-12-30 PROCEDURE — G0158 HHC OT ASSISTANT EA 15: HCPCS | Mod: CO

## 2024-12-30 PROCEDURE — 99214 OFFICE O/P EST MOD 30 MIN: CPT

## 2024-12-31 ENCOUNTER — HOME CARE VISIT (OUTPATIENT)
Dept: HOME HEALTH SERVICES | Facility: HOME HEALTH | Age: 74
End: 2024-12-31
Payer: MEDICARE

## 2024-12-31 VITALS
HEART RATE: 76 BPM | SYSTOLIC BLOOD PRESSURE: 137 MMHG | RESPIRATION RATE: 17 BRPM | OXYGEN SATURATION: 97 % | DIASTOLIC BLOOD PRESSURE: 76 MMHG

## 2024-12-31 VITALS — SYSTOLIC BLOOD PRESSURE: 170 MMHG | DIASTOLIC BLOOD PRESSURE: 76 MMHG

## 2024-12-31 PROCEDURE — G0156 HHCP-SVS OF AIDE,EA 15 MIN: HCPCS

## 2024-12-31 PROCEDURE — G0151 HHCP-SERV OF PT,EA 15 MIN: HCPCS

## 2024-12-31 ASSESSMENT — ENCOUNTER SYMPTOMS
HIGHEST PAIN SEVERITY IN PAST 24 HOURS: 7/10
PAIN: 1
PERSON REPORTING PAIN: PATIENT
LOWEST PAIN SEVERITY IN PAST 24 HOURS: 4/10
PAIN LOCATION: BACK
SUBJECTIVE PAIN PROGRESSION: UNCHANGED

## 2024-12-31 ASSESSMENT — ACTIVITIES OF DAILY LIVING (ADL)
AMBULATION ASSISTANCE: 1
GROOMING ASSESSED: 1
TOILETING: STAND BY ASSIST
DRESSING_UB_CURRENT_FUNCTION: STAND BY ASSIST
AMBULATION ASSISTANCE: CONTACT GUARD ASSIST
TOILETING: 1
DRESSING_LB_CURRENT_FUNCTION: MINIMUM ASSIST
AMBULATION ASSISTANCE: STAND BY ASSIST
GROOMING_CURRENT_FUNCTION: STAND BY ASSIST

## 2025-01-02 ENCOUNTER — HOME CARE VISIT (OUTPATIENT)
Dept: HOME HEALTH SERVICES | Facility: HOME HEALTH | Age: 75
End: 2025-01-02
Payer: MEDICARE

## 2025-01-02 VITALS — SYSTOLIC BLOOD PRESSURE: 136 MMHG | TEMPERATURE: 97.4 F | DIASTOLIC BLOOD PRESSURE: 106 MMHG

## 2025-01-02 PROCEDURE — G0151 HHCP-SERV OF PT,EA 15 MIN: HCPCS

## 2025-01-02 PROCEDURE — G0158 HHC OT ASSISTANT EA 15: HCPCS | Mod: CO

## 2025-01-02 ASSESSMENT — ENCOUNTER SYMPTOMS
PAIN: 1
PAIN LOCATION - PAIN SEVERITY: 4/10
PERSON REPORTING PAIN: PATIENT
HIGHEST PAIN SEVERITY IN PAST 24 HOURS: 6/10
LOWEST PAIN SEVERITY IN PAST 24 HOURS: 4/10
PAIN LOCATION: COCCYX

## 2025-01-03 ENCOUNTER — HOME CARE VISIT (OUTPATIENT)
Dept: HOME HEALTH SERVICES | Facility: HOME HEALTH | Age: 75
End: 2025-01-03
Payer: MEDICARE

## 2025-01-03 PROCEDURE — G0156 HHCP-SVS OF AIDE,EA 15 MIN: HCPCS

## 2025-01-04 VITALS
SYSTOLIC BLOOD PRESSURE: 139 MMHG | OXYGEN SATURATION: 97 % | HEART RATE: 77 BPM | DIASTOLIC BLOOD PRESSURE: 77 MMHG | RESPIRATION RATE: 16 BRPM

## 2025-01-04 ASSESSMENT — ACTIVITIES OF DAILY LIVING (ADL)
TOILETING: STAND BY ASSIST
AMBULATION ASSISTANCE: 1
GROOMING_CURRENT_FUNCTION: CONTACT GUARD ASSIST
TOILETING: 1
CURRENT_FUNCTION: STAND BY ASSIST
GROOMING_CURRENT_FUNCTION: STAND BY ASSIST
GROOMING ASSESSED: 1
PHYSICAL TRANSFERS ASSESSED: 1
TOILETING: CONTACT GUARD ASSIST
AMBULATION ASSISTANCE: CONTACT GUARD ASSIST
AMBULATION ASSISTANCE: STAND BY ASSIST
CURRENT_FUNCTION: CONTACT GUARD ASSIST
DRESSING_LB_CURRENT_FUNCTION: MINIMUM ASSIST

## 2025-01-06 ENCOUNTER — HOME CARE VISIT (OUTPATIENT)
Dept: HOME HEALTH SERVICES | Facility: HOME HEALTH | Age: 75
End: 2025-01-06
Payer: MEDICARE

## 2025-01-06 VITALS
TEMPERATURE: 98.8 F | OXYGEN SATURATION: 98 % | SYSTOLIC BLOOD PRESSURE: 140 MMHG | RESPIRATION RATE: 18 BRPM | DIASTOLIC BLOOD PRESSURE: 60 MMHG | HEART RATE: 90 BPM

## 2025-01-06 PROCEDURE — G0299 HHS/HOSPICE OF RN EA 15 MIN: HCPCS

## 2025-01-06 PROCEDURE — G0156 HHCP-SVS OF AIDE,EA 15 MIN: HCPCS

## 2025-01-06 ASSESSMENT — ENCOUNTER SYMPTOMS
PAIN LOCATION - EXACERBATING FACTORS: MOST THINGS
PAIN LOCATION - PAIN QUALITY: ACHY
FATIGUE: 1
CHANGE IN APPETITE: UNCHANGED
HIGHEST PAIN SEVERITY IN PAST 24 HOURS: 6/10
PAIN LOCATION - PAIN FREQUENCY: CONSTANT
PAIN LOCATION - PAIN DURATION: CONTINUOUS
PAIN SEVERITY GOAL: 0/10
SUBJECTIVE PAIN PROGRESSION: GRADUALLY WORSENING
PALPITATIONS: 1
MUSCLE WEAKNESS: 1
HYPERTENSION: 1
ONSET QUALITY: INTERMITTENT
PAIN LOCATION - PAIN FREQUENCY: CONSTANT
LIMITED RANGE OF MOTION: 1
PAIN LOCATION - RELIEVING FACTORS: PAIN MEDS
PAIN LOCATION - PAIN QUALITY: ACHY
PAIN LOCATION - PAIN DURATION: CONTINUOUS
LOWEST PAIN SEVERITY IN PAST 24 HOURS: 5/10
SKIN LESIONS: 1
PAIN LOCATION - PAIN FREQUENCY: CONSTANT
PAIN LOCATION: LEFT LEG
DESCRIPTION OF MEMORY LOSS: SHORT TERM
PAIN LOCATION - EXACERBATING FACTORS: MOST THINGS
PAIN LOCATION - PAIN SEVERITY: 5/10
PAIN LOCATION - PAIN QUALITY: ACHY
PAIN LOCATION: BACK
PAIN LOCATION - EXACERBATING FACTORS: MOST THINGS
PAIN LOCATION - PAIN SEVERITY: 6/10
FATIGUES EASILY: 1
PAIN LOCATION: LEFT SHOULDER
PAIN LOCATION - RELIEVING FACTORS: PAIN MEDS
PAIN LOCATION - PAIN SEVERITY: 5/10
PAIN LOCATION - RELIEVING FACTORS: PAIN MEDS
LAST BOWEL MOVEMENT: 67211
APPETITE LEVEL: GOOD
FORGETFULNESS: 1

## 2025-01-07 ENCOUNTER — HOME CARE VISIT (OUTPATIENT)
Dept: HOME HEALTH SERVICES | Facility: HOME HEALTH | Age: 75
End: 2025-01-07
Payer: MEDICARE

## 2025-01-07 VITALS — SYSTOLIC BLOOD PRESSURE: 146 MMHG | TEMPERATURE: 98.4 F | DIASTOLIC BLOOD PRESSURE: 84 MMHG

## 2025-01-07 PROCEDURE — G0158 HHC OT ASSISTANT EA 15: HCPCS | Mod: CO

## 2025-01-07 PROCEDURE — G0151 HHCP-SERV OF PT,EA 15 MIN: HCPCS

## 2025-01-07 ASSESSMENT — ENCOUNTER SYMPTOMS
PAIN LOCATION - PAIN SEVERITY: 5/10
PAIN LOCATION: COCCYX
HIGHEST PAIN SEVERITY IN PAST 24 HOURS: 6/10
PERSON REPORTING PAIN: PATIENT
LOWEST PAIN SEVERITY IN PAST 24 HOURS: 5/10
PAIN: 1

## 2025-01-08 ENCOUNTER — HOME CARE VISIT (OUTPATIENT)
Dept: HOME HEALTH SERVICES | Facility: HOME HEALTH | Age: 75
End: 2025-01-08
Payer: MEDICARE

## 2025-01-08 VITALS — TEMPERATURE: 98 F | DIASTOLIC BLOOD PRESSURE: 84 MMHG | SYSTOLIC BLOOD PRESSURE: 160 MMHG

## 2025-01-08 PROCEDURE — G0151 HHCP-SERV OF PT,EA 15 MIN: HCPCS

## 2025-01-08 ASSESSMENT — ENCOUNTER SYMPTOMS
PAIN LOCATION - PAIN SEVERITY: 5/10
DEPRESSION: 1
SUBJECTIVE PAIN PROGRESSION: UNCHANGED
PERSON REPORTING PAIN: PATIENT
LOSS OF SENSATION IN FEET: 1
LOWEST PAIN SEVERITY IN PAST 24 HOURS: 4/10
HIGHEST PAIN SEVERITY IN PAST 24 HOURS: 5/10
OCCASIONAL FEELINGS OF UNSTEADINESS: 1
PAIN LOCATION: COCCYX
PAIN: 1

## 2025-01-09 ENCOUNTER — HOME CARE VISIT (OUTPATIENT)
Dept: HOME HEALTH SERVICES | Facility: HOME HEALTH | Age: 75
End: 2025-01-09
Payer: MEDICARE

## 2025-01-09 VITALS
TEMPERATURE: 97.9 F | DIASTOLIC BLOOD PRESSURE: 73 MMHG | OXYGEN SATURATION: 98 % | SYSTOLIC BLOOD PRESSURE: 148 MMHG | RESPIRATION RATE: 16 BRPM | HEART RATE: 70 BPM

## 2025-01-09 PROCEDURE — G0152 HHCP-SERV OF OT,EA 15 MIN: HCPCS

## 2025-01-09 ASSESSMENT — ENCOUNTER SYMPTOMS
SUBJECTIVE PAIN PROGRESSION: UNCHANGED
PAIN LOCATION - PAIN FREQUENCY: CONSTANT
PERSON REPORTING PAIN: PATIENT
HIGHEST PAIN SEVERITY IN PAST 24 HOURS: 7/10
LOWEST PAIN SEVERITY IN PAST 24 HOURS: 7/10
PAIN LOCATION - PAIN QUALITY: ACHING
PAIN SEVERITY GOAL: 3/10
PAIN LOCATION - PAIN SEVERITY: 7/10
PAIN LOCATION: GENERALIZED
PAIN: 1

## 2025-01-10 ENCOUNTER — HOME CARE VISIT (OUTPATIENT)
Dept: HOME HEALTH SERVICES | Facility: HOME HEALTH | Age: 75
End: 2025-01-10
Payer: MEDICARE

## 2025-01-10 DIAGNOSIS — I48.11 LONGSTANDING PERSISTENT ATRIAL FIBRILLATION (MULTI): Primary | ICD-10-CM

## 2025-01-10 PROCEDURE — G0156 HHCP-SVS OF AIDE,EA 15 MIN: HCPCS

## 2025-01-10 ASSESSMENT — ACTIVITIES OF DAILY LIVING (ADL)
PHYSICAL TRANSFERS ASSESSED: 1
DRESSING_LB_CURRENT_FUNCTION: MODERATE ASSIST
TOILETING: MINIMUM ASSIST
TOILETING: 1
PREPARING MEALS: DEPENDENT
DRESSING_UB_CURRENT_FUNCTION: MODERATE ASSIST
BATHING_CURRENT_FUNCTION: MODERATE ASSIST
CURRENT_FUNCTION: MINIMUM ASSIST
BATHING ASSESSED: 1

## 2025-01-10 NOTE — TELEPHONE ENCOUNTER
Pt daughter walk into  office today requesting a 10 day supply for Elquis   Pt is in the process of filling out patient assistance form.  Pt will be out of this medication /01/12/2025  I did offer the pt daughter Eliquis free 30 day Trial offer to take to pharmacy for assistance  Please advice   Good call back number   [phone]

## 2025-01-13 ENCOUNTER — HOME CARE VISIT (OUTPATIENT)
Dept: HOME HEALTH SERVICES | Facility: HOME HEALTH | Age: 75
End: 2025-01-13
Payer: MEDICARE

## 2025-01-13 ENCOUNTER — APPOINTMENT (OUTPATIENT)
Dept: WOUND CARE | Facility: HOSPITAL | Age: 75
End: 2025-01-13
Payer: MEDICARE

## 2025-01-13 ENCOUNTER — PREP FOR PROCEDURE (OUTPATIENT)
Dept: PAIN MEDICINE | Facility: CLINIC | Age: 75
End: 2025-01-13
Payer: MEDICARE

## 2025-01-13 ENCOUNTER — OFFICE VISIT (OUTPATIENT)
Dept: PAIN MEDICINE | Facility: CLINIC | Age: 75
End: 2025-01-13
Payer: MEDICARE

## 2025-01-13 VITALS
HEIGHT: 76 IN | RESPIRATION RATE: 22 BRPM | HEART RATE: 78 BPM | WEIGHT: 228 LBS | OXYGEN SATURATION: 99 % | BODY MASS INDEX: 27.76 KG/M2 | SYSTOLIC BLOOD PRESSURE: 140 MMHG | DIASTOLIC BLOOD PRESSURE: 70 MMHG

## 2025-01-13 VITALS
DIASTOLIC BLOOD PRESSURE: 76 MMHG | TEMPERATURE: 98 F | HEART RATE: 66 BPM | RESPIRATION RATE: 20 BRPM | SYSTOLIC BLOOD PRESSURE: 160 MMHG

## 2025-01-13 DIAGNOSIS — M25.511 CHRONIC PAIN OF BOTH SHOULDERS: Primary | ICD-10-CM

## 2025-01-13 DIAGNOSIS — M25.512 CHRONIC PAIN OF BOTH SHOULDERS: ICD-10-CM

## 2025-01-13 DIAGNOSIS — G89.29 CHRONIC PAIN OF BOTH SHOULDERS: Primary | ICD-10-CM

## 2025-01-13 DIAGNOSIS — M47.816 LUMBAR SPONDYLOSIS: ICD-10-CM

## 2025-01-13 DIAGNOSIS — G89.29 CHRONIC PAIN OF BOTH SHOULDERS: ICD-10-CM

## 2025-01-13 DIAGNOSIS — M25.511 CHRONIC PAIN OF BOTH SHOULDERS: ICD-10-CM

## 2025-01-13 DIAGNOSIS — M47.817 LUMBOSACRAL SPONDYLOSIS WITHOUT MYELOPATHY: Primary | ICD-10-CM

## 2025-01-13 DIAGNOSIS — M25.512 CHRONIC PAIN OF BOTH SHOULDERS: Primary | ICD-10-CM

## 2025-01-13 PROCEDURE — 99204 OFFICE O/P NEW MOD 45 MIN: CPT | Performed by: ANESTHESIOLOGY

## 2025-01-13 PROCEDURE — 3078F DIAST BP <80 MM HG: CPT | Performed by: ANESTHESIOLOGY

## 2025-01-13 PROCEDURE — 3008F BODY MASS INDEX DOCD: CPT | Performed by: ANESTHESIOLOGY

## 2025-01-13 PROCEDURE — 1036F TOBACCO NON-USER: CPT | Performed by: ANESTHESIOLOGY

## 2025-01-13 PROCEDURE — 1157F ADVNC CARE PLAN IN RCRD: CPT | Performed by: ANESTHESIOLOGY

## 2025-01-13 PROCEDURE — G2211 COMPLEX E/M VISIT ADD ON: HCPCS | Performed by: ANESTHESIOLOGY

## 2025-01-13 PROCEDURE — G0158 HHC OT ASSISTANT EA 15: HCPCS | Mod: CO

## 2025-01-13 PROCEDURE — 99214 OFFICE O/P EST MOD 30 MIN: CPT | Performed by: ANESTHESIOLOGY

## 2025-01-13 PROCEDURE — 1125F AMNT PAIN NOTED PAIN PRSNT: CPT | Performed by: ANESTHESIOLOGY

## 2025-01-13 PROCEDURE — 1159F MED LIST DOCD IN RCRD: CPT | Performed by: ANESTHESIOLOGY

## 2025-01-13 PROCEDURE — 3077F SYST BP >= 140 MM HG: CPT | Performed by: ANESTHESIOLOGY

## 2025-01-13 PROCEDURE — G0299 HHS/HOSPICE OF RN EA 15 MIN: HCPCS

## 2025-01-13 RX ORDER — GABAPENTIN 800 MG/1
800 TABLET ORAL 4 TIMES DAILY
Qty: 360 TABLET | Refills: 3 | Status: SHIPPED | OUTPATIENT
Start: 2025-01-13 | End: 2026-01-08

## 2025-01-13 ASSESSMENT — ENCOUNTER SYMPTOMS
HIGHEST PAIN SEVERITY IN PAST 24 HOURS: 6/10
PAIN LOCATION - PAIN FREQUENCY: FREQUENT
FORGETFULNESS: 1
SKIN LESIONS: 1
PAIN LOCATION - EXACERBATING FACTORS: MANY THINGS
MYALGIAS: 1
PERSON REPORTING PAIN: PATIENT
PAIN: 1
PAIN LOCATION - EXACERBATING FACTORS: MANY THINGS
PALPITATIONS: 1
FATIGUE: 1
MUSCLE WEAKNESS: 1
PAIN LOCATION - RELIEVING FACTORS: REST, PAIN MEDS
PAIN LOCATION - PAIN FREQUENCY: FREQUENT
PAIN LOCATION: LEFT SHOULDER
FATIGUES EASILY: 1
DESCRIPTION OF MEMORY LOSS: LONG TERM
ACTIVITY CHANGE: 1
ARTHRALGIAS: 1
PAIN LOCATION - PAIN DURATION: VARIES
BACK PAIN: 1
PAIN LOCATION - PAIN SEVERITY: 5/10
LIMITED RANGE OF MOTION: 1
APPETITE LEVEL: GOOD
PAIN SEVERITY GOAL: 0/10
ONSET QUALITY: INTERMITTENT
PAIN LOCATION - PAIN SEVERITY: 5/10
PAIN LOCATION - RELIEVING FACTORS: REST PAIN MEDS
PAIN LOCATION - PAIN DURATION: VARIES
DESCRIPTION OF MEMORY LOSS: SHORT TERM
PAIN LOCATION: BACK
LOWEST PAIN SEVERITY IN PAST 24 HOURS: 5/10
HYPERTENSION: 1
SUBJECTIVE PAIN PROGRESSION: UNCHANGED
CHANGE IN APPETITE: UNCHANGED

## 2025-01-13 ASSESSMENT — PAIN SCALES - GENERAL
PAINLEVEL_OUTOF10: 6
PAINLEVEL_OUTOF10: 6

## 2025-01-13 ASSESSMENT — PAIN - FUNCTIONAL ASSESSMENT: PAIN_FUNCTIONAL_ASSESSMENT: 0-10

## 2025-01-13 NOTE — PROGRESS NOTES
The patient is a 74-year-old male with diffuse musculoskeletal pain.  The predominant issues are the bilateral shoulders and the low back.  He has had shoulder pain for many years.  The patient has been told he has severe degenerative changes and internal derangements but is not a candidate for replacement given his health status.  He has benefited from occasional subacromial shoulder injections in the past.  He requests another injection as it has been at least 6 months since his last injection.  The low back pain is constant but worse when he is rising from the seated position and getting out of bed in the morning.  He benefits from radiofrequency ablation bilaterally at the L4-5 and the L5-S1 levels.  He reports least 80% relief of his arthritic low back pain for 6 months with each ablation.  The patient reports that it is been greater than 6 months since his last radiofrequency ablation and requests another as soon as possible.  The patient has undergone several knee replacements.  He had a severe trauma which resulted in a comminuted left femur.  He has pain that persists in both knees as well as in his left leg.  He also describes bilateral hand pain after carpal tunnel surgery.  He had been taking oxycodone 15 mg 4 times daily for many years.  Because of a discrepancy his prescribing physician would no longer fill the prescription and discontinued the medication abruptly.  The patient went through severe withdrawal.  This occurred approximately 2 months ago.  The patient has been using medical THC and benefiting significantly.    Review of Systems   Constitutional:  Positive for activity change.   Musculoskeletal:  Positive for arthralgias, back pain, gait problem and myalgias.   All other systems reviewed and are negative.    GENERAL: alert and appropriate, in no distress, well-hydrated, well nourished, interactive         SKIN: no rash noted, surgical scars are well-healed          HEAD: normocephalic, no  abnormality or lesion noted         EYES: no injection and visual acuity is grossly normal         EARS: external ears normal, no mastoid tenderness         NOSE: external nose normal without rhinorrhea         OROPHARYNX: moist mucus membranes, no tonsillar hypertrophy/exudate, uvula midline and pharynx non-erythematous, lips, teeth and gums are without obvious lesion         NECK: Reduced ROM, no cervical LNs noted         RESPIRATORY: breathing non-labored and no grunting/flaring/retractions         CHEST: equal chest rise with normal respiratory effort         ABDOMEN: soft and non-tender         BACK: back normal in appearance, cervical and lumbar spine with reduced ROM         EXTREMITIES: Diminished strength in the bilateral lower extremities, atrophy in the left lower extremity bilateral shoulder range of motion reduced and painful, bilateral knee range of motion reduced and painful         NEUROLOGIC: Patient seated, SLR negative, Chiara sign negative, Spurling sign reproduced pain, sensation grossly intact    Assessment and Plan    -Chronicity--chronic musculoskeletal neuropathic pain    -Diagnostics--no new imaging ordered today    -Pharmacologic--I refilled the patient's gabapentin    -Psychologic--no need for psychologic intervention from my standpoint.  There are no mental health issues of which I am aware that are contributing to the patient's pain.  There are no substance abuse or alcohol abuse issues of which I am aware that are contributing to the patient's pain.    -Physical--we discussed the importance of physical therapy and exercise.  We discussed avoidance and modification techniques.    -Intervention--the patient is a candidate for bilateral subacromial shoulder injections which will take place later this week.  I explained the risks benefits and alternatives of the procedure to the patient.  The patient wishes to proceed.  The patient is also a candidate for radiofrequency ablation of the  facet medial nerve branches bilaterally at the L4-5 and the L5-S1 levels.  I explained the risks benefits and alternatives of the procedure to the patient.  The patient wishes to proceed.    I spent time educating the patient on the condition including the treatment and the prognosis.  I invited the patient to call at anytime with any questions.

## 2025-01-14 ENCOUNTER — HOME CARE VISIT (OUTPATIENT)
Dept: HOME HEALTH SERVICES | Facility: HOME HEALTH | Age: 75
End: 2025-01-14
Payer: MEDICARE

## 2025-01-14 VITALS — SYSTOLIC BLOOD PRESSURE: 136 MMHG | TEMPERATURE: 98.1 F | DIASTOLIC BLOOD PRESSURE: 84 MMHG

## 2025-01-14 VITALS
SYSTOLIC BLOOD PRESSURE: 148 MMHG | DIASTOLIC BLOOD PRESSURE: 77 MMHG | HEART RATE: 69 BPM | TEMPERATURE: 98.1 F | OXYGEN SATURATION: 97 %

## 2025-01-14 PROCEDURE — G0151 HHCP-SERV OF PT,EA 15 MIN: HCPCS

## 2025-01-14 PROCEDURE — G0156 HHCP-SVS OF AIDE,EA 15 MIN: HCPCS

## 2025-01-14 ASSESSMENT — ENCOUNTER SYMPTOMS
PAIN SEVERITY GOAL: 0/10
LOWEST PAIN SEVERITY IN PAST 24 HOURS: 4/10
PAIN: 1
DEPRESSION: 1
HIGHEST PAIN SEVERITY IN PAST 24 HOURS: 9/10
SUBJECTIVE PAIN PROGRESSION: UNCHANGED

## 2025-01-15 ENCOUNTER — HOME CARE VISIT (OUTPATIENT)
Dept: HOME HEALTH SERVICES | Facility: HOME HEALTH | Age: 75
End: 2025-01-15
Payer: MEDICARE

## 2025-01-15 PROCEDURE — G0158 HHC OT ASSISTANT EA 15: HCPCS | Mod: CO

## 2025-01-16 ENCOUNTER — HOME CARE VISIT (OUTPATIENT)
Dept: HOME HEALTH SERVICES | Facility: HOME HEALTH | Age: 75
End: 2025-01-16
Payer: MEDICARE

## 2025-01-16 VITALS — SYSTOLIC BLOOD PRESSURE: 124 MMHG | TEMPERATURE: 97.4 F | DIASTOLIC BLOOD PRESSURE: 74 MMHG

## 2025-01-16 PROCEDURE — G0151 HHCP-SERV OF PT,EA 15 MIN: HCPCS

## 2025-01-16 PROCEDURE — G0156 HHCP-SVS OF AIDE,EA 15 MIN: HCPCS

## 2025-01-17 ENCOUNTER — OFFICE VISIT (OUTPATIENT)
Dept: PAIN MEDICINE | Facility: CLINIC | Age: 75
End: 2025-01-17
Payer: MEDICARE

## 2025-01-17 ENCOUNTER — PREP FOR PROCEDURE (OUTPATIENT)
Dept: PAIN MEDICINE | Facility: CLINIC | Age: 75
End: 2025-01-17
Payer: MEDICARE

## 2025-01-17 VITALS — TEMPERATURE: 98.2 F | OXYGEN SATURATION: 98 % | SYSTOLIC BLOOD PRESSURE: 148 MMHG | DIASTOLIC BLOOD PRESSURE: 78 MMHG

## 2025-01-17 VITALS
DIASTOLIC BLOOD PRESSURE: 60 MMHG | HEART RATE: 60 BPM | OXYGEN SATURATION: 99 % | WEIGHT: 228 LBS | HEIGHT: 76 IN | RESPIRATION RATE: 22 BRPM | SYSTOLIC BLOOD PRESSURE: 120 MMHG | BODY MASS INDEX: 27.76 KG/M2

## 2025-01-17 DIAGNOSIS — G89.29 CHRONIC PAIN OF BOTH SHOULDERS: Primary | ICD-10-CM

## 2025-01-17 DIAGNOSIS — M25.511 CHRONIC PAIN OF BOTH SHOULDERS: Primary | ICD-10-CM

## 2025-01-17 DIAGNOSIS — M25.512 CHRONIC PAIN OF BOTH SHOULDERS: Primary | ICD-10-CM

## 2025-01-17 PROCEDURE — 1125F AMNT PAIN NOTED PAIN PRSNT: CPT | Performed by: ANESTHESIOLOGY

## 2025-01-17 PROCEDURE — 1157F ADVNC CARE PLAN IN RCRD: CPT | Performed by: ANESTHESIOLOGY

## 2025-01-17 PROCEDURE — 20610 DRAIN/INJ JOINT/BURSA W/O US: CPT | Mod: 50 | Performed by: ANESTHESIOLOGY

## 2025-01-17 PROCEDURE — 3078F DIAST BP <80 MM HG: CPT | Performed by: ANESTHESIOLOGY

## 2025-01-17 PROCEDURE — 2500000004 HC RX 250 GENERAL PHARMACY W/ HCPCS (ALT 636 FOR OP/ED): Performed by: ANESTHESIOLOGY

## 2025-01-17 PROCEDURE — 3074F SYST BP LT 130 MM HG: CPT | Performed by: ANESTHESIOLOGY

## 2025-01-17 PROCEDURE — 1036F TOBACCO NON-USER: CPT | Performed by: ANESTHESIOLOGY

## 2025-01-17 PROCEDURE — 20610 DRAIN/INJ JOINT/BURSA W/O US: CPT | Performed by: ANESTHESIOLOGY

## 2025-01-17 PROCEDURE — 96372 THER/PROPH/DIAG INJ SC/IM: CPT | Performed by: ANESTHESIOLOGY

## 2025-01-17 PROCEDURE — 1159F MED LIST DOCD IN RCRD: CPT | Performed by: ANESTHESIOLOGY

## 2025-01-17 PROCEDURE — 3008F BODY MASS INDEX DOCD: CPT | Performed by: ANESTHESIOLOGY

## 2025-01-17 RX ORDER — LIDOCAINE HYDROCHLORIDE 10 MG/ML
8 INJECTION, SOLUTION EPIDURAL; INFILTRATION; INTRACAUDAL; PERINEURAL ONCE
Status: COMPLETED | OUTPATIENT
Start: 2025-01-17 | End: 2025-01-17

## 2025-01-17 RX ORDER — TRIAMCINOLONE ACETONIDE 40 MG/ML
80 INJECTION, SUSPENSION INTRA-ARTICULAR; INTRAMUSCULAR ONCE
Status: COMPLETED | OUTPATIENT
Start: 2025-01-17 | End: 2025-01-17

## 2025-01-17 RX ADMIN — LIDOCAINE HYDROCHLORIDE 80 MG: 10 INJECTION, SOLUTION EPIDURAL; INFILTRATION; INTRACAUDAL; PERINEURAL at 11:07

## 2025-01-17 RX ADMIN — TRIAMCINOLONE ACETONIDE 80 MG: 40 INJECTION, SUSPENSION INTRA-ARTICULAR; INTRAMUSCULAR at 11:07

## 2025-01-17 ASSESSMENT — ENCOUNTER SYMPTOMS
SUBJECTIVE PAIN PROGRESSION: WAXING AND WANING
PAIN SEVERITY GOAL: 0/10
HIGHEST PAIN SEVERITY IN PAST 24 HOURS: 8/10
PAIN: 1
LOWEST PAIN SEVERITY IN PAST 24 HOURS: 2/10

## 2025-01-17 ASSESSMENT — ACTIVITIES OF DAILY LIVING (ADL)
AMBULATION ASSISTANCE: CONTACT GUARD ASSIST
AMBULATION ASSISTANCE: 1
PHYSICAL TRANSFERS ASSESSED: 1
TOILETING: CONTACT GUARD ASSIST
GROOMING_INDEPENDENT_CURRENT_FUNCTION: 1
TOILETING: 1
CURRENT_FUNCTION: CONTACT GUARD ASSIST

## 2025-01-17 ASSESSMENT — PAIN SCALES - GENERAL
PAINLEVEL_OUTOF10: 6
PAINLEVEL_OUTOF10: 6

## 2025-01-17 ASSESSMENT — PAIN DESCRIPTION - DESCRIPTORS: DESCRIPTORS: ACHING

## 2025-01-17 ASSESSMENT — PATIENT HEALTH QUESTIONNAIRE - PHQ9
SUM OF ALL RESPONSES TO PHQ9 QUESTIONS 1 & 2: 0
1. LITTLE INTEREST OR PLEASURE IN DOING THINGS: NOT AT ALL
2. FEELING DOWN, DEPRESSED OR HOPELESS: NOT AT ALL

## 2025-01-17 ASSESSMENT — PAIN - FUNCTIONAL ASSESSMENT: PAIN_FUNCTIONAL_ASSESSMENT: 0-10

## 2025-01-17 NOTE — PROGRESS NOTES
The patient was placed in the seated position.  His skin was prepped with isopropyl alcohol.  A 25-gauge spinal needle was used to inject 40 mg of triamcinolone and 4 mL of 1% lidocaine into the subacromial space of the left shoulder.  The needle was removed.  There were no complications.  The procedure was repeated on the right side.  The patient tolerated the procedure well.    Preprocedure VAS 8  Postprocedure VAS 2

## 2025-01-20 ENCOUNTER — OFFICE VISIT (OUTPATIENT)
Dept: WOUND CARE | Facility: HOSPITAL | Age: 75
End: 2025-01-20
Payer: MEDICARE

## 2025-01-20 ENCOUNTER — PATIENT OUTREACH (OUTPATIENT)
Dept: PRIMARY CARE | Facility: CLINIC | Age: 75
End: 2025-01-20
Payer: MEDICARE

## 2025-01-20 PROCEDURE — 99213 OFFICE O/P EST LOW 20 MIN: CPT

## 2025-01-21 ENCOUNTER — HOME CARE VISIT (OUTPATIENT)
Dept: HOME HEALTH SERVICES | Facility: HOME HEALTH | Age: 75
End: 2025-01-21
Payer: MEDICARE

## 2025-01-21 PROCEDURE — G0156 HHCP-SVS OF AIDE,EA 15 MIN: HCPCS

## 2025-01-22 ENCOUNTER — HOME CARE VISIT (OUTPATIENT)
Dept: HOME HEALTH SERVICES | Facility: HOME HEALTH | Age: 75
End: 2025-01-22
Payer: MEDICARE

## 2025-01-22 VITALS — SYSTOLIC BLOOD PRESSURE: 139 MMHG | OXYGEN SATURATION: 99 % | DIASTOLIC BLOOD PRESSURE: 75 MMHG

## 2025-01-22 VITALS — TEMPERATURE: 97.4 F | DIASTOLIC BLOOD PRESSURE: 94 MMHG | SYSTOLIC BLOOD PRESSURE: 156 MMHG

## 2025-01-22 PROCEDURE — G0158 HHC OT ASSISTANT EA 15: HCPCS | Mod: CO

## 2025-01-22 PROCEDURE — G0151 HHCP-SERV OF PT,EA 15 MIN: HCPCS

## 2025-01-22 ASSESSMENT — ENCOUNTER SYMPTOMS
PAIN LOCATION - PAIN SEVERITY: 5/10
PAIN: 1
HIGHEST PAIN SEVERITY IN PAST 24 HOURS: 5/10
PERSON REPORTING PAIN: PATIENT
LOWEST PAIN SEVERITY IN PAST 24 HOURS: 5/10
PAIN LOCATION: COCCYX

## 2025-01-23 ASSESSMENT — ACTIVITIES OF DAILY LIVING (ADL)
AMBULATION ASSISTANCE: STAND BY ASSIST
AMBULATION ASSISTANCE: 1
AMBULATION ASSISTANCE: CONTACT GUARD ASSIST
CURRENT_FUNCTION: CONTACT GUARD ASSIST
CURRENT_FUNCTION: STAND BY ASSIST
PHYSICAL TRANSFERS ASSESSED: 1

## 2025-01-23 ASSESSMENT — ENCOUNTER SYMPTOMS
PAIN: 1
SUBJECTIVE PAIN PROGRESSION: WAXING AND WANING
PAIN LOCATION: BACK
LOWEST PAIN SEVERITY IN PAST 24 HOURS: 4/10
PAIN SEVERITY GOAL: 0/10
HIGHEST PAIN SEVERITY IN PAST 24 HOURS: 7/10

## 2025-01-24 ENCOUNTER — HOME CARE VISIT (OUTPATIENT)
Dept: HOME HEALTH SERVICES | Facility: HOME HEALTH | Age: 75
End: 2025-01-24
Payer: MEDICARE

## 2025-01-24 VITALS — DIASTOLIC BLOOD PRESSURE: 76 MMHG | TEMPERATURE: 98.1 F | SYSTOLIC BLOOD PRESSURE: 118 MMHG

## 2025-01-24 PROCEDURE — G0158 HHC OT ASSISTANT EA 15: HCPCS | Mod: CO

## 2025-01-24 PROCEDURE — G0156 HHCP-SVS OF AIDE,EA 15 MIN: HCPCS

## 2025-01-24 PROCEDURE — G0151 HHCP-SERV OF PT,EA 15 MIN: HCPCS

## 2025-01-24 ASSESSMENT — ENCOUNTER SYMPTOMS
PAIN LOCATION: LEFT SHOULDER
HIGHEST PAIN SEVERITY IN PAST 24 HOURS: 6/10
LOWEST PAIN SEVERITY IN PAST 24 HOURS: 4/10
PAIN: 1
PAIN LOCATION - PAIN SEVERITY: 5/10

## 2025-01-25 VITALS — OXYGEN SATURATION: 98 % | DIASTOLIC BLOOD PRESSURE: 80 MMHG | SYSTOLIC BLOOD PRESSURE: 150 MMHG

## 2025-01-25 ASSESSMENT — ACTIVITIES OF DAILY LIVING (ADL)
WASHING_BACK_CURRENT_FUNCTION: MAXIMUM ASSIST
AMBULATION ASSISTANCE: CONTACT GUARD ASSIST
WASHING_LB_CURRENT_FUNCTION: MODERATE ASSIST
DRESSING_UB_CURRENT_FUNCTION: STAND BY ASSIST
WASHING_HAIR_CURRENT_FUNCTION: MINIMUM ASSIST
TOILETING: 1
WASHING_UPB_CURRENT_FUNCTION: STAND BY ASSIST
TOILETING: MINIMUM ASSIST
CURRENT_FUNCTION: CONTACT GUARD ASSIST
AMBULATION ASSISTANCE: 1
DRESSING_LB_CURRENT_FUNCTION: MODERATE ASSIST
TOILETING: CONTACT GUARD ASSIST
PHYSICAL TRANSFERS ASSESSED: 1

## 2025-01-25 ASSESSMENT — ENCOUNTER SYMPTOMS
PAIN SEVERITY GOAL: 0/10
HIGHEST PAIN SEVERITY IN PAST 24 HOURS: 8/10
LOWEST PAIN SEVERITY IN PAST 24 HOURS: 5/10
PAIN LOCATION: LEFT SHOULDER
PAIN: 1
SUBJECTIVE PAIN PROGRESSION: WAXING AND WANING

## 2025-01-28 ENCOUNTER — HOME CARE VISIT (OUTPATIENT)
Dept: HOME HEALTH SERVICES | Facility: HOME HEALTH | Age: 75
End: 2025-01-28
Payer: MEDICARE

## 2025-01-28 VITALS — TEMPERATURE: 97.8 F | SYSTOLIC BLOOD PRESSURE: 136 MMHG | DIASTOLIC BLOOD PRESSURE: 80 MMHG

## 2025-01-28 PROCEDURE — G0156 HHCP-SVS OF AIDE,EA 15 MIN: HCPCS

## 2025-01-28 PROCEDURE — G0158 HHC OT ASSISTANT EA 15: HCPCS | Mod: CO

## 2025-01-28 PROCEDURE — G0151 HHCP-SERV OF PT,EA 15 MIN: HCPCS

## 2025-01-28 ASSESSMENT — ENCOUNTER SYMPTOMS
PERSON REPORTING PAIN: PATIENT
PAIN: 1

## 2025-01-29 ENCOUNTER — HOME CARE VISIT (OUTPATIENT)
Dept: HOME HEALTH SERVICES | Facility: HOME HEALTH | Age: 75
End: 2025-01-29
Payer: MEDICARE

## 2025-01-29 VITALS — SYSTOLIC BLOOD PRESSURE: 144 MMHG | DIASTOLIC BLOOD PRESSURE: 84 MMHG | TEMPERATURE: 98.5 F

## 2025-01-29 PROCEDURE — G0151 HHCP-SERV OF PT,EA 15 MIN: HCPCS

## 2025-01-29 ASSESSMENT — ENCOUNTER SYMPTOMS
PAIN LOCATION - PAIN SEVERITY: 5/10
PERSON REPORTING PAIN: PATIENT
PAIN LOCATION: COCCYX
HIGHEST PAIN SEVERITY IN PAST 24 HOURS: 6/10
PAIN: 1
LOWEST PAIN SEVERITY IN PAST 24 HOURS: 5/10

## 2025-01-30 ENCOUNTER — TELEPHONE (OUTPATIENT)
Dept: PAIN MEDICINE | Facility: CLINIC | Age: 75
End: 2025-01-30
Payer: MEDICARE

## 2025-01-30 ENCOUNTER — HOME CARE VISIT (OUTPATIENT)
Dept: HOME HEALTH SERVICES | Facility: HOME HEALTH | Age: 75
End: 2025-01-30
Payer: MEDICARE

## 2025-01-30 VITALS
DIASTOLIC BLOOD PRESSURE: 85 MMHG | TEMPERATURE: 98.2 F | OXYGEN SATURATION: 98 % | SYSTOLIC BLOOD PRESSURE: 148 MMHG | RESPIRATION RATE: 18 BRPM

## 2025-01-30 VITALS — DIASTOLIC BLOOD PRESSURE: 80 MMHG | HEART RATE: 76 BPM | SYSTOLIC BLOOD PRESSURE: 154 MMHG | OXYGEN SATURATION: 99 %

## 2025-01-30 PROCEDURE — G0158 HHC OT ASSISTANT EA 15: HCPCS | Mod: CO

## 2025-01-30 ASSESSMENT — ACTIVITIES OF DAILY LIVING (ADL)
CURRENT_FUNCTION: CONTACT GUARD ASSIST
TOILETING: CONTACT GUARD ASSIST
PHYSICAL TRANSFERS ASSESSED: 1
TOILETING: 1

## 2025-01-31 ENCOUNTER — HOME CARE VISIT (OUTPATIENT)
Dept: HOME HEALTH SERVICES | Facility: HOME HEALTH | Age: 75
End: 2025-01-31
Payer: MEDICARE

## 2025-01-31 PROCEDURE — G0156 HHCP-SVS OF AIDE,EA 15 MIN: HCPCS

## 2025-02-01 ASSESSMENT — ACTIVITIES OF DAILY LIVING (ADL)
PHYSICAL TRANSFERS ASSESSED: 1
CURRENT_FUNCTION: STAND BY ASSIST
TOILETING: STAND BY ASSIST
TOILETING: 1

## 2025-02-03 ENCOUNTER — HOME CARE VISIT (OUTPATIENT)
Dept: HOME HEALTH SERVICES | Facility: HOME HEALTH | Age: 75
End: 2025-02-03
Payer: MEDICARE

## 2025-02-03 VITALS
DIASTOLIC BLOOD PRESSURE: 88 MMHG | SYSTOLIC BLOOD PRESSURE: 149 MMHG | RESPIRATION RATE: 18 BRPM | OXYGEN SATURATION: 97 % | TEMPERATURE: 98.4 F | HEART RATE: 77 BPM

## 2025-02-03 PROCEDURE — G0158 HHC OT ASSISTANT EA 15: HCPCS | Mod: CO

## 2025-02-03 PROCEDURE — G0156 HHCP-SVS OF AIDE,EA 15 MIN: HCPCS

## 2025-02-04 ASSESSMENT — ENCOUNTER SYMPTOMS
PAIN: 1
SUBJECTIVE PAIN PROGRESSION: GRADUALLY WORSENING
LOWEST PAIN SEVERITY IN PAST 24 HOURS: 3/10
HIGHEST PAIN SEVERITY IN PAST 24 HOURS: 8/10
PAIN LOCATION: LEFT SHOULDER
PAIN SEVERITY GOAL: 0/10

## 2025-02-04 ASSESSMENT — ACTIVITIES OF DAILY LIVING (ADL)
DRESSING_LB_CURRENT_FUNCTION: MINIMUM ASSIST
TOILETING: STAND BY ASSIST
PHYSICAL TRANSFERS ASSESSED: 1
CURRENT_FUNCTION: STAND BY ASSIST
TOILETING: 1
AMBULATION ASSISTANCE: STAND BY ASSIST
AMBULATION ASSISTANCE: 1

## 2025-02-05 ENCOUNTER — HOME CARE VISIT (OUTPATIENT)
Dept: HOME HEALTH SERVICES | Facility: HOME HEALTH | Age: 75
End: 2025-02-05
Payer: MEDICARE

## 2025-02-05 VITALS — TEMPERATURE: 98 F | SYSTOLIC BLOOD PRESSURE: 114 MMHG | DIASTOLIC BLOOD PRESSURE: 72 MMHG

## 2025-02-05 PROCEDURE — G0151 HHCP-SERV OF PT,EA 15 MIN: HCPCS

## 2025-02-05 ASSESSMENT — ENCOUNTER SYMPTOMS
LOWEST PAIN SEVERITY IN PAST 24 HOURS: 4/10
PERSON REPORTING PAIN: PATIENT
PAIN LOCATION - PAIN SEVERITY: 5/10
HIGHEST PAIN SEVERITY IN PAST 24 HOURS: 5/10
PAIN LOCATION: COCCYX
PAIN: 1

## 2025-02-06 ENCOUNTER — HOME CARE VISIT (OUTPATIENT)
Dept: HOME HEALTH SERVICES | Facility: HOME HEALTH | Age: 75
End: 2025-02-06
Payer: MEDICARE

## 2025-02-06 PROCEDURE — G0156 HHCP-SVS OF AIDE,EA 15 MIN: HCPCS

## 2025-02-07 ENCOUNTER — HOME CARE VISIT (OUTPATIENT)
Dept: HOME HEALTH SERVICES | Facility: HOME HEALTH | Age: 75
End: 2025-02-07
Payer: MEDICARE

## 2025-02-07 VITALS — DIASTOLIC BLOOD PRESSURE: 80 MMHG | SYSTOLIC BLOOD PRESSURE: 132 MMHG | TEMPERATURE: 97.3 F | HEART RATE: 72 BPM

## 2025-02-07 PROCEDURE — G0151 HHCP-SERV OF PT,EA 15 MIN: HCPCS

## 2025-02-07 ASSESSMENT — ENCOUNTER SYMPTOMS
PERSON REPORTING PAIN: PATIENT
PAIN LOCATION - PAIN SEVERITY: 5/10
LOWEST PAIN SEVERITY IN PAST 24 HOURS: 3/10
PAIN: 1
PAIN LOCATION: COCCYX
HIGHEST PAIN SEVERITY IN PAST 24 HOURS: 6/10

## 2025-02-11 ENCOUNTER — HOME CARE VISIT (OUTPATIENT)
Dept: HOME HEALTH SERVICES | Facility: HOME HEALTH | Age: 75
End: 2025-02-11
Payer: MEDICARE

## 2025-02-11 PROCEDURE — G0152 HHCP-SERV OF OT,EA 15 MIN: HCPCS

## 2025-02-11 ASSESSMENT — ENCOUNTER SYMPTOMS
SUBJECTIVE PAIN PROGRESSION: UNCHANGED
LOWEST PAIN SEVERITY IN PAST 24 HOURS: 8/10
HIGHEST PAIN SEVERITY IN PAST 24 HOURS: 8/10
PAIN SEVERITY GOAL: 5/10
PAIN: 1
PAIN LOCATION: GENERALIZED
PERSON REPORTING PAIN: PATIENT

## 2025-02-11 ASSESSMENT — ACTIVITIES OF DAILY LIVING (ADL): ENTERING_EXITING_HOME: MODERATE ASSIST

## 2025-02-12 ASSESSMENT — ACTIVITIES OF DAILY LIVING (ADL): OASIS_M1830: 03

## 2025-02-13 ENCOUNTER — HOME CARE VISIT (OUTPATIENT)
Dept: HOME HEALTH SERVICES | Facility: HOME HEALTH | Age: 75
End: 2025-02-13
Payer: MEDICARE

## 2025-02-13 PROCEDURE — G0156 HHCP-SVS OF AIDE,EA 15 MIN: HCPCS

## 2025-02-14 ENCOUNTER — HOME CARE VISIT (OUTPATIENT)
Dept: HOME HEALTH SERVICES | Facility: HOME HEALTH | Age: 75
End: 2025-02-14
Payer: MEDICARE

## 2025-02-18 ENCOUNTER — HOME CARE VISIT (OUTPATIENT)
Dept: HOME HEALTH SERVICES | Facility: HOME HEALTH | Age: 75
End: 2025-02-18
Payer: MEDICARE

## 2025-02-18 LAB
ALBUMIN SERPL-MCNC: 3.2 G/DL (ref 3.6–5.1)
ALP SERPL-CCNC: 65 U/L (ref 35–144)
ALT SERPL-CCNC: 12 U/L (ref 9–46)
ANION GAP SERPL CALCULATED.4IONS-SCNC: 6 MMOL/L (CALC) (ref 7–17)
AST SERPL-CCNC: 17 U/L (ref 10–35)
BASOPHILS # BLD AUTO: 109 CELLS/UL (ref 0–200)
BASOPHILS NFR BLD AUTO: 1.6 %
BILIRUB SERPL-MCNC: 0.5 MG/DL (ref 0.2–1.2)
BUN SERPL-MCNC: 27 MG/DL (ref 7–25)
CALCIUM SERPL-MCNC: 8.5 MG/DL (ref 8.6–10.3)
CHLORIDE SERPL-SCNC: 105 MMOL/L (ref 98–110)
CK SERPL-CCNC: 138 U/L (ref 19–278)
CO2 SERPL-SCNC: 29 MMOL/L (ref 20–32)
CREAT SERPL-MCNC: 1.72 MG/DL (ref 0.7–1.28)
EGFRCR SERPLBLD CKD-EPI 2021: 41 ML/MIN/1.73M2
EOSINOPHIL # BLD AUTO: 279 CELLS/UL (ref 15–500)
EOSINOPHIL NFR BLD AUTO: 4.1 %
ERYTHROCYTE [DISTWIDTH] IN BLOOD BY AUTOMATED COUNT: 13.6 % (ref 11–15)
GLUCOSE SERPL-MCNC: 162 MG/DL (ref 65–99)
HCT VFR BLD AUTO: 34.8 % (ref 38.5–50)
HGB BLD-MCNC: 11.1 G/DL (ref 13.2–17.1)
LYMPHOCYTES # BLD AUTO: 1142 CELLS/UL (ref 850–3900)
LYMPHOCYTES NFR BLD AUTO: 16.8 %
MCH RBC QN AUTO: 29 PG (ref 27–33)
MCHC RBC AUTO-ENTMCNC: 31.9 G/DL (ref 32–36)
MCV RBC AUTO: 90.9 FL (ref 80–100)
MONOCYTES # BLD AUTO: 524 CELLS/UL (ref 200–950)
MONOCYTES NFR BLD AUTO: 7.7 %
NEUTROPHILS # BLD AUTO: 4746 CELLS/UL (ref 1500–7800)
NEUTROPHILS NFR BLD AUTO: 69.8 %
PLATELET # BLD AUTO: 208 THOUSAND/UL (ref 140–400)
PMV BLD REES-ECKER: 10.3 FL (ref 7.5–12.5)
POTASSIUM SERPL-SCNC: 5 MMOL/L (ref 3.5–5.3)
PROT SERPL-MCNC: 5.8 G/DL (ref 6.1–8.1)
RBC # BLD AUTO: 3.83 MILLION/UL (ref 4.2–5.8)
SODIUM SERPL-SCNC: 140 MMOL/L (ref 135–146)
WBC # BLD AUTO: 6.8 THOUSAND/UL (ref 3.8–10.8)

## 2025-02-18 PROCEDURE — G0156 HHCP-SVS OF AIDE,EA 15 MIN: HCPCS

## 2025-02-20 ENCOUNTER — HOME CARE VISIT (OUTPATIENT)
Dept: HOME HEALTH SERVICES | Facility: HOME HEALTH | Age: 75
End: 2025-02-20
Payer: MEDICARE

## 2025-02-20 PROCEDURE — G0158 HHC OT ASSISTANT EA 15: HCPCS | Mod: CO

## 2025-02-21 ENCOUNTER — HOME CARE VISIT (OUTPATIENT)
Dept: HOME HEALTH SERVICES | Facility: HOME HEALTH | Age: 75
End: 2025-02-21
Payer: MEDICARE

## 2025-02-21 VITALS
DIASTOLIC BLOOD PRESSURE: 88 MMHG | OXYGEN SATURATION: 97 % | RESPIRATION RATE: 17 BRPM | SYSTOLIC BLOOD PRESSURE: 145 MMHG | HEART RATE: 77 BPM

## 2025-02-21 PROCEDURE — G0156 HHCP-SVS OF AIDE,EA 15 MIN: HCPCS

## 2025-02-21 ASSESSMENT — ACTIVITIES OF DAILY LIVING (ADL)
AMBULATION ASSISTANCE: 1
PHYSICAL TRANSFERS ASSESSED: 1
CURRENT_FUNCTION: STAND BY ASSIST
TOILETING: 1
AMBULATION ASSISTANCE: SUPERVISION
TOILETING: SUPERVISION
CURRENT_FUNCTION: SUPERVISION
TOILETING: STAND BY ASSIST
AMBULATION ASSISTANCE: STAND BY ASSIST

## 2025-02-21 ASSESSMENT — ENCOUNTER SYMPTOMS
PAIN: 1
SUBJECTIVE PAIN PROGRESSION: WAXING AND WANING
PAIN LOCATION: BACK
PAIN SEVERITY GOAL: 0/10
HIGHEST PAIN SEVERITY IN PAST 24 HOURS: 6/10
LOWEST PAIN SEVERITY IN PAST 24 HOURS: 3/10

## 2025-02-24 ENCOUNTER — HOME CARE VISIT (OUTPATIENT)
Dept: HOME HEALTH SERVICES | Facility: HOME HEALTH | Age: 75
End: 2025-02-24
Payer: MEDICARE

## 2025-02-24 PROCEDURE — G0158 HHC OT ASSISTANT EA 15: HCPCS | Mod: CO

## 2025-02-24 PROCEDURE — G0156 HHCP-SVS OF AIDE,EA 15 MIN: HCPCS

## 2025-02-25 ENCOUNTER — PATIENT OUTREACH (OUTPATIENT)
Dept: PRIMARY CARE | Facility: CLINIC | Age: 75
End: 2025-02-25
Payer: MEDICARE

## 2025-02-25 VITALS
TEMPERATURE: 98.1 F | OXYGEN SATURATION: 99 % | RESPIRATION RATE: 16 BRPM | DIASTOLIC BLOOD PRESSURE: 88 MMHG | HEART RATE: 76 BPM | SYSTOLIC BLOOD PRESSURE: 148 MMHG

## 2025-02-25 ASSESSMENT — ACTIVITIES OF DAILY LIVING (ADL)
BATHING_CURRENT_FUNCTION: MINIMUM ASSIST
AMBULATION ASSISTANCE: STAND BY ASSIST
BATHING ASSESSED: 1
AMBULATION ASSISTANCE: 1
TOILETING: 1
CURRENT_FUNCTION: STAND BY ASSIST
DRESSING_LB_CURRENT_FUNCTION: MINIMUM ASSIST
DRESSING_UB_CURRENT_FUNCTION: INDEPENDENT
TOILETING: MINIMUM ASSIST
PHYSICAL TRANSFERS ASSESSED: 1

## 2025-02-25 ASSESSMENT — ENCOUNTER SYMPTOMS
PAIN LOCATION: BACK
PAIN SEVERITY GOAL: 0/10
SUBJECTIVE PAIN PROGRESSION: WAXING AND WANING
LOWEST PAIN SEVERITY IN PAST 24 HOURS: 2/10
PAIN: 1
HIGHEST PAIN SEVERITY IN PAST 24 HOURS: 7/10

## 2025-02-26 ENCOUNTER — HOME CARE VISIT (OUTPATIENT)
Dept: HOME HEALTH SERVICES | Facility: HOME HEALTH | Age: 75
End: 2025-02-26
Payer: MEDICARE

## 2025-02-26 VITALS
DIASTOLIC BLOOD PRESSURE: 80 MMHG | RESPIRATION RATE: 17 BRPM | OXYGEN SATURATION: 98 % | HEART RATE: 77 BPM | SYSTOLIC BLOOD PRESSURE: 140 MMHG

## 2025-02-26 PROCEDURE — G0158 HHC OT ASSISTANT EA 15: HCPCS | Mod: CO

## 2025-02-26 ASSESSMENT — ENCOUNTER SYMPTOMS
HIGHEST PAIN SEVERITY IN PAST 24 HOURS: 6/10
PAIN: 1
PAIN SEVERITY GOAL: 0/10
SUBJECTIVE PAIN PROGRESSION: WAXING AND WANING
PAIN LOCATION: BACK
LOWEST PAIN SEVERITY IN PAST 24 HOURS: 3/10

## 2025-02-27 ENCOUNTER — HOME CARE VISIT (OUTPATIENT)
Dept: HOME HEALTH SERVICES | Facility: HOME HEALTH | Age: 75
End: 2025-02-27
Payer: MEDICARE

## 2025-02-27 PROCEDURE — G0156 HHCP-SVS OF AIDE,EA 15 MIN: HCPCS

## 2025-02-27 ASSESSMENT — ACTIVITIES OF DAILY LIVING (ADL)
DRESSING_LB_CURRENT_FUNCTION: MINIMUM ASSIST
BATHING_CURRENT_FUNCTION: MINIMUM ASSIST
BATHING ASSESSED: 1
TOILETING: MINIMUM ASSIST
TOILETING: 1

## 2025-02-28 ENCOUNTER — OFFICE VISIT (OUTPATIENT)
Dept: PRIMARY CARE | Facility: CLINIC | Age: 75
End: 2025-02-28
Payer: MEDICARE

## 2025-02-28 VITALS
OXYGEN SATURATION: 98 % | SYSTOLIC BLOOD PRESSURE: 124 MMHG | DIASTOLIC BLOOD PRESSURE: 68 MMHG | HEART RATE: 83 BPM | RESPIRATION RATE: 16 BRPM | HEIGHT: 76 IN | BODY MASS INDEX: 29.1 KG/M2 | WEIGHT: 239 LBS

## 2025-02-28 DIAGNOSIS — E11.42 DIABETIC POLYNEUROPATHY ASSOCIATED WITH TYPE 2 DIABETES MELLITUS (MULTI): ICD-10-CM

## 2025-02-28 DIAGNOSIS — Z12.5 SCREENING FOR PROSTATE CANCER: ICD-10-CM

## 2025-02-28 DIAGNOSIS — I48.11 LONGSTANDING PERSISTENT ATRIAL FIBRILLATION (MULTI): ICD-10-CM

## 2025-02-28 DIAGNOSIS — I10 PRIMARY HYPERTENSION: ICD-10-CM

## 2025-02-28 DIAGNOSIS — N18.32 STAGE 3B CHRONIC KIDNEY DISEASE (MULTI): ICD-10-CM

## 2025-02-28 DIAGNOSIS — Z00.00 ROUTINE GENERAL MEDICAL EXAMINATION AT A HEALTH CARE FACILITY: Primary | ICD-10-CM

## 2025-02-28 DIAGNOSIS — G47.33 OBSTRUCTIVE SLEEP APNEA SYNDROME: ICD-10-CM

## 2025-02-28 DIAGNOSIS — N40.1 BENIGN PROSTATIC HYPERPLASIA WITH URINARY FREQUENCY: ICD-10-CM

## 2025-02-28 DIAGNOSIS — R35.0 BENIGN PROSTATIC HYPERPLASIA WITH URINARY FREQUENCY: ICD-10-CM

## 2025-02-28 DIAGNOSIS — R19.5 DARK STOOLS: ICD-10-CM

## 2025-02-28 PROCEDURE — 3078F DIAST BP <80 MM HG: CPT | Performed by: INTERNAL MEDICINE

## 2025-02-28 PROCEDURE — 99497 ADVNCD CARE PLAN 30 MIN: CPT | Mod: 33,25 | Performed by: INTERNAL MEDICINE

## 2025-02-28 PROCEDURE — 99397 PER PM REEVAL EST PAT 65+ YR: CPT | Mod: CSA | Performed by: INTERNAL MEDICINE

## 2025-02-28 PROCEDURE — 3008F BODY MASS INDEX DOCD: CPT | Performed by: INTERNAL MEDICINE

## 2025-02-28 PROCEDURE — 1159F MED LIST DOCD IN RCRD: CPT | Performed by: INTERNAL MEDICINE

## 2025-02-28 PROCEDURE — 99214 OFFICE O/P EST MOD 30 MIN: CPT | Mod: 25 | Performed by: INTERNAL MEDICINE

## 2025-02-28 PROCEDURE — G0439 PPPS, SUBSEQ VISIT: HCPCS | Performed by: INTERNAL MEDICINE

## 2025-02-28 PROCEDURE — 99397 PER PM REEVAL EST PAT 65+ YR: CPT | Performed by: INTERNAL MEDICINE

## 2025-02-28 PROCEDURE — 1157F ADVNC CARE PLAN IN RCRD: CPT | Performed by: INTERNAL MEDICINE

## 2025-02-28 PROCEDURE — 99214 OFFICE O/P EST MOD 30 MIN: CPT | Performed by: INTERNAL MEDICINE

## 2025-02-28 PROCEDURE — G0446 INTENS BEHAVE THER CARDIO DX: HCPCS | Performed by: INTERNAL MEDICINE

## 2025-02-28 PROCEDURE — 1125F AMNT PAIN NOTED PAIN PRSNT: CPT | Performed by: INTERNAL MEDICINE

## 2025-02-28 PROCEDURE — 99497 ADVNCD CARE PLAN 30 MIN: CPT | Performed by: INTERNAL MEDICINE

## 2025-02-28 PROCEDURE — 99215 OFFICE O/P EST HI 40 MIN: CPT | Performed by: INTERNAL MEDICINE

## 2025-02-28 PROCEDURE — 3074F SYST BP LT 130 MM HG: CPT | Performed by: INTERNAL MEDICINE

## 2025-02-28 RX ORDER — WARFARIN SODIUM 5 MG/1
5 TABLET ORAL EVERY EVENING
Qty: 30 TABLET | Refills: 11 | Status: SHIPPED | OUTPATIENT
Start: 2025-02-28

## 2025-02-28 ASSESSMENT — ENCOUNTER SYMPTOMS
CONSTIPATION: 0
LOSS OF SENSATION IN FEET: 0
DIZZINESS: 0
DEPRESSION: 0
COUGH: 1
ARTHRALGIAS: 1
SHORTNESS OF BREATH: 0
SINUS PRESSURE: 1
ROS GI COMMENTS: DARK COLORED STOOL
PALPITATIONS: 0
DIARRHEA: 0
BACK PAIN: 1
ABDOMINAL PAIN: 0
NAUSEA: 0
OCCASIONAL FEELINGS OF UNSTEADINESS: 1
NUMBNESS: 1

## 2025-02-28 ASSESSMENT — PAIN SCALES - GENERAL: PAINLEVEL_OUTOF10: 5

## 2025-02-28 NOTE — PATIENT INSTRUCTIONS
Start taking coumadin after radiofrequency ablation if Dr. Ayala says that it's ok to take it. We will be arranging for a home INR machine.

## 2025-02-28 NOTE — PROGRESS NOTES
"Subjective   Patient ID: Rachid Yun is a 74 y.o. male who presents for Medicare wellness visit and Follow-up and Stool Color Change.    Patient has been experiencing a lot of shoulder and back pain. Also, has noticed that he is having dark colored stool for over a month. Takes an iron supplement. Is still getting numbness in the fingers of his R hand. Hasn't had any recent falls. Walks with a walker. Receives home care. Is going to be getting a radiofrequency ablation for his back.     Diagnostics Reviewed:  Labs Reviewed: 2/17/2025 Blood Work: hemoglobin 11.1 and RBC 3.83.          Review of Systems   HENT:  Positive for sinus pressure.    Respiratory:  Positive for cough. Negative for shortness of breath.    Cardiovascular:  Negative for chest pain and palpitations.   Gastrointestinal:  Negative for abdominal pain, constipation, diarrhea and nausea.        Dark colored stool   Musculoskeletal:  Positive for arthralgias (shoulders) and back pain.   Neurological:  Positive for numbness (R hand). Negative for dizziness.     Objective   /68   Pulse 83   Resp 16   Ht 1.93 m (6' 4\")   Wt 108 kg (239 lb)   SpO2 98%   BMI 29.09 kg/m²     Physical Exam  Cardiovascular:      Rate and Rhythm: Regular rhythm. Tachycardia present.      Heart sounds: Normal heart sounds.   Pulmonary:      Breath sounds: Normal breath sounds.   Abdominal:      Palpations: Abdomen is soft. There is no hepatomegaly.      Tenderness: There is no abdominal tenderness.   Musculoskeletal:      Right lower leg: No edema.      Left lower leg: No edema.      Comments: Reduced ROM and tenderness of LS spine    Neurological:      Mental Status: He is alert and oriented to person, place, and time.      Gait: Gait normal.   Psychiatric:         Mood and Affect: Mood normal.         Behavior: Behavior normal.       Assessment/Plan   Diagnoses and all orders for this visit:  Routine general medical examination at a health care facility  Dark " stools  -     Iron and TIBC; Future  -     Ferritin; Future  -     CBC and Auto Differential; Future  -     Hemoglobin A1C; Future  -     Lipid Panel; Future  -     Prostate Specific Antigen, Screen; Future  Diabetic polyneuropathy associated with type 2 diabetes mellitus (Multi)  -     Iron and TIBC; Future  -     Ferritin; Future  -     CBC and Auto Differential; Future  -     Hemoglobin A1C; Future  -     Lipid Panel; Future  -     Prostate Specific Antigen, Screen; Future  Longstanding persistent atrial fibrillation (Multi)  -     Iron and TIBC; Future  -     Ferritin; Future  -     CBC and Auto Differential; Future  -     warfarin (Coumadin) 5 mg tablet; Take 1 tablet (5 mg) by mouth once daily in the evening. Take as directed per After Visit Summary.  -     Hemoglobin A1C; Future  -     Lipid Panel; Future  -     Prostate Specific Antigen, Screen; Future  Obstructive sleep apnea syndrome  -     Iron and TIBC; Future  -     Ferritin; Future  -     CBC and Auto Differential; Future  -     Hemoglobin A1C; Future  -     Lipid Panel; Future  -     Prostate Specific Antigen, Screen; Future  Primary hypertension  -     Iron and TIBC; Future  -     Ferritin; Future  -     CBC and Auto Differential; Future  -     Hemoglobin A1C; Future  -     Lipid Panel; Future  -     Prostate Specific Antigen, Screen; Future  Stage 3b chronic kidney disease (Multi)  -     Iron and TIBC; Future  -     Ferritin; Future  -     CBC and Auto Differential; Future  -     Hemoglobin A1C; Future  -     Lipid Panel; Future  -     Prostate Specific Antigen, Screen; Future  Benign prostatic hyperplasia with urinary frequency  -     Hemoglobin A1C; Future  -     Lipid Panel; Future  -     Prostate Specific Antigen, Screen; Future  Screening for prostate cancer  -     Hemoglobin A1C; Future  -     Lipid Panel; Future  -     Prostate Specific Antigen, Screen; Future  Advance Care Planning Note     Discussion Date: 02/28/25   Discussion  Participants: Patient  Advance Care Planning was discussed today and the following is a brief summary of our discussion.   Patient has capacity to make their own medical decisions: Yes  Health Care Agent/Surrogate Decision Maker documented in chart: Yes,   (See Problem List: Advance Directive Discussion for details)    Documents on file and valid:  Advance Directive/Living Will: Yes   Health Care Power of : Yes      Communication of Medical Status/Prognosis, Treatment Goals/Options, Treatment Decisions: Yes  Goals of Care: survival is prioritized, if goals for quality or survival can reasonably be achieved   Follow Up Plan: Annual Discussion  Time Statement: Total face to face time spent counseling, including the explanation on advance care planning, was 16 minutes.  Cardiovascular risk was discussed and lifestyle modifications recommended, including nutritional choices, regular exercise, and elimination of risky habits.  Discussion time 15 minutes   Izabel Marshall MD     Scribe Attestation  By signing my name below, IRashad, Scribchanning   attest that this documentation has been prepared under the direction and in the presence of Izabel Marshall MD.

## 2025-03-03 ENCOUNTER — HOME CARE VISIT (OUTPATIENT)
Dept: HOME HEALTH SERVICES | Facility: HOME HEALTH | Age: 75
End: 2025-03-03
Payer: MEDICARE

## 2025-03-03 PROCEDURE — G0156 HHCP-SVS OF AIDE,EA 15 MIN: HCPCS

## 2025-03-03 PROCEDURE — G0158 HHC OT ASSISTANT EA 15: HCPCS | Mod: CO

## 2025-03-04 ENCOUNTER — HOSPITAL ENCOUNTER (OUTPATIENT)
Dept: OPERATING ROOM | Facility: HOSPITAL | Age: 75
Discharge: HOME | End: 2025-03-04
Payer: MEDICARE

## 2025-03-04 VITALS
SYSTOLIC BLOOD PRESSURE: 148 MMHG | TEMPERATURE: 98.4 F | HEART RATE: 88 BPM | OXYGEN SATURATION: 98 % | DIASTOLIC BLOOD PRESSURE: 80 MMHG

## 2025-03-04 VITALS
HEART RATE: 73 BPM | RESPIRATION RATE: 18 BRPM | SYSTOLIC BLOOD PRESSURE: 129 MMHG | BODY MASS INDEX: 28.98 KG/M2 | HEIGHT: 76 IN | TEMPERATURE: 96.6 F | DIASTOLIC BLOOD PRESSURE: 48 MMHG | OXYGEN SATURATION: 99 % | WEIGHT: 238 LBS

## 2025-03-04 DIAGNOSIS — M47.817 LUMBOSACRAL SPONDYLOSIS WITHOUT MYELOPATHY: ICD-10-CM

## 2025-03-04 LAB — GLUCOSE BLD MANUAL STRIP-MCNC: 146 MG/DL (ref 74–99)

## 2025-03-04 PROCEDURE — 3600000001 HC OR TIME - INITIAL BASE CHARGE - PROCEDURE LEVEL ONE

## 2025-03-04 PROCEDURE — 64635 DESTROY LUMB/SAC FACET JNT: CPT | Performed by: ANESTHESIOLOGY

## 2025-03-04 PROCEDURE — 7100000010 HC PHASE TWO TIME - EACH INCREMENTAL 1 MINUTE

## 2025-03-04 PROCEDURE — 3600000006 HC OR TIME - EACH INCREMENTAL 1 MINUTE - PROCEDURE LEVEL ONE

## 2025-03-04 PROCEDURE — 2500000004 HC RX 250 GENERAL PHARMACY W/ HCPCS (ALT 636 FOR OP/ED): Performed by: ANESTHESIOLOGY

## 2025-03-04 PROCEDURE — 64636 DESTROY L/S FACET JNT ADDL: CPT | Performed by: ANESTHESIOLOGY

## 2025-03-04 PROCEDURE — 82947 ASSAY GLUCOSE BLOOD QUANT: CPT

## 2025-03-04 PROCEDURE — 7100000009 HC PHASE TWO TIME - INITIAL BASE CHARGE

## 2025-03-04 RX ORDER — LIDOCAINE HYDROCHLORIDE 20 MG/ML
INJECTION, SOLUTION EPIDURAL; INFILTRATION; INTRACAUDAL; PERINEURAL AS NEEDED
Status: COMPLETED | OUTPATIENT
Start: 2025-03-04 | End: 2025-03-04

## 2025-03-04 RX ORDER — TRIAMCINOLONE ACETONIDE 40 MG/ML
INJECTION, SUSPENSION INTRA-ARTICULAR; INTRAMUSCULAR AS NEEDED
Status: COMPLETED | OUTPATIENT
Start: 2025-03-04 | End: 2025-03-04

## 2025-03-04 RX ADMIN — LIDOCAINE HYDROCHLORIDE 15 ML: 20 INJECTION, SOLUTION EPIDURAL; INFILTRATION; INTRACAUDAL; PERINEURAL at 12:40

## 2025-03-04 RX ADMIN — TRIAMCINOLONE ACETONIDE 40 MG: 40 INJECTION, SUSPENSION INTRA-ARTICULAR; INTRAMUSCULAR at 12:40

## 2025-03-04 ASSESSMENT — ACTIVITIES OF DAILY LIVING (ADL)
PHYSICAL TRANSFERS ASSESSED: 1
DRESSING_UB_CURRENT_FUNCTION: CONTACT GUARD ASSIST
AMBULATION ASSISTANCE: 1
TOILETING: 1
TOILETING: INDEPENDENT
CURRENT_FUNCTION: SUPERVISION
AMBULATION ASSISTANCE: SUPERVISION
DRESSING_UB_CURRENT_FUNCTION: MINIMUM ASSIST

## 2025-03-04 ASSESSMENT — PAIN SCALES - GENERAL
PAINLEVEL_OUTOF10: 6
PAINLEVEL_OUTOF10: 0 - NO PAIN

## 2025-03-04 ASSESSMENT — PAIN - FUNCTIONAL ASSESSMENT
PAIN_FUNCTIONAL_ASSESSMENT: 0-10
PAIN_FUNCTIONAL_ASSESSMENT: 0-10

## 2025-03-04 ASSESSMENT — PAIN DESCRIPTION - DESCRIPTORS: DESCRIPTORS: BURNING;SHARP

## 2025-03-04 NOTE — Clinical Note
Dressing applied to insertion sites. Patient tolerated procedure well. Post grounding pad site clear.

## 2025-03-04 NOTE — H&P
"History Of Present Illness  Rachid Yun is a 74 y.o. male presenting with low back pain.     Past Medical History  Past Medical History:   Diagnosis Date    Arthritis     Coronary artery disease     Diabetes mellitus (Multi)     Hypertension     Stroke (Multi)        Surgical History  Past Surgical History:   Procedure Laterality Date    BACK SURGERY      06/2024    CARDIAC CATHETERIZATION N/A 12/28/2023    Procedure: Left Heart Cath, With LV, Angriography And Grafts;  Surgeon: Phill Hare DO;  Location: [facility] Cardiac Cath Lab;  Service: Cardiovascular;  Laterality: N/A;    CARDIAC CATHETERIZATION N/A 12/28/2023    Procedure: PCI;  Surgeon: Phill Hare DO;  Location: [facility] Cardiac Cath Lab;  Service: Cardiovascular;  Laterality: N/A;    CARDIAC ELECTROPHYSIOLOGY PROCEDURE N/A 11/29/2023    Procedure: Cardioversion;  Surgeon: Phill Hare DO;  Location: [facility] Cardiac Cath Lab;  Service: Cardiovascular;  Laterality: N/A;    CORONARY ANGIOPLASTY WITH STENT PLACEMENT      CORONARY ANGIOPLASTY WITH STENT PLACEMENT      cardiac stent 12/2023    RADIOFREQUENCY ABLATION          Social History  He reports that he quit smoking about 37 years ago. His smoking use included cigarettes. He has been exposed to tobacco smoke. He has never used smokeless tobacco. He reports that he does not currently use alcohol. He reports current drug use. Drug: Marijuana.    Family History  Family History   Problem Relation Name Age of Onset    Stomach cancer Mother      Diabetes Mother      Coronary artery disease Father      Other (Pacemaker) Father      Other (S/p CABG) Sister      Other (S/p CABG) Brother      Ovarian cancer Sibling      Coronary artery disease Sibling          Allergies  Meperidine, Vancomycin, Hydromorphone, Other, and Rifampin    Review of Systems     Physical Exam     Last Recorded Vitals  Blood pressure 114/57, pulse 71, temperature 35.9 °C (96.6 °F), temperature source Temporal, resp. rate 17, height 1.93 m (6' 4\"), " weight 108 kg (238 lb), SpO2 99%.    Relevant Results        Radiofrequency ablation of the facet medial nerve branches bilaterally at the L4-5 and the L5-S1 levels     Assessment/Plan   Assessment & Plan  Lumbosacral spondylosis without myelopathy      Radiofrequency ablation of the facet medial nerve branches bilaterally at the L4-5 and the L5-S1 levels       I spent 10 minutes in the professional and overall care of this patient.      Genaro Ayala MD

## 2025-03-04 NOTE — DISCHARGE INSTRUCTIONS
You underwent a procedure today    After most procedures, it is recommended that you relax and limit your activity for the remainder of the day unless you have been told otherwise by your pain physician.  You should not drive a car, operate machinery, or make important legal decisions unless otherwise directed by your pain physician.  You may resume your normal activity, including exercise, tomorrow.      Keep a written pain diary of how much pain relief you experienced following the procedure and the length of time of pain relief you experienced pain relief. Following diagnostic injections like medial branch nerve blocks, sacroiliac joint blocks, stellate ganglion injections and other blocks, it is very important you record the specific amount of pain relief you experienced immediately after the injectionand how long it lasted. Your doctor will ask you for this information at your follow up visit.     For all injections, please keep the injection site dry and inspect the site for a couple of days. You may remove the Band-Aid the day of the injection at any time.     Some discomfort, bruising or slight swelling may occur at the injection site. This is not abnormal if it occurs.  If needed you may:    -Take over the counter medication such as Tylenol or Motrin.   -Apply an ice pack for 30 minutes, 2 to 3 times a day for the first 24 hours.     You may shower today; no soaking baths, hot tubs, whirlpools or swimming pools for two days.      If you are given steroids in your injection, it may take 3-5 days for the steroid medication to take effect. You may notice a worsening of your symptoms for 1-2 days after the injection. This is not abnormal.  You may use acetaminophen, ibuprofen, or prescription medication that your doctor may have prescribed for you if you need to do so.     A few common side effects of steroids include facial flushing, sweating, restlessness, irritability,difficulty sleeping, increase in blood  sugar, and increased blood pressure. If you have diabetes, please monitor your blood sugar at least once a day for at least 5 days. If you have poorly controlled high blood pressure, monitoryour blood pressure for at least 2 days and contact your primary care physician if these numbers are unusually high for you.      If you take aspirin or non-steroidal anti-inflammatory drugs (examples are Motrin, Advil, ibuprofen, Naprosyn, Voltaren, Relafen, etc.) you may restart these this evening.    You do not need to discontinue non-aspirin-containing pain medications prior to an injection (examples: Celebrex, tramadol, hydrocodone and acetaminophen).      If you take a blood thinning medication (Coumadin, Lovenox, Fragmin,Ticlid, Plavix, Pradaxa, etc.), please discuss this with your primary care physician/cardiologist and your pain physician. These medications MUST be discontinued before you can have an injection safely, without the risk of uncontrolled bleeding. If these medications are not discontinued for an appropriate period of time, you will not be able to receivean injection.      If you are taking Coumadin, please have your INR checked the morning of your procedure and bringthe result to your appointment unless otherwise instructed. If your INR is over 1.2, your injection may need to be rescheduled to avoid uncontrolled bleeding from the needle placement.     Call [facility] at [phone] between 8am-4pm Monday - Friday if you are experiencing the following:    If you received an epidural or spinal injection:    -Headache that doesnot go away with medicine, is worse when sitting or standing up, and is greatly relieved upon lying down.   -Severe pain worse than or different than your baseline pain.   -Chills or fever (101º F or greater).   -Drainage or signs of infection at the injection site     Go directly to the Emergency Department if you are experiencing the following and received an  epidural or spinal injection:   -Abrupt weakness or progressive weakness in your legs that starts after you leave the clinic.   -Abrupt severe or worsening numbness in your legs.   -Inability to urinate after the injection or loss of bowel or bladder control without the urge to defecate or urinate.     If you have a clinical question that cannot wait until your next appointment, please call [phone] between 8am-4pm Monday - Friday or send a [employer] message. We do our best to return all non-emergency messages within 24 hours, Monday - Friday. A nurse or physician will return your message.      If you need to cancel an appointment, please call the scheduling staff at [phone] during normal business hours or leave a message at least 24 hours in advance.     If you are going to be sedated for your next procedure, you MUST have responsible adult who can legally drive accompany you home. You cannot eat or drink for eight hours prior to the planned procedure if you are going to receive sedation. You may take your non-blood thinning medications with a small sip of water.

## 2025-03-06 ENCOUNTER — HOME CARE VISIT (OUTPATIENT)
Dept: HOME HEALTH SERVICES | Facility: HOME HEALTH | Age: 75
End: 2025-03-06
Payer: MEDICARE

## 2025-03-06 PROCEDURE — G0158 HHC OT ASSISTANT EA 15: HCPCS | Mod: CO

## 2025-03-06 PROCEDURE — G0156 HHCP-SVS OF AIDE,EA 15 MIN: HCPCS

## 2025-03-07 ENCOUNTER — OFFICE VISIT (OUTPATIENT)
Dept: PAIN MEDICINE | Facility: CLINIC | Age: 75
End: 2025-03-07
Payer: MEDICARE

## 2025-03-07 ENCOUNTER — PREP FOR PROCEDURE (OUTPATIENT)
Dept: PAIN MEDICINE | Facility: CLINIC | Age: 75
End: 2025-03-07

## 2025-03-07 ENCOUNTER — LAB (OUTPATIENT)
Dept: LAB | Facility: HOSPITAL | Age: 75
End: 2025-03-07
Payer: MEDICARE

## 2025-03-07 VITALS
SYSTOLIC BLOOD PRESSURE: 130 MMHG | BODY MASS INDEX: 28.98 KG/M2 | WEIGHT: 238 LBS | HEART RATE: 88 BPM | RESPIRATION RATE: 22 BRPM | DIASTOLIC BLOOD PRESSURE: 56 MMHG | HEIGHT: 76 IN | OXYGEN SATURATION: 98 %

## 2025-03-07 VITALS — SYSTOLIC BLOOD PRESSURE: 148 MMHG | DIASTOLIC BLOOD PRESSURE: 87 MMHG | RESPIRATION RATE: 17 BRPM | HEART RATE: 77 BPM

## 2025-03-07 DIAGNOSIS — I10 ESSENTIAL (PRIMARY) HYPERTENSION: ICD-10-CM

## 2025-03-07 DIAGNOSIS — G89.29 CHRONIC PAIN OF BOTH SHOULDERS: Primary | ICD-10-CM

## 2025-03-07 DIAGNOSIS — G89.29 CHRONIC PAIN OF BOTH SHOULDERS: ICD-10-CM

## 2025-03-07 DIAGNOSIS — M25.512 CHRONIC PAIN OF BOTH SHOULDERS: Primary | ICD-10-CM

## 2025-03-07 DIAGNOSIS — M25.511 CHRONIC PAIN OF BOTH SHOULDERS: Primary | ICD-10-CM

## 2025-03-07 DIAGNOSIS — N18.32 CHRONIC KIDNEY DISEASE, STAGE 3B (MULTI): ICD-10-CM

## 2025-03-07 DIAGNOSIS — I48.11 LONGSTANDING PERSISTENT ATRIAL FIBRILLATION (MULTI): ICD-10-CM

## 2025-03-07 DIAGNOSIS — E11.42 TYPE 2 DIABETES MELLITUS WITH DIABETIC POLYNEUROPATHY: Primary | ICD-10-CM

## 2025-03-07 DIAGNOSIS — M25.512 CHRONIC PAIN OF BOTH SHOULDERS: ICD-10-CM

## 2025-03-07 DIAGNOSIS — M25.511 CHRONIC PAIN OF BOTH SHOULDERS: ICD-10-CM

## 2025-03-07 DIAGNOSIS — G47.33 OBSTRUCTIVE SLEEP APNEA (ADULT) (PEDIATRIC): ICD-10-CM

## 2025-03-07 LAB
BASOPHILS # BLD AUTO: 0.09 X10*3/UL (ref 0–0.1)
BASOPHILS NFR BLD AUTO: 1.1 %
EOSINOPHIL # BLD AUTO: 0.12 X10*3/UL (ref 0–0.4)
EOSINOPHIL NFR BLD AUTO: 1.4 %
ERYTHROCYTE [DISTWIDTH] IN BLOOD BY AUTOMATED COUNT: 14.6 % (ref 11.5–14.5)
FERRITIN SERPL-MCNC: 146 NG/ML (ref 20–300)
HCT VFR BLD AUTO: 37.5 % (ref 41–52)
HGB BLD-MCNC: 11.9 G/DL (ref 13.5–17.5)
IMM GRANULOCYTES # BLD AUTO: 0.03 X10*3/UL (ref 0–0.5)
IMM GRANULOCYTES NFR BLD AUTO: 0.4 % (ref 0–0.9)
IRON SATN MFR SERPL: 33 % (ref 25–45)
IRON SERPL-MCNC: 97 UG/DL (ref 35–150)
LYMPHOCYTES # BLD AUTO: 1.08 X10*3/UL (ref 0.8–3)
LYMPHOCYTES NFR BLD AUTO: 13 %
MCH RBC QN AUTO: 29 PG (ref 26–34)
MCHC RBC AUTO-ENTMCNC: 31.7 G/DL (ref 32–36)
MCV RBC AUTO: 91 FL (ref 80–100)
MONOCYTES # BLD AUTO: 0.55 X10*3/UL (ref 0.05–0.8)
MONOCYTES NFR BLD AUTO: 6.6 %
NEUTROPHILS # BLD AUTO: 6.46 X10*3/UL (ref 1.6–5.5)
NEUTROPHILS NFR BLD AUTO: 77.5 %
NRBC BLD-RTO: 0 /100 WBCS (ref 0–0)
PLATELET # BLD AUTO: 201 X10*3/UL (ref 150–450)
RBC # BLD AUTO: 4.11 X10*6/UL (ref 4.5–5.9)
TIBC SERPL-MCNC: 297 UG/DL (ref 240–445)
UIBC SERPL-MCNC: 200 UG/DL (ref 110–370)
WBC # BLD AUTO: 8.3 X10*3/UL (ref 4.4–11.3)

## 2025-03-07 PROCEDURE — 85025 COMPLETE CBC W/AUTO DIFF WBC: CPT

## 2025-03-07 PROCEDURE — 82728 ASSAY OF FERRITIN: CPT

## 2025-03-07 PROCEDURE — 1157F ADVNC CARE PLAN IN RCRD: CPT | Performed by: ANESTHESIOLOGY

## 2025-03-07 PROCEDURE — 3008F BODY MASS INDEX DOCD: CPT | Performed by: ANESTHESIOLOGY

## 2025-03-07 PROCEDURE — 2500000004 HC RX 250 GENERAL PHARMACY W/ HCPCS (ALT 636 FOR OP/ED): Performed by: ANESTHESIOLOGY

## 2025-03-07 PROCEDURE — 83540 ASSAY OF IRON: CPT

## 2025-03-07 PROCEDURE — 20610 DRAIN/INJ JOINT/BURSA W/O US: CPT | Performed by: ANESTHESIOLOGY

## 2025-03-07 PROCEDURE — 1159F MED LIST DOCD IN RCRD: CPT | Performed by: ANESTHESIOLOGY

## 2025-03-07 PROCEDURE — 3078F DIAST BP <80 MM HG: CPT | Performed by: ANESTHESIOLOGY

## 2025-03-07 PROCEDURE — 96372 THER/PROPH/DIAG INJ SC/IM: CPT | Performed by: ANESTHESIOLOGY

## 2025-03-07 PROCEDURE — 3075F SYST BP GE 130 - 139MM HG: CPT | Performed by: ANESTHESIOLOGY

## 2025-03-07 PROCEDURE — 1036F TOBACCO NON-USER: CPT | Performed by: ANESTHESIOLOGY

## 2025-03-07 PROCEDURE — 83550 IRON BINDING TEST: CPT

## 2025-03-07 PROCEDURE — 20610 DRAIN/INJ JOINT/BURSA W/O US: CPT | Mod: 50 | Performed by: ANESTHESIOLOGY

## 2025-03-07 RX ORDER — LIDOCAINE HYDROCHLORIDE 10 MG/ML
4 INJECTION, SOLUTION EPIDURAL; INFILTRATION; INTRACAUDAL; PERINEURAL ONCE
Status: COMPLETED | OUTPATIENT
Start: 2025-03-07 | End: 2025-03-07

## 2025-03-07 RX ORDER — TRIAMCINOLONE ACETONIDE 40 MG/ML
80 INJECTION, SUSPENSION INTRA-ARTICULAR; INTRAMUSCULAR ONCE
Status: COMPLETED | OUTPATIENT
Start: 2025-03-07 | End: 2025-03-07

## 2025-03-07 RX ADMIN — TRIAMCINOLONE ACETONIDE 80 MG: 40 INJECTION, SUSPENSION INTRA-ARTICULAR; INTRAMUSCULAR at 10:59

## 2025-03-07 RX ADMIN — LIDOCAINE HYDROCHLORIDE 40 MG: 10 INJECTION, SOLUTION EPIDURAL; INFILTRATION; INTRACAUDAL; PERINEURAL at 10:59

## 2025-03-07 ASSESSMENT — ACTIVITIES OF DAILY LIVING (ADL)
TOILETING: 1
AMBULATION ASSISTANCE: STAND BY ASSIST
PHYSICAL TRANSFERS ASSESSED: 1
TOILETING: MINIMUM ASSIST
CURRENT_FUNCTION: STAND BY ASSIST
AMBULATION ASSISTANCE: 1

## 2025-03-07 ASSESSMENT — ENCOUNTER SYMPTOMS
PAIN LOCATION: BACK
SUBJECTIVE PAIN PROGRESSION: WAXING AND WANING
LOWEST PAIN SEVERITY IN PAST 24 HOURS: 0/10
PAIN SEVERITY GOAL: 0/10
PAIN: 1
HIGHEST PAIN SEVERITY IN PAST 24 HOURS: 5/10

## 2025-03-07 ASSESSMENT — PAIN DESCRIPTION - DESCRIPTORS: DESCRIPTORS: ACHING;SHARP

## 2025-03-07 ASSESSMENT — PAIN SCALES - GENERAL
PAINLEVEL_OUTOF10: 5 - MODERATE PAIN
PAINLEVEL_OUTOF10: 5

## 2025-03-07 ASSESSMENT — PATIENT HEALTH QUESTIONNAIRE - PHQ9
2. FEELING DOWN, DEPRESSED OR HOPELESS: NOT AT ALL
1. LITTLE INTEREST OR PLEASURE IN DOING THINGS: NOT AT ALL
SUM OF ALL RESPONSES TO PHQ9 QUESTIONS 1 & 2: 0

## 2025-03-07 ASSESSMENT — PAIN - FUNCTIONAL ASSESSMENT: PAIN_FUNCTIONAL_ASSESSMENT: 0-10

## 2025-03-07 NOTE — PROGRESS NOTES
The patient was placed in seated position.  Skin was prepped with isopropyl alcohol.  A 25-gauge spinal needle was used to inject 40 mg of triamcinolone and 2.5 mL of 1% lidocaine into the subacromial space of the right shoulder.  The procedure was repeated on the left side.  The needle was removed.  There were no complications.  The patient tolerated the procedure well.    Preprocedure VAS 8  Postprocedure VAS 2

## 2025-03-10 ENCOUNTER — HOME CARE VISIT (OUTPATIENT)
Dept: HOME HEALTH SERVICES | Facility: HOME HEALTH | Age: 75
End: 2025-03-10
Payer: MEDICARE

## 2025-03-10 ENCOUNTER — TELEPHONE (OUTPATIENT)
Dept: CARDIOLOGY | Facility: CLINIC | Age: 75
End: 2025-03-10

## 2025-03-10 VITALS
HEART RATE: 68 BPM | DIASTOLIC BLOOD PRESSURE: 75 MMHG | RESPIRATION RATE: 16 BRPM | TEMPERATURE: 98.5 F | OXYGEN SATURATION: 98 % | SYSTOLIC BLOOD PRESSURE: 140 MMHG

## 2025-03-10 PROCEDURE — G0158 HHC OT ASSISTANT EA 15: HCPCS | Mod: CO

## 2025-03-10 PROCEDURE — G0156 HHCP-SVS OF AIDE,EA 15 MIN: HCPCS

## 2025-03-10 ASSESSMENT — ACTIVITIES OF DAILY LIVING (ADL)
AMBULATION ASSISTANCE: SUPERVISION
CURRENT_FUNCTION: SUPERVISION
PHYSICAL TRANSFERS ASSESSED: 1
AMBULATION ASSISTANCE: 1

## 2025-03-10 ASSESSMENT — ENCOUNTER SYMPTOMS
LOWEST PAIN SEVERITY IN PAST 24 HOURS: 1/10
PAIN SEVERITY GOAL: 0/10
HIGHEST PAIN SEVERITY IN PAST 24 HOURS: 5/10
PAIN: 1
SUBJECTIVE PAIN PROGRESSION: WAXING AND WANING

## 2025-03-10 NOTE — TELEPHONE ENCOUNTER
Rachid is starting warfarin and just had labs done. Dr Marshall said to follow up with you before he starts the Rx. Can you just review his labs and clear him to start or do you need to see him tomorrow? Please advise

## 2025-03-11 ENCOUNTER — HOME CARE VISIT (OUTPATIENT)
Dept: HOME HEALTH SERVICES | Facility: HOME HEALTH | Age: 75
End: 2025-03-11
Payer: MEDICARE

## 2025-03-11 PROCEDURE — G0152 HHCP-SERV OF OT,EA 15 MIN: HCPCS

## 2025-03-11 ASSESSMENT — ENCOUNTER SYMPTOMS
PAIN LOCATION: LEFT SHOULDER
PERSON REPORTING PAIN: PATIENT
LOWEST PAIN SEVERITY IN PAST 24 HOURS: 6/10
PAIN SEVERITY GOAL: 1/10
PAIN: 1
PAIN LOCATION: BACK
PAIN LOCATION - PAIN SEVERITY: 6/10
SUBJECTIVE PAIN PROGRESSION: GRADUALLY IMPROVING
HIGHEST PAIN SEVERITY IN PAST 24 HOURS: 6/10
PAIN LOCATION - PAIN SEVERITY: 1/10

## 2025-03-13 ENCOUNTER — HOME CARE VISIT (OUTPATIENT)
Dept: HOME HEALTH SERVICES | Facility: HOME HEALTH | Age: 75
End: 2025-03-13
Payer: MEDICARE

## 2025-03-13 PROCEDURE — G0156 HHCP-SVS OF AIDE,EA 15 MIN: HCPCS

## 2025-03-17 ENCOUNTER — HOME CARE VISIT (OUTPATIENT)
Dept: HOME HEALTH SERVICES | Facility: HOME HEALTH | Age: 75
End: 2025-03-17
Payer: MEDICARE

## 2025-03-17 VITALS
HEART RATE: 76 BPM | TEMPERATURE: 97.7 F | DIASTOLIC BLOOD PRESSURE: 78 MMHG | SYSTOLIC BLOOD PRESSURE: 148 MMHG | OXYGEN SATURATION: 99 %

## 2025-03-17 PROCEDURE — G0158 HHC OT ASSISTANT EA 15: HCPCS | Mod: CO

## 2025-03-17 ASSESSMENT — ACTIVITIES OF DAILY LIVING (ADL)
AMBULATION ASSISTANCE: 1
TOILETING: STAND BY ASSIST
DRESSING_LB_CURRENT_FUNCTION: MINIMUM ASSIST
BATHING_CURRENT_FUNCTION: MINIMUM ASSIST
BATHING ASSESSED: 1
TOILETING: 1
AMBULATION ASSISTANCE: STAND BY ASSIST
PHYSICAL TRANSFERS ASSESSED: 1
DRESSING_UB_CURRENT_FUNCTION: MINIMUM ASSIST
BATHING_CURRENT_FUNCTION: ONE PERSON
CURRENT_FUNCTION: STAND BY ASSIST

## 2025-03-17 ASSESSMENT — ENCOUNTER SYMPTOMS
SUBJECTIVE PAIN PROGRESSION: WAXING AND WANING
HIGHEST PAIN SEVERITY IN PAST 24 HOURS: 5/10
PAIN LOCATION: LEFT ARM
PAIN: 1
LOWEST PAIN SEVERITY IN PAST 24 HOURS: 0/10
PAIN SEVERITY GOAL: 0/10

## 2025-03-19 ENCOUNTER — HOME CARE VISIT (OUTPATIENT)
Dept: HOME HEALTH SERVICES | Facility: HOME HEALTH | Age: 75
End: 2025-03-19
Payer: MEDICARE

## 2025-03-19 VITALS
TEMPERATURE: 97.7 F | HEART RATE: 71 BPM | OXYGEN SATURATION: 98 % | DIASTOLIC BLOOD PRESSURE: 75 MMHG | SYSTOLIC BLOOD PRESSURE: 145 MMHG

## 2025-03-19 PROCEDURE — G0158 HHC OT ASSISTANT EA 15: HCPCS | Mod: CO

## 2025-03-19 ASSESSMENT — ENCOUNTER SYMPTOMS
PAIN LOCATION: BACK
PAIN SEVERITY GOAL: 0/10
HIGHEST PAIN SEVERITY IN PAST 24 HOURS: 6/10
PAIN: 1
LOWEST PAIN SEVERITY IN PAST 24 HOURS: 2/10
SUBJECTIVE PAIN PROGRESSION: WAXING AND WANING

## 2025-03-19 ASSESSMENT — ACTIVITIES OF DAILY LIVING (ADL)
WASHING_UPB_CURRENT_FUNCTION: MODERATE ASSIST
BATHING_CURRENT_FUNCTION: ONE PERSON
AMBULATION ASSISTANCE: 1
GROOMING ASSESSED: 1
WASHING_BACK_CURRENT_FUNCTION: MAXIMUM ASSIST
WASHING_LB_CURRENT_FUNCTION: MAXIMUM ASSIST
WASHING_HAIR_CURRENT_FUNCTION: MAXIMUM ASSIST
AMBULATION ASSISTANCE: STAND BY ASSIST
PHYSICAL TRANSFERS ASSESSED: 1
BATHING_CURRENT_FUNCTION: MODERATE ASSIST
BATHING ASSESSED: 1
DRESSING_UB_CURRENT_FUNCTION: MINIMUM ASSIST

## 2025-03-20 ASSESSMENT — ACTIVITIES OF DAILY LIVING (ADL)
DRESSING_LB_CURRENT_FUNCTION: MINIMUM ASSIST
GROOMING_CURRENT_FUNCTION: MINIMUM ASSIST
CURRENT_FUNCTION: STAND BY ASSIST
CURRENT_FUNCTION: CONTACT GUARD ASSIST
DRESSING_LB_CURRENT_FUNCTION: MAXIMUM ASSIST

## 2025-03-21 ENCOUNTER — HOME CARE VISIT (OUTPATIENT)
Dept: HOME HEALTH SERVICES | Facility: HOME HEALTH | Age: 75
End: 2025-03-21
Payer: MEDICARE

## 2025-03-24 ENCOUNTER — HOME CARE VISIT (OUTPATIENT)
Dept: HOME HEALTH SERVICES | Facility: HOME HEALTH | Age: 75
End: 2025-03-24
Payer: MEDICARE

## 2025-03-24 PROCEDURE — G0156 HHCP-SVS OF AIDE,EA 15 MIN: HCPCS

## 2025-03-24 PROCEDURE — G0158 HHC OT ASSISTANT EA 15: HCPCS | Mod: CO

## 2025-03-25 ENCOUNTER — OFFICE VISIT (OUTPATIENT)
Dept: PRIMARY CARE | Facility: CLINIC | Age: 75
End: 2025-03-25
Payer: MEDICARE

## 2025-03-25 VITALS
OXYGEN SATURATION: 99 % | WEIGHT: 242.6 LBS | DIASTOLIC BLOOD PRESSURE: 82 MMHG | SYSTOLIC BLOOD PRESSURE: 130 MMHG | BODY MASS INDEX: 29.53 KG/M2 | HEART RATE: 63 BPM

## 2025-03-25 VITALS
HEART RATE: 88 BPM | RESPIRATION RATE: 16 BRPM | OXYGEN SATURATION: 97 % | DIASTOLIC BLOOD PRESSURE: 80 MMHG | SYSTOLIC BLOOD PRESSURE: 145 MMHG

## 2025-03-25 DIAGNOSIS — N18.32 STAGE 3B CHRONIC KIDNEY DISEASE (MULTI): ICD-10-CM

## 2025-03-25 DIAGNOSIS — L97.309 DIABETIC ULCER OF ANKLE (MULTI): ICD-10-CM

## 2025-03-25 DIAGNOSIS — I48.11 LONGSTANDING PERSISTENT ATRIAL FIBRILLATION (MULTI): Primary | ICD-10-CM

## 2025-03-25 DIAGNOSIS — Z79.4 TYPE 2 DIABETES MELLITUS WITHOUT COMPLICATION, WITH LONG-TERM CURRENT USE OF INSULIN: ICD-10-CM

## 2025-03-25 DIAGNOSIS — E11.9 TYPE 2 DIABETES MELLITUS WITHOUT COMPLICATION, WITH LONG-TERM CURRENT USE OF INSULIN: ICD-10-CM

## 2025-03-25 DIAGNOSIS — E11.622 DIABETIC ULCER OF ANKLE (MULTI): ICD-10-CM

## 2025-03-25 DIAGNOSIS — G40.909 NONINTRACTABLE EPILEPSY WITHOUT STATUS EPILEPTICUS, UNSPECIFIED EPILEPSY TYPE (MULTI): ICD-10-CM

## 2025-03-25 DIAGNOSIS — F03.918 DEMENTIA WITH BEHAVIORAL DISTURBANCE (MULTI): ICD-10-CM

## 2025-03-25 DIAGNOSIS — I11.9 BENIGN HYPERTENSIVE HEART DISEASE WITHOUT CONGESTIVE HEART FAILURE: ICD-10-CM

## 2025-03-25 PROCEDURE — 3075F SYST BP GE 130 - 139MM HG: CPT | Performed by: PHYSICIAN ASSISTANT

## 2025-03-25 PROCEDURE — 1159F MED LIST DOCD IN RCRD: CPT | Performed by: PHYSICIAN ASSISTANT

## 2025-03-25 PROCEDURE — 3079F DIAST BP 80-89 MM HG: CPT | Performed by: PHYSICIAN ASSISTANT

## 2025-03-25 PROCEDURE — 1157F ADVNC CARE PLAN IN RCRD: CPT | Performed by: PHYSICIAN ASSISTANT

## 2025-03-25 PROCEDURE — 1125F AMNT PAIN NOTED PAIN PRSNT: CPT | Performed by: PHYSICIAN ASSISTANT

## 2025-03-25 PROCEDURE — 99213 OFFICE O/P EST LOW 20 MIN: CPT | Performed by: PHYSICIAN ASSISTANT

## 2025-03-25 PROCEDURE — 1160F RVW MEDS BY RX/DR IN RCRD: CPT | Performed by: PHYSICIAN ASSISTANT

## 2025-03-25 PROCEDURE — 1036F TOBACCO NON-USER: CPT | Performed by: PHYSICIAN ASSISTANT

## 2025-03-25 RX ORDER — CEPHALEXIN 500 MG/1
500 CAPSULE ORAL 2 TIMES DAILY
Qty: 28 CAPSULE | Refills: 0 | Status: SHIPPED | OUTPATIENT
Start: 2025-03-25 | End: 2025-04-08

## 2025-03-25 RX ORDER — MUPIROCIN 20 MG/G
OINTMENT TOPICAL 3 TIMES DAILY
Qty: 30 G | Refills: 2 | Status: SHIPPED | OUTPATIENT
Start: 2025-03-25 | End: 2025-04-04

## 2025-03-25 RX ORDER — SULFAMETHOXAZOLE AND TRIMETHOPRIM 800; 160 MG/1; MG/1
1 TABLET ORAL 2 TIMES DAILY
Qty: 28 TABLET | Refills: 0 | Status: SHIPPED | OUTPATIENT
Start: 2025-03-25 | End: 2025-04-08

## 2025-03-25 ASSESSMENT — LIFESTYLE VARIABLES
HOW MANY STANDARD DRINKS CONTAINING ALCOHOL DO YOU HAVE ON A TYPICAL DAY: PATIENT DOES NOT DRINK
HOW OFTEN DO YOU HAVE A DRINK CONTAINING ALCOHOL: NEVER
SKIP TO QUESTIONS 9-10: 1
AUDIT-C TOTAL SCORE: 0
HOW OFTEN DO YOU HAVE SIX OR MORE DRINKS ON ONE OCCASION: NEVER

## 2025-03-25 ASSESSMENT — PAIN SCALES - GENERAL: PAINLEVEL_OUTOF10: 5

## 2025-03-25 ASSESSMENT — PATIENT HEALTH QUESTIONNAIRE - PHQ9: 1. LITTLE INTEREST OR PLEASURE IN DOING THINGS: NOT AT ALL

## 2025-03-25 ASSESSMENT — ENCOUNTER SYMPTOMS
DEPRESSION: 0
OCCASIONAL FEELINGS OF UNSTEADINESS: 0
LOSS OF SENSATION IN FEET: 0

## 2025-03-25 NOTE — PROGRESS NOTES
Subjective   Patient ID:   Rachid Yun is a 74 y.o. male who presents for Wound Check.  HPI  Dr. Marshall patient.  Here with acute symptoms:  Symptoms x 1 week.  Wound on heel of right foot.  History significant for A-fib, HTN, DM, CKD, epilepsy.  Had put betadine on it currently.  Has history of diabetic ulcers.  Was hospitalized for the other foot recently and was on IV antibiotics.  Covering it with guaze currently.    Review of Systems  12 point review of systems negative unless stated above in HPI    Vitals:    03/25/25 1507   BP: 130/82   Pulse: 63   SpO2: 99%     Physical Exam  General: Alert and oriented, well nourished, no acute distress.  Lungs: Clear to auscultation, non-labored respiration.  Heart: Normal rate, regular rhythm, no murmur, gallop or edema.  Neurologic: Awake, alert, and oriented X3, CN II-XII intact.  Psychiatric: Cooperative, appropriate mood and affect.  Skin: Slightly smaller than button sized, open wound on posterior right heel.    Assessment/Plan   It was good seeing you!  I have sent in Bactroban cream, Keflex, and Bactrim to treat this wound.  Hopefully this will help keep you out of the hospital.  If symptoms persist or worsen despite current plan of care, please contact your healthcare provider for further evaluation.  Patient instructed to contact the office if there are any questions regarding their care or treatment.   [facility] [phone]    Fu with Dr. Marshall  Diagnoses and all orders for this visit:  Longstanding persistent atrial fibrillation (Multi)  Benign hypertensive heart disease without congestive heart failure  Dementia with behavioral disturbance (Multi)  Stage 3b chronic kidney disease (Multi)  Type 2 diabetes mellitus without complication, with long-term current use of insulin (Multi)  Nonintractable epilepsy without status epilepticus, unspecified epilepsy type (Multi)  Diabetic ulcer of ankle (Multi)  -     mupirocin (Bactroban) 2 %  ointment; Apply topically 3 times a day for 10 days. apply to affected area  -     sulfamethoxazole-trimethoprim (Bactrim DS) 800-160 mg tablet; Take 1 tablet by mouth 2 times a day for 14 days.  -     cephalexin (Keflex) 500 mg capsule; Take 1 capsule (500 mg) by mouth 2 times a day for 14 days.

## 2025-03-27 ENCOUNTER — HOME CARE VISIT (OUTPATIENT)
Dept: HOME HEALTH SERVICES | Facility: HOME HEALTH | Age: 75
End: 2025-03-27
Payer: MEDICARE

## 2025-03-27 PROCEDURE — G0156 HHCP-SVS OF AIDE,EA 15 MIN: HCPCS

## 2025-03-28 ENCOUNTER — HOME CARE VISIT (OUTPATIENT)
Dept: HOME HEALTH SERVICES | Facility: HOME HEALTH | Age: 75
End: 2025-03-28
Payer: MEDICARE

## 2025-03-28 PROCEDURE — G0152 HHCP-SERV OF OT,EA 15 MIN: HCPCS

## 2025-03-28 ASSESSMENT — ACTIVITIES OF DAILY LIVING (ADL)
TOILETING: 1
GROOMING ASSESSED: 1
PHYSICAL TRANSFERS ASSESSED: 1
BATHING ASSESSED: 1
LAUNDRY ASSESSED: 1
AMBULATION ASSISTANCE: 1

## 2025-03-28 ASSESSMENT — ENCOUNTER SYMPTOMS
PAIN LOCATION: LEFT SHOULDER
PAIN LOCATION: RIGHT SHOULDER
PAIN LOCATION: RIGHT FOOT
PERSON REPORTING PAIN: PATIENT
PAIN LOCATION: BACK
PAIN: 1

## 2025-03-28 NOTE — CASE COMMUNICATION
"Diagnoses and all orders for this visit:   Diabetic polyneuropathy associated with type 2 diabetes mellitus (Multi)   - CBC and Auto Differential; Future   - Comprehensive Metabolic Panel; Future   - Power mobility device   Ulcer of left foot with fat layer exposed (Multi)   - Power mobility device   Longstanding persistent atrial fibrillation (Multi)   Obstructive sleep apnea syndrome   - In-Center Sleep Study (Non-Sleep Provider Only) ; Future   Primary hypertension   Stage 3b chronic kidney disease (Multi)   Atherosclerosis of native coronary artery of native heart without angina pectoris   Arthritis of left shoulder region   Lumbar spondylosis   - Referral to Pain Medicine; Future   - Power mobility device   Left foot drop      good morning, we are seeing Rachid for a power wheelchair. Above is an excerpt from a note from Dr Marshall 24. He is planning on making an  appt for a \"face to face for a power chair\" That prescription is .     Please call me with any questions or concerns  Thank you  Marisa Mccrary, OTR/L"

## 2025-03-31 ENCOUNTER — HOME CARE VISIT (OUTPATIENT)
Dept: HOME HEALTH SERVICES | Facility: HOME HEALTH | Age: 75
End: 2025-03-31
Payer: MEDICARE

## 2025-03-31 VITALS
OXYGEN SATURATION: 97 % | SYSTOLIC BLOOD PRESSURE: 148 MMHG | DIASTOLIC BLOOD PRESSURE: 72 MMHG | HEART RATE: 84 BPM | TEMPERATURE: 98 F | RESPIRATION RATE: 16 BRPM

## 2025-03-31 PROCEDURE — G0299 HHS/HOSPICE OF RN EA 15 MIN: HCPCS

## 2025-03-31 PROCEDURE — G0156 HHCP-SVS OF AIDE,EA 15 MIN: HCPCS

## 2025-03-31 ASSESSMENT — ENCOUNTER SYMPTOMS
BOWEL PATTERN NORMAL: 1
OCCASIONAL FEELINGS OF UNSTEADINESS: 1
PAIN SEVERITY GOAL: 0/10
PERSON REPORTING PAIN: PATIENT
PAIN LOCATION - PAIN SEVERITY: 7/10
TREMORS: 1
FORGETFULNESS: 1
LAST BOWEL MOVEMENT: 67295
DRY SKIN: 1
PAIN LOCATION - EXACERBATING FACTORS: CERTAIN MOVEMENTS
SHORTNESS OF BREATH: 1
FATIGUE: 1
AGITATION: 1
DEPRESSION: 0
TINGLING: 1
PAIN LOCATION - PAIN FREQUENCY: INTERMITTENT
CHANGE IN APPETITE: UNCHANGED
PAIN LOCATION - RELIEVING FACTORS: REST
LOWEST PAIN SEVERITY IN PAST 24 HOURS: 0/10
STOOL FREQUENCY: DAILY
PAIN LOCATION: LEFT SHOULDER
DESCRIPTION OF MEMORY LOSS: SHORT TERM
HEADACHES: 1
DYSPNEA ACTIVITY LEVEL: WHILE SPEAKING
APPETITE LEVEL: GOOD
PAIN LOCATION - PAIN DURATION: VARIES
PAIN: 1
SUBJECTIVE PAIN PROGRESSION: UNCHANGED
LOSS OF SENSATION IN FEET: 1
LOWER EXTREMITY EDEMA: 1
HIGHEST PAIN SEVERITY IN PAST 24 HOURS: 7/10

## 2025-03-31 ASSESSMENT — LIFESTYLE VARIABLES: FAMILY HISTORY ILLEGAL DRUGS: 1

## 2025-03-31 ASSESSMENT — ACTIVITIES OF DAILY LIVING (ADL)
AMBULATION ASSISTANCE: ONE PERSON
CURRENT_FUNCTION: ONE PERSON

## 2025-04-01 ENCOUNTER — HOME CARE VISIT (OUTPATIENT)
Dept: HOME HEALTH SERVICES | Facility: HOME HEALTH | Age: 75
End: 2025-04-01
Payer: MEDICARE

## 2025-04-01 VITALS
TEMPERATURE: 98.5 F | HEART RATE: 76 BPM | RESPIRATION RATE: 16 BRPM | DIASTOLIC BLOOD PRESSURE: 70 MMHG | SYSTOLIC BLOOD PRESSURE: 140 MMHG | OXYGEN SATURATION: 98 %

## 2025-04-01 PROCEDURE — G0158 HHC OT ASSISTANT EA 15: HCPCS | Mod: CO

## 2025-04-01 ASSESSMENT — ACTIVITIES OF DAILY LIVING (ADL)
PHYSICAL TRANSFERS ASSESSED: 1
CURRENT_FUNCTION: STAND BY ASSIST
AMBULATION ASSISTANCE: 1
AMBULATION ASSISTANCE: STAND BY ASSIST

## 2025-04-02 ENCOUNTER — OFFICE VISIT (OUTPATIENT)
Dept: PRIMARY CARE | Facility: CLINIC | Age: 75
End: 2025-04-02
Payer: MEDICARE

## 2025-04-02 VITALS
BODY MASS INDEX: 29.96 KG/M2 | WEIGHT: 246 LBS | DIASTOLIC BLOOD PRESSURE: 78 MMHG | SYSTOLIC BLOOD PRESSURE: 134 MMHG | HEART RATE: 85 BPM | HEIGHT: 76 IN | RESPIRATION RATE: 16 BRPM | OXYGEN SATURATION: 97 %

## 2025-04-02 DIAGNOSIS — M47.816 LUMBAR SPONDYLOSIS: ICD-10-CM

## 2025-04-02 DIAGNOSIS — E11.621 DIABETIC ULCER OF RIGHT HEEL ASSOCIATED WITH TYPE 2 DIABETES MELLITUS, LIMITED TO BREAKDOWN OF SKIN: Primary | ICD-10-CM

## 2025-04-02 DIAGNOSIS — R19.5 DARK STOOLS: ICD-10-CM

## 2025-04-02 DIAGNOSIS — L97.411 DIABETIC ULCER OF RIGHT HEEL ASSOCIATED WITH TYPE 2 DIABETES MELLITUS, LIMITED TO BREAKDOWN OF SKIN: Primary | ICD-10-CM

## 2025-04-02 DIAGNOSIS — N18.4 CHRONIC KIDNEY DISEASE, STAGE 4 (SEVERE) (MULTI): ICD-10-CM

## 2025-04-02 DIAGNOSIS — I48.11 LONGSTANDING PERSISTENT ATRIAL FIBRILLATION (MULTI): ICD-10-CM

## 2025-04-02 DIAGNOSIS — M19.012 ARTHRITIS OF LEFT SHOULDER REGION: ICD-10-CM

## 2025-04-02 DIAGNOSIS — Z12.5 SCREENING FOR PROSTATE CANCER: ICD-10-CM

## 2025-04-02 DIAGNOSIS — E11.51 TYPE 2 DIABETES MELLITUS WITH DIABETIC PERIPHERAL ANGIOPATHY WITHOUT GANGRENE, WITH LONG-TERM CURRENT USE OF INSULIN (MULTI): ICD-10-CM

## 2025-04-02 DIAGNOSIS — L97.526 NON-PRESSURE CHRONIC ULCER OF OTHER PART OF LEFT FOOT WITH BONE INVOLVEMENT WITHOUT EVIDENCE OF NECROSIS: ICD-10-CM

## 2025-04-02 DIAGNOSIS — M15.0 PRIMARY OSTEOARTHRITIS INVOLVING MULTIPLE JOINTS: ICD-10-CM

## 2025-04-02 DIAGNOSIS — E11.42 DIABETIC POLYNEUROPATHY ASSOCIATED WITH TYPE 2 DIABETES MELLITUS: ICD-10-CM

## 2025-04-02 DIAGNOSIS — N18.32 STAGE 3B CHRONIC KIDNEY DISEASE (MULTI): ICD-10-CM

## 2025-04-02 DIAGNOSIS — Z79.4 TYPE 2 DIABETES MELLITUS WITH DIABETIC PERIPHERAL ANGIOPATHY WITHOUT GANGRENE, WITH LONG-TERM CURRENT USE OF INSULIN (MULTI): ICD-10-CM

## 2025-04-02 DIAGNOSIS — M21.372 LEFT FOOT DROP: ICD-10-CM

## 2025-04-02 LAB — POC HEMOGLOBIN A1C: 7.5 % (ref 4.2–6.5)

## 2025-04-02 PROCEDURE — 99215 OFFICE O/P EST HI 40 MIN: CPT | Performed by: INTERNAL MEDICINE

## 2025-04-02 PROCEDURE — 99406 BEHAV CHNG SMOKING 3-10 MIN: CPT | Performed by: INTERNAL MEDICINE

## 2025-04-02 PROCEDURE — 83036 HEMOGLOBIN GLYCOSYLATED A1C: CPT | Mod: QW | Performed by: INTERNAL MEDICINE

## 2025-04-02 RX ORDER — LOSARTAN POTASSIUM 50 MG/1
50 TABLET ORAL DAILY
Qty: 90 TABLET | Refills: 3 | Status: SHIPPED | OUTPATIENT
Start: 2025-04-02 | End: 2026-04-02

## 2025-04-02 RX ORDER — DOXYCYCLINE 100 MG/1
100 CAPSULE ORAL 2 TIMES DAILY
Qty: 14 CAPSULE | Refills: 0 | Status: SHIPPED | OUTPATIENT
Start: 2025-04-02 | End: 2025-04-09

## 2025-04-02 RX ORDER — CLOPIDOGREL BISULFATE 75 MG/1
75 TABLET ORAL DAILY
COMMUNITY

## 2025-04-02 RX ORDER — CEPHALEXIN 500 MG/1
500 CAPSULE ORAL 3 TIMES DAILY
Qty: 21 CAPSULE | Refills: 0 | Status: SHIPPED | OUTPATIENT
Start: 2025-04-02 | End: 2025-04-09

## 2025-04-02 ASSESSMENT — ENCOUNTER SYMPTOMS
COUGH: 0
NAUSEA: 0
DIARRHEA: 0
WOUND: 1
ARTHRALGIAS: 0
ABDOMINAL PAIN: 0
PALPITATIONS: 0
OCCASIONAL FEELINGS OF UNSTEADINESS: 0
CONSTIPATION: 0
DIZZINESS: 0
SHORTNESS OF BREATH: 0
LOSS OF SENSATION IN FEET: 0
DEPRESSION: 0
ROS GI COMMENTS: DARK COLORED STOOL

## 2025-04-02 ASSESSMENT — PAIN SCALES - GENERAL: PAINLEVEL_OUTOF10: 5

## 2025-04-02 NOTE — PATIENT INSTRUCTIONS
Until INR (international normalized ratio) can be checked at home, can have the INR checked at the lab once a week.

## 2025-04-02 NOTE — PATIENT INSTRUCTIONS
Needs home PT, INR machine. Patient would benefit tremendously from a power wheelchair due to him having imbalance, poor coordination, recurrent falls, missing toes and neuropathy of legs. Is not safe enough to use a cane, walker or scooter due to legs/hands being weak and feeling imbalanced. Cannot use a manual wheelchair because he doesn't have the strength to self-propel. Therefore, needs a power wheel chair.

## 2025-04-02 NOTE — PROGRESS NOTES
"Subjective   Patient ID: Rachid Yun is a 74 y.o. male who presents for face to face visit note for power chair.    Patient has an ulcer on the right heel. Was in the office last week and was given two antibiotics and a topical. Has a similar issue that occurred last year or his other foot. Was seeing a podiatrist. Sees a nephrologist. Daughter reports that his injuries take a long time to heal. For some time, was taking eliquis but is no longer taking it due to insurance issues. Is trying to transition to coumadin. Lastly, is trying to get a motorized wheelchair. Patient would benefit tremendously from a power wheelchair due to him having imbalance, poor coordination, recurrent falls, missing toes and neuropathy of leg. Is not safe enough to use a cane or walker due to legs/hands being weak, numb from arthritis, limited mobility of fingers and feeling imbalanced. Due to these symptoms, cannot use a cane or walker . Cannot use a manual wheelchair because he doesn't have the strength to self-propel and has limited ROM both shoulders. Therefore, needs a power wheel chair. Is still having dark stool and is on an iron supplement.   Diagnostics Reviewed:   Labs Reviewed: 10/13/2024 Urinalysis: revealed proteinuria.        3/7/2025 Blood Work: hemoglobin 11.9, RBC 4.11 and glucose 146.          Review of Systems   Respiratory:  Negative for cough and shortness of breath.    Cardiovascular:  Negative for chest pain and palpitations.   Gastrointestinal:  Negative for abdominal pain, constipation, diarrhea and nausea.        Dark colored stool   Musculoskeletal:  Negative for arthralgias.   Skin:  Positive for wound (sore on back of R foot). Negative for rash.   Neurological:  Negative for dizziness.     Objective   /78   Pulse 85   Resp 16   Ht 1.93 m (6' 4\")   Wt 112 kg (246 lb)   SpO2 97%   BMI 29.94 kg/m²     Physical Exam  Cardiovascular:      Rate and Rhythm: Normal rate and regular rhythm.      Heart " sounds: Normal heart sounds.   Pulmonary:      Breath sounds: Normal breath sounds.   Abdominal:      Palpations: Abdomen is soft. There is no hepatomegaly.      Tenderness: There is no abdominal tenderness.   Musculoskeletal:      Right lower leg: No edema.      Left lower leg: No edema.      Comments: Reduced ROM both shoulders, hands with deformities of fingers, reduced touch sensation, limited ROM. Limited ROM of hips, knees, ankles   Feet:      Comments: Amputation of R foot digits 1,2 and 3.   Neurological:      Mental Status: He is alert and oriented to person, place, and time.      Sensory: Sensory deficit present.      Motor: Weakness present.      Coordination: Coordination abnormal.      Gait: Gait abnormal.      Deep Tendon Reflexes: Reflexes abnormal.      Comments: Left foot drop, hypoesthesia both hands and both feet   Psychiatric:         Mood and Affect: Mood normal.         Behavior: Behavior normal.       Assessment/Plan   Diagnoses and all orders for this visit:  Diabetic ulcer of right heel associated with type 2 diabetes mellitus, limited to breakdown of skin  -     losartan (Cozaar) 50 mg tablet; Take 1 tablet (50 mg) by mouth once daily.  -     Protime-INR; Future  -     POCT glycosylated hemoglobin (Hb A1C) manually resulted  -     Prostate Specific Antigen, Screen; Future  -     Lipid Panel; Future  -     Basic Metabolic Panel; Future  -     Referral to Wound Clinic; Future  -     CBC and Auto Differential; Future  -     doxycycline (Vibramycin) 100 mg capsule; Take 1 capsule (100 mg) by mouth 2 times a day for 7 days. Take with at least 8 ounces (large glass) of water, do not lie down for 30 minutes after  -     cephalexin (Keflex) 500 mg capsule; Take 1 capsule (500 mg) by mouth 3 times a day for 7 days.  -     Power mobility device  Longstanding persistent atrial fibrillation (Multi)  -     losartan (Cozaar) 50 mg tablet; Take 1 tablet (50 mg) by mouth once daily.  -     Protime-INR;  Future  -     POCT glycosylated hemoglobin (Hb A1C) manually resulted  -     Prostate Specific Antigen, Screen; Future  -     Lipid Panel; Future  -     Basic Metabolic Panel; Future  -     Referral to Wound Clinic; Future  -     CBC and Auto Differential; Future  -     doxycycline (Vibramycin) 100 mg capsule; Take 1 capsule (100 mg) by mouth 2 times a day for 7 days. Take with at least 8 ounces (large glass) of water, do not lie down for 30 minutes after  -     cephalexin (Keflex) 500 mg capsule; Take 1 capsule (500 mg) by mouth 3 times a day for 7 days.  Stage 3b chronic kidney disease (Multi)  -     losartan (Cozaar) 50 mg tablet; Take 1 tablet (50 mg) by mouth once daily.  -     Protime-INR; Future  -     POCT glycosylated hemoglobin (Hb A1C) manually resulted  -     Prostate Specific Antigen, Screen; Future  -     Lipid Panel; Future  -     Basic Metabolic Panel; Future  -     Referral to Wound Clinic; Future  -     CBC and Auto Differential; Future  -     doxycycline (Vibramycin) 100 mg capsule; Take 1 capsule (100 mg) by mouth 2 times a day for 7 days. Take with at least 8 ounces (large glass) of water, do not lie down for 30 minutes after  -     cephalexin (Keflex) 500 mg capsule; Take 1 capsule (500 mg) by mouth 3 times a day for 7 days.  Type 2 diabetes mellitus with diabetic peripheral angiopathy without gangrene, with long-term current use of insulin (Multi)  -     losartan (Cozaar) 50 mg tablet; Take 1 tablet (50 mg) by mouth once daily.  -     Protime-INR; Future  -     POCT glycosylated hemoglobin (Hb A1C) manually resulted  -     Prostate Specific Antigen, Screen; Future  -     Lipid Panel; Future  -     Basic Metabolic Panel; Future  -     Referral to Wound Clinic; Future  -     CBC and Auto Differential; Future  -     doxycycline (Vibramycin) 100 mg capsule; Take 1 capsule (100 mg) by mouth 2 times a day for 7 days. Take with at least 8 ounces (large glass) of water, do not lie down for 30 minutes  after  -     cephalexin (Keflex) 500 mg capsule; Take 1 capsule (500 mg) by mouth 3 times a day for 7 days.  -     Power mobility device  Screening for prostate cancer  -     losartan (Cozaar) 50 mg tablet; Take 1 tablet (50 mg) by mouth once daily.  -     Protime-INR; Future  -     POCT glycosylated hemoglobin (Hb A1C) manually resulted  -     Prostate Specific Antigen, Screen; Future  -     Lipid Panel; Future  -     Basic Metabolic Panel; Future  -     Referral to Wound Clinic; Future  -     CBC and Auto Differential; Future  -     doxycycline (Vibramycin) 100 mg capsule; Take 1 capsule (100 mg) by mouth 2 times a day for 7 days. Take with at least 8 ounces (large glass) of water, do not lie down for 30 minutes after  -     cephalexin (Keflex) 500 mg capsule; Take 1 capsule (500 mg) by mouth 3 times a day for 7 days.  Dark stools  -     Referral to Wound Clinic; Future  -     CBC and Auto Differential; Future  -     doxycycline (Vibramycin) 100 mg capsule; Take 1 capsule (100 mg) by mouth 2 times a day for 7 days. Take with at least 8 ounces (large glass) of water, do not lie down for 30 minutes after  -     cephalexin (Keflex) 500 mg capsule; Take 1 capsule (500 mg) by mouth 3 times a day for 7 days.  Primary osteoarthritis involving multiple joints  -     Power mobility device  Diabetic polyneuropathy associated with type 2 diabetes mellitus  -     Power mobility device  Arthritis of left shoulder region  -     Power mobility device  Lumbar spondylosis  -     Power mobility device  Left foot drop  -     Power mobility device      Scribe Attestation  By signing my name below, IRashad Scribe   attest that this documentation has been prepared under the direction and in the presence of Izabel Marshall MD.

## 2025-04-03 ENCOUNTER — HOME CARE VISIT (OUTPATIENT)
Dept: HOME HEALTH SERVICES | Facility: HOME HEALTH | Age: 75
End: 2025-04-03
Payer: MEDICARE

## 2025-04-03 VITALS
TEMPERATURE: 97.6 F | HEART RATE: 76 BPM | SYSTOLIC BLOOD PRESSURE: 138 MMHG | OXYGEN SATURATION: 99 % | RESPIRATION RATE: 16 BRPM | DIASTOLIC BLOOD PRESSURE: 75 MMHG

## 2025-04-03 PROBLEM — L97.526 NON-PRESSURE CHRONIC ULCER OF OTHER PART OF LEFT FOOT WITH BONE INVOLVEMENT WITHOUT EVIDENCE OF NECROSIS: Status: ACTIVE | Noted: 2025-04-03

## 2025-04-03 PROBLEM — N18.4 CHRONIC KIDNEY DISEASE, STAGE 4 (SEVERE) (MULTI): Status: ACTIVE | Noted: 2025-04-03

## 2025-04-03 PROCEDURE — G0156 HHCP-SVS OF AIDE,EA 15 MIN: HCPCS

## 2025-04-03 PROCEDURE — G0158 HHC OT ASSISTANT EA 15: HCPCS | Mod: CO

## 2025-04-05 ENCOUNTER — HOME CARE VISIT (OUTPATIENT)
Dept: HOME HEALTH SERVICES | Facility: HOME HEALTH | Age: 75
End: 2025-04-05
Payer: MEDICARE

## 2025-04-05 VITALS
TEMPERATURE: 97.1 F | SYSTOLIC BLOOD PRESSURE: 120 MMHG | DIASTOLIC BLOOD PRESSURE: 84 MMHG | HEART RATE: 68 BPM | OXYGEN SATURATION: 98 % | RESPIRATION RATE: 16 BRPM

## 2025-04-05 PROCEDURE — G0300 HHS/HOSPICE OF LPN EA 15 MIN: HCPCS

## 2025-04-05 ASSESSMENT — ENCOUNTER SYMPTOMS
PAIN LOCATION - PAIN QUALITY: STABBING
CHANGE IN APPETITE: UNCHANGED
FATIGUE: 1
PAIN: 1
PAIN LOCATION: LEFT SHOULDER
PAIN LOCATION - RELIEVING FACTORS: REST
PAIN SEVERITY GOAL: 0/10
APPETITE LEVEL: GOOD
PAIN LOCATION - PAIN FREQUENCY: CONSTANT
DESCRIPTION OF MEMORY LOSS: SHORT TERM
HIGHEST PAIN SEVERITY IN PAST 24 HOURS: 5/10
PAIN LOCATION - PAIN SEVERITY: 5/10
SUBJECTIVE PAIN PROGRESSION: UNCHANGED
PAIN LOCATION - PAIN DURATION: DAILY
LOWEST PAIN SEVERITY IN PAST 24 HOURS: 5/10
FORGETFULNESS: 1
PAIN LOCATION - EXACERBATING FACTORS: MOVEMENT
HEADACHES: 1

## 2025-04-05 ASSESSMENT — ACTIVITIES OF DAILY LIVING (ADL)
AMBULATION ASSISTANCE: STAND BY ASSIST
CURRENT_FUNCTION: STAND BY ASSIST

## 2025-04-07 ENCOUNTER — HOME CARE VISIT (OUTPATIENT)
Dept: HOME HEALTH SERVICES | Facility: HOME HEALTH | Age: 75
End: 2025-04-07
Payer: MEDICARE

## 2025-04-07 VITALS
TEMPERATURE: 97.2 F | OXYGEN SATURATION: 97 % | DIASTOLIC BLOOD PRESSURE: 80 MMHG | SYSTOLIC BLOOD PRESSURE: 128 MMHG | HEART RATE: 76 BPM | RESPIRATION RATE: 18 BRPM

## 2025-04-07 VITALS
DIASTOLIC BLOOD PRESSURE: 79 MMHG | SYSTOLIC BLOOD PRESSURE: 140 MMHG | HEART RATE: 77 BPM | TEMPERATURE: 97.9 F | OXYGEN SATURATION: 99 %

## 2025-04-07 PROCEDURE — G0156 HHCP-SVS OF AIDE,EA 15 MIN: HCPCS

## 2025-04-07 PROCEDURE — G0299 HHS/HOSPICE OF RN EA 15 MIN: HCPCS

## 2025-04-07 PROCEDURE — G0158 HHC OT ASSISTANT EA 15: HCPCS | Mod: CO

## 2025-04-07 ASSESSMENT — ENCOUNTER SYMPTOMS
LOWEST PAIN SEVERITY IN PAST 24 HOURS: 4/10
PAIN LOCATION: LEFT SHOULDER
DEPRESSION: 0
DENIES PAIN: 1
DRY SKIN: 1
BOWEL PATTERN NORMAL: 1
DYSPNEA ACTIVITY LEVEL: AFTER AMBULATING 10 - 20 FT
APPETITE LEVEL: GOOD
CHANGE IN APPETITE: UNCHANGED
FATIGUE: 1
OCCASIONAL FEELINGS OF UNSTEADINESS: 1
PAIN LOCATION - EXACERBATING FACTORS: MOVEMENTS
LOSS OF SENSATION IN FEET: 1
LAST BOWEL MOVEMENT: 67302
SUBJECTIVE PAIN PROGRESSION: UNCHANGED
STOOL FREQUENCY: LESS THAN DAILY
PAIN LOCATION - PAIN QUALITY: SHARP
PAIN LOCATION - PAIN DURATION: CONSTANT
PAIN LOCATION - RELIEVING FACTORS: REST
PAIN LOCATION - PAIN FREQUENCY: CONSTANT
PAIN: 1
PERSON REPORTING PAIN: PATIENT
PAIN LOCATION - PAIN SEVERITY: 5/10
SHORTNESS OF BREATH: 1
FORGETFULNESS: 1
HIGHEST PAIN SEVERITY IN PAST 24 HOURS: 6/10
MUSCLE WEAKNESS: 1

## 2025-04-08 ENCOUNTER — APPOINTMENT (OUTPATIENT)
Dept: PRIMARY CARE | Facility: CLINIC | Age: 75
End: 2025-04-08
Payer: MEDICARE

## 2025-04-08 ENCOUNTER — HOME CARE VISIT (OUTPATIENT)
Dept: HOME HEALTH SERVICES | Facility: HOME HEALTH | Age: 75
End: 2025-04-08
Payer: MEDICARE

## 2025-04-08 DIAGNOSIS — I48.11 LONGSTANDING PERSISTENT ATRIAL FIBRILLATION (MULTI): Primary | ICD-10-CM

## 2025-04-08 LAB
BASOPHILS # BLD AUTO: 78 CELLS/UL (ref 0–200)
BASOPHILS # BLD AUTO: 80 CELLS/UL (ref 0–200)
BASOPHILS NFR BLD AUTO: 0.9 %
BASOPHILS NFR BLD AUTO: 1 %
CHOLEST SERPL-MCNC: 160 MG/DL
CHOLEST/HDLC SERPL: 3.8 (CALC)
EOSINOPHIL # BLD AUTO: 157 CELLS/UL (ref 15–500)
EOSINOPHIL # BLD AUTO: 192 CELLS/UL (ref 15–500)
EOSINOPHIL NFR BLD AUTO: 1.8 %
EOSINOPHIL NFR BLD AUTO: 2.4 %
ERYTHROCYTE [DISTWIDTH] IN BLOOD BY AUTOMATED COUNT: 14.2 % (ref 11–15)
ERYTHROCYTE [DISTWIDTH] IN BLOOD BY AUTOMATED COUNT: 14.3 % (ref 11–15)
EST. AVERAGE GLUCOSE BLD GHB EST-MCNC: 169 MG/DL
EST. AVERAGE GLUCOSE BLD GHB EST-SCNC: 9.3 MMOL/L
HBA1C MFR BLD: 7.5 % OF TOTAL HGB
HCT VFR BLD AUTO: 39.7 % (ref 38.5–50)
HCT VFR BLD AUTO: 40.9 % (ref 38.5–50)
HDLC SERPL-MCNC: 42 MG/DL
HGB BLD-MCNC: 13.1 G/DL (ref 13.2–17.1)
HGB BLD-MCNC: 13.4 G/DL (ref 13.2–17.1)
INR PPP: 1.6
IRON SATN MFR SERPL: 32 % (CALC) (ref 20–48)
IRON SERPL-MCNC: 91 MCG/DL (ref 50–180)
LDLC SERPL CALC-MCNC: 89 MG/DL (CALC)
LYMPHOCYTES # BLD AUTO: 1488 CELLS/UL (ref 850–3900)
LYMPHOCYTES # BLD AUTO: 1723 CELLS/UL (ref 850–3900)
LYMPHOCYTES NFR BLD AUTO: 18.6 %
LYMPHOCYTES NFR BLD AUTO: 19.8 %
MCH RBC QN AUTO: 29.8 PG (ref 27–33)
MCH RBC QN AUTO: 30.1 PG (ref 27–33)
MCHC RBC AUTO-ENTMCNC: 32.8 G/DL (ref 32–36)
MCHC RBC AUTO-ENTMCNC: 33 G/DL (ref 32–36)
MCV RBC AUTO: 90.9 FL (ref 80–100)
MCV RBC AUTO: 91.3 FL (ref 80–100)
MONOCYTES # BLD AUTO: 640 CELLS/UL (ref 200–950)
MONOCYTES # BLD AUTO: 644 CELLS/UL (ref 200–950)
MONOCYTES NFR BLD AUTO: 7.4 %
MONOCYTES NFR BLD AUTO: 8 %
NEUTROPHILS # BLD AUTO: 5600 CELLS/UL (ref 1500–7800)
NEUTROPHILS # BLD AUTO: 6099 CELLS/UL (ref 1500–7800)
NEUTROPHILS NFR BLD AUTO: 70 %
NEUTROPHILS NFR BLD AUTO: 70.1 %
NONHDLC SERPL-MCNC: 118 MG/DL (CALC)
PLATELET # BLD AUTO: 209 THOUSAND/UL (ref 140–400)
PLATELET # BLD AUTO: 212 THOUSAND/UL (ref 140–400)
PMV BLD REES-ECKER: 10.1 FL (ref 7.5–12.5)
PMV BLD REES-ECKER: 10.2 FL (ref 7.5–12.5)
PROTHROMBIN TIME: 16.5 SEC (ref 9–11.5)
PSA SERPL-MCNC: 0.28 NG/ML
RBC # BLD AUTO: 4.35 MILLION/UL (ref 4.2–5.8)
RBC # BLD AUTO: 4.5 MILLION/UL (ref 4.2–5.8)
TIBC SERPL-MCNC: 286 MCG/DL (CALC) (ref 250–425)
TRIGL SERPL-MCNC: 194 MG/DL
WBC # BLD AUTO: 8 THOUSAND/UL (ref 3.8–10.8)
WBC # BLD AUTO: 8.7 THOUSAND/UL (ref 3.8–10.8)

## 2025-04-08 ASSESSMENT — ACTIVITIES OF DAILY LIVING (ADL)
AMBULATION ASSISTANCE: 1
AMBULATION ASSISTANCE: SUPERVISION

## 2025-04-08 NOTE — CASE COMMUNICATION
hello,     can you send note from 4/2 (primary care visit with Dr Marshall) to [email or web address]?    Thank you

## 2025-04-09 ENCOUNTER — HOME CARE VISIT (OUTPATIENT)
Dept: HOME HEALTH SERVICES | Facility: HOME HEALTH | Age: 75
End: 2025-04-09
Payer: MEDICARE

## 2025-04-09 PROCEDURE — G0152 HHCP-SERV OF OT,EA 15 MIN: HCPCS

## 2025-04-09 ASSESSMENT — ENCOUNTER SYMPTOMS
PAIN: 1
PERSON REPORTING PAIN: PATIENT

## 2025-04-09 ASSESSMENT — ACTIVITIES OF DAILY LIVING (ADL): ENTERING_EXITING_HOME: MODERATE ASSIST

## 2025-04-10 ENCOUNTER — HOME CARE VISIT (OUTPATIENT)
Dept: HOME HEALTH SERVICES | Facility: HOME HEALTH | Age: 75
End: 2025-04-10
Payer: MEDICARE

## 2025-04-10 PROCEDURE — G0156 HHCP-SVS OF AIDE,EA 15 MIN: HCPCS

## 2025-04-10 ASSESSMENT — ENCOUNTER SYMPTOMS
PAIN LOCATION - PAIN QUALITY: ACHING
PAIN LOCATION - PAIN FREQUENCY: INTERMITTENT
HIGHEST PAIN SEVERITY IN PAST 24 HOURS: 8/10
LOWEST PAIN SEVERITY IN PAST 24 HOURS: 5/10
PAIN LOCATION: GENERALIZED
PAIN LOCATION - PAIN SEVERITY: 8/10
PAIN LOCATION - PAIN QUALITY: ACHING
PAIN LOCATION - PAIN SEVERITY: 6/10
PAIN LOCATION - PAIN FREQUENCY: INTERMITTENT
PAIN SEVERITY GOAL: 3/10
PAIN LOCATION: LEFT SHOULDER
SUBJECTIVE PAIN PROGRESSION: UNCHANGED

## 2025-04-10 ASSESSMENT — ACTIVITIES OF DAILY LIVING (ADL): OASIS_M1830: 03

## 2025-04-11 DIAGNOSIS — I48.11 LONGSTANDING PERSISTENT ATRIAL FIBRILLATION (MULTI): ICD-10-CM

## 2025-04-12 ENCOUNTER — HOME CARE VISIT (OUTPATIENT)
Dept: HOME HEALTH SERVICES | Facility: HOME HEALTH | Age: 75
End: 2025-04-12
Payer: MEDICARE

## 2025-04-12 VITALS
OXYGEN SATURATION: 98 % | RESPIRATION RATE: 18 BRPM | DIASTOLIC BLOOD PRESSURE: 60 MMHG | HEART RATE: 68 BPM | TEMPERATURE: 98.2 F | SYSTOLIC BLOOD PRESSURE: 102 MMHG

## 2025-04-12 LAB
INR PPP: 2.1
PROTHROMBIN TIME: 21.5 SEC (ref 9–11.5)

## 2025-04-12 PROCEDURE — G0299 HHS/HOSPICE OF RN EA 15 MIN: HCPCS

## 2025-04-12 ASSESSMENT — ENCOUNTER SYMPTOMS
PAIN LOCATION: GENERALIZED
STOOL FREQUENCY: LESS THAN DAILY
PAIN LOCATION - RELIEVING FACTORS: REST
APPETITE LEVEL: GOOD
LOWER EXTREMITY EDEMA: 1
PAIN LOCATION - PAIN SEVERITY: 5/10
PAIN LOCATION - PAIN QUALITY: ACHE, STABBING
PERSON REPORTING PAIN: PATIENT
DEPRESSION: 0
LOSS OF SENSATION IN FEET: 0
OCCASIONAL FEELINGS OF UNSTEADINESS: 1
MUSCLE WEAKNESS: 1
PAIN: 1
LAST BOWEL MOVEMENT: 67307
PAIN LOCATION - EXACERBATING FACTORS: MOVEMENTS
HIGHEST PAIN SEVERITY IN PAST 24 HOURS: 6/10
FORGETFULNESS: 1
CHANGE IN APPETITE: UNCHANGED
PAIN LOCATION - PAIN DURATION: CONSTANT
SUBJECTIVE PAIN PROGRESSION: UNCHANGED
LOWEST PAIN SEVERITY IN PAST 24 HOURS: 5/10
DRY SKIN: 1
PAIN SEVERITY GOAL: 0/10
BOWEL PATTERN NORMAL: 1
DYSPNEA ACTIVITY LEVEL: AFTER AMBULATING 10 - 20 FT
SHORTNESS OF BREATH: 1
DESCRIPTION OF MEMORY LOSS: SHORT TERM
PAIN LOCATION - PAIN FREQUENCY: CONSTANT

## 2025-04-12 ASSESSMENT — ACTIVITIES OF DAILY LIVING (ADL)
AMBULATION ASSISTANCE: ONE PERSON
AMBULATION ASSISTANCE: 1
MONEY MANAGEMENT (EXPENSES/BILLS): NEEDS ASSISTANCE

## 2025-04-14 ENCOUNTER — HOME CARE VISIT (OUTPATIENT)
Dept: HOME HEALTH SERVICES | Facility: HOME HEALTH | Age: 75
End: 2025-04-14
Payer: MEDICARE

## 2025-04-14 PROCEDURE — G0156 HHCP-SVS OF AIDE,EA 15 MIN: HCPCS

## 2025-04-15 DIAGNOSIS — I48.11 LONGSTANDING PERSISTENT ATRIAL FIBRILLATION (MULTI): ICD-10-CM

## 2025-04-15 LAB
ANION GAP SERPL CALCULATED.4IONS-SCNC: 8 MMOL/L (CALC) (ref 7–17)
BUN SERPL-MCNC: 46 MG/DL (ref 7–25)
BUN/CREAT SERPL: 19 (CALC) (ref 6–22)
CALCIUM SERPL-MCNC: 8.3 MG/DL (ref 8.6–10.3)
CHLORIDE SERPL-SCNC: 108 MMOL/L (ref 98–110)
CHOLEST SERPL-MCNC: 159 MG/DL
CHOLEST/HDLC SERPL: 3.4 (CALC)
CO2 SERPL-SCNC: 25 MMOL/L (ref 20–32)
CREAT SERPL-MCNC: 2.44 MG/DL (ref 0.7–1.28)
EGFRCR SERPLBLD CKD-EPI 2021: 27 ML/MIN/1.73M2
GLUCOSE SERPL-MCNC: 110 MG/DL (ref 65–99)
HDLC SERPL-MCNC: 47 MG/DL
INR PPP: 1.9
LDLC SERPL CALC-MCNC: 92 MG/DL (CALC)
NONHDLC SERPL-MCNC: 112 MG/DL (CALC)
POTASSIUM SERPL-SCNC: 5.3 MMOL/L (ref 3.5–5.3)
PROTHROMBIN TIME: 19.6 SEC (ref 9–11.5)
PSA SERPL-MCNC: 0.23 NG/ML
SODIUM SERPL-SCNC: 141 MMOL/L (ref 135–146)
TRIGL SERPL-MCNC: 108 MG/DL

## 2025-04-15 NOTE — RESULT ENCOUNTER NOTE
INR okay, continue same Coumadin dose, recheck in 1 week.  Please check if he got the home INR machine we ordered for him

## 2025-04-15 NOTE — RESULT ENCOUNTER NOTE
Labs okay except kidney function slightly slower than before, possibly due to starting losartan recently, I recommend to continue for now and recheck BMP in 1 week.  He also needs to see nephrologist, please send referral to Dr. Ariel Costa [facility] build

## 2025-04-16 DIAGNOSIS — N18.32 STAGE 3B CHRONIC KIDNEY DISEASE (MULTI): ICD-10-CM

## 2025-04-16 DIAGNOSIS — Z79.4 TYPE 2 DIABETES MELLITUS WITH DIABETIC PERIPHERAL ANGIOPATHY WITHOUT GANGRENE, WITH LONG-TERM CURRENT USE OF INSULIN (MULTI): ICD-10-CM

## 2025-04-16 DIAGNOSIS — L97.411 DIABETIC ULCER OF RIGHT HEEL ASSOCIATED WITH TYPE 2 DIABETES MELLITUS, LIMITED TO BREAKDOWN OF SKIN: ICD-10-CM

## 2025-04-16 DIAGNOSIS — E11.621 DIABETIC ULCER OF RIGHT HEEL ASSOCIATED WITH TYPE 2 DIABETES MELLITUS, LIMITED TO BREAKDOWN OF SKIN: ICD-10-CM

## 2025-04-16 DIAGNOSIS — Z12.5 SCREENING FOR PROSTATE CANCER: ICD-10-CM

## 2025-04-16 DIAGNOSIS — I48.11 LONGSTANDING PERSISTENT ATRIAL FIBRILLATION (MULTI): ICD-10-CM

## 2025-04-16 DIAGNOSIS — E11.51 TYPE 2 DIABETES MELLITUS WITH DIABETIC PERIPHERAL ANGIOPATHY WITHOUT GANGRENE, WITH LONG-TERM CURRENT USE OF INSULIN (MULTI): ICD-10-CM

## 2025-04-17 ENCOUNTER — HOME CARE VISIT (OUTPATIENT)
Dept: HOME HEALTH SERVICES | Facility: HOME HEALTH | Age: 75
End: 2025-04-17
Payer: MEDICARE

## 2025-04-17 PROCEDURE — G0156 HHCP-SVS OF AIDE,EA 15 MIN: HCPCS

## 2025-04-18 ENCOUNTER — HOME CARE VISIT (OUTPATIENT)
Dept: HOME HEALTH SERVICES | Facility: HOME HEALTH | Age: 75
End: 2025-04-18
Payer: MEDICARE

## 2025-04-18 VITALS
TEMPERATURE: 97.4 F | HEART RATE: 62 BPM | DIASTOLIC BLOOD PRESSURE: 80 MMHG | SYSTOLIC BLOOD PRESSURE: 140 MMHG | OXYGEN SATURATION: 99 %

## 2025-04-18 VITALS
OXYGEN SATURATION: 98 % | RESPIRATION RATE: 20 BRPM | HEART RATE: 72 BPM | DIASTOLIC BLOOD PRESSURE: 80 MMHG | SYSTOLIC BLOOD PRESSURE: 138 MMHG | TEMPERATURE: 97.5 F

## 2025-04-18 DIAGNOSIS — I48.11 LONGSTANDING PERSISTENT ATRIAL FIBRILLATION (MULTI): ICD-10-CM

## 2025-04-18 PROCEDURE — G0299 HHS/HOSPICE OF RN EA 15 MIN: HCPCS

## 2025-04-18 PROCEDURE — G0158 HHC OT ASSISTANT EA 15: HCPCS | Mod: CO

## 2025-04-18 ASSESSMENT — ENCOUNTER SYMPTOMS
LOSS OF SENSATION IN FEET: 1
PAIN LOCATION - PAIN SEVERITY: 5/10
SPUTUM PRODUCTION: 1
PAIN: 1
FATIGUE: 1
PAIN LOCATION - PAIN DURATION: CONSTANT
PAIN LOCATION - PAIN QUALITY: STAB
PAIN LOCATION - EXACERBATING FACTORS: UNSURE
PAIN LOCATION - RELIEVING FACTORS: REST
PAIN LOCATION - PAIN DURATION: CONSTANT
OCCASIONAL FEELINGS OF UNSTEADINESS: 1
HIGHEST PAIN SEVERITY IN PAST 24 HOURS: 6/10
FORGETFULNESS: 1
DESCRIPTION OF MEMORY LOSS: SHORT TERM
TREMORS: 1
PAIN LOCATION - PAIN FREQUENCY: CONSTANT
PAIN LOCATION: BACK
BOWEL PATTERN NORMAL: 1
CHANGE IN APPETITE: UNCHANGED
PERSON REPORTING PAIN: PATIENT
PAIN LOCATION: LEFT SHOULDER
PAIN LOCATION - PAIN SEVERITY: 5/10
APPETITE LEVEL: GOOD
AGITATION: 1
LAST BOWEL MOVEMENT: 67312
TINGLING: 1
DRY SKIN: 1
PAIN LOCATION - PAIN FREQUENCY: CONSTANT
DEPRESSION: 0
SPUTUM CONSISTENCY: THICK
COUGH: 1
SUBJECTIVE PAIN PROGRESSION: WAXING AND WANING
MUSCLE WEAKNESS: 1
PAIN LOCATION - RELIEVING FACTORS: REST
SPUTUM COLOR: WHITE
DYSPNEA ACTIVITY LEVEL: AFTER AMBULATING 10 - 20 FT
PAIN: 1
PAIN SEVERITY GOAL: 0/10
SHORTNESS OF BREATH: 1
PAIN LOCATION - EXACERBATING FACTORS: UNSURE
LOWEST PAIN SEVERITY IN PAST 24 HOURS: 5/10
PAIN LOCATION: BACK
SUBJECTIVE PAIN PROGRESSION: UNCHANGED
SPUTUM AMOUNT: SCANT
STOOL FREQUENCY: DAILY

## 2025-04-18 ASSESSMENT — ACTIVITIES OF DAILY LIVING (ADL)
AMBULATION ASSISTANCE: 1
AMBULATION ASSISTANCE: ONE PERSON
MONEY MANAGEMENT (EXPENSES/BILLS): NEEDS ASSISTANCE

## 2025-04-21 ENCOUNTER — HOME CARE VISIT (OUTPATIENT)
Dept: HOME HEALTH SERVICES | Facility: HOME HEALTH | Age: 75
End: 2025-04-21
Payer: MEDICARE

## 2025-04-21 DIAGNOSIS — I48.11 LONGSTANDING PERSISTENT ATRIAL FIBRILLATION (MULTI): ICD-10-CM

## 2025-04-21 DIAGNOSIS — N18.32 STAGE 3B CHRONIC KIDNEY DISEASE (MULTI): ICD-10-CM

## 2025-04-21 PROCEDURE — G0156 HHCP-SVS OF AIDE,EA 15 MIN: HCPCS

## 2025-04-22 ENCOUNTER — HOME CARE VISIT (OUTPATIENT)
Dept: HOME HEALTH SERVICES | Facility: HOME HEALTH | Age: 75
End: 2025-04-22
Payer: MEDICARE

## 2025-04-22 VITALS
SYSTOLIC BLOOD PRESSURE: 141 MMHG | DIASTOLIC BLOOD PRESSURE: 72 MMHG | HEART RATE: 72 BPM | OXYGEN SATURATION: 98 % | TEMPERATURE: 97.6 F

## 2025-04-22 DIAGNOSIS — I48.11 LONGSTANDING PERSISTENT ATRIAL FIBRILLATION (MULTI): ICD-10-CM

## 2025-04-22 LAB
INR PPP: 1.8
PROTHROMBIN TIME: 18.3 SEC (ref 9–11.5)

## 2025-04-22 PROCEDURE — G0158 HHC OT ASSISTANT EA 15: HCPCS | Mod: CO

## 2025-04-22 ASSESSMENT — ACTIVITIES OF DAILY LIVING (ADL)
AMBULATION ASSISTANCE: 1
AMBULATION ASSISTANCE: SUPERVISION
PHYSICAL_TRANSFERS_DEVICES: 2WW
CURRENT_FUNCTION: SUPERVISION
PHYSICAL TRANSFERS ASSESSED: 1

## 2025-04-22 ASSESSMENT — ENCOUNTER SYMPTOMS
PAIN SEVERITY GOAL: 0/10
PERSON REPORTING PAIN: PATIENT
PAIN: 1
LOWEST PAIN SEVERITY IN PAST 24 HOURS: 3/10
HIGHEST PAIN SEVERITY IN PAST 24 HOURS: 3/10

## 2025-04-24 ENCOUNTER — HOME CARE VISIT (OUTPATIENT)
Dept: HOME HEALTH SERVICES | Facility: HOME HEALTH | Age: 75
End: 2025-04-24
Payer: MEDICARE

## 2025-04-24 PROCEDURE — G0156 HHCP-SVS OF AIDE,EA 15 MIN: HCPCS

## 2025-04-25 ENCOUNTER — HOME CARE VISIT (OUTPATIENT)
Dept: HOME HEALTH SERVICES | Facility: HOME HEALTH | Age: 75
End: 2025-04-25
Payer: MEDICARE

## 2025-04-25 VITALS
HEART RATE: 80 BPM | RESPIRATION RATE: 20 BRPM | DIASTOLIC BLOOD PRESSURE: 78 MMHG | TEMPERATURE: 97.5 F | OXYGEN SATURATION: 98 % | SYSTOLIC BLOOD PRESSURE: 110 MMHG

## 2025-04-25 DIAGNOSIS — I48.11 LONGSTANDING PERSISTENT ATRIAL FIBRILLATION (MULTI): ICD-10-CM

## 2025-04-25 PROCEDURE — G0299 HHS/HOSPICE OF RN EA 15 MIN: HCPCS

## 2025-04-25 ASSESSMENT — ENCOUNTER SYMPTOMS
DESCRIPTION OF MEMORY LOSS: SHORT TERM
PAIN LOCATION - RELIEVING FACTORS: REST
PAIN LOCATION - PAIN FREQUENCY: CONSTANT
PAIN LOCATION - PAIN QUALITY: STAB
SPUTUM CONSISTENCY: THICK
SPUTUM COLOR: CLEAR
LOSS OF SENSATION IN FEET: 1
STOOL FREQUENCY: DAILY
PAIN LOCATION - PAIN SEVERITY: 4/10
HIGHEST PAIN SEVERITY IN PAST 24 HOURS: 6/10
FORGETFULNESS: 1
AGITATION: 1
PAIN LOCATION: BACK
PAIN LOCATION - RELIEVING FACTORS: REST
FATIGUE: 1
SPUTUM AMOUNT: SCANT
COUGH CHARACTERISTICS: PRODUCTIVE
PAIN LOCATION - PAIN DURATION: CONSTANT
CHANGE IN APPETITE: UNCHANGED
BOWEL PATTERN NORMAL: 1
PAIN LOCATION - PAIN DURATION: CONSTANT
LOWER EXTREMITY EDEMA: 1
PAIN LOCATION - EXACERBATING FACTORS: MOVEMENTS
PERSON REPORTING PAIN: PATIENT
PAIN LOCATION - PAIN SEVERITY: 6/10
PAIN LOCATION - EXACERBATING FACTORS: MOVEMENTS
LAST BOWEL MOVEMENT: 67320
COUGH: 1
PAIN LOCATION: LEFT SHOULDER
DEPRESSION: 0
DYSPNEA ACTIVITY LEVEL: AFTER AMBULATING 10 - 20 FT
MUSCLE WEAKNESS: 1
SPUTUM PRODUCTION: 1
TINGLING: 1
LOWEST PAIN SEVERITY IN PAST 24 HOURS: 3/10
PAIN LOCATION - PAIN FREQUENCY: CONSTANT
APPETITE LEVEL: GOOD
SHORTNESS OF BREATH: 1
PAIN: 1
SUBJECTIVE PAIN PROGRESSION: UNCHANGED
OCCASIONAL FEELINGS OF UNSTEADINESS: 1

## 2025-04-25 ASSESSMENT — ACTIVITIES OF DAILY LIVING (ADL)
AMBULATION ASSISTANCE: ONE PERSON
CURRENT_FUNCTION: ONE PERSON
MONEY MANAGEMENT (EXPENSES/BILLS): NEEDS ASSISTANCE

## 2025-04-28 ENCOUNTER — OFFICE VISIT (OUTPATIENT)
Dept: PAIN MEDICINE | Facility: CLINIC | Age: 75
End: 2025-04-28
Payer: MEDICARE

## 2025-04-28 ENCOUNTER — HOME CARE VISIT (OUTPATIENT)
Dept: HOME HEALTH SERVICES | Facility: HOME HEALTH | Age: 75
End: 2025-04-28
Payer: MEDICARE

## 2025-04-28 ENCOUNTER — PREP FOR PROCEDURE (OUTPATIENT)
Dept: PAIN MEDICINE | Facility: CLINIC | Age: 75
End: 2025-04-28

## 2025-04-28 VITALS
WEIGHT: 246 LBS | OXYGEN SATURATION: 99 % | BODY MASS INDEX: 30.59 KG/M2 | RESPIRATION RATE: 24 BRPM | HEART RATE: 84 BPM | DIASTOLIC BLOOD PRESSURE: 64 MMHG | HEIGHT: 75 IN | SYSTOLIC BLOOD PRESSURE: 119 MMHG

## 2025-04-28 VITALS — HEART RATE: 70 BPM | OXYGEN SATURATION: 99 % | SYSTOLIC BLOOD PRESSURE: 144 MMHG | DIASTOLIC BLOOD PRESSURE: 86 MMHG

## 2025-04-28 DIAGNOSIS — M54.16 LUMBAR RADICULOPATHY: Primary | ICD-10-CM

## 2025-04-28 DIAGNOSIS — M25.512 CHRONIC PAIN OF BOTH SHOULDERS: ICD-10-CM

## 2025-04-28 DIAGNOSIS — M25.511 CHRONIC PAIN OF BOTH SHOULDERS: ICD-10-CM

## 2025-04-28 DIAGNOSIS — G89.29 CHRONIC PAIN OF BOTH SHOULDERS: ICD-10-CM

## 2025-04-28 PROCEDURE — 3078F DIAST BP <80 MM HG: CPT | Performed by: ANESTHESIOLOGY

## 2025-04-28 PROCEDURE — 3051F HG A1C>EQUAL 7.0%<8.0%: CPT | Performed by: ANESTHESIOLOGY

## 2025-04-28 PROCEDURE — 99214 OFFICE O/P EST MOD 30 MIN: CPT | Performed by: ANESTHESIOLOGY

## 2025-04-28 PROCEDURE — 1036F TOBACCO NON-USER: CPT | Performed by: ANESTHESIOLOGY

## 2025-04-28 PROCEDURE — 4010F ACE/ARB THERAPY RXD/TAKEN: CPT | Performed by: ANESTHESIOLOGY

## 2025-04-28 PROCEDURE — 99214 OFFICE O/P EST MOD 30 MIN: CPT | Mod: 25 | Performed by: ANESTHESIOLOGY

## 2025-04-28 PROCEDURE — 2500000004 HC RX 250 GENERAL PHARMACY W/ HCPCS (ALT 636 FOR OP/ED): Mod: JZ | Performed by: ANESTHESIOLOGY

## 2025-04-28 PROCEDURE — G0158 HHC OT ASSISTANT EA 15: HCPCS | Mod: CO

## 2025-04-28 PROCEDURE — 1157F ADVNC CARE PLAN IN RCRD: CPT | Performed by: ANESTHESIOLOGY

## 2025-04-28 PROCEDURE — 20610 DRAIN/INJ JOINT/BURSA W/O US: CPT | Mod: 50 | Performed by: ANESTHESIOLOGY

## 2025-04-28 PROCEDURE — 3008F BODY MASS INDEX DOCD: CPT | Performed by: ANESTHESIOLOGY

## 2025-04-28 PROCEDURE — 20610 DRAIN/INJ JOINT/BURSA W/O US: CPT | Performed by: ANESTHESIOLOGY

## 2025-04-28 PROCEDURE — 96372 THER/PROPH/DIAG INJ SC/IM: CPT | Mod: 59 | Performed by: ANESTHESIOLOGY

## 2025-04-28 PROCEDURE — 1125F AMNT PAIN NOTED PAIN PRSNT: CPT | Performed by: ANESTHESIOLOGY

## 2025-04-28 PROCEDURE — 3074F SYST BP LT 130 MM HG: CPT | Performed by: ANESTHESIOLOGY

## 2025-04-28 RX ORDER — LIDOCAINE HYDROCHLORIDE 10 MG/ML
5 INJECTION, SOLUTION EPIDURAL; INFILTRATION; INTRACAUDAL; PERINEURAL ONCE
Status: COMPLETED | OUTPATIENT
Start: 2025-04-28 | End: 2025-04-28

## 2025-04-28 RX ORDER — TRIAMCINOLONE ACETONIDE 40 MG/ML
80 INJECTION, SUSPENSION INTRA-ARTICULAR; INTRAMUSCULAR ONCE
Status: COMPLETED | OUTPATIENT
Start: 2025-04-28 | End: 2025-04-28

## 2025-04-28 RX ADMIN — LIDOCAINE HYDROCHLORIDE 50 MG: 10 INJECTION, SOLUTION EPIDURAL; INFILTRATION; INTRACAUDAL; PERINEURAL at 09:14

## 2025-04-28 RX ADMIN — TRIAMCINOLONE ACETONIDE 80 MG: 40 INJECTION, SUSPENSION INTRA-ARTICULAR; INTRAMUSCULAR at 09:15

## 2025-04-28 ASSESSMENT — ENCOUNTER SYMPTOMS
ACTIVITY CHANGE: 1
ARTHRALGIAS: 1
BACK PAIN: 1

## 2025-04-28 ASSESSMENT — PAIN - FUNCTIONAL ASSESSMENT: PAIN_FUNCTIONAL_ASSESSMENT: 0-10

## 2025-04-28 ASSESSMENT — PAIN SCALES - GENERAL
PAINLEVEL_OUTOF10: 6
PAINLEVEL_OUTOF10: 6

## 2025-04-28 NOTE — PROGRESS NOTES
The patient is a 74-year-old male with low back and bilateral leg pain.  The pain radiates down the posterior and lateral aspect of both legs to the ankles.  The pain is constant but worse with activity.  He gets some relief with rest.  The patient reports that his arthritic low back pain is improved after radiofrequency ablation earlier this year.  The patient is also here today to have bilateral subacromial shoulder injections.    Review of Systems   Constitutional:  Positive for activity change.   Musculoskeletal:  Positive for arthralgias, back pain and gait problem.   All other systems reviewed and are negative.    GENERAL: alert and appropriate, in no distress, well-hydrated, well-nourished and interactive  SKIN: no rash noted, surgical scars well healed  RESPIRATORY: breathing non-labored and no grunting/flaring/retractions  CHEST: equal chest rise with normal respiratory effort  ABDOMEN: soft and non-tender  BACK: back normal in appearance, spine with reduced ROM  EXTREMITIES: strength intact, bilateral shoulder range of motion reduced and painful   NEUROLOGIC: gait antalgic, SLR negative, sensation grossly intact.    Assessment and Plan    -Chronicity--chronic musculoskeletal pain    -Diagnostics--no new imaging ordered.    -Pharmacologic--no change    -Psychologic--no need for psychologic intervention from my standpoint.  There are no mental health issues of which I am aware that are contributing to the patient's pain.  There are no substance abuse or alcohol abuse issues of which I am aware that are contributing to the patient's pain.    -Physical--we discussed the importance of physical therapy and exercise.  We discussed avoidance and modification techniques.    -Intervention--the patient is a candidate for a bilateral subacromial shoulder injection today.  The patient is also a candidate for a bilateral L5 transforaminal epidural steroid injection.    I spent time educating the patient on the condition  including the treatment and the prognosis.  I invited the patient to call at anytime with any questions.    The patient was placed in seated position.  Skin was prepped with isopropyl alcohol.  A 25-gauge spinal needle was used to inject 40 mg of triamcinolone and 2.5 mL of 1% lidocaine into the subacromial space of the right shoulder.  The procedure was repeated on the left side.  The needle was removed.  There were no complications.  The patient tolerated the procedure well.     Preprocedure VAS 8  Postprocedure VAS 2

## 2025-04-29 ENCOUNTER — HOME CARE VISIT (OUTPATIENT)
Dept: HOME HEALTH SERVICES | Facility: HOME HEALTH | Age: 75
End: 2025-04-29
Payer: MEDICARE

## 2025-04-29 ENCOUNTER — TELEPHONE (OUTPATIENT)
Dept: PAIN MEDICINE | Facility: CLINIC | Age: 75
End: 2025-04-29

## 2025-04-29 DIAGNOSIS — I48.11 LONGSTANDING PERSISTENT ATRIAL FIBRILLATION (MULTI): ICD-10-CM

## 2025-04-29 LAB
INR PPP: 3.1
PROTHROMBIN TIME: 29.9 SEC (ref 9–11.5)

## 2025-04-29 PROCEDURE — G0156 HHCP-SVS OF AIDE,EA 15 MIN: HCPCS

## 2025-05-01 ENCOUNTER — HOME CARE VISIT (OUTPATIENT)
Dept: HOME HEALTH SERVICES | Facility: HOME HEALTH | Age: 75
End: 2025-05-01
Payer: MEDICARE

## 2025-05-01 VITALS
HEART RATE: 92 BPM | SYSTOLIC BLOOD PRESSURE: 102 MMHG | DIASTOLIC BLOOD PRESSURE: 78 MMHG | OXYGEN SATURATION: 97 % | TEMPERATURE: 97.6 F | RESPIRATION RATE: 20 BRPM

## 2025-05-01 PROCEDURE — G0156 HHCP-SVS OF AIDE,EA 15 MIN: HCPCS

## 2025-05-01 PROCEDURE — G0299 HHS/HOSPICE OF RN EA 15 MIN: HCPCS

## 2025-05-01 ASSESSMENT — ENCOUNTER SYMPTOMS
LOWER EXTREMITY EDEMA: 1
PAIN LOCATION: BACK
MUSCLE WEAKNESS: 1
LOWEST PAIN SEVERITY IN PAST 24 HOURS: 2/10
STOOL FREQUENCY: LESS THAN DAILY
PERSON REPORTING PAIN: PATIENT
PAIN LOCATION - RELIEVING FACTORS: REST
DRY SKIN: 1
PAIN: 1
COUGH: 1
PAIN LOCATION - PAIN QUALITY: ACHE
DYSPNEA ACTIVITY LEVEL: AFTER AMBULATING 10 - 20 FT
FORGETFULNESS: 1
BOWEL PATTERN NORMAL: 1
DEPRESSION: 0
SPUTUM CONSISTENCY: THICK
SPUTUM PRODUCTION: 1
SHORTNESS OF BREATH: 1
SPUTUM AMOUNT: SCANT
PAIN SEVERITY GOAL: 0/10
HIGHEST PAIN SEVERITY IN PAST 24 HOURS: 8/10
PAIN LOCATION - PAIN DURATION: CONSTANT
PAIN LOCATION - PAIN SEVERITY: 6/10
SPUTUM COLOR: WHITE
COUGH CHARACTERISTICS: PRODUCTIVE
LOSS OF SENSATION IN FEET: 1
SUBJECTIVE PAIN PROGRESSION: UNCHANGED
CHANGE IN APPETITE: UNCHANGED
APPETITE LEVEL: GOOD
FATIGUE: 1
OCCASIONAL FEELINGS OF UNSTEADINESS: 1
DESCRIPTION OF MEMORY LOSS: SHORT TERM
LAST BOWEL MOVEMENT: 67326
PAIN LOCATION - PAIN FREQUENCY: CONSTANT

## 2025-05-01 ASSESSMENT — ACTIVITIES OF DAILY LIVING (ADL)
AMBULATION ASSISTANCE: ONE PERSON
AMBULATION ASSISTANCE: 1

## 2025-05-02 DIAGNOSIS — I48.11 LONGSTANDING PERSISTENT ATRIAL FIBRILLATION (MULTI): ICD-10-CM

## 2025-05-05 ENCOUNTER — HOME CARE VISIT (OUTPATIENT)
Dept: HOME HEALTH SERVICES | Facility: HOME HEALTH | Age: 75
End: 2025-05-05
Payer: MEDICARE

## 2025-05-05 PROCEDURE — G0156 HHCP-SVS OF AIDE,EA 15 MIN: HCPCS

## 2025-05-06 DIAGNOSIS — I48.11 LONGSTANDING PERSISTENT ATRIAL FIBRILLATION (MULTI): ICD-10-CM

## 2025-05-06 LAB
INR PPP: 2.5
PROTHROMBIN TIME: 24.8 SEC (ref 9–11.5)

## 2025-05-07 ENCOUNTER — HOME CARE VISIT (OUTPATIENT)
Dept: HOME HEALTH SERVICES | Facility: HOME HEALTH | Age: 75
End: 2025-05-07
Payer: MEDICARE

## 2025-05-07 PROCEDURE — G0152 HHCP-SERV OF OT,EA 15 MIN: HCPCS

## 2025-05-08 ENCOUNTER — HOME CARE VISIT (OUTPATIENT)
Dept: HOME HEALTH SERVICES | Facility: HOME HEALTH | Age: 75
End: 2025-05-08
Payer: MEDICARE

## 2025-05-08 VITALS
OXYGEN SATURATION: 98 % | HEART RATE: 88 BPM | TEMPERATURE: 97.6 F | DIASTOLIC BLOOD PRESSURE: 84 MMHG | SYSTOLIC BLOOD PRESSURE: 118 MMHG | RESPIRATION RATE: 20 BRPM

## 2025-05-08 PROCEDURE — G0156 HHCP-SVS OF AIDE,EA 15 MIN: HCPCS

## 2025-05-08 PROCEDURE — G0299 HHS/HOSPICE OF RN EA 15 MIN: HCPCS

## 2025-05-08 ASSESSMENT — ENCOUNTER SYMPTOMS
PAIN LOCATION - PAIN SEVERITY: 4/10
FORGETFULNESS: 1
DRY SKIN: 1
SPUTUM PRODUCTION: 1
COUGH: 1
LOWEST PAIN SEVERITY IN PAST 24 HOURS: 4/10
SPUTUM AMOUNT: SCANT
PAIN LOCATION - EXACERBATING FACTORS: MOVEMENTS
PAIN LOCATION - EXACERBATING FACTORS: MOVEMENTS
OCCASIONAL FEELINGS OF UNSTEADINESS: 1
SUBJECTIVE PAIN PROGRESSION: GRADUALLY WORSENING
LOSS OF SENSATION IN FEET: 1
SPUTUM CONSISTENCY: THICK
COUGH CHARACTERISTICS: PRODUCTIVE
LOWEST PAIN SEVERITY IN PAST 24 HOURS: 2/10
PERSON REPORTING PAIN: PATIENT
PAIN LOCATION - PAIN SEVERITY: 8/10
DESCRIPTION OF MEMORY LOSS: SHORT TERM
TINGLING: 1
APPETITE LEVEL: GOOD
LAST BOWEL MOVEMENT: 67333
SPUTUM COLOR: WHITE
CHANGE IN APPETITE: UNCHANGED
DIZZINESS: 1
PAIN LOCATION: LEFT SHOULDER
PERSON REPORTING PAIN: PATIENT
HIGHEST PAIN SEVERITY IN PAST 24 HOURS: 8/10
FATIGUE: 1
PAIN SEVERITY GOAL: 0/10
DEPRESSION: 0
PAIN: 1
PAIN LOCATION: BACK
HIGHEST PAIN SEVERITY IN PAST 24 HOURS: 4/10
PAIN LOCATION - PAIN SEVERITY: 7/10
SHORTNESS OF BREATH: 1
PAIN LOCATION - RELIEVING FACTORS: REST
BOWEL PATTERN NORMAL: 1
STOOL FREQUENCY: DAILY
PAIN LOCATION - PAIN DURATION: VARIES
PAIN LOCATION - PAIN DURATION: CONSTANT
DYSPNEA ACTIVITY LEVEL: AFTER AMBULATING 10 - 20 FT
PAIN SEVERITY GOAL: 4/10
PAIN: 1
MUSCLE WEAKNESS: 1
PAIN LOCATION - PAIN FREQUENCY: INTERMITTENT
PAIN LOCATION: GENERALIZED
PAIN LOCATION - RELIEVING FACTORS: MEDICATIONS
PAIN LOCATION - PAIN FREQUENCY: CONSTANT
PAIN LOCATION - PAIN QUALITY: STABBING
PAIN LOCATION - PAIN QUALITY: ACHE
SUBJECTIVE PAIN PROGRESSION: UNCHANGED

## 2025-05-09 DIAGNOSIS — I48.11 LONGSTANDING PERSISTENT ATRIAL FIBRILLATION (MULTI): ICD-10-CM

## 2025-05-12 ENCOUNTER — HOME CARE VISIT (OUTPATIENT)
Dept: HOME HEALTH SERVICES | Facility: HOME HEALTH | Age: 75
End: 2025-05-12
Payer: MEDICARE

## 2025-05-12 ENCOUNTER — APPOINTMENT (OUTPATIENT)
Dept: PRIMARY CARE | Facility: CLINIC | Age: 75
End: 2025-05-12
Payer: MEDICARE

## 2025-05-12 PROCEDURE — G0156 HHCP-SVS OF AIDE,EA 15 MIN: HCPCS

## 2025-05-13 ENCOUNTER — HOME CARE VISIT (OUTPATIENT)
Dept: HOME HEALTH SERVICES | Facility: HOME HEALTH | Age: 75
End: 2025-05-13
Payer: MEDICARE

## 2025-05-13 VITALS
DIASTOLIC BLOOD PRESSURE: 60 MMHG | OXYGEN SATURATION: 98 % | RESPIRATION RATE: 20 BRPM | HEART RATE: 61 BPM | TEMPERATURE: 98.1 F | SYSTOLIC BLOOD PRESSURE: 100 MMHG

## 2025-05-13 DIAGNOSIS — I48.11 LONGSTANDING PERSISTENT ATRIAL FIBRILLATION (MULTI): ICD-10-CM

## 2025-05-13 PROCEDURE — G0151 HHCP-SERV OF PT,EA 15 MIN: HCPCS

## 2025-05-13 ASSESSMENT — ENCOUNTER SYMPTOMS
LOWEST PAIN SEVERITY IN PAST 24 HOURS: 4/10
PAIN: 1
PERSON REPORTING PAIN: PATIENT
HIGHEST PAIN SEVERITY IN PAST 24 HOURS: 8/10

## 2025-05-14 ENCOUNTER — HOME CARE VISIT (OUTPATIENT)
Dept: HOME HEALTH SERVICES | Facility: HOME HEALTH | Age: 75
End: 2025-05-14
Payer: MEDICARE

## 2025-05-14 ENCOUNTER — OFFICE VISIT (OUTPATIENT)
Dept: PRIMARY CARE | Facility: CLINIC | Age: 75
End: 2025-05-14
Payer: MEDICARE

## 2025-05-14 VITALS
WEIGHT: 242 LBS | HEIGHT: 75 IN | HEART RATE: 69 BPM | BODY MASS INDEX: 30.09 KG/M2 | OXYGEN SATURATION: 97 % | SYSTOLIC BLOOD PRESSURE: 118 MMHG | DIASTOLIC BLOOD PRESSURE: 68 MMHG | RESPIRATION RATE: 16 BRPM

## 2025-05-14 VITALS — OXYGEN SATURATION: 98 % | HEART RATE: 67 BPM | DIASTOLIC BLOOD PRESSURE: 68 MMHG | SYSTOLIC BLOOD PRESSURE: 118 MMHG

## 2025-05-14 DIAGNOSIS — Z79.4 TYPE 2 DIABETES MELLITUS WITH DIABETIC PERIPHERAL ANGIOPATHY WITHOUT GANGRENE, WITH LONG-TERM CURRENT USE OF INSULIN (MULTI): ICD-10-CM

## 2025-05-14 DIAGNOSIS — I50.22 CHRONIC SYSTOLIC HEART FAILURE: ICD-10-CM

## 2025-05-14 DIAGNOSIS — I48.11 LONGSTANDING PERSISTENT ATRIAL FIBRILLATION (MULTI): Primary | ICD-10-CM

## 2025-05-14 DIAGNOSIS — E11.51 TYPE 2 DIABETES MELLITUS WITH DIABETIC PERIPHERAL ANGIOPATHY WITHOUT GANGRENE, WITH LONG-TERM CURRENT USE OF INSULIN (MULTI): ICD-10-CM

## 2025-05-14 DIAGNOSIS — N18.32 STAGE 3B CHRONIC KIDNEY DISEASE (MULTI): ICD-10-CM

## 2025-05-14 DIAGNOSIS — M48.061 SPINAL STENOSIS OF LUMBAR REGION WITHOUT NEUROGENIC CLAUDICATION: ICD-10-CM

## 2025-05-14 DIAGNOSIS — I11.0 HYPERTENSIVE HEART FAILURE: ICD-10-CM

## 2025-05-14 DIAGNOSIS — G47.33 OBSTRUCTIVE SLEEP APNEA: ICD-10-CM

## 2025-05-14 DIAGNOSIS — I25.10 ATHEROSCLEROSIS OF NATIVE CORONARY ARTERY OF NATIVE HEART WITHOUT ANGINA PECTORIS: ICD-10-CM

## 2025-05-14 DIAGNOSIS — I13.0 HYPERTENSIVE HEART AND CHRONIC KIDNEY DISEASE WITH HEART FAILURE AND STAGE 1 THROUGH STAGE 4 CHRONIC KIDNEY DISEASE, OR UNSPECIFIED CHRONIC KIDNEY DISEASE: ICD-10-CM

## 2025-05-14 DIAGNOSIS — E11.42 DIABETIC POLYNEUROPATHY ASSOCIATED WITH TYPE 2 DIABETES MELLITUS: ICD-10-CM

## 2025-05-14 PROCEDURE — 99214 OFFICE O/P EST MOD 30 MIN: CPT | Performed by: INTERNAL MEDICINE

## 2025-05-14 PROCEDURE — G0158 HHC OT ASSISTANT EA 15: HCPCS | Mod: CO

## 2025-05-14 SDOH — HEALTH STABILITY: PHYSICAL HEALTH
EXERCISE COMMENTS: EDUCATED PATIENT ON STRETCHING TO INCREASE L HIP ROM INTO ADDUCTION AND IR AND EDUCATED PATIENT ON IMPORTANCE OF BEGINNING TO PERFORM HEP 1-2X DAILY TO TOLERANCE.

## 2025-05-14 SDOH — HEALTH STABILITY: PHYSICAL HEALTH
EXERCISE COMMENTS: PT INSTRUCTED PATIENT IN SEATED EXERCISES IN ORDER TO IMPROVE LE STRENGTH AND ROM, DECREASE PAIN, IMPROVE POSTURE AND IMPROVE FUNCTIONAL PERFORMANCE, 1X5:  - KNEE EXTENSION  - KNEE FLEXION  - HIP FLEXION  - HIP ABDUCTION/ADDUCTION  - PF/DF    PT ALSO

## 2025-05-14 ASSESSMENT — ENCOUNTER SYMPTOMS
OCCASIONAL FEELINGS OF UNSTEADINESS: 0
PALPITATIONS: 0
LOWEST PAIN SEVERITY IN PAST 24 HOURS: 8/10
OCCASIONAL FEELINGS OF UNSTEADINESS: 1
NAUSEA: 0
PAIN LOCATION: BACK
PAIN: 1
CONSTIPATION: 0
BRUISES/BLEEDS EASILY: 1
ARTHRALGIAS: 1
DEPRESSION: 0
SHORTNESS OF BREATH: 0
LOSS OF SENSATION IN FEET: 0
HIGHEST PAIN SEVERITY IN PAST 24 HOURS: 8/10
ABDOMINAL PAIN: 0
DIARRHEA: 0
PAIN SEVERITY GOAL: 0/10
COUGH: 0
DIZZINESS: 0
SUBJECTIVE PAIN PROGRESSION: WAXING AND WANING
BACK PAIN: 1

## 2025-05-14 ASSESSMENT — ACTIVITIES OF DAILY LIVING (ADL)
AMBULATION ASSISTANCE: 1
AMBULATION ASSISTANCE: STAND BY ASSIST
AMBULATION ASSISTANCE ON FLAT SURFACES: 1
AMBULATION ASSISTANCE: CONTACT GUARD ASSIST
AMBULATION_DISTANCE/DURATION_TOLERATED: 20 FT.
CURRENT_FUNCTION: CONTACT GUARD ASSIST
TOILETING: 1
TOILETING: STAND BY ASSIST
PHYSICAL TRANSFERS ASSESSED: 1
CURRENT_FUNCTION: STAND BY ASSIST

## 2025-05-14 ASSESSMENT — PAIN SCALES - GENERAL: PAINLEVEL_OUTOF10: 8

## 2025-05-14 ASSESSMENT — PATIENT HEALTH QUESTIONNAIRE - PHQ9
SUM OF ALL RESPONSES TO PHQ9 QUESTIONS 1 AND 2: 0
1. LITTLE INTEREST OR PLEASURE IN DOING THINGS: NOT AT ALL
2. FEELING DOWN, DEPRESSED OR HOPELESS: NOT AT ALL

## 2025-05-14 NOTE — PATIENT INSTRUCTIONS
Recommended checking blood sugars before dinner. In terms of diet, try to consume 60 grams of protein per day spread out amongst three meals (20 grams of protein per meal). Protein should come from sources such as meat, low fat dairy and eggs. When eating meals, always start with the protein. Do your best to avoid pasta, potatoes, break, rice, cereal, oatmeal, fruit, peas and corn because they are high in sugars. On the rare occasion that you eat fruit, stick to blueberries, blackberries, grapefruit or granny franco apples. Substitute fruit for unsweetened greek yogurt.

## 2025-05-14 NOTE — PROGRESS NOTES
Subjective   Patient ID: Rachid Yun is a 74 y.o. male who presents for 3 month check up.    Patient has been suffering from persistent nerve pains within his legs which can radiate up the buttock. Gets injections in his shoulders/back for arthritic pains which do temporarily alleviate the pain. His orthopedic is considering giving him an epidural. Receives home care and gets physical therapy. Walks at home with a walker. Currently, is taking coumadin but hasn't gotten an INR machine yet due to insurance issues. Sees ophthalmologist and dentist regularly. Phx of blood clots in knees and ischemic CVA/seizures. Checks blood sugars at home. Used to use a CPAP but no longer has it. Patient complains of excessive daytime somnolence, mood disorder, inomnia, leg swelling and waking up with dry mouth. Discussed healthy diabetic lifestyle. Feet assessed.       Diagnostics Reviewed: 11/5/2024 MR Lumbar: Marked lumbar spondylosis. Grade 1 anterolisthesis L4 on L5.   Labs Reviewed: 4/7/2025 Hemoglobin A1C: 7.5.          Review of Systems   Respiratory:  Negative for cough and shortness of breath.    Cardiovascular:  Negative for chest pain and palpitations.   Gastrointestinal:  Negative for abdominal pain, constipation, diarrhea and nausea.   Musculoskeletal:  Positive for arthralgias (shoulders) and back pain.   Neurological:  Negative for dizziness.   Hematological:  Bruises/bleeds easily.     Objective   There were no vitals taken for this visit.    Physical Exam  Cardiovascular:      Rate and Rhythm: Normal rate and regular rhythm.      Heart sounds: Normal heart sounds.   Pulmonary:      Breath sounds: Normal breath sounds.   Abdominal:      Palpations: Abdomen is soft. There is no hepatomegaly.      Tenderness: There is no abdominal tenderness.   Musculoskeletal:      Right lower leg: No edema.      Left lower leg: No edema.   Neurological:      Mental Status: He is alert and oriented to person, place, and time.       Gait: Gait normal.   Psychiatric:         Mood and Affect: Mood normal.         Behavior: Behavior normal.       Assessment/Plan   There are no diagnoses linked to this encounter.    Scribe Attestation  By signing my name below, I, Jose R Ariza   attest that this documentation has been prepared under the direction and in the presence of Izabel Marshall MD.

## 2025-05-15 ENCOUNTER — HOME CARE VISIT (OUTPATIENT)
Dept: HOME HEALTH SERVICES | Facility: HOME HEALTH | Age: 75
End: 2025-05-15
Payer: MEDICARE

## 2025-05-15 VITALS
TEMPERATURE: 97.2 F | HEART RATE: 88 BPM | OXYGEN SATURATION: 97 % | RESPIRATION RATE: 20 BRPM | SYSTOLIC BLOOD PRESSURE: 114 MMHG | DIASTOLIC BLOOD PRESSURE: 78 MMHG

## 2025-05-15 VITALS
HEART RATE: 66 BPM | OXYGEN SATURATION: 95 % | DIASTOLIC BLOOD PRESSURE: 60 MMHG | TEMPERATURE: 97.6 F | SYSTOLIC BLOOD PRESSURE: 124 MMHG | RESPIRATION RATE: 16 BRPM

## 2025-05-15 PROCEDURE — G0156 HHCP-SVS OF AIDE,EA 15 MIN: HCPCS

## 2025-05-15 PROCEDURE — G0157 HHC PT ASSISTANT EA 15: HCPCS

## 2025-05-15 PROCEDURE — G0299 HHS/HOSPICE OF RN EA 15 MIN: HCPCS

## 2025-05-15 SDOH — HEALTH STABILITY: PHYSICAL HEALTH: PHYSICAL EXERCISE: SUPINE

## 2025-05-15 SDOH — HEALTH STABILITY: PHYSICAL HEALTH: EXERCISE TYPE: BLE EXS

## 2025-05-15 SDOH — HEALTH STABILITY: PHYSICAL HEALTH: PHYSICAL EXERCISE: SEATED

## 2025-05-15 SDOH — HEALTH STABILITY: PHYSICAL HEALTH: EXERCISE ACTIVITIES SETS: 1

## 2025-05-15 SDOH — HEALTH STABILITY: PHYSICAL HEALTH: PHYSICAL EXERCISE: 10

## 2025-05-15 SDOH — HEALTH STABILITY: PHYSICAL HEALTH: EXERCISE ACTIVITY: LAQS

## 2025-05-15 SDOH — HEALTH STABILITY: PHYSICAL HEALTH: PHYSICAL EXERCISE: 5

## 2025-05-15 SDOH — HEALTH STABILITY: PHYSICAL HEALTH: EXERCISE ACTIVITY: APS, QS, GS, HIP ABD, HS

## 2025-05-15 ASSESSMENT — ENCOUNTER SYMPTOMS
DYSPNEA ACTIVITY LEVEL: AFTER AMBULATING MORE THAN 20 FT
PAIN SEVERITY GOAL: 0/10
PAIN LOCATION - PAIN SEVERITY: 6/10
LOWEST PAIN SEVERITY IN PAST 24 HOURS: 3/10
PAIN LOCATION - PAIN SEVERITY: 7/10
DESCRIPTION OF MEMORY LOSS: SHORT TERM
HIGHEST PAIN SEVERITY IN PAST 24 HOURS: 8/10
PAIN LOCATION - PAIN QUALITY: ACHE
PAIN LOCATION - PAIN FREQUENCY: CONSTANT
LOSS OF SENSATION IN FEET: 0
PAIN LOCATION - PAIN DURATION: VARIES
LAST BOWEL MOVEMENT: 67340
FORGETFULNESS: 1
PAIN LOCATION - EXACERBATING FACTORS: MOVEMENTS
PERSON REPORTING PAIN: PATIENT
PAIN LOCATION - PAIN QUALITY: ACHE
PAIN LOCATION - PAIN FREQUENCY: INTERMITTENT
PAIN LOCATION: BACK
PERSON REPORTING PAIN: PATIENT
BOWEL PATTERN NORMAL: 1
PAIN LOCATION: LEFT SHOULDER
MUSCLE WEAKNESS: 1
SUBJECTIVE PAIN PROGRESSION: GRADUALLY WORSENING
PAIN LOCATION - PAIN FREQUENCY: CONSTANT
PAIN SEVERITY GOAL: 0/10
PAIN: 1
PAIN LOCATION - PAIN DURATION: CONSTANT
SUBJECTIVE PAIN PROGRESSION: UNCHANGED
PAIN LOCATION - PAIN SEVERITY: 7/10
PAIN LOCATION: LEFT HIP
PAIN LOCATION - EXACERBATING FACTORS: UNSURE
STOOL FREQUENCY: LESS THAN DAILY
CHANGE IN APPETITE: UNCHANGED
DRY SKIN: 1
APPETITE LEVEL: GOOD
OCCASIONAL FEELINGS OF UNSTEADINESS: 1
PAIN LOCATION - RELIEVING FACTORS: REST
PAIN LOCATION - RELIEVING FACTORS: UNSURE
PAIN: 1
SHORTNESS OF BREATH: 1
DEPRESSION: 0

## 2025-05-15 ASSESSMENT — ACTIVITIES OF DAILY LIVING (ADL)
CURRENT_FUNCTION: CONTACT GUARD ASSIST
AMBULATION ASSISTANCE ON FLAT SURFACES: 1
MONEY MANAGEMENT (EXPENSES/BILLS): NEEDS ASSISTANCE
PHYSICAL_TRANSFERS_DEVICES: ROLLATOR
AMBULATION ASSISTANCE: 1
AMBULATION ASSISTANCE: 1
AMBULATION ASSISTANCE: ONE PERSON
AMBULATION_DISTANCE/DURATION_TOLERATED: 20'X2
AMBULATION ASSISTANCE: CONTACT GUARD ASSIST
PHYSICAL TRANSFERS ASSESSED: 1

## 2025-05-16 DIAGNOSIS — I48.11 LONGSTANDING PERSISTENT ATRIAL FIBRILLATION (MULTI): ICD-10-CM

## 2025-05-19 ENCOUNTER — HOME CARE VISIT (OUTPATIENT)
Dept: HOME HEALTH SERVICES | Facility: HOME HEALTH | Age: 75
End: 2025-05-19
Payer: MEDICARE

## 2025-05-19 VITALS
HEART RATE: 82 BPM | RESPIRATION RATE: 20 BRPM | SYSTOLIC BLOOD PRESSURE: 115 MMHG | DIASTOLIC BLOOD PRESSURE: 75 MMHG | OXYGEN SATURATION: 98 %

## 2025-05-19 PROCEDURE — G0158 HHC OT ASSISTANT EA 15: HCPCS | Mod: CO

## 2025-05-19 PROCEDURE — G0156 HHCP-SVS OF AIDE,EA 15 MIN: HCPCS

## 2025-05-20 DIAGNOSIS — I48.11 LONGSTANDING PERSISTENT ATRIAL FIBRILLATION (MULTI): ICD-10-CM

## 2025-05-20 LAB
ALBUMIN SERPL-MCNC: 3.4 G/DL (ref 3.6–5.1)
BUN SERPL-MCNC: 35 MG/DL (ref 7–25)
BUN/CREAT SERPL: 19 (CALC) (ref 6–22)
CALCIUM SERPL-MCNC: 8.5 MG/DL (ref 8.6–10.3)
CHLORIDE SERPL-SCNC: 107 MMOL/L (ref 98–110)
CO2 SERPL-SCNC: 27 MMOL/L (ref 20–32)
CREAT SERPL-MCNC: 1.81 MG/DL (ref 0.7–1.28)
EGFRCR SERPLBLD CKD-EPI 2021: 39 ML/MIN/1.73M2
ERYTHROCYTE [DISTWIDTH] IN BLOOD BY AUTOMATED COUNT: 14.8 % (ref 11–15)
GLUCOSE SERPL-MCNC: 117 MG/DL (ref 65–99)
HCT VFR BLD AUTO: 41 % (ref 38.5–50)
HGB BLD-MCNC: 12.8 G/DL (ref 13.2–17.1)
INR PPP: 1.9
MCH RBC QN AUTO: 28.9 PG (ref 27–33)
MCHC RBC AUTO-ENTMCNC: 31.2 G/DL (ref 32–36)
MCV RBC AUTO: 92.6 FL (ref 80–100)
PHOSPHATE SERPL-MCNC: 3.4 MG/DL (ref 2.1–4.3)
PLATELET # BLD AUTO: 190 THOUSAND/UL (ref 140–400)
PMV BLD REES-ECKER: 10.5 FL (ref 7.5–12.5)
POTASSIUM SERPL-SCNC: 5.3 MMOL/L (ref 3.5–5.3)
PROTHROMBIN TIME: 19.3 SEC (ref 9–11.5)
RBC # BLD AUTO: 4.43 MILLION/UL (ref 4.2–5.8)
SODIUM SERPL-SCNC: 140 MMOL/L (ref 135–146)
WBC # BLD AUTO: 6.4 THOUSAND/UL (ref 3.8–10.8)

## 2025-05-21 ENCOUNTER — HOME CARE VISIT (OUTPATIENT)
Dept: HOME HEALTH SERVICES | Facility: HOME HEALTH | Age: 75
End: 2025-05-21
Payer: MEDICARE

## 2025-05-21 VITALS
DIASTOLIC BLOOD PRESSURE: 65 MMHG | SYSTOLIC BLOOD PRESSURE: 130 MMHG | TEMPERATURE: 98 F | OXYGEN SATURATION: 97 % | HEART RATE: 78 BPM | RESPIRATION RATE: 16 BRPM

## 2025-05-21 PROCEDURE — G0157 HHC PT ASSISTANT EA 15: HCPCS

## 2025-05-21 SDOH — HEALTH STABILITY: PHYSICAL HEALTH: PHYSICAL EXERCISE: 10

## 2025-05-21 SDOH — HEALTH STABILITY: PHYSICAL HEALTH: PHYSICAL EXERCISE: SUPINE

## 2025-05-21 SDOH — HEALTH STABILITY: PHYSICAL HEALTH: PHYSICAL EXERCISE: 5

## 2025-05-21 SDOH — HEALTH STABILITY: PHYSICAL HEALTH: EXERCISE ACTIVITIES SETS: 1

## 2025-05-21 SDOH — HEALTH STABILITY: PHYSICAL HEALTH: EXERCISE ACTIVITY: APS, QS, GS HIP ABD, HS

## 2025-05-21 SDOH — HEALTH STABILITY: PHYSICAL HEALTH: EXERCISE TYPE: BLE SUPINE EXS

## 2025-05-21 SDOH — HEALTH STABILITY: PHYSICAL HEALTH: EXERCISE ACTIVITY: LEG LENGTHENER/SPINAL DECOMPRESSION EX

## 2025-05-21 ASSESSMENT — ACTIVITIES OF DAILY LIVING (ADL)
CURRENT_FUNCTION: STAND BY ASSIST
AMBULATION ASSISTANCE: 1
PHYSICAL TRANSFERS ASSESSED: 1
AMBULATION ASSISTANCE: SUPERVISION
CURRENT_FUNCTION: CONTACT GUARD ASSIST
AMBULATION ASSISTANCE: STAND BY ASSIST
PHYSICAL_TRANSFERS_DEVICES: ROLLATOR
AMBULATION ASSISTANCE ON FLAT SURFACES: 1

## 2025-05-21 ASSESSMENT — ENCOUNTER SYMPTOMS
SUBJECTIVE PAIN PROGRESSION: UNCHANGED
LOWEST PAIN SEVERITY IN PAST 24 HOURS: 4/10
HIGHEST PAIN SEVERITY IN PAST 24 HOURS: 7/10
PAIN LOCATION: LEFT HIP
PAIN LOCATION - PAIN SEVERITY: 7/10
PAIN LOCATION - PAIN FREQUENCY: INTERMITTENT
PAIN LOCATION - EXACERBATING FACTORS: ACTIVITY
PAIN SEVERITY GOAL: 0/10

## 2025-05-22 ENCOUNTER — HOME CARE VISIT (OUTPATIENT)
Dept: HOME HEALTH SERVICES | Facility: HOME HEALTH | Age: 75
End: 2025-05-22
Payer: MEDICARE

## 2025-05-22 VITALS
SYSTOLIC BLOOD PRESSURE: 140 MMHG | TEMPERATURE: 97.9 F | HEART RATE: 76 BPM | RESPIRATION RATE: 20 BRPM | OXYGEN SATURATION: 98 % | DIASTOLIC BLOOD PRESSURE: 72 MMHG

## 2025-05-22 DIAGNOSIS — Z79.4 TYPE 2 DIABETES MELLITUS WITHOUT COMPLICATION, WITH LONG-TERM CURRENT USE OF INSULIN: ICD-10-CM

## 2025-05-22 DIAGNOSIS — E11.9 TYPE 2 DIABETES MELLITUS WITHOUT COMPLICATION, WITH LONG-TERM CURRENT USE OF INSULIN: ICD-10-CM

## 2025-05-22 DIAGNOSIS — I48.11 LONGSTANDING PERSISTENT ATRIAL FIBRILLATION (MULTI): ICD-10-CM

## 2025-05-22 PROCEDURE — G0156 HHCP-SVS OF AIDE,EA 15 MIN: HCPCS

## 2025-05-22 PROCEDURE — G0300 HHS/HOSPICE OF LPN EA 15 MIN: HCPCS

## 2025-05-22 ASSESSMENT — ENCOUNTER SYMPTOMS
LAST BOWEL MOVEMENT: 67346
APPETITE LEVEL: GOOD
DENIES PAIN: 1
CHANGE IN APPETITE: UNCHANGED

## 2025-05-23 ENCOUNTER — HOME CARE VISIT (OUTPATIENT)
Dept: HOME HEALTH SERVICES | Facility: HOME HEALTH | Age: 75
End: 2025-05-23
Payer: MEDICARE

## 2025-05-23 DIAGNOSIS — I48.11 LONGSTANDING PERSISTENT ATRIAL FIBRILLATION (MULTI): ICD-10-CM

## 2025-05-23 PROCEDURE — G0157 HHC PT ASSISTANT EA 15: HCPCS

## 2025-05-23 RX ORDER — INSULIN GLARGINE 100 [IU]/ML
INJECTION, SOLUTION SUBCUTANEOUS
Qty: 10 ML | Refills: 11 | Status: SHIPPED | OUTPATIENT
Start: 2025-05-23

## 2025-05-24 VITALS
RESPIRATION RATE: 16 BRPM | TEMPERATURE: 97.6 F | DIASTOLIC BLOOD PRESSURE: 64 MMHG | OXYGEN SATURATION: 96 % | SYSTOLIC BLOOD PRESSURE: 125 MMHG | HEART RATE: 76 BPM

## 2025-05-24 SDOH — HEALTH STABILITY: PHYSICAL HEALTH: EXERCISE ACTIVITY: APS, QS, HIP ABD, HS

## 2025-05-24 SDOH — HEALTH STABILITY: PHYSICAL HEALTH: EXERCISE ACTIVITY: LEG LENGTHENER/SPINAL DECOMPRESSION EX

## 2025-05-24 SDOH — HEALTH STABILITY: PHYSICAL HEALTH: PHYSICAL EXERCISE: 10

## 2025-05-24 SDOH — HEALTH STABILITY: PHYSICAL HEALTH: EXERCISE ACTIVITIES SETS: 1

## 2025-05-24 SDOH — HEALTH STABILITY: PHYSICAL HEALTH: PHYSICAL EXERCISE: SUPINE

## 2025-05-24 SDOH — HEALTH STABILITY: PHYSICAL HEALTH: EXERCISE TYPE: BLES

## 2025-05-24 ASSESSMENT — ENCOUNTER SYMPTOMS
SUBJECTIVE PAIN PROGRESSION: UNCHANGED
PAIN LOCATION - PAIN SEVERITY: 6/10
PAIN LOCATION: LEFT HIP
LOWEST PAIN SEVERITY IN PAST 24 HOURS: 5/10
HIGHEST PAIN SEVERITY IN PAST 24 HOURS: 6/10
PAIN SEVERITY GOAL: 2/10
PAIN: 1
PAIN LOCATION - PAIN FREQUENCY: INTERMITTENT
PERSON REPORTING PAIN: PATIENT
PAIN LOCATION - PAIN QUALITY: ACHING

## 2025-05-24 ASSESSMENT — ACTIVITIES OF DAILY LIVING (ADL)
AMBULATION ASSISTANCE ON FLAT SURFACES: 1
AMBULATION ASSISTANCE: SUPERVISION
PHYSICAL_TRANSFERS_DEVICES: ROLLATOR
AMBULATION ASSISTANCE: 1
AMBULATION_DISTANCE/DURATION_TOLERATED: 50'
CURRENT_FUNCTION: SUPERVISION
AMBULATION ASSISTANCE: STAND BY ASSIST
CURRENT_FUNCTION: STAND BY ASSIST
PHYSICAL TRANSFERS ASSESSED: 1

## 2025-05-27 ENCOUNTER — HOME CARE VISIT (OUTPATIENT)
Dept: HOME HEALTH SERVICES | Facility: HOME HEALTH | Age: 75
End: 2025-05-27
Payer: MEDICARE

## 2025-05-27 DIAGNOSIS — I48.11 LONGSTANDING PERSISTENT ATRIAL FIBRILLATION (MULTI): ICD-10-CM

## 2025-05-27 PROCEDURE — G0158 HHC OT ASSISTANT EA 15: HCPCS | Mod: CO

## 2025-05-27 PROCEDURE — G0156 HHCP-SVS OF AIDE,EA 15 MIN: HCPCS

## 2025-05-28 ENCOUNTER — HOME CARE VISIT (OUTPATIENT)
Dept: HOME HEALTH SERVICES | Facility: HOME HEALTH | Age: 75
End: 2025-05-28
Payer: MEDICARE

## 2025-05-28 VITALS
SYSTOLIC BLOOD PRESSURE: 122 MMHG | DIASTOLIC BLOOD PRESSURE: 68 MMHG | TEMPERATURE: 97.9 F | HEART RATE: 69 BPM | OXYGEN SATURATION: 96 % | RESPIRATION RATE: 16 BRPM

## 2025-05-28 VITALS
DIASTOLIC BLOOD PRESSURE: 66 MMHG | OXYGEN SATURATION: 95 % | RESPIRATION RATE: 17 BRPM | SYSTOLIC BLOOD PRESSURE: 128 MMHG | HEART RATE: 77 BPM

## 2025-05-28 PROCEDURE — G0157 HHC PT ASSISTANT EA 15: HCPCS

## 2025-05-28 SDOH — HEALTH STABILITY: PHYSICAL HEALTH: RESISTANCE: RED TBAND

## 2025-05-28 SDOH — HEALTH STABILITY: PHYSICAL HEALTH: EXERCISE TYPE: BLE, SEATED

## 2025-05-28 SDOH — HEALTH STABILITY: PHYSICAL HEALTH: EXERCISE ACTIVITY: APS, LAQS, MARCHES HIP ABD/ ADD WITH PILLOW

## 2025-05-28 SDOH — HEALTH STABILITY: PHYSICAL HEALTH: PHYSICAL EXERCISE: SEATED

## 2025-05-28 SDOH — HEALTH STABILITY: PHYSICAL HEALTH: EXERCISE ACTIVITIES SETS: 1

## 2025-05-28 SDOH — HEALTH STABILITY: PHYSICAL HEALTH: PHYSICAL EXERCISE: 10

## 2025-05-28 ASSESSMENT — ACTIVITIES OF DAILY LIVING (ADL)
AMBULATION ASSISTANCE: STAND BY ASSIST
TOILETING: STAND BY ASSIST
AMBULATION ASSISTANCE ON FLAT SURFACES: 1
AMBULATION ASSISTANCE: STAND BY ASSIST
CURRENT_FUNCTION: SUPERVISION
CURRENT_FUNCTION: STAND BY ASSIST
PHYSICAL TRANSFERS ASSESSED: 1
AMBULATION ASSISTANCE: SUPERVISION
AMBULATION ASSISTANCE: 1
TOILETING: 1
AMBULATION ASSISTANCE: 1
PHYSICAL TRANSFERS ASSESSED: 1
CURRENT_FUNCTION: STAND BY ASSIST

## 2025-05-28 ASSESSMENT — ENCOUNTER SYMPTOMS
SUBJECTIVE PAIN PROGRESSION: GRADUALLY WORSENING
PAIN LOCATION - PAIN SEVERITY: 5/10
HIGHEST PAIN SEVERITY IN PAST 24 HOURS: 6/10
PAIN LOCATION: LEFT HIP
PAIN SEVERITY GOAL: 2/10
PAIN LOCATION - PAIN FREQUENCY: CONSTANT
PAIN LOCATION - PAIN QUALITY: ACHING
LOWEST PAIN SEVERITY IN PAST 24 HOURS: 4/10
PAIN LOCATION - RELIEVING FACTORS: REST, POSITIONING
PAIN: 1
PERSON REPORTING PAIN: PATIENT

## 2025-05-29 ENCOUNTER — HOME CARE VISIT (OUTPATIENT)
Dept: HOME HEALTH SERVICES | Facility: HOME HEALTH | Age: 75
End: 2025-05-29
Payer: MEDICARE

## 2025-05-29 VITALS
OXYGEN SATURATION: 97 % | RESPIRATION RATE: 18 BRPM | SYSTOLIC BLOOD PRESSURE: 118 MMHG | TEMPERATURE: 97.6 F | HEART RATE: 76 BPM | DIASTOLIC BLOOD PRESSURE: 80 MMHG

## 2025-05-29 PROCEDURE — G0299 HHS/HOSPICE OF RN EA 15 MIN: HCPCS

## 2025-05-29 PROCEDURE — G0156 HHCP-SVS OF AIDE,EA 15 MIN: HCPCS

## 2025-05-29 RX ORDER — WARFARIN 1 MG/1
TABLET ORAL
Qty: 90 TABLET | Refills: 3 | Status: SHIPPED | OUTPATIENT
Start: 2025-05-29 | End: 2025-05-30 | Stop reason: DRUGHIGH

## 2025-05-29 ASSESSMENT — ENCOUNTER SYMPTOMS
PAIN LOCATION - EXACERBATING FACTORS: MOVEMENT
CHANGE IN APPETITE: UNCHANGED
AGITATION: 1
PAIN LOCATION: LEFT SHOULDER
PAIN LOCATION - PAIN DURATION: VARIES
DYSPNEA ACTIVITY LEVEL: AFTER AMBULATING MORE THAN 20 FT
DESCRIPTION OF MEMORY LOSS: SHORT TERM
DEPRESSION: 0
LOSS OF SENSATION IN FEET: 1
LAST BOWEL MOVEMENT: 67354
PAIN LOCATION - PAIN FREQUENCY: INTERMITTENT
PAIN LOCATION: BACK
PAIN LOCATION - RELIEVING FACTORS: UNSURE
PAIN LOCATION - RELIEVING FACTORS: REST
PAIN LOCATION - PAIN SEVERITY: 3/10
PAIN LOCATION - PAIN DURATION: CONSTANT
PAIN LOCATION: RIGHT SHOULDER
HIGHEST PAIN SEVERITY IN PAST 24 HOURS: 5/10
PAIN LOCATION - PAIN QUALITY: SHARP
PAIN LOCATION - PAIN FREQUENCY: CONSTANT
PAIN LOCATION - RELIEVING FACTORS: REST
PAIN LOCATION - PAIN DURATION: VARIES
MUSCLE WEAKNESS: 1
PAIN: 1
PAIN LOCATION - PAIN SEVERITY: 5/10
SHORTNESS OF BREATH: 1
PAIN LOCATION - PAIN QUALITY: SHARP
BOWEL INCONTINENCE: 1
DRY SKIN: 1
PAIN SEVERITY GOAL: 0/10
STOOL FREQUENCY: DAILY
SUBJECTIVE PAIN PROGRESSION: UNCHANGED
OCCASIONAL FEELINGS OF UNSTEADINESS: 1
FORGETFULNESS: 1
APPETITE LEVEL: GOOD
PAIN LOCATION - EXACERBATING FACTORS: MOVEMENT
TINGLING: 1
FATIGUE: 1
PAIN LOCATION - EXACERBATING FACTORS: UNSURE
PAIN LOCATION - PAIN QUALITY: ACHE
PAIN LOCATION - PAIN SEVERITY: 5/10
PAIN LOCATION - PAIN FREQUENCY: INTERMITTENT
PERSON REPORTING PAIN: PATIENT

## 2025-05-29 ASSESSMENT — ACTIVITIES OF DAILY LIVING (ADL)
CURRENT_FUNCTION: STAND BY ASSIST
AMBULATION ASSISTANCE: STAND BY ASSIST

## 2025-05-30 ENCOUNTER — HOME CARE VISIT (OUTPATIENT)
Dept: HOME HEALTH SERVICES | Facility: HOME HEALTH | Age: 75
End: 2025-05-30
Payer: MEDICARE

## 2025-05-30 VITALS
RESPIRATION RATE: 16 BRPM | SYSTOLIC BLOOD PRESSURE: 125 MMHG | HEART RATE: 76 BPM | DIASTOLIC BLOOD PRESSURE: 68 MMHG | TEMPERATURE: 60.8 F | OXYGEN SATURATION: 96 %

## 2025-05-30 DIAGNOSIS — I48.11 LONGSTANDING PERSISTENT ATRIAL FIBRILLATION (MULTI): ICD-10-CM

## 2025-05-30 LAB
INR PPP: 2.4
PROTHROMBIN TIME: 23.5 SEC (ref 9–11.5)

## 2025-05-30 PROCEDURE — G0157 HHC PT ASSISTANT EA 15: HCPCS

## 2025-05-30 SDOH — HEALTH STABILITY: PHYSICAL HEALTH: EXERCISE ACTIVITIES SETS: 1

## 2025-05-30 SDOH — HEALTH STABILITY: PHYSICAL HEALTH: PHYSICAL EXERCISE: SEATED

## 2025-05-30 SDOH — HEALTH STABILITY: PHYSICAL HEALTH: RESISTANCE: RED TBAND

## 2025-05-30 SDOH — HEALTH STABILITY: PHYSICAL HEALTH: PHYSICAL EXERCISE: 12

## 2025-05-30 SDOH — HEALTH STABILITY: PHYSICAL HEALTH: EXERCISE ACTIVITY: LAQS, MARCHES, HIP ABD, HIP ADD WITH PILLOW

## 2025-05-30 ASSESSMENT — ACTIVITIES OF DAILY LIVING (ADL)
PHYSICAL_TRANSFERS_DEVICES: ROLLATOR
PHYSICAL TRANSFERS ASSESSED: 1
CURRENT_FUNCTION: SUPERVISION
AMBULATION ASSISTANCE: 1
AMBULATION ASSISTANCE: SUPERVISION
CURRENT_FUNCTION: STAND BY ASSIST
AMBULATION_DISTANCE/DURATION_TOLERATED: 100'X2
AMBULATION ASSISTANCE ON FLAT SURFACES: 1

## 2025-05-30 ASSESSMENT — ENCOUNTER SYMPTOMS
SUBJECTIVE PAIN PROGRESSION: GRADUALLY WORSENING
HIGHEST PAIN SEVERITY IN PAST 24 HOURS: 6/10
PAIN LOCATION - PAIN SEVERITY: 4/10
LOWEST PAIN SEVERITY IN PAST 24 HOURS: 3/10
PAIN LOCATION: LEFT HIP
PAIN LOCATION - PAIN QUALITY: ACHING
PAIN LOCATION - PAIN FREQUENCY: CONSTANT
PAIN SEVERITY GOAL: 0/10

## 2025-05-31 PROBLEM — I50.9 CHRONIC HEART FAILURE, UNSPECIFIED HEART FAILURE TYPE: Status: RESOLVED | Noted: 2023-11-24 | Resolved: 2025-05-31

## 2025-06-02 ENCOUNTER — HOME CARE VISIT (OUTPATIENT)
Dept: HOME HEALTH SERVICES | Facility: HOME HEALTH | Age: 75
End: 2025-06-02
Payer: MEDICARE

## 2025-06-02 PROCEDURE — G0156 HHCP-SVS OF AIDE,EA 15 MIN: HCPCS

## 2025-06-02 RX ORDER — WARFARIN 1 MG/1
2 TABLET ORAL NIGHTLY
Qty: 180 TABLET | Refills: 3 | Status: SHIPPED | OUTPATIENT
Start: 2025-06-02 | End: 2026-06-02

## 2025-06-03 ENCOUNTER — HOME CARE VISIT (OUTPATIENT)
Dept: HOME HEALTH SERVICES | Facility: HOME HEALTH | Age: 75
End: 2025-06-03
Payer: MEDICARE

## 2025-06-03 DIAGNOSIS — I48.11 LONGSTANDING PERSISTENT ATRIAL FIBRILLATION (MULTI): ICD-10-CM

## 2025-06-03 PROCEDURE — G0152 HHCP-SERV OF OT,EA 15 MIN: HCPCS

## 2025-06-04 ENCOUNTER — APPOINTMENT (OUTPATIENT)
Facility: CLINIC | Age: 75
End: 2025-06-04
Payer: MEDICARE

## 2025-06-05 ENCOUNTER — HOME CARE VISIT (OUTPATIENT)
Dept: HOME HEALTH SERVICES | Facility: HOME HEALTH | Age: 75
End: 2025-06-05
Payer: MEDICARE

## 2025-06-05 VITALS
HEART RATE: 72 BPM | DIASTOLIC BLOOD PRESSURE: 62 MMHG | SYSTOLIC BLOOD PRESSURE: 102 MMHG | RESPIRATION RATE: 20 BRPM | TEMPERATURE: 98.3 F | OXYGEN SATURATION: 98 %

## 2025-06-05 PROCEDURE — G0156 HHCP-SVS OF AIDE,EA 15 MIN: HCPCS

## 2025-06-05 PROCEDURE — G0151 HHCP-SERV OF PT,EA 15 MIN: HCPCS

## 2025-06-05 ASSESSMENT — ENCOUNTER SYMPTOMS
LOWEST PAIN SEVERITY IN PAST 24 HOURS: 4/10
OCCASIONAL FEELINGS OF UNSTEADINESS: 1
PERSON REPORTING PAIN: PATIENT
PAIN: 1
HIGHEST PAIN SEVERITY IN PAST 24 HOURS: 7/10

## 2025-06-06 ENCOUNTER — HOME CARE VISIT (OUTPATIENT)
Dept: HOME HEALTH SERVICES | Facility: HOME HEALTH | Age: 75
End: 2025-06-06
Payer: MEDICARE

## 2025-06-06 DIAGNOSIS — I48.11 LONGSTANDING PERSISTENT ATRIAL FIBRILLATION (MULTI): ICD-10-CM

## 2025-06-09 ENCOUNTER — APPOINTMENT (OUTPATIENT)
Facility: CLINIC | Age: 75
End: 2025-06-09
Payer: MEDICARE

## 2025-06-10 ENCOUNTER — HOME CARE VISIT (OUTPATIENT)
Dept: HOME HEALTH SERVICES | Facility: HOME HEALTH | Age: 75
End: 2025-06-10
Payer: MEDICARE

## 2025-06-10 VITALS
DIASTOLIC BLOOD PRESSURE: 76 MMHG | OXYGEN SATURATION: 8 % | TEMPERATURE: 97.6 F | HEART RATE: 87 BPM | SYSTOLIC BLOOD PRESSURE: 124 MMHG

## 2025-06-10 DIAGNOSIS — I48.11 LONGSTANDING PERSISTENT ATRIAL FIBRILLATION (MULTI): ICD-10-CM

## 2025-06-10 PROCEDURE — G0152 HHCP-SERV OF OT,EA 15 MIN: HCPCS

## 2025-06-10 PROCEDURE — G0151 HHCP-SERV OF PT,EA 15 MIN: HCPCS

## 2025-06-10 ASSESSMENT — ACTIVITIES OF DAILY LIVING (ADL)
CURRENT_FUNCTION: MINIMUM ASSIST
PHYSICAL TRANSFERS ASSESSED: 1
TOILETING: MINIMUM ASSIST
TOILETING: 1
ENTERING_EXITING_HOME: MAXIMUM ASSIST
OASIS_M1830: 03

## 2025-06-10 ASSESSMENT — ENCOUNTER SYMPTOMS
OCCASIONAL FEELINGS OF UNSTEADINESS: 1
HIGHEST PAIN SEVERITY IN PAST 24 HOURS: 7/10
PERSON REPORTING PAIN: PATIENT
LOWEST PAIN SEVERITY IN PAST 24 HOURS: 3/10
PAIN: 1

## 2025-06-12 ENCOUNTER — HOME CARE VISIT (OUTPATIENT)
Dept: HOME HEALTH SERVICES | Facility: HOME HEALTH | Age: 75
End: 2025-06-12
Payer: MEDICARE

## 2025-06-12 PROCEDURE — G0158 HHC OT ASSISTANT EA 15: HCPCS | Mod: CO

## 2025-06-12 PROCEDURE — 400014 HH F/U

## 2025-06-13 ENCOUNTER — HOME CARE VISIT (OUTPATIENT)
Dept: HOME HEALTH SERVICES | Facility: HOME HEALTH | Age: 75
End: 2025-06-13
Payer: MEDICARE

## 2025-06-13 DIAGNOSIS — M47.816 LUMBAR SPONDYLOSIS: Primary | ICD-10-CM

## 2025-06-13 DIAGNOSIS — M48.061 SPINAL STENOSIS OF LUMBAR REGION WITHOUT NEUROGENIC CLAUDICATION: ICD-10-CM

## 2025-06-13 DIAGNOSIS — M21.372 LEFT FOOT DROP: ICD-10-CM

## 2025-06-13 DIAGNOSIS — E11.42 DIABETIC POLYNEUROPATHY ASSOCIATED WITH TYPE 2 DIABETES MELLITUS: ICD-10-CM

## 2025-06-13 DIAGNOSIS — I48.11 LONGSTANDING PERSISTENT ATRIAL FIBRILLATION (MULTI): ICD-10-CM

## 2025-06-13 PROCEDURE — G0157 HHC PT ASSISTANT EA 15: HCPCS

## 2025-06-14 VITALS
OXYGEN SATURATION: 98 % | RESPIRATION RATE: 16 BRPM | TEMPERATURE: 98.1 F | SYSTOLIC BLOOD PRESSURE: 128 MMHG | DIASTOLIC BLOOD PRESSURE: 68 MMHG | HEART RATE: 58 BPM

## 2025-06-14 VITALS
RESPIRATION RATE: 17 BRPM | DIASTOLIC BLOOD PRESSURE: 71 MMHG | OXYGEN SATURATION: 99 % | HEART RATE: 80 BPM | SYSTOLIC BLOOD PRESSURE: 121 MMHG

## 2025-06-14 SDOH — HEALTH STABILITY: PHYSICAL HEALTH: PHYSICAL EXERCISE: 15

## 2025-06-14 SDOH — HEALTH STABILITY: PHYSICAL HEALTH: EXERCISE ACTIVITIES SETS: 1

## 2025-06-14 SDOH — HEALTH STABILITY: PHYSICAL HEALTH: PHYSICAL EXERCISE: SEATED

## 2025-06-14 SDOH — HEALTH STABILITY: PHYSICAL HEALTH: EXERCISE TYPE: BLE EXS

## 2025-06-14 SDOH — HEALTH STABILITY: PHYSICAL HEALTH: EXERCISE ACTIVITY: LAQS, MARCHES, HIP ABD

## 2025-06-14 SDOH — HEALTH STABILITY: PHYSICAL HEALTH: RESISTANCE: RED TBAND

## 2025-06-14 ASSESSMENT — ENCOUNTER SYMPTOMS
PAIN LOCATION - PAIN QUALITY: ACHING, SHARP
PAIN LOCATION - PAIN SEVERITY: 4/10
SUBJECTIVE PAIN PROGRESSION: UNCHANGED
LOWEST PAIN SEVERITY IN PAST 24 HOURS: 4/10
HIGHEST PAIN SEVERITY IN PAST 24 HOURS: 9/10
PAIN LOCATION - PAIN FREQUENCY: INTERMITTENT
PAIN LOCATION - EXACERBATING FACTORS: ACTIVITY
PAIN LOCATION - PAIN FREQUENCY: INTERMITTENT
PAIN LOCATION - EXACERBATING FACTORS: ACTIVITY
PAIN SEVERITY GOAL: 2/10
PAIN LOCATION - PAIN QUALITY: ACHING, SHARP
PAIN LOCATION: LEFT HIP
PAIN: 1
PAIN LOCATION - PAIN SEVERITY: 4/10
PERSON REPORTING PAIN: PATIENT
PAIN LOCATION: BACK

## 2025-06-14 ASSESSMENT — ACTIVITIES OF DAILY LIVING (ADL)
AMBULATION ASSISTANCE: 1
CURRENT_FUNCTION: SUPERVISION
AMBULATION ASSISTANCE: STAND BY ASSIST
AMBULATION ASSISTANCE: STAND BY ASSIST
DRESSING_LB_CURRENT_FUNCTION: MINIMUM ASSIST
PHYSICAL TRANSFERS ASSESSED: 1
PHYSICAL TRANSFERS ASSESSED: 1
AMBULATION ASSISTANCE: 1
AMBULATION_DISTANCE/DURATION_TOLERATED: 70'
AMBULATION ASSISTANCE: SUPERVISION
AMBULATION ASSISTANCE ON FLAT SURFACES: 1
DRESSING_UB_CURRENT_FUNCTION: STAND BY ASSIST
CURRENT_FUNCTION: STAND BY ASSIST
CURRENT_FUNCTION: STAND BY ASSIST

## 2025-06-16 ENCOUNTER — HOME CARE VISIT (OUTPATIENT)
Dept: HOME HEALTH SERVICES | Facility: HOME HEALTH | Age: 75
End: 2025-06-16
Payer: MEDICARE

## 2025-06-16 PROCEDURE — G0156 HHCP-SVS OF AIDE,EA 15 MIN: HCPCS

## 2025-06-16 PROCEDURE — G0158 HHC OT ASSISTANT EA 15: HCPCS | Mod: CO

## 2025-06-17 ENCOUNTER — HOSPITAL ENCOUNTER (OUTPATIENT)
Dept: OPERATING ROOM | Facility: HOSPITAL | Age: 75
Discharge: HOME | End: 2025-06-17
Payer: MEDICARE

## 2025-06-17 VITALS
TEMPERATURE: 96.6 F | HEIGHT: 75 IN | HEART RATE: 70 BPM | SYSTOLIC BLOOD PRESSURE: 118 MMHG | WEIGHT: 235 LBS | RESPIRATION RATE: 19 BRPM | DIASTOLIC BLOOD PRESSURE: 64 MMHG | OXYGEN SATURATION: 100 % | BODY MASS INDEX: 29.22 KG/M2

## 2025-06-17 DIAGNOSIS — I48.11 LONGSTANDING PERSISTENT ATRIAL FIBRILLATION (MULTI): ICD-10-CM

## 2025-06-17 DIAGNOSIS — M54.16 LUMBAR RADICULOPATHY: ICD-10-CM

## 2025-06-17 LAB
GLUCOSE BLD MANUAL STRIP-MCNC: 119 MG/DL (ref 74–99)
POCT INTERNATIONAL NORMALIZATION RATIO: 1.3
POCT PROTHROMBIN TIME: 15.1 SECONDS

## 2025-06-17 PROCEDURE — 2500000004 HC RX 250 GENERAL PHARMACY W/ HCPCS (ALT 636 FOR OP/ED): Performed by: ANESTHESIOLOGY

## 2025-06-17 PROCEDURE — 7100000009 HC PHASE TWO TIME - INITIAL BASE CHARGE

## 2025-06-17 PROCEDURE — 7100000010 HC PHASE TWO TIME - EACH INCREMENTAL 1 MINUTE

## 2025-06-17 PROCEDURE — 82947 ASSAY GLUCOSE BLOOD QUANT: CPT

## 2025-06-17 PROCEDURE — 3600000001 HC OR TIME - INITIAL BASE CHARGE - PROCEDURE LEVEL ONE

## 2025-06-17 PROCEDURE — 62323 NJX INTERLAMINAR LMBR/SAC: CPT | Performed by: ANESTHESIOLOGY

## 2025-06-17 PROCEDURE — 3600000006 HC OR TIME - EACH INCREMENTAL 1 MINUTE - PROCEDURE LEVEL ONE

## 2025-06-17 PROCEDURE — 2550000001 HC RX 255 CONTRASTS: Mod: JW | Performed by: ANESTHESIOLOGY

## 2025-06-17 RX ORDER — LIDOCAINE HYDROCHLORIDE 5 MG/ML
INJECTION, SOLUTION INFILTRATION; INTRAVENOUS AS NEEDED
Status: COMPLETED | OUTPATIENT
Start: 2025-06-17 | End: 2025-06-17

## 2025-06-17 RX ORDER — TRIAMCINOLONE ACETONIDE 40 MG/ML
INJECTION, SUSPENSION INTRA-ARTICULAR; INTRAMUSCULAR AS NEEDED
Status: COMPLETED | OUTPATIENT
Start: 2025-06-17 | End: 2025-06-17

## 2025-06-17 RX ADMIN — LIDOCAINE HYDROCHLORIDE 10 ML: 5 INJECTION, SOLUTION INFILTRATION at 13:21

## 2025-06-17 RX ADMIN — TRIAMCINOLONE ACETONIDE 60 MG: 40 INJECTION, SUSPENSION INTRA-ARTICULAR; INTRAMUSCULAR at 13:21

## 2025-06-17 RX ADMIN — IOHEXOL 1 ML: 240 INJECTION, SOLUTION INTRATHECAL; INTRAVASCULAR; INTRAVENOUS; ORAL at 13:21

## 2025-06-17 ASSESSMENT — PAIN - FUNCTIONAL ASSESSMENT
PAIN_FUNCTIONAL_ASSESSMENT: 0-10
PAIN_FUNCTIONAL_ASSESSMENT: 0-10

## 2025-06-17 ASSESSMENT — PAIN SCALES - GENERAL
PAINLEVEL_OUTOF10: 0 - NO PAIN
PAINLEVEL_OUTOF10: 5 - MODERATE PAIN

## 2025-06-17 ASSESSMENT — PAIN DESCRIPTION - DESCRIPTORS: DESCRIPTORS: ACHING;SHARP;STABBING

## 2025-06-17 NOTE — DISCHARGE INSTRUCTIONS
You underwent a procedure today    After most procedures, it is recommended that you relax and limit your activity for the remainder of the day unless you have been told otherwise by your pain physician.  You should not drive a car, operate machinery, or make important legal decisions unless otherwise directed by your pain physician.  You may resume your normal activity, including exercise, tomorrow.      Keep a written pain diary of how much pain relief you experienced following the procedure and the length of time of pain relief you experienced pain relief. Following diagnostic injections like medial branch nerve blocks, sacroiliac joint blocks, stellate ganglion injections and other blocks, it is very important you record the specific amount of pain relief you experienced immediately after the injectionand how long it lasted. Your doctor will ask you for this information at your follow up visit.     For all injections, please keep the injection site dry and inspect the site for a couple of days. You may remove the Band-Aid the day of the injection at any time.     Some discomfort, bruising or slight swelling may occur at the injection site. This is not abnormal if it occurs.  If needed you may:    -Take over the counter medication such as Tylenol or Motrin.   -Apply an ice pack for 30 minutes, 2 to 3 times a day for the first 24 hours.     You may shower today; no soaking baths, hot tubs, whirlpools or swimming pools for two days.      If you are given steroids in your injection, it may take 3-5 days for the steroid medication to take effect. You may notice a worsening of your symptoms for 1-2 days after the injection. This is not abnormal.  You may use acetaminophen, ibuprofen, or prescription medication that your doctor may have prescribed for you if you need to do so.     A few common side effects of steroids include facial flushing, sweating, restlessness, irritability,difficulty sleeping, increase in blood  sugar, and increased blood pressure. If you have diabetes, please monitor your blood sugar at least once a day for at least 5 days. If you have poorly controlled high blood pressure, monitoryour blood pressure for at least 2 days and contact your primary care physician if these numbers are unusually high for you.      If you take aspirin or non-steroidal anti-inflammatory drugs (examples are Motrin, Advil, ibuprofen, Naprosyn, Voltaren, Relafen, etc.) you may restart these this evening.    You do not need to discontinue non-aspirin-containing pain medications prior to an injection (examples: Celebrex, tramadol, hydrocodone and acetaminophen).      If you take a blood thinning medication (Coumadin, Lovenox, Fragmin,Ticlid, Plavix, Pradaxa, etc.), please discuss this with your primary care physician/cardiologist and your pain physician. These medications MUST be discontinued before you can have an injection safely, without the risk of uncontrolled bleeding. If these medications are not discontinued for an appropriate period of time, you will not be able to receivean injection.      If you are taking Coumadin, please have your INR checked the morning of your procedure and bringthe result to your appointment unless otherwise instructed. If your INR is over 1.2, your injection may need to be rescheduled to avoid uncontrolled bleeding from the needle placement.     Call [facility] at [phone] between 8am-4pm Monday - Friday if you are experiencing the following:    If you received an epidural or spinal injection:    -Headache that doesnot go away with medicine, is worse when sitting or standing up, and is greatly relieved upon lying down.   -Severe pain worse than or different than your baseline pain.   -Chills or fever (101º F or greater).   -Drainage or signs of infection at the injection site     Go directly to the Emergency Department if you are experiencing the following and received an  epidural or spinal injection:   -Abrupt weakness or progressive weakness in your legs that starts after you leave the clinic.   -Abrupt severe or worsening numbness in your legs.   -Inability to urinate after the injection or loss of bowel or bladder control without the urge to defecate or urinate.     If you have a clinical question that cannot wait until your next appointment, please call [phone] between 8am-4pm Monday - Friday or send a [employer] message. We do our best to return all non-emergency messages within 24 hours, Monday - Friday. A nurse or physician will return your message.      If you need to cancel an appointment, please call the scheduling staff at [phone] during normal business hours or leave a message at least 24 hours in advance.     If you are going to be sedated for your next procedure, you MUST have responsible adult who can legally drive accompany you home. You cannot eat or drink for eight hours prior to the planned procedure if you are going to receive sedation. You may take your non-blood thinning medications with a small sip of water.

## 2025-06-17 NOTE — POST-PROCEDURE NOTE
PATIENT RECEIVED FROM PROCEDURE ALERT AND ORIENTED. DENIES ANY C/O PAIN. BAND-AIDS X2 TO BACK CLEAN , DRY, AND INTACT.   PATIENT ABLE TO TRANSFER TO W/C WELL. DISCHARGE INSTRUCTIONS REVIEWED WITH PATIENT.

## 2025-06-17 NOTE — H&P
"History Of Present Illness  Rachid Yun is a 74 y.o. male presenting with low back and bilateral leg pain.     Past Medical History  Medical History[1]    Surgical History  Surgical History[2]     Social History  He reports that he quit smoking about 37 years ago. His smoking use included cigarettes. He has been exposed to tobacco smoke. He has never used smokeless tobacco. He reports that he does not currently use alcohol. He reports current drug use. Drug: Marijuana.    Family History  Family History[3]     Allergies  Meperidine, Vancomycin, Hydromorphone, Other, and Rifampin    Review of Systems     Physical Exam     Last Recorded Vitals  Blood pressure 116/66, pulse 88, temperature 35.9 °C (96.6 °F), temperature source Temporal, resp. rate 17, height 1.905 m (6' 3\"), weight 107 kg (235 lb), SpO2 98%.    Relevant Results             Assessment & Plan  Lumbar radiculopathy      Interlaminar epidural steroid injection at the L5-S1 level    I spent 10 minutes in the professional and overall care of this patient.      eGnaro Ayala MD         [1]   Past Medical History:  Diagnosis Date    Arthritis     Coronary artery disease     Diabetes mellitus (Multi)     Hypertension     Stroke (Multi)    [2]   Past Surgical History:  Procedure Laterality Date    BACK SURGERY      06/2024    CARDIAC CATHETERIZATION N/A 12/28/2023    Procedure: Left Heart Cath, With LV, Angriography And Grafts;  Surgeon: Phill Hare DO;  Location: [facility] Cardiac Cath Lab;  Service: Cardiovascular;  Laterality: N/A;    CARDIAC CATHETERIZATION N/A 12/28/2023    Procedure: PCI;  Surgeon: Phill Hare DO;  Location: [facility] Cardiac Cath Lab;  Service: Cardiovascular;  Laterality: N/A;    CARDIAC ELECTROPHYSIOLOGY PROCEDURE N/A 11/29/2023    Procedure: Cardioversion;  Surgeon: Phill Hare DO;  Location: [facility] Cardiac Cath Lab;  Service: Cardiovascular;  Laterality: N/A;    CORONARY ANGIOPLASTY WITH STENT PLACEMENT      CORONARY ANGIOPLASTY WITH " STENT PLACEMENT      cardiac stent 12/2023    RADIOFREQUENCY ABLATION     [3]   Family History  Problem Relation Name Age of Onset    Stomach cancer Mother      Diabetes Mother      Coronary artery disease Father      Other (Pacemaker) Father      Other (S/p CABG) Sister      Other (S/p CABG) Brother      Ovarian cancer Sibling      Coronary artery disease Sibling

## 2025-06-18 ENCOUNTER — HOME CARE VISIT (OUTPATIENT)
Dept: HOME HEALTH SERVICES | Facility: HOME HEALTH | Age: 75
End: 2025-06-18
Payer: MEDICARE

## 2025-06-18 VITALS
RESPIRATION RATE: 16 BRPM | SYSTOLIC BLOOD PRESSURE: 135 MMHG | DIASTOLIC BLOOD PRESSURE: 70 MMHG | OXYGEN SATURATION: 96 % | TEMPERATURE: 97.9 F | HEART RATE: 89 BPM

## 2025-06-18 PROCEDURE — G0157 HHC PT ASSISTANT EA 15: HCPCS

## 2025-06-18 SDOH — HEALTH STABILITY: PHYSICAL HEALTH: EXERCISE ACTIVITIES SETS: 1

## 2025-06-18 SDOH — HEALTH STABILITY: PHYSICAL HEALTH: EXERCISE TYPE: BLES

## 2025-06-18 SDOH — HEALTH STABILITY: PHYSICAL HEALTH: PHYSICAL EXERCISE: 20

## 2025-06-18 SDOH — HEALTH STABILITY: PHYSICAL HEALTH: EXERCISE ACTIVITY: APS, QS, GS HIP ABD, HS

## 2025-06-18 SDOH — HEALTH STABILITY: PHYSICAL HEALTH: PHYSICAL EXERCISE: SUPINE

## 2025-06-18 ASSESSMENT — ENCOUNTER SYMPTOMS
HIGHEST PAIN SEVERITY IN PAST 24 HOURS: 7/10
PAIN LOCATION - PAIN FREQUENCY: INTERMITTENT
PAIN LOCATION: RIGHT HIP
PAIN LOCATION - PAIN QUALITY: ACHING
PAIN LOCATION - EXACERBATING FACTORS: ACTIVITY
PERSON REPORTING PAIN: PATIENT
PAIN SEVERITY GOAL: 0/10
PAIN LOCATION - PAIN QUALITY: ACHING
PAIN LOCATION - PAIN SEVERITY: 2/10
PAIN LOCATION - PAIN FREQUENCY: INTERMITTENT
PAIN LOCATION: LEFT HIP
LOWEST PAIN SEVERITY IN PAST 24 HOURS: 2/10
SUBJECTIVE PAIN PROGRESSION: GRADUALLY IMPROVING
PAIN: 1
PAIN LOCATION - PAIN SEVERITY: 2/10
PAIN LOCATION - EXACERBATING FACTORS: ACTIVITY

## 2025-06-18 ASSESSMENT — ACTIVITIES OF DAILY LIVING (ADL)
PHYSICAL_TRANSFERS_DEVICES: ROLLATOR
PHYSICAL TRANSFERS ASSESSED: 1
AMBULATION_DISTANCE/DURATION_TOLERATED: 125'
AMBULATION ASSISTANCE ON FLAT SURFACES: 1
CURRENT_FUNCTION: SUPERVISION
AMBULATION ASSISTANCE: SUPERVISION
AMBULATION ASSISTANCE: 1

## 2025-06-18 NOTE — CARE PLAN
The patient's goals for the shift include feel better    The clinical goals for the shift include rest      Problem: Fall/Injury  Goal: Verbalize understanding of personal risk factors for fall in the hospital  Outcome: Progressing     Problem: Fall/Injury  Goal: Verbalize understanding of risk factor reduction measures to prevent injury from fall in the home  Outcome: Progressing     Problem: Skin  Goal: Decreased wound size/increased tissue granulation at next dressing change  Outcome: Progressing     Problem: Skin  Goal: Decreased wound size/increased tissue granulation at next dressing change  Outcome: Progressing     Problem: Skin  Goal: Prevent/manage excess moisture  Outcome: Progressing     Problem: Pain  Goal: Performs ADL's with improved pain control throughout shift  Outcome: Progressing     Problem: Pain  Goal: Free from acute confusion related to pain meds throughout the shift  Outcome: Progressing        scd

## 2025-06-19 ENCOUNTER — HOME CARE VISIT (OUTPATIENT)
Dept: HOME HEALTH SERVICES | Facility: HOME HEALTH | Age: 75
End: 2025-06-19
Payer: MEDICARE

## 2025-06-19 PROCEDURE — G0156 HHCP-SVS OF AIDE,EA 15 MIN: HCPCS

## 2025-06-20 ENCOUNTER — HOME CARE VISIT (OUTPATIENT)
Dept: HOME HEALTH SERVICES | Facility: HOME HEALTH | Age: 75
End: 2025-06-20
Payer: MEDICARE

## 2025-06-20 VITALS
OXYGEN SATURATION: 97 % | TEMPERATURE: 98.2 F | RESPIRATION RATE: 15 BRPM | SYSTOLIC BLOOD PRESSURE: 126 MMHG | HEART RATE: 81 BPM | DIASTOLIC BLOOD PRESSURE: 65 MMHG

## 2025-06-20 DIAGNOSIS — I48.11 LONGSTANDING PERSISTENT ATRIAL FIBRILLATION (MULTI): ICD-10-CM

## 2025-06-20 PROCEDURE — G0157 HHC PT ASSISTANT EA 15: HCPCS

## 2025-06-20 SDOH — HEALTH STABILITY: PHYSICAL HEALTH: RESISTANCE: BLUE TBAND

## 2025-06-20 SDOH — HEALTH STABILITY: PHYSICAL HEALTH: EXERCISE ACTIVITIES SETS: 1

## 2025-06-20 SDOH — HEALTH STABILITY: PHYSICAL HEALTH: PHYSICAL EXERCISE: SEATED

## 2025-06-20 SDOH — HEALTH STABILITY: PHYSICAL HEALTH: EXERCISE TYPE: BLE EXS

## 2025-06-20 SDOH — HEALTH STABILITY: PHYSICAL HEALTH: PHYSICAL EXERCISE: 20

## 2025-06-20 SDOH — HEALTH STABILITY: PHYSICAL HEALTH: EXERCISE ACTIVITY: LAQS, MARCHES, HIP ABD, HS

## 2025-06-20 ASSESSMENT — ENCOUNTER SYMPTOMS
LOWEST PAIN SEVERITY IN PAST 24 HOURS: 8/10
PAIN LOCATION - PAIN QUALITY: ACHING
PERSON REPORTING PAIN: PATIENT
HIGHEST PAIN SEVERITY IN PAST 24 HOURS: 4/10
PAIN LOCATION - PAIN SEVERITY: 5/10
PAIN LOCATION - EXACERBATING FACTORS: ACTIVITY
PAIN LOCATION: LEFT HIP
PAIN SEVERITY GOAL: 2/10
PAIN LOCATION - PAIN FREQUENCY: INTERMITTENT
PAIN LOCATION: LEFT SHOULDER
PAIN LOCATION - PAIN FREQUENCY: INTERMITTENT
PAIN LOCATION - PAIN SEVERITY: 3/10
PAIN: 1
PAIN LOCATION - PAIN QUALITY: ACHING

## 2025-06-20 ASSESSMENT — ACTIVITIES OF DAILY LIVING (ADL)
AMBULATION ASSISTANCE: 1
CURRENT_FUNCTION: SUPERVISION
AMBULATION ASSISTANCE ON FLAT SURFACES: 1
AMBULATION_DISTANCE/DURATION_TOLERATED: 80'
PHYSICAL TRANSFERS ASSESSED: 1
PHYSICAL_TRANSFERS_DEVICES: ROLLATOR
AMBULATION ASSISTANCE: SUPERVISION

## 2025-06-20 NOTE — ASSESSMENT & PLAN NOTE
Reviewed recent labs: Sodium 141, potassium 5.3, creatinine 2.44 with a GFR of 27.  Blood pressure is well-controlled on the losartan and metoprolol.  Patient does have chronic kidney disease with creatinine of 2.4 and GFR 27.  Blood pressure today though is adequately controlled.

## 2025-06-20 NOTE — ASSESSMENT & PLAN NOTE
Dr. Marshall checked lipids in April: Total cholesterol 159, triglycerides 108, HDL 47 and LDL 92.  We target an LDL less than 70.  I am going to increase the atorvastatin to 40 mg daily and provide the patient with a requisition for a fasting lipid panel to be done prior to his follow-up visit.

## 2025-06-21 VITALS — OXYGEN SATURATION: 99 % | DIASTOLIC BLOOD PRESSURE: 76 MMHG | SYSTOLIC BLOOD PRESSURE: 128 MMHG

## 2025-06-21 ASSESSMENT — ENCOUNTER SYMPTOMS: DENIES PAIN: 1

## 2025-06-23 ENCOUNTER — HOME CARE VISIT (OUTPATIENT)
Dept: HOME HEALTH SERVICES | Facility: HOME HEALTH | Age: 75
End: 2025-06-23
Payer: MEDICARE

## 2025-06-23 PROCEDURE — G0156 HHCP-SVS OF AIDE,EA 15 MIN: HCPCS

## 2025-06-24 ENCOUNTER — OFFICE VISIT (OUTPATIENT)
Facility: CLINIC | Age: 75
End: 2025-06-24
Payer: MEDICARE

## 2025-06-24 VITALS
BODY MASS INDEX: 29.5 KG/M2 | OXYGEN SATURATION: 98 % | HEART RATE: 83 BPM | WEIGHT: 236 LBS | SYSTOLIC BLOOD PRESSURE: 108 MMHG | DIASTOLIC BLOOD PRESSURE: 68 MMHG

## 2025-06-24 DIAGNOSIS — I25.10 ASHD (ARTERIOSCLEROTIC HEART DISEASE): Primary | ICD-10-CM

## 2025-06-24 DIAGNOSIS — N18.4 CHRONIC KIDNEY DISEASE, STAGE 4 (SEVERE) (MULTI): ICD-10-CM

## 2025-06-24 DIAGNOSIS — I48.11 LONGSTANDING PERSISTENT ATRIAL FIBRILLATION (MULTI): ICD-10-CM

## 2025-06-24 DIAGNOSIS — E78.2 MIXED HYPERLIPIDEMIA: ICD-10-CM

## 2025-06-24 DIAGNOSIS — I10 PRIMARY HYPERTENSION: ICD-10-CM

## 2025-06-24 PROCEDURE — 3074F SYST BP LT 130 MM HG: CPT | Performed by: INTERNAL MEDICINE

## 2025-06-24 PROCEDURE — 99212 OFFICE O/P EST SF 10 MIN: CPT

## 2025-06-24 PROCEDURE — G2211 COMPLEX E/M VISIT ADD ON: HCPCS | Performed by: INTERNAL MEDICINE

## 2025-06-24 PROCEDURE — 1036F TOBACCO NON-USER: CPT | Performed by: INTERNAL MEDICINE

## 2025-06-24 PROCEDURE — 4010F ACE/ARB THERAPY RXD/TAKEN: CPT | Performed by: INTERNAL MEDICINE

## 2025-06-24 PROCEDURE — 99214 OFFICE O/P EST MOD 30 MIN: CPT | Performed by: INTERNAL MEDICINE

## 2025-06-24 PROCEDURE — 1125F AMNT PAIN NOTED PAIN PRSNT: CPT | Performed by: INTERNAL MEDICINE

## 2025-06-24 PROCEDURE — 3051F HG A1C>EQUAL 7.0%<8.0%: CPT | Performed by: INTERNAL MEDICINE

## 2025-06-24 PROCEDURE — 3078F DIAST BP <80 MM HG: CPT | Performed by: INTERNAL MEDICINE

## 2025-06-24 PROCEDURE — 1159F MED LIST DOCD IN RCRD: CPT | Performed by: INTERNAL MEDICINE

## 2025-06-24 RX ORDER — ATORVASTATIN CALCIUM 40 MG/1
40 TABLET, FILM COATED ORAL DAILY
Qty: 90 TABLET | Refills: 3 | Status: SHIPPED | OUTPATIENT
Start: 2025-06-24 | End: 2026-06-24

## 2025-06-24 ASSESSMENT — ENCOUNTER SYMPTOMS
BACK PAIN: 1
DYSURIA: 0
BLURRED VISION: 0
SHORTNESS OF BREATH: 0
HEMATURIA: 0
LOSS OF SENSATION IN FEET: 1
PALPITATIONS: 0
NUMBNESS: 0
OCCASIONAL FEELINGS OF UNSTEADINESS: 1
ABDOMINAL PAIN: 0
PARESTHESIAS: 0
COUGH: 0
DYSPNEA ON EXERTION: 0
DEPRESSION: 1

## 2025-06-24 ASSESSMENT — LIFESTYLE VARIABLES: TOTAL SCORE: 0

## 2025-06-24 ASSESSMENT — PAIN SCALES - GENERAL: PAINLEVEL_OUTOF10: 5

## 2025-06-24 NOTE — PROGRESS NOTES
Subjective   Rachid Yun is a 74 y.o. male.    Chief Complaint:  6 month f/u    HPI  74-year-old male with a history of coronary artery disease and longstanding persistent atrial fibrillation reports for coronary follow-up visit.  Patient suffered a non-ST elevation myocardial infarction back in December 2023 and had drug-eluting stent placed into the circumflex artery.  He has also had longstanding persistent atrial fibrillation and is on warfarin for stroke prevention.  Patient states that overall he has been doing relatively well.  He will get some sharp pinprick type pain in his left apical chest area when he lies down on his back to sleep at nights over the last week or 2.  Discomfort lasts for short period of time 15 to 20 minutes and then resolves.  No associated signs or symptoms and he does not feel like previous cardiac discomfort.    Review of Systems   Constitutional: Negative for malaise/fatigue.   HENT:  Negative for congestion.    Eyes:  Negative for blurred vision.   Cardiovascular:  Positive for chest pain. Negative for dyspnea on exertion and palpitations.   Respiratory:  Negative for cough and shortness of breath.    Musculoskeletal:  Positive for back pain. Negative for joint pain.   Gastrointestinal:  Negative for abdominal pain.   Genitourinary:  Negative for dysuria and hematuria.   Neurological:  Negative for numbness and paresthesias.       Objective   Constitutional:       Appearance: Not in distress.   Eyes:      Conjunctiva/sclera: Conjunctivae normal.   Neck:      Vascular: JVD normal.   Pulmonary:      Breath sounds: Normal breath sounds. No wheezing. No rhonchi. No rales.   Cardiovascular:      Normal rate. Regular rhythm.      Murmurs: There is no murmur.      No gallop.  No click. No rub.   Abdominal:      Palpations: Abdomen is soft.   Neurological:      General: No focal deficit present.      Mental Status: Alert.         Lab Review:   Hospital Outpatient Visit on 06/17/2025    Component Date Value    POCT Prothrombin time 06/17/2025 15.1     POCT INR 06/17/2025 1.3     POCT Glucose 06/17/2025 119 (H)    Orders Only on 05/30/2025   Component Date Value    INR 05/29/2025 2.4 (H)     PT 05/29/2025 23.5 (H)    Orders Only on 05/20/2025   Component Date Value    INR 05/19/2025 1.9 (H)     PT 05/19/2025 19.3 (H)    Orders Only on 05/19/2025   Component Date Value    GLUCOSE 05/19/2025 117 (H)     UREA NITROGEN (BUN) 05/19/2025 35 (H)     CREATININE 05/19/2025 1.81 (H)     EGFR 05/19/2025 39 (L)     BUN/CREATININE RATIO 05/19/2025 19     SODIUM 05/19/2025 140     POTASSIUM 05/19/2025 5.3     CHLORIDE 05/19/2025 107     CARBON DIOXIDE 05/19/2025 27     CALCIUM 05/19/2025 8.5 (L)     PHOSPHATE (AS PHOSPHORUS) 05/19/2025 3.4     ALBUMIN 05/19/2025 3.4 (L)     WHITE BLOOD CELL COUNT 05/19/2025 6.4     RED BLOOD CELL COUNT 05/19/2025 4.43     HEMOGLOBIN 05/19/2025 12.8 (L)     HEMATOCRIT 05/19/2025 41.0     MCV 05/19/2025 92.6     MCH 05/19/2025 28.9     MCHC 05/19/2025 31.2 (L)     RDW 05/19/2025 14.8     PLATELET COUNT 05/19/2025 190     MPV 05/19/2025 10.5    Orders Only on 05/06/2025   Component Date Value    INR 05/06/2025 2.5 (H)     PT 05/06/2025 24.8 (H)    Orders Only on 04/29/2025   Component Date Value    INR 04/28/2025 3.1 (H)     PT 04/28/2025 29.9 (H)        Assessment/Plan   The primary encounter diagnosis was ASHD (arteriosclerotic heart disease). Diagnoses of Longstanding persistent atrial fibrillation (Multi), Mixed hyperlipidemia, Primary hypertension, and Chronic kidney disease, stage 4 (severe) (Multi) were also pertinent to this visit.    Mixed hyperlipidemia  Dr. Marshall checked lipids in April: Total cholesterol 159, triglycerides 108, HDL 47 and LDL 92.  We target an LDL less than 70.  I am going to increase the atorvastatin to 40 mg daily and provide the patient with a requisition for a fasting lipid panel to be done prior to his follow-up visit.    Primary  hypertension  Reviewed recent labs: Sodium 141, potassium 5.3, creatinine 2.44 with a GFR of 27.  Blood pressure is well-controlled on the losartan and metoprolol.  Patient does have chronic kidney disease with creatinine of 2.4 and GFR 27.  Blood pressure today though is adequately controlled.    Chronic kidney disease, stage 4 (severe) (Multi)  The patient does have stage IV chronic kidney disease, most recent GFR 27.    Atrial fibrillation (Multi)  Heart rate is adequately controlled on beta-blocker therapy.  INRs are followed by Dr. Marshall.    KISHA (arteriosclerotic heart disease)  Stable with no anginal type chest pains.  He has had atypical sharp pinprick type of pains in the left apical chest area when he lies back flat at night.  I am going to recommend that the patient take an over-the-counter anti-inflammatory agent such as Advil, 1 tablet before he goes to bed at night and do that for a week.  The discomfort certainly sounds inflammatory and hopefully just a short course of an anti-inflammatory agent will decrease and resolve the discomfort.

## 2025-06-24 NOTE — ASSESSMENT & PLAN NOTE
Stable with no anginal type chest pains.  He has had atypical sharp pinprick type of pains in the left apical chest area when he lies back flat at night.  I am going to recommend that the patient take an over-the-counter anti-inflammatory agent such as Advil, 1 tablet before he goes to bed at night and do that for a week.  The discomfort certainly sounds inflammatory and hopefully just a short course of an anti-inflammatory agent will decrease and resolve the discomfort.

## 2025-06-25 ENCOUNTER — HOME CARE VISIT (OUTPATIENT)
Dept: HOME HEALTH SERVICES | Facility: HOME HEALTH | Age: 75
End: 2025-06-25
Payer: MEDICARE

## 2025-06-25 VITALS
TEMPERATURE: 95.5 F | HEART RATE: 78 BPM | RESPIRATION RATE: 16 BRPM | OXYGEN SATURATION: 95 % | DIASTOLIC BLOOD PRESSURE: 65 MMHG | SYSTOLIC BLOOD PRESSURE: 128 MMHG

## 2025-06-25 PROCEDURE — G0157 HHC PT ASSISTANT EA 15: HCPCS

## 2025-06-25 SDOH — HEALTH STABILITY: PHYSICAL HEALTH: PHYSICAL EXERCISE: SUPINE

## 2025-06-25 SDOH — HEALTH STABILITY: PHYSICAL HEALTH: PHYSICAL EXERCISE: 10

## 2025-06-25 SDOH — HEALTH STABILITY: PHYSICAL HEALTH: EXERCISE ACTIVITIES SETS: 2

## 2025-06-25 SDOH — HEALTH STABILITY: PHYSICAL HEALTH: EXERCISE TYPE: BLE EXS

## 2025-06-25 SDOH — HEALTH STABILITY: PHYSICAL HEALTH: EXERCISE ACTIVITY: APS, QS, GS HIP ABD, HS

## 2025-06-25 SDOH — HEALTH STABILITY: PHYSICAL HEALTH: EXERCISE ACTIVITY: SLRS

## 2025-06-25 SDOH — HEALTH STABILITY: PHYSICAL HEALTH: EXERCISE ACTIVITIES SETS: 1

## 2025-06-25 ASSESSMENT — ENCOUNTER SYMPTOMS
LOWEST PAIN SEVERITY IN PAST 24 HOURS: 2/10
PAIN LOCATION - PAIN QUALITY: ACHING
PAIN LOCATION - RELIEVING FACTORS: REST, POSITIONING
PAIN LOCATION - PAIN SEVERITY: 5/10
SUBJECTIVE PAIN PROGRESSION: GRADUALLY IMPROVING
PAIN LOCATION: LEFT SHOULDER
PAIN SEVERITY GOAL: 2/10
HIGHEST PAIN SEVERITY IN PAST 24 HOURS: 7/10

## 2025-06-25 ASSESSMENT — ACTIVITIES OF DAILY LIVING (ADL)
PHYSICAL_TRANSFERS_DEVICES: ROLLATOR
PHYSICAL TRANSFERS ASSESSED: 1
CURRENT_FUNCTION: SUPERVISION
AMBULATION ASSISTANCE ON FLAT SURFACES: 1
AMBULATION ASSISTANCE: SUPERVISION
AMBULATION_DISTANCE/DURATION_TOLERATED: 130'
AMBULATION ASSISTANCE: 1

## 2025-06-26 ENCOUNTER — HOME CARE VISIT (OUTPATIENT)
Dept: HOME HEALTH SERVICES | Facility: HOME HEALTH | Age: 75
End: 2025-06-26
Payer: MEDICARE

## 2025-06-26 VITALS — HEART RATE: 76 BPM | SYSTOLIC BLOOD PRESSURE: 135 MMHG | OXYGEN SATURATION: 99 % | DIASTOLIC BLOOD PRESSURE: 76 MMHG

## 2025-06-26 PROCEDURE — G0158 HHC OT ASSISTANT EA 15: HCPCS | Mod: CO

## 2025-06-26 PROCEDURE — G0156 HHCP-SVS OF AIDE,EA 15 MIN: HCPCS

## 2025-06-26 ASSESSMENT — ENCOUNTER SYMPTOMS
PAIN: 1
LOWEST PAIN SEVERITY IN PAST 24 HOURS: 0/10
PAIN LOCATION: BACK
PAIN SEVERITY GOAL: 0/10
HIGHEST PAIN SEVERITY IN PAST 24 HOURS: 0/10

## 2025-06-27 ENCOUNTER — HOME CARE VISIT (OUTPATIENT)
Dept: HOME HEALTH SERVICES | Facility: HOME HEALTH | Age: 75
End: 2025-06-27
Payer: MEDICARE

## 2025-06-27 VITALS
HEART RATE: 73 BPM | DIASTOLIC BLOOD PRESSURE: 65 MMHG | RESPIRATION RATE: 16 BRPM | SYSTOLIC BLOOD PRESSURE: 135 MMHG | OXYGEN SATURATION: 96 % | TEMPERATURE: 98.3 F

## 2025-06-27 DIAGNOSIS — I48.11 LONGSTANDING PERSISTENT ATRIAL FIBRILLATION (MULTI): ICD-10-CM

## 2025-06-27 PROCEDURE — G0157 HHC PT ASSISTANT EA 15: HCPCS

## 2025-06-27 SDOH — HEALTH STABILITY: PHYSICAL HEALTH: RESISTANCE: BLUE TBAND

## 2025-06-27 SDOH — HEALTH STABILITY: PHYSICAL HEALTH: PHYSICAL EXERCISE: 15

## 2025-06-27 SDOH — HEALTH STABILITY: PHYSICAL HEALTH: EXERCISE TYPE: BLE EXS

## 2025-06-27 SDOH — HEALTH STABILITY: PHYSICAL HEALTH: EXERCISE ACTIVITIES SETS: 1

## 2025-06-27 SDOH — HEALTH STABILITY: PHYSICAL HEALTH: PHYSICAL EXERCISE: SEATED

## 2025-06-27 SDOH — HEALTH STABILITY: PHYSICAL HEALTH: EXERCISE ACTIVITY: LAQS, MARCHES, HIP ABD, HIP ADD WITHNPILLOW

## 2025-06-27 ASSESSMENT — ENCOUNTER SYMPTOMS
PAIN LOCATION: LEFT SHOULDER
PAIN LOCATION - EXACERBATING FACTORS: MVMT, ACTIVITY
PAIN LOCATION - PAIN QUALITY: ACHING
PAIN SEVERITY GOAL: 0/10
PERSON REPORTING PAIN: PATIENT
LOWEST PAIN SEVERITY IN PAST 24 HOURS: 1/10
PAIN LOCATION - PAIN SEVERITY: 1/10
HIGHEST PAIN SEVERITY IN PAST 24 HOURS: 5/10
SUBJECTIVE PAIN PROGRESSION: UNCHANGED
PAIN LOCATION - PAIN FREQUENCY: INTERMITTENT
PAIN: 1

## 2025-06-27 ASSESSMENT — ACTIVITIES OF DAILY LIVING (ADL): AMBULATION ASSISTANCE ON FLAT SURFACES: 1

## 2025-06-28 ASSESSMENT — ACTIVITIES OF DAILY LIVING (ADL)
AMBULATION ASSISTANCE: 1
AMBULATION ASSISTANCE: SUPERVISION
PHYSICAL TRANSFERS ASSESSED: 1
PHYSICAL_TRANSFERS_DEVICES: S/MI WITH ROLLATOR

## 2025-06-30 ENCOUNTER — HOME CARE VISIT (OUTPATIENT)
Dept: HOME HEALTH SERVICES | Facility: HOME HEALTH | Age: 75
End: 2025-06-30
Payer: MEDICARE

## 2025-06-30 PROCEDURE — G0158 HHC OT ASSISTANT EA 15: HCPCS | Mod: CO

## 2025-06-30 PROCEDURE — G0156 HHCP-SVS OF AIDE,EA 15 MIN: HCPCS

## 2025-07-01 ENCOUNTER — HOME CARE VISIT (OUTPATIENT)
Dept: HOME HEALTH SERVICES | Facility: HOME HEALTH | Age: 75
End: 2025-07-01
Payer: MEDICARE

## 2025-07-01 VITALS — HEART RATE: 68 BPM | SYSTOLIC BLOOD PRESSURE: 120 MMHG | DIASTOLIC BLOOD PRESSURE: 76 MMHG | OXYGEN SATURATION: 99 %

## 2025-07-01 VITALS
DIASTOLIC BLOOD PRESSURE: 62 MMHG | OXYGEN SATURATION: 96 % | RESPIRATION RATE: 16 BRPM | HEART RATE: 79 BPM | TEMPERATURE: 97.5 F | SYSTOLIC BLOOD PRESSURE: 132 MMHG

## 2025-07-01 DIAGNOSIS — I48.11 LONGSTANDING PERSISTENT ATRIAL FIBRILLATION (MULTI): ICD-10-CM

## 2025-07-01 PROCEDURE — G0157 HHC PT ASSISTANT EA 15: HCPCS

## 2025-07-01 SDOH — HEALTH STABILITY: PHYSICAL HEALTH: EXERCISE ACTIVITIES SETS: 2

## 2025-07-01 SDOH — HEALTH STABILITY: PHYSICAL HEALTH: EXERCISE TYPE: BLE EXS

## 2025-07-01 SDOH — HEALTH STABILITY: PHYSICAL HEALTH: EXERCISE ACTIVITY: APS, QS, GS HIP ABD, HS, SLRS

## 2025-07-01 SDOH — HEALTH STABILITY: PHYSICAL HEALTH: PHYSICAL EXERCISE: 10

## 2025-07-01 SDOH — HEALTH STABILITY: PHYSICAL HEALTH: PHYSICAL EXERCISE: SUPINE

## 2025-07-01 ASSESSMENT — ACTIVITIES OF DAILY LIVING (ADL)
DRESSING_LB_CURRENT_FUNCTION: MINIMUM ASSIST
AMBULATION ASSISTANCE: 1
PHYSICAL TRANSFERS ASSESSED: 1
PHYSICAL TRANSFERS ASSESSED: 1
AMBULATION ASSISTANCE: 1
PHYSICAL_TRANSFERS_DEVICES: ROLLATOR
AMBULATION_DISTANCE/DURATION_TOLERATED: 150'
AMBULATION ASSISTANCE: STAND BY ASSIST
CURRENT_FUNCTION: SUPERVISION
AMBULATION ASSISTANCE: SUPERVISION
AMBULATION ASSISTANCE: SUPERVISION
CURRENT_FUNCTION: SUPERVISION
AMBULATION ASSISTANCE ON FLAT SURFACES: 1

## 2025-07-01 ASSESSMENT — ENCOUNTER SYMPTOMS
PAIN LOCATION - PAIN FREQUENCY: INTERMITTENT
LOWEST PAIN SEVERITY IN PAST 24 HOURS: 4/10
PAIN LOCATION - PAIN QUALITY: ACHING
HIGHEST PAIN SEVERITY IN PAST 24 HOURS: 7/10
PAIN LOCATION: LEFT SHOULDER
PAIN SEVERITY GOAL: 0/10
PAIN LOCATION: BACK
SUBJECTIVE PAIN PROGRESSION: WAXING AND WANING
LOWEST PAIN SEVERITY IN PAST 24 HOURS: 2/10
SUBJECTIVE PAIN PROGRESSION: UNCHANGED
HIGHEST PAIN SEVERITY IN PAST 24 HOURS: 2/10
PAIN SEVERITY GOAL: 0/10
PAIN: 1
PAIN LOCATION - PAIN SEVERITY: 4/10

## 2025-07-02 LAB
INR PPP: 1.7
PROTHROMBIN TIME: 17.5 SEC (ref 9–11.5)

## 2025-07-03 ENCOUNTER — HOME CARE VISIT (OUTPATIENT)
Dept: HOME HEALTH SERVICES | Facility: HOME HEALTH | Age: 75
End: 2025-07-03
Payer: MEDICARE

## 2025-07-03 PROCEDURE — G0156 HHCP-SVS OF AIDE,EA 15 MIN: HCPCS

## 2025-07-04 DIAGNOSIS — I48.11 LONGSTANDING PERSISTENT ATRIAL FIBRILLATION (MULTI): ICD-10-CM

## 2025-07-07 ENCOUNTER — HOME CARE VISIT (OUTPATIENT)
Dept: HOME HEALTH SERVICES | Facility: HOME HEALTH | Age: 75
End: 2025-07-07
Payer: MEDICARE

## 2025-07-07 PROCEDURE — G0156 HHCP-SVS OF AIDE,EA 15 MIN: HCPCS

## 2025-07-08 ENCOUNTER — HOME CARE VISIT (OUTPATIENT)
Dept: HOME HEALTH SERVICES | Facility: HOME HEALTH | Age: 75
End: 2025-07-08
Payer: MEDICARE

## 2025-07-08 VITALS
DIASTOLIC BLOOD PRESSURE: 68 MMHG | SYSTOLIC BLOOD PRESSURE: 140 MMHG | OXYGEN SATURATION: 97 % | TEMPERATURE: 97.8 F | RESPIRATION RATE: 16 BRPM | HEART RATE: 86 BPM

## 2025-07-08 DIAGNOSIS — I48.11 LONGSTANDING PERSISTENT ATRIAL FIBRILLATION (MULTI): ICD-10-CM

## 2025-07-08 PROCEDURE — G0152 HHCP-SERV OF OT,EA 15 MIN: HCPCS

## 2025-07-08 PROCEDURE — G0157 HHC PT ASSISTANT EA 15: HCPCS

## 2025-07-08 SDOH — HEALTH STABILITY: PHYSICAL HEALTH: PHYSICAL EXERCISE: SEATED

## 2025-07-08 SDOH — HEALTH STABILITY: PHYSICAL HEALTH: EXERCISE ACTIVITY: LAQS, MARCHES, HIP ABD , HIP ADD AND SIDE STEP OUTS

## 2025-07-08 SDOH — HEALTH STABILITY: PHYSICAL HEALTH: EXERCISE ACTIVITIES SETS: 1

## 2025-07-08 SDOH — HEALTH STABILITY: PHYSICAL HEALTH: RESISTANCE: GREEN TBAND

## 2025-07-08 SDOH — HEALTH STABILITY: PHYSICAL HEALTH: EXERCISE TYPE: BLE EXS

## 2025-07-08 SDOH — HEALTH STABILITY: PHYSICAL HEALTH: PHYSICAL EXERCISE: 20

## 2025-07-08 ASSESSMENT — ENCOUNTER SYMPTOMS
PAIN LOCATION - PAIN QUALITY: ACHING
PAIN: 1
LOWEST PAIN SEVERITY IN PAST 24 HOURS: 5/10
PAIN LOCATION - PAIN FREQUENCY: INTERMITTENT
PAIN LOCATION - PAIN SEVERITY: 5/10
HIGHEST PAIN SEVERITY IN PAST 24 HOURS: 5/10
PAIN SEVERITY GOAL: 0/10
PAIN LOCATION - PAIN FREQUENCY: INTERMITTENT
PAIN LOCATION - PAIN QUALITY: ACHING
PAIN LOCATION: LEFT SHOULDER
PAIN LOCATION - PAIN FREQUENCY: INTERMITTENT
PAIN LOCATION: LEFT SHOULDER
PERSON REPORTING PAIN: PATIENT
LOWEST PAIN SEVERITY IN PAST 24 HOURS: 4/10
PAIN: 1
PERSON REPORTING PAIN: PATIENT
PAIN LOCATION: RIGHT SHOULDER
PAIN SEVERITY GOAL: 0/10
PAIN LOCATION - EXACERBATING FACTORS: ACTIVITY
PAIN LOCATION - PAIN SEVERITY: 4/10
PAIN LOCATION - PAIN QUALITY: ACHING
HIGHEST PAIN SEVERITY IN PAST 24 HOURS: 6/10
PAIN LOCATION - PAIN SEVERITY: 5/10
SUBJECTIVE PAIN PROGRESSION: UNCHANGED

## 2025-07-08 ASSESSMENT — ACTIVITIES OF DAILY LIVING (ADL)
CURRENT_FUNCTION: INDEPENDENT
PHYSICAL TRANSFERS ASSESSED: 1
AMBULATION ASSISTANCE ON FLAT SURFACES: 1
AMBULATION ASSISTANCE: 1
AMBULATION_DISTANCE/DURATION_TOLERATED: 120'
PHYSICAL_TRANSFERS_DEVICES: MI
PHYSICAL TRANSFERS ASSESSED: 1

## 2025-07-09 ENCOUNTER — OFFICE VISIT (OUTPATIENT)
Dept: PAIN MEDICINE | Facility: CLINIC | Age: 75
End: 2025-07-09
Payer: MEDICARE

## 2025-07-09 ENCOUNTER — HOME CARE VISIT (OUTPATIENT)
Dept: HOME HEALTH SERVICES | Facility: HOME HEALTH | Age: 75
End: 2025-07-09
Payer: MEDICARE

## 2025-07-09 ENCOUNTER — PROCEDURE VISIT (OUTPATIENT)
Dept: SLEEP MEDICINE | Facility: HOSPITAL | Age: 75
End: 2025-07-09
Payer: MEDICARE

## 2025-07-09 VITALS — SYSTOLIC BLOOD PRESSURE: 108 MMHG | DIASTOLIC BLOOD PRESSURE: 60 MMHG | OXYGEN SATURATION: 97 % | HEART RATE: 90 BPM

## 2025-07-09 DIAGNOSIS — M25.512 CHRONIC PAIN OF BOTH SHOULDERS: Primary | ICD-10-CM

## 2025-07-09 DIAGNOSIS — G47.33 OBSTRUCTIVE SLEEP APNEA: ICD-10-CM

## 2025-07-09 DIAGNOSIS — G89.29 CHRONIC PAIN OF BOTH SHOULDERS: Primary | ICD-10-CM

## 2025-07-09 DIAGNOSIS — M25.511 CHRONIC PAIN OF BOTH SHOULDERS: Primary | ICD-10-CM

## 2025-07-09 PROCEDURE — 99214 OFFICE O/P EST MOD 30 MIN: CPT | Mod: 25 | Performed by: ANESTHESIOLOGY

## 2025-07-09 PROCEDURE — 3078F DIAST BP <80 MM HG: CPT | Performed by: ANESTHESIOLOGY

## 2025-07-09 PROCEDURE — 2500000004 HC RX 250 GENERAL PHARMACY W/ HCPCS (ALT 636 FOR OP/ED): Performed by: ANESTHESIOLOGY

## 2025-07-09 PROCEDURE — 3074F SYST BP LT 130 MM HG: CPT | Performed by: ANESTHESIOLOGY

## 2025-07-09 PROCEDURE — 1159F MED LIST DOCD IN RCRD: CPT | Performed by: ANESTHESIOLOGY

## 2025-07-09 PROCEDURE — 96372 THER/PROPH/DIAG INJ SC/IM: CPT | Performed by: ANESTHESIOLOGY

## 2025-07-09 PROCEDURE — 20610 DRAIN/INJ JOINT/BURSA W/O US: CPT | Performed by: ANESTHESIOLOGY

## 2025-07-09 PROCEDURE — 99214 OFFICE O/P EST MOD 30 MIN: CPT | Performed by: ANESTHESIOLOGY

## 2025-07-09 PROCEDURE — 1036F TOBACCO NON-USER: CPT | Performed by: ANESTHESIOLOGY

## 2025-07-09 PROCEDURE — 20610 DRAIN/INJ JOINT/BURSA W/O US: CPT | Mod: 50 | Performed by: ANESTHESIOLOGY

## 2025-07-09 PROCEDURE — 3051F HG A1C>EQUAL 7.0%<8.0%: CPT | Performed by: ANESTHESIOLOGY

## 2025-07-09 PROCEDURE — 4010F ACE/ARB THERAPY RXD/TAKEN: CPT | Performed by: ANESTHESIOLOGY

## 2025-07-09 RX ORDER — LIDOCAINE HYDROCHLORIDE 10 MG/ML
5 INJECTION, SOLUTION EPIDURAL; INFILTRATION; INTRACAUDAL; PERINEURAL ONCE
Status: COMPLETED | OUTPATIENT
Start: 2025-07-09 | End: 2025-07-09

## 2025-07-09 RX ORDER — TRIAMCINOLONE ACETONIDE 40 MG/ML
80 INJECTION, SUSPENSION INTRA-ARTICULAR; INTRAMUSCULAR ONCE
Status: COMPLETED | OUTPATIENT
Start: 2025-07-09 | End: 2025-07-09

## 2025-07-09 RX ADMIN — TRIAMCINOLONE ACETONIDE 80 MG: 40 SUSPENSION INTRA-ARTERIAL; INTRAMUSCULAR at 14:56

## 2025-07-09 RX ADMIN — LIDOCAINE HYDROCHLORIDE 50 MG: 10 SOLUTION INTRAVENOUS at 14:55

## 2025-07-09 ASSESSMENT — ENCOUNTER SYMPTOMS
LOSS OF SENSATION IN FEET: 0
DEPRESSION: 0
OCCASIONAL FEELINGS OF UNSTEADINESS: 0

## 2025-07-09 ASSESSMENT — PAIN DESCRIPTION - DESCRIPTORS: DESCRIPTORS: ACHING;SHARP;STABBING

## 2025-07-09 ASSESSMENT — PAIN SCALES - GENERAL
PAINLEVEL_OUTOF10: 5
PAINLEVEL_OUTOF10: 5 - MODERATE PAIN

## 2025-07-09 ASSESSMENT — PAIN - FUNCTIONAL ASSESSMENT: PAIN_FUNCTIONAL_ASSESSMENT: 0-10

## 2025-07-09 ASSESSMENT — ACTIVITIES OF DAILY LIVING (ADL)
OASIS_M1830: 03
HOME_HEALTH_OASIS: 01

## 2025-07-09 NOTE — PROGRESS NOTES
Type of Study: HOME SLEEP STUDY - NOMAD     The patient received equipment and instructions for use of the [employer] [facility] Nomad HSAT device. The patient was instructed how to apply the effort belts, cannula, thermistor. It was also explained how the Nomad and oximeter components work.  The patient was asked to record their sleep for an 8-hour period.     The patient was informed of their responsibility for the device and acknowledged this by signing the HSAT device contract. The patient was asked to return the device on 07/10/2025 between the hours of 0800-0200pm to the [facility].     The patient was instructed to call 911 as usual for any medical- emergencies while at home.  The patient was also given a phone number for troubleshooting when using the device in case there were additional questions.

## 2025-07-09 NOTE — PROGRESS NOTES
The patient is a 74-year-old male with low back and bilateral leg pain as well as bilateral shoulder pain.  The patient reports that his radiating leg pain is much improved after his bilateral transforaminal epidural steroid injection.  The back pain responds well to radiofrequency ablation.  He is still doing well after his last ablation in March of this year.  The patient reports that his shoulder pain is returned and he request bilateral shoulder injections again today.      Review of Systems   Constitutional:  Positive for activity change.   Musculoskeletal:  Positive for arthralgias, back pain and gait problem.   All other systems reviewed and are negative.     GENERAL: alert and appropriate, in no distress, well-hydrated, well-nourished and interactive  SKIN: no rash noted, surgical scars well healed  RESPIRATORY: breathing non-labored and no grunting/flaring/retractions  CHEST: equal chest rise with normal respiratory effort  ABDOMEN: soft and non-tender  BACK: back normal in appearance, spine with reduced ROM  EXTREMITIES: strength intact, bilateral shoulder range of motion reduced and painful   NEUROLOGIC: gait antalgic, SLR negative, sensation grossly intact.    Assessment and Plan    -Chronicity--chronic musculoskeletal pain    -Diagnostics--no new imaging ordered    -Pharmacologic--no change    -Psychologic--no need for psychologic intervention from my standpoint.  There are no mental health issues of which I am aware that are contributing to the patient's pain.  There are no substance abuse or alcohol abuse issues of which I am aware that are contributing to the patient's pain.    -Physical--we discussed the importance of physical therapy and exercise.  We discussed avoidance and modification techniques.    -Intervention--bilateral subacromial shoulder injections today.  I explained the risks benefits and alternatives of the procedure to the patient.  The patient wishes to proceed.    I spent time  educating the patient on the condition including the treatment and the prognosis.  I invited the patient to call at anytime with any questions.      The patient was placed in the seated position.  His skin was prepped with isopropyl alcohol.  A 25-gauge spinal needle was used to inject 40 mg of triamcinolone and 2.5 mL of 1% lidocaine into the left subacromial space.  The procedure was repeated on the right side.  There were no complications.  The patient tolerated the procedure well.

## 2025-07-10 ENCOUNTER — PREP FOR PROCEDURE (OUTPATIENT)
Dept: RADIOLOGY | Facility: EXTERNAL LOCATION | Age: 75
End: 2025-07-10
Payer: MEDICARE

## 2025-07-10 DIAGNOSIS — G89.29 CHRONIC PAIN OF BOTH SHOULDERS: Primary | ICD-10-CM

## 2025-07-10 DIAGNOSIS — M25.512 CHRONIC PAIN OF BOTH SHOULDERS: Primary | ICD-10-CM

## 2025-07-10 DIAGNOSIS — M25.511 CHRONIC PAIN OF BOTH SHOULDERS: Primary | ICD-10-CM

## 2025-07-11 ENCOUNTER — APPOINTMENT (OUTPATIENT)
Facility: CLINIC | Age: 75
End: 2025-07-11
Payer: MEDICARE

## 2025-07-11 DIAGNOSIS — I48.11 LONGSTANDING PERSISTENT ATRIAL FIBRILLATION (MULTI): ICD-10-CM

## 2025-07-15 DIAGNOSIS — I48.11 LONGSTANDING PERSISTENT ATRIAL FIBRILLATION (MULTI): ICD-10-CM

## 2025-07-15 LAB
INR PPP: 1.7
PROTHROMBIN TIME: 17.1 SEC (ref 9–11.5)

## 2025-07-18 DIAGNOSIS — I48.11 LONGSTANDING PERSISTENT ATRIAL FIBRILLATION (MULTI): ICD-10-CM

## 2025-07-22 DIAGNOSIS — I48.11 LONGSTANDING PERSISTENT ATRIAL FIBRILLATION (MULTI): ICD-10-CM

## 2025-07-25 DIAGNOSIS — I48.11 LONGSTANDING PERSISTENT ATRIAL FIBRILLATION (MULTI): ICD-10-CM

## 2025-07-29 DIAGNOSIS — I48.11 LONGSTANDING PERSISTENT ATRIAL FIBRILLATION (MULTI): ICD-10-CM

## 2025-07-30 LAB
INR PPP: 1.9
PROTHROMBIN TIME: 19.3 SEC (ref 9–11.5)

## 2025-08-01 DIAGNOSIS — I48.11 LONGSTANDING PERSISTENT ATRIAL FIBRILLATION (MULTI): ICD-10-CM

## 2025-08-01 DIAGNOSIS — E78.2 MIXED HYPERLIPIDEMIA: ICD-10-CM

## 2025-08-05 DIAGNOSIS — I48.11 LONGSTANDING PERSISTENT ATRIAL FIBRILLATION (MULTI): ICD-10-CM

## 2025-08-08 DIAGNOSIS — I48.11 LONGSTANDING PERSISTENT ATRIAL FIBRILLATION (MULTI): ICD-10-CM

## 2025-08-12 DIAGNOSIS — I48.11 LONGSTANDING PERSISTENT ATRIAL FIBRILLATION (MULTI): ICD-10-CM

## 2025-08-13 ENCOUNTER — OFFICE VISIT (OUTPATIENT)
Dept: PRIMARY CARE | Facility: CLINIC | Age: 75
End: 2025-08-13
Payer: MEDICARE

## 2025-08-13 VITALS
OXYGEN SATURATION: 98 % | SYSTOLIC BLOOD PRESSURE: 130 MMHG | BODY MASS INDEX: 30.09 KG/M2 | WEIGHT: 242 LBS | HEART RATE: 76 BPM | RESPIRATION RATE: 16 BRPM | DIASTOLIC BLOOD PRESSURE: 78 MMHG | HEIGHT: 75 IN

## 2025-08-13 DIAGNOSIS — I25.10 ATHEROSCLEROSIS OF NATIVE CORONARY ARTERY OF NATIVE HEART WITHOUT ANGINA PECTORIS: ICD-10-CM

## 2025-08-13 DIAGNOSIS — E11.51 TYPE 2 DIABETES MELLITUS WITH DIABETIC PERIPHERAL ANGIOPATHY WITHOUT GANGRENE, WITH LONG-TERM CURRENT USE OF INSULIN (MULTI): Primary | ICD-10-CM

## 2025-08-13 DIAGNOSIS — Z71.85 IMMUNIZATION COUNSELING: ICD-10-CM

## 2025-08-13 DIAGNOSIS — Z12.5 SCREENING FOR PROSTATE CANCER: ICD-10-CM

## 2025-08-13 DIAGNOSIS — N39.41 URGE INCONTINENCE OF URINE: ICD-10-CM

## 2025-08-13 DIAGNOSIS — G47.33 OBSTRUCTIVE SLEEP APNEA: ICD-10-CM

## 2025-08-13 DIAGNOSIS — I48.11 LONGSTANDING PERSISTENT ATRIAL FIBRILLATION (MULTI): ICD-10-CM

## 2025-08-13 DIAGNOSIS — N40.1 BENIGN PROSTATIC HYPERPLASIA WITH URINARY FREQUENCY: ICD-10-CM

## 2025-08-13 DIAGNOSIS — Z79.4 TYPE 2 DIABETES MELLITUS WITH DIABETIC PERIPHERAL ANGIOPATHY WITHOUT GANGRENE, WITH LONG-TERM CURRENT USE OF INSULIN (MULTI): Primary | ICD-10-CM

## 2025-08-13 DIAGNOSIS — R35.0 BENIGN PROSTATIC HYPERPLASIA WITH URINARY FREQUENCY: ICD-10-CM

## 2025-08-13 DIAGNOSIS — M48.061 SPINAL STENOSIS OF LUMBAR REGION WITHOUT NEUROGENIC CLAUDICATION: ICD-10-CM

## 2025-08-13 DIAGNOSIS — N18.32 STAGE 3B CHRONIC KIDNEY DISEASE (MULTI): ICD-10-CM

## 2025-08-13 DIAGNOSIS — L21.9 SEBORRHEIC DERMATITIS OF SCALP: ICD-10-CM

## 2025-08-13 DIAGNOSIS — Z00.00 ROUTINE ADULT HEALTH MAINTENANCE: ICD-10-CM

## 2025-08-13 LAB
POC APPEARANCE, URINE: CLEAR
POC BILIRUBIN, URINE: NEGATIVE
POC BLOOD, URINE: ABNORMAL
POC COLOR, URINE: YELLOW
POC GLUCOSE, URINE: NEGATIVE MG/DL
POC KETONES, URINE: NEGATIVE MG/DL
POC LEUKOCYTES, URINE: ABNORMAL
POC NITRITE,URINE: NEGATIVE
POC PH, URINE: 6 PH
POC PROTEIN, URINE: ABNORMAL MG/DL
POC SPECIFIC GRAVITY, URINE: 1.02
POC UROBILINOGEN, URINE: 0.2 EU/DL

## 2025-08-13 PROCEDURE — 1125F AMNT PAIN NOTED PAIN PRSNT: CPT | Performed by: INTERNAL MEDICINE

## 2025-08-13 PROCEDURE — 3051F HG A1C>EQUAL 7.0%<8.0%: CPT | Performed by: INTERNAL MEDICINE

## 2025-08-13 PROCEDURE — 1170F FXNL STATUS ASSESSED: CPT | Performed by: INTERNAL MEDICINE

## 2025-08-13 PROCEDURE — 3078F DIAST BP <80 MM HG: CPT | Performed by: INTERNAL MEDICINE

## 2025-08-13 PROCEDURE — G2211 COMPLEX E/M VISIT ADD ON: HCPCS | Performed by: INTERNAL MEDICINE

## 2025-08-13 PROCEDURE — 99214 OFFICE O/P EST MOD 30 MIN: CPT | Performed by: INTERNAL MEDICINE

## 2025-08-13 PROCEDURE — 1159F MED LIST DOCD IN RCRD: CPT | Performed by: INTERNAL MEDICINE

## 2025-08-13 PROCEDURE — 3075F SYST BP GE 130 - 139MM HG: CPT | Performed by: INTERNAL MEDICINE

## 2025-08-13 PROCEDURE — 81002 URINALYSIS NONAUTO W/O SCOPE: CPT | Performed by: INTERNAL MEDICINE

## 2025-08-13 PROCEDURE — 4010F ACE/ARB THERAPY RXD/TAKEN: CPT | Performed by: INTERNAL MEDICINE

## 2025-08-13 RX ORDER — BLOOD-GLUCOSE METER
1 EACH MISCELLANEOUS
Qty: 200 STRIP | Refills: 3 | Status: SHIPPED | OUTPATIENT
Start: 2025-08-13 | End: 2026-08-13

## 2025-08-13 ASSESSMENT — ACTIVITIES OF DAILY LIVING (ADL)
STIL DRIVING: YES
DOING HOUSEWORK: INDEPENDENT
TAKING MEDICATION: INDEPENDENT
TOILETING: INDEPENDENT
NEEDS ASSISTANCE WITH FOOD: INDEPENDENT
PILL BOX USED: NO
DRESSING: INDEPENDENT
PREPARING MEALS: INDEPENDENT
USING TELEPHONE: INDEPENDENT
USING TRANSPORTATION: INDEPENDENT
JUDGMENT_ADEQUATE_SAFELY_COMPLETE_DAILY_ACTIVITIES: YES
GROCERY SHOPPING: INDEPENDENT
FEEDING: INDEPENDENT
BATHING: INDEPENDENT
EATING: INDEPENDENT
ADEQUATE_TO_COMPLETE_ADL: YES
MANAGING FINANCES: INDEPENDENT

## 2025-08-13 ASSESSMENT — ENCOUNTER SYMPTOMS
DYSURIA: 0
WEAKNESS: 0
HEADACHES: 0
SINUS PAIN: 0
FEVER: 0
CHILLS: 0
ARTHRALGIAS: 0
CONSTIPATION: 0
NAUSEA: 0
RHINORRHEA: 0
ABDOMINAL PAIN: 0
FREQUENCY: 1
SHORTNESS OF BREATH: 0
CONFUSION: 1
FATIGUE: 0
APPETITE CHANGE: 0
DIARRHEA: 0
COUGH: 0
SLEEP DISTURBANCE: 0
DIFFICULTY URINATING: 0
LOSS OF SENSATION IN FEET: 0
SORE THROAT: 0
NERVOUS/ANXIOUS: 0
JOINT SWELLING: 0
PALPITATIONS: 1
VOMITING: 0
DEPRESSION: 0
OCCASIONAL FEELINGS OF UNSTEADINESS: 1
DIZZINESS: 0
HEMATURIA: 0

## 2025-08-13 ASSESSMENT — MINI MENTAL STATE EXAM
PLACE DESIGN, ERASER AND PENCIL IN FRONT OF THE PERSON.  SAY:  COPY THIS DESIGN PLEASE.  SHOW: DESIGN. ALLOW: MULTIPLE TRIES. WAIT UNTIL PERSON IS FINISHED AND HANDS IT BACK. SCORE: ONLY FOR DIAGRAM WITH 4-SIDED FIGURE BETWEEN TWO 5-SIDED FIGURES: 1 CORRECT
NAME OR REPEAT 3 OBJECTS - (APPLE, TABLE, PENNY) OR (BALL, TREE, FLAG): 3 CORRECT
RECALL THE 3 OBJECTS FROM ABOVE (APPLE, TABLE, PENNY) OR (BALL, TREE, FLAG): 3 CORRECT
WHAT STATE, COUNTRY, CITY, HOSPITAL, FLOOR: 5 CORRECT
SAY:  READ THE WORDS ON THE PAGE AND THEN DO WHAT IT SAYS.  THEN HAND THE PERSON THE SHEET WITH CLOSE YOUR EYES ON IT.  IF THE SUBJECT READS AND DOES NOT CLOSE THEIR EYES, REPEAT UP TO THREE TIMES.  SCORE ONLY IF SUBJECT CLOSES EYES.: 3 CORRECT
SHOW: PENCIL [OBJECT] ASK: WHAT IS THIS CALLED?: 2 CORRECT
SAY: I WOULD LIKE YOU TO REPEAT THIS PHRASE AFTER ME: NO IFS, ANDS, OR BUTS.: 1 CORRECT
SPELL THE WORD WORLD FORWARD AND BACKWARDS OR SERIAL 7S: 5 CORRECT
HAND THE PERSON A PENCIL AND PAPER. SAY:  WRITE ANY COMPLETE SENTENCE ON THAT PIECE OF PAPER. (NOTE: THE SENTENCE MUST MAKE SENSE.  IGNORE SPELLING ERRORS): 1 CORRECT
SUM ALL MMSE QUESTIONS FOR TOTAL SCORE [OUT OF 30].: 30
PLEASE COPY THIS PICTURE (NOTE ALL 10 ANGLES MUST BE PRESENT AND TWO MUST INTERSECT): 1 CORRECT
WHAT IS THE YEAR, SEASON, DATE, DAY, AND MONTH: 5 CORRECT

## 2025-08-13 ASSESSMENT — PAIN SCALES - GENERAL: PAINLEVEL_OUTOF10: 5

## 2025-08-14 LAB
ALBUMIN/CREAT UR: NORMAL MG/G CREAT
CREAT UR-MCNC: 97 MG/DL (ref 20–320)
MICROALBUMIN UR-MCNC: >600 MG/DL

## 2025-08-15 DIAGNOSIS — I48.11 LONGSTANDING PERSISTENT ATRIAL FIBRILLATION (MULTI): ICD-10-CM

## 2025-08-16 LAB — BACTERIA UR CULT: ABNORMAL

## 2025-08-19 DIAGNOSIS — I48.11 LONGSTANDING PERSISTENT ATRIAL FIBRILLATION (MULTI): ICD-10-CM

## 2025-08-20 LAB
ALBUMIN SERPL-MCNC: 3.6 G/DL (ref 3.6–5.1)
ALP SERPL-CCNC: 44 U/L (ref 35–144)
ALT SERPL-CCNC: 25 U/L (ref 9–46)
ANION GAP SERPL CALCULATED.4IONS-SCNC: 9 MMOL/L (CALC) (ref 7–17)
AST SERPL-CCNC: 20 U/L (ref 10–35)
BASOPHILS # BLD AUTO: 67 CELLS/UL (ref 0–200)
BASOPHILS NFR BLD AUTO: 1 %
BILIRUB SERPL-MCNC: 0.7 MG/DL (ref 0.2–1.2)
BUN SERPL-MCNC: 46 MG/DL (ref 7–25)
CALCIUM SERPL-MCNC: 8.6 MG/DL (ref 8.6–10.3)
CHLORIDE SERPL-SCNC: 105 MMOL/L (ref 98–110)
CO2 SERPL-SCNC: 25 MMOL/L (ref 20–32)
CREAT SERPL-MCNC: 2.1 MG/DL (ref 0.7–1.28)
EGFRCR SERPLBLD CKD-EPI 2021: 32 ML/MIN/1.73M2
EOSINOPHIL # BLD AUTO: 60 CELLS/UL (ref 15–500)
EOSINOPHIL NFR BLD AUTO: 0.9 %
ERYTHROCYTE [DISTWIDTH] IN BLOOD BY AUTOMATED COUNT: 14.3 % (ref 11–15)
EST. AVERAGE GLUCOSE BLD GHB EST-MCNC: 157 MG/DL
EST. AVERAGE GLUCOSE BLD GHB EST-SCNC: 8.7 MMOL/L
GLUCOSE SERPL-MCNC: 148 MG/DL (ref 65–99)
HBA1C MFR BLD: 7.1 %
HCT VFR BLD AUTO: 41.5 % (ref 38.5–50)
HGB BLD-MCNC: 13.4 G/DL (ref 13.2–17.1)
INR PPP: 1.6
LYMPHOCYTES # BLD AUTO: 925 CELLS/UL (ref 850–3900)
LYMPHOCYTES NFR BLD AUTO: 13.8 %
MCH RBC QN AUTO: 30.7 PG (ref 27–33)
MCHC RBC AUTO-ENTMCNC: 32.3 G/DL (ref 32–36)
MCV RBC AUTO: 95.2 FL (ref 80–100)
MONOCYTES # BLD AUTO: 449 CELLS/UL (ref 200–950)
MONOCYTES NFR BLD AUTO: 6.7 %
NEUTROPHILS # BLD AUTO: 5199 CELLS/UL (ref 1500–7800)
NEUTROPHILS NFR BLD AUTO: 77.6 %
PLATELET # BLD AUTO: 173 THOUSAND/UL (ref 140–400)
PMV BLD REES-ECKER: 10.2 FL (ref 7.5–12.5)
POTASSIUM SERPL-SCNC: 5.1 MMOL/L (ref 3.5–5.3)
PROT SERPL-MCNC: 5.7 G/DL (ref 6.1–8.1)
PROTHROMBIN TIME: 16.1 SEC (ref 9–11.5)
PSA SERPL-MCNC: 0.25 NG/ML
RBC # BLD AUTO: 4.36 MILLION/UL (ref 4.2–5.8)
SODIUM SERPL-SCNC: 139 MMOL/L (ref 135–146)
WBC # BLD AUTO: 6.7 THOUSAND/UL (ref 3.8–10.8)

## 2025-08-20 ASSESSMENT — ENCOUNTER SYMPTOMS
BACK PAIN: 1
ARTHRALGIAS: 1

## 2025-08-22 DIAGNOSIS — I48.11 LONGSTANDING PERSISTENT ATRIAL FIBRILLATION (MULTI): ICD-10-CM

## 2025-08-22 DIAGNOSIS — R35.0 BENIGN PROSTATIC HYPERPLASIA WITH URINARY FREQUENCY: Primary | ICD-10-CM

## 2025-08-22 DIAGNOSIS — N40.1 BENIGN PROSTATIC HYPERPLASIA WITH URINARY FREQUENCY: Primary | ICD-10-CM

## 2025-08-23 RX ORDER — TAMSULOSIN HYDROCHLORIDE 0.4 MG/1
0.4 CAPSULE ORAL NIGHTLY
Qty: 90 CAPSULE | Refills: 3 | Status: SHIPPED | OUTPATIENT
Start: 2025-08-23

## 2025-08-26 DIAGNOSIS — I48.11 LONGSTANDING PERSISTENT ATRIAL FIBRILLATION (MULTI): ICD-10-CM

## 2025-08-29 DIAGNOSIS — I48.11 LONGSTANDING PERSISTENT ATRIAL FIBRILLATION (MULTI): ICD-10-CM

## 2025-09-02 ENCOUNTER — APPOINTMENT (OUTPATIENT)
Dept: RADIOLOGY | Facility: CLINIC | Age: 75
End: 2025-09-02
Payer: MEDICARE

## 2025-09-15 ENCOUNTER — APPOINTMENT (OUTPATIENT)
Dept: UROLOGY | Facility: CLINIC | Age: 75
End: 2025-09-15
Payer: MEDICARE

## 2025-09-17 ENCOUNTER — APPOINTMENT (OUTPATIENT)
Dept: RADIOLOGY | Facility: CLINIC | Age: 75
End: 2025-09-17
Payer: MEDICARE

## 2025-09-22 ENCOUNTER — APPOINTMENT (OUTPATIENT)
Facility: CLINIC | Age: 75
End: 2025-09-22
Payer: MEDICARE

## (undated) DEVICE — CATHETER, ANGIO, EXPO, WRP, 6 FR X 100 CM

## (undated) DEVICE — CATHETER, DIAGNOSTIC, INFINITI, 6 FR, JL 4.0

## (undated) DEVICE — SHEATH, PINNACLE, W/.038 GUIDEWIRE, 10 CM,  6FR INTRODUCER, 6FR DIA, 2.5 CM DIALATOR

## (undated) DEVICE — CLOSURE DEVICE, VASCULAR, ANGIO-SEAL, VIP, 6FR, LF

## (undated) DEVICE — KIT, NAMIC STANDARD LEFT HEART, CUSTOM, LWMC

## (undated) DEVICE — CATHETER, BALLOON DILATION, EUPHORA SEMICOMPLIANT 2.50  X 10 MM X 142CM

## (undated) DEVICE — PAD, ELECTRODE DEFIB PADPRO ADULT STRL W/ADAPTER

## (undated) DEVICE — PACK, ANGIO P2, CUSTOM, LAKE

## (undated) DEVICE — INTRODUCER, SHEATH, AVANTI PLUS, W/MINI WIRE, STANDARD, 6MS FR, 11 CM

## (undated) DEVICE — CATHETER, GUIDING, VISTA BRITE 6FR, XB LAD 3.5 100CM

## (undated) DEVICE — CATHETER, EXPO, MODEL-D, 6FR PIG 110CM

## (undated) DEVICE — GUIDEWIRE, J TIP, 3 MM, 0.035 IN X 150 CM, PTFE

## (undated) DEVICE — GUIDEWIRE, PT2 MODERATE SUPPORT, 185CM, J-TIP